# Patient Record
Sex: FEMALE | Race: WHITE | NOT HISPANIC OR LATINO | Employment: OTHER | URBAN - METROPOLITAN AREA
[De-identification: names, ages, dates, MRNs, and addresses within clinical notes are randomized per-mention and may not be internally consistent; named-entity substitution may affect disease eponyms.]

---

## 2017-01-05 ENCOUNTER — DOCUMENTATION ONLY (OUTPATIENT)
Dept: NEUROLOGY | Facility: CLINIC | Age: 52
End: 2017-01-05

## 2017-01-05 NOTE — PROGRESS NOTES
Notified via fax from [employer] that patient has been approved for the Free Drug Program for Avonex as of 1/4/2017.

## 2017-01-09 DIAGNOSIS — M79.2 NEUROPATHIC PAIN OF FOOT, LEFT: ICD-10-CM

## 2017-01-09 DIAGNOSIS — G57.93 NEUROPATHIC PAIN OF BOTH FEET: ICD-10-CM

## 2017-01-09 DIAGNOSIS — M79.2 NEUROPATHIC PAIN OF FOOT, RIGHT: ICD-10-CM

## 2017-01-09 DIAGNOSIS — G35 MS (MULTIPLE SCLEROSIS): ICD-10-CM

## 2017-01-10 RX ORDER — GABAPENTIN 400 MG/1
CAPSULE ORAL
Qty: 450 CAPSULE | Refills: 1 | Status: SHIPPED | OUTPATIENT
Start: 2017-01-10 | End: 2017-05-31 | Stop reason: SDUPTHER

## 2017-01-10 RX ORDER — CARBAMAZEPINE 200 MG/1
TABLET, EXTENDED RELEASE ORAL
Qty: 180 TABLET | Refills: 1 | Status: SHIPPED | OUTPATIENT
Start: 2017-01-10 | End: 2017-01-19

## 2017-01-10 RX ORDER — GABAPENTIN 600 MG/1
TABLET ORAL
Qty: 270 TABLET | Refills: 1 | Status: SHIPPED | OUTPATIENT
Start: 2017-01-10 | End: 2017-05-31 | Stop reason: SDUPTHER

## 2017-01-17 ENCOUNTER — LAB VISIT (OUTPATIENT)
Dept: LAB | Facility: HOSPITAL | Age: 52
End: 2017-01-17
Attending: INTERNAL MEDICINE
Payer: MEDICARE

## 2017-01-17 DIAGNOSIS — D50.9 IRON DEFICIENCY ANEMIA, UNSPECIFIED: ICD-10-CM

## 2017-01-17 LAB
BASOPHILS # BLD AUTO: 0.02 K/UL
BASOPHILS NFR BLD: 0.2 %
DIFFERENTIAL METHOD: ABNORMAL
EOSINOPHIL # BLD AUTO: 0.2 K/UL
EOSINOPHIL NFR BLD: 2 %
ERYTHROCYTE [DISTWIDTH] IN BLOOD BY AUTOMATED COUNT: 12.7 %
FERRITIN SERPL-MCNC: 36 NG/ML
HCT VFR BLD AUTO: 40 %
HGB BLD-MCNC: 14.3 G/DL
IRON SERPL-MCNC: 91 UG/DL
LYMPHOCYTES # BLD AUTO: 1.7 K/UL
LYMPHOCYTES NFR BLD: 20.4 %
MCH RBC QN AUTO: 29.7 PG
MCHC RBC AUTO-ENTMCNC: 35.8 %
MCV RBC AUTO: 83 FL
MONOCYTES # BLD AUTO: 0.5 K/UL
MONOCYTES NFR BLD: 5.8 %
NEUTROPHILS # BLD AUTO: 6.1 K/UL
NEUTROPHILS NFR BLD: 71.4 %
PLATELET # BLD AUTO: 306 K/UL
PMV BLD AUTO: 8.4 FL
RBC # BLD AUTO: 4.81 M/UL
SATURATED IRON: 24 %
TOTAL IRON BINDING CAPACITY: 385 UG/DL
TRANSFERRIN SERPL-MCNC: 260 MG/DL
WBC # BLD AUTO: 8.55 K/UL

## 2017-01-17 PROCEDURE — 85025 COMPLETE CBC W/AUTO DIFF WBC: CPT

## 2017-01-17 PROCEDURE — 84466 ASSAY OF TRANSFERRIN: CPT

## 2017-01-17 PROCEDURE — 36415 COLL VENOUS BLD VENIPUNCTURE: CPT

## 2017-01-17 PROCEDURE — 82728 ASSAY OF FERRITIN: CPT

## 2017-01-17 PROCEDURE — 83540 ASSAY OF IRON: CPT

## 2017-01-18 ENCOUNTER — DOCUMENTATION ONLY (OUTPATIENT)
Dept: NEUROLOGY | Facility: CLINIC | Age: 52
End: 2017-01-18

## 2017-01-19 ENCOUNTER — OFFICE VISIT (OUTPATIENT)
Dept: HEMATOLOGY/ONCOLOGY | Facility: CLINIC | Age: 52
End: 2017-01-19
Payer: MEDICARE

## 2017-01-19 ENCOUNTER — TELEPHONE (OUTPATIENT)
Dept: NEUROLOGY | Facility: CLINIC | Age: 52
End: 2017-01-19

## 2017-01-19 VITALS
TEMPERATURE: 98 F | DIASTOLIC BLOOD PRESSURE: 60 MMHG | WEIGHT: 137.13 LBS | HEART RATE: 62 BPM | BODY MASS INDEX: 26.78 KG/M2 | SYSTOLIC BLOOD PRESSURE: 102 MMHG | OXYGEN SATURATION: 95 %

## 2017-01-19 DIAGNOSIS — D50.9 IRON DEFICIENCY ANEMIA, UNSPECIFIED IRON DEFICIENCY ANEMIA TYPE: Primary | ICD-10-CM

## 2017-01-19 PROCEDURE — 99213 OFFICE O/P EST LOW 20 MIN: CPT | Mod: S$GLB,,, | Performed by: INTERNAL MEDICINE

## 2017-01-19 PROCEDURE — 1159F MED LIST DOCD IN RCRD: CPT | Mod: S$GLB,,, | Performed by: INTERNAL MEDICINE

## 2017-01-19 PROCEDURE — 99999 PR PBB SHADOW E&M-EST. PATIENT-LVL IV: CPT | Mod: PBBFAC,,, | Performed by: INTERNAL MEDICINE

## 2017-01-19 PROCEDURE — 3078F DIAST BP <80 MM HG: CPT | Mod: S$GLB,,, | Performed by: INTERNAL MEDICINE

## 2017-01-19 PROCEDURE — 3074F SYST BP LT 130 MM HG: CPT | Mod: S$GLB,,, | Performed by: INTERNAL MEDICINE

## 2017-01-19 RX ORDER — CARBAMAZEPINE 200 MG/1
200 TABLET, EXTENDED RELEASE ORAL 2 TIMES DAILY
COMMUNITY
End: 2017-02-20 | Stop reason: SDUPTHER

## 2017-01-19 NOTE — PROGRESS NOTES
"Subjective:       Patient ID: Zoey Cali is a 51 y.o. female.    Chief Complaint: Follow-up  Diagnosis: TRACIE  HPI 51 y/o female with history of MS seen  today for f/u for TRACIE  She undergoes intermittent IV iron therapy.  She has been intolerant of oral Fe supp  s/p GI eval with EGD and colonoscopy which was unremarkable    She is followed by Dr. Mendez for long standing history of MS and remains on therapy with Avonex   She has chronic pain  and neuralgia and on opioid pain medication managed by PCP and Neurology    Today , she reports mild fatuge  She ran out of Tegretol 4 days ago  She reports "trembling  in legs" and HAs since d/c medication  No SOB/CP/N/V  No melena, hematochezia or change in bowel habits    MRI brain planned end of February     CBC reveals wbc 8550/mm3  Hb 14.3g/d  40 g/dl   plt ct 306k    Review of Systems   Constitutional: Positive for fatigue. Negative for appetite change, chills and fever.   Eyes: Negative for visual disturbance.   Respiratory: Negative for cough and shortness of breath.    Cardiovascular: Negative for chest pain, palpitations and leg swelling.   Gastrointestinal: Negative for abdominal pain, blood in stool, constipation, diarrhea, nausea and vomiting.   Genitourinary: Negative for dysuria, frequency and hematuria.   Musculoskeletal: Negative for arthralgias, back pain and joint swelling.   Skin: Negative for rash.        No petechiae, ecchymoses   Neurological: Positive for headaches. Negative for light-headedness.        Paresthesias   Hematological: Negative for adenopathy. Does not bruise/bleed easily.       Objective:       Vitals:    01/19/17 1104   BP: 102/60   BP Location: Left arm   Patient Position: Sitting   BP Method: Manual   Pulse: 62   Temp: 98.4 °F (36.9 °C)   TempSrc: Oral   SpO2: 95%   Weight: 62.2 kg (137 lb 2 oz)       Physical Exam   Constitutional: She is oriented to person, place, and time. She appears well-developed and well-nourished.   HENT: "   Head: Normocephalic.   Mouth/Throat: Oropharynx is clear and moist. No oropharyngeal exudate.   Eyes: Conjunctivae and lids are normal. Pupils are equal, round, and reactive to light. No scleral icterus.   Neck: Normal range of motion. Neck supple. No thyromegaly present.   Cardiovascular: Normal rate, regular rhythm and normal heart sounds.    No murmur heard.  Pulmonary/Chest: Breath sounds normal. She has no wheezes. She has no rales.   Abdominal: Soft. Bowel sounds are normal. She exhibits no distension and no mass. There is no hepatosplenomegaly. There is no tenderness. There is no rebound and no guarding.   Musculoskeletal: Normal range of motion. She exhibits no edema or tenderness.   Lymphadenopathy:     She has no cervical adenopathy.     She has no axillary adenopathy.        Right: No supraclavicular adenopathy present.        Left: No supraclavicular adenopathy present.   Neurological: She is alert and oriented to person, place, and time. No cranial nerve deficit. Coordination normal.   Skin: Skin is warm and dry. No ecchymosis, no petechiae and no rash noted. No erythema.   Psychiatric: She has a normal mood and affect.        Lab Results   Component Value Date    WBC 8.55 01/17/2017    HGB 14.3 01/17/2017    HCT 40.0 01/17/2017    MCV 83 01/17/2017     01/17/2017     Lab Results   Component Value Date    IRON 91 01/17/2017    TIBC 385 01/17/2017    FERRITIN 36 01/17/2017       Assessment:       1. Iron deficiency anemia, unspecified iron deficiency anemia type        Plan:   Zoey FRASER was seen today for follow-up.    Diagnoses and all orders for this visit:    Iron deficiency anemia, unspecified iron deficiency       S/p intermittent IV Fe therapy       S/p GI eval-unremarkable       CBC reveals stable Hb 14+ g/dl        Fe parameters wnl        No indication for IV iron        Cont to monitor                  Nursing staff will contact Dr. Mendez's office today regarding refill of Tegretol       F/u 3 mos with cbc, Fe studies prior to f/u( or sooner if problems should arise in the interim)      Cc: MD Heidy Lancaster MD

## 2017-01-19 NOTE — MR AVS SNAPSHOT
[facility]  [address] [place] [address]  Phone: [phone]                  Zoey Cali   2017 10:30 AM   Office Visit    Description:  Female : 1965   Provider:  Charito Mejia MD   [facility]           Reason for Visit     Follow-up           Diagnoses this Visit        Comments    Iron deficiency anemia, unspecified iron deficiency anemia type    -  Primary            To Do List           Future Appointments        Provider Department Dept Phone    2017 10:30 AM [facility] MRI2 [facility] [phone]    3/1/2017 10:00 AM Charito Mejia MD [facility] [phone]    3/29/2017 12:45 PM [facility] Multiple S[facility] [phone]      Goals (5 Years of Data)     None      Follow-Up and Disposition     Return in about 2 months (around 3/19/2017).      [facility]     [facility] -  Assistance  Registered nurses in the [facility] provide clinical advisement, health education, appointment booking, and other advisory services.  Call for this free service at [phone].             Medications           Message regarding Medications     Verify the changes and/or additions to your medication regime listed below are the same as discussed with your clinician today.  If any of these changes or additions are incorrect, please notify your healthcare provider.        STOP taking these medications     albuterol (PROAIR HFA) 90 mcg/actuation inhaler Inhale 2 puffs into the lungs every 4 (four) hours as needed for Wheezing.           Verify that the below list of medications is an accurate representation of the medications you are currently taking.  If none reported, the list may be blank. If incorrect, please contact your healthcare provider. Carry this list with you in case of emergency.           Current Medications      cholecalciferol, vitamin D3, (VITAMIN D3) 5,000 unit Tab Take 5,000 Units by mouth once daily.    diazePAM (VALIUM) 5 MG tablet Take 1 to 2 tabs po 2 hours prior to MRI    escitalopram oxalate (LEXAPRO) 10 MG tablet Take 2 tablets (20 mg total) by mouth once daily.    gabapentin (NEURONTIN) 400 MG capsule TAKES 1 CAPSULE @ 9AM, 1 CAPSULE @ 1PM, 1 CAPSULE @ 5PM, 2 CAPSULES @ BEDTIME    gabapentin (NEURONTIN) 600 MG tablet TAKE 1 TABLET THREE TIMES DAILY    hydrocodone-acetaminophen 10-325mg (NORCO)  mg Tab Starting on Feb 01, 2017. Take 1 tablet by mouth every 6 (six) hours as needed.    ibuprofen (ADVIL,MOTRIN) 200 MG tablet Take 800 mg by mouth. Takes 4 tablets every 6 hours post Avonex injection    interferon beta-1a (AVONEX) 30 mcg/0.5 mL PnKt Inject 30 mcg into the muscle once a week.    lidocaine-prilocaine (EMLA) cream Apply topically 3 (three) times daily.     lorazepam (ATIVAN) 1 MG tablet Take 1 tablet (1 mg total) by mouth nightly as needed.    meclizine (ANTIVERT) 25 mg tablet Take 1 tablet (25 mg total) by mouth as needed. 1 Tablet Oral Every day    metoprolol succinate (TOPROL-XL) 100 MG 24 hr tablet Take 1 tablet (100 mg total) by mouth once daily.    omeprazole (PRILOSEC) 40 MG capsule Take 40 mg by mouth every evening.     promethazine (PHENERGAN) 25 MG tablet Take 1 tablet (25 mg total) by mouth every 4 (four) hours.    simvastatin (ZOCOR) 20 MG tablet Take 2 tablets (40 mg total) by mouth every evening.    tizanidine (ZANAFLEX) 4 MG tablet TAKE 2 TABLETS IN THE MORNING, AT NOON, AND IN THE EVENING AND 3 TABLETS AT NIGHT (9 TABLETS PER DAY)    triamterene-hydrochlorothiazide 37.5-25 mg (MAXZIDE-25) 37.5-25 mg per tablet Take 1 tablet by mouth once daily.    carbamazepine (TEGRETOL XR) 200 MG 12 hr tablet Take 200 mg by mouth 2 (two) times daily.    lidocaine (LIDODERM) 5 %(700 mg/patch) Place 1 patch onto the skin once daily. Remove & Discard patch within 12 hours or as directed by MD            Clinical Reference Information           Vital Signs - Last Recorded  Most recent update: 1/19/2017 11:05 AM by Brittany Whitmore RN    BP Pulse Temp Wt SpO2 BMI    102/60 (BP Location: Left arm, Patient Position: Sitting, BP Method: Manual) 62 98.4 °F (36.9 °C) (Oral) 62.2 kg (137 lb 2 oz) 95% 26.78 kg/m2      Blood Pressure          Most Recent Value    BP  102/60      Allergies as of 1/19/2017     Lomotil [Diphenoxylate-atropine]    Dilaudid [Hydromorphone (Bulk)]      Immunizations Administered on Date of Encounter - 1/19/2017     None      Smoking Cessation     If you would like to quit smoking:   You may be eligible for free services if you are a [place] resident and started smoking cigarettes before September 1, 1988.  Call the Smoking Cessation Trust (SCT) toll free at [phone] or [phone].   Call 6-097-QUIT-NOW if you do not meet the above criteria.

## 2017-01-19 NOTE — TELEPHONE ENCOUNTER
Call received from Kia. She states that pt's Tegretol is $294 for a 90 day supply. Pt is out of medication and doesn't know what to do.    Call placed to [facility]. Spoke with Hakeem. He states that pt has $400 deductible since start of new year. Her current 90 day fill will be $294 and next 90 day fill will be $105.60. Her subsequent fills will be her much lower standard copay.    Call placed to pt and informed her of the above and that we have faxed in a PA request to [facility] for tegretol. She would like a 30 day supply called in to [facility] in order to tide her over while waiting for the mail delivery issue to be sorted out.    Call placed to [facility] and spoke with Jena. Called in a 30 day supply.

## 2017-01-19 NOTE — TELEPHONE ENCOUNTER
----- Message from Sharlene Cano sent at 1/19/2017 11:22 AM CST -----  Contact: [facility]  Would like to speak with someone, regarding her carbamazepine (TEGRETOL XR) 200 MG 12 hr tablet    Please call: [phone]

## 2017-01-20 NOTE — TELEPHONE ENCOUNTER
Call placed to pt. She has not picked up her rx yet. I informed her that we received a denial to lower the tier classification of her carbamazepine er. We discussed prescription discount cards and [email or web address] to help her with medication cost. Verbalizes understanding of all. States she will call this office if there are any other issues with filling her rx.

## 2017-02-01 ENCOUNTER — HOSPITAL ENCOUNTER (OUTPATIENT)
Dept: RADIOLOGY | Facility: HOSPITAL | Age: 52
Discharge: HOME | End: 2017-02-01
Attending: PSYCHIATRY & NEUROLOGY
Payer: MEDICARE

## 2017-02-01 DIAGNOSIS — G35 MS (MULTIPLE SCLEROSIS): ICD-10-CM

## 2017-02-01 PROCEDURE — A9585 GADOBUTROL INJECTION: HCPCS | Performed by: PSYCHIATRY & NEUROLOGY

## 2017-02-01 PROCEDURE — 70553 MRI BRAIN STEM W/O & W/DYE: CPT | Mod: TC

## 2017-02-01 PROCEDURE — 25500020 PHARM REV CODE 255: Performed by: PSYCHIATRY & NEUROLOGY

## 2017-02-01 PROCEDURE — 70553 MRI BRAIN STEM W/O & W/DYE: CPT | Mod: 26,,, | Performed by: RADIOLOGY

## 2017-02-01 RX ORDER — GADOBUTROL 604.72 MG/ML
7 INJECTION INTRAVENOUS
Status: COMPLETED | OUTPATIENT
Start: 2017-02-01 | End: 2017-02-01

## 2017-02-01 RX ADMIN — GADOBUTROL 7 ML: 604.72 INJECTION INTRAVENOUS at 10:02

## 2017-02-17 DIAGNOSIS — G35 MULTIPLE SCLEROSIS: Primary | ICD-10-CM

## 2017-02-20 ENCOUNTER — HOSPITAL ENCOUNTER (OUTPATIENT)
Dept: RADIOLOGY | Facility: HOSPITAL | Age: 52
Discharge: HOME | End: 2017-02-20
Attending: OBSTETRICS & GYNECOLOGY
Payer: MEDICARE

## 2017-02-20 DIAGNOSIS — Z12.31 OTHER SCREENING MAMMOGRAM: ICD-10-CM

## 2017-02-20 PROCEDURE — 77063 BREAST TOMOSYNTHESIS BI: CPT | Mod: 26,,, | Performed by: RADIOLOGY

## 2017-02-20 PROCEDURE — 77067 SCR MAMMO BI INCL CAD: CPT | Mod: TC

## 2017-02-20 PROCEDURE — 77067 SCR MAMMO BI INCL CAD: CPT | Mod: 26,,, | Performed by: RADIOLOGY

## 2017-02-21 ENCOUNTER — PATIENT MESSAGE (OUTPATIENT)
Dept: OBSTETRICS AND GYNECOLOGY | Facility: CLINIC | Age: 52
End: 2017-02-21

## 2017-02-22 RX ORDER — CARBAMAZEPINE 200 MG/1
TABLET, EXTENDED RELEASE ORAL
Qty: 60 TABLET | Refills: 5 | Status: SHIPPED | OUTPATIENT
Start: 2017-02-22 | End: 2017-08-23 | Stop reason: SDUPTHER

## 2017-02-27 ENCOUNTER — LAB VISIT (OUTPATIENT)
Dept: LAB | Facility: HOSPITAL | Age: 52
End: 2017-02-27
Attending: INTERNAL MEDICINE
Payer: MEDICARE

## 2017-02-27 DIAGNOSIS — D50.9 IRON DEFICIENCY ANEMIA, UNSPECIFIED: ICD-10-CM

## 2017-02-27 LAB
BASOPHILS # BLD AUTO: 0.04 K/UL
BASOPHILS NFR BLD: 0.6 %
DIFFERENTIAL METHOD: ABNORMAL
EOSINOPHIL # BLD AUTO: 0.2 K/UL
EOSINOPHIL NFR BLD: 3.1 %
ERYTHROCYTE [DISTWIDTH] IN BLOOD BY AUTOMATED COUNT: 13.2 %
FERRITIN SERPL-MCNC: 38 NG/ML
HCT VFR BLD AUTO: 40.1 %
HGB BLD-MCNC: 14.2 G/DL
IRON SERPL-MCNC: 74 UG/DL
LYMPHOCYTES # BLD AUTO: 1.6 K/UL
LYMPHOCYTES NFR BLD: 24.7 %
MCH RBC QN AUTO: 29.3 PG
MCHC RBC AUTO-ENTMCNC: 35.4 %
MCV RBC AUTO: 83 FL
MONOCYTES # BLD AUTO: 0.4 K/UL
MONOCYTES NFR BLD: 5.7 %
NEUTROPHILS # BLD AUTO: 4.3 K/UL
NEUTROPHILS NFR BLD: 65.7 %
PLATELET # BLD AUTO: 278 K/UL
PMV BLD AUTO: 9.1 FL
RBC # BLD AUTO: 4.85 M/UL
SATURATED IRON: 19 %
TOTAL IRON BINDING CAPACITY: 397 UG/DL
TRANSFERRIN SERPL-MCNC: 268 MG/DL
WBC # BLD AUTO: 6.47 K/UL

## 2017-02-27 PROCEDURE — 83540 ASSAY OF IRON: CPT

## 2017-02-27 PROCEDURE — 82728 ASSAY OF FERRITIN: CPT

## 2017-02-27 PROCEDURE — 36415 COLL VENOUS BLD VENIPUNCTURE: CPT

## 2017-02-27 PROCEDURE — 85025 COMPLETE CBC W/AUTO DIFF WBC: CPT

## 2017-03-01 ENCOUNTER — OFFICE VISIT (OUTPATIENT)
Dept: HEMATOLOGY/ONCOLOGY | Facility: CLINIC | Age: 52
End: 2017-03-01
Payer: MEDICARE

## 2017-03-01 VITALS
DIASTOLIC BLOOD PRESSURE: 64 MMHG | OXYGEN SATURATION: 96 % | HEIGHT: 60 IN | SYSTOLIC BLOOD PRESSURE: 118 MMHG | TEMPERATURE: 98 F | HEART RATE: 62 BPM | WEIGHT: 137.56 LBS | BODY MASS INDEX: 27.01 KG/M2

## 2017-03-01 DIAGNOSIS — D50.9 IRON DEFICIENCY ANEMIA, UNSPECIFIED IRON DEFICIENCY ANEMIA TYPE: Primary | ICD-10-CM

## 2017-03-01 PROCEDURE — 3078F DIAST BP <80 MM HG: CPT | Mod: S$GLB,,, | Performed by: INTERNAL MEDICINE

## 2017-03-01 PROCEDURE — 1160F RVW MEDS BY RX/DR IN RCRD: CPT | Mod: S$GLB,,, | Performed by: INTERNAL MEDICINE

## 2017-03-01 PROCEDURE — 3074F SYST BP LT 130 MM HG: CPT | Mod: S$GLB,,, | Performed by: INTERNAL MEDICINE

## 2017-03-01 PROCEDURE — 99999 PR PBB SHADOW E&M-EST. PATIENT-LVL IV: CPT | Mod: PBBFAC,,, | Performed by: INTERNAL MEDICINE

## 2017-03-01 PROCEDURE — 99213 OFFICE O/P EST LOW 20 MIN: CPT | Mod: S$GLB,,, | Performed by: INTERNAL MEDICINE

## 2017-03-01 RX ORDER — SODIUM CHLORIDE 0.9 % (FLUSH) 0.9 %
10 SYRINGE (ML) INJECTION
Status: CANCELLED | OUTPATIENT
Start: 2017-03-09

## 2017-03-01 RX ORDER — SODIUM CHLORIDE 0.9 % (FLUSH) 0.9 %
10 SYRINGE (ML) INJECTION
Status: CANCELLED | OUTPATIENT
Start: 2017-03-16

## 2017-03-01 RX ORDER — HEPARIN 100 UNIT/ML
500 SYRINGE INTRAVENOUS
Status: CANCELLED | OUTPATIENT
Start: 2017-03-09

## 2017-03-01 RX ORDER — CIPROFLOXACIN HYDROCHLORIDE 3 MG/ML
SOLUTION/ DROPS OPHTHALMIC
Refills: 0 | COMMUNITY
Start: 2017-02-07 | End: 2017-03-29

## 2017-03-01 RX ORDER — HEPARIN 100 UNIT/ML
500 SYRINGE INTRAVENOUS
Status: CANCELLED | OUTPATIENT
Start: 2017-03-16

## 2017-03-01 NOTE — PROGRESS NOTES
Subjective:       Patient ID: Zoey Cali is a 51 y.o. female.    Chief Complaint: Follow-up  Diagnosis: TRACIE  HPI 51 y/o female with history of MS seen  today for f/u for TRACIE  She undergoes intermittent IV iron therapy.  She has been intolerant of oral Fe supp  s/p GI eval with EGD and colonoscopy which was unremarkable    She is followed by Dr. Mendez for long standing history of MS and remains on therapy with Avonex   She has chronic pain  and neuralgia and on opioid pain medication managed by PCP and Neurology    Today , she reports no new issues  She continues with mild fatuge  No SOB/CP/N/V  No melena, hematochezia or change in bowel habits      CBC reveals wbc 6470/mm3  Hb 14.2g/d  40 g/dl   plt ct 270k    Review of Systems   Constitutional: Positive for fatigue. Negative for appetite change, chills and fever.   Eyes: Negative for visual disturbance.   Respiratory: Negative for cough and shortness of breath.    Cardiovascular: Negative for chest pain, palpitations and leg swelling.   Gastrointestinal: Negative for abdominal pain, blood in stool, constipation, diarrhea, nausea and vomiting.   Genitourinary: Negative for dysuria, frequency and hematuria.   Musculoskeletal: Negative for arthralgias, back pain and joint swelling.   Skin: Negative for rash.        No petechiae, ecchymoses   Neurological: Positive for headaches. Negative for light-headedness.        Paresthesias   Hematological: Negative for adenopathy. Does not bruise/bleed easily.       Objective:       Vitals:    03/01/17 1044   BP: 118/64   BP Location: Right arm   Patient Position: Sitting   BP Method: Manual   Pulse: 62   Temp: 97.7 °F (36.5 °C)   TempSrc: Oral   SpO2: 96%   Weight: 62.4 kg (137 lb 9.1 oz)   Height: 5' (1.524 m)       Physical Exam   Constitutional: She is oriented to person, place, and time. She appears well-developed and well-nourished.   HENT:   Head: Normocephalic.   Mouth/Throat: Oropharynx is clear and moist. No  oropharyngeal exudate.   Eyes: Conjunctivae and lids are normal. Pupils are equal, round, and reactive to light. No scleral icterus.   Neck: Normal range of motion. Neck supple. No thyromegaly present.   Cardiovascular: Normal rate, regular rhythm and normal heart sounds.    No murmur heard.  Pulmonary/Chest: Breath sounds normal. She has no wheezes. She has no rales.   Abdominal: Soft. Bowel sounds are normal. She exhibits no distension and no mass. There is no hepatosplenomegaly. There is no tenderness. There is no rebound and no guarding.   Musculoskeletal: Normal range of motion. She exhibits no edema or tenderness.   Lymphadenopathy:     She has no cervical adenopathy.     She has no axillary adenopathy.        Right: No supraclavicular adenopathy present.        Left: No supraclavicular adenopathy present.   Neurological: She is alert and oriented to person, place, and time. No cranial nerve deficit. Coordination normal.   Skin: Skin is warm and dry. No ecchymosis, no petechiae and no rash noted. No erythema.   Psychiatric: She has a normal mood and affect.        Lab Results   Component Value Date    WBC 6.47 02/27/2017    HGB 14.2 02/27/2017    HCT 40.1 02/27/2017    MCV 83 02/27/2017     02/27/2017     Lab Results   Component Value Date    IRON 74 02/27/2017    TIBC 397 02/27/2017    FERRITIN 38 02/27/2017       Assessment:       1. Iron deficiency anemia, unspecified iron deficiency anemia type        Plan:   Zoey FRASER was seen today for follow-up.    Diagnoses and all orders for this visit:    Iron deficiency anemia, unspecified iron deficiency       S/p intermittent IV Fe therapy       S/p GI eval-unremarkable       CBC reveals stable Hb 14+ g/dl        Fe parameters nl except - decreased Fe sat       Plan  IV iron with target Ferrtin > 50                           F/u 3 mos with cbc, Fe studies prior to f/u( or sooner if problems should arise in the interim)      Cc: Devon Collier MD          Heidy Mendez MD

## 2017-03-01 NOTE — MR AVS SNAPSHOT
[facility]  [address] [place] [address]  Phone: [phone]                  Zoey Cali   3/1/2017 10:00 AM   Office Visit    Description:  Female : 1965   Provider:  Charito Mejia MD   Department:  [facility] Oncology           Reason for Visit     Follow-up           Diagnoses this Visit        Comments    Iron deficiency anemia, unspecified iron deficiency anemia type    -  Primary            To Do List           Future Appointments        Provider Department Dept Phone    3/29/2017 12:45 PM [facility]- Multiple Sclerosis [phone]    2017 10:00 AM [facility] [phone]    2017 10:30 AM Charito Mejia [facility] [phone]      Goals (5 Years of Data)     None      Follow-Up and Disposition     Return in about 3 months (around 2017).      [facility]     [facility] Care [facility] -  Assistance  Registered nurses in the [facility] provide clinical advisement, health education, appointment booking, and other advisory services.  Call for this free service at [phone].             Medications           Message regarding Medications     Verify the changes and/or additions to your medication regime listed below are the same as discussed with your clinician today.  If any of these changes or additions are incorrect, please notify your healthcare provider.        STOP taking these medications     diazePAM (VALIUM) 5 MG tablet Take 1 to 2 tabs po 2 hours prior to MRI    lidocaine (LIDODERM) 5 %(700 mg/patch) Place 1 patch onto the skin once daily. Remove & Discard patch within 12 hours or as directed by MD           Verify that the below list of medications is an accurate representation of the medications you are currently taking.  If none reported, the list may be blank. If incorrect, please contact your healthcare  provider. Carry this list with you in case of emergency.           Current Medications     carbamazepine (TEGRETOL XR) 200 MG 12 hr tablet TAKE ONE TABLET TWICE DAILY    cholecalciferol, vitamin D3, (VITAMIN D3) 5,000 unit Tab Take 5,000 Units by mouth once daily.    ciprofloxacin HCl (CILOXAN) 0.3 % ophthalmic solution TWO DROPS IN LEFT EYE EVERY 4 HOURS FOR 10 DAYS    escitalopram oxalate (LEXAPRO) 10 MG tablet Take 2 tablets (20 mg total) by mouth once daily.    gabapentin (NEURONTIN) 400 MG capsule TAKES 1 CAPSULE @ 9AM, 1 CAPSULE @ 1PM, 1 CAPSULE @ 5PM, 2 CAPSULES @ BEDTIME    gabapentin (NEURONTIN) 600 MG tablet TAKE 1 TABLET THREE TIMES DAILY    hydrocodone-acetaminophen 10-325mg (NORCO)  mg Tab Take 1 tablet by mouth every 6 (six) hours as needed.    ibuprofen (ADVIL,MOTRIN) 200 MG tablet Take 800 mg by mouth. Takes 4 tablets every 6 hours post Avonex injection    interferon beta-1a (AVONEX) 30 mcg/0.5 mL PnKt Inject 30 mcg into the muscle once a week.    lidocaine-prilocaine (EMLA) cream Apply topically 3 (three) times daily.     lorazepam (ATIVAN) 1 MG tablet Take 1 tablet (1 mg total) by mouth nightly as needed.    meclizine (ANTIVERT) 25 mg tablet Take 1 tablet (25 mg total) by mouth as needed. 1 Tablet Oral Every day    metoprolol succinate (TOPROL-XL) 100 MG 24 hr tablet Take 1 tablet (100 mg total) by mouth once daily.    omeprazole (PRILOSEC) 40 MG capsule Take 40 mg by mouth every evening.     promethazine (PHENERGAN) 25 MG tablet Take 1 tablet (25 mg total) by mouth every 4 (four) hours.    simvastatin (ZOCOR) 20 MG tablet Take 2 tablets (40 mg total) by mouth every evening.    tizanidine (ZANAFLEX) 4 MG tablet TAKE 2 TABLETS IN THE MORNING, AT NOON, AND IN THE EVENING AND 3 TABLETS AT NIGHT (9 TABLETS PER DAY)    triamterene-hydrochlorothiazide 37.5-25 mg (MAXZIDE-25) 37.5-25 mg per tablet Take 1 tablet by mouth once daily.           Clinical Reference Information           Your Vitals Were      BP                   118/64 (BP Location: Right arm, Patient Position: Sitting, BP Method: Manual)           Blood Pressure          Most Recent Value    BP  118/64      Allergies as of 3/1/2017     Lomotil [Diphenoxylate-atropine]    Dilaudid [Hydromorphone (Bulk)]      Immunizations Administered on Date of Encounter - 3/1/2017     None      Smoking Cessation     If you would like to quit smoking:   You may be eligible for free services if you are a [place] resident and started smoking cigarettes before September 1, 1988.  Call the Smoking Cessation Trust (SCT) toll free at [phone] or [phone].   Call 4-689-QUIT-NOW if you do not meet the above criteria.            Language Assistance Services     ATTENTION: Language assistance services are available, free of charge. Please call [phone].      ATENCIÓN: Si batsheva chapa, tiene a small disposición servicios gratuitos de asistencia lingüística. Llame al [phone].     CHÚ Ý: N?u b?n nói Ti?ng Vi?t, có các d?ch v? h? tr? ngôn ng? mi?n phí dành cho b?n. G?i s? [phone].         [facility] complies with applicable Federal civil rights laws and does not discriminate on the basis of race, color, national origin, age, disability, or sex.

## 2017-03-09 ENCOUNTER — INFUSION (OUTPATIENT)
Dept: INFUSION THERAPY | Facility: HOSPITAL | Age: 52
End: 2017-03-09
Attending: INTERNAL MEDICINE
Payer: MEDICARE

## 2017-03-09 VITALS — HEART RATE: 62 BPM | DIASTOLIC BLOOD PRESSURE: 58 MMHG | SYSTOLIC BLOOD PRESSURE: 101 MMHG | RESPIRATION RATE: 14 BRPM

## 2017-03-09 DIAGNOSIS — D50.9 IRON DEFICIENCY ANEMIA, UNSPECIFIED IRON DEFICIENCY ANEMIA TYPE: Primary | ICD-10-CM

## 2017-03-09 PROCEDURE — 63600175 PHARM REV CODE 636 W HCPCS: Performed by: INTERNAL MEDICINE

## 2017-03-09 PROCEDURE — 25000003 PHARM REV CODE 250: Performed by: INTERNAL MEDICINE

## 2017-03-09 PROCEDURE — 96374 THER/PROPH/DIAG INJ IV PUSH: CPT

## 2017-03-09 RX ADMIN — SODIUM CHLORIDE: 0.9 INJECTION, SOLUTION INTRAVENOUS at 01:03

## 2017-03-09 RX ADMIN — IRON SUCROSE 200 MG: 20 INJECTION, SOLUTION INTRAVENOUS at 01:03

## 2017-03-09 NOTE — PLAN OF CARE
Problem: Patient Care Overview (Adult)  Goal: Plan of Care Review  Outcome: Ongoing (interventions implemented as appropriate)  Pt will inform md of any changes while on Venofer.

## 2017-03-16 ENCOUNTER — INFUSION (OUTPATIENT)
Dept: INFUSION THERAPY | Facility: HOSPITAL | Age: 52
End: 2017-03-16
Attending: INTERNAL MEDICINE
Payer: MEDICARE

## 2017-03-16 VITALS — SYSTOLIC BLOOD PRESSURE: 109 MMHG | DIASTOLIC BLOOD PRESSURE: 71 MMHG | HEART RATE: 76 BPM | RESPIRATION RATE: 16 BRPM

## 2017-03-16 DIAGNOSIS — D50.9 IRON DEFICIENCY ANEMIA, UNSPECIFIED IRON DEFICIENCY ANEMIA TYPE: Primary | ICD-10-CM

## 2017-03-16 PROCEDURE — 96372 THER/PROPH/DIAG INJ SC/IM: CPT

## 2017-03-16 PROCEDURE — 25000003 PHARM REV CODE 250: Performed by: INTERNAL MEDICINE

## 2017-03-16 PROCEDURE — 63600175 PHARM REV CODE 636 W HCPCS: Performed by: INTERNAL MEDICINE

## 2017-03-16 RX ADMIN — SODIUM CHLORIDE: 0.9 INJECTION, SOLUTION INTRAVENOUS at 01:03

## 2017-03-16 RX ADMIN — IRON SUCROSE 200 MG: 20 INJECTION, SOLUTION INTRAVENOUS at 01:03

## 2017-03-20 ENCOUNTER — TELEPHONE (OUTPATIENT)
Dept: NEUROLOGY | Facility: CLINIC | Age: 52
End: 2017-03-20

## 2017-03-23 ENCOUNTER — TELEPHONE (OUTPATIENT)
Dept: NEUROLOGY | Facility: CLINIC | Age: 52
End: 2017-03-23

## 2017-03-29 ENCOUNTER — OFFICE VISIT (OUTPATIENT)
Dept: FAMILY MEDICINE | Facility: CLINIC | Age: 52
End: 2017-03-29
Payer: MEDICARE

## 2017-03-29 VITALS
HEART RATE: 71 BPM | HEIGHT: 60 IN | DIASTOLIC BLOOD PRESSURE: 60 MMHG | WEIGHT: 138.69 LBS | SYSTOLIC BLOOD PRESSURE: 100 MMHG | OXYGEN SATURATION: 95 % | RESPIRATION RATE: 16 BRPM | BODY MASS INDEX: 27.23 KG/M2 | TEMPERATURE: 98 F

## 2017-03-29 DIAGNOSIS — J01.90 ACUTE SINUSITIS WITH SYMPTOMS GREATER THAN 10 DAYS: Primary | ICD-10-CM

## 2017-03-29 PROCEDURE — 3078F DIAST BP <80 MM HG: CPT | Mod: S$GLB,,, | Performed by: PHYSICIAN ASSISTANT

## 2017-03-29 PROCEDURE — 3074F SYST BP LT 130 MM HG: CPT | Mod: S$GLB,,, | Performed by: PHYSICIAN ASSISTANT

## 2017-03-29 PROCEDURE — 1160F RVW MEDS BY RX/DR IN RCRD: CPT | Mod: S$GLB,,, | Performed by: PHYSICIAN ASSISTANT

## 2017-03-29 PROCEDURE — 99214 OFFICE O/P EST MOD 30 MIN: CPT | Mod: S$GLB,,, | Performed by: PHYSICIAN ASSISTANT

## 2017-03-29 PROCEDURE — 99999 PR PBB SHADOW E&M-EST. PATIENT-LVL V: CPT | Mod: PBBFAC,,, | Performed by: PHYSICIAN ASSISTANT

## 2017-03-29 RX ORDER — DOXYCYCLINE 100 MG/1
100 TABLET ORAL 2 TIMES DAILY
Qty: 20 TABLET | Refills: 0 | Status: SHIPPED | OUTPATIENT
Start: 2017-03-29 | End: 2017-04-18 | Stop reason: ALTCHOICE

## 2017-03-29 RX ORDER — PROMETHAZINE HYDROCHLORIDE AND DEXTROMETHORPHAN HYDROBROMIDE 6.25; 15 MG/5ML; MG/5ML
5 SYRUP ORAL 4 TIMES DAILY PRN
Qty: 120 ML | Refills: 0 | Status: SHIPPED | OUTPATIENT
Start: 2017-03-29 | End: 2017-04-05

## 2017-03-29 NOTE — PROGRESS NOTES
Subjective:       Patient ID: Zoey Cali is a 51 y.o. female with multiple medical diagnoses as listed in the medical history and problem list that presents for URI (since yesterday)  .    Chief Complaint: URI (since yesterday)      URI    This is a new problem. The current episode started 1 to 4 weeks ago (worse over the weekend ). The problem has been gradually worsening. There has been no fever. Associated symptoms include congestion, coughing, headaches, nausea, rhinorrhea (AM able to blow it ), a sore throat and wheezing (maybe last night ). Pertinent negatives include no abdominal pain, ear pain, sneezing or vomiting. Treatments tried: tylenol sinu, severe congestion, and bendaryl--last dose was last night  The treatment provided mild relief.     Review of Systems   Constitutional: Positive for chills. Negative for fever.   HENT: Positive for congestion, postnasal drip, rhinorrhea (AM able to blow it ), sinus pressure and sore throat. Negative for ear pain, sneezing and trouble swallowing.    Eyes: Negative for pain, discharge, redness and itching.   Respiratory: Positive for cough and wheezing (maybe last night ). Negative for chest tightness and shortness of breath.    Gastrointestinal: Positive for nausea. Negative for abdominal pain and vomiting.   Neurological: Positive for headaches.         PAST MEDICAL HISTORY:  Past Medical History:   Diagnosis Date    Allergy     Anemia     Anxiety     Chronic kidney disease     Depression     Hypertension     Multiple sclerosis     Multiple sclerosis     Neuromuscular disorder     Osteoporosis     Rib fracture 12/2015       SOCIAL HISTORY:  Social History     Social History    Marital status:      Spouse name: N/A    Number of children: N/A    Years of education: N/A     Occupational History    Not on file.     Social History Main Topics    Smoking status: Current Every Day Smoker     Packs/day: 1.00     Types: Cigarettes    Smokeless  tobacco: Never Used    Alcohol use No    Drug use: No    Sexual activity: Yes     Other Topics Concern    Not on file     Social History Narrative       ALLERGIES AND MEDICATIONS: updated and reviewed.  Review of patient's allergies indicates:   Allergen Reactions    Lomotil [diphenoxylate-atropine]      Causes stomach spasms    Dilaudid [hydromorphone (bulk)] Itching     Current Outpatient Prescriptions   Medication Sig Dispense Refill    carbamazepine (TEGRETOL XR) 200 MG 12 hr tablet TAKE ONE TABLET TWICE DAILY 60 tablet 5    cholecalciferol, vitamin D3, (VITAMIN D3) 5,000 unit Tab Take 5,000 Units by mouth once daily.      escitalopram oxalate (LEXAPRO) 10 MG tablet Take 2 tablets (20 mg total) by mouth once daily. 180 tablet 3    gabapentin (NEURONTIN) 400 MG capsule TAKES 1 CAPSULE @ 9AM, 1 CAPSULE @ 1PM, 1 CAPSULE @ 5PM, 2 CAPSULES @ BEDTIME 450 capsule 1    gabapentin (NEURONTIN) 600 MG tablet TAKE 1 TABLET THREE TIMES DAILY 270 tablet 1    hydrocodone-acetaminophen 10-325mg (NORCO)  mg Tab Take 1 tablet by mouth every 6 (six) hours as needed. 120 tablet 0    ibuprofen (ADVIL,MOTRIN) 200 MG tablet Take 800 mg by mouth. Takes 4 tablets every 6 hours post Avonex injection      interferon beta-1a (AVONEX) 30 mcg/0.5 mL PnKt Inject 30 mcg into the muscle once a week. 3 kit 0    lidocaine-prilocaine (EMLA) cream Apply topically 3 (three) times daily.       lorazepam (ATIVAN) 1 MG tablet Take 1 tablet (1 mg total) by mouth nightly as needed. 30 tablet 2    meclizine (ANTIVERT) 25 mg tablet Take 1 tablet (25 mg total) by mouth as needed. 1 Tablet Oral Every day 90 tablet 3    metoprolol succinate (TOPROL-XL) 100 MG 24 hr tablet Take 1 tablet (100 mg total) by mouth once daily. 90 tablet 3    omeprazole (PRILOSEC) 40 MG capsule Take 40 mg by mouth every evening.       promethazine (PHENERGAN) 25 MG tablet Take 1 tablet (25 mg total) by mouth every 4 (four) hours. 45 tablet 0    simvastatin  (ZOCOR) 20 MG tablet Take 2 tablets (40 mg total) by mouth every evening. 180 tablet 3    tizanidine (ZANAFLEX) 4 MG tablet TAKE 2 TABLETS IN THE MORNING, AT NOON, AND IN THE EVENING AND 3 TABLETS AT NIGHT (9 TABLETS PER DAY) 810 tablet 3    triamterene-hydrochlorothiazide 37.5-25 mg (MAXZIDE-25) 37.5-25 mg per tablet Take 1 tablet by mouth once daily. 90 tablet 3    doxycycline monohydrate 100 mg Tab Take 1 tablet (100 mg total) by mouth 2 (two) times daily. Take with food 20 tablet 0    promethazine-dextromethorphan (PROMETHAZINE-DM) 6.25-15 mg/5 mL Syrp Take 5 mLs by mouth 4 (four) times daily as needed. 120 mL 0     No current facility-administered medications for this visit.          Objective:   /60  Pulse 71  Temp 98.2 °F (36.8 °C) (Oral)   Resp 16  Ht 5' (1.524 m)  Wt 62.9 kg (138 lb 10.7 oz)  SpO2 95%  BMI 27.08 kg/m2     Physical Exam   Constitutional: She is oriented to person, place, and time. No distress.   HENT:   Head: Normocephalic and atraumatic.   Right Ear: External ear and ear canal normal. No middle ear effusion.   Left Ear: External ear and ear canal normal.  No middle ear effusion.   Nose: Mucosal edema present. No rhinorrhea. Right sinus exhibits maxillary sinus tenderness and frontal sinus tenderness. Left sinus exhibits maxillary sinus tenderness and frontal sinus tenderness.   Mouth/Throat: Uvula is midline and mucous membranes are normal. Posterior oropharyngeal erythema (PND) present. No oropharyngeal exudate. No tonsillar exudate.   Air fluid levels    Eyes: Conjunctivae and EOM are normal.   Cardiovascular: Normal rate and regular rhythm.    Pulmonary/Chest: Effort normal and breath sounds normal. She has no wheezes.   Negative egophony   Lymphadenopathy:     She has no cervical adenopathy.   Neurological: She is alert and oriented to person, place, and time.   Skin: Skin is warm. No erythema.           Assessment:       1. Acute sinusitis with symptoms greater than 10  days        Plan:       Acute sinusitis with symptoms greater than 10 days  -     doxycycline monohydrate 100 mg Tab; Take 1 tablet (100 mg total) by mouth 2 (two) times daily. Take with food  Dispense: 20 tablet; Refill: 0  -     promethazine-dextromethorphan (PROMETHAZINE-DM) 6.25-15 mg/5 mL Syrp; Take 5 mLs by mouth 4 (four) times daily as needed.  Dispense: 120 mL; Refill: 0  cetrizine or loratadine     Call if no improvement in 2-3 days           No Follow-up on file.

## 2017-03-29 NOTE — MR AVS SNAPSHOT
[facility]  [address]  [address]  [place] [address]  Phone: [phone]  Fax: [phone]                  Zoey Cali   3/29/2017 3:00 PM   Office Visit    Description:  Female : 1965   Provider:  ANTHONY Loving   Department:  [facility]           Reason for Visit     URI           Diagnoses this Visit        Comments    Acute sinusitis with symptoms greater than 10 days    -  Primary            To Do List           Future Appointments        Provider Department Dept Phone    3/29/2017 3:00 PM ANTHONY Loving [facility] [phone]    2017 10:10 AM [facility] [phone]    2017 12:45 PM ANTHONY Arita-KADE Trejo [facility]- Multiple Sclerosis [phone]    2017 10:00 AM [facility] [phone]    2017 10:30 AM Charito Mejia MD [facility] [phone]      Goals (5 Years of Data)     None       These Medications        Disp Refills Start End    doxycycline monohydrate 100 mg Tab 20 tablet 0 3/29/2017     Take 1 tablet (100 mg total) by mouth 2 (two) times daily. Take with food - Oral    Pharmacy: [facility] - [place], [place] - [address] Ph #: [phone]       promethazine-dextromethorphan (PROMETHAZINE-DM) 6.25-15 mg/5 mL Syrp 120 mL 0 3/29/2017 2017    Take 5 mLs by mouth 4 (four) times daily as needed. - Oral    Pharmacy: [facility] - [facility] [place] - [address] Ph #: [phone]         [facility]     [facility] -  Assistance  Registered nurses in the [facility] provide clinical advisement, health education, appointment booking, and other advisory services.  Call for this free service at [phone].             Medications           Message regarding Medications     Verify the changes and/or additions to  your medication regime listed below are the same as discussed with your clinician today.  If any of these changes or additions are incorrect, please notify your healthcare provider.        START taking these NEW medications        Refills    doxycycline monohydrate 100 mg Tab 0    Sig: Take 1 tablet (100 mg total) by mouth 2 (two) times daily. Take with food    Class: Normal    Route: Oral    promethazine-dextromethorphan (PROMETHAZINE-DM) 6.25-15 mg/5 mL Syrp 0    Sig: Take 5 mLs by mouth 4 (four) times daily as needed.    Class: Normal    Route: Oral      STOP taking these medications     ciprofloxacin HCl (CILOXAN) 0.3 % ophthalmic solution TWO DROPS IN LEFT EYE EVERY 4 HOURS FOR 10 DAYS           Verify that the below list of medications is an accurate representation of the medications you are currently taking.  If none reported, the list may be blank. If incorrect, please contact your healthcare provider. Carry this list with you in case of emergency.           Current Medications     carbamazepine (TEGRETOL XR) 200 MG 12 hr tablet TAKE ONE TABLET TWICE DAILY    cholecalciferol, vitamin D3, (VITAMIN D3) 5,000 unit Tab Take 5,000 Units by mouth once daily.    doxycycline monohydrate 100 mg Tab Take 1 tablet (100 mg total) by mouth 2 (two) times daily. Take with food    escitalopram oxalate (LEXAPRO) 10 MG tablet Take 2 tablets (20 mg total) by mouth once daily.    gabapentin (NEURONTIN) 400 MG capsule TAKES 1 CAPSULE @ 9AM, 1 CAPSULE @ 1PM, 1 CAPSULE @ 5PM, 2 CAPSULES @ BEDTIME    gabapentin (NEURONTIN) 600 MG tablet TAKE 1 TABLET THREE TIMES DAILY    hydrocodone-acetaminophen 10-325mg (NORCO)  mg Tab Take 1 tablet by mouth every 6 (six) hours as needed.    ibuprofen (ADVIL,MOTRIN) 200 MG tablet Take 800 mg by mouth. Takes 4 tablets every 6 hours post Avonex injection    interferon beta-1a (AVONEX) 30 mcg/0.5 mL PnKt Inject 30 mcg into the muscle once a week.    lidocaine-prilocaine (EMLA) cream Apply  topically 3 (three) times daily.     lorazepam (ATIVAN) 1 MG tablet Take 1 tablet (1 mg total) by mouth nightly as needed.    meclizine (ANTIVERT) 25 mg tablet Take 1 tablet (25 mg total) by mouth as needed. 1 Tablet Oral Every day    metoprolol succinate (TOPROL-XL) 100 MG 24 hr tablet Take 1 tablet (100 mg total) by mouth once daily.    omeprazole (PRILOSEC) 40 MG capsule Take 40 mg by mouth every evening.     promethazine (PHENERGAN) 25 MG tablet Take 1 tablet (25 mg total) by mouth every 4 (four) hours.    promethazine-dextromethorphan (PROMETHAZINE-DM) 6.25-15 mg/5 mL Syrp Take 5 mLs by mouth 4 (four) times daily as needed.    simvastatin (ZOCOR) 20 MG tablet Take 2 tablets (40 mg total) by mouth every evening.    tizanidine (ZANAFLEX) 4 MG tablet TAKE 2 TABLETS IN THE MORNING, AT NOON, AND IN THE EVENING AND 3 TABLETS AT NIGHT (9 TABLETS PER DAY)    triamterene-hydrochlorothiazide 37.5-25 mg (MAXZIDE-25) 37.5-25 mg per tablet Take 1 tablet by mouth once daily.           Clinical Reference Information           Your Vitals Were     BP Pulse Temp Resp Height Weight    100/60 71 98.2 °F (36.8 °C) (Oral) 16 5' (1.524 m) 62.9 kg (138 lb 10.7 oz)    SpO2 BMI             95% 27.08 kg/m2         Blood Pressure          Most Recent Value    BP  100/60      Allergies as of 3/29/2017     Lomotil [Diphenoxylate-atropine]    Dilaudid [Hydromorphone (Bulk)]      Immunizations Administered on Date of Encounter - 3/29/2017     None      Instructions    cetrizine or loratadine        Smoking Cessation     If you would like to quit smoking:   You may be eligible for free services if you are a [place] resident and started smoking cigarettes before September 1, 1988.  Call the [employer] ([employer]) toll free at (477) 524-3393 or (758) 646-2531.   Call 1-800-QUIT-NOW if you do not meet the above criteria.            Language Assistance Services     ATTENTION: Language assistance services are available, free of charge.  Please call [phone].      ATENCIÓN: Si habla español, tiene a small disposición servicios gratuitos de asistencia lingüística. Llame al [phone].     CHÚ Ý: N?u b?n nói Ti?ng Vi?t, có các d?ch v? h? tr? ngôn ng? mi?n phí dành cho b?n. G?i s? [phone].         [facility] complies with applicable Federal civil rights laws and does not discriminate on the basis of race, color, national origin, age, disability, or sex.

## 2017-04-05 ENCOUNTER — DOCUMENTATION ONLY (OUTPATIENT)
Dept: NEUROLOGY | Facility: CLINIC | Age: 52
End: 2017-04-05

## 2017-04-05 ENCOUNTER — LAB VISIT (OUTPATIENT)
Dept: LAB | Facility: HOSPITAL | Age: 52
End: 2017-04-05
Attending: PSYCHIATRY & NEUROLOGY
Payer: MEDICARE

## 2017-04-05 DIAGNOSIS — G35 MULTIPLE SCLEROSIS: ICD-10-CM

## 2017-04-05 LAB
ALBUMIN SERPL BCP-MCNC: 3.7 G/DL
ALP SERPL-CCNC: 146 U/L
ALT SERPL W/O P-5'-P-CCNC: 12 U/L
AST SERPL-CCNC: 15 U/L
BILIRUB DIRECT SERPL-MCNC: 0.1 MG/DL
BILIRUB SERPL-MCNC: 0.4 MG/DL
PROT SERPL-MCNC: 7.2 G/DL

## 2017-04-05 PROCEDURE — 80076 HEPATIC FUNCTION PANEL: CPT

## 2017-04-05 PROCEDURE — 36415 COLL VENOUS BLD VENIPUNCTURE: CPT

## 2017-04-18 ENCOUNTER — OFFICE VISIT (OUTPATIENT)
Dept: FAMILY MEDICINE | Facility: CLINIC | Age: 52
End: 2017-04-18
Payer: MEDICARE

## 2017-04-18 VITALS
HEART RATE: 70 BPM | WEIGHT: 136.69 LBS | TEMPERATURE: 98 F | BODY MASS INDEX: 26.84 KG/M2 | DIASTOLIC BLOOD PRESSURE: 60 MMHG | OXYGEN SATURATION: 97 % | HEIGHT: 60 IN | SYSTOLIC BLOOD PRESSURE: 104 MMHG

## 2017-04-18 DIAGNOSIS — F11.90 CHRONIC, CONTINUOUS USE OF OPIOIDS: ICD-10-CM

## 2017-04-18 DIAGNOSIS — I10 ESSENTIAL HYPERTENSION: ICD-10-CM

## 2017-04-18 DIAGNOSIS — Z00.00 ANNUAL PHYSICAL EXAM: Primary | ICD-10-CM

## 2017-04-18 DIAGNOSIS — Z72.0 TOBACCO ABUSE: ICD-10-CM

## 2017-04-18 DIAGNOSIS — G35 MULTIPLE SCLEROSIS: ICD-10-CM

## 2017-04-18 DIAGNOSIS — R25.2 SPASTICITY: ICD-10-CM

## 2017-04-18 DIAGNOSIS — E78.5 HYPERLIPIDEMIA, UNSPECIFIED HYPERLIPIDEMIA TYPE: ICD-10-CM

## 2017-04-18 DIAGNOSIS — Z79.1 NSAID LONG-TERM USE: ICD-10-CM

## 2017-04-18 DIAGNOSIS — F32.A DEPRESSION, UNSPECIFIED DEPRESSION TYPE: ICD-10-CM

## 2017-04-18 PROCEDURE — 3078F DIAST BP <80 MM HG: CPT | Mod: S$GLB,,, | Performed by: FAMILY MEDICINE

## 2017-04-18 PROCEDURE — 99406 BEHAV CHNG SMOKING 3-10 MIN: CPT | Mod: S$GLB,,, | Performed by: FAMILY MEDICINE

## 2017-04-18 PROCEDURE — 99214 OFFICE O/P EST MOD 30 MIN: CPT | Mod: 25,S$GLB,, | Performed by: FAMILY MEDICINE

## 2017-04-18 PROCEDURE — 3074F SYST BP LT 130 MM HG: CPT | Mod: S$GLB,,, | Performed by: FAMILY MEDICINE

## 2017-04-18 PROCEDURE — 99999 PR PBB SHADOW E&M-EST. PATIENT-LVL III: CPT | Mod: PBBFAC,,, | Performed by: FAMILY MEDICINE

## 2017-04-18 RX ORDER — HYDROCODONE BITARTRATE AND ACETAMINOPHEN 10; 325 MG/1; MG/1
1 TABLET ORAL EVERY 6 HOURS PRN
Qty: 120 TABLET | Refills: 0 | Status: SHIPPED | OUTPATIENT
Start: 2017-06-12 | End: 2017-08-15 | Stop reason: SDUPTHER

## 2017-04-18 RX ORDER — ESCITALOPRAM OXALATE 20 MG/1
20 TABLET ORAL DAILY
Qty: 90 TABLET | Refills: 3 | Status: SHIPPED | OUTPATIENT
Start: 2017-04-18 | End: 2017-08-15 | Stop reason: SDUPTHER

## 2017-04-18 RX ORDER — SIMVASTATIN 40 MG/1
40 TABLET, FILM COATED ORAL NIGHTLY
Qty: 90 TABLET | Refills: 3 | Status: SHIPPED | OUTPATIENT
Start: 2017-04-18 | End: 2017-08-15 | Stop reason: SDUPTHER

## 2017-04-18 RX ORDER — HYDROCODONE BITARTRATE AND ACETAMINOPHEN 10; 325 MG/1; MG/1
1 TABLET ORAL EVERY 6 HOURS PRN
Qty: 120 TABLET | Refills: 0 | Status: SHIPPED | OUTPATIENT
Start: 2017-05-15 | End: 2017-07-14 | Stop reason: SDUPTHER

## 2017-04-18 RX ORDER — LORAZEPAM 1 MG/1
1 TABLET ORAL NIGHTLY PRN
Qty: 30 TABLET | Refills: 2 | Status: SHIPPED | OUTPATIENT
Start: 2017-04-18 | End: 2017-11-24 | Stop reason: SDUPTHER

## 2017-04-18 RX ORDER — METOPROLOL SUCCINATE 100 MG/1
100 TABLET, EXTENDED RELEASE ORAL DAILY
Qty: 90 TABLET | Refills: 3 | Status: SHIPPED | OUTPATIENT
Start: 2017-04-18 | End: 2017-08-15 | Stop reason: SDUPTHER

## 2017-04-18 RX ORDER — IBUPROFEN 800 MG/1
800 TABLET ORAL EVERY 8 HOURS
Qty: 60 TABLET | Refills: 2 | Status: SHIPPED | OUTPATIENT
Start: 2017-04-18 | End: 2017-05-18

## 2017-04-18 RX ORDER — OMEPRAZOLE 40 MG/1
40 CAPSULE, DELAYED RELEASE ORAL NIGHTLY
Qty: 90 CAPSULE | Refills: 3 | Status: SHIPPED | OUTPATIENT
Start: 2017-04-18 | End: 2019-12-27

## 2017-04-18 RX ORDER — HYDROCODONE BITARTRATE AND ACETAMINOPHEN 10; 325 MG/1; MG/1
1 TABLET ORAL EVERY 6 HOURS PRN
Qty: 120 TABLET | Refills: 0 | Status: SHIPPED | OUTPATIENT
Start: 2017-04-18 | End: 2017-07-14 | Stop reason: SDUPTHER

## 2017-04-18 RX ORDER — IBUPROFEN 200 MG
800 TABLET ORAL
Status: CANCELLED | COMMUNITY
Start: 2017-04-18

## 2017-04-18 NOTE — PROGRESS NOTES
Chief Complaint   Patient presents with    Medication Refill     SUBJECTIVE:   51 y.o. female for annual routine Pap and checkup.  Current Outpatient Prescriptions   Medication Sig Dispense Refill    carbamazepine (TEGRETOL XR) 200 MG 12 hr tablet TAKE ONE TABLET TWICE DAILY 60 tablet 5    cholecalciferol, vitamin D3, (VITAMIN D3) 5,000 unit Tab Take 5,000 Units by mouth once daily.      escitalopram oxalate (LEXAPRO) 20 MG tablet Take 1 tablet (20 mg total) by mouth once daily. 90 tablet 3    gabapentin (NEURONTIN) 400 MG capsule TAKES 1 CAPSULE @ 9AM, 1 CAPSULE @ 1PM, 1 CAPSULE @ 5PM, 2 CAPSULES @ BEDTIME 450 capsule 1    gabapentin (NEURONTIN) 600 MG tablet TAKE 1 TABLET THREE TIMES DAILY 270 tablet 1    hydrocodone-acetaminophen 10-325mg (NORCO)  mg Tab Take 1 tablet by mouth every 6 (six) hours as needed. 120 tablet 0    [START ON 5/15/2017] hydrocodone-acetaminophen 10-325mg (NORCO)  mg Tab Take 1 tablet by mouth every 6 (six) hours as needed. 120 tablet 0    [START ON 6/12/2017] hydrocodone-acetaminophen 10-325mg (NORCO)  mg Tab Take 1 tablet by mouth every 6 (six) hours as needed. 120 tablet 0    interferon beta-1a (AVONEX) 30 mcg/0.5 mL PnKt Inject 30 mcg into the muscle once a week. 3 kit 0    lidocaine-prilocaine (EMLA) cream Apply topically 3 (three) times daily.       lorazepam (ATIVAN) 1 MG tablet Take 1 tablet (1 mg total) by mouth nightly as needed. 30 tablet 2    meclizine (ANTIVERT) 25 mg tablet Take 1 tablet (25 mg total) by mouth as needed. 1 Tablet Oral Every day 90 tablet 3    metoprolol succinate (TOPROL-XL) 100 MG 24 hr tablet Take 1 tablet (100 mg total) by mouth once daily. 90 tablet 3    omeprazole (PRILOSEC) 40 MG capsule Take 1 capsule (40 mg total) by mouth every evening. 90 capsule 3    promethazine (PHENERGAN) 25 MG tablet Take 1 tablet (25 mg total) by mouth every 4 (four) hours. 45 tablet 0    simvastatin (ZOCOR) 40 MG tablet Take 1 tablet (40 mg  total) by mouth every evening. 90 tablet 3    tizanidine (ZANAFLEX) 4 MG tablet TAKE 2 TABLETS IN THE MORNING, AT NOON, AND IN THE EVENING AND 3 TABLETS AT NIGHT (9 TABLETS PER DAY) 810 tablet 3    triamterene-hydrochlorothiazide 37.5-25 mg (MAXZIDE-25) 37.5-25 mg per tablet Take 1 tablet by mouth once daily. 90 tablet 3    ibuprofen (ADVIL,MOTRIN) 800 MG tablet Take 1 tablet (800 mg total) by mouth every 8 (eight) hours. 60 tablet 2     No current facility-administered medications for this visit.      Allergies: Lomotil [diphenoxylate-atropine] and Dilaudid [hydromorphone (bulk)]   No LMP recorded. Patient has had a hysterectomy.    ROS:  Feeling well. No dyspnea or chest pain on exertion.  No abdominal pain, change in bowel habits, black or bloody stools.  No urinary tract symptoms. GYN ROS: no breast pain or new or enlarging lumps on self exam, no vaginal bleeding. No neurological complaints.  Foot burning and pain and the fatigue  OBJECTIVE:   The patient appears well, alert, oriented x 3, in no distress.  /60 (BP Location: Right arm, Patient Position: Sitting, BP Method: Manual)  Pulse 70  Temp 97.8 °F (36.6 °C) (Oral)   Ht 5' (1.524 m)  Wt 62 kg (136 lb 11 oz)  SpO2 97%  BMI 26.69 kg/m2  ENT normal.  Neck supple. No adenopathy or thyromegaly. YAN. Lungs are clear, good air entry, no wheezes, rhonchi or rales. S1 and S2 normal, no murmurs, regular rate and rhythm. Abdomen soft without tenderness, guarding, mass or organomegaly. Extremities show no edema, normal peripheral pulses. Neurological is normal, no focal findings.    BREAST EXAM:     PELVIC EXAM:     ASSESSMENT:   1. Annual physical exam    2. Hyperlipidemia, unspecified hyperlipidemia type    3. Essential hypertension    4. Spasticity    5. Depression, unspecified depression type    6. Multiple sclerosis    7. Chronic, continuous use of opioids    8. Tobacco abuse    9. NSAID long-term use          PLAN:   Zoey FRASER was seen today for  medication refill.    Diagnoses and all orders for this visit:    Annual physical exam  Counseled on age appropriate medical preventative services, including age appropriate cancer screenings, over all nutritional health, need for a consistent exercise regimen and an over all push towards maintaining a vigorous and active lifestyle.  Counseled on age appropriate vaccines and discussed upcoming health care needs based on age/gender.  Spent time with patient counseling on need for a good patient/doctor relationship moving forward.  Discussed use of common OTC medications and supplements.  Discussed common dietary aids and use of caffeine and the need for good sleep hygiene and stress management.    Hyperlipidemia, unspecified hyperlipidemia type  -     simvastatin (ZOCOR) 40 MG tablet; Take 1 tablet (40 mg total) by mouth every evening.    Essential hypertension  -     metoprolol succinate (TOPROL-XL) 100 MG 24 hr tablet; Take 1 tablet (100 mg total) by mouth once daily.    Spasticity  -     lorazepam (ATIVAN) 1 MG tablet; Take 1 tablet (1 mg total) by mouth nightly as needed.    Depression, unspecified depression type  -     lorazepam (ATIVAN) 1 MG tablet; Take 1 tablet (1 mg total) by mouth nightly as needed.  -     escitalopram oxalate (LEXAPRO) 20 MG tablet; Take 1 tablet (20 mg total) by mouth once daily.    Multiple sclerosis  -     hydrocodone-acetaminophen 10-325mg (NORCO)  mg Tab; Take 1 tablet by mouth every 6 (six) hours as needed.  -     hydrocodone-acetaminophen 10-325mg (NORCO)  mg Tab; Take 1 tablet by mouth every 6 (six) hours as needed.  -     hydrocodone-acetaminophen 10-325mg (NORCO)  mg Tab; Take 1 tablet by mouth every 6 (six) hours as needed.    Chronic, continuous use of opioids  -     hydrocodone-acetaminophen 10-325mg (NORCO)  mg Tab; Take 1 tablet by mouth every 6 (six) hours as needed.  -     hydrocodone-acetaminophen 10-325mg (NORCO)  mg Tab; Take 1 tablet by  mouth every 6 (six) hours as needed.  -     hydrocodone-acetaminophen 10-325mg (NORCO)  mg Tab; Take 1 tablet by mouth every 6 (six) hours as needed.    Tobacco abuse  Continue to  and encourage has been very difficult for her to even consider.  Spent 5 minutes counseling.  Will f/u in 3 months to reassess.  NSAID long-term use  -     omeprazole (PRILOSEC) 40 MG capsule; Take 1 capsule (40 mg total) by mouth every evening.  -     ibuprofen (ADVIL,MOTRIN) 800 MG tablet; Take 1 tablet (800 mg total) by mouth every 8 (eight) hours.      She is on 40 MME, we will try to wean down to 30 MME also with ativan and high risk, not to be taken with OPIOID  Went over black box warning.    F/u in 3 months

## 2017-05-01 ENCOUNTER — TELEPHONE (OUTPATIENT)
Dept: NEUROLOGY | Facility: CLINIC | Age: 52
End: 2017-05-01

## 2017-05-01 NOTE — TELEPHONE ENCOUNTER
----- Message from Jessica Caldwell sent at 5/1/2017 12:48 PM CDT -----  Contact: pt [phone]  Pt is calling to reschedule her appt for tomorrow,pt states she can come in on tomorrow.pls call

## 2017-05-02 ENCOUNTER — OFFICE VISIT (OUTPATIENT)
Dept: NEUROLOGY | Facility: CLINIC | Age: 52
End: 2017-05-02
Payer: MEDICARE

## 2017-05-02 VITALS
BODY MASS INDEX: 27.09 KG/M2 | DIASTOLIC BLOOD PRESSURE: 67 MMHG | HEART RATE: 62 BPM | HEIGHT: 60 IN | WEIGHT: 138 LBS | SYSTOLIC BLOOD PRESSURE: 108 MMHG

## 2017-05-02 DIAGNOSIS — G35 MULTIPLE SCLEROSIS: Primary | ICD-10-CM

## 2017-05-02 DIAGNOSIS — M62.838 MUSCLE SPASM: ICD-10-CM

## 2017-05-02 DIAGNOSIS — Z71.89 COUNSELING REGARDING GOALS OF CARE: ICD-10-CM

## 2017-05-02 DIAGNOSIS — Z79.899 ENCOUNTER FOR LONG-TERM (CURRENT) USE OF HIGH-RISK MEDICATION: ICD-10-CM

## 2017-05-02 DIAGNOSIS — R26.9 GAIT DISTURBANCE: ICD-10-CM

## 2017-05-02 DIAGNOSIS — M79.2 NEUROPATHIC PAIN: ICD-10-CM

## 2017-05-02 DIAGNOSIS — E55.9 VITAMIN D INSUFFICIENCY: ICD-10-CM

## 2017-05-02 PROCEDURE — 99215 OFFICE O/P EST HI 40 MIN: CPT | Mod: S$GLB,,, | Performed by: PHYSICIAN ASSISTANT

## 2017-05-02 PROCEDURE — 3074F SYST BP LT 130 MM HG: CPT | Mod: S$GLB,,, | Performed by: PHYSICIAN ASSISTANT

## 2017-05-02 PROCEDURE — 3078F DIAST BP <80 MM HG: CPT | Mod: S$GLB,,, | Performed by: PHYSICIAN ASSISTANT

## 2017-05-02 PROCEDURE — 99999 PR PBB SHADOW E&M-EST. PATIENT-LVL III: CPT | Mod: PBBFAC,,, | Performed by: PHYSICIAN ASSISTANT

## 2017-05-02 RX ORDER — BETAMETHASONE DIPROPIONATE 0.5 MG/G
CREAM TOPICAL
COMMUNITY
Start: 2017-05-01 | End: 2017-08-07

## 2017-05-02 NOTE — PROGRESS NOTES
Subjective:       Patient ID: Zoey Cali is a 51 y.o. female who presents today for a routine clinic visit for MS.    MS HPI:  · DMT: Avonex  · Side effects from DMT? No  · Taking vitamin D3 as recommended? Yes -  Dose: 5,000 IU daily  · Feels that her headaches are worsen lately  · Allergic reaction ---skin reaction behind ears --seen in urgent care yesterday. Treated with steroid cream  · Sees GI for gastro spasms  · Daughter just got engaged-planning wedding for early next year    SOCIAL HISTORY  Social History   Substance Use Topics    Smoking status: Current Every Day Smoker     Packs/day: 1.00     Types: Cigarettes    Smokeless tobacco: Never Used    Alcohol use No     Living arrangements - the patient lives with her family.  Employment N/A    MS ROS:  · Fatigue: Yes - under a lot of stress and feels this is a factor;   · Sleep Disturbance: Yes -sleeping OK  · Bladder Dysfunction: No  · Bowel Dysfunction: No  · Spasticity: Yes - takes tizanidine QID  · Visual Symptoms: Yes - History of ON, see Dr. Hernandez  · Cognitive: No  · Mood Disorder: Yes - Taking lexapro  · Gait Disturbance: No  · Falls: No  · Hand Dysfunction: No  · Pain: Yes - NP in feet, improved; Taking gabapentin 600mg TID and 800mg HS and Tegretol 100mg TWICE DAILY; on Norco as well prescribed by Dr. Costello;   · Sexual Dysfunction: Not Assessed  · Skin Breakdown: No  · Tremors: No  · Dysphagia: No  · Dysarthria: No  · Heat sensitivity: Yes--fatigue  · Any un-met adaptive needs? Yes - cooling device  · Copay Assist? Getting free drug through [employer]    Research:   CHORDS candidate? No      Objective:        1. 25 foot timed walk:last visit in November 4.5s  Timed 25 Foot Walk: 8/16/2016 5/2/2017   Did patient wear an AFO? No No   Was assistive device used? No No   Time for 25 Foot Walk (seconds) 5.2 4.4     Neurologic Exam     Mental Status   Oriented to person, place, and time.   Follows 3 step commands.   Speech: speech is normal    Level of consciousness: alert  Normal comprehension.     Cranial Nerves     CN II   Visual acuity: normal with correction (20/20 OS; 20/25 OD correctedwith Snellen hand held chart at 6 ft)    CN III, IV, VI   Pupils are equal, round, and reactive to light.  Extraocular motions are normal.     CN V   Facial sensation intact.     CN VII   Facial expression full, symmetric.     CN VIII   Hearing: intact (finger rub)    CN IX, X   Palate: symmetric    CN XI   CN XI normal.     CN XII   Tongue deviation: none    Motor Exam   Muscle bulk: normal  Overall muscle tone: normal    Strength   Right deltoid: 5/5  Left deltoid: 5/5  Right triceps: 5/5  Left triceps: 5/5  Right wrist extension: 5/5  Left wrist extension: 5/5  Right interossei: 5/5  Left interossei: 5/5  Right iliopsoas: 5/5  Left iliopsoas: 5/5  Right hamstrin/5  Left hamstrin/5  Right anterior tibial: 5/5  Left anterior tibial: 5/5  Right peroneal: 5/5  Left peroneal: 5/5       Increased muscle tightness noted traps bilaterally moreso upper and middle traps     Sensory Exam   Light touch normal.   Right arm vibration: normal  Left arm vibration: normal  Right leg vibration: normal  Left leg vibration: decreased from toes    Gait, Coordination, and Reflexes     Gait  Gait: normal    Coordination   Finger to nose coordination: normal  Tandem walking coordination: abnormal (loss of balance noted)    Tremor   Resting tremor: absent  Action tremor: absent    Reflexes   Right brachioradialis: 3+  Left brachioradialis: 3+  Right biceps: 3+  Left biceps: 3+  Right triceps: 3+  Left triceps: 3+  Right patellar: 3+  Left patellar: 3+  Right achilles: 3+  Left achilles: 3+  Right plantar: equivocal  Left plantar: equivocal  Right ankle clonus: absent  Left ankle clonus: absent  Right pendular knee jerk: absent  Left pendular knee jerk: absent       Normal Heel/toe walk  Normal RSM         Imaging:     Results for orders placed during the hospital encounter of  02/01/17   MRI Brain W WO Contrast    Narrative Technique: Multiplanar, multisequence MRI brain was obtained before and after the administration of IV contrast per demyelinating protocol. 7 cc gadavist was used.    History: Multiple sclerosis    Comparison: 01/21/2016    Findings:    Patient again demonstrates a few patchy areas of T2/FLAIR hyperintensity within the supratentorial white matter , largely situated in the periventricular zones along the margins of the corpus callosum and lateral ventricles radiating outward.  This pattern is nonspecific, but it is quite typical for the clinically suspected diagnosis of multiple sclerosis. No new discrete lesions identified on today's study. Following contrast administration, there is no evidence of abnormal enhancement to suggest active demyelinating plaques.    No new parenchymal mass, hemorrhage or major vascular distribution infarct. No new extra-axial blood or fluid collections. Ventricles stable in size without evidence of hydrocephalus.    T2 skull base flow voids are preserved. Bone marrow signal intensity is unremarkable.    Impression Scattered white matter lesions consistent with the reported history of multiple sclerosis, not significant change from the prior study of 1/21/2016.  No new or enhancing lesions to indicate ongoing or active demyelination.    ______________________________________     Electronically signed by resident: TANYA PORRAS  Date:     02/01/17  Time:    11:35  As the supervising and teaching physician, I personally reviewed the images and resident's interpretation and I agree with the findings.      Electronically signed by: NADIR RAMIREZ MD  Date:     02/01/17  Time:    17:15          Labs:     Lab Results   Component Value Date    QNKLSSEB84QC 48 11/11/2016    VGATUAHD78IR 93 01/26/2016    QERLSHTR33EA 45 11/04/2014     No results found for: JCVINDEX, JCVANTIBODY  No results found for: FG7CORMM, ABSOLUTECD3, IQ9WIIQI, ABSOLUTECD8,  PA9RIFNC, ABSOLUTECD4, LABCD48  Lab Results   Component Value Date    ALT 12 04/05/2017    AST 15 04/05/2017    ALKPHOS 146 (H) 04/05/2017    BILITOT 0.4 04/05/2017     Lab Results   Component Value Date    WBC 6.47 02/27/2017    RBC 4.85 02/27/2017    HGB 14.2 02/27/2017    HCT 40.1 02/27/2017    MCV 83 02/27/2017    MCH 29.3 02/27/2017    MCHC 35.4 02/27/2017    RDW 13.2 02/27/2017     02/27/2017    MPV 9.1 (L) 02/27/2017    GRAN 4.3 02/27/2017    GRAN 65.7 02/27/2017    LYMPH 1.6 02/27/2017    LYMPH 24.7 02/27/2017    MONO 0.4 02/27/2017    MONO 5.7 02/27/2017    EOS 0.2 02/27/2017    BASO 0.04 02/27/2017    EOSINOPHIL 3.1 02/27/2017    BASOPHIL 0.6 02/27/2017       Diagnosis/Assessment/Plan:    1. Multiple Sclerosis  · Assessment:Patient is stable both clinically and radiographically on Avonex  · Imaging: MRI Brain in February 2017 stable as above with no new or enhancing lesions noted. Reviewed images today in clinic with patient   · Disease Modifying Therapies: Continue Avonex and high dose Vit D3. Safety labs stable, will continue to monitor    2. MS Symptom Assessment / Management  · Spasticity: continue Tizanidine. I have given her written material on neck exercises and provided her with card for MS Society website to further investigate neck /upper back stretching exercises.   · Visual Symptoms: history of ON(Left)  · Mood Disorder: continue Lexapro  · Pain: continue gabapentin/tegretol regime for NP pain    Over 50% of this 40 minute visit was spent in direct face to face counseling of the patient regarding her current symptoms and management of same.  Follow up in 6 months with me  Patient agreed to POC today.    Attending, Dr. Mendez, was available during today's encounter. Any change to plan along with cosign to appear in the EMR.     Makayla Swift, PA-C  [facility]    Multiple sclerosis    Vitamin D insufficiency    Muscle spasm    Counseling regarding goals of care    Encounter for long-term  (current) use of high-risk medication    Gait disturbance    Neuropathic pain

## 2017-05-02 NOTE — MR AVS SNAPSHOT
[facility]  [address]  [facility] [address]  Phone: [phone]                  Zoey Cali   2017 12:45 PM   Office Visit    Description:  Female : 1965   Provider:  Makayla Swift PA-C   Department:  [facility]           Reason for Visit     Neurologic Problem                To Do List           Future Appointments        Provider Department Dept Phone    2017 10:00 AM LAB, [facility] [phone]    2017 10:30 AM Charito Mejia MD [facility]Hematology Oncology [phone]      Goals (5 Years of Data)     None      Follow-Up and Disposition     Return in about 6 months (around 2017).      [facility]     [facility] -  Assistance  Unless otherwise directed by your provider, please contact [facility], our nurse care line that is available for  assistance.     Registered nurses in the [facility] provide: appointment scheduling, clinical advisement, health education, and other advisory services.  Call: [phone] (toll free)               Medications           Message regarding Medications     Verify the changes and/or additions to your medication regime listed below are the same as discussed with your clinician today.  If any of these changes or additions are incorrect, please notify your healthcare provider.             Verify that the below list of medications is an accurate representation of the medications you are currently taking.  If none reported, the list may be blank. If incorrect, please contact your healthcare provider. Carry this list with you in case of emergency.           Current Medications     betamethasone dipropionate (DIPROLENE) 0.05 % cream     carbamazepine (TEGRETOL XR) 200 MG 12 hr tablet TAKE ONE TABLET TWICE DAILY    cholecalciferol, vitamin D3, (VITAMIN D3) 5,000 unit Tab Take 5,000 Units by mouth once daily.     escitalopram oxalate (LEXAPRO) 20 MG tablet Take 1 tablet (20 mg total) by mouth once daily.    gabapentin (NEURONTIN) 400 MG capsule TAKES 1 CAPSULE @ 9AM, 1 CAPSULE @ 1PM, 1 CAPSULE @ 5PM, 2 CAPSULES @ BEDTIME    gabapentin (NEURONTIN) 600 MG tablet TAKE 1 TABLET THREE TIMES DAILY    hydrocodone-acetaminophen 10-325mg (NORCO)  mg Tab Take 1 tablet by mouth every 6 (six) hours as needed.    hydrocodone-acetaminophen 10-325mg (NORCO)  mg Tab Starting on May 15, 2017. Take 1 tablet by mouth every 6 (six) hours as needed.    hydrocodone-acetaminophen 10-325mg (NORCO)  mg Tab Starting on Jun 12, 2017. Take 1 tablet by mouth every 6 (six) hours as needed.    ibuprofen (ADVIL,MOTRIN) 800 MG tablet Take 1 tablet (800 mg total) by mouth every 8 (eight) hours.    interferon beta-1a (AVONEX) 30 mcg/0.5 mL PnKt Inject 30 mcg into the muscle once a week.    lidocaine-prilocaine (EMLA) cream Apply topically 3 (three) times daily.     lorazepam (ATIVAN) 1 MG tablet Take 1 tablet (1 mg total) by mouth nightly as needed.    meclizine (ANTIVERT) 25 mg tablet Take 1 tablet (25 mg total) by mouth as needed. 1 Tablet Oral Every day    metoprolol succinate (TOPROL-XL) 100 MG 24 hr tablet Take 1 tablet (100 mg total) by mouth once daily.    omeprazole (PRILOSEC) 40 MG capsule Take 1 capsule (40 mg total) by mouth every evening.    promethazine (PHENERGAN) 25 MG tablet Take 1 tablet (25 mg total) by mouth every 4 (four) hours.    simvastatin (ZOCOR) 40 MG tablet Take 1 tablet (40 mg total) by mouth every evening.    tizanidine (ZANAFLEX) 4 MG tablet TAKE 2 TABLETS IN THE MORNING, AT NOON, AND IN THE EVENING AND 3 TABLETS AT NIGHT (9 TABLETS PER DAY)    triamterene-hydrochlorothiazide 37.5-25 mg (MAXZIDE-25) 37.5-25 mg per tablet Take 1 tablet by mouth once daily.           Clinical Reference Information           Your Vitals Were     BP Pulse Height Weight BMI    108/67 62 5' (1.524 m) 62.6 kg (138 lb) 26.95 kg/m2       Blood Pressure          Most Recent Value    BP  108/67      Allergies as of 5/2/2017     Lomotil [Diphenoxylate-atropine]    Dilaudid [Hydromorphone (Bulk)]      Immunizations Administered on Date of Encounter - 5/2/2017     None      Instructions      Shoulder and Upper Back Stretch  To start, stand tall with your ears, shoulders, and hips in line. Your feet should be slightly apart, positioned just under your hips. Focus your eyes directly in front of you.  this position for a few seconds before starting your exercise. This helps increase your awareness of proper posture.  Reach overhead and slightly back with both arms. Keep your shoulders and neck aligned and your elbows behind your shoulders:  · With your palms facing the ceiling, turn your fingers inward.  · Take a deep breath. Breathe out, and lower your elbows toward your buttocks. Hold for 5 seconds, then return to starting position.  · Repeat 3 times.       Date Last Reviewed: 8/16/2015  © 2301-9312 [employer]. [address] [place] [address]. All rights reserved. This information is not intended as a substitute for professional medical care. Always follow your healthcare professional's instructions.        Shoulder Girdle Stretch      To start, sit in a chair with your feet flat on the floor. Your weight should be slightly forward so that youre balanced evenly on your buttocks. Relax your shoulders and keep your head level. Using a chair with arms may help you keep your balance:  · Place 1 hand on the outside elbow of the other arm.  · Pull the arm across your body. Hold for 30 to 60 seconds. Repeat once.  · Switch sides.  For your safety, check with your healthcare provider before starting an exercise program.   Date Last Reviewed: 8/16/2015  © 9301-8912 [employer]. [address] [place] [address]. All rights reserved. This information is not intended as a substitute for professional medical  care. Always follow your healthcare professional's instructions.        Neck Exercises: Active Neck Rotation    To start, lie on your back, knees bent and feet flat on the floor. Keep your ears, shoulders, and hips aligned, but dont press your lower back to the floor. Rest your hands on your pelvis. Breathe deeply and relax.    · Use your neck muscles to turn your head to one side until you feel a stretch in the muscles.  · Hold for 5 seconds. Then turn to the other side.  · Repeat 5 times on each side.  Note: Keep your shoulders on the floor. Dont lift or tuck your chin as you turn your head.  Date Last Reviewed: 8/16/2015  © 3311-8989 [employer]. [address] [place] [address]. All rights reserved. This information is not intended as a substitute for professional medical care. Always follow your healthcare professional's instructions.        Neck Exercises: Neck Flex  To start, sit in a chair with your feet flat on the floor. Your weight should be slightly forward so that youre balanced evenly on your buttocks. Relax your shoulders and keep your head level. Avoid arching your back or rounding your shoulders. Using a chair with arms may help you keep your balance:  · Rest the back of your left hand against your lower back. Place your right palm on the top of your head.  · Gently pull your head forward and down until you feel a stretch in the muscles in the back of your neck. Dont force the motion.  · Hold for 20 seconds, then return to starting position. Switch arms.    Date Last Reviewed: 10/2/2015  © 6869-6321 [employer]. [address] [place] [address]. All rights reserved. This information is not intended as a substitute for professional medical care. Always follow your healthcare professional's instructions.             Smoking Cessation     If you would like to quit smoking:   You may be eligible for free services if you are a [place] resident and  started smoking cigarettes before September 1, 1988.  Call the Smoking Cessation Trust (SCT) toll free at (130) 931-3053 or (007) 231-4019.   Call 1-800-QUIT-NOW if you do not meet the above criteria.   Contact us via email: [email or web address]   View our website for more information: [email or web address]        Language Assistance Services     ATTENTION: Language assistance services are available, free of charge. Please call [phone].      ATENCIÓN: Si habla español, tiene a small disposición servicios gratuitos de asistencia lingüística. Llame al [phone].     CHÚ Ý: N?u b?n nói Ti?ng Vi?t, có các d?ch v? h? tr? ngôn ng? mi?n phí dành cho b?n. G?i s? [phone].         [facility] complies with applicable Federal civil rights laws and does not discriminate on the basis of race, color, national origin, age, disability, or sex.

## 2017-05-02 NOTE — PATIENT INSTRUCTIONS
Shoulder and Upper Back Stretch  To start, stand tall with your ears, shoulders, and hips in line. Your feet should be slightly apart, positioned just under your hips. Focus your eyes directly in front of you.  this position for a few seconds before starting your exercise. This helps increase your awareness of proper posture.  Reach overhead and slightly back with both arms. Keep your shoulders and neck aligned and your elbows behind your shoulders:  · With your palms facing the ceiling, turn your fingers inward.  · Take a deep breath. Breathe out, and lower your elbows toward your buttocks. Hold for 5 seconds, then return to starting position.  · Repeat 3 times.       Date Last Reviewed: 8/16/2015  © 0754-0042 [employer]. [address] [place] [address]. All rights reserved. This information is not intended as a substitute for professional medical care. Always follow your healthcare professional's instructions.        Shoulder Girdle Stretch      To start, sit in a chair with your feet flat on the floor. Your weight should be slightly forward so that youre balanced evenly on your buttocks. Relax your shoulders and keep your head level. Using a chair with arms may help you keep your balance:  · Place 1 hand on the outside elbow of the other arm.  · Pull the arm across your body. Hold for 30 to 60 seconds. Repeat once.  · Switch sides.  For your safety, check with your healthcare provider before starting an exercise program.   Date Last Reviewed: 8/16/2015  © 9906-1566 [employer]. [address] [place] [address]. All rights reserved. This information is not intended as a substitute for professional medical care. Always follow your healthcare professional's instructions.        Neck Exercises: Active Neck Rotation    To start, lie on your back, knees bent and feet flat on the floor. Keep your ears, shoulders, and hips aligned, but dont press your lower back  to the floor. Rest your hands on your pelvis. Breathe deeply and relax.    · Use your neck muscles to turn your head to one side until you feel a stretch in the muscles.  · Hold for 5 seconds. Then turn to the other side.  · Repeat 5 times on each side.  Note: Keep your shoulders on the floor. Dont lift or tuck your chin as you turn your head.  Date Last Reviewed: 8/16/2015  © 4399-0782 [employer]. [address] [place] [address]. All rights reserved. This information is not intended as a substitute for professional medical care. Always follow your healthcare professional's instructions.        Neck Exercises: Neck Flex  To start, sit in a chair with your feet flat on the floor. Your weight should be slightly forward so that youre balanced evenly on your buttocks. Relax your shoulders and keep your head level. Avoid arching your back or rounding your shoulders. Using a chair with arms may help you keep your balance:  · Rest the back of your left hand against your lower back. Place your right palm on the top of your head.  · Gently pull your head forward and down until you feel a stretch in the muscles in the back of your neck. Dont force the motion.  · Hold for 20 seconds, then return to starting position. Switch arms.    Date Last Reviewed: 10/2/2015  © 5629-9474 [employer]. [address] [place] [address]. All rights reserved. This information is not intended as a substitute for professional medical care. Always follow your healthcare professional's instructions.

## 2017-05-31 DIAGNOSIS — G35 MS (MULTIPLE SCLEROSIS): ICD-10-CM

## 2017-05-31 DIAGNOSIS — M79.2 NEUROPATHIC PAIN OF FOOT, LEFT: ICD-10-CM

## 2017-05-31 DIAGNOSIS — M79.2 NEUROPATHIC PAIN OF FOOT, RIGHT: ICD-10-CM

## 2017-05-31 RX ORDER — GABAPENTIN 600 MG/1
TABLET ORAL
Qty: 270 TABLET | Refills: 1 | Status: SHIPPED | OUTPATIENT
Start: 2017-05-31 | End: 2018-05-01 | Stop reason: SDUPTHER

## 2017-05-31 RX ORDER — GABAPENTIN 400 MG/1
800 CAPSULE ORAL NIGHTLY
Qty: 180 CAPSULE | Refills: 1 | Status: SHIPPED | OUTPATIENT
Start: 2017-05-31 | End: 2018-05-01 | Stop reason: SDUPTHER

## 2017-06-01 RX ORDER — GABAPENTIN 400 MG/1
CAPSULE ORAL
Qty: 450 CAPSULE | Refills: 1 | OUTPATIENT
Start: 2017-06-01

## 2017-06-14 DIAGNOSIS — R25.2 SPASTICITY: ICD-10-CM

## 2017-06-15 RX ORDER — TIZANIDINE 4 MG/1
TABLET ORAL
Qty: 810 TABLET | Refills: 3 | Status: SHIPPED | OUTPATIENT
Start: 2017-06-15 | End: 2017-08-15 | Stop reason: SDUPTHER

## 2017-06-19 ENCOUNTER — PATIENT MESSAGE (OUTPATIENT)
Dept: FAMILY MEDICINE | Facility: CLINIC | Age: 52
End: 2017-06-19

## 2017-06-19 DIAGNOSIS — B37.9 CANDIDA INFECTION: Primary | ICD-10-CM

## 2017-06-19 RX ORDER — FLUCONAZOLE 150 MG/1
150 TABLET ORAL ONCE
Qty: 1 TABLET | Refills: 0 | Status: SHIPPED | OUTPATIENT
Start: 2017-06-19 | End: 2017-06-19

## 2017-06-27 DIAGNOSIS — G35 MULTIPLE SCLEROSIS: ICD-10-CM

## 2017-06-28 ENCOUNTER — DOCUMENTATION ONLY (OUTPATIENT)
Dept: NEUROLOGY | Facility: CLINIC | Age: 52
End: 2017-06-28

## 2017-07-10 ENCOUNTER — LAB VISIT (OUTPATIENT)
Dept: LAB | Facility: HOSPITAL | Age: 52
End: 2017-07-10
Attending: INTERNAL MEDICINE
Payer: MEDICARE

## 2017-07-10 DIAGNOSIS — D50.9 IRON DEFICIENCY ANEMIA, UNSPECIFIED: ICD-10-CM

## 2017-07-10 LAB
BASOPHILS # BLD AUTO: 0.02 K/UL
BASOPHILS NFR BLD: 0.3 %
DIFFERENTIAL METHOD: ABNORMAL
EOSINOPHIL # BLD AUTO: 0.2 K/UL
EOSINOPHIL NFR BLD: 2.3 %
ERYTHROCYTE [DISTWIDTH] IN BLOOD BY AUTOMATED COUNT: 13.2 %
FERRITIN SERPL-MCNC: 100 NG/ML
HCT VFR BLD AUTO: 39.6 %
HGB BLD-MCNC: 14.2 G/DL
IRON SERPL-MCNC: 66 UG/DL
LYMPHOCYTES # BLD AUTO: 1.7 K/UL
LYMPHOCYTES NFR BLD: 23.9 %
MCH RBC QN AUTO: 31.3 PG
MCHC RBC AUTO-ENTMCNC: 35.9 %
MCV RBC AUTO: 87 FL
MONOCYTES # BLD AUTO: 0.3 K/UL
MONOCYTES NFR BLD: 4.1 %
NEUTROPHILS # BLD AUTO: 4.8 K/UL
NEUTROPHILS NFR BLD: 69.3 %
PLATELET # BLD AUTO: 290 K/UL
PMV BLD AUTO: 8.7 FL
RBC # BLD AUTO: 4.53 M/UL
SATURATED IRON: 18 %
TOTAL IRON BINDING CAPACITY: 366 UG/DL
TRANSFERRIN SERPL-MCNC: 247 MG/DL
WBC # BLD AUTO: 6.9 K/UL

## 2017-07-10 PROCEDURE — 85025 COMPLETE CBC W/AUTO DIFF WBC: CPT

## 2017-07-10 PROCEDURE — 83540 ASSAY OF IRON: CPT

## 2017-07-10 PROCEDURE — 82728 ASSAY OF FERRITIN: CPT

## 2017-07-10 PROCEDURE — 36415 COLL VENOUS BLD VENIPUNCTURE: CPT

## 2017-07-14 ENCOUNTER — OFFICE VISIT (OUTPATIENT)
Dept: HEMATOLOGY/ONCOLOGY | Facility: CLINIC | Age: 52
End: 2017-07-14
Payer: MEDICARE

## 2017-07-14 VITALS
WEIGHT: 135.13 LBS | DIASTOLIC BLOOD PRESSURE: 60 MMHG | SYSTOLIC BLOOD PRESSURE: 90 MMHG | HEART RATE: 67 BPM | TEMPERATURE: 98 F | BODY MASS INDEX: 26.39 KG/M2 | RESPIRATION RATE: 16 BRPM | OXYGEN SATURATION: 93 %

## 2017-07-14 DIAGNOSIS — D50.9 IRON DEFICIENCY ANEMIA, UNSPECIFIED IRON DEFICIENCY ANEMIA TYPE: Primary | ICD-10-CM

## 2017-07-14 PROCEDURE — 99999 PR PBB SHADOW E&M-EST. PATIENT-LVL III: CPT | Mod: PBBFAC,,, | Performed by: INTERNAL MEDICINE

## 2017-07-14 PROCEDURE — 99213 OFFICE O/P EST LOW 20 MIN: CPT | Mod: S$GLB,,, | Performed by: INTERNAL MEDICINE

## 2017-07-14 RX ORDER — MUPIROCIN 20 MG/G
OINTMENT TOPICAL
COMMUNITY
Start: 2017-07-07 | End: 2017-08-04

## 2017-07-14 RX ORDER — CEPHALEXIN 500 MG/1
500 CAPSULE ORAL 2 TIMES DAILY
COMMUNITY
Start: 2017-07-07 | End: 2017-08-07

## 2017-07-14 NOTE — PROGRESS NOTES
Subjective:       Patient ID: Zoey Cali is a 51 y.o. female.    Chief Complaint: No chief complaint on file.  Diagnosis: TRACIE  HPI 50 y/o female with history of MS seen  today for f/u for TRACIE  She undergoes intermittent IV iron therapy.  She has been intolerant of oral Fe supp  s/p GI eval with EGD and colonoscopy which was unremarkable    She is followed by Dr. Mendez for long standing history of MS and remains on therapy with Avonex   She has chronic pain  and neuralgia and on opioid pain medication managed by PCP and Neurology    Today , she is doing well  Mild fatigue-stable  No SOB/CP  No lightheadedness  No melena, hematochezia or change in bowel habits      CBC reveals wbc 91064/mm3  Hb 14.2g/d  40 g/dl   plt ct 290k    Review of Systems   Constitutional: Positive for fatigue. Negative for appetite change and unexpected weight change.   Eyes: Negative for visual disturbance.   Respiratory: Negative for cough and shortness of breath.    Cardiovascular: Negative for chest pain and leg swelling.   Gastrointestinal: Negative for abdominal pain, blood in stool, constipation, diarrhea, nausea and vomiting.   Genitourinary: Negative for frequency.   Musculoskeletal: Negative for arthralgias, back pain and joint swelling.   Skin: Negative for rash.        No petechiae, ecchymoses   Neurological: Negative for light-headedness and headaches.        Paresthesias   Hematological: Negative for adenopathy. Does not bruise/bleed easily.       Objective:       Vitals:    07/14/17 1521   BP: 90/60   BP Location: Left arm   Patient Position: Sitting   BP Method: Manual   Pulse: 67   Resp: 16   Temp: 98.2 °F (36.8 °C)   SpO2: (!) 93%   Weight: 61.3 kg (135 lb 2.3 oz)       Physical Exam   Constitutional: She is oriented to person, place, and time. She appears well-developed and well-nourished.   HENT:   Head: Normocephalic.   Mouth/Throat: Oropharynx is clear and moist. No oropharyngeal exudate.   Eyes: Conjunctivae and  lids are normal. Pupils are equal, round, and reactive to light. No scleral icterus.   Neck: Normal range of motion. Neck supple. No thyromegaly present.   Cardiovascular: Normal rate, regular rhythm and normal heart sounds.    No murmur heard.  Pulmonary/Chest: Breath sounds normal. She has no wheezes. She has no rales.   Abdominal: Soft. Bowel sounds are normal. She exhibits no distension and no mass. There is no hepatosplenomegaly. There is no tenderness. There is no rebound and no guarding.   Musculoskeletal: Normal range of motion. She exhibits no edema or tenderness.   Lymphadenopathy:     She has no cervical adenopathy.     She has no axillary adenopathy.        Right: No supraclavicular adenopathy present.        Left: No supraclavicular adenopathy present.   Neurological: She is alert and oriented to person, place, and time. No cranial nerve deficit. Coordination normal.   Skin: Skin is warm and dry. No ecchymosis, no petechiae and no rash noted. No erythema.   Psychiatric: She has a normal mood and affect.        Lab Results   Component Value Date    WBC 6.90 07/10/2017    HGB 14.2 07/10/2017    HCT 39.6 07/10/2017    MCV 87 07/10/2017     07/10/2017     Lab Results   Component Value Date    IRON 66 07/10/2017    TIBC 366 07/10/2017    FERRITIN 100 07/10/2017       Assessment:       1. Iron deficiency anemia, unspecified iron deficiency anemia type        Plan:   Zoey FRASER was seen today for follow-up.    Diagnoses and all orders for this visit:    Iron deficiency anemia, unspecified iron deficiency       S/p intermittent IV Fe therapy       S/p GI eval-unremarkable       CBC reveals stable Hb 14+ g/dl        Fe parameters nl except borderline decreased Fe sat       Ferritin 100                          F/u 3 mos with cbc, Fe studies prior to f/u( or sooner if problems should arise in the interim)      Cc: MD Heidy Lancaster MD

## 2017-07-31 ENCOUNTER — PATIENT MESSAGE (OUTPATIENT)
Dept: FAMILY MEDICINE | Facility: CLINIC | Age: 52
End: 2017-07-31

## 2017-08-04 ENCOUNTER — HOSPITAL ENCOUNTER (EMERGENCY)
Facility: HOSPITAL | Age: 52
Discharge: HOME | End: 2017-08-04
Attending: EMERGENCY MEDICINE
Payer: MEDICARE

## 2017-08-04 VITALS
TEMPERATURE: 99 F | DIASTOLIC BLOOD PRESSURE: 76 MMHG | HEART RATE: 77 BPM | RESPIRATION RATE: 20 BRPM | SYSTOLIC BLOOD PRESSURE: 141 MMHG | WEIGHT: 136 LBS | HEIGHT: 60 IN | OXYGEN SATURATION: 95 % | BODY MASS INDEX: 26.7 KG/M2

## 2017-08-04 DIAGNOSIS — S80.01XA CONTUSION OF RIGHT KNEE, INITIAL ENCOUNTER: ICD-10-CM

## 2017-08-04 DIAGNOSIS — S60.212A CONTUSION OF LEFT WRIST, INITIAL ENCOUNTER: Primary | ICD-10-CM

## 2017-08-04 DIAGNOSIS — R52 PAIN: ICD-10-CM

## 2017-08-04 DIAGNOSIS — J06.9 VIRAL URI WITH COUGH: ICD-10-CM

## 2017-08-04 PROCEDURE — 25000003 PHARM REV CODE 250: Performed by: PHYSICIAN ASSISTANT

## 2017-08-04 PROCEDURE — 99284 EMERGENCY DEPT VISIT MOD MDM: CPT

## 2017-08-04 PROCEDURE — 25000242 PHARM REV CODE 250 ALT 637 W/ HCPCS: Performed by: PHYSICIAN ASSISTANT

## 2017-08-04 PROCEDURE — 94640 AIRWAY INHALATION TREATMENT: CPT

## 2017-08-04 RX ORDER — OXYMETAZOLINE HCL 0.05 %
1 SPRAY, NON-AEROSOL (ML) NASAL 2 TIMES DAILY
Qty: 15 ML | Refills: 0 | COMMUNITY
Start: 2017-08-04 | End: 2017-08-07

## 2017-08-04 RX ORDER — ACETAMINOPHEN 500 MG
1000 TABLET ORAL
Status: COMPLETED | OUTPATIENT
Start: 2017-08-04 | End: 2017-08-04

## 2017-08-04 RX ORDER — CETIRIZINE HYDROCHLORIDE 10 MG/1
10 TABLET ORAL
Status: COMPLETED | OUTPATIENT
Start: 2017-08-04 | End: 2017-08-04

## 2017-08-04 RX ORDER — CETIRIZINE HYDROCHLORIDE 10 MG/1
10 TABLET ORAL DAILY
Qty: 30 TABLET | Refills: 0 | COMMUNITY
Start: 2017-08-04

## 2017-08-04 RX ORDER — ALBUTEROL SULFATE 2.5 MG/.5ML
2.5 SOLUTION RESPIRATORY (INHALATION)
Status: COMPLETED | OUTPATIENT
Start: 2017-08-04 | End: 2017-08-04

## 2017-08-04 RX ADMIN — ACETAMINOPHEN 1000 MG: 500 TABLET ORAL at 08:08

## 2017-08-04 RX ADMIN — CETIRIZINE HYDROCHLORIDE 10 MG: 10 TABLET, FILM COATED ORAL at 08:08

## 2017-08-04 RX ADMIN — ALBUTEROL SULFATE 2.5 MG: 2.5 SOLUTION RESPIRATORY (INHALATION) at 08:08

## 2017-08-04 NOTE — ED TRIAGE NOTES
Reports falling asleep  And fell from a bar stool landing on lt. Hand  And rt. Knee last night. Denies  Hitting head.c/o lt wrist  And rt. Knee pain.  No pain meds taken.  ALSO,  C/o cough, wheezing , nasal congestion,  red eyes. Reports using child's inhaler, cough med taken yesterday.

## 2017-08-04 NOTE — DISCHARGE INSTRUCTIONS
Take albuterol rescue inhaler as needed for cough and shortness of breath.  Elevate and ice the injured joints for the next 1-2 days.

## 2017-08-04 NOTE — ED PROVIDER NOTES
"Encounter Date: 8/4/2017    SCRIBE #1 NOTE: I, Liborio Negro, janelle scribing for, and in the presence of,  ANTHONY Curz. I have scribed the following portions of the note - Other sections scribed: HPI and ROS.       History     Chief Complaint   Patient presents with    Fall     fall from stool approx 1 am at home.  landing on right knee and left wrist.  Abrasion to right tibia area, bleeding has stopped.  Hx: MS      URI     "stuffy head with runny nose."  productive cough till yesterday now "just tight" states she has been wheezing.     Chief Complaint: Fall; URI    HPI: This 51 y.o. Female with anemia, anxiety, CKD, HTN, osteoporosis and MS presents to the ED status post a fall from a bar stool at 1 am this morning. Patient believes she fell asleep at the bar table while watching TV prior to fall. However, she is unsure if the fall woke her up or if she "jumped up from sleep" and proceeded to fall. She fell onto her left and landed on the floor. She c/o severe left wrist pain. Symptoms are worse with movement. She reports being unable to  her coffee cup this morning secondary to symptoms. There's an associated abrasions to the right knee and a laceration to the lower right leg. Knee is worse severe and worse with bending. She suspects hitting knee on the table during fall. There's no attempted treatment. No LOC.    She also reports nasal congestion with associated cough and wheezing x 1 week. She was evaluated at  and prescribed an unknown nasal spray for symptoms. Patient denies symptoms improvement. No fever or chills at this time.       The history is provided by the patient. No  was used.     Review of patient's allergies indicates:   Allergen Reactions    Lomotil [diphenoxylate-atropine]      Causes stomach spasms    Dilaudid [hydromorphone (bulk)] Itching     Past Medical History:   Diagnosis Date    Allergy     Anemia     Anxiety     Chronic kidney disease     Depression "     Hypertension     Multiple sclerosis     Multiple sclerosis     Neuromuscular disorder     Osteoporosis     Rib fracture 2015     Past Surgical History:   Procedure Laterality Date    APPENDECTOMY       SECTION, CLASSIC      CHOLECYSTECTOMY      EYE SURGERY      HYSTERECTOMY       Family History   Problem Relation Age of Onset    Arthritis Mother     Diabetes Father     Heart disease Father     Hyperlipidemia Father     Hypertension Father     Stroke Father     Alcohol abuse Son     Arthritis Son     Alcohol abuse Maternal Aunt     Arthritis Maternal Aunt     Alcohol abuse Maternal Uncle     Drug abuse Maternal Uncle     Hypertension Maternal Uncle     Alcohol abuse Paternal Aunt     Heart disease Paternal Aunt     Hearing loss Paternal Aunt     Hypertension Paternal Aunt     Alcohol abuse Paternal Uncle     Heart disease Paternal Uncle     Alcohol abuse Maternal Grandmother     Heart disease Maternal Grandmother     Stroke Maternal Grandmother     Alcohol abuse Maternal Grandfather     Heart disease Maternal Grandfather     Alcohol abuse Paternal Grandmother     Cancer Paternal Grandmother     Heart disease Paternal Grandmother     Hypertension Paternal Grandmother     Stroke Paternal Grandmother     Alcohol abuse Paternal Grandfather     Heart disease Paternal Grandfather      Social History   Substance Use Topics    Smoking status: Current Every Day Smoker     Packs/day: 1.00     Types: Cigarettes    Smokeless tobacco: Never Used    Alcohol use No     Review of Systems   Constitutional: Negative for chills and fever.   HENT: Positive for congestion. Negative for ear pain and sore throat.    Eyes: Negative for pain.   Respiratory: Positive for cough and wheezing. Negative for shortness of breath.    Cardiovascular: Negative for chest pain.   Gastrointestinal: Negative for abdominal pain, diarrhea, nausea and vomiting.   Genitourinary: Negative for dysuria  and hematuria.   Musculoskeletal: Negative for myalgias (arm or leg pain).        (+) wrist pain  (+) knee pain   Skin: Positive for wound. Negative for rash.   Neurological: Positive for weakness. Negative for syncope and headaches.       Physical Exam     Initial Vitals [08/04/17 0743]   BP Pulse Resp Temp SpO2   (!) 172/81 73 17 98.8 °F (37.1 °C) 97 %      MAP       111.33         Physical Exam    Vitals reviewed.  Constitutional: She appears well-developed and well-nourished. She is not diaphoretic. No distress.   HENT:   Head: Normocephalic and atraumatic.   Right Ear: External ear normal.   Left Ear: External ear normal.   Nose: Nose normal.   Mouth/Throat: Oropharynx is clear and moist.   Sinus congestion.   Eyes: No scleral icterus.   Conjunctiva slightly injected bilaterally   Neck: Normal range of motion. Neck supple. No JVD present.   Cardiovascular: Normal rate, regular rhythm, normal heart sounds and intact distal pulses.   Pulmonary/Chest: No respiratory distress. She has wheezes (slight expiratory wheeze bilaterally). She has no rhonchi. She has no rales. She exhibits no tenderness.   Abdominal: Soft. She exhibits no distension and no mass. There is no tenderness. There is no rebound and no guarding.   Musculoskeletal: Normal range of motion. She exhibits edema and tenderness.   Left wrist tenderness with restricted range of motion.  No edema, erythema, warmth.  Right knee patellar tenderness with minor abrasion and minimal suprapatellar edema.  No warmth or erythema.  Good range of motion.   Lymphadenopathy:     She has no cervical adenopathy.   Neurological: She is alert and oriented to person, place, and time. She has normal strength. No sensory deficit.   Skin: Skin is warm and dry.   Scab over abrasion to the right anterior leg mid shaft.  No bony tenderness or deformity.         ED Course   Procedures  Labs Reviewed - No data to display          Medical Decision Making:   Initial Assessment:    51-year-old female complains of left wrist pain and right knee pain after falling off of a stool when she fell asleep early this morning.  She denies hitting head.  She denies loss of consciousness after falling.  She is ambulatory.  She denies weakness.  She also complains of sinus congestion and cough for one week.  She denies fever.  Treated with nasal sprays at urgent care without relief.  She presents in no distress, afebrile, slightly hypertensive with otherwise reassuring vital signs.  She has tenderness to the left wrist and right knee without obvious deformity or fracture.  Patient's left wrist is much more painful than the right knee.  She is ambulatory with minimal limp.  Lung sounds with slight expiratory wheeze and normal work of breathing.  Heart sounds are normal.  Will obtain x-rays of left wrist.  ED Management:  X-rays negative for fracture.  Will treat for wrist and knee contusions.  Patient was given albuterol treatment in the ED with improvement of cough.  Wheezes resolved.  I doubt pneumonia.  She was discharged with instructions to take Tylenol for pain, and Zyrtec and Afrin nasal sprays.  Patient given return precautions and encouraged to follow-up with PCP.  She verbalized understanding and agreed with plan.            Scribe Attestation:   Scribe #1: I performed the above scribed service and the documentation accurately describes the services I performed. I attest to the accuracy of the note.    Attending Attestation:           Physician Attestation for Scribe:  Physician Attestation Statement for Scribe #1: I, ANTHONY Cruz, reviewed documentation, as scribed by Liborio Negro in my presence, and it is both accurate and complete.                 ED Course     Clinical Impression:   The primary encounter diagnosis was Contusion of left wrist, initial encounter. Diagnoses of Pain, Contusion of right knee, initial encounter, and Viral URI with cough were also pertinent to this visit.                            Chun Huynh PA-C  08/04/17 9807

## 2017-08-07 ENCOUNTER — OFFICE VISIT (OUTPATIENT)
Dept: FAMILY MEDICINE | Facility: CLINIC | Age: 52
End: 2017-08-07
Payer: MEDICARE

## 2017-08-07 VITALS
HEART RATE: 59 BPM | RESPIRATION RATE: 16 BRPM | HEIGHT: 60 IN | BODY MASS INDEX: 26.66 KG/M2 | DIASTOLIC BLOOD PRESSURE: 60 MMHG | TEMPERATURE: 99 F | WEIGHT: 135.81 LBS | SYSTOLIC BLOOD PRESSURE: 104 MMHG | OXYGEN SATURATION: 96 %

## 2017-08-07 DIAGNOSIS — Z72.0 TOBACCO ABUSE: ICD-10-CM

## 2017-08-07 DIAGNOSIS — B96.89 ACUTE BACTERIAL BRONCHITIS: Primary | ICD-10-CM

## 2017-08-07 DIAGNOSIS — J20.8 ACUTE BACTERIAL BRONCHITIS: Primary | ICD-10-CM

## 2017-08-07 PROCEDURE — 3008F BODY MASS INDEX DOCD: CPT | Mod: S$GLB,,, | Performed by: PHYSICIAN ASSISTANT

## 2017-08-07 PROCEDURE — 99999 PR PBB SHADOW E&M-EST. PATIENT-LVL V: CPT | Mod: PBBFAC,,, | Performed by: PHYSICIAN ASSISTANT

## 2017-08-07 PROCEDURE — 3078F DIAST BP <80 MM HG: CPT | Mod: S$GLB,,, | Performed by: PHYSICIAN ASSISTANT

## 2017-08-07 PROCEDURE — 3074F SYST BP LT 130 MM HG: CPT | Mod: S$GLB,,, | Performed by: PHYSICIAN ASSISTANT

## 2017-08-07 PROCEDURE — 99214 OFFICE O/P EST MOD 30 MIN: CPT | Mod: S$GLB,,, | Performed by: PHYSICIAN ASSISTANT

## 2017-08-07 RX ORDER — AMOXICILLIN AND CLAVULANATE POTASSIUM 500; 125 MG/1; MG/1
1 TABLET, FILM COATED ORAL 2 TIMES DAILY
Qty: 14 TABLET | Refills: 0 | Status: SHIPPED | OUTPATIENT
Start: 2017-08-07 | End: 2017-08-14

## 2017-08-07 RX ORDER — IBUPROFEN 800 MG/1
TABLET ORAL
Refills: 2 | COMMUNITY
Start: 2017-07-29 | End: 2019-02-05 | Stop reason: SDUPTHER

## 2017-08-07 RX ORDER — ALBUTEROL SULFATE 90 UG/1
2 AEROSOL, METERED RESPIRATORY (INHALATION) EVERY 6 HOURS PRN
Qty: 1 EACH | Refills: 0 | Status: SHIPPED | OUTPATIENT
Start: 2017-08-07 | End: 2017-11-17

## 2017-08-07 RX ORDER — BENZONATATE 100 MG/1
CAPSULE ORAL
Refills: 0 | COMMUNITY
Start: 2017-07-29 | End: 2017-09-15

## 2017-08-07 RX ORDER — FLUTICASONE PROPIONATE 50 MCG
SPRAY, SUSPENSION (ML) NASAL
Refills: 0 | COMMUNITY
Start: 2017-07-29 | End: 2018-05-21 | Stop reason: SDUPTHER

## 2017-08-15 ENCOUNTER — OFFICE VISIT (OUTPATIENT)
Dept: FAMILY MEDICINE | Facility: CLINIC | Age: 52
End: 2017-08-15
Payer: MEDICARE

## 2017-08-15 VITALS
TEMPERATURE: 97 F | DIASTOLIC BLOOD PRESSURE: 70 MMHG | HEIGHT: 60 IN | WEIGHT: 134.5 LBS | SYSTOLIC BLOOD PRESSURE: 110 MMHG | OXYGEN SATURATION: 95 % | BODY MASS INDEX: 26.41 KG/M2 | HEART RATE: 76 BPM

## 2017-08-15 DIAGNOSIS — E78.5 HYPERLIPIDEMIA, UNSPECIFIED HYPERLIPIDEMIA TYPE: ICD-10-CM

## 2017-08-15 DIAGNOSIS — Z72.0 TOBACCO ABUSE: ICD-10-CM

## 2017-08-15 DIAGNOSIS — R25.2 SPASTICITY: ICD-10-CM

## 2017-08-15 DIAGNOSIS — G35 MULTIPLE SCLEROSIS: ICD-10-CM

## 2017-08-15 DIAGNOSIS — B96.89 BACTERIAL SINUSITIS: ICD-10-CM

## 2017-08-15 DIAGNOSIS — F32.A DEPRESSION, UNSPECIFIED DEPRESSION TYPE: ICD-10-CM

## 2017-08-15 DIAGNOSIS — I10 ESSENTIAL HYPERTENSION: ICD-10-CM

## 2017-08-15 DIAGNOSIS — J32.9 BACTERIAL SINUSITIS: ICD-10-CM

## 2017-08-15 DIAGNOSIS — F11.90 CHRONIC, CONTINUOUS USE OF OPIOIDS: Primary | ICD-10-CM

## 2017-08-15 PROCEDURE — 99406 BEHAV CHNG SMOKING 3-10 MIN: CPT | Mod: S$GLB,,, | Performed by: FAMILY MEDICINE

## 2017-08-15 PROCEDURE — 3008F BODY MASS INDEX DOCD: CPT | Mod: S$GLB,,, | Performed by: FAMILY MEDICINE

## 2017-08-15 PROCEDURE — 99215 OFFICE O/P EST HI 40 MIN: CPT | Mod: 25,S$GLB,, | Performed by: FAMILY MEDICINE

## 2017-08-15 PROCEDURE — 99999 PR PBB SHADOW E&M-EST. PATIENT-LVL III: CPT | Mod: PBBFAC,,, | Performed by: FAMILY MEDICINE

## 2017-08-15 PROCEDURE — 3078F DIAST BP <80 MM HG: CPT | Mod: S$GLB,,, | Performed by: FAMILY MEDICINE

## 2017-08-15 PROCEDURE — 3074F SYST BP LT 130 MM HG: CPT | Mod: S$GLB,,, | Performed by: FAMILY MEDICINE

## 2017-08-15 RX ORDER — HYDROCODONE BITARTRATE AND ACETAMINOPHEN 7.5; 325 MG/1; MG/1
1 TABLET ORAL EVERY 6 HOURS PRN
Qty: 120 TABLET | Refills: 0 | Status: SHIPPED | OUTPATIENT
Start: 2017-10-14 | End: 2017-09-15 | Stop reason: SDUPTHER

## 2017-08-15 RX ORDER — ESCITALOPRAM OXALATE 20 MG/1
20 TABLET ORAL DAILY
Qty: 90 TABLET | Refills: 3 | Status: SHIPPED | OUTPATIENT
Start: 2017-08-15 | End: 2018-08-30 | Stop reason: SDUPTHER

## 2017-08-15 RX ORDER — HYDROCODONE BITARTRATE AND ACETAMINOPHEN 7.5; 325 MG/1; MG/1
1 TABLET ORAL EVERY 6 HOURS PRN
Qty: 120 TABLET | Refills: 0 | Status: SHIPPED | OUTPATIENT
Start: 2017-09-14 | End: 2017-10-14

## 2017-08-15 RX ORDER — ONDANSETRON 4 MG/1
4 TABLET, ORALLY DISINTEGRATING ORAL EVERY 12 HOURS PRN
Qty: 20 TABLET | Refills: 2 | Status: SHIPPED | OUTPATIENT
Start: 2017-08-15 | End: 2018-10-01 | Stop reason: SDUPTHER

## 2017-08-15 RX ORDER — HYDROCODONE BITARTRATE AND ACETAMINOPHEN 7.5; 325 MG/1; MG/1
1 TABLET ORAL EVERY 6 HOURS PRN
Qty: 120 TABLET | Refills: 0 | Status: SHIPPED | OUTPATIENT
Start: 2017-09-14 | End: 2017-08-15 | Stop reason: SDUPTHER

## 2017-08-15 RX ORDER — METOPROLOL SUCCINATE 100 MG/1
100 TABLET, EXTENDED RELEASE ORAL DAILY
Qty: 90 TABLET | Refills: 3 | Status: SHIPPED | OUTPATIENT
Start: 2017-08-15 | End: 2018-05-21 | Stop reason: SDUPTHER

## 2017-08-15 RX ORDER — TIZANIDINE 4 MG/1
TABLET ORAL
Qty: 810 TABLET | Refills: 3 | Status: SHIPPED | OUTPATIENT
Start: 2017-08-15 | End: 2018-11-05 | Stop reason: SDUPTHER

## 2017-08-15 RX ORDER — HYDROCODONE BITARTRATE AND ACETAMINOPHEN 10; 325 MG/1; MG/1
1 TABLET ORAL EVERY 6 HOURS PRN
Qty: 120 TABLET | Refills: 0 | Status: SHIPPED | OUTPATIENT
Start: 2017-08-15 | End: 2017-09-15

## 2017-08-15 RX ORDER — DOXYCYCLINE 100 MG/1
100 CAPSULE ORAL 2 TIMES DAILY
Qty: 28 CAPSULE | Refills: 0 | Status: SHIPPED | OUTPATIENT
Start: 2017-08-15 | End: 2017-08-29

## 2017-08-15 RX ORDER — SIMVASTATIN 40 MG/1
40 TABLET, FILM COATED ORAL NIGHTLY
Qty: 90 TABLET | Refills: 3 | Status: SHIPPED | OUTPATIENT
Start: 2017-08-15 | End: 2018-05-21 | Stop reason: SDUPTHER

## 2017-08-15 NOTE — PROGRESS NOTES
Chief Complaint   Patient presents with    Medication Refill       SUBJECTIVE:  Zoey Cali is a 51 y.o. female here for follow up of her chronic pain from the neuropathic pain, doing well on her chronic pain medication and the neurontin, we will get her to less risk MME and see if she can tolerate, also she is still having trouble with the smoking, has tried to quit, she has not gone to tobacco cessation.  She also went to the ER for left wrist and right knee pain with a fall and URI symptoms, saw my PA on 7th as well and feeling a little better.  Still with some postnasal drip and slight cough. But still has facial pressure and daily thick purulent mucus from nose and foul taste in mouth. Currently has co-morbidities including per problem list.      Social History   Substance Use Topics    Smoking status: Current Every Day Smoker     Packs/day: 1.00     Types: Cigarettes    Smokeless tobacco: Never Used    Alcohol use No       Patient Active Problem List    Diagnosis Date Noted    Neuropathic pain 05/02/2017    Prophylactic immunotherapy 11/30/2016    Gait disturbance 11/30/2016    Chronic, continuous use of opioids 04/04/2016     She has mainly back and leg pain with neuropathic pain  Using gabapentin and oxycodone    GOALS:  Keep her walking and functioning with ADL's    PROGNOSIS:  Overall is fair      Vitamin D insufficiency 07/08/2015    Muscle spasm 07/08/2015    Counseling regarding goals of care 07/08/2015    Encounter for long-term (current) use of high-risk medication 07/08/2015    Urinary hesitancy 07/08/2015    Neuralgia and neuritis 06/02/2015     Bilateral foot and lower limb pain.      Tobacco abuse 06/02/2015     Trying to quit.  4/17 still trying to quit brief counseling  8/17 counseled again still not ready to do the tobacco cessation program but she is thinking about it, brief counseling       Osteoporosis 12/29/2014    Iron deficiency anemia 12/29/2014    Hyperlipidemia  LDL goal < 130 12/29/2014    HTN (hypertension) 07/17/2012    Multiple sclerosis 07/17/2012     AVONEX and gabapentin with chronic pain         ROS    OBJECTIVE:  /70   Pulse 76   Temp 97 °F (36.1 °C) (Oral)   Ht 5' (1.524 m)   Wt 61 kg (134 lb 7.7 oz)   SpO2 95%   BMI 26.26 kg/m²     Wt Readings from Last 3 Encounters:   08/15/17 61 kg (134 lb 7.7 oz)   08/07/17 61.6 kg (135 lb 12.9 oz)   08/04/17 61.7 kg (136 lb)     BP Readings from Last 3 Encounters:   08/15/17 110/70   08/07/17 104/60   08/04/17 (!) 141/76       She appears chronically ill, alert and oriented  Ears clear  Nose with coryza and right sided purulent discharge  Some right facial pressure on palpation  Transillumination abnormal on right sinus frontal/maxillary  Lungs clear  Heart exam unremarkable  No gross focal neurological deficits noted today    Review of old Records:  reviedwed  missed her July scripts but was on oxycodone from dentist so didn't want to mix.      MS specialist 5/17- stable on avonex, high dose vit D3, zanaflex, HEP, lexapro, gabapentin/tegretol, and opioid with me    Hem/onc: 7/17 s/p IV FE, GI eval negative, FE better, repeat in 3 months.  Review of old labs:    Lab Visit on 07/10/2017   Component Date Value Ref Range Status    WBC 07/10/2017 6.90  3.90 - 12.70 K/uL Final    RBC 07/10/2017 4.53  4.00 - 5.40 M/uL Final    Hemoglobin 07/10/2017 14.2  12.0 - 16.0 g/dL Final    Hematocrit 07/10/2017 39.6  37.0 - 48.5 % Final    MCV 07/10/2017 87  82 - 98 fL Final    MCH 07/10/2017 31.3* 27.0 - 31.0 pg Final    MCHC 07/10/2017 35.9  32.0 - 36.0 % Final    RDW 07/10/2017 13.2  11.5 - 14.5 % Final    Platelets 07/10/2017 290  150 - 350 K/uL Final    MPV 07/10/2017 8.7* 9.2 - 12.9 fL Final    Gran # 07/10/2017 4.8  1.8 - 7.7 K/uL Final    Lymph # 07/10/2017 1.7  1.0 - 4.8 K/uL Final    Mono # 07/10/2017 0.3  0.3 - 1.0 K/uL Final    Eos # 07/10/2017 0.2  0.0 - 0.5 K/uL Final    Baso # 07/10/2017 0.02   0.00 - 0.20 K/uL Final    Gran% 07/10/2017 69.3  38.0 - 73.0 % Final    Lymph% 07/10/2017 23.9  18.0 - 48.0 % Final    Mono% 07/10/2017 4.1  4.0 - 15.0 % Final    Eosinophil% 07/10/2017 2.3  0.0 - 8.0 % Final    Basophil% 07/10/2017 0.3  0.0 - 1.9 % Final    Differential Method 07/10/2017 Automated   Final    Ferritin 07/10/2017 100  20.0 - 300.0 ng/mL Final    Iron 07/10/2017 66  30 - 160 ug/dL Final    Transferrin 07/10/2017 247  200 - 375 mg/dL Final    TIBC 07/10/2017 366  250 - 450 ug/dL Final    Saturated Iron 07/10/2017 18* 20 - 50 % Final         Review of old imaging:  Xray of left wrist with soft tissue swelling no fx      ASSESSMENT:  Problem List Items Addressed This Visit     Tobacco abuse    Multiple sclerosis    Relevant Medications    hydrocodone-acetaminophen 10-325mg (NORCO)  mg Tab    HTN (hypertension)    Relevant Medications    metoprolol succinate (TOPROL-XL) 100 MG 24 hr tablet    Chronic, continuous use of opioids - Primary    Relevant Medications    hydrocodone-acetaminophen 10-325mg (NORCO)  mg Tab    ondansetron (ZOFRAN-ODT) 4 MG TbDL      Other Visit Diagnoses     Depression, unspecified depression type        Relevant Medications    escitalopram oxalate (LEXAPRO) 20 MG tablet    Hyperlipidemia, unspecified hyperlipidemia type        Relevant Medications    simvastatin (ZOCOR) 40 MG tablet    Spasticity        Relevant Medications    tizanidine (ZANAFLEX) 4 MG tablet    Bacterial sinusitis        Relevant Medications    doxycycline (MONODOX) 100 MG capsule          ICD-10-CM ICD-9-CM   1. Chronic, continuous use of opioids F11.90 305.51   2. Multiple sclerosis G35 340   3. Depression, unspecified depression type F32.9 311   4. Essential hypertension I10 401.9   5. Hyperlipidemia, unspecified hyperlipidemia type E78.5 272.4   6. Spasticity R25.2 781.0   7. Bacterial sinusitis J32.9 473.9    B96.89 041.9   8. Tobacco abuse Z72.0 305.1               PLAN:     continue  with MS    Continue with her pain management   We will go down to 30 MME    Still with the tobacco abuse    We will continue out her treatment for sinusitis with doxycycline    She has more dental procedures to get the bottom 4 teeth out and do implants      Explained to patient the risks and adverse events of opioid treatment. Including: nausea which can be managed with antiemetic, constipation which is well treated with increased fiber, water, exercise and senna, may cause vestibular dizziness which may be treated with antihistamine, CNS side effects that can be sedation and decreased cognition, can be treated with dexedrine or ritalin and seroquel or similar drug if needed, pruritus is well treated with antihistamine.  Also cautioned patient that tolerance and dependence can be a factor and certain risk factors in history can predispose to abuse as well as not taking medication as prescribed. Went over the new black box labeling with concurrent benzodiazepine use that can lead to overdose and to watch for signs and symptoms.  Also explained the possible adrenal suppression.  Patient voiced understanding.        Medication List with Changes/Refills   New Medications    DOXYCYCLINE (MONODOX) 100 MG CAPSULE    Take 1 capsule (100 mg total) by mouth 2 (two) times daily.    HYDROCODONE-ACETAMINOPHEN 7.5-325MG (NORCO) 7.5-325 MG PER TABLET    Take 1 tablet by mouth every 6 (six) hours as needed.    HYDROCODONE-ACETAMINOPHEN 7.5-325MG (NORCO) 7.5-325 MG PER TABLET    Take 1 tablet by mouth every 6 (six) hours as needed.    ONDANSETRON (ZOFRAN-ODT) 4 MG TBDL    Take 1 tablet (4 mg total) by mouth every 12 (twelve) hours as needed.   Current Medications    ALBUTEROL 90 MCG/ACTUATION INHALER    Inhale 2 puffs into the lungs every 6 (six) hours as needed.    BENZONATATE (TESSALON) 100 MG CAPSULE    TAKE ONE CAPSULE BY MOUTH THREE TIMES DAILY AS NEEDED COUGH. Do not crush OR chew    CARBAMAZEPINE (TEGRETOL XR) 200 MG 12 HR  TABLET    TAKE ONE TABLET TWICE DAILY    CETIRIZINE (ZYRTEC) 10 MG TABLET    Take 1 tablet (10 mg total) by mouth once daily.    CHOLECALCIFEROL, VITAMIN D3, (VITAMIN D3) 5,000 UNIT TAB    Take 5,000 Units by mouth once daily.    FLUTICASONE (FLONASE) 50 MCG/ACTUATION NASAL SPRAY    ONE sprays(s) each nostril TWICE DAILY per instructed    GABAPENTIN (NEURONTIN) 400 MG CAPSULE    Take 2 capsules (800 mg total) by mouth every evening.    GABAPENTIN (NEURONTIN) 600 MG TABLET    TAKE 1 TABLET THREE TIMES DAILY    IBUPROFEN (ADVIL,MOTRIN) 800 MG TABLET    TAKE ONE TABLET BY MOUTH EVERY 8 HOURS WITH FOOD AND WATER    INTERFERON BETA-1A (AVONEX) 30 MCG/0.5 ML PNKT    Inject 30 mcg into the muscle once a week.    LORAZEPAM (ATIVAN) 1 MG TABLET    Take 1 tablet (1 mg total) by mouth nightly as needed.    MECLIZINE (ANTIVERT) 25 MG TABLET    Take 1 tablet (25 mg total) by mouth as needed. 1 Tablet Oral Every day    OMEPRAZOLE (PRILOSEC) 40 MG CAPSULE    Take 1 capsule (40 mg total) by mouth every evening.    TRIAMTERENE-HYDROCHLOROTHIAZIDE 37.5-25 MG (MAXZIDE-25) 37.5-25 MG PER TABLET    Take 1 tablet by mouth once daily.   Changed and/or Refilled Medications    Modified Medication Previous Medication    ESCITALOPRAM OXALATE (LEXAPRO) 20 MG TABLET escitalopram oxalate (LEXAPRO) 20 MG tablet       Take 1 tablet (20 mg total) by mouth once daily.    Take 1 tablet (20 mg total) by mouth once daily.    HYDROCODONE-ACETAMINOPHEN 10-325MG (NORCO)  MG TAB hydrocodone-acetaminophen 10-325mg (NORCO)  mg Tab       Take 1 tablet by mouth every 6 (six) hours as needed.    Take 1 tablet by mouth every 6 (six) hours as needed.    METOPROLOL SUCCINATE (TOPROL-XL) 100 MG 24 HR TABLET metoprolol succinate (TOPROL-XL) 100 MG 24 hr tablet       Take 1 tablet (100 mg total) by mouth once daily.    Take 1 tablet (100 mg total) by mouth once daily.    SIMVASTATIN (ZOCOR) 40 MG TABLET simvastatin (ZOCOR) 40 MG tablet       Take 1 tablet  (40 mg total) by mouth every evening.    Take 1 tablet (40 mg total) by mouth every evening.    TIZANIDINE (ZANAFLEX) 4 MG TABLET tizanidine (ZANAFLEX) 4 MG tablet       TAKE 2 TABLETS IN THE MORNING, AT NOON AND IN THE EVENING AND TAKE 3 TABLETS AT NIGHT (9 TABLETS PER DAY)    TAKE 2 TABLETS IN THE MORNING, AT NOON AND IN THE EVENING AND TAKE 3 TABLETS AT NIGHT (9 TABLETS PER DAY)       Return in about 3 months (around 11/15/2017) for assess treatment plan, chronic pain managment.

## 2017-08-23 ENCOUNTER — PATIENT MESSAGE (OUTPATIENT)
Dept: NEUROLOGY | Facility: CLINIC | Age: 52
End: 2017-08-23

## 2017-08-23 DIAGNOSIS — M79.2 NEUROPATHIC PAIN: Primary | ICD-10-CM

## 2017-08-23 RX ORDER — CARBAMAZEPINE 200 MG/1
200 TABLET, EXTENDED RELEASE ORAL 2 TIMES DAILY
Qty: 60 TABLET | Refills: 5 | Status: SHIPPED | OUTPATIENT
Start: 2017-08-23 | End: 2018-02-26 | Stop reason: SDUPTHER

## 2017-08-25 ENCOUNTER — DOCUMENTATION ONLY (OUTPATIENT)
Dept: NEUROLOGY | Facility: CLINIC | Age: 52
End: 2017-08-25

## 2017-09-11 ENCOUNTER — LAB VISIT (OUTPATIENT)
Dept: LAB | Facility: HOSPITAL | Age: 52
End: 2017-09-11
Attending: INTERNAL MEDICINE
Payer: MEDICARE

## 2017-09-11 DIAGNOSIS — D50.9 IRON DEFICIENCY ANEMIA, UNSPECIFIED: ICD-10-CM

## 2017-09-11 LAB
BASOPHILS # BLD AUTO: 0.03 K/UL
BASOPHILS NFR BLD: 0.4 %
DIFFERENTIAL METHOD: ABNORMAL
EOSINOPHIL # BLD AUTO: 0.2 K/UL
EOSINOPHIL NFR BLD: 2 %
ERYTHROCYTE [DISTWIDTH] IN BLOOD BY AUTOMATED COUNT: 12.9 %
FERRITIN SERPL-MCNC: 109 NG/ML
HCT VFR BLD AUTO: 37.9 %
HGB BLD-MCNC: 13.8 G/DL
IRON SERPL-MCNC: 78 UG/DL
LYMPHOCYTES # BLD AUTO: 1.7 K/UL
LYMPHOCYTES NFR BLD: 19.9 %
MCH RBC QN AUTO: 31.5 PG
MCHC RBC AUTO-ENTMCNC: 36.4 G/DL
MCV RBC AUTO: 87 FL
MONOCYTES # BLD AUTO: 0.5 K/UL
MONOCYTES NFR BLD: 5.4 %
NEUTROPHILS # BLD AUTO: 6 K/UL
NEUTROPHILS NFR BLD: 72.3 %
PLATELET # BLD AUTO: 256 K/UL
PMV BLD AUTO: 8.9 FL
RBC # BLD AUTO: 4.38 M/UL
SATURATED IRON: 22 %
TOTAL IRON BINDING CAPACITY: 349 UG/DL
TRANSFERRIN SERPL-MCNC: 236 MG/DL
WBC # BLD AUTO: 8.35 K/UL

## 2017-09-11 PROCEDURE — 83540 ASSAY OF IRON: CPT

## 2017-09-11 PROCEDURE — 85025 COMPLETE CBC W/AUTO DIFF WBC: CPT

## 2017-09-11 PROCEDURE — 36415 COLL VENOUS BLD VENIPUNCTURE: CPT

## 2017-09-11 PROCEDURE — 82728 ASSAY OF FERRITIN: CPT

## 2017-09-15 ENCOUNTER — OFFICE VISIT (OUTPATIENT)
Dept: HEMATOLOGY/ONCOLOGY | Facility: CLINIC | Age: 52
End: 2017-09-15
Payer: MEDICARE

## 2017-09-15 VITALS
HEART RATE: 67 BPM | OXYGEN SATURATION: 95 % | SYSTOLIC BLOOD PRESSURE: 103 MMHG | BODY MASS INDEX: 24.02 KG/M2 | TEMPERATURE: 98 F | WEIGHT: 135.56 LBS | DIASTOLIC BLOOD PRESSURE: 57 MMHG | HEIGHT: 63 IN

## 2017-09-15 DIAGNOSIS — G35 MULTIPLE SCLEROSIS: ICD-10-CM

## 2017-09-15 DIAGNOSIS — D50.9 IRON DEFICIENCY ANEMIA, UNSPECIFIED IRON DEFICIENCY ANEMIA TYPE: Primary | ICD-10-CM

## 2017-09-15 PROCEDURE — 3008F BODY MASS INDEX DOCD: CPT | Mod: S$GLB,,, | Performed by: INTERNAL MEDICINE

## 2017-09-15 PROCEDURE — 99999 PR PBB SHADOW E&M-EST. PATIENT-LVL IV: CPT | Mod: PBBFAC,,, | Performed by: INTERNAL MEDICINE

## 2017-09-15 PROCEDURE — 3078F DIAST BP <80 MM HG: CPT | Mod: S$GLB,,, | Performed by: INTERNAL MEDICINE

## 2017-09-15 PROCEDURE — 3074F SYST BP LT 130 MM HG: CPT | Mod: S$GLB,,, | Performed by: INTERNAL MEDICINE

## 2017-09-15 PROCEDURE — 99213 OFFICE O/P EST LOW 20 MIN: CPT | Mod: S$GLB,,, | Performed by: INTERNAL MEDICINE

## 2017-09-15 NOTE — PROGRESS NOTES
"Subjective:       Patient ID: Zoey Cali is a 51 y.o. female.    Chief Complaint: Follow-up  Diagnosis: TRACIE  HPI     50 y/o female with history of MS seen  today for f/u for TRACIE  She undergoes intermittent IV iron therapy.  She has been intolerant of oral Fe supp  s/p GI eval with EGD and colonoscopy which was unremarkable    She is followed by Dr. Mendez for long standing history of MS and remains on therapy with Avonex   She has chronic pain  and neuralgia and on opioid pain medication managed by PCP and Neurology    Today , she is doing well  She continues with mild fatigue-stable  Appetite and weight stable   No SOB/CP  No lightheadedness  No melena, hematochezia or change in bowel habits      CBC reveals wbc 8350/mm3  Hb 13.8 g/d   37.9 g/dl   plt ct 256k    Review of Systems   Constitutional: Positive for fatigue. Negative for appetite change and unexpected weight change.   Eyes: Negative for visual disturbance.   Respiratory: Negative for cough and shortness of breath.    Cardiovascular: Negative for chest pain and leg swelling.   Gastrointestinal: Negative for abdominal pain, blood in stool, constipation, diarrhea, nausea and vomiting.   Genitourinary: Negative for frequency.   Musculoskeletal: Negative for arthralgias, back pain and joint swelling.   Skin: Negative for rash.        No petechiae, ecchymoses   Neurological: Negative for light-headedness and headaches.   Hematological: Negative for adenopathy. Does not bruise/bleed easily.       Objective:       Vitals:    09/15/17 1021   BP: (!) 103/57   BP Location: Left arm   Patient Position: Sitting   BP Method: Medium (Automatic)   Pulse: 67   Temp: 98.4 °F (36.9 °C)   TempSrc: Oral   SpO2: 95%   Weight: 61.5 kg (135 lb 9.3 oz)   Height: 5' 3" (1.6 m)       Physical Exam   Constitutional: She is oriented to person, place, and time. She appears well-developed and well-nourished.   HENT:   Head: Normocephalic.   Mouth/Throat: Oropharynx is clear " and moist. No oropharyngeal exudate.   Eyes: Conjunctivae and lids are normal. Pupils are equal, round, and reactive to light. No scleral icterus.   Neck: Normal range of motion. Neck supple. No thyromegaly present.   Cardiovascular: Normal rate, regular rhythm and normal heart sounds.    No murmur heard.  Pulmonary/Chest: Breath sounds normal. She has no wheezes. She has no rales.   Abdominal: Soft. Bowel sounds are normal. She exhibits no distension and no mass. There is no hepatosplenomegaly. There is no tenderness. There is no rebound and no guarding.   Musculoskeletal: Normal range of motion. She exhibits no edema or tenderness.   Lymphadenopathy:     She has no cervical adenopathy.     She has no axillary adenopathy.        Right: No supraclavicular adenopathy present.        Left: No supraclavicular adenopathy present.   Neurological: She is alert and oriented to person, place, and time. No cranial nerve deficit. Coordination normal.   Skin: Skin is warm and dry. No ecchymosis, no petechiae and no rash noted. No erythema.   Psychiatric: She has a normal mood and affect.        Lab Results   Component Value Date    WBC 8.35 09/11/2017    HGB 13.8 09/11/2017    HCT 37.9 09/11/2017    MCV 87 09/11/2017     09/11/2017     Lab Results   Component Value Date    IRON 78 09/11/2017    TIBC 349 09/11/2017    FERRITIN 109 09/11/2017       Assessment:       1. Iron deficiency anemia, unspecified iron deficiency anemia type        Plan:   Zoey FRASER was seen today for follow-up.    Diagnoses and all orders for this visit:    Iron deficiency anemia, unspecified iron deficiency       S/p intermittent IV Fe therapy       S/p GI eval-unremarkable       CBC reveals stable Hb 13.8 g/dl        Fe parameters nl        Ferritin 100    Multiple Sclerosis    Follow-up with Neurology     Cont to monitor              F/u 2 mos with cbc, Fe studies prior to f/u ( or sooner if problems should arise in the interim)      Cc: Devon  MD Heidy Collier MD

## 2017-09-21 ENCOUNTER — TELEPHONE (OUTPATIENT)
Dept: NEUROLOGY | Facility: CLINIC | Age: 52
End: 2017-09-21

## 2017-09-21 DIAGNOSIS — G35 MULTIPLE SCLEROSIS: ICD-10-CM

## 2017-09-21 DIAGNOSIS — Z79.899 HIGH RISK MEDICATION USE: ICD-10-CM

## 2017-09-21 DIAGNOSIS — G35 MULTIPLE SCLEROSIS: Primary | ICD-10-CM

## 2017-09-21 NOTE — TELEPHONE ENCOUNTER
----- Message from Liu Mcgarry sent at 9/21/2017  3:54 PM CDT -----  Contact: Patient @ [phone]  Patient is returning a missed call, pls return call

## 2017-10-25 ENCOUNTER — HOSPITAL ENCOUNTER (EMERGENCY)
Facility: HOSPITAL | Age: 52
Discharge: HOME | End: 2017-10-25
Attending: EMERGENCY MEDICINE
Payer: MEDICARE

## 2017-10-25 ENCOUNTER — PATIENT MESSAGE (OUTPATIENT)
Dept: FAMILY MEDICINE | Facility: CLINIC | Age: 52
End: 2017-10-25

## 2017-10-25 VITALS
WEIGHT: 134 LBS | TEMPERATURE: 98 F | OXYGEN SATURATION: 99 % | BODY MASS INDEX: 26.31 KG/M2 | SYSTOLIC BLOOD PRESSURE: 159 MMHG | HEIGHT: 60 IN | DIASTOLIC BLOOD PRESSURE: 74 MMHG | RESPIRATION RATE: 16 BRPM | HEART RATE: 59 BPM

## 2017-10-25 DIAGNOSIS — M85.9 DISORDER OF BONE DENSITY AND STRUCTURE, UNSPECIFIED: ICD-10-CM

## 2017-10-25 DIAGNOSIS — M85.80 OSTEOPENIA, UNSPECIFIED LOCATION: Primary | ICD-10-CM

## 2017-10-25 DIAGNOSIS — S02.2XXA CLOSED FRACTURE OF NASAL BONE, INITIAL ENCOUNTER: Primary | ICD-10-CM

## 2017-10-25 DIAGNOSIS — M89.9 DISORDER OF BONE: ICD-10-CM

## 2017-10-25 DIAGNOSIS — S02.40DD: ICD-10-CM

## 2017-10-25 DIAGNOSIS — T14.90XA TRAUMA: ICD-10-CM

## 2017-10-25 LAB — POCT GLUCOSE: 84 MG/DL (ref 70–110)

## 2017-10-25 PROCEDURE — 93010 ELECTROCARDIOGRAM REPORT: CPT | Mod: ,,, | Performed by: INTERNAL MEDICINE

## 2017-10-25 PROCEDURE — 93005 ELECTROCARDIOGRAM TRACING: CPT

## 2017-10-25 PROCEDURE — 25000003 PHARM REV CODE 250: Performed by: EMERGENCY MEDICINE

## 2017-10-25 PROCEDURE — 21310 HC CL TX OF NASAL FX WO MAN: CPT

## 2017-10-25 PROCEDURE — 99284 EMERGENCY DEPT VISIT MOD MDM: CPT | Mod: 25

## 2017-10-25 PROCEDURE — 82962 GLUCOSE BLOOD TEST: CPT

## 2017-10-25 RX ORDER — OXYCODONE AND ACETAMINOPHEN 10; 325 MG/1; MG/1
1 TABLET ORAL EVERY 6 HOURS PRN
Qty: 18 TABLET | Refills: 0 | Status: SHIPPED | OUTPATIENT
Start: 2017-10-25 | End: 2017-11-10 | Stop reason: ALTCHOICE

## 2017-10-25 RX ORDER — CEPHALEXIN 250 MG/1
250 CAPSULE ORAL 4 TIMES DAILY
Qty: 28 CAPSULE | Refills: 0 | Status: SHIPPED | OUTPATIENT
Start: 2017-10-25 | End: 2017-11-01

## 2017-10-25 RX ORDER — CEPHALEXIN 250 MG/1
250 CAPSULE ORAL 4 TIMES DAILY
Qty: 28 CAPSULE | Refills: 0 | Status: SHIPPED | OUTPATIENT
Start: 2017-10-25 | End: 2017-10-25 | Stop reason: HOSPADM

## 2017-10-25 RX ORDER — CEPHALEXIN 250 MG/1
250 CAPSULE ORAL 4 TIMES DAILY
Qty: 28 CAPSULE | Refills: 0 | Status: SHIPPED | OUTPATIENT
Start: 2017-10-25 | End: 2017-10-25

## 2017-10-25 RX ORDER — OXYCODONE AND ACETAMINOPHEN 5; 325 MG/1; MG/1
1 TABLET ORAL
Status: COMPLETED | OUTPATIENT
Start: 2017-10-25 | End: 2017-10-25

## 2017-10-25 RX ADMIN — OXYCODONE AND ACETAMINOPHEN 1 TABLET: 5; 325 TABLET ORAL at 12:10

## 2017-10-25 NOTE — ED TRIAGE NOTES
Pt. Has a history of MS, Pt. States she came in today due to a fall early this morning. Pt. States she is experiencing pain in her shoulders and muscles. Pt. Has abrasions on her forehead and nose. She is AAOx3 calm and in no acute distress. Family at bedside.

## 2017-10-25 NOTE — ED PROVIDER NOTES
"Encounter Date: 10/25/2017    SCRIBE #1 NOTE: I, Bebe Corona, am scribing for, and in the presence of,  Salvador Enriquez MD. I have scribed the following portions of the note -       History     Chief Complaint   Patient presents with    Fall     "I'm and MS patient, I got up at 0300, went to garage, sat on stool and fell on concrete face first; I don't think I blacked out;"  also complains of bilateral shoulder pain and neck pain, abrasions to face; "not sure if I was dizzy or my foot got caught in stool"     CC: Fall     51 year old female with multiple sclerosis, hypertension, anemia, anxiety, chronic kidney disease, depression, neuromuscular disorder, and osteoporosis presents to the ED for evaluation after a fall at 315 am this morning. Pt has abrasions to her forehead/nose and ecchymosis around her left eye. She notes jaw pain, posterior neck pain,left/right sided shoulder pain that is worse to her left shoulder.     Pt reports she was sitting on a stool in her garage when she got up and fell onto her face. She does not recall if her foot got stuck in the rug or if she was dizzy prior to the fall. Pt took Motrin which did not provide relief. No other symptoms reported.       The history is provided by the patient. No  was used.     Review of patient's allergies indicates:   Allergen Reactions    Lomotil [diphenoxylate-atropine]      Causes stomach spasms    Dilaudid [hydromorphone (bulk)] Itching     Past Medical History:   Diagnosis Date    Allergy     Anemia     Anxiety     Chronic kidney disease     Depression     Hypertension     Multiple sclerosis     Multiple sclerosis     Neuromuscular disorder     Osteoporosis     Rib fracture 2015     Past Surgical History:   Procedure Laterality Date    APPENDECTOMY       SECTION, CLASSIC      CHOLECYSTECTOMY      EYE SURGERY      HYSTERECTOMY       Family History   Problem Relation Age of Onset    " Arthritis Mother     Diabetes Father     Heart disease Father     Hyperlipidemia Father     Hypertension Father     Stroke Father     Alcohol abuse Son     Arthritis Son     Alcohol abuse Maternal Aunt     Arthritis Maternal Aunt     Alcohol abuse Maternal Uncle     Drug abuse Maternal Uncle     Hypertension Maternal Uncle     Alcohol abuse Paternal Aunt     Heart disease Paternal Aunt     Hearing loss Paternal Aunt     Hypertension Paternal Aunt     Alcohol abuse Paternal Uncle     Heart disease Paternal Uncle     Alcohol abuse Maternal Grandmother     Heart disease Maternal Grandmother     Stroke Maternal Grandmother     Alcohol abuse Maternal Grandfather     Heart disease Maternal Grandfather     Alcohol abuse Paternal Grandmother     Cancer Paternal Grandmother     Heart disease Paternal Grandmother     Hypertension Paternal Grandmother     Stroke Paternal Grandmother     Alcohol abuse Paternal Grandfather     Heart disease Paternal Grandfather      Social History   Substance Use Topics    Smoking status: Current Every Day Smoker     Packs/day: 1.00     Types: Cigarettes    Smokeless tobacco: Never Used    Alcohol use No     Review of Systems   Constitutional: Negative for fever.   HENT: Negative for sore throat.    Respiratory: Negative for shortness of breath.    Cardiovascular: Negative for chest pain.   Gastrointestinal: Negative for nausea.   Genitourinary: Negative for dysuria.   Musculoskeletal: Positive for neck pain. Negative for back pain.        (+) right/left shoulder pain  (+) jaw pain   Skin: Negative for rash.        (+) abrasions to forehead and nose  (+) ecchymosis to left eye   Neurological: Negative for weakness.   Hematological: Does not bruise/bleed easily.       Physical Exam     Initial Vitals [10/25/17 1009]   BP Pulse Resp Temp SpO2   (!) 189/75 63 20 98 °F (36.7 °C) 100 %      MAP       113         Physical Exam    Nursing note and vitals  reviewed.  Constitutional: She appears well-developed and well-nourished.   HENT:   Head: Normocephalic. Head is with abrasion (to forehead and nose).   Nose: Nose normal.   Ecchymosis to left eye   Eyes: EOM and lids are normal.   Extraocular movements intact   Neck: Neck supple.   Pulmonary/Chest: No respiratory distress.   Abdominal: Normal appearance.   Musculoskeletal: Normal range of motion.   Neurological: She is alert.   Skin: Skin is warm and dry.   Psychiatric: She has a normal mood and affect. Her behavior is normal. Thought content normal.         ED Course   Procedures  Labs Reviewed - No data to display  EKG Readings: (Independently Interpreted)   Initial Reading: No STEMI. Rhythm: Normal Sinus Rhythm. Heart Rate: 60. Axis: Normal.   No acute ischemic changes           Medical Decision Making:   Differential Diagnosis:   51-year-old white female was sitting on a stool earlier today and actually fell out landing on her face no LOC in the ER complaining of facial pain but signs are normal CT scan of the head face and cervical spine show left maxilla anterior process fracture as well as left nasal bone fracture EKG did not show ischemic changes or arrhythmias Accu-Chek is 84 patient was given 5 mg of Percocet by mouth in emergency department she'll be discharged home with ENT follow-up given a prescription for Keflex and Percocet return ER for any altered mental status change of vision speech strength.  Sensation fever            Scribe Attestation:   Scribe #1: I performed the above scribed service and the documentation accurately describes the services I performed. I attest to the accuracy of the note.    Attending Attestation:           Physician Attestation for Scribe:  Physician Attestation Statement for Scribe #1: I, Salvador Enriquez MD, reviewed documentation, as scribed by Bebe Corona in my presence, and it is both accurate and complete.                 ED Course      Clinical  Impression:   There were no encounter diagnoses.                           Salvador Enriquez MD  10/25/17 [id number]

## 2017-10-27 ENCOUNTER — PATIENT MESSAGE (OUTPATIENT)
Dept: FAMILY MEDICINE | Facility: CLINIC | Age: 52
End: 2017-10-27

## 2017-10-30 ENCOUNTER — PATIENT MESSAGE (OUTPATIENT)
Dept: FAMILY MEDICINE | Facility: CLINIC | Age: 52
End: 2017-10-30

## 2017-11-02 ENCOUNTER — PATIENT MESSAGE (OUTPATIENT)
Dept: FAMILY MEDICINE | Facility: CLINIC | Age: 52
End: 2017-11-02

## 2017-11-03 ENCOUNTER — TELEPHONE (OUTPATIENT)
Dept: NEUROLOGY | Facility: CLINIC | Age: 52
End: 2017-11-03

## 2017-11-03 ENCOUNTER — PATIENT MESSAGE (OUTPATIENT)
Dept: NEUROLOGY | Facility: CLINIC | Age: 52
End: 2017-11-03

## 2017-11-03 ENCOUNTER — TELEPHONE (OUTPATIENT)
Dept: FAMILY MEDICINE | Facility: CLINIC | Age: 52
End: 2017-11-03

## 2017-11-03 DIAGNOSIS — Z79.899 HIGH RISK MEDICATION USE: ICD-10-CM

## 2017-11-03 DIAGNOSIS — G35 MULTIPLE SCLEROSIS: Primary | ICD-10-CM

## 2017-11-03 NOTE — LETTER
November 3, 2017    Zoey Cali  [address] [place] [address]             [facility]  [address] [facility]  [place] [address]  Phone: [phone]  Fax: [phone] Dear MsPatricia Karely:    Sorry we were unable to contact you to schedule your ENT appointment. Please give the referral department a call at [phone].        If you have any questions or concerns, please don't hesitate to call.    Sincerely,        Estrella Ford MA

## 2017-11-03 NOTE — TELEPHONE ENCOUNTER
----- Message from Loulou Manzanares sent at 11/3/2017 11:50 AM CDT -----  Contact: Pharmacy   Pharmacy called regarding interferon beta-1a (AVONEX) 30 mcg/0.5 mL PnKt for the above pt. [facility] - [address] [place] [address]    Pharmacy can be reached at [phone].

## 2017-11-03 NOTE — TELEPHONE ENCOUNTER
Call returned to [facility] and spoke with Fernanda. Informed her that pt is due for safety labs and f/u appt. Attempted to reach her in Sept but no response from pt. Unable to refill until labs done and f/u scheduled.

## 2017-11-06 ENCOUNTER — LAB VISIT (OUTPATIENT)
Dept: LAB | Facility: HOSPITAL | Age: 52
End: 2017-11-06
Attending: PHYSICIAN ASSISTANT
Payer: MEDICARE

## 2017-11-06 DIAGNOSIS — Z79.899 HIGH RISK MEDICATION USE: ICD-10-CM

## 2017-11-06 DIAGNOSIS — G35 MULTIPLE SCLEROSIS: ICD-10-CM

## 2017-11-06 LAB
ALBUMIN SERPL BCP-MCNC: 3.8 G/DL
ALP SERPL-CCNC: 170 U/L
ALT SERPL W/O P-5'-P-CCNC: 31 U/L
AST SERPL-CCNC: 22 U/L
BASOPHILS # BLD AUTO: 0.02 K/UL
BASOPHILS NFR BLD: 0.3 %
BILIRUB DIRECT SERPL-MCNC: 0.2 MG/DL
BILIRUB SERPL-MCNC: 0.5 MG/DL
DIFFERENTIAL METHOD: ABNORMAL
EOSINOPHIL # BLD AUTO: 0.2 K/UL
EOSINOPHIL NFR BLD: 2.1 %
ERYTHROCYTE [DISTWIDTH] IN BLOOD BY AUTOMATED COUNT: 13.2 %
HCT VFR BLD AUTO: 41.2 %
HGB BLD-MCNC: 14.6 G/DL
LYMPHOCYTES # BLD AUTO: 1.9 K/UL
LYMPHOCYTES NFR BLD: 26.2 %
MCH RBC QN AUTO: 30.8 PG
MCHC RBC AUTO-ENTMCNC: 35.4 G/DL
MCV RBC AUTO: 87 FL
MONOCYTES # BLD AUTO: 0.4 K/UL
MONOCYTES NFR BLD: 5.3 %
NEUTROPHILS # BLD AUTO: 4.7 K/UL
NEUTROPHILS NFR BLD: 66.1 %
PLATELET # BLD AUTO: 237 K/UL
PMV BLD AUTO: 9 FL
PROT SERPL-MCNC: 7.2 G/DL
RBC # BLD AUTO: 4.74 M/UL
WBC # BLD AUTO: 7.11 K/UL

## 2017-11-06 PROCEDURE — 36415 COLL VENOUS BLD VENIPUNCTURE: CPT

## 2017-11-06 PROCEDURE — 85025 COMPLETE CBC W/AUTO DIFF WBC: CPT

## 2017-11-06 PROCEDURE — 80076 HEPATIC FUNCTION PANEL: CPT

## 2017-11-07 DIAGNOSIS — G35 MULTIPLE SCLEROSIS: ICD-10-CM

## 2017-11-10 ENCOUNTER — OFFICE VISIT (OUTPATIENT)
Dept: FAMILY MEDICINE | Facility: CLINIC | Age: 52
End: 2017-11-10
Payer: MEDICARE

## 2017-11-10 VITALS
TEMPERATURE: 98 F | HEIGHT: 60 IN | OXYGEN SATURATION: 95 % | WEIGHT: 137.13 LBS | DIASTOLIC BLOOD PRESSURE: 70 MMHG | HEART RATE: 57 BPM | SYSTOLIC BLOOD PRESSURE: 110 MMHG | BODY MASS INDEX: 26.92 KG/M2

## 2017-11-10 DIAGNOSIS — F11.90 CHRONIC, CONTINUOUS USE OF OPIOIDS: ICD-10-CM

## 2017-11-10 DIAGNOSIS — Z72.0 TOBACCO ABUSE: ICD-10-CM

## 2017-11-10 DIAGNOSIS — Z23 NEED FOR PROPHYLACTIC VACCINATION AND INOCULATION AGAINST INFLUENZA: ICD-10-CM

## 2017-11-10 DIAGNOSIS — S02.40DD: ICD-10-CM
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
## 2017-11-10 PROCEDURE — 99214 OFFICE O/P EST MOD 30 MIN: CPT | Mod: 25,S$GLB,, | Performed by: FAMILY MEDICINE

## 2017-11-10 PROCEDURE — G0008 ADMIN INFLUENZA VIRUS VAC: HCPCS | Mod: S$GLB,,, | Performed by: FAMILY MEDICINE

## 2017-11-10 PROCEDURE — 90686 IIV4 VACC NO PRSV 0.5 ML IM: CPT | Mod: S$GLB,,, | Performed by: FAMILY MEDICINE

## 2017-11-10 PROCEDURE — 99999 PR PBB SHADOW E&M-EST. PATIENT-LVL III: CPT | Mod: PBBFAC,,, | Performed by: FAMILY MEDICINE

## 2017-11-10 RX ORDER — OXYCODONE AND ACETAMINOPHEN 10; 325 MG/1; MG/1
1 TABLET ORAL EVERY 6 HOURS PRN
Qty: 90 TABLET | Refills: 0 | Status: SHIPPED | OUTPATIENT
Start: 2017-11-10 | End: 2018-01-17

## 2017-11-10 NOTE — PROGRESS NOTES
Chief Complaint   Patient presents with    Fall       SUBJECTIVE:  Zoey Cali is a 51 y.o. female here for new problem of fall with left facial nasal and maxillary fractures and she is doing better, can breath, no bloody nose, eye/vision normal, she does have consistent frontal headaches over the prior soft tissue swelling and trauma of the forehead, cognition is improved.  Currently has co-morbidities including per problem list.      Past Medical History:   Diagnosis Date    Allergy     Anemia     Anxiety     Chronic kidney disease     Depression     Hypertension     Multiple sclerosis     Multiple sclerosis     Neuromuscular disorder     Osteoporosis     Rib fracture 2015     Past Surgical History:   Procedure Laterality Date    APPENDECTOMY       SECTION, CLASSIC      CHOLECYSTECTOMY      EYE SURGERY      HYSTERECTOMY       Social History     Social History    Marital status:      Spouse name: N/A    Number of children: N/A    Years of education: N/A     Occupational History    Not on file.     Social History Main Topics    Smoking status: Current Every Day Smoker     Packs/day: 1.00     Types: Cigarettes    Smokeless tobacco: Never Used    Alcohol use No    Drug use: No    Sexual activity: Yes     Other Topics Concern    Not on file     Social History Narrative    No narrative on file     Family History   Problem Relation Age of Onset    Arthritis Mother     Diabetes Father     Heart disease Father     Hyperlipidemia Father     Hypertension Father     Stroke Father     Alcohol abuse Son     Arthritis Son     Alcohol abuse Maternal Aunt     Arthritis Maternal Aunt     Alcohol abuse Maternal Uncle     Drug abuse Maternal Uncle     Hypertension Maternal Uncle     Alcohol abuse Paternal Aunt     Heart disease Paternal Aunt     Hearing loss Paternal Aunt     Hypertension Paternal Aunt     Alcohol abuse Paternal Uncle     Heart disease Paternal  Uncle     Alcohol abuse Maternal Grandmother     Heart disease Maternal Grandmother     Stroke Maternal Grandmother     Alcohol abuse Maternal Grandfather     Heart disease Maternal Grandfather     Alcohol abuse Paternal Grandmother     Cancer Paternal Grandmother     Heart disease Paternal Grandmother     Hypertension Paternal Grandmother     Stroke Paternal Grandmother     Alcohol abuse Paternal Grandfather     Heart disease Paternal Grandfather      Current Outpatient Prescriptions on File Prior to Visit   Medication Sig Dispense Refill    albuterol 90 mcg/actuation inhaler Inhale 2 puffs into the lungs every 6 (six) hours as needed. 1 each 0    carbamazepine (TEGRETOL XR) 200 MG 12 hr tablet Take 1 tablet (200 mg total) by mouth 2 (two) times daily. 60 tablet 5    cetirizine (ZYRTEC) 10 MG tablet Take 1 tablet (10 mg total) by mouth once daily. 30 tablet 0    cholecalciferol, vitamin D3, (VITAMIN D3) 5,000 unit Tab Take 5,000 Units by mouth once daily.      escitalopram oxalate (LEXAPRO) 20 MG tablet Take 1 tablet (20 mg total) by mouth once daily. 90 tablet 3    fluticasone (FLONASE) 50 mcg/actuation nasal spray ONE sprays(s) each nostril TWICE DAILY per instructed  0    gabapentin (NEURONTIN) 400 MG capsule Take 2 capsules (800 mg total) by mouth every evening. 180 capsule 1    gabapentin (NEURONTIN) 600 MG tablet TAKE 1 TABLET THREE TIMES DAILY 270 tablet 1    ibuprofen (ADVIL,MOTRIN) 800 MG tablet TAKE ONE TABLET BY MOUTH EVERY 8 HOURS WITH FOOD AND WATER  2    interferon beta-1a (AVONEX) 30 mcg/0.5 mL PnKt Inject 30 mcg into the muscle once a week. 3 kit 1    lorazepam (ATIVAN) 1 MG tablet Take 1 tablet (1 mg total) by mouth nightly as needed. 30 tablet 2    meclizine (ANTIVERT) 25 mg tablet Take 1 tablet (25 mg total) by mouth as needed. 1 Tablet Oral Every day 90 tablet 3    metoprolol succinate (TOPROL-XL) 100 MG 24 hr tablet Take 1 tablet (100 mg total) by mouth once daily. 90  tablet 3    omeprazole (PRILOSEC) 40 MG capsule Take 1 capsule (40 mg total) by mouth every evening. 90 capsule 3    ondansetron (ZOFRAN-ODT) 4 MG TbDL Take 1 tablet (4 mg total) by mouth every 12 (twelve) hours as needed. 20 tablet 2    simvastatin (ZOCOR) 40 MG tablet Take 1 tablet (40 mg total) by mouth every evening. 90 tablet 3    tizanidine (ZANAFLEX) 4 MG tablet TAKE 2 TABLETS IN THE MORNING, AT NOON AND IN THE EVENING AND TAKE 3 TABLETS AT NIGHT (9 TABLETS PER DAY) 810 tablet 3    triamterene-hydrochlorothiazide 37.5-25 mg (MAXZIDE-25) 37.5-25 mg per tablet Take 1 tablet by mouth once daily. 90 tablet 3     No current facility-administered medications on file prior to visit.      Review of patient's allergies indicates:   Allergen Reactions    Lomotil [diphenoxylate-atropine]      Causes stomach spasms    Dilaudid [hydromorphone (bulk)] Itching         ROS  Per HPI    OBJECTIVE:  /70   Pulse (!) 57   Temp 98.4 °F (36.9 °C) (Oral)   Ht 5' (1.524 m)   Wt 62.2 kg (137 lb 2 oz)   SpO2 95%   BMI 26.78 kg/m²     Wt Readings from Last 3 Encounters:   11/10/17 62.2 kg (137 lb 2 oz)   10/25/17 60.8 kg (134 lb)   09/15/17 61.5 kg (135 lb 9.3 oz)     BP Readings from Last 3 Encounters:   11/10/17 110/70   10/25/17 (!) 159/74   09/15/17 (!) 103/57       In some distress, alert and oriented  No new focal neurological deficits noted.  Left facial pain, left forehead still with some swelling  Eyes normal  Dentition normal  Nose without trauma signs inside with speculum exam    Review of old Records:  Reviewed     Review of old labs:  Reviewed labs    Review of old imaging:  Reviewed imaging of CT's    ASSESSMENT:  Problem List Items Addressed This Visit     Tobacco abuse    Closed fracture of left side of maxilla with routine healing    Relevant Medications    oxyCODONE-acetaminophen (PERCOCET)  mg per tablet    Other Relevant Orders    Ambulatory referral to ENT    Chronic, continuous use of  opioids      Other Visit Diagnoses     Fall, sequela    -  Primary    Need for prophylactic vaccination and inoculation against influenza        Relevant Orders    Flu Vaccine - Quadrivalent (PF) (3 years & older) (Completed)          ICD-10-CM ICD-9-CM   1. Fall, sequela W19.XXXS 909.4     E929.3   2. Closed fracture of left side of maxilla with routine healing S02.40DD V54.19   3. Tobacco abuse Z72.0 305.1   4. Chronic, continuous use of opioids F11.90 305.51   5. Need for prophylactic vaccination and inoculation against influenza Z23 V04.81         PLAN:  1. Fall, sequela  Noted  She was high on stool  Make sure she has her feet on the ground or close to them while sitting to avoid blood pooling    2. Closed fracture of left side of maxilla with routine healing  Send to ENT for opinion    3. Tobacco abuse  Brief counseling, she wants to quit, states she can't really do it just yet but we will revisit    4. Chronic, continuous use of opioids  Dependent at this point.  Has gone without for a number of days due to circumstances without severe withdrawal.  May need to consider weaning just for dependence and retitrating back up      Medication List with Changes/Refills   Current Medications    ALBUTEROL 90 MCG/ACTUATION INHALER    Inhale 2 puffs into the lungs every 6 (six) hours as needed.    CARBAMAZEPINE (TEGRETOL XR) 200 MG 12 HR TABLET    Take 1 tablet (200 mg total) by mouth 2 (two) times daily.    CETIRIZINE (ZYRTEC) 10 MG TABLET    Take 1 tablet (10 mg total) by mouth once daily.    CHOLECALCIFEROL, VITAMIN D3, (VITAMIN D3) 5,000 UNIT TAB    Take 5,000 Units by mouth once daily.    ESCITALOPRAM OXALATE (LEXAPRO) 20 MG TABLET    Take 1 tablet (20 mg total) by mouth once daily.    FLUTICASONE (FLONASE) 50 MCG/ACTUATION NASAL SPRAY    ONE sprays(s) each nostril TWICE DAILY per instructed    GABAPENTIN (NEURONTIN) 400 MG CAPSULE    Take 2 capsules (800 mg total) by mouth every evening.    GABAPENTIN (NEURONTIN)  600 MG TABLET    TAKE 1 TABLET THREE TIMES DAILY    IBUPROFEN (ADVIL,MOTRIN) 800 MG TABLET    TAKE ONE TABLET BY MOUTH EVERY 8 HOURS WITH FOOD AND WATER    INTERFERON BETA-1A (AVONEX) 30 MCG/0.5 ML PNKT    Inject 30 mcg into the muscle once a week.    LORAZEPAM (ATIVAN) 1 MG TABLET    Take 1 tablet (1 mg total) by mouth nightly as needed.    MECLIZINE (ANTIVERT) 25 MG TABLET    Take 1 tablet (25 mg total) by mouth as needed. 1 Tablet Oral Every day    METOPROLOL SUCCINATE (TOPROL-XL) 100 MG 24 HR TABLET    Take 1 tablet (100 mg total) by mouth once daily.    OMEPRAZOLE (PRILOSEC) 40 MG CAPSULE    Take 1 capsule (40 mg total) by mouth every evening.    ONDANSETRON (ZOFRAN-ODT) 4 MG TBDL    Take 1 tablet (4 mg total) by mouth every 12 (twelve) hours as needed.    SIMVASTATIN (ZOCOR) 40 MG TABLET    Take 1 tablet (40 mg total) by mouth every evening.    TIZANIDINE (ZANAFLEX) 4 MG TABLET    TAKE 2 TABLETS IN THE MORNING, AT NOON AND IN THE EVENING AND TAKE 3 TABLETS AT NIGHT (9 TABLETS PER DAY)    TRIAMTERENE-HYDROCHLOROTHIAZIDE 37.5-25 MG (MAXZIDE-25) 37.5-25 MG PER TABLET    Take 1 tablet by mouth once daily.   Changed and/or Refilled Medications    Modified Medication Previous Medication    OXYCODONE-ACETAMINOPHEN (PERCOCET)  MG PER TABLET oxyCODONE-acetaminophen (PERCOCET)  mg per tablet       Take 1 tablet by mouth every 6 (six) hours as needed for Pain.    Take 1 tablet by mouth every 6 (six) hours as needed for Pain.       Return in about 4 weeks (around 12/8/2017) for assess treatment plan.

## 2017-11-14 ENCOUNTER — LAB VISIT (OUTPATIENT)
Dept: LAB | Facility: HOSPITAL | Age: 52
End: 2017-11-14
Attending: INTERNAL MEDICINE
Payer: MEDICARE

## 2017-11-14 DIAGNOSIS — D50.9 IRON DEFICIENCY ANEMIA: ICD-10-CM

## 2017-11-14 LAB
BASOPHILS # BLD AUTO: 0.02 K/UL
BASOPHILS NFR BLD: 0.3 %
DIFFERENTIAL METHOD: ABNORMAL
EOSINOPHIL # BLD AUTO: 0.1 K/UL
EOSINOPHIL NFR BLD: 1.6 %
ERYTHROCYTE [DISTWIDTH] IN BLOOD BY AUTOMATED COUNT: 13.1 %
FERRITIN SERPL-MCNC: 92 NG/ML
HCT VFR BLD AUTO: 38.8 %
HGB BLD-MCNC: 14 G/DL
IRON SERPL-MCNC: 82 UG/DL
LYMPHOCYTES # BLD AUTO: 1.6 K/UL
LYMPHOCYTES NFR BLD: 22.4 %
MCH RBC QN AUTO: 31.2 PG
MCHC RBC AUTO-ENTMCNC: 36.1 G/DL
MCV RBC AUTO: 86 FL
MONOCYTES # BLD AUTO: 0.4 K/UL
MONOCYTES NFR BLD: 5.2 %
NEUTROPHILS # BLD AUTO: 5 K/UL
NEUTROPHILS NFR BLD: 70.6 %
PLATELET # BLD AUTO: 217 K/UL
PMV BLD AUTO: 8.9 FL
RBC # BLD AUTO: 4.49 M/UL
SATURATED IRON: 22 %
TOTAL IRON BINDING CAPACITY: 374 UG/DL
TRANSFERRIN SERPL-MCNC: 253 MG/DL
WBC # BLD AUTO: 7.05 K/UL

## 2017-11-14 PROCEDURE — 83540 ASSAY OF IRON: CPT

## 2017-11-14 PROCEDURE — 85025 COMPLETE CBC W/AUTO DIFF WBC: CPT

## 2017-11-14 PROCEDURE — 36415 COLL VENOUS BLD VENIPUNCTURE: CPT

## 2017-11-14 PROCEDURE — 82728 ASSAY OF FERRITIN: CPT

## 2017-11-15 ENCOUNTER — OFFICE VISIT (OUTPATIENT)
Dept: HEMATOLOGY/ONCOLOGY | Facility: CLINIC | Age: 52
End: 2017-11-15
Payer: MEDICARE

## 2017-11-15 VITALS
HEART RATE: 70 BPM | BODY MASS INDEX: 27.09 KG/M2 | OXYGEN SATURATION: 97 % | TEMPERATURE: 99 F | WEIGHT: 138 LBS | SYSTOLIC BLOOD PRESSURE: 116 MMHG | HEIGHT: 60 IN | DIASTOLIC BLOOD PRESSURE: 66 MMHG

## 2017-11-15 DIAGNOSIS — S02.401D CLOSED FRACTURE OF MAXILLA WITH ROUTINE HEALING, UNSPECIFIED LATERALITY, SUBSEQUENT ENCOUNTER: ICD-10-CM

## 2017-11-15 DIAGNOSIS — D50.9 IRON DEFICIENCY ANEMIA, UNSPECIFIED IRON DEFICIENCY ANEMIA TYPE: Primary | ICD-10-CM

## 2017-11-15 DIAGNOSIS — G35 MULTIPLE SCLEROSIS: ICD-10-CM

## 2017-11-15 PROCEDURE — 99213 OFFICE O/P EST LOW 20 MIN: CPT | Mod: S$GLB,,, | Performed by: INTERNAL MEDICINE

## 2017-11-15 PROCEDURE — 99999 PR PBB SHADOW E&M-EST. PATIENT-LVL III: CPT | Mod: PBBFAC,,, | Performed by: INTERNAL MEDICINE

## 2017-11-15 NOTE — PROGRESS NOTES
Subjective:       Patient ID: Zoey Cali is a 51 y.o. female.    Chief Complaint: Follow-up  Diagnosis: TRACIE  HPI     52 y/o female with history of MS seen  today for f/u for TRACIE  She undergoes intermittent IV iron therapy.  She has been intolerant of oral Fe supp  s/p GI eval with EGD and colonoscopy which was unremarkable    She is followed by Dr. Mendez for long standing history of MS and remains on therapy with [facility]   She has chronic pain  and neuralgia and on opioid pain medication managed by PCP and Neurology      She recently visited ED after she suffered a fall   She reports she was sitting on a stool in her garage when she got up and fell onto her face.  She was diagnosed with  left facial nasal and maxillary fractures   She reports HA following the fall  DA SILVA improved  She reports mild neck pain   Denies LOC/vision changes/N/V  No CP/SOB  She is doing better now  Follow-up with ENT, Dr. Reyna planned in near future    CBC reveals wbc 7050/mm3  Hb 14 g/d   38 g/dl   plt ct 217k    Review of Systems   Constitutional: Positive for fatigue. Negative for appetite change and unexpected weight change.   Eyes: Negative for visual disturbance.   Respiratory: Negative for cough and shortness of breath.    Cardiovascular: Negative for chest pain and leg swelling.   Gastrointestinal: Negative for abdominal pain, blood in stool, constipation, diarrhea, nausea and vomiting.   Genitourinary: Negative for frequency.   Musculoskeletal: Positive for neck pain. Negative for arthralgias, back pain and joint swelling.   Skin: Negative for rash.        No petechiae, ecchymoses   Neurological: Negative for light-headedness and headaches.   Hematological: Negative for adenopathy. Does not bruise/bleed easily.       Objective:       Vitals:    11/15/17 0937   BP: 116/66   BP Location: Left arm   Patient Position: Sitting   BP Method: Medium (Automatic)   Pulse: 70   Temp: 98.5 °F (36.9 °C)   TempSrc: Oral   SpO2: 97%    Weight: 62.6 kg (138 lb 0.1 oz)   Height: 5' (1.524 m)       Physical Exam   Constitutional: She is oriented to person, place, and time. She appears well-developed and well-nourished.   HENT:   Head: Normocephalic.   Mouth/Throat: Oropharynx is clear and moist. No oropharyngeal exudate.   Eyes: Conjunctivae and lids are normal. Pupils are equal, round, and reactive to light. No scleral icterus.   Neck: Normal range of motion. Neck supple. No thyromegaly present.   Cardiovascular: Normal rate, regular rhythm and normal heart sounds.    No murmur heard.  Pulmonary/Chest: Breath sounds normal. She has no wheezes. She has no rales.   Abdominal: Soft. Bowel sounds are normal. She exhibits no distension and no mass. There is no hepatosplenomegaly. There is no tenderness. There is no rebound and no guarding.   Musculoskeletal: Normal range of motion. She exhibits no edema or tenderness.   Lymphadenopathy:     She has no cervical adenopathy.     She has no axillary adenopathy.        Right: No supraclavicular adenopathy present.        Left: No supraclavicular adenopathy present.   Neurological: She is alert and oriented to person, place, and time. No cranial nerve deficit. Coordination normal.   Skin: Skin is warm and dry. No ecchymosis, no petechiae and no rash noted. No erythema.   Psychiatric: She has a normal mood and affect.        Lab Results   Component Value Date    WBC 7.05 11/14/2017    HGB 14.0 11/14/2017    HCT 38.8 11/14/2017    MCV 86 11/14/2017     11/14/2017     Lab Results   Component Value Date    IRON 82 11/14/2017    TIBC 374 11/14/2017    FERRITIN 92 11/14/2017       Assessment:       1. Iron deficiency anemia, unspecified iron deficiency anemia type    2. Multiple sclerosis    3. Closed fracture of maxilla with routine healing, unspecified laterality, subsequent encounter        Plan:   Zoey FRASER was seen today for follow-up.    Diagnoses and all orders for this visit:    Iron deficiency  anemia, unspecified iron deficiency       S/p intermittent IV Fe therapy       S/p GI eval-unremarkable       CBC reveals stable Hb 14 g/dl        Fe parameters nl        Ferritin 92       Cont to monitor    Multiple Sclerosis    Follow-up with Neurology    Closed fracture of maxilla    Follow-up with ENT                   F/u 2 mos with cbc, Fe studies prior to f/u ( or sooner if problems should arise in the interim)      Cc: MD Heidy Lancaster MD

## 2017-11-17 ENCOUNTER — OFFICE VISIT (OUTPATIENT)
Dept: NEUROLOGY | Facility: CLINIC | Age: 52
End: 2017-11-17
Payer: MEDICARE

## 2017-11-17 ENCOUNTER — LAB VISIT (OUTPATIENT)
Dept: LAB | Facility: HOSPITAL | Age: 52
End: 2017-11-17
Payer: MEDICARE

## 2017-11-17 VITALS
BODY MASS INDEX: 27.09 KG/M2 | HEART RATE: 51 BPM | DIASTOLIC BLOOD PRESSURE: 63 MMHG | WEIGHT: 138 LBS | HEIGHT: 60 IN | SYSTOLIC BLOOD PRESSURE: 117 MMHG

## 2017-11-17 DIAGNOSIS — Z71.89 COUNSELING REGARDING GOALS OF CARE: ICD-10-CM

## 2017-11-17 DIAGNOSIS — E55.9 VITAMIN D INSUFFICIENCY: ICD-10-CM

## 2017-11-17 DIAGNOSIS — Z79.899 ENCOUNTER FOR LONG-TERM (CURRENT) USE OF HIGH-RISK MEDICATION: ICD-10-CM

## 2017-11-17 DIAGNOSIS — M62.838 MUSCLE SPASM: ICD-10-CM

## 2017-11-17 DIAGNOSIS — G35 MULTIPLE SCLEROSIS: ICD-10-CM

## 2017-11-17 DIAGNOSIS — M79.2 NEUROPATHIC PAIN: ICD-10-CM

## 2017-11-17 DIAGNOSIS — G35 MULTIPLE SCLEROSIS: Primary | ICD-10-CM

## 2017-11-17 LAB — 25(OH)D3+25(OH)D2 SERPL-MCNC: 54 NG/ML

## 2017-11-17 PROCEDURE — 36415 COLL VENOUS BLD VENIPUNCTURE: CPT

## 2017-11-17 PROCEDURE — 99215 OFFICE O/P EST HI 40 MIN: CPT | Mod: S$GLB,,, | Performed by: PHYSICIAN ASSISTANT

## 2017-11-17 PROCEDURE — 82306 VITAMIN D 25 HYDROXY: CPT

## 2017-11-17 PROCEDURE — 99999 PR PBB SHADOW E&M-EST. PATIENT-LVL III: CPT | Mod: PBBFAC,,, | Performed by: PHYSICIAN ASSISTANT

## 2017-11-17 NOTE — PROGRESS NOTES
Subjective:       Patient ID: Zoey Cali is a 51 y.o. female who presents today for a routine clinic visit for MS.      MS HPI:  · DMT: Avonex  · Side effects from DMT? No  · Taking vitamin D3 as recommended? Yes -  Dose: 5,000 IU daily  Patient had a fall on October 25th. Facial fractures. Following up with ENT in the next couple of weeks  Recent oral surgery prior to fall--seeing today and will be completing implants soon  Saw Dr. Mejia---treating iron deficiency anemia  Going to [place] after Thanksgiving    SOCIAL HISTORY  Social History   Substance Use Topics    Smoking status: Current Every Day Smoker     Packs/day: 1.00     Types: Cigarettes    Smokeless tobacco: Never Used    Alcohol use No     Living arrangements - the patient lives with family.  Employment N/A    MS ROS:  · Fatigue: Yes -feels improved with iron infusions   · Sleep Disturbance: Yes -sleeping OK  · Bladder Dysfunction: No  · Bowel Dysfunction: No  · Spasticity: Yes - takes tizanidine QID  · Visual Symptoms: Yes - History of ON, see Dr. Hernandez--time for a visit  · Cognitive: No  · Mood Disorder: Yes - Taking Lexapro  · Gait Disturbance: No  · Falls: as above  · Hand Dysfunction: No  · Pain: Yes - NP in feet, improved; Taking gabapentin 600mg TID and 800mg HS and Tegretol 100mg TWICE DAILY; on Norco PRN prescribed by Dr. Costello--related now to recent fall   · Sexual Dysfunction: Not Assessed  · Skin Breakdown: No  · Tremors: No  · Dysphagia: No  · Dysarthria: No  · Heat sensitivity: Yes--fatigue  · Any un-met adaptive needs? Yes - cooling device  · Copay Assist? Getting free drug through [employer]        Objective:        1. 25 foot timed walk: 4.7s today without assist  Timed 25 Foot Walk: 8/16/2016 5/2/2017   Did patient wear an AFO? No No   Was assistive device used? No No   Time for 25 Foot Walk (seconds) 5.2 4.4       Neurologic Exam     Gait, Coordination, and Reflexes     Reflexes   Right brachioradialis: 2+  Left  brachioradialis: 2+  Right biceps: 2+  Left biceps: 2+  Right triceps: 2+  Left triceps: 2+  Right patellar: 2+  Left patellar: 2+  Right achilles: 2+  Left achilles: 2+        Mental Status   Oriented to person, place, and time.   Follows 3 step commands.   Speech: speech is normal   Level of consciousness: alert  Normal comprehension.      Cranial Nerves      CN II   Visual acuity: normal with correction (20/20 OS; 20/20 OD correctedwith Snellen hand held chart at 6 ft)     CN III, IV, VI   Pupils are equal, round, and reactive to light.  Extraocular motions are normal.      CN V   Facial sensation intact.      CN VII   Facial expression full, symmetric.      CN VIII   Hearing: intact (finger rub)     CN IX, X   Palate: symmetric     CN XI   CN XI normal.      CN XII   Tongue deviation: none     Motor Exam   Muscle bulk: normal  Overall muscle tone: normal     Strength   Right deltoid: 5/5  Left deltoid: 5/5  Right triceps: 5/5  Left triceps: 5/5  Right wrist extension: 5/5  Left wrist extension: 5/5  Right interossei: 5/5  Left interossei: 5/5  Right iliopsoas: 5/5  Left iliopsoas: 5/5  Right hamstrin/5  Left hamstrin/5  Right anterior tibial: 5/5  Left anterior tibial: 5/5  Right peroneal: 5/5  Left peroneal: 5/5            Sensory Exam   Light touch normal.   Right arm vibration: normal  Left arm vibration: normal  Right leg vibration: normal  Left leg vibration: decreased from toes     Gait, Coordination, and Reflexes      Gait  Gait: normal     Coordination   Finger to nose coordination: normal     Tremor   Resting tremor: absent  Action tremor: absent     Right plantar: equivocal  Left plantar: equivocal  Right ankle clonus: absent  Left ankle clonus: absent  Right pendular knee jerk: absent  Left pendular knee jerk: absent    Normal RSM        Imaging:     Results for orders placed during the hospital encounter of 17   MRI Brain W WO Contrast    Impression Scattered white matter lesions  consistent with the reported history of multiple sclerosis, not significant change from the prior study of 1/21/2016.  No new or enhancing lesions to indicate ongoing or active demyelination.    ______________________________________     Electronically signed by resident: TANYA PORRAS  Date:     02/01/17  Time:    11:35    As the supervising and teaching physician, I personally reviewed the images and resident's interpretation and I agree with the findings.    Electronically signed by: NADIR RAMIREZ MD  Date:     02/01/17  Time:    17:15      Results for orders placed during the hospital encounter of 11/19/14   MRI Cervical Spine W WO Cont    Impression  Unremarkable motion limited MRI of the cervical spine specifically without evidence for cord signal abnormality to suggest edema or abnormal intrathecal enhancement.    No significant central canal or neural foraminal stenosis.          Electronically signed by: QUIRINO BURRIS DO  Date:     11/19/14  Time:    13:55      Results for orders placed during the hospital encounter of 11/29/14   MRI Thoracic Spine W WO Cont    Impression  Limited study by motion artifact.  Within limits of the study there is a single nonenhancing focus of T2/stir signal hyperintensity and slight cord truncation right aspect thoracic cord centered at the T11/T12 disk configuration most suggestive for   sequela of prior demyelination in light of history and intracranial findings.  level.    No definite abnormal intrathecal enhancement.    No significant disk bulge, central canal or neural foraminal stenosis allowing for patient motion limitation.      Electronically signed by: QUIRINO BURRIS DO  Date:     12/01/14  Time:    08:54      Labs:     Lab Results   Component Value Date    [id number] 48 11/11/2016    [id number] 93 01/26/2016    [id number] 45 11/04/2014     No results found for: JCVINDEX, JCVANTIBODY  No results found for: VZ5WNVLD, ABSOLUTECD3, YY3EUCLV, ABSOLUTECD8, RJ5LWVDK,  ABSOLUTECD4, LABCD48  Lab Results   Component Value Date    WBC 7.05 11/14/2017    RBC 4.49 11/14/2017    HGB 14.0 11/14/2017    HCT 38.8 11/14/2017    MCV 86 11/14/2017    MCH 31.2 (H) 11/14/2017    MCHC 36.1 (H) 11/14/2017    RDW 13.1 11/14/2017     11/14/2017    MPV 8.9 (L) 11/14/2017    GRAN 5.0 11/14/2017    GRAN 70.6 11/14/2017    LYMPH 1.6 11/14/2017    LYMPH 22.4 11/14/2017    MONO 0.4 11/14/2017    MONO 5.2 11/14/2017    EOS 0.1 11/14/2017    BASO 0.02 11/14/2017    EOSINOPHIL 1.6 11/14/2017    BASOPHIL 0.3 11/14/2017     Sodium   Date Value Ref Range Status   10/12/2015 139 136 - 145 mmol/L Final     Comment:     Specimen is lipemic.     Potassium   Date Value Ref Range Status   10/12/2015 3.2 (L) 3.5 - 5.1 mmol/L Final     Chloride   Date Value Ref Range Status   10/12/2015 104 95 - 110 mmol/L Final     CO2   Date Value Ref Range Status   10/12/2015 22 (L) 23 - 29 mmol/L Final     Glucose   Date Value Ref Range Status   10/12/2015 86 70 - 110 mg/dL Final     BUN, Bld   Date Value Ref Range Status   10/12/2015 16 6 - 20 mg/dL Final     Creatinine   Date Value Ref Range Status   10/12/2015 0.8 0.5 - 1.4 mg/dL Final     Calcium   Date Value Ref Range Status   10/12/2015 9.9 8.7 - 10.5 mg/dL Final     Total Protein   Date Value Ref Range Status   11/06/2017 7.2 6.0 - 8.4 g/dL Final     Albumin   Date Value Ref Range Status   11/06/2017 3.8 3.5 - 5.2 g/dL Final     Total Bilirubin   Date Value Ref Range Status   11/06/2017 0.5 0.1 - 1.0 mg/dL Final     Comment:     For infants and newborns, interpretation of results should be based  on gestational age, weight and in agreement with clinical  observations.  Premature Infant recommended reference ranges:  Up to 24 hours.............<8.0 mg/dL  Up to 48 hours............<12.0 mg/dL  3-5 days..................<15.0 mg/dL  6-29 days.................<15.0 mg/dL       Alkaline Phosphatase   Date Value Ref Range Status   11/06/2017 170 (H) 55 - 135 U/L Final      AST   Date Value Ref Range Status   11/06/2017 22 10 - 40 U/L Final     ALT   Date Value Ref Range Status   11/06/2017 31 10 - 44 U/L Final     Anion Gap   Date Value Ref Range Status   10/12/2015 13 8 - 16 mmol/L Final     eGFR if    Date Value Ref Range Status   10/12/2015 >60 >60 mL/min/1.73 m^2 Final     eGFR if non    Date Value Ref Range Status   10/12/2015 >60 >60 mL/min/1.73 m^2 Final     Comment:     Calculation used to obtain the estimated glomerular filtration  rate (eGFR) is the CKD-EPI equation. Since race is unknown   in our information system, the eGFR values for   -American and Non--American patients are given   for each creatinine result.         Diagnosis/Assessment/Plan:    1. Multiple Sclerosis  · Assessment: Patient is clinically stable on Avonex  · Imaging: Annual MRI planned for February-will need Valium. MRI ordered today  · Disease Modifying Therapies: Continue Avonex and high dose Vit D. Safety labs performed earlier this month are stable. Will check Vit D3 lab today.     2. MS Symptom Assessment / Management   No changes made to management of care today        Over 50% of this 40 minute visit was spent in direct face to face counseling of the patient about MS, DMT considerations, and MS symptom management.     Return in about 6 months (around 5/17/2018) for follow up with Dr. Mendez.  Patient agreed to POC today.    Attending, Dr. Mendez, was available during today's encounter. Any change to plan along with cosign to appear in the EMR.     Makayla Swift PA-C  [facility]        Multiple sclerosis  -     MRI Brain W WO Contrast; Future; Expected date: 02/05/2018  -     Vitamin D; Future    Counseling regarding goals of care    Vitamin D insufficiency  -     Vitamin D; Future

## 2017-11-24 ENCOUNTER — OFFICE VISIT (OUTPATIENT)
Dept: FAMILY MEDICINE | Facility: CLINIC | Age: 52
End: 2017-11-24
Payer: MEDICARE

## 2017-11-24 VITALS
HEART RATE: 68 BPM | TEMPERATURE: 97 F | BODY MASS INDEX: 27.01 KG/M2 | SYSTOLIC BLOOD PRESSURE: 80 MMHG | HEIGHT: 60 IN | WEIGHT: 137.56 LBS | DIASTOLIC BLOOD PRESSURE: 60 MMHG | OXYGEN SATURATION: 96 %

## 2017-11-24 DIAGNOSIS — I10 ESSENTIAL HYPERTENSION: Primary | ICD-10-CM

## 2017-11-24 DIAGNOSIS — S02.40DD: ICD-10-CM

## 2017-11-24 DIAGNOSIS — R25.2 SPASTICITY: ICD-10-CM

## 2017-11-24 DIAGNOSIS — F32.A DEPRESSION, UNSPECIFIED DEPRESSION TYPE: ICD-10-CM

## 2017-11-24 PROCEDURE — 99213 OFFICE O/P EST LOW 20 MIN: CPT | Mod: S$GLB,,, | Performed by: FAMILY MEDICINE

## 2017-11-24 PROCEDURE — 99999 PR PBB SHADOW E&M-EST. PATIENT-LVL III: CPT | Mod: PBBFAC,,, | Performed by: FAMILY MEDICINE

## 2017-11-24 RX ORDER — LORAZEPAM 1 MG/1
1 TABLET ORAL NIGHTLY PRN
Qty: 30 TABLET | Refills: 2 | Status: SHIPPED | OUTPATIENT
Start: 2017-11-24 | End: 2017-12-12 | Stop reason: SDUPTHER

## 2017-11-24 NOTE — PROGRESS NOTES
Chief Complaint   Patient presents with    Medication Refill       SUBJECTIVE:  Zoey Cali is a 51 y.o. female here for follow up of her facial fractures and with her chronic pain and she is doing better and slowly improving and she is to see the specialist.  Currently has co-morbidities including per problem list.      Social History   Substance Use Topics    Smoking status: Current Every Day Smoker     Packs/day: 1.00     Types: Cigarettes    Smokeless tobacco: Never Used    Alcohol use No       Patient Active Problem List    Diagnosis Date Noted    Closed fracture of left side of maxilla with routine healing 10/25/2017    Neuropathic pain 05/02/2017    Prophylactic immunotherapy 11/30/2016    Gait disturbance 11/30/2016    Chronic, continuous use of opioids 04/04/2016     She has mainly back and leg pain with neuropathic pain  Using gabapentin and oxycodone    GOALS:  Keep her walking and functioning with ADL's    PROGNOSIS:  Overall is fair      Vitamin D insufficiency 07/08/2015    Muscle spasm 07/08/2015    Counseling regarding goals of care 07/08/2015    Encounter for long-term (current) use of high-risk medication 07/08/2015    Urinary hesitancy 07/08/2015    Neuralgia and neuritis 06/02/2015     Bilateral foot and lower limb pain.      Tobacco abuse 06/02/2015     Trying to quit.  4/17 still trying to quit brief counseling  8/17 counseled again still not ready to do the tobacco cessation program but she is thinking about it, brief counseling       Osteoporosis 12/29/2014    Iron deficiency anemia 12/29/2014    Hyperlipidemia LDL goal < 130 12/29/2014    HTN (hypertension) 07/17/2012    Multiple sclerosis 07/17/2012     AVONEX and gabapentin with chronic pain         ROS    OBJECTIVE:  BP (!) 80/60   Pulse 68   Temp 97.2 °F (36.2 °C) (Oral)   Ht 5' (1.524 m)   Wt 62.4 kg (137 lb 9.1 oz)   SpO2 96%   BMI 26.87 kg/m²     Wt Readings from Last 3 Encounters:   11/24/17 62.4 kg  (137 lb 9.1 oz)   11/17/17 62.6 kg (138 lb)   11/15/17 62.6 kg (138 lb 0.1 oz)     BP Readings from Last 3 Encounters:   11/24/17 (!) 80/60   11/17/17 117/63   11/15/17 116/66     BP Readings from Last 15 Encounters:   11/24/17 (!) 80/60   11/17/17 117/63   11/15/17 116/66   11/10/17 110/70   10/25/17 (!) 159/74   09/15/17 (!) 103/57   08/15/17 110/70   08/07/17 104/60   08/04/17 (!) 141/76   07/14/17 90/60   05/02/17 108/67   04/18/17 104/60   03/29/17 100/60   03/16/17 109/71   03/09/17 (!) 101/58       She appears well, in no apparent distress.  Alert and oriented times three, pleasant and cooperative. Vital signs are as documented in vital signs section.  Facial trauma improving.    Review of old Records:      Review of old labs:      Review of old imaging:        ASSESSMENT:  Problem List Items Addressed This Visit     HTN (hypertension) - Primary    Closed fracture of left side of maxilla with routine healing      Other Visit Diagnoses     Spasticity        Relevant Medications    LORazepam (ATIVAN) 1 MG tablet    Depression, unspecified depression type        Relevant Medications    LORazepam (ATIVAN) 1 MG tablet          ICD-10-CM ICD-9-CM   1. Essential hypertension I10 401.9   2. Closed fracture of left side of maxilla with routine healing S02.40DD V54.19   3. Spasticity R25.2 781.0   4. Depression, unspecified depression type F32.9 311               PLAN:       Will get post dated scrips after she is back  Hold maxzide and go to every other day    Facial fractures healing will see ENT  Medication List with Changes/Refills   Current Medications    CARBAMAZEPINE (TEGRETOL XR) 200 MG 12 HR TABLET    Take 1 tablet (200 mg total) by mouth 2 (two) times daily.    CETIRIZINE (ZYRTEC) 10 MG TABLET    Take 1 tablet (10 mg total) by mouth once daily.    CHOLECALCIFEROL, VITAMIN D3, (VITAMIN D3) 5,000 UNIT TAB    Take 5,000 Units by mouth once daily.    ESCITALOPRAM OXALATE (LEXAPRO) 20 MG TABLET    Take 1 tablet  (20 mg total) by mouth once daily.    FLUTICASONE (FLONASE) 50 MCG/ACTUATION NASAL SPRAY    ONE sprays(s) each nostril TWICE DAILY per instructed    GABAPENTIN (NEURONTIN) 400 MG CAPSULE    Take 2 capsules (800 mg total) by mouth every evening.    GABAPENTIN (NEURONTIN) 600 MG TABLET    TAKE 1 TABLET THREE TIMES DAILY    IBUPROFEN (ADVIL,MOTRIN) 800 MG TABLET    TAKE ONE TABLET BY MOUTH EVERY 8 HOURS WITH FOOD AND WATER    INTERFERON BETA-1A (AVONEX) 30 MCG/0.5 ML PNKT    Inject 30 mcg into the muscle once a week.    MECLIZINE (ANTIVERT) 25 MG TABLET    Take 1 tablet (25 mg total) by mouth as needed. 1 Tablet Oral Every day    METOPROLOL SUCCINATE (TOPROL-XL) 100 MG 24 HR TABLET    Take 1 tablet (100 mg total) by mouth once daily.    OMEPRAZOLE (PRILOSEC) 40 MG CAPSULE    Take 1 capsule (40 mg total) by mouth every evening.    ONDANSETRON (ZOFRAN-ODT) 4 MG TBDL    Take 1 tablet (4 mg total) by mouth every 12 (twelve) hours as needed.    OXYCODONE-ACETAMINOPHEN (PERCOCET)  MG PER TABLET    Take 1 tablet by mouth every 6 (six) hours as needed for Pain.    SIMVASTATIN (ZOCOR) 40 MG TABLET    Take 1 tablet (40 mg total) by mouth every evening.    TIZANIDINE (ZANAFLEX) 4 MG TABLET    TAKE 2 TABLETS IN THE MORNING, AT NOON AND IN THE EVENING AND TAKE 3 TABLETS AT NIGHT (9 TABLETS PER DAY)    TRIAMTERENE-HYDROCHLOROTHIAZIDE 37.5-25 MG (MAXZIDE-25) 37.5-25 MG PER TABLET    Take 1 tablet by mouth once daily.   Changed and/or Refilled Medications    Modified Medication Previous Medication    LORAZEPAM (ATIVAN) 1 MG TABLET lorazepam (ATIVAN) 1 MG tablet       Take 1 tablet (1 mg total) by mouth nightly as needed.    Take 1 tablet (1 mg total) by mouth nightly as needed.       No Follow-up on file.

## 2017-12-12 ENCOUNTER — PATIENT MESSAGE (OUTPATIENT)
Dept: FAMILY MEDICINE | Facility: CLINIC | Age: 52
End: 2017-12-12

## 2017-12-12 DIAGNOSIS — F32.A DEPRESSION, UNSPECIFIED DEPRESSION TYPE: ICD-10-CM

## 2017-12-12 DIAGNOSIS — R25.2 SPASTICITY: ICD-10-CM

## 2017-12-12 RX ORDER — LORAZEPAM 1 MG/1
1 TABLET ORAL NIGHTLY PRN
Qty: 30 TABLET | Refills: 2 | Status: SHIPPED | OUTPATIENT
Start: 2017-12-12 | End: 2018-03-28 | Stop reason: SDUPTHER

## 2017-12-22 ENCOUNTER — TELEPHONE (OUTPATIENT)
Dept: ADMINISTRATIVE | Facility: HOSPITAL | Age: 52
End: 2017-12-22

## 2018-01-08 ENCOUNTER — PATIENT MESSAGE (OUTPATIENT)
Dept: FAMILY MEDICINE | Facility: CLINIC | Age: 53
End: 2018-01-08

## 2018-01-08 DIAGNOSIS — G35 MULTIPLE SCLEROSIS: Primary | ICD-10-CM

## 2018-01-09 RX ORDER — CARISOPRODOL 350 MG/1
350 TABLET ORAL 3 TIMES DAILY PRN
Qty: 20 TABLET | Refills: 0 | Status: SHIPPED | OUTPATIENT
Start: 2018-01-09 | End: 2018-01-19

## 2018-01-11 ENCOUNTER — LAB VISIT (OUTPATIENT)
Dept: LAB | Facility: HOSPITAL | Age: 53
End: 2018-01-11
Attending: INTERNAL MEDICINE
Payer: MEDICARE

## 2018-01-11 DIAGNOSIS — D50.9 IRON DEFICIENCY ANEMIA, UNSPECIFIED IRON DEFICIENCY ANEMIA TYPE: ICD-10-CM

## 2018-01-11 LAB
BASOPHILS # BLD AUTO: 0.04 K/UL
BASOPHILS NFR BLD: 0.5 %
DIFFERENTIAL METHOD: ABNORMAL
EOSINOPHIL # BLD AUTO: 0.1 K/UL
EOSINOPHIL NFR BLD: 1.5 %
ERYTHROCYTE [DISTWIDTH] IN BLOOD BY AUTOMATED COUNT: 13.2 %
FERRITIN SERPL-MCNC: 59 NG/ML
HCT VFR BLD AUTO: 44 %
HGB BLD-MCNC: 15.3 G/DL
IRON SERPL-MCNC: 74 UG/DL
LYMPHOCYTES # BLD AUTO: 1.5 K/UL
LYMPHOCYTES NFR BLD: 17.7 %
MCH RBC QN AUTO: 30.5 PG
MCHC RBC AUTO-ENTMCNC: 34.8 G/DL
MCV RBC AUTO: 88 FL
MONOCYTES # BLD AUTO: 0.5 K/UL
MONOCYTES NFR BLD: 5.8 %
NEUTROPHILS # BLD AUTO: 6.4 K/UL
NEUTROPHILS NFR BLD: 74.5 %
PLATELET # BLD AUTO: 247 K/UL
PMV BLD AUTO: 9 FL
RBC # BLD AUTO: 5.02 M/UL
SATURATED IRON: 18 %
TOTAL IRON BINDING CAPACITY: 419 UG/DL
TRANSFERRIN SERPL-MCNC: 283 MG/DL
WBC # BLD AUTO: 8.55 K/UL

## 2018-01-11 PROCEDURE — 36415 COLL VENOUS BLD VENIPUNCTURE: CPT

## 2018-01-11 PROCEDURE — 82728 ASSAY OF FERRITIN: CPT

## 2018-01-11 PROCEDURE — 83540 ASSAY OF IRON: CPT

## 2018-01-11 PROCEDURE — 85025 COMPLETE CBC W/AUTO DIFF WBC: CPT

## 2018-01-17 ENCOUNTER — OFFICE VISIT (OUTPATIENT)
Dept: FAMILY MEDICINE | Facility: CLINIC | Age: 53
End: 2018-01-17
Payer: MEDICARE

## 2018-01-17 VITALS
RESPIRATION RATE: 16 BRPM | OXYGEN SATURATION: 97 % | HEIGHT: 60 IN | DIASTOLIC BLOOD PRESSURE: 70 MMHG | TEMPERATURE: 98 F | SYSTOLIC BLOOD PRESSURE: 110 MMHG | WEIGHT: 136.69 LBS | BODY MASS INDEX: 26.84 KG/M2 | HEART RATE: 67 BPM

## 2018-01-17 DIAGNOSIS — G35 MULTIPLE SCLEROSIS: Primary | ICD-10-CM

## 2018-01-17 PROCEDURE — 99999 PR PBB SHADOW E&M-EST. PATIENT-LVL III: CPT | Mod: PBBFAC,,, | Performed by: FAMILY MEDICINE

## 2018-01-17 PROCEDURE — G0009 ADMIN PNEUMOCOCCAL VACCINE: HCPCS | Mod: S$GLB,,, | Performed by: FAMILY MEDICINE

## 2018-01-17 PROCEDURE — 99214 OFFICE O/P EST MOD 30 MIN: CPT | Mod: S$GLB,,, | Performed by: FAMILY MEDICINE

## 2018-01-17 PROCEDURE — 90732 PPSV23 VACC 2 YRS+ SUBQ/IM: CPT | Mod: S$GLB,,, | Performed by: FAMILY MEDICINE

## 2018-01-17 RX ORDER — HYDROCODONE BITARTRATE AND ACETAMINOPHEN 10; 325 MG/1; MG/1
1 TABLET ORAL EVERY 6 HOURS PRN
Qty: 120 TABLET | Refills: 0 | Status: SHIPPED | OUTPATIENT
Start: 2018-01-17 | End: 2018-02-21 | Stop reason: SDUPTHER

## 2018-01-17 NOTE — PROGRESS NOTES
Chief Complaint   Patient presents with    Spasms     shoulders to neck and back of head       SUBJECTIVE:  Zoey Cali is a 52 y.o. female here for new problem of chronic pain, see overview, more spams in the neck and back of head and more spasm, she is battling severe stress and anxiety with child who is on serious drugs and potentially risking his life, she is very distraught today.  Currently has co-morbidities including per problem list.      Past Medical History:   Diagnosis Date    Allergy     Anemia     Anxiety     Chronic kidney disease     Depression     Hypertension     Multiple sclerosis     Multiple sclerosis     Neuromuscular disorder     Osteoporosis     Rib fracture 2015     Past Surgical History:   Procedure Laterality Date    APPENDECTOMY       SECTION, CLASSIC      CHOLECYSTECTOMY      EYE SURGERY      HYSTERECTOMY       Social History     Social History    Marital status:      Spouse name: N/A    Number of children: N/A    Years of education: N/A     Occupational History    Not on file.     Social History Main Topics    Smoking status: Current Every Day Smoker     Packs/day: 1.00     Types: Cigarettes    Smokeless tobacco: Never Used    Alcohol use No    Drug use: No    Sexual activity: Yes     Other Topics Concern    Not on file     Social History Narrative    No narrative on file     Family History   Problem Relation Age of Onset    Arthritis Mother     Diabetes Father     Heart disease Father     Hyperlipidemia Father     Hypertension Father     Stroke Father     Alcohol abuse Son     Arthritis Son     Alcohol abuse Maternal Aunt     Arthritis Maternal Aunt     Alcohol abuse Maternal Uncle     Drug abuse Maternal Uncle     Hypertension Maternal Uncle     Alcohol abuse Paternal Aunt     Heart disease Paternal Aunt     Hearing loss Paternal Aunt     Hypertension Paternal Aunt     Alcohol abuse Paternal Uncle     Heart  disease Paternal Uncle     Alcohol abuse Maternal Grandmother     Heart disease Maternal Grandmother     Stroke Maternal Grandmother     Alcohol abuse Maternal Grandfather     Heart disease Maternal Grandfather     Alcohol abuse Paternal Grandmother     Cancer Paternal Grandmother     Heart disease Paternal Grandmother     Hypertension Paternal Grandmother     Stroke Paternal Grandmother     Alcohol abuse Paternal Grandfather     Heart disease Paternal Grandfather      Current Outpatient Prescriptions on File Prior to Visit   Medication Sig Dispense Refill    carbamazepine (TEGRETOL XR) 200 MG 12 hr tablet Take 1 tablet (200 mg total) by mouth 2 (two) times daily. 60 tablet 5    cetirizine (ZYRTEC) 10 MG tablet Take 1 tablet (10 mg total) by mouth once daily. 30 tablet 0    cholecalciferol, vitamin D3, (VITAMIN D3) 5,000 unit Tab Take 5,000 Units by mouth once daily.      escitalopram oxalate (LEXAPRO) 20 MG tablet Take 1 tablet (20 mg total) by mouth once daily. 90 tablet 3    fluticasone (FLONASE) 50 mcg/actuation nasal spray ONE sprays(s) each nostril TWICE DAILY per instructed  0    gabapentin (NEURONTIN) 400 MG capsule Take 2 capsules (800 mg total) by mouth every evening. 180 capsule 1    gabapentin (NEURONTIN) 600 MG tablet TAKE 1 TABLET THREE TIMES DAILY 270 tablet 1    ibuprofen (ADVIL,MOTRIN) 800 MG tablet TAKE ONE TABLET BY MOUTH EVERY 8 HOURS WITH FOOD AND WATER  2    interferon beta-1a (AVONEX) 30 mcg/0.5 mL PnKt Inject 30 mcg into the muscle once a week. 3 kit 1    LORazepam (ATIVAN) 1 MG tablet Take 1 tablet (1 mg total) by mouth nightly as needed. 30 tablet 2    meclizine (ANTIVERT) 25 mg tablet Take 1 tablet (25 mg total) by mouth as needed. 1 Tablet Oral Every day 90 tablet 3    metoprolol succinate (TOPROL-XL) 100 MG 24 hr tablet Take 1 tablet (100 mg total) by mouth once daily. 90 tablet 3    omeprazole (PRILOSEC) 40 MG capsule Take 1 capsule (40 mg total) by mouth every  evening. 90 capsule 3    ondansetron (ZOFRAN-ODT) 4 MG TbDL Take 1 tablet (4 mg total) by mouth every 12 (twelve) hours as needed. 20 tablet 2    simvastatin (ZOCOR) 40 MG tablet Take 1 tablet (40 mg total) by mouth every evening. 90 tablet 3    tizanidine (ZANAFLEX) 4 MG tablet TAKE 2 TABLETS IN THE MORNING, AT NOON AND IN THE EVENING AND TAKE 3 TABLETS AT NIGHT (9 TABLETS PER DAY) 810 tablet 3    triamterene-hydrochlorothiazide 37.5-25 mg (MAXZIDE-25) 37.5-25 mg per tablet Take 1 tablet by mouth once daily. 90 tablet 3     No current facility-administered medications on file prior to visit.      Review of patient's allergies indicates:   Allergen Reactions    Lomotil [diphenoxylate-atropine]      Causes stomach spasms    Dilaudid [hydromorphone (bulk)] Itching         ROS    OBJECTIVE:  /70   Pulse 67   Temp 97.9 °F (36.6 °C) (Oral)   Resp 16   Ht 5' (1.524 m)   Wt 62 kg (136 lb 11 oz)   SpO2 97%   BMI 26.69 kg/m²     Wt Readings from Last 3 Encounters:   01/17/18 62 kg (136 lb 11 oz)   11/24/17 62.4 kg (137 lb 9.1 oz)   11/17/17 62.6 kg (138 lb)     BP Readings from Last 3 Encounters:   01/17/18 110/70   11/24/17 (!) 80/60   11/17/17 117/63       She appears well, in no apparent distress.  Alert and oriented times three, pleasant and cooperative. Vital signs are as documented in vital signs section.  Neck with increased spasm      Review of old Records:  Reviewed per     Review of old labs:      Review of old imaging:      ASSESSMENT:  Problem List Items Addressed This Visit     Multiple sclerosis - Primary          ICD-10-CM ICD-9-CM   1. Multiple sclerosis G35 340         PLAN:  1. Multiple sclerosis  The current medical regimen is effective;  continue present plan and medications.    Continue soma PRN  Continue opioids    She needs to tend to her son.      Medication List with Changes/Refills   New Medications    HYDROCODONE-ACETAMINOPHEN 10-325MG (NORCO)  MG TAB    Take 1 tablet by  mouth every 6 (six) hours as needed for Pain.   Current Medications    CARBAMAZEPINE (TEGRETOL XR) 200 MG 12 HR TABLET    Take 1 tablet (200 mg total) by mouth 2 (two) times daily.    CARISOPRODOL (SOMA) 350 MG TABLET    Take 350 mg by mouth 3 (three) times daily as needed.    CETIRIZINE (ZYRTEC) 10 MG TABLET    Take 1 tablet (10 mg total) by mouth once daily.    CHOLECALCIFEROL, VITAMIN D3, (VITAMIN D3) 5,000 UNIT TAB    Take 5,000 Units by mouth once daily.    ESCITALOPRAM OXALATE (LEXAPRO) 20 MG TABLET    Take 1 tablet (20 mg total) by mouth once daily.    FLUTICASONE (FLONASE) 50 MCG/ACTUATION NASAL SPRAY    ONE sprays(s) each nostril TWICE DAILY per instructed    GABAPENTIN (NEURONTIN) 400 MG CAPSULE    Take 2 capsules (800 mg total) by mouth every evening.    GABAPENTIN (NEURONTIN) 600 MG TABLET    TAKE 1 TABLET THREE TIMES DAILY    IBUPROFEN (ADVIL,MOTRIN) 800 MG TABLET    TAKE ONE TABLET BY MOUTH EVERY 8 HOURS WITH FOOD AND WATER    INTERFERON BETA-1A (AVONEX) 30 MCG/0.5 ML PNKT    Inject 30 mcg into the muscle once a week.    LORAZEPAM (ATIVAN) 1 MG TABLET    Take 1 tablet (1 mg total) by mouth nightly as needed.    MECLIZINE (ANTIVERT) 25 MG TABLET    Take 1 tablet (25 mg total) by mouth as needed. 1 Tablet Oral Every day    METOPROLOL SUCCINATE (TOPROL-XL) 100 MG 24 HR TABLET    Take 1 tablet (100 mg total) by mouth once daily.    OMEPRAZOLE (PRILOSEC) 40 MG CAPSULE    Take 1 capsule (40 mg total) by mouth every evening.    ONDANSETRON (ZOFRAN-ODT) 4 MG TBDL    Take 1 tablet (4 mg total) by mouth every 12 (twelve) hours as needed.    SIMVASTATIN (ZOCOR) 40 MG TABLET    Take 1 tablet (40 mg total) by mouth every evening.    TIZANIDINE (ZANAFLEX) 4 MG TABLET    TAKE 2 TABLETS IN THE MORNING, AT NOON AND IN THE EVENING AND TAKE 3 TABLETS AT NIGHT (9 TABLETS PER DAY)    TRIAMTERENE-HYDROCHLOROTHIAZIDE 37.5-25 MG (MAXZIDE-25) 37.5-25 MG PER TABLET    Take 1 tablet by mouth once daily.   Discontinued  Medications    OXYCODONE-ACETAMINOPHEN (PERCOCET)  MG PER TABLET    Take 1 tablet by mouth every 6 (six) hours as needed for Pain.       No Follow-up on file.

## 2018-01-21 RX ORDER — CARISOPRODOL 350 MG/1
350 TABLET ORAL 3 TIMES DAILY PRN
Refills: 0 | COMMUNITY
Start: 2018-01-09 | End: 2018-04-25

## 2018-02-21 ENCOUNTER — OFFICE VISIT (OUTPATIENT)
Dept: FAMILY MEDICINE | Facility: CLINIC | Age: 53
End: 2018-02-21
Payer: MEDICARE

## 2018-02-21 VITALS
HEIGHT: 60 IN | OXYGEN SATURATION: 96 % | SYSTOLIC BLOOD PRESSURE: 100 MMHG | BODY MASS INDEX: 27.26 KG/M2 | DIASTOLIC BLOOD PRESSURE: 60 MMHG | TEMPERATURE: 97 F | HEART RATE: 68 BPM | WEIGHT: 138.88 LBS

## 2018-02-21 DIAGNOSIS — F11.90 CHRONIC, CONTINUOUS USE OF OPIOIDS: ICD-10-CM

## 2018-02-21 DIAGNOSIS — R30.0 DYSURIA: ICD-10-CM

## 2018-02-21 DIAGNOSIS — B00.1 COLD SORE: ICD-10-CM

## 2018-02-21 DIAGNOSIS — I10 ESSENTIAL HYPERTENSION: Primary | ICD-10-CM

## 2018-02-21 PROCEDURE — 99999 PR PBB SHADOW E&M-EST. PATIENT-LVL III: CPT | Mod: PBBFAC,,, | Performed by: FAMILY MEDICINE

## 2018-02-21 PROCEDURE — 99214 OFFICE O/P EST MOD 30 MIN: CPT | Mod: S$GLB,,, | Performed by: FAMILY MEDICINE

## 2018-02-21 PROCEDURE — 3008F BODY MASS INDEX DOCD: CPT | Mod: S$GLB,,, | Performed by: FAMILY MEDICINE

## 2018-02-21 RX ORDER — HYDROCODONE BITARTRATE AND ACETAMINOPHEN 10; 325 MG/1; MG/1
1 TABLET ORAL EVERY 6 HOURS PRN
Qty: 120 TABLET | Refills: 0 | Status: SHIPPED | OUTPATIENT
Start: 2018-02-21 | End: 2018-05-21 | Stop reason: SDUPTHER

## 2018-02-21 RX ORDER — VALACYCLOVIR HYDROCHLORIDE 1 G/1
TABLET, FILM COATED ORAL
Qty: 60 TABLET | Refills: 0 | Status: SHIPPED | OUTPATIENT
Start: 2018-02-21 | End: 2018-04-25

## 2018-02-21 NOTE — PROGRESS NOTES
Chief Complaint   Patient presents with    Medication Refill       SUBJECTIVE:  Zoey Cali is a 52 y.o. female here for new problem of losing her son and a lot of stress and she has painful cold sore.  She is otherwise doing well with the current regimen .  Currently has co-morbidities including per problem list.      Past Medical History:   Diagnosis Date    Allergy     Anemia     Anxiety     Chronic kidney disease     Depression     Hypertension     Multiple sclerosis     Multiple sclerosis     Neuromuscular disorder     Osteoporosis     Rib fracture 2015     Past Surgical History:   Procedure Laterality Date    APPENDECTOMY       SECTION, CLASSIC      CHOLECYSTECTOMY      EYE SURGERY      HYSTERECTOMY       Social History     Social History    Marital status:      Spouse name: N/A    Number of children: N/A    Years of education: N/A     Occupational History    Not on file.     Social History Main Topics    Smoking status: Current Every Day Smoker     Packs/day: 1.00     Types: Cigarettes    Smokeless tobacco: Never Used    Alcohol use No    Drug use: No    Sexual activity: Yes     Other Topics Concern    Not on file     Social History Narrative    No narrative on file     Family History   Problem Relation Age of Onset    Arthritis Mother     Diabetes Father     Heart disease Father     Hyperlipidemia Father     Hypertension Father     Stroke Father     Alcohol abuse Son     Arthritis Son     Drug abuse Son     Alcohol abuse Maternal Aunt     Arthritis Maternal Aunt     Alcohol abuse Maternal Uncle     Drug abuse Maternal Uncle     Hypertension Maternal Uncle     Alcohol abuse Paternal Aunt     Heart disease Paternal Aunt     Hearing loss Paternal Aunt     Hypertension Paternal Aunt     Alcohol abuse Paternal Uncle     Heart disease Paternal Uncle     Alcohol abuse Maternal Grandmother     Heart disease Maternal Grandmother     Stroke  Maternal Grandmother     Alcohol abuse Maternal Grandfather     Heart disease Maternal Grandfather     Alcohol abuse Paternal Grandmother     Cancer Paternal Grandmother     Heart disease Paternal Grandmother     Hypertension Paternal Grandmother     Stroke Paternal Grandmother     Alcohol abuse Paternal Grandfather     Heart disease Paternal Grandfather      Current Outpatient Prescriptions on File Prior to Visit   Medication Sig Dispense Refill    carbamazepine (TEGRETOL XR) 200 MG 12 hr tablet Take 1 tablet (200 mg total) by mouth 2 (two) times daily. 60 tablet 5    carisoprodol (SOMA) 350 MG tablet Take 350 mg by mouth 3 (three) times daily as needed.  0    cetirizine (ZYRTEC) 10 MG tablet Take 1 tablet (10 mg total) by mouth once daily. 30 tablet 0    cholecalciferol, vitamin D3, (VITAMIN D3) 5,000 unit Tab Take 5,000 Units by mouth once daily.      escitalopram oxalate (LEXAPRO) 20 MG tablet Take 1 tablet (20 mg total) by mouth once daily. 90 tablet 3    fluticasone (FLONASE) 50 mcg/actuation nasal spray ONE sprays(s) each nostril TWICE DAILY per instructed  0    gabapentin (NEURONTIN) 400 MG capsule Take 2 capsules (800 mg total) by mouth every evening. 180 capsule 1    gabapentin (NEURONTIN) 600 MG tablet TAKE 1 TABLET THREE TIMES DAILY 270 tablet 1    ibuprofen (ADVIL,MOTRIN) 800 MG tablet TAKE ONE TABLET BY MOUTH EVERY 8 HOURS WITH FOOD AND WATER  2    interferon beta-1a (AVONEX) 30 mcg/0.5 mL PnKt Inject 30 mcg into the muscle once a week. 3 kit 1    LORazepam (ATIVAN) 1 MG tablet Take 1 tablet (1 mg total) by mouth nightly as needed. 30 tablet 2    meclizine (ANTIVERT) 25 mg tablet Take 1 tablet (25 mg total) by mouth as needed. 1 Tablet Oral Every day 90 tablet 3    metoprolol succinate (TOPROL-XL) 100 MG 24 hr tablet Take 1 tablet (100 mg total) by mouth once daily. 90 tablet 3    omeprazole (PRILOSEC) 40 MG capsule Take 1 capsule (40 mg total) by mouth every evening. 90 capsule  3    ondansetron (ZOFRAN-ODT) 4 MG TbDL Take 1 tablet (4 mg total) by mouth every 12 (twelve) hours as needed. 20 tablet 2    simvastatin (ZOCOR) 40 MG tablet Take 1 tablet (40 mg total) by mouth every evening. 90 tablet 3    tizanidine (ZANAFLEX) 4 MG tablet TAKE 2 TABLETS IN THE MORNING, AT NOON AND IN THE EVENING AND TAKE 3 TABLETS AT NIGHT (9 TABLETS PER DAY) 810 tablet 3    triamterene-hydrochlorothiazide 37.5-25 mg (MAXZIDE-25) 37.5-25 mg per tablet Take 1 tablet by mouth once daily. 90 tablet 3    [DISCONTINUED] hydrocodone-acetaminophen 10-325mg (NORCO)  mg Tab Take 1 tablet by mouth every 6 (six) hours as needed for Pain. 120 tablet 0     No current facility-administered medications on file prior to visit.      Review of patient's allergies indicates:   Allergen Reactions    Lomotil [diphenoxylate-atropine]      Causes stomach spasms    Dilaudid [hydromorphone (bulk)] Itching         ROS  Per HPI    OBJECTIVE:  /60   Pulse 68   Temp 96.8 °F (36 °C) (Oral)   Ht 5' (1.524 m)   Wt 63 kg (138 lb 14.2 oz)   SpO2 96%   BMI 27.13 kg/m²     Wt Readings from Last 3 Encounters:   02/21/18 63 kg (138 lb 14.2 oz)   01/17/18 62 kg (136 lb 11 oz)   11/24/17 62.4 kg (137 lb 9.1 oz)     BP Readings from Last 3 Encounters:   02/21/18 100/60   01/17/18 110/70   11/24/17 (!) 80/60       She appears well, in no apparent distress.  Alert and oriented times three, pleasant and cooperative. Vital signs are as documented in vital signs section.  S1 and S2 normal, no murmurs, clicks, gallops or rubs. Regular rate and rhythm. Chest is clear; no wheezes or rales. No edema or JVD.  Cold sore noted  She is grieving      Review of old Records:  Reviewed     Review of old labs:      Review of old imaging:      ASSESSMENT:  Problem List Items Addressed This Visit     HTN (hypertension) - Primary    Chronic, continuous use of opioids    Relevant Medications    hydrocodone-acetaminophen 10-325mg (NORCO)  mg  Tab      Other Visit Diagnoses     Cold sore        Relevant Medications    valACYclovir (VALTREX) 1000 MG tablet          ICD-10-CM ICD-9-CM   1. Essential hypertension I10 401.9   2. Chronic, continuous use of opioids F11.90 305.51   3. Cold sore B00.1 054.9         PLAN:  1. Essential hypertension  The current medical regimen is effective;  continue present plan and medications.      2. Chronic, continuous use of opioids  The current medical regimen is effective;  continue present plan and medications.    - hydrocodone-acetaminophen 10-325mg (NORCO)  mg Tab; Take 1 tablet by mouth every 6 (six) hours as needed for Pain.  Dispense: 120 tablet; Refill: 0    3. Cold sore  Potential medication side effects were discussed with the patient; let me know if any occur.    - valACYclovir (VALTREX) 1000 MG tablet; Take 2 pills PO every 12 hours x 1 day, 15 treatments  Dispense: 60 tablet; Refill: 0    Grief counseling and went over some books and groups.  Is connected with everyone  Medication List with Changes/Refills   New Medications    VALACYCLOVIR (VALTREX) 1000 MG TABLET    Take 2 pills PO every 12 hours x 1 day, 15 treatments   Current Medications    CARBAMAZEPINE (TEGRETOL XR) 200 MG 12 HR TABLET    Take 1 tablet (200 mg total) by mouth 2 (two) times daily.    CARISOPRODOL (SOMA) 350 MG TABLET    Take 350 mg by mouth 3 (three) times daily as needed.    CETIRIZINE (ZYRTEC) 10 MG TABLET    Take 1 tablet (10 mg total) by mouth once daily.    CHOLECALCIFEROL, VITAMIN D3, (VITAMIN D3) 5,000 UNIT TAB    Take 5,000 Units by mouth once daily.    ESCITALOPRAM OXALATE (LEXAPRO) 20 MG TABLET    Take 1 tablet (20 mg total) by mouth once daily.    FLUTICASONE (FLONASE) 50 MCG/ACTUATION NASAL SPRAY    ONE sprays(s) each nostril TWICE DAILY per instructed    GABAPENTIN (NEURONTIN) 400 MG CAPSULE    Take 2 capsules (800 mg total) by mouth every evening.    GABAPENTIN (NEURONTIN) 600 MG TABLET    TAKE 1 TABLET THREE TIMES  DAILY    IBUPROFEN (ADVIL,MOTRIN) 800 MG TABLET    TAKE ONE TABLET BY MOUTH EVERY 8 HOURS WITH FOOD AND WATER    INTERFERON BETA-1A (AVONEX) 30 MCG/0.5 ML PNKT    Inject 30 mcg into the muscle once a week.    LORAZEPAM (ATIVAN) 1 MG TABLET    Take 1 tablet (1 mg total) by mouth nightly as needed.    MECLIZINE (ANTIVERT) 25 MG TABLET    Take 1 tablet (25 mg total) by mouth as needed. 1 Tablet Oral Every day    METOPROLOL SUCCINATE (TOPROL-XL) 100 MG 24 HR TABLET    Take 1 tablet (100 mg total) by mouth once daily.    OMEPRAZOLE (PRILOSEC) 40 MG CAPSULE    Take 1 capsule (40 mg total) by mouth every evening.    ONDANSETRON (ZOFRAN-ODT) 4 MG TBDL    Take 1 tablet (4 mg total) by mouth every 12 (twelve) hours as needed.    SIMVASTATIN (ZOCOR) 40 MG TABLET    Take 1 tablet (40 mg total) by mouth every evening.    TIZANIDINE (ZANAFLEX) 4 MG TABLET    TAKE 2 TABLETS IN THE MORNING, AT NOON AND IN THE EVENING AND TAKE 3 TABLETS AT NIGHT (9 TABLETS PER DAY)    TRIAMTERENE-HYDROCHLOROTHIAZIDE 37.5-25 MG (MAXZIDE-25) 37.5-25 MG PER TABLET    Take 1 tablet by mouth once daily.   Changed and/or Refilled Medications    Modified Medication Previous Medication    HYDROCODONE-ACETAMINOPHEN 10-325MG (NORCO)  MG TAB hydrocodone-acetaminophen 10-325mg (NORCO)  mg Tab       Take 1 tablet by mouth every 6 (six) hours as needed for Pain.    Take 1 tablet by mouth every 6 (six) hours as needed for Pain.       Follow-up in about 4 weeks (around 3/21/2018) for assess treatment plan.

## 2018-02-26 ENCOUNTER — PATIENT MESSAGE (OUTPATIENT)
Dept: NEUROLOGY | Facility: CLINIC | Age: 53
End: 2018-02-26

## 2018-02-26 DIAGNOSIS — M79.2 NEUROPATHIC PAIN: ICD-10-CM

## 2018-02-26 RX ORDER — CARBAMAZEPINE 200 MG/1
200 TABLET, EXTENDED RELEASE ORAL 2 TIMES DAILY
Qty: 60 TABLET | Refills: 5 | Status: CANCELLED | OUTPATIENT
Start: 2018-02-26

## 2018-02-26 RX ORDER — CARBAMAZEPINE 200 MG/1
200 TABLET, EXTENDED RELEASE ORAL 2 TIMES DAILY
Qty: 60 TABLET | Refills: 5 | Status: SHIPPED | OUTPATIENT
Start: 2018-02-26 | End: 2018-08-27 | Stop reason: SDUPTHER

## 2018-02-26 RX ORDER — CARBAMAZEPINE 200 MG/1
TABLET, EXTENDED RELEASE ORAL
Qty: 60 TABLET | Status: CANCELLED | OUTPATIENT
Start: 2018-02-26

## 2018-02-27 DIAGNOSIS — G35 MULTIPLE SCLEROSIS: Primary | ICD-10-CM

## 2018-03-28 DIAGNOSIS — F32.A DEPRESSION, UNSPECIFIED DEPRESSION TYPE: ICD-10-CM

## 2018-03-28 DIAGNOSIS — R25.2 SPASTICITY: ICD-10-CM

## 2018-03-29 RX ORDER — LORAZEPAM 1 MG/1
TABLET ORAL
Qty: 30 TABLET | Refills: 0 | Status: SHIPPED | OUTPATIENT
Start: 2018-03-29 | End: 2018-05-21 | Stop reason: SDUPTHER

## 2018-04-03 ENCOUNTER — PATIENT MESSAGE (OUTPATIENT)
Dept: NEUROLOGY | Facility: CLINIC | Age: 53
End: 2018-04-03

## 2018-04-03 DIAGNOSIS — G35 MULTIPLE SCLEROSIS: ICD-10-CM

## 2018-04-16 ENCOUNTER — PATIENT MESSAGE (OUTPATIENT)
Dept: NEUROLOGY | Facility: CLINIC | Age: 53
End: 2018-04-16

## 2018-04-24 ENCOUNTER — TELEPHONE (OUTPATIENT)
Dept: HEMATOLOGY/ONCOLOGY | Facility: CLINIC | Age: 53
End: 2018-04-24

## 2018-04-24 ENCOUNTER — LAB VISIT (OUTPATIENT)
Dept: LAB | Facility: HOSPITAL | Age: 53
End: 2018-04-24
Attending: INTERNAL MEDICINE
Payer: MEDICARE

## 2018-04-24 DIAGNOSIS — D50.9 IRON DEFICIENCY ANEMIA: ICD-10-CM

## 2018-04-24 DIAGNOSIS — D50.9 IRON DEFICIENCY ANEMIA: Primary | ICD-10-CM

## 2018-04-24 LAB
BASOPHILS # BLD AUTO: 0.03 K/UL
BASOPHILS NFR BLD: 0.5 %
DIFFERENTIAL METHOD: ABNORMAL
EOSINOPHIL # BLD AUTO: 0.2 K/UL
EOSINOPHIL NFR BLD: 2.6 %
ERYTHROCYTE [DISTWIDTH] IN BLOOD BY AUTOMATED COUNT: 13.6 %
FERRITIN SERPL-MCNC: 38 NG/ML
HCT VFR BLD AUTO: 41.3 %
HGB BLD-MCNC: 14.7 G/DL
IRON SERPL-MCNC: 53 UG/DL
LYMPHOCYTES # BLD AUTO: 1.4 K/UL
LYMPHOCYTES NFR BLD: 21.6 %
MCH RBC QN AUTO: 30.9 PG
MCHC RBC AUTO-ENTMCNC: 35.6 G/DL
MCV RBC AUTO: 87 FL
MONOCYTES # BLD AUTO: 0.3 K/UL
MONOCYTES NFR BLD: 4.8 %
NEUTROPHILS # BLD AUTO: 4.7 K/UL
NEUTROPHILS NFR BLD: 70.3 %
PLATELET # BLD AUTO: 277 K/UL
PMV BLD AUTO: 8.8 FL
RBC # BLD AUTO: 4.76 M/UL
SATURATED IRON: 13 %
TOTAL IRON BINDING CAPACITY: 416 UG/DL
TRANSFERRIN SERPL-MCNC: 281 MG/DL
WBC # BLD AUTO: 6.63 K/UL

## 2018-04-24 PROCEDURE — 36415 COLL VENOUS BLD VENIPUNCTURE: CPT

## 2018-04-24 PROCEDURE — 83540 ASSAY OF IRON: CPT

## 2018-04-24 PROCEDURE — 82728 ASSAY OF FERRITIN: CPT

## 2018-04-24 PROCEDURE — 85025 COMPLETE CBC W/AUTO DIFF WBC: CPT

## 2018-04-25 ENCOUNTER — OFFICE VISIT (OUTPATIENT)
Dept: HEMATOLOGY/ONCOLOGY | Facility: CLINIC | Age: 53
End: 2018-04-25
Payer: MEDICARE

## 2018-04-25 VITALS
OXYGEN SATURATION: 96 % | TEMPERATURE: 98 F | HEART RATE: 82 BPM | DIASTOLIC BLOOD PRESSURE: 62 MMHG | BODY MASS INDEX: 27.32 KG/M2 | HEIGHT: 60 IN | WEIGHT: 139.13 LBS | SYSTOLIC BLOOD PRESSURE: 104 MMHG

## 2018-04-25 DIAGNOSIS — D50.9 IRON DEFICIENCY ANEMIA, UNSPECIFIED IRON DEFICIENCY ANEMIA TYPE: Primary | ICD-10-CM

## 2018-04-25 PROCEDURE — 99213 OFFICE O/P EST LOW 20 MIN: CPT | Mod: S$GLB,,, | Performed by: INTERNAL MEDICINE

## 2018-04-25 PROCEDURE — 3074F SYST BP LT 130 MM HG: CPT | Mod: CPTII,S$GLB,, | Performed by: INTERNAL MEDICINE

## 2018-04-25 PROCEDURE — 99999 PR PBB SHADOW E&M-EST. PATIENT-LVL IV: CPT | Mod: PBBFAC,,, | Performed by: INTERNAL MEDICINE

## 2018-04-25 PROCEDURE — 3078F DIAST BP <80 MM HG: CPT | Mod: CPTII,S$GLB,, | Performed by: INTERNAL MEDICINE

## 2018-04-25 RX ORDER — SODIUM CHLORIDE 0.9 % (FLUSH) 0.9 %
10 SYRINGE (ML) INJECTION
Status: CANCELLED | OUTPATIENT
Start: 2018-05-15

## 2018-04-25 RX ORDER — SODIUM CHLORIDE 0.9 % (FLUSH) 0.9 %
10 SYRINGE (ML) INJECTION
Status: CANCELLED | OUTPATIENT
Start: 2018-05-08

## 2018-04-25 RX ORDER — SODIUM CHLORIDE 0.9 % (FLUSH) 0.9 %
10 SYRINGE (ML) INJECTION
Status: CANCELLED | OUTPATIENT
Start: 2018-05-01

## 2018-04-25 RX ORDER — HEPARIN 100 UNIT/ML
500 SYRINGE INTRAVENOUS
Status: CANCELLED | OUTPATIENT
Start: 2018-05-08

## 2018-04-25 RX ORDER — HEPARIN 100 UNIT/ML
500 SYRINGE INTRAVENOUS
Status: CANCELLED | OUTPATIENT
Start: 2018-05-15

## 2018-04-25 RX ORDER — HEPARIN 100 UNIT/ML
500 SYRINGE INTRAVENOUS
Status: CANCELLED | OUTPATIENT
Start: 2018-05-01

## 2018-04-25 NOTE — PROGRESS NOTES
Subjective:       Patient ID: Zoey Cali is a 52 y.o. female.    Chief Complaint: Follow-up  Diagnosis: TRACIE  HPI     52 y/o female with history of MS seen  today for f/u for TRACIE  She undergoes intermittent IV iron therapy.  She has been intolerant of oral Fe supp  s/p GI eval with EGD and colonoscopy which was unremarkable    She is followed by Dr. Mendez for long standing history of MS and remains on therapy with [facility]one[facility]   She has chronic pain  and neuralgia and on opioid pain medication managed by PCP and Neurology    She is grieving today   Her son recently passed from heroin overdose   He suffered from depression  She is having sleep disturbances  She has fatigue   She is going to counseling   Mild fatigue  No SOB/CP  No N/V  Chronic neck pain-stable    CBC reveals wbc 6630 /mm3  Hb 14.y g/d 41.3  g/dl   plt ct 277k    Review of Systems   Constitutional: Positive for fatigue. Negative for appetite change and unexpected weight change.   Eyes: Negative for visual disturbance.   Respiratory: Negative for cough and shortness of breath.    Cardiovascular: Negative for chest pain and leg swelling.   Gastrointestinal: Negative for abdominal pain, blood in stool, constipation, diarrhea, nausea and vomiting.   Genitourinary: Negative for frequency.   Musculoskeletal: Positive for neck pain. Negative for arthralgias, back pain and joint swelling.   Skin: Negative for rash.        No petechiae, ecchymoses   Neurological: Negative for light-headedness and headaches.   Hematological: Negative for adenopathy. Does not bruise/bleed easily.   Psychiatric/Behavioral: Positive for dysphoric mood and sleep disturbance.       Objective:       Vitals:    04/25/18 1532 04/25/18 1536   BP: (!) 101/57 104/62   BP Location: Right arm Left arm   Patient Position: Sitting Sitting   BP Method: Medium (Automatic) Medium (Automatic)   Pulse: 82    Temp: 98.2 °F (36.8 °C)    TempSrc: Oral    SpO2: 96%    Weight: 63.1 kg (139 lb 1.8 oz)     Height: 5' (1.524 m)        Physical Exam   Constitutional: She is oriented to person, place, and time. She appears well-developed and well-nourished.   HENT:   Head: Normocephalic.   Mouth/Throat: Oropharynx is clear and moist. No oropharyngeal exudate.   Eyes: Conjunctivae and lids are normal. Pupils are equal, round, and reactive to light. No scleral icterus.   Neck: Normal range of motion. Neck supple. No thyromegaly present.   Cardiovascular: Normal rate, regular rhythm and normal heart sounds.    No murmur heard.  Pulmonary/Chest: Breath sounds normal. She has no wheezes. She has no rales.   Abdominal: Soft. Bowel sounds are normal. She exhibits no distension and no mass. There is no hepatosplenomegaly. There is no tenderness. There is no rebound and no guarding.   Musculoskeletal: Normal range of motion. She exhibits no edema or tenderness.   Lymphadenopathy:     She has no cervical adenopathy.     She has no axillary adenopathy.        Right: No supraclavicular adenopathy present.        Left: No supraclavicular adenopathy present.   Neurological: She is alert and oriented to person, place, and time. No cranial nerve deficit. Coordination normal.   Skin: Skin is warm and dry. No ecchymosis, no petechiae and no rash noted. No erythema.   Psychiatric:   Dysphoric mood        Lab Results   Component Value Date    WBC 6.63 04/24/2018    HGB 14.7 04/24/2018    HCT 41.3 04/24/2018    MCV 87 04/24/2018     04/24/2018     Lab Results   Component Value Date    IRON 53 04/24/2018    TIBC 416 04/24/2018    FERRITIN 38 04/24/2018       Assessment:       1. Iron deficiency anemia, unspecified iron deficiency anemia type        Plan:   Zoey FRASER was seen today for follow-up.    Diagnoses and all orders for this visit:    Iron deficiency anemia, unspecified iron deficiency       S/p GI eval-unremarkable       CBC reveals stable Hb 14 g/dl        Fe parameters reveal decreased Fe sat and Ferritin <30       Plan  Venofer weekly x 3     Multiple Sclerosis    Follow-up with Neurology     Pt advised to continue with grief counseling          F/u 2 mos with cbc, Fe studies prior to f/u ( or sooner if problems should arise in the interim)      Cc: MD Heidy Lancaster MD

## 2018-04-26 DIAGNOSIS — D50.9 IRON (FE) DEFICIENCY ANEMIA: Primary | ICD-10-CM

## 2018-04-27 ENCOUNTER — OFFICE VISIT (OUTPATIENT)
Dept: OBSTETRICS AND GYNECOLOGY | Facility: CLINIC | Age: 53
End: 2018-04-27
Attending: OBSTETRICS & GYNECOLOGY
Payer: MEDICARE

## 2018-04-27 VITALS
WEIGHT: 139.13 LBS | HEIGHT: 60 IN | BODY MASS INDEX: 27.32 KG/M2 | DIASTOLIC BLOOD PRESSURE: 72 MMHG | SYSTOLIC BLOOD PRESSURE: 100 MMHG

## 2018-04-27 DIAGNOSIS — Z90.722 HISTORY OF HYSTERECTOMY WITH BILATERAL OOPHORECTOMY: ICD-10-CM

## 2018-04-27 DIAGNOSIS — Z12.31 VISIT FOR SCREENING MAMMOGRAM: ICD-10-CM

## 2018-04-27 DIAGNOSIS — Z01.419 WELL WOMAN EXAM WITH ROUTINE GYNECOLOGICAL EXAM: Primary | ICD-10-CM

## 2018-04-27 DIAGNOSIS — Z90.710 HISTORY OF HYSTERECTOMY WITH BILATERAL OOPHORECTOMY: ICD-10-CM

## 2018-04-27 DIAGNOSIS — Z78.0 POSTMENOPAUSAL STATUS: ICD-10-CM

## 2018-04-27 PROCEDURE — 99999 PR PBB SHADOW E&M-EST. PATIENT-LVL III: CPT | Mod: PBBFAC,,, | Performed by: OBSTETRICS & GYNECOLOGY

## 2018-04-27 PROCEDURE — G0101 CA SCREEN;PELVIC/BREAST EXAM: HCPCS | Mod: S$GLB,,, | Performed by: OBSTETRICS & GYNECOLOGY

## 2018-04-27 RX ORDER — NYSTATIN AND TRIAMCINOLONE ACETONIDE 100000; 1 [USP'U]/G; MG/G
CREAM TOPICAL
Qty: 30 G | Refills: 1 | Status: SHIPPED | OUTPATIENT
Start: 2018-04-27 | End: 2019-09-26

## 2018-04-27 NOTE — PROGRESS NOTES
Zoey Cali is a 52 y.o. year old  who presents for annual exam.  She is S/P JARON / BSO, not on HRT.  Reports noting some vulva itching.  No abnormal discharge.  No vaginal itching or irritation.  Also, she describes occasional irritation in skin fold of pannus.  She has a history of MS and feels that her symptoms are essentially stable over the past year.  She has been receiving iron infusions.  Reports that her son recently .    Pap 6/9/15: Negative    Past Medical History:   Diagnosis Date    Allergy     Anemia     Anxiety     Chronic kidney disease     Depression     Hypertension     Multiple sclerosis     Multiple sclerosis     Neuromuscular disorder     Osteoporosis     Rib fracture 2015       Past Surgical History:   Procedure Laterality Date    APPENDECTOMY       SECTION, CLASSIC      CHOLECYSTECTOMY      EYE SURGERY      HYSTERECTOMY         OB History      Para Term  AB Living    2 2       2    SAB TAB Ectopic Multiple Live Births                       ROS:  GENERAL: Feeling well overall.   SKIN: Reports episodes vulva itching.   HEAD: Denies head injury or headache.   NODES: Denies enlarged lymph nodes.   CHEST: Denies chest pain or shortness of breath.   CARDIOVASCULAR: Denies palpitations or left sided chest pain.   ABDOMEN: No abdominal pain, nausea, vomiting or rectal bleeding.   URINARY: No dysuria or hematuria.  REPRODUCTIVE: See HPI.   BREASTS: Denies pain, lumps, or nipple discharge.   HEMATOLOGIC: No easy bruisability or excessive bleeding.   MUSCULOSKELETAL: Reports some discomfort in joints.   NEUROLOGIC: Reports walking with cane because of weakness in legs.   PSYCHIATRIC: Reports recent death of her son.    PE:  (chaperone present during entire exam)  APPEARANCE: Well nourished, well developed, in no acute distress.  NODES: No inguinal lymph node enlargement.  ABDOMEN: Soft. No tenderness or masses. No hernias.  BREASTS: Symmetrical, no  skin changes or visible lesions. No palpable masses, nipple discharge or adenopathy bilaterally.  PELVIC: Atrophic external female genitalia without lesions.  Mild loss of labial architecture.  Normal hair distribution. Adequate perineal body, normal urethral meatus. Vagina atrophic without lesions or discharge. No significant cystocele or rectocele. Uterus and cervix surgically absent. Bimanual exam revealed no masses, tenderness or abnormality.  ANUS: Normal.    Diagnosis:  1. Well woman exam with routine gynecological exam    2. Postmenopausal status    3. History of hysterectomy with bilateral oophorectomy    4. Visit for screening mammogram          PLAN:    Orders Placed This Encounter    Mammo Digital Screening Bilat with Tomosynthesis CAD    nystatin-triamcinolone (MYCOLOG II) cream       Patient was counseled today on postmenopausal issues.  She will use Mycolog cream externally to help with vulvitis.  She will also try the cream for skin irritation under her pannus.  If not improved, she will let us know.    Follow-up in 1 year.

## 2018-05-01 ENCOUNTER — OFFICE VISIT (OUTPATIENT)
Dept: NEUROLOGY | Facility: CLINIC | Age: 53
End: 2018-05-01
Payer: MEDICARE

## 2018-05-01 ENCOUNTER — LAB VISIT (OUTPATIENT)
Dept: LAB | Facility: HOSPITAL | Age: 53
End: 2018-05-01
Attending: PHYSICIAN ASSISTANT
Payer: MEDICARE

## 2018-05-01 VITALS
SYSTOLIC BLOOD PRESSURE: 113 MMHG | DIASTOLIC BLOOD PRESSURE: 73 MMHG | HEART RATE: 51 BPM | WEIGHT: 135 LBS | HEIGHT: 60 IN | BODY MASS INDEX: 26.5 KG/M2

## 2018-05-01 DIAGNOSIS — M79.2 NEUROPATHIC PAIN OF FOOT, RIGHT: ICD-10-CM

## 2018-05-01 DIAGNOSIS — G35 MULTIPLE SCLEROSIS: Primary | ICD-10-CM

## 2018-05-01 DIAGNOSIS — Z71.89 COUNSELING REGARDING GOALS OF CARE: ICD-10-CM

## 2018-05-01 DIAGNOSIS — E55.9 VITAMIN D INSUFFICIENCY: ICD-10-CM

## 2018-05-01 DIAGNOSIS — F43.21 GRIEF AT LOSS OF CHILD: ICD-10-CM

## 2018-05-01 DIAGNOSIS — Z79.899 ENCOUNTER FOR LONG-TERM (CURRENT) USE OF HIGH-RISK MEDICATION: ICD-10-CM

## 2018-05-01 DIAGNOSIS — F40.240 CLAUSTROPHOBIA: ICD-10-CM

## 2018-05-01 DIAGNOSIS — Z63.4 GRIEF AT LOSS OF CHILD: ICD-10-CM

## 2018-05-01 DIAGNOSIS — G35 MULTIPLE SCLEROSIS: ICD-10-CM

## 2018-05-01 DIAGNOSIS — M79.2 NEUROPATHIC PAIN OF FOOT, LEFT: ICD-10-CM

## 2018-05-01 DIAGNOSIS — G35 MS (MULTIPLE SCLEROSIS): ICD-10-CM

## 2018-05-01 LAB
ALBUMIN SERPL BCP-MCNC: 4 G/DL
ALP SERPL-CCNC: 136 U/L
ALT SERPL W/O P-5'-P-CCNC: 16 U/L
ANION GAP SERPL CALC-SCNC: 8 MMOL/L
AST SERPL-CCNC: 17 U/L
BASOPHILS # BLD AUTO: 0.02 K/UL
BASOPHILS NFR BLD: 0.3 %
BILIRUB SERPL-MCNC: 0.5 MG/DL
BUN SERPL-MCNC: 10 MG/DL
CALCIUM SERPL-MCNC: 10 MG/DL
CHLORIDE SERPL-SCNC: 102 MMOL/L
CO2 SERPL-SCNC: 31 MMOL/L
CREAT SERPL-MCNC: 0.8 MG/DL
DIFFERENTIAL METHOD: ABNORMAL
EOSINOPHIL # BLD AUTO: 0.2 K/UL
EOSINOPHIL NFR BLD: 3 %
ERYTHROCYTE [DISTWIDTH] IN BLOOD BY AUTOMATED COUNT: 13.8 %
EST. GFR  (AFRICAN AMERICAN): >60 ML/MIN/1.73 M^2
EST. GFR  (NON AFRICAN AMERICAN): >60 ML/MIN/1.73 M^2
GLUCOSE SERPL-MCNC: 89 MG/DL
HCT VFR BLD AUTO: 42 %
HGB BLD-MCNC: 14.9 G/DL
LYMPHOCYTES # BLD AUTO: 2 K/UL
LYMPHOCYTES NFR BLD: 32.5 %
MCH RBC QN AUTO: 30.5 PG
MCHC RBC AUTO-ENTMCNC: 35.5 G/DL
MCV RBC AUTO: 86 FL
MONOCYTES # BLD AUTO: 0.4 K/UL
MONOCYTES NFR BLD: 6.9 %
NEUTROPHILS # BLD AUTO: 3.5 K/UL
NEUTROPHILS NFR BLD: 57.1 %
PLATELET # BLD AUTO: 273 K/UL
PMV BLD AUTO: 8.8 FL
POTASSIUM SERPL-SCNC: 4.4 MMOL/L
PROT SERPL-MCNC: 7.3 G/DL
RBC # BLD AUTO: 4.88 M/UL
SODIUM SERPL-SCNC: 141 MMOL/L
WBC # BLD AUTO: 6.07 K/UL

## 2018-05-01 PROCEDURE — 99999 PR PBB SHADOW E&M-EST. PATIENT-LVL III: CPT | Mod: PBBFAC,,, | Performed by: PHYSICIAN ASSISTANT

## 2018-05-01 PROCEDURE — 3078F DIAST BP <80 MM HG: CPT | Mod: CPTII,S$GLB,, | Performed by: PHYSICIAN ASSISTANT

## 2018-05-01 PROCEDURE — 85025 COMPLETE CBC W/AUTO DIFF WBC: CPT

## 2018-05-01 PROCEDURE — 80053 COMPREHEN METABOLIC PANEL: CPT

## 2018-05-01 PROCEDURE — 99215 OFFICE O/P EST HI 40 MIN: CPT | Mod: S$GLB,,, | Performed by: PHYSICIAN ASSISTANT

## 2018-05-01 PROCEDURE — 3074F SYST BP LT 130 MM HG: CPT | Mod: CPTII,S$GLB,, | Performed by: PHYSICIAN ASSISTANT

## 2018-05-01 PROCEDURE — 3008F BODY MASS INDEX DOCD: CPT | Mod: CPTII,S$GLB,, | Performed by: PHYSICIAN ASSISTANT

## 2018-05-01 PROCEDURE — 36415 COLL VENOUS BLD VENIPUNCTURE: CPT

## 2018-05-01 PROCEDURE — 82306 VITAMIN D 25 HYDROXY: CPT

## 2018-05-01 RX ORDER — GABAPENTIN 400 MG/1
800 CAPSULE ORAL NIGHTLY
Qty: 180 CAPSULE | Refills: 1 | Status: SHIPPED | OUTPATIENT
Start: 2018-05-01 | End: 2019-07-08 | Stop reason: SDUPTHER

## 2018-05-01 RX ORDER — GABAPENTIN 600 MG/1
600 TABLET ORAL 3 TIMES DAILY
Qty: 270 TABLET | Refills: 1 | Status: SHIPPED | OUTPATIENT
Start: 2018-05-01 | End: 2019-02-21 | Stop reason: SDUPTHER

## 2018-05-01 RX ORDER — DIAZEPAM 5 MG/1
TABLET ORAL
Qty: 2 TABLET | Refills: 0 | Status: SHIPPED | OUTPATIENT
Start: 2018-05-01 | End: 2018-07-26

## 2018-05-01 SDOH — SOCIAL DETERMINANTS OF HEALTH (SDOH): DISSAPEARANCE AND DEATH OF FAMILY MEMBER: Z63.4

## 2018-05-01 NOTE — Clinical Note
Hi Dr. Collier, Patient is requesting an increase in her Lexapro if possible. She has recently lost her son and is currently in weekly counseling, but feels that she might benefit from an increase. She sees you on the 21st of this month, but I thought I would pass along the message. I do feel she could benefit from this. She is open to seeing you sooner as well. Thanks, Makayla Swift PA-C [facility]

## 2018-05-01 NOTE — PROGRESS NOTES
"Subjective:       Patient ID: Zoey Cali is a 52 y.o. female who presents today for a routine clinic visit for MS.      MS HPI:  · DMT: Avonex(missed 3 injections since last visit)  · Side effects from DMT? No  · Taking vitamin D3 as recommended? Yes - Dose:   · Requests refills today on Avonex, gabapentin  Dr. Mejia---treating iron deficiency anemia-infusion tomorrow  Patients son passed away in January  She feels her MS has been "great"      SOCIAL HISTORY  Social History   Substance Use Topics    Smoking status: Current Every Day Smoker     Packs/day: 1.00     Types: Cigarettes    Smokeless tobacco: Never Used    Alcohol use No     Living arrangements - the patient lives with their family.  Employment N/A    MS ROS:  · Fatigue: Yes- feels that iron infusion will help with fatigue   · Sleep Disturbance: Yes   · Bladder Dysfunction: No  · Bowel Dysfunction: No  · Spasticity: Yes - takes tizanidine QID, and Baclofen BID  · Visual Symptoms: Yes - History of ON, see Dr. Hernandez  · Cognitive: No  · Mood Disorder: Yes - Taking Lexapro--20mg-feels she might do better to increase again--she is going to weekly counseling now  · Gait Disturbance: No  · Falls: as above  · Hand Dysfunction: No  · Pain: Yes - NP in feet, improved; Taking gabapentin 600mg TID and 800mg HS and Tegretol 100mg TWICE DAILY(has missed last two doses-needs to  from pharmacy); on [place] PRN prescribed by Dr. Costello--occasional  · Sexual Dysfunction: Not Assessed  · Skin Breakdown: No  · Tremors: No  · Dysphagia: No  · Dysarthria: No  · Heat sensitivity: Yes--fatigue  · Any un-met adaptive needs? Yes - cooling device  · Copay Assist? Getting free drug through [employer]        Objective:        1. 25 foot timed walk:5.4s today  Timed 25 Foot Walk: 8/16/2016 5/2/2017   Did patient wear an AFO? No No   Was assistive device used? No No   Time for 25 Foot Walk (seconds) 5.2 4.4     Neurologic Exam     Mental Status   Oriented to person, " place, and time.   Follows 3 step commands.   Speech: speech is normal   Level of consciousness: alert  Normal comprehension.     Cranial Nerves     CN II   Visual acuity: (20/25 OS; 20/25 OD corrected with Snellen hand held chart at 6 ft)    CN III, IV, VI   Pupils are equal, round, and reactive to light.  Extraocular motions are normal.     CN V   Facial sensation intact.     CN VII   Facial expression full, symmetric.     CN VIII   Hearing: intact (finger rub)    CN IX, X   Palate: symmetric    CN XI   CN XI normal.     CN XII   Tongue deviation: none    Motor Exam   Muscle bulk: normal  Overall muscle tone: normal    Strength   Right deltoid: 5/5  Left deltoid: 5/5  Right triceps: 5/5  Left triceps: 5/5  Right wrist extension: 5/5  Left wrist extension: 5/5  Right interossei: 5/5  Left interossei: 5/5  Right iliopsoas: 5/5  Left iliopsoas: 5/5  Right hamstrin/5  Left hamstrin/5  Right anterior tibial: 5/5  Left anterior tibial: 5/5  Right peroneal: 5/5  Left peroneal: 5/5Increased muscle tightness noted traps bilaterally moreso upper and middle traps     Sensory Exam   Light touch normal.   Right arm vibration: normal  Left arm vibration: normal  Right leg vibration: normal  Left leg vibration: normal    Gait, Coordination, and Reflexes     Gait  Gait: normal    Coordination   Finger to nose coordination: normal  Tandem walking coordination: abnormal (loss of balance noted)    Tremor   Resting tremor: absent  Action tremor: absent    Reflexes   Right brachioradialis: 2+  Left brachioradialis: 2+  Right biceps: 2+  Left biceps: 2+  Right triceps: 2+  Left triceps: 2+  Right patellar: 2+  Left patellar: 2+  Right achilles: 2+  Left achilles: 2+  Right plantar: equivocal  Left plantar: equivocal  Right ankle clonus: absent  Left ankle clonus: absent  Right pendular knee jerk: absent  Left pendular knee jerk: absentNormal Heel/toe walk  Normal RSM             Imaging:     Results for orders placed during the  hospital encounter of 02/01/17   MRI Brain W WO Contrast    Impression Scattered white matter lesions consistent with the reported history of multiple sclerosis, not significant change from the prior study of 1/21/2016.  No new or enhancing lesions to indicate ongoing or active demyelination.    ______________________________________     Electronically signed by resident: TANYA PORRAS  Date:     02/01/17  Time:    11:35            As the supervising and teaching physician, I personally reviewed the images and resident's interpretation and I agree with the findings.          Electronically signed by: NADIR RAMIREZ MD  Date:     02/01/17  Time:    17:15      Results for orders placed during the hospital encounter of 11/19/14   MRI Cervical Spine W WO Cont    Impression  Unremarkable motion limited MRI of the cervical spine specifically without evidence for cord signal abnormality to suggest edema or abnormal intrathecal enhancement.    No significant central canal or neural foraminal stenosis.          Electronically signed by: QUIRINO BURRIS DO  Date:     11/19/14  Time:    13:55      Results for orders placed during the hospital encounter of 11/29/14   MRI Thoracic Spine W WO Cont    Impression  Limited study by motion artifact.  Within limits of the study there is a single nonenhancing focus of T2/stir signal hyperintensity and slight cord truncation right aspect thoracic cord centered at the T11/T12 disk configuration most suggestive for   sequela of prior demyelination in light of history and intracranial findings.  level.    No definite abnormal intrathecal enhancement.    No significant disk bulge, central canal or neural foraminal stenosis allowing for patient motion limitation.      Electronically signed by: QUIRINO BURRIS DO  Date:     12/01/14  Time:    08:54          Labs:     Lab Results   Component Value Date    QJJASCEX25OH 54 11/17/2017    CFZMDLZQ15VW 48 11/11/2016    UEBKTRRO17ZF 93 01/26/2016     No  results found for: JCVINDEX, JCVANTIBODY  No results found for: UU1JUXZY, ABSOLUTECD3, LR1NCPNX, ABSOLUTECD8, OB3HKIMC, ABSOLUTECD4, LABCD48  Lab Results   Component Value Date    WBC 6.07 05/01/2018    RBC 4.88 05/01/2018    HGB 14.9 05/01/2018    HCT 42.0 05/01/2018    MCV 86 05/01/2018    MCH 30.5 05/01/2018    MCHC 35.5 05/01/2018    RDW 13.8 05/01/2018     05/01/2018    MPV 8.8 (L) 05/01/2018    GRAN 3.5 05/01/2018    GRAN 57.1 05/01/2018    LYMPH 2.0 05/01/2018    LYMPH 32.5 05/01/2018    MONO 0.4 05/01/2018    MONO 6.9 05/01/2018    EOS 0.2 05/01/2018    BASO 0.02 05/01/2018    EOSINOPHIL 3.0 05/01/2018    BASOPHIL 0.3 05/01/2018     Sodium   Date Value Ref Range Status   05/01/2018 141 136 - 145 mmol/L Final     Potassium   Date Value Ref Range Status   05/01/2018 4.4 3.5 - 5.1 mmol/L Final     Chloride   Date Value Ref Range Status   05/01/2018 102 95 - 110 mmol/L Final     CO2   Date Value Ref Range Status   05/01/2018 31 (H) 23 - 29 mmol/L Final     Glucose   Date Value Ref Range Status   05/01/2018 89 70 - 110 mg/dL Final     BUN, Bld   Date Value Ref Range Status   05/01/2018 10 6 - 20 mg/dL Final     Creatinine   Date Value Ref Range Status   05/01/2018 0.8 0.5 - 1.4 mg/dL Final     Calcium   Date Value Ref Range Status   05/01/2018 10.0 8.7 - 10.5 mg/dL Final     Total Protein   Date Value Ref Range Status   05/01/2018 7.3 6.0 - 8.4 g/dL Final     Albumin   Date Value Ref Range Status   05/01/2018 4.0 3.5 - 5.2 g/dL Final     Total Bilirubin   Date Value Ref Range Status   05/01/2018 0.5 0.1 - 1.0 mg/dL Final     Comment:     For infants and newborns, interpretation of results should be based  on gestational age, weight and in agreement with clinical  observations.  Premature Infant recommended reference ranges:  Up to 24 hours.............<8.0 mg/dL  Up to 48 hours............<12.0 mg/dL  3-5 days..................<15.0 mg/dL  6-29 days.................<15.0 mg/dL       Alkaline Phosphatase    Date Value Ref Range Status   05/01/2018 136 (H) 55 - 135 U/L Final     AST   Date Value Ref Range Status   05/01/2018 17 10 - 40 U/L Final     ALT   Date Value Ref Range Status   05/01/2018 16 10 - 44 U/L Final     Anion Gap   Date Value Ref Range Status   05/01/2018 8 8 - 16 mmol/L Final     eGFR if    Date Value Ref Range Status   05/01/2018 >60 >60 mL/min/1.73 m^2 Final     eGFR if non    Date Value Ref Range Status   05/01/2018 >60 >60 mL/min/1.73 m^2 Final     Comment:     Calculation used to obtain the estimated glomerular filtration  rate (eGFR) is the CKD-EPI equation.          Diagnosis/Assessment/Plan:    1. Multiple Sclerosis  · Assessment: She appears clinically stable today.   · Imaging: She missed her annual MRI in February after her son's death-will reschedule-sent Valium to pharmacy  · Disease Modifying Therapies: Continue Avonex. Will check safety labs today along with Vit D3.     2. MS Symptom Assessment / Management  · Mood Disorder: encouraged continued counseling and suggested specific grief counseling. Message sent to PCP regarding patient request to increase Lexapro at this time  · Pain: refilled Gabapentin(both doses) today.    Over 50% of this 40 minute visit was spent in direct face to face counseling of the patient about MS, DMT considerations, and MS symptom management.   Follow-up in about 3 months (around 8/1/2018) for follow up with Dr. Mendez.  Patient agreed to POC today.    Attending, Dr. Mendez, was available during today's encounter. Any change to plan along with cosign to appear in the EMR.     Makayla Swift PA-C  [facility]        Multiple sclerosis  -     CBC auto differential; Future; Expected date: 05/01/2018  -     Comprehensive metabolic panel; Future  -     Vitamin D; Future  -     gabapentin (NEURONTIN) 600 MG tablet; Take 1 tablet (600 mg total) by mouth 3 (three) times daily.  Dispense: 270 tablet; Refill: 1  -     gabapentin  (NEURONTIN) 400 MG capsule; Take 2 capsules (800 mg total) by mouth every evening.  Dispense: 180 capsule; Refill: 1  -     diazePAM (VALIUM) 5 MG tablet; Take 1 to 2 tabs po 2 hours prior to MRI  Dispense: 2 tablet; Refill: 0    Counseling regarding goals of care    Encounter for long-term (current) use of high-risk medication  -     CBC auto differential; Future; Expected date: 05/01/2018  -     Comprehensive metabolic panel; Future    Vitamin D insufficiency  -     Vitamin D; Future    Neuropathic pain of foot, left  -     gabapentin (NEURONTIN) 600 MG tablet; Take 1 tablet (600 mg total) by mouth 3 (three) times daily.  Dispense: 270 tablet; Refill: 1  -     gabapentin (NEURONTIN) 400 MG capsule; Take 2 capsules (800 mg total) by mouth every evening.  Dispense: 180 capsule; Refill: 1    Neuropathic pain of foot, right  -     gabapentin (NEURONTIN) 600 MG tablet; Take 1 tablet (600 mg total) by mouth 3 (three) times daily.  Dispense: 270 tablet; Refill: 1  -     gabapentin (NEURONTIN) 400 MG capsule; Take 2 capsules (800 mg total) by mouth every evening.  Dispense: 180 capsule; Refill: 1    MS (multiple sclerosis)  -     gabapentin (NEURONTIN) 400 MG capsule; Take 2 capsules (800 mg total) by mouth every evening.  Dispense: 180 capsule; Refill: 1    Claustrophobia  -     diazePAM (VALIUM) 5 MG tablet; Take 1 to 2 tabs po 2 hours prior to MRI  Dispense: 2 tablet; Refill: 0

## 2018-05-02 ENCOUNTER — INFUSION (OUTPATIENT)
Dept: INFUSION THERAPY | Facility: HOSPITAL | Age: 53
End: 2018-05-02
Attending: INTERNAL MEDICINE
Payer: MEDICARE

## 2018-05-02 VITALS
DIASTOLIC BLOOD PRESSURE: 59 MMHG | RESPIRATION RATE: 17 BRPM | TEMPERATURE: 97 F | HEART RATE: 55 BPM | OXYGEN SATURATION: 99 % | SYSTOLIC BLOOD PRESSURE: 117 MMHG

## 2018-05-02 DIAGNOSIS — D50.9 IRON DEFICIENCY ANEMIA, UNSPECIFIED IRON DEFICIENCY ANEMIA TYPE: Primary | ICD-10-CM

## 2018-05-02 DIAGNOSIS — F32.A DEPRESSION, UNSPECIFIED DEPRESSION TYPE: ICD-10-CM

## 2018-05-02 LAB — 25(OH)D3+25(OH)D2 SERPL-MCNC: 59 NG/ML

## 2018-05-02 PROCEDURE — 63600175 PHARM REV CODE 636 W HCPCS: Performed by: INTERNAL MEDICINE

## 2018-05-02 PROCEDURE — 96374 THER/PROPH/DIAG INJ IV PUSH: CPT

## 2018-05-02 RX ORDER — ARIPIPRAZOLE 2 MG/1
2 TABLET ORAL DAILY
Qty: 30 TABLET | Refills: 11 | Status: SHIPPED | OUTPATIENT
Start: 2018-05-02 | End: 2018-10-17

## 2018-05-02 RX ADMIN — IRON SUCROSE 200 MG: 20 INJECTION, SOLUTION INTRAVENOUS at 12:05

## 2018-05-02 NOTE — PLAN OF CARE
Problem: Patient Care Overview  Goal: Plan of Care Review  Outcome: Ongoing (interventions implemented as appropriate)  Pt came in today for venofer infusion. Pt tolerated well. Discharge reviewed and pt verbalized understanding. Pt walked in and out of unit alone with a cane.

## 2018-05-09 ENCOUNTER — INFUSION (OUTPATIENT)
Dept: INFUSION THERAPY | Facility: HOSPITAL | Age: 53
End: 2018-05-09
Attending: INTERNAL MEDICINE
Payer: MEDICARE

## 2018-05-09 DIAGNOSIS — D50.9 IRON DEFICIENCY ANEMIA, UNSPECIFIED IRON DEFICIENCY ANEMIA TYPE: Primary | ICD-10-CM

## 2018-05-09 PROCEDURE — 96374 THER/PROPH/DIAG INJ IV PUSH: CPT

## 2018-05-09 PROCEDURE — 63600175 PHARM REV CODE 636 W HCPCS: Performed by: INTERNAL MEDICINE

## 2018-05-09 RX ADMIN — IRON SUCROSE 200 MG: 20 INJECTION, SOLUTION INTRAVENOUS at 12:05

## 2018-05-09 NOTE — PLAN OF CARE
Problem: Patient Care Overview  Goal: Plan of Care Review  Outcome: Ongoing (interventions implemented as appropriate)  Patient received Venofer IVP. Tolerated well. VSS. Received discharge instructions and verbalized understanding.

## 2018-05-11 ENCOUNTER — HOSPITAL ENCOUNTER (OUTPATIENT)
Dept: RADIOLOGY | Facility: HOSPITAL | Age: 53
Discharge: HOME | End: 2018-05-11
Attending: PHYSICIAN ASSISTANT
Payer: MEDICARE

## 2018-05-11 DIAGNOSIS — G35 MULTIPLE SCLEROSIS: ICD-10-CM

## 2018-05-11 PROCEDURE — 70553 MRI BRAIN STEM W/O & W/DYE: CPT | Mod: TC

## 2018-05-11 PROCEDURE — 25500020 PHARM REV CODE 255: Performed by: PHYSICIAN ASSISTANT

## 2018-05-11 PROCEDURE — A9585 GADOBUTROL INJECTION: HCPCS | Performed by: PHYSICIAN ASSISTANT

## 2018-05-11 PROCEDURE — 70553 MRI BRAIN STEM W/O & W/DYE: CPT | Mod: 26,,, | Performed by: RADIOLOGY

## 2018-05-11 RX ORDER — GADOBUTROL 604.72 MG/ML
6 INJECTION INTRAVENOUS
Status: DISCONTINUED | OUTPATIENT
Start: 2018-05-11 | End: 2018-05-12 | Stop reason: HOSPADM

## 2018-05-11 RX ORDER — GADOBUTROL 604.72 MG/ML
6 INJECTION INTRAVENOUS
Status: COMPLETED | OUTPATIENT
Start: 2018-05-11 | End: 2018-05-11

## 2018-05-11 RX ADMIN — GADOBUTROL 6 ML: 604.72 INJECTION INTRAVENOUS at 11:05

## 2018-05-16 ENCOUNTER — INFUSION (OUTPATIENT)
Dept: INFUSION THERAPY | Facility: HOSPITAL | Age: 53
End: 2018-05-16
Attending: INTERNAL MEDICINE
Payer: MEDICARE

## 2018-05-16 VITALS — HEART RATE: 62 BPM | SYSTOLIC BLOOD PRESSURE: 111 MMHG | DIASTOLIC BLOOD PRESSURE: 57 MMHG | RESPIRATION RATE: 17 BRPM

## 2018-05-16 DIAGNOSIS — D50.9 IRON DEFICIENCY ANEMIA, UNSPECIFIED IRON DEFICIENCY ANEMIA TYPE: Primary | ICD-10-CM

## 2018-05-16 PROCEDURE — 96374 THER/PROPH/DIAG INJ IV PUSH: CPT

## 2018-05-16 PROCEDURE — 63600175 PHARM REV CODE 636 W HCPCS: Performed by: INTERNAL MEDICINE

## 2018-05-16 RX ADMIN — IRON SUCROSE 200 MG: 20 INJECTION, SOLUTION INTRAVENOUS at 01:05

## 2018-05-21 ENCOUNTER — OFFICE VISIT (OUTPATIENT)
Dept: FAMILY MEDICINE | Facility: CLINIC | Age: 53
End: 2018-05-21
Payer: MEDICARE

## 2018-05-21 VITALS
DIASTOLIC BLOOD PRESSURE: 50 MMHG | OXYGEN SATURATION: 96 % | WEIGHT: 138.25 LBS | BODY MASS INDEX: 27.14 KG/M2 | HEART RATE: 61 BPM | HEIGHT: 60 IN | SYSTOLIC BLOOD PRESSURE: 80 MMHG | TEMPERATURE: 99 F

## 2018-05-21 DIAGNOSIS — R25.2 SPASTICITY: ICD-10-CM

## 2018-05-21 DIAGNOSIS — F32.A DEPRESSION, UNSPECIFIED DEPRESSION TYPE: ICD-10-CM

## 2018-05-21 DIAGNOSIS — F11.90 CHRONIC, CONTINUOUS USE OF OPIOIDS: Primary | ICD-10-CM

## 2018-05-21 DIAGNOSIS — E78.5 HYPERLIPIDEMIA, UNSPECIFIED HYPERLIPIDEMIA TYPE: ICD-10-CM

## 2018-05-21 DIAGNOSIS — I10 ESSENTIAL HYPERTENSION: ICD-10-CM

## 2018-05-21 PROCEDURE — 3074F SYST BP LT 130 MM HG: CPT | Mod: CPTII,S$GLB,, | Performed by: FAMILY MEDICINE

## 2018-05-21 PROCEDURE — 3008F BODY MASS INDEX DOCD: CPT | Mod: CPTII,S$GLB,, | Performed by: FAMILY MEDICINE

## 2018-05-21 PROCEDURE — 99215 OFFICE O/P EST HI 40 MIN: CPT | Mod: S$GLB,,, | Performed by: FAMILY MEDICINE

## 2018-05-21 PROCEDURE — 99999 PR PBB SHADOW E&M-EST. PATIENT-LVL III: CPT | Mod: PBBFAC,,, | Performed by: FAMILY MEDICINE

## 2018-05-21 PROCEDURE — 3078F DIAST BP <80 MM HG: CPT | Mod: CPTII,S$GLB,, | Performed by: FAMILY MEDICINE

## 2018-05-21 RX ORDER — TRIAMTERENE/HYDROCHLOROTHIAZID 37.5-25 MG
1 TABLET ORAL DAILY
Qty: 90 TABLET | Refills: 3 | Status: SHIPPED | OUTPATIENT
Start: 2018-05-21 | End: 2019-07-08 | Stop reason: SDUPTHER

## 2018-05-21 RX ORDER — HYDROCODONE BITARTRATE AND ACETAMINOPHEN 10; 325 MG/1; MG/1
1 TABLET ORAL EVERY 6 HOURS PRN
Qty: 120 TABLET | Refills: 0 | Status: SHIPPED | OUTPATIENT
Start: 2018-05-21 | End: 2018-07-30 | Stop reason: SDUPTHER

## 2018-05-21 RX ORDER — LORAZEPAM 2 MG/1
TABLET ORAL
Qty: 30 TABLET | Refills: 0 | Status: SHIPPED | OUTPATIENT
Start: 2018-05-21 | End: 2018-06-20 | Stop reason: SDUPTHER

## 2018-05-21 RX ORDER — LORAZEPAM 1 MG/1
TABLET ORAL
Qty: 30 TABLET | Refills: 0 | Status: SHIPPED | OUTPATIENT
Start: 2018-05-21 | End: 2018-05-21 | Stop reason: SDUPTHER

## 2018-05-21 RX ORDER — SIMVASTATIN 40 MG/1
40 TABLET, FILM COATED ORAL NIGHTLY
Qty: 90 TABLET | Refills: 3 | Status: SHIPPED | OUTPATIENT
Start: 2018-05-21 | End: 2019-02-05 | Stop reason: SDUPTHER

## 2018-05-21 RX ORDER — METOPROLOL SUCCINATE 100 MG/1
100 TABLET, EXTENDED RELEASE ORAL DAILY
Qty: 90 TABLET | Refills: 3 | Status: SHIPPED | OUTPATIENT
Start: 2018-05-21 | End: 2019-02-05 | Stop reason: SDUPTHER

## 2018-05-21 RX ORDER — FLUTICASONE PROPIONATE 50 MCG
SPRAY, SUSPENSION (ML) NASAL
Qty: 16 G | Refills: 3 | Status: SHIPPED | OUTPATIENT
Start: 2018-05-21 | End: 2019-09-26

## 2018-05-21 NOTE — PROGRESS NOTES
Chief Complaint   Patient presents with    Medication Refill       SUBJECTIVE:  Zoey Cali is a 52 y.o. female here for new problem of hardship with the chronic pain and she is dealing with her sons death and she is struggling, didn't start the abilify.  Currently has co-morbidities including per problem list.      Past Medical History:   Diagnosis Date    Allergy     Anemia     Anxiety     Chronic kidney disease     Depression     Hypertension     Multiple sclerosis     Multiple sclerosis     Neuromuscular disorder     Osteoporosis     Rib fracture 2015     Past Surgical History:   Procedure Laterality Date    APPENDECTOMY       SECTION, CLASSIC      CHOLECYSTECTOMY      EYE SURGERY      HYSTERECTOMY       Social History     Social History    Marital status:      Spouse name: N/A    Number of children: N/A    Years of education: N/A     Occupational History    Not on file.     Social History Main Topics    Smoking status: Current Every Day Smoker     Packs/day: 1.00     Types: Cigarettes    Smokeless tobacco: Never Used    Alcohol use No    Drug use: No    Sexual activity: Yes     Other Topics Concern    Not on file     Social History Narrative    No narrative on file     Family History   Problem Relation Age of Onset    Arthritis Mother     Diabetes Father     Heart disease Father     Hyperlipidemia Father     Hypertension Father     Stroke Father     Alcohol abuse Son     Arthritis Son     Drug abuse Son     Alcohol abuse Maternal Aunt     Arthritis Maternal Aunt     Alcohol abuse Maternal Uncle     Drug abuse Maternal Uncle     Hypertension Maternal Uncle     Alcohol abuse Paternal Aunt     Heart disease Paternal Aunt     Hearing loss Paternal Aunt     Hypertension Paternal Aunt     Alcohol abuse Paternal Uncle     Heart disease Paternal Uncle     Alcohol abuse Maternal Grandmother     Heart disease Maternal Grandmother     Stroke  Maternal Grandmother     Alcohol abuse Maternal Grandfather     Heart disease Maternal Grandfather     Alcohol abuse Paternal Grandmother     Cancer Paternal Grandmother     Heart disease Paternal Grandmother     Hypertension Paternal Grandmother     Stroke Paternal Grandmother     Alcohol abuse Paternal Grandfather     Heart disease Paternal Grandfather      Current Outpatient Prescriptions on File Prior to Visit   Medication Sig Dispense Refill    carBAMazepine (TEGRETOL XR) 200 MG 12 hr tablet Take 1 tablet (200 mg total) by mouth 2 (two) times daily. 60 tablet 5    cetirizine (ZYRTEC) 10 MG tablet Take 1 tablet (10 mg total) by mouth once daily. 30 tablet 0    cholecalciferol, vitamin D3, (VITAMIN D3) 5,000 unit Tab Take 5,000 Units by mouth once daily.      escitalopram oxalate (LEXAPRO) 20 MG tablet Take 1 tablet (20 mg total) by mouth once daily. 90 tablet 3    gabapentin (NEURONTIN) 400 MG capsule Take 2 capsules (800 mg total) by mouth every evening. 180 capsule 1    gabapentin (NEURONTIN) 600 MG tablet Take 1 tablet (600 mg total) by mouth 3 (three) times daily. 270 tablet 1    ibuprofen (ADVIL,MOTRIN) 800 MG tablet TAKE ONE TABLET BY MOUTH EVERY 8 HOURS WITH FOOD AND WATER  2    interferon beta-1a (AVONEX) 30 mcg/0.5 mL PnKt Inject 30 mcg into the muscle once a week. 1 kit 0    nystatin-triamcinolone (MYCOLOG II) cream Apply to affected area 2 times daily 30 g 1    omeprazole (PRILOSEC) 40 MG capsule Take 1 capsule (40 mg total) by mouth every evening. 90 capsule 3    ondansetron (ZOFRAN-ODT) 4 MG TbDL Take 1 tablet (4 mg total) by mouth every 12 (twelve) hours as needed. 20 tablet 2    tizanidine (ZANAFLEX) 4 MG tablet TAKE 2 TABLETS IN THE MORNING, AT NOON AND IN THE EVENING AND TAKE 3 TABLETS AT NIGHT (9 TABLETS PER DAY) 810 tablet 3    [DISCONTINUED] fluticasone (FLONASE) 50 mcg/actuation nasal spray ONE sprays(s) each nostril TWICE DAILY per instructed  0    [DISCONTINUED]  hydrocodone-acetaminophen 10-325mg (NORCO)  mg Tab Take 1 tablet by mouth every 6 (six) hours as needed for Pain. 120 tablet 0    [DISCONTINUED] LORazepam (ATIVAN) 1 MG tablet TAKE ONE TABLET BY MOUTH NIGHTLY AS NEEDED ** NO ALCOHOL ** *MAY CAUSE DROWSINESS* 30 tablet 0    [DISCONTINUED] metoprolol succinate (TOPROL-XL) 100 MG 24 hr tablet Take 1 tablet (100 mg total) by mouth once daily. 90 tablet 3    [DISCONTINUED] simvastatin (ZOCOR) 40 MG tablet Take 1 tablet (40 mg total) by mouth every evening. 90 tablet 3    [DISCONTINUED] triamterene-hydrochlorothiazide 37.5-25 mg (MAXZIDE-25) 37.5-25 mg per tablet Take 1 tablet by mouth once daily. 90 tablet 3    ARIPiprazole (ABILIFY) 2 MG Tab Take 1 tablet (2 mg total) by mouth once daily. 30 tablet 11    diazePAM (VALIUM) 5 MG tablet Take 1 to 2 tabs po 2 hours prior to MRI 2 tablet 0     No current facility-administered medications on file prior to visit.      Review of patient's allergies indicates:   Allergen Reactions    Lomotil [diphenoxylate-atropine]      Causes stomach spasms    Dilaudid [hydromorphone (bulk)] Itching         Review of Systems   Constitutional: Positive for malaise/fatigue. Negative for chills, diaphoresis, fever and weight loss.   HENT: Negative.    Eyes: Negative.    Respiratory: Negative.    Cardiovascular: Negative.    Gastrointestinal: Negative.    Genitourinary: Negative.    Musculoskeletal: Positive for back pain and myalgias.   Skin: Negative.    Neurological: Positive for sensory change, focal weakness and headaches. Negative for weakness.   Endo/Heme/Allergies: Negative.    Psychiatric/Behavioral: Negative.        OBJECTIVE:  BP (!) 80/50   Pulse 61   Temp 98.5 °F (36.9 °C) (Oral)   Ht 5' (1.524 m)   Wt 62.7 kg (138 lb 3.7 oz)   SpO2 96%   BMI 27.00 kg/m²     Wt Readings from Last 3 Encounters:   05/21/18 62.7 kg (138 lb 3.7 oz)   05/01/18 61.2 kg (135 lb)   04/27/18 63.1 kg (139 lb 1.8 oz)     BP Readings from Last  3 Encounters:   05/21/18 (!) 80/50   05/16/18 (!) 111/57   05/02/18 (!) 117/59       She appears well, in no apparent distress.  Alert and oriented times three, pleasant and cooperative. Vital signs are as documented in vital signs section.      Review of old Records:  Reviewed per [facility]    Review of old labs:  No results found for: TSH  Lab Results   Component Value Date    WBC 6.07 05/01/2018    HGB 14.9 05/01/2018    HCT 42.0 05/01/2018    MCV 86 05/01/2018     05/01/2018       Chemistry        Component Value Date/Time     05/01/2018 1511    K 4.4 05/01/2018 1511     05/01/2018 1511    CO2 31 (H) 05/01/2018 1511    BUN 10 05/01/2018 1511    CREATININE 0.8 05/01/2018 1511    GLU 89 05/01/2018 1511        Component Value Date/Time    CALCIUM 10.0 05/01/2018 1511    ALKPHOS 136 (H) 05/01/2018 1511    AST 17 05/01/2018 1511    ALT 16 05/01/2018 1511    BILITOT 0.5 05/01/2018 1511    ESTGFRAFRICA >60 05/01/2018 1511    EGFRNONAA >60 05/01/2018 1511        Lab Results   Component Value Date    CHOL 110 (L) 06/22/2015    CHOL 144 05/18/2015    CHOL 174 12/22/2014     Lab Results   Component Value Date    HDL 22 (L) 06/22/2015    HDL 24 (L) 05/18/2015    HDL 19 (L) 12/22/2014     Lab Results   Component Value Date    LDLCALC 47.0 (L) 06/22/2015    LDLCALC Invalid, Trig>400.0 05/18/2015    LDLCALC Invalid, Trig>400.0 12/22/2014     Lab Results   Component Value Date    TRIG 205 (H) 06/22/2015    TRIG 598 (H) 05/18/2015    TRIG 564 (H) 12/22/2014     Lab Results   Component Value Date    CHOLHDL 20.0 06/22/2015    CHOLHDL 16.7 (L) 05/18/2015    CHOLHDL 10.9 (L) 12/22/2014         Review of old imaging:  n/a    ASSESSMENT:  Problem List Items Addressed This Visit     HTN (hypertension)    Relevant Medications    metoprolol succinate (TOPROL-XL) 100 MG 24 hr tablet    triamterene-hydrochlorothiazide 37.5-25 mg (MAXZIDE-25) 37.5-25 mg per tablet    Chronic, continuous use of opioids - Primary    Relevant  Medications    HYDROcodone-acetaminophen (NORCO)  mg per tablet      Other Visit Diagnoses     Spasticity        Relevant Medications    LORazepam (ATIVAN) 2 MG Tab    Depression, unspecified depression type        Relevant Medications    LORazepam (ATIVAN) 2 MG Tab    Hyperlipidemia, unspecified hyperlipidemia type        Relevant Medications    simvastatin (ZOCOR) 40 MG tablet          ICD-10-CM ICD-9-CM   1. Chronic, continuous use of opioids F11.90 305.51   2. Spasticity R25.2 781.0   3. Depression, unspecified depression type F32.9 311   4. Essential hypertension I10 401.9   5. Hyperlipidemia, unspecified hyperlipidemia type E78.5 272.4         PLAN:  1. Chronic, continuous use of opioids  Potential medication side effects were discussed with the patient; let me know if any occur.  The current medical regimen is effective;  continue present plan and medications.    - HYDROcodone-acetaminophen (NORCO)  mg per tablet; Take 1 tablet by mouth every 6 (six) hours as needed for Pain.  Dispense: 120 tablet; Refill: 0    2. Spasticity  The current medical regimen is effective;  continue present plan and medications.    - LORazepam (ATIVAN) 2 MG Tab; TAKE ONE TABLET BY MOUTH NIGHTLY AS NEEDED ** NO ALCOHOL ** *MAY CAUSE DROWSINESS*  Dispense: 30 tablet; Refill: 0    3. Depression, unspecified depression type    - LORazepam (ATIVAN) 2 MG Tab; TAKE ONE TABLET BY MOUTH NIGHTLY AS NEEDED ** NO ALCOHOL ** *MAY CAUSE DROWSINESS*  Dispense: 30 tablet; Refill: 0    4. Essential hypertension    - metoprolol succinate (TOPROL-XL) 100 MG 24 hr tablet; Take 1 tablet (100 mg total) by mouth once daily.  Dispense: 90 tablet; Refill: 3  - triamterene-hydrochlorothiazide 37.5-25 mg (MAXZIDE-25) 37.5-25 mg per tablet; Take 1 tablet by mouth once daily.  Dispense: 90 tablet; Refill: 3    5. Hyperlipidemia, unspecified hyperlipidemia type    - simvastatin (ZOCOR) 40 MG tablet; Take 1 tablet (40 mg total) by mouth every  evening.  Dispense: 90 tablet; Refill: 3    Start abilify  Medication List with Changes/Refills   Current Medications    ARIPIPRAZOLE (ABILIFY) 2 MG TAB    Take 1 tablet (2 mg total) by mouth once daily.    CARBAMAZEPINE (TEGRETOL XR) 200 MG 12 HR TABLET    Take 1 tablet (200 mg total) by mouth 2 (two) times daily.    CETIRIZINE (ZYRTEC) 10 MG TABLET    Take 1 tablet (10 mg total) by mouth once daily.    CHOLECALCIFEROL, VITAMIN D3, (VITAMIN D3) 5,000 UNIT TAB    Take 5,000 Units by mouth once daily.    DIAZEPAM (VALIUM) 5 MG TABLET    Take 1 to 2 tabs po 2 hours prior to MRI    ESCITALOPRAM OXALATE (LEXAPRO) 20 MG TABLET    Take 1 tablet (20 mg total) by mouth once daily.    GABAPENTIN (NEURONTIN) 400 MG CAPSULE    Take 2 capsules (800 mg total) by mouth every evening.    GABAPENTIN (NEURONTIN) 600 MG TABLET    Take 1 tablet (600 mg total) by mouth 3 (three) times daily.    IBUPROFEN (ADVIL,MOTRIN) 800 MG TABLET    TAKE ONE TABLET BY MOUTH EVERY 8 HOURS WITH FOOD AND WATER    INTERFERON BETA-1A (AVONEX) 30 MCG/0.5 ML PNKT    Inject 30 mcg into the muscle once a week.    NYSTATIN-TRIAMCINOLONE (MYCOLOG II) CREAM    Apply to affected area 2 times daily    OMEPRAZOLE (PRILOSEC) 40 MG CAPSULE    Take 1 capsule (40 mg total) by mouth every evening.    ONDANSETRON (ZOFRAN-ODT) 4 MG TBDL    Take 1 tablet (4 mg total) by mouth every 12 (twelve) hours as needed.    TIZANIDINE (ZANAFLEX) 4 MG TABLET    TAKE 2 TABLETS IN THE MORNING, AT NOON AND IN THE EVENING AND TAKE 3 TABLETS AT NIGHT (9 TABLETS PER DAY)   Changed and/or Refilled Medications    Modified Medication Previous Medication    FLUTICASONE (FLONASE) 50 MCG/ACTUATION NASAL SPRAY fluticasone (FLONASE) 50 mcg/actuation nasal spray       ONE sprays(s) each nostril TWICE DAILY per instructed    ONE sprays(s) each nostril TWICE DAILY per instructed    HYDROCODONE-ACETAMINOPHEN (NORCO)  MG PER TABLET hydrocodone-acetaminophen 10-325mg (NORCO)  mg Tab        Take 1 tablet by mouth every 6 (six) hours as needed for Pain.    Take 1 tablet by mouth every 6 (six) hours as needed for Pain.    LORAZEPAM (ATIVAN) 2 MG TAB LORazepam (ATIVAN) 1 MG tablet       TAKE ONE TABLET BY MOUTH NIGHTLY AS NEEDED ** NO ALCOHOL ** *MAY CAUSE DROWSINESS*    TAKE ONE TABLET BY MOUTH NIGHTLY AS NEEDED ** NO ALCOHOL ** *MAY CAUSE DROWSINESS*    METOPROLOL SUCCINATE (TOPROL-XL) 100 MG 24 HR TABLET metoprolol succinate (TOPROL-XL) 100 MG 24 hr tablet       Take 1 tablet (100 mg total) by mouth once daily.    Take 1 tablet (100 mg total) by mouth once daily.    SIMVASTATIN (ZOCOR) 40 MG TABLET simvastatin (ZOCOR) 40 MG tablet       Take 1 tablet (40 mg total) by mouth every evening.    Take 1 tablet (40 mg total) by mouth every evening.    TRIAMTERENE-HYDROCHLOROTHIAZIDE 37.5-25 MG (MAXZIDE-25) 37.5-25 MG PER TABLET triamterene-hydrochlorothiazide 37.5-25 mg (MAXZIDE-25) 37.5-25 mg per tablet       Take 1 tablet by mouth once daily.    Take 1 tablet by mouth once daily.       Follow-up in about 4 weeks (around 6/18/2018) for assess treatment plan.

## 2018-06-01 ENCOUNTER — PATIENT MESSAGE (OUTPATIENT)
Dept: FAMILY MEDICINE | Facility: CLINIC | Age: 53
End: 2018-06-01

## 2018-06-01 ENCOUNTER — TELEPHONE (OUTPATIENT)
Dept: NEUROLOGY | Facility: CLINIC | Age: 53
End: 2018-06-01

## 2018-06-01 NOTE — TELEPHONE ENCOUNTER
Left VM for patient to give our office a call to reschedule her appointment with Dr. Mendez that was set for 8/20/18.  Provider will be out of the office due to meeting

## 2018-06-13 ENCOUNTER — CLINICAL SUPPORT (OUTPATIENT)
Dept: FAMILY MEDICINE | Facility: CLINIC | Age: 53
End: 2018-06-13
Payer: MEDICARE

## 2018-06-13 DIAGNOSIS — R07.81 RIB PAIN ON RIGHT SIDE: Primary | ICD-10-CM

## 2018-06-13 PROCEDURE — 99214 OFFICE O/P EST MOD 30 MIN: CPT | Mod: 25,S$GLB,, | Performed by: FAMILY MEDICINE

## 2018-06-13 PROCEDURE — 96372 THER/PROPH/DIAG INJ SC/IM: CPT | Mod: S$GLB,,, | Performed by: FAMILY MEDICINE

## 2018-06-13 RX ORDER — OXYCODONE AND ACETAMINOPHEN 5; 325 MG/1; MG/1
1 TABLET ORAL EVERY 4 HOURS PRN
Qty: 15 TABLET | Refills: 0 | Status: SHIPPED | OUTPATIENT
Start: 2018-06-13 | End: 2018-07-26

## 2018-06-13 RX ORDER — KETOROLAC TROMETHAMINE 30 MG/ML
30 INJECTION, SOLUTION INTRAMUSCULAR; INTRAVENOUS
Status: COMPLETED | OUTPATIENT
Start: 2018-06-13 | End: 2018-06-13

## 2018-06-13 RX ADMIN — KETOROLAC TROMETHAMINE 30 MG: 30 INJECTION, SOLUTION INTRAMUSCULAR; INTRAVENOUS at 09:06

## 2018-06-13 NOTE — PROGRESS NOTES
Right rib pain and fall    Fell 3 weeks ago right lower rib pain and severe slowly improving  No fever no SoB.    Right ribs lower part and upper abdomen with guarding, no trauma signs  No crepitus  Good BS on lung exam    Right rib pain with fall and h/o osteoporosis and rib fx/    Get xray and toradol  Send in additional pain medication

## 2018-06-14 ENCOUNTER — HOSPITAL ENCOUNTER (OUTPATIENT)
Dept: RADIOLOGY | Facility: HOSPITAL | Age: 53
Discharge: HOME | End: 2018-06-14
Attending: FAMILY MEDICINE
Payer: MEDICARE

## 2018-06-14 DIAGNOSIS — R07.81 RIB PAIN ON RIGHT SIDE: ICD-10-CM

## 2018-06-14 PROCEDURE — 71100 X-RAY EXAM RIBS UNI 2 VIEWS: CPT | Mod: 26,,, | Performed by: RADIOLOGY

## 2018-06-14 PROCEDURE — 71100 X-RAY EXAM RIBS UNI 2 VIEWS: CPT | Mod: TC,FY

## 2018-06-20 DIAGNOSIS — R25.2 SPASTICITY: ICD-10-CM

## 2018-06-20 DIAGNOSIS — F32.A DEPRESSION, UNSPECIFIED DEPRESSION TYPE: ICD-10-CM

## 2018-06-20 RX ORDER — LORAZEPAM 2 MG/1
TABLET ORAL
Qty: 30 TABLET | Refills: 1 | Status: SHIPPED | OUTPATIENT
Start: 2018-06-20 | End: 2018-08-22 | Stop reason: SDUPTHER

## 2018-06-21 ENCOUNTER — OFFICE VISIT (OUTPATIENT)
Dept: FAMILY MEDICINE | Facility: CLINIC | Age: 53
End: 2018-06-21
Payer: MEDICARE

## 2018-06-21 ENCOUNTER — HOSPITAL ENCOUNTER (OUTPATIENT)
Dept: RADIOLOGY | Facility: HOSPITAL | Age: 53
Discharge: HOME | End: 2018-06-21
Attending: PHYSICIAN ASSISTANT
Payer: MEDICARE

## 2018-06-21 VITALS
BODY MASS INDEX: 27.57 KG/M2 | DIASTOLIC BLOOD PRESSURE: 80 MMHG | OXYGEN SATURATION: 96 % | SYSTOLIC BLOOD PRESSURE: 118 MMHG | RESPIRATION RATE: 17 BRPM | TEMPERATURE: 99 F | HEART RATE: 67 BPM | WEIGHT: 140.44 LBS | HEIGHT: 60 IN

## 2018-06-21 DIAGNOSIS — S22.31XA CLOSED FRACTURE OF ONE RIB OF RIGHT SIDE, INITIAL ENCOUNTER: ICD-10-CM

## 2018-06-21 DIAGNOSIS — F11.90 CHRONIC, CONTINUOUS USE OF OPIOIDS: ICD-10-CM

## 2018-06-21 DIAGNOSIS — Z23 NEED FOR DIPHTHERIA, TETANUS, PERTUSSIS, AND HIB VACCINATION: ICD-10-CM

## 2018-06-21 DIAGNOSIS — S22.31XA CLOSED FRACTURE OF ONE RIB OF RIGHT SIDE, INITIAL ENCOUNTER: Primary | ICD-10-CM

## 2018-06-21 PROCEDURE — 3079F DIAST BP 80-89 MM HG: CPT | Mod: CPTII,S$GLB,, | Performed by: FAMILY MEDICINE

## 2018-06-21 PROCEDURE — 3074F SYST BP LT 130 MM HG: CPT | Mod: CPTII,S$GLB,, | Performed by: FAMILY MEDICINE

## 2018-06-21 PROCEDURE — 99999 PR PBB SHADOW E&M-EST. PATIENT-LVL V: CPT | Mod: PBBFAC,,, | Performed by: FAMILY MEDICINE

## 2018-06-21 PROCEDURE — 71250 CT THORAX DX C-: CPT | Mod: 26,,, | Performed by: RADIOLOGY

## 2018-06-21 PROCEDURE — 99214 OFFICE O/P EST MOD 30 MIN: CPT | Mod: S$GLB,,, | Performed by: FAMILY MEDICINE

## 2018-06-21 PROCEDURE — 71250 CT THORAX DX C-: CPT | Mod: TC

## 2018-06-21 PROCEDURE — 3008F BODY MASS INDEX DOCD: CPT | Mod: CPTII,S$GLB,, | Performed by: FAMILY MEDICINE

## 2018-06-22 ENCOUNTER — TELEPHONE (OUTPATIENT)
Dept: FAMILY MEDICINE | Facility: CLINIC | Age: 53
End: 2018-06-22

## 2018-06-22 DIAGNOSIS — Z12.31 ENCOUNTER FOR SCREENING MAMMOGRAM FOR MALIGNANT NEOPLASM OF BREAST: Primary | ICD-10-CM

## 2018-06-25 ENCOUNTER — TELEPHONE (OUTPATIENT)
Dept: FAMILY MEDICINE | Facility: CLINIC | Age: 53
End: 2018-06-25

## 2018-06-25 NOTE — TELEPHONE ENCOUNTER
----- Message from Devon Collier MD sent at 6/25/2018  9:18 AM CDT -----  Yes to the anoro  ----- Message -----  From: ANTHONY Loving  Sent: 6/22/2018   7:29 AM  To: Devon Collier MD    Will order mammogram. Do you want to put her on anoro?

## 2018-06-25 NOTE — TELEPHONE ENCOUNTER
Patient informed, encouraged to quit smokingShe started mucinex last night she is feeling better.

## 2018-06-25 NOTE — TELEPHONE ENCOUNTER
Let her know her CT scan shows Pulmonary emphysema from smoking. Sending in daily Inhaler for this

## 2018-07-02 NOTE — PROGRESS NOTES
Chief Complaint   Patient presents with    fracture ribs     right side        SUBJECTIVE:  Zoey Cali is a 52 y.o. female here for new problem of fall into a chair and she has fractured her rib with increased pain about 2 weeks but not improving.  Currently has co-morbidities including per problem list.      Past Medical History:   Diagnosis Date    Allergy     Anemia     Anxiety     Chronic kidney disease     Depression     Hypertension     Multiple sclerosis     Multiple sclerosis     Neuromuscular disorder     Osteoporosis     Rib fracture 2015     Past Surgical History:   Procedure Laterality Date    APPENDECTOMY       SECTION, CLASSIC      CHOLECYSTECTOMY      EYE SURGERY      HYSTERECTOMY       Social History     Social History    Marital status:      Spouse name: N/A    Number of children: N/A    Years of education: N/A     Occupational History    Not on file.     Social History Main Topics    Smoking status: Current Every Day Smoker     Packs/day: 1.00     Types: Cigarettes    Smokeless tobacco: Never Used    Alcohol use No    Drug use: No    Sexual activity: Yes     Other Topics Concern    Not on file     Social History Narrative    No narrative on file     Family History   Problem Relation Age of Onset    Arthritis Mother     Diabetes Father     Heart disease Father     Hyperlipidemia Father     Hypertension Father     Stroke Father     Alcohol abuse Son     Arthritis Son     Drug abuse Son     Alcohol abuse Maternal Aunt     Arthritis Maternal Aunt     Alcohol abuse Maternal Uncle     Drug abuse Maternal Uncle     Hypertension Maternal Uncle     Alcohol abuse Paternal Aunt     Heart disease Paternal Aunt     Hearing loss Paternal Aunt     Hypertension Paternal Aunt     Alcohol abuse Paternal Uncle     Heart disease Paternal Uncle     Alcohol abuse Maternal Grandmother     Heart disease Maternal Grandmother     Stroke Maternal  Grandmother     Alcohol abuse Maternal Grandfather     Heart disease Maternal Grandfather     Alcohol abuse Paternal Grandmother     Cancer Paternal Grandmother     Heart disease Paternal Grandmother     Hypertension Paternal Grandmother     Stroke Paternal Grandmother     Alcohol abuse Paternal Grandfather     Heart disease Paternal Grandfather      Current Outpatient Prescriptions on File Prior to Visit   Medication Sig Dispense Refill    ARIPiprazole (ABILIFY) 2 MG Tab Take 1 tablet (2 mg total) by mouth once daily. 30 tablet 11    carBAMazepine (TEGRETOL XR) 200 MG 12 hr tablet Take 1 tablet (200 mg total) by mouth 2 (two) times daily. 60 tablet 5    cetirizine (ZYRTEC) 10 MG tablet Take 1 tablet (10 mg total) by mouth once daily. 30 tablet 0    cholecalciferol, vitamin D3, (VITAMIN D3) 5,000 unit Tab Take 5,000 Units by mouth once daily.      escitalopram oxalate (LEXAPRO) 20 MG tablet Take 1 tablet (20 mg total) by mouth once daily. 90 tablet 3    fluticasone (FLONASE) 50 mcg/actuation nasal spray ONE sprays(s) each nostril TWICE DAILY per instructed 16 g 3    gabapentin (NEURONTIN) 400 MG capsule Take 2 capsules (800 mg total) by mouth every evening. 180 capsule 1    gabapentin (NEURONTIN) 600 MG tablet Take 1 tablet (600 mg total) by mouth 3 (three) times daily. 270 tablet 1    HYDROcodone-acetaminophen (NORCO)  mg per tablet Take 1 tablet by mouth every 6 (six) hours as needed for Pain. 120 tablet 0    ibuprofen (ADVIL,MOTRIN) 800 MG tablet TAKE ONE TABLET BY MOUTH EVERY 8 HOURS WITH FOOD AND WATER  2    interferon beta-1a (AVONEX) 30 mcg/0.5 mL PnKt Inject 30 mcg into the muscle once a week. 1 kit 0    LORazepam (ATIVAN) 2 MG Tab TAKE ONE TABLET BY MOUTH EVERY EVENING AS NEEDED 30 tablet 1    metoprolol succinate (TOPROL-XL) 100 MG 24 hr tablet Take 1 tablet (100 mg total) by mouth once daily. 90 tablet 3    nystatin-triamcinolone (MYCOLOG II) cream Apply to affected area 2  times daily 30 g 1    omeprazole (PRILOSEC) 40 MG capsule Take 1 capsule (40 mg total) by mouth every evening. 90 capsule 3    ondansetron (ZOFRAN-ODT) 4 MG TbDL Take 1 tablet (4 mg total) by mouth every 12 (twelve) hours as needed. 20 tablet 2    simvastatin (ZOCOR) 40 MG tablet Take 1 tablet (40 mg total) by mouth every evening. 90 tablet 3    tizanidine (ZANAFLEX) 4 MG tablet TAKE 2 TABLETS IN THE MORNING, AT NOON AND IN THE EVENING AND TAKE 3 TABLETS AT NIGHT (9 TABLETS PER DAY) 810 tablet 3    triamterene-hydrochlorothiazide 37.5-25 mg (MAXZIDE-25) 37.5-25 mg per tablet Take 1 tablet by mouth once daily. 90 tablet 3    diazePAM (VALIUM) 5 MG tablet Take 1 to 2 tabs po 2 hours prior to MRI 2 tablet 0    oxyCODONE-acetaminophen (PERCOCET) 5-325 mg per tablet Take 1 tablet by mouth every 4 (four) hours as needed for Pain. 15 tablet 0     No current facility-administered medications on file prior to visit.      Review of patient's allergies indicates:   Allergen Reactions    Lomotil [diphenoxylate-atropine]      Causes stomach spasms    Dilaudid [hydromorphone (bulk)] Itching         ROS    OBJECTIVE:  /80 (BP Location: Left arm, Patient Position: Sitting, BP Method: Medium (Manual))   Pulse 67   Temp 98.6 °F (37 °C) (Oral)   Resp 17   Ht 5' (1.524 m)   Wt 63.7 kg (140 lb 6.9 oz)   SpO2 96%   BMI 27.43 kg/m²     Wt Readings from Last 3 Encounters:   06/21/18 63.7 kg (140 lb 6.9 oz)   05/21/18 62.7 kg (138 lb 3.7 oz)   05/01/18 61.2 kg (135 lb)     BP Readings from Last 3 Encounters:   06/21/18 118/80   05/21/18 (!) 80/50   05/16/18 (!) 111/57       She appears well, in no apparent distress.  Alert and oriented times three, pleasant and cooperative. Vital signs are as documented in vital signs section.  In pain when she moves.  S1 and S2 normal, no murmurs, clicks, gallops or rubs. Regular rate and rhythm. Chest is clear; no wheezes or rales. No edema or JVD.  Chest wall pain on the  right    Review of old Records:  Reviewed per     Review of old labs:      Review of old imaging:      ASSESSMENT:  Problem List Items Addressed This Visit     Chronic, continuous use of opioids      Other Visit Diagnoses     Closed fracture of one rib of right side, initial encounter    -  Primary    Relevant Orders    CT Chest Without Contrast (Completed)    Need for diphtheria, tetanus, pertussis, and Hib vaccination              ICD-10-CM ICD-9-CM   1. Closed fracture of one rib of right side, initial encounter S22.31XA 807.01   2. Need for diphtheria, tetanus, pertussis, and Hib vaccination Z23 V06.8   3. Chronic, continuous use of opioids F11.90 305.51         PLAN:  1. Need for diphtheria, tetanus, pertussis, and Hib vaccination      2. Chronic, continuous use of opioids  The current medical regimen is effective;  continue present plan and medications.  Doing well    3. Closed fracture of one rib of right side, initial encounter  Get CT with prior history to see if new or old and any lung abnormality  - CT Chest Without Contrast; Future      Medication List with Changes/Refills   New Medications    UMECLIDINIUM-VILANTEROL 62.5-25 MCG/ACTUATION DSDV    Inhale 1 puff into the lungs once daily. Controller   Current Medications    ARIPIPRAZOLE (ABILIFY) 2 MG TAB    Take 1 tablet (2 mg total) by mouth once daily.    CARBAMAZEPINE (TEGRETOL XR) 200 MG 12 HR TABLET    Take 1 tablet (200 mg total) by mouth 2 (two) times daily.    CETIRIZINE (ZYRTEC) 10 MG TABLET    Take 1 tablet (10 mg total) by mouth once daily.    CHOLECALCIFEROL, VITAMIN D3, (VITAMIN D3) 5,000 UNIT TAB    Take 5,000 Units by mouth once daily.    DIAZEPAM (VALIUM) 5 MG TABLET    Take 1 to 2 tabs po 2 hours prior to MRI    ESCITALOPRAM OXALATE (LEXAPRO) 20 MG TABLET    Take 1 tablet (20 mg total) by mouth once daily.    FLUTICASONE (FLONASE) 50 MCG/ACTUATION NASAL SPRAY    ONE sprays(s) each nostril TWICE DAILY per instructed    GABAPENTIN  (NEURONTIN) 400 MG CAPSULE    Take 2 capsules (800 mg total) by mouth every evening.    GABAPENTIN (NEURONTIN) 600 MG TABLET    Take 1 tablet (600 mg total) by mouth 3 (three) times daily.    HYDROCODONE-ACETAMINOPHEN (NORCO)  MG PER TABLET    Take 1 tablet by mouth every 6 (six) hours as needed for Pain.    IBUPROFEN (ADVIL,MOTRIN) 800 MG TABLET    TAKE ONE TABLET BY MOUTH EVERY 8 HOURS WITH FOOD AND WATER    INTERFERON BETA-1A (AVONEX) 30 MCG/0.5 ML PNKT    Inject 30 mcg into the muscle once a week.    LORAZEPAM (ATIVAN) 2 MG TAB    TAKE ONE TABLET BY MOUTH EVERY EVENING AS NEEDED    METOPROLOL SUCCINATE (TOPROL-XL) 100 MG 24 HR TABLET    Take 1 tablet (100 mg total) by mouth once daily.    NYSTATIN-TRIAMCINOLONE (MYCOLOG II) CREAM    Apply to affected area 2 times daily    OMEPRAZOLE (PRILOSEC) 40 MG CAPSULE    Take 1 capsule (40 mg total) by mouth every evening.    ONDANSETRON (ZOFRAN-ODT) 4 MG TBDL    Take 1 tablet (4 mg total) by mouth every 12 (twelve) hours as needed.    OXYCODONE-ACETAMINOPHEN (PERCOCET) 5-325 MG PER TABLET    Take 1 tablet by mouth every 4 (four) hours as needed for Pain.    SIMVASTATIN (ZOCOR) 40 MG TABLET    Take 1 tablet (40 mg total) by mouth every evening.    TIZANIDINE (ZANAFLEX) 4 MG TABLET    TAKE 2 TABLETS IN THE MORNING, AT NOON AND IN THE EVENING AND TAKE 3 TABLETS AT NIGHT (9 TABLETS PER DAY)    TRIAMTERENE-HYDROCHLOROTHIAZIDE 37.5-25 MG (MAXZIDE-25) 37.5-25 MG PER TABLET    Take 1 tablet by mouth once daily.       No Follow-up on file.

## 2018-07-05 DIAGNOSIS — G35 MULTIPLE SCLEROSIS: ICD-10-CM

## 2018-07-10 ENCOUNTER — DOCUMENTATION ONLY (OUTPATIENT)
Dept: NEUROLOGY | Facility: CLINIC | Age: 53
End: 2018-07-10

## 2018-07-16 ENCOUNTER — HOSPITAL ENCOUNTER (OUTPATIENT)
Dept: RADIOLOGY | Facility: HOSPITAL | Age: 53
Discharge: HOME | End: 2018-07-16
Attending: PHYSICIAN ASSISTANT
Payer: MEDICARE

## 2018-07-16 ENCOUNTER — PATIENT MESSAGE (OUTPATIENT)
Dept: OBSTETRICS AND GYNECOLOGY | Facility: CLINIC | Age: 53
End: 2018-07-16

## 2018-07-16 VITALS — HEIGHT: 60 IN | WEIGHT: 140 LBS | BODY MASS INDEX: 27.48 KG/M2

## 2018-07-16 DIAGNOSIS — Z12.31 ENCOUNTER FOR SCREENING MAMMOGRAM FOR MALIGNANT NEOPLASM OF BREAST: ICD-10-CM

## 2018-07-16 DIAGNOSIS — Z12.31 VISIT FOR SCREENING MAMMOGRAM: ICD-10-CM

## 2018-07-16 PROCEDURE — 77067 SCR MAMMO BI INCL CAD: CPT | Mod: 26,,, | Performed by: RADIOLOGY

## 2018-07-16 PROCEDURE — 77063 BREAST TOMOSYNTHESIS BI: CPT | Mod: 26,,, | Performed by: RADIOLOGY

## 2018-07-16 PROCEDURE — 77063 BREAST TOMOSYNTHESIS BI: CPT | Mod: TC,PO

## 2018-07-23 ENCOUNTER — LAB VISIT (OUTPATIENT)
Dept: LAB | Facility: HOSPITAL | Age: 53
End: 2018-07-23
Attending: INTERNAL MEDICINE
Payer: MEDICARE

## 2018-07-23 DIAGNOSIS — D50.9 IRON DEFICIENCY ANEMIA, UNSPECIFIED IRON DEFICIENCY ANEMIA TYPE: ICD-10-CM

## 2018-07-23 LAB
BASOPHILS # BLD AUTO: 0.01 K/UL
BASOPHILS NFR BLD: 0.2 %
DIFFERENTIAL METHOD: ABNORMAL
EOSINOPHIL # BLD AUTO: 0.2 K/UL
EOSINOPHIL NFR BLD: 2.4 %
ERYTHROCYTE [DISTWIDTH] IN BLOOD BY AUTOMATED COUNT: 13.8 %
FERRITIN SERPL-MCNC: 183 NG/ML
HCT VFR BLD AUTO: 39.6 %
HGB BLD-MCNC: 14.4 G/DL
IRON SERPL-MCNC: 69 UG/DL
LYMPHOCYTES # BLD AUTO: 1.3 K/UL
LYMPHOCYTES NFR BLD: 19.4 %
MCH RBC QN AUTO: 31 PG
MCHC RBC AUTO-ENTMCNC: 36.4 G/DL
MCV RBC AUTO: 85 FL
MONOCYTES # BLD AUTO: 0.3 K/UL
MONOCYTES NFR BLD: 5.1 %
NEUTROPHILS # BLD AUTO: 4.8 K/UL
NEUTROPHILS NFR BLD: 72.7 %
PLATELET # BLD AUTO: 236 K/UL
PMV BLD AUTO: 8.6 FL
RBC # BLD AUTO: 4.64 M/UL
SATURATED IRON: 19 %
TOTAL IRON BINDING CAPACITY: 363 UG/DL
TRANSFERRIN SERPL-MCNC: 245 MG/DL
WBC # BLD AUTO: 6.64 K/UL

## 2018-07-23 PROCEDURE — 36415 COLL VENOUS BLD VENIPUNCTURE: CPT

## 2018-07-23 PROCEDURE — 83540 ASSAY OF IRON: CPT

## 2018-07-23 PROCEDURE — 85025 COMPLETE CBC W/AUTO DIFF WBC: CPT

## 2018-07-23 PROCEDURE — 82728 ASSAY OF FERRITIN: CPT

## 2018-07-26 ENCOUNTER — OFFICE VISIT (OUTPATIENT)
Dept: HEMATOLOGY/ONCOLOGY | Facility: CLINIC | Age: 53
End: 2018-07-26
Payer: MEDICARE

## 2018-07-26 VITALS
HEART RATE: 64 BPM | DIASTOLIC BLOOD PRESSURE: 52 MMHG | OXYGEN SATURATION: 96 % | TEMPERATURE: 98 F | SYSTOLIC BLOOD PRESSURE: 82 MMHG | WEIGHT: 141.88 LBS | HEIGHT: 60 IN | BODY MASS INDEX: 27.86 KG/M2

## 2018-07-26 DIAGNOSIS — D50.9 IRON DEFICIENCY ANEMIA, UNSPECIFIED IRON DEFICIENCY ANEMIA TYPE: Primary | ICD-10-CM

## 2018-07-26 PROCEDURE — 99999 PR PBB SHADOW E&M-EST. PATIENT-LVL V: CPT | Mod: PBBFAC,,, | Performed by: INTERNAL MEDICINE

## 2018-07-26 PROCEDURE — 3074F SYST BP LT 130 MM HG: CPT | Mod: CPTII,S$GLB,, | Performed by: INTERNAL MEDICINE

## 2018-07-26 PROCEDURE — 3078F DIAST BP <80 MM HG: CPT | Mod: CPTII,S$GLB,, | Performed by: INTERNAL MEDICINE

## 2018-07-26 PROCEDURE — 99213 OFFICE O/P EST LOW 20 MIN: CPT | Mod: S$GLB,,, | Performed by: INTERNAL MEDICINE

## 2018-07-26 PROCEDURE — 3008F BODY MASS INDEX DOCD: CPT | Mod: CPTII,S$GLB,, | Performed by: INTERNAL MEDICINE

## 2018-07-26 NOTE — PROGRESS NOTES
Subjective:       Patient ID: Zoey Cali is a 52 y.o. female.    Chief Complaint: Follow-up  Diagnosis: TRACIE  HPI     51 y/o female with history of MS seen  today for f/u for TRACIE  She undergoes intermittent IV iron therapy.  She has been intolerant of oral Fe supp  s/p GI eval with EGD and colonoscopy which was unremarkable    She is followed by Dr. Mendez for long standing history of MS and remains on therapy with [facility]   She has chronic pain  and neuralgia and on opioid pain medication managed by PCP and Neurology      Her son passed earlier this year from heroin overdose   He suffered from depression  She continues with grief counseling  She is having sleep disturbances  She continues with mild  fatigue   No SOB/CP  No lightheadedness  Chronic neck pain-stable    CBC reveals wbc 6640 /mm3  Hb 14.4 g/dl   39.6 g/dl   plt ct 236k    Review of Systems   Constitutional: Positive for fatigue. Negative for appetite change and unexpected weight change.   Eyes: Negative for visual disturbance.   Respiratory: Negative for cough and shortness of breath.    Cardiovascular: Negative for chest pain and leg swelling.   Gastrointestinal: Negative for abdominal pain, blood in stool, constipation, diarrhea, nausea and vomiting.   Genitourinary: Negative for frequency.   Musculoskeletal: Negative for arthralgias, back pain, joint swelling and neck pain.   Skin: Negative for rash.        No petechiae, ecchymoses   Neurological: Negative for light-headedness and headaches.   Hematological: Negative for adenopathy. Does not bruise/bleed easily.   Psychiatric/Behavioral: Positive for dysphoric mood and sleep disturbance.       Objective:       Vitals:    07/26/18 1048 07/26/18 1052   BP: (!) 89/55 (!) 82/52   BP Location: Left arm Left arm   Patient Position: Sitting Sitting   BP Method: Medium (Automatic) Medium (Manual)   Pulse: 61 64   Temp: 97.9 °F (36.6 °C)    TempSrc: Oral    SpO2: (!) 94% 96%   Weight: 64.4 kg (141 lb  13.9 oz)    Height: 5' (1.524 m)        Physical Exam   Constitutional: She is oriented to person, place, and time. She appears well-developed and well-nourished.   HENT:   Head: Normocephalic.   Mouth/Throat: Oropharynx is clear and moist. No oropharyngeal exudate.   Eyes: Conjunctivae and lids are normal. Pupils are equal, round, and reactive to light. No scleral icterus.   Neck: Normal range of motion. Neck supple. No thyromegaly present.   Cardiovascular: Normal rate, regular rhythm and normal heart sounds.    No murmur heard.  Pulmonary/Chest: Breath sounds normal. She has no wheezes. She has no rales.   Abdominal: Soft. Bowel sounds are normal. She exhibits no distension and no mass. There is no hepatosplenomegaly. There is no tenderness. There is no rebound and no guarding.   Musculoskeletal: Normal range of motion. She exhibits no edema or tenderness.   Lymphadenopathy:     She has no cervical adenopathy.     She has no axillary adenopathy.        Right: No supraclavicular adenopathy present.        Left: No supraclavicular adenopathy present.   Neurological: She is alert and oriented to person, place, and time. No cranial nerve deficit. Coordination normal.   Skin: Skin is warm and dry. No ecchymosis, no petechiae and no rash noted. No erythema.   Psychiatric:   Dysphoric mood        Lab Results   Component Value Date    WBC 6.64 07/23/2018    HGB 14.4 07/23/2018    HCT 39.6 07/23/2018    MCV 85 07/23/2018     07/23/2018     Lab Results   Component Value Date    IRON 69 07/23/2018    TIBC 363 07/23/2018    FERRITIN 183 07/23/2018       Assessment:       1. Iron deficiency anemia, unspecified iron deficiency anemia type        Plan:   Zoey FRASER was seen today for follow-up.    Diagnoses and all orders for this visit:    Iron deficiency anemia, unspecified iron deficiency       S/p GI eval-unremarkable       S/p Venofer x 3 completed 5/2018        CBC reveals stable Hb 14 g/dl        Fe parameters nl  except for borderline Fe sat       Cont to monitor    Multiple Sclerosis    Follow-up with Neurology     She will continue with grief counseling     Addendum: Decreased BP noted . Pt asymptomatic. Attempts to contact pt following visit unsuccesful.          F/u 3  mos with cbc, Fe studies prior to f/u ( or sooner if problems should arise in the interim)      Cc: MD Heidy Lancaster MD

## 2018-07-27 ENCOUNTER — TELEPHONE (OUTPATIENT)
Dept: HEMATOLOGY/ONCOLOGY | Facility: CLINIC | Age: 53
End: 2018-07-27

## 2018-07-30 ENCOUNTER — OFFICE VISIT (OUTPATIENT)
Dept: FAMILY MEDICINE | Facility: CLINIC | Age: 53
End: 2018-07-30
Payer: MEDICARE

## 2018-07-30 VITALS
BODY MASS INDEX: 24.96 KG/M2 | HEIGHT: 63 IN | SYSTOLIC BLOOD PRESSURE: 120 MMHG | WEIGHT: 140.88 LBS | OXYGEN SATURATION: 95 % | DIASTOLIC BLOOD PRESSURE: 60 MMHG | HEART RATE: 70 BPM | TEMPERATURE: 97 F

## 2018-07-30 DIAGNOSIS — M62.838 MUSCLE SPASM: ICD-10-CM

## 2018-07-30 DIAGNOSIS — M79.2 NEUROPATHIC PAIN: ICD-10-CM

## 2018-07-30 DIAGNOSIS — Z79.899 ENCOUNTER FOR LONG-TERM (CURRENT) USE OF HIGH-RISK MEDICATION: ICD-10-CM

## 2018-07-30 DIAGNOSIS — F11.90 CHRONIC, CONTINUOUS USE OF OPIOIDS: ICD-10-CM

## 2018-07-30 DIAGNOSIS — Z71.89 COUNSELING REGARDING GOALS OF CARE: ICD-10-CM

## 2018-07-30 DIAGNOSIS — I10 ESSENTIAL HYPERTENSION: ICD-10-CM

## 2018-07-30 DIAGNOSIS — Z72.0 TOBACCO ABUSE: ICD-10-CM

## 2018-07-30 DIAGNOSIS — M79.2 NEURALGIA AND NEURITIS: Primary | ICD-10-CM

## 2018-07-30 DIAGNOSIS — G35 MULTIPLE SCLEROSIS: ICD-10-CM

## 2018-07-30 PROCEDURE — 3074F SYST BP LT 130 MM HG: CPT | Mod: CPTII,S$GLB,, | Performed by: FAMILY MEDICINE

## 2018-07-30 PROCEDURE — 3008F BODY MASS INDEX DOCD: CPT | Mod: CPTII,S$GLB,, | Performed by: FAMILY MEDICINE

## 2018-07-30 PROCEDURE — 3078F DIAST BP <80 MM HG: CPT | Mod: CPTII,S$GLB,, | Performed by: FAMILY MEDICINE

## 2018-07-30 PROCEDURE — 99999 PR PBB SHADOW E&M-EST. PATIENT-LVL III: CPT | Mod: PBBFAC,,, | Performed by: FAMILY MEDICINE

## 2018-07-30 PROCEDURE — 99215 OFFICE O/P EST HI 40 MIN: CPT | Mod: S$GLB,,, | Performed by: FAMILY MEDICINE

## 2018-07-30 RX ORDER — HYDROCODONE BITARTRATE AND ACETAMINOPHEN 10; 325 MG/1; MG/1
1 TABLET ORAL EVERY 6 HOURS PRN
Qty: 120 TABLET | Refills: 0 | Status: SHIPPED | OUTPATIENT
Start: 2018-07-30 | End: 2018-10-17

## 2018-07-30 RX ORDER — HYDROCODONE BITARTRATE AND ACETAMINOPHEN 10; 325 MG/1; MG/1
1 TABLET ORAL EVERY 6 HOURS PRN
Qty: 120 TABLET | Refills: 0 | Status: SHIPPED | OUTPATIENT
Start: 2018-08-27 | End: 2018-08-21 | Stop reason: SDUPTHER

## 2018-07-30 RX ORDER — HYDROCODONE BITARTRATE AND ACETAMINOPHEN 10; 325 MG/1; MG/1
1 TABLET ORAL EVERY 6 HOURS PRN
Qty: 120 TABLET | Refills: 0 | Status: SHIPPED | OUTPATIENT
Start: 2018-09-27 | End: 2018-08-21 | Stop reason: SDUPTHER

## 2018-07-30 NOTE — PROGRESS NOTES
Chief Complaint   Patient presents with    Medication Refill       SUBJECTIVE:  Zoey Cali is a 52 y.o. female here for new problem of right rib pain that has subsided and now resolved.  She is haivng more neck pain and neuropathy.  NO new falls, no dizziness, no visual disturbances.  Currently has co-morbidities including per problem list.      Past Medical History:   Diagnosis Date    Allergy     Anemia     Anxiety     Breast cyst     Chronic kidney disease     Depression     Fibrocystic breast     Hypertension     Multiple sclerosis     Multiple sclerosis     Neuromuscular disorder     Osteoporosis     Rib fracture 2015     Past Surgical History:   Procedure Laterality Date    APPENDECTOMY      BREAST BIOPSY Left 2012    BREAST CYST ASPIRATION      BREAST SURGERY  2004    excisional biopsy      SECTION, CLASSIC      CHOLECYSTECTOMY      EYE SURGERY      HYSTERECTOMY      OOPHORECTOMY       Social History     Social History    Marital status:      Spouse name: N/A    Number of children: N/A    Years of education: N/A     Occupational History    Not on file.     Social History Main Topics    Smoking status: Current Every Day Smoker     Packs/day: 1.00     Types: Cigarettes    Smokeless tobacco: Never Used    Alcohol use No    Drug use: No    Sexual activity: Yes     Other Topics Concern    Not on file     Social History Narrative    No narrative on file     Family History   Problem Relation Age of Onset    Arthritis Mother     Diabetes Father     Heart disease Father     Hyperlipidemia Father     Hypertension Father     Stroke Father     Alcohol abuse Son     Arthritis Son     Drug abuse Son     Alcohol abuse Maternal Aunt     Arthritis Maternal Aunt     Alcohol abuse Maternal Uncle     Drug abuse Maternal Uncle     Hypertension Maternal Uncle     Alcohol abuse Paternal Aunt     Heart disease Paternal Aunt     Hearing loss Paternal  Aunt     Hypertension Paternal Aunt     Alcohol abuse Paternal Uncle     Heart disease Paternal Uncle     Alcohol abuse Maternal Grandmother     Heart disease Maternal Grandmother     Stroke Maternal Grandmother     Alcohol abuse Maternal Grandfather     Heart disease Maternal Grandfather     Alcohol abuse Paternal Grandmother     Cancer Paternal Grandmother     Heart disease Paternal Grandmother     Hypertension Paternal Grandmother     Stroke Paternal Grandmother     Alcohol abuse Paternal Grandfather     Heart disease Paternal Grandfather      Current Outpatient Prescriptions on File Prior to Visit   Medication Sig Dispense Refill    ARIPiprazole (ABILIFY) 2 MG Tab Take 1 tablet (2 mg total) by mouth once daily. 30 tablet 11    carBAMazepine (TEGRETOL XR) 200 MG 12 hr tablet Take 1 tablet (200 mg total) by mouth 2 (two) times daily. 60 tablet 5    cetirizine (ZYRTEC) 10 MG tablet Take 1 tablet (10 mg total) by mouth once daily. 30 tablet 0    cholecalciferol, vitamin D3, (VITAMIN D3) 5,000 unit Tab Take 5,000 Units by mouth once daily.      escitalopram oxalate (LEXAPRO) 20 MG tablet Take 1 tablet (20 mg total) by mouth once daily. 90 tablet 3    fluticasone (FLONASE) 50 mcg/actuation nasal spray ONE sprays(s) each nostril TWICE DAILY per instructed 16 g 3    gabapentin (NEURONTIN) 400 MG capsule Take 2 capsules (800 mg total) by mouth every evening. 180 capsule 1    gabapentin (NEURONTIN) 600 MG tablet Take 1 tablet (600 mg total) by mouth 3 (three) times daily. 270 tablet 1    ibuprofen (ADVIL,MOTRIN) 800 MG tablet TAKE ONE TABLET BY MOUTH EVERY 8 HOURS WITH FOOD AND WATER  2    interferon beta-1a (AVONEX) 30 mcg/0.5 mL PnKt Inject 30 mcg into the muscle once a week. 3 kit 1    LORazepam (ATIVAN) 2 MG Tab TAKE ONE TABLET BY MOUTH EVERY EVENING AS NEEDED 30 tablet 1    metoprolol succinate (TOPROL-XL) 100 MG 24 hr tablet Take 1 tablet (100 mg total) by mouth once daily. 90 tablet 3     "nystatin-triamcinolone (MYCOLOG II) cream Apply to affected area 2 times daily 30 g 1    omeprazole (PRILOSEC) 40 MG capsule Take 1 capsule (40 mg total) by mouth every evening. 90 capsule 3    ondansetron (ZOFRAN-ODT) 4 MG TbDL Take 1 tablet (4 mg total) by mouth every 12 (twelve) hours as needed. 20 tablet 2    simvastatin (ZOCOR) 40 MG tablet Take 1 tablet (40 mg total) by mouth every evening. 90 tablet 3    tizanidine (ZANAFLEX) 4 MG tablet TAKE 2 TABLETS IN THE MORNING, AT NOON AND IN THE EVENING AND TAKE 3 TABLETS AT NIGHT (9 TABLETS PER DAY) 810 tablet 3    triamterene-hydrochlorothiazide 37.5-25 mg (MAXZIDE-25) 37.5-25 mg per tablet Take 1 tablet by mouth once daily. 90 tablet 3    umeclidinium-vilanterol 62.5-25 mcg/actuation DsDv Inhale 1 puff into the lungs once daily. Controller 60 each 11    [DISCONTINUED] HYDROcodone-acetaminophen (NORCO)  mg per tablet Take 1 tablet by mouth every 6 (six) hours as needed for Pain. 120 tablet 0     No current facility-administered medications on file prior to visit.      Review of patient's allergies indicates:   Allergen Reactions    Lomotil [diphenoxylate-atropine]      Causes stomach spasms    Dilaudid [hydromorphone (bulk)] Itching         Review of Systems   Constitutional: Negative.    HENT: Negative.    Eyes: Negative.    Respiratory: Negative.    Cardiovascular: Negative.    Gastrointestinal: Negative.    Genitourinary: Negative.    Musculoskeletal: Positive for back pain, joint pain, myalgias and neck pain.   Skin: Negative.    Neurological: Negative.    Endo/Heme/Allergies: Negative.    Psychiatric/Behavioral: Positive for depression. Negative for hallucinations, memory loss, substance abuse and suicidal ideas. The patient is nervous/anxious. The patient does not have insomnia.        OBJECTIVE:  /60   Pulse 70   Temp 97.2 °F (36.2 °C) (Oral)   Ht 5' 3" (1.6 m)   Wt 63.9 kg (140 lb 14 oz)   LMP 01/01/2001 (Approximate) Comment: 2001 "  SpO2 95%   BMI 24.95 kg/m²     Wt Readings from Last 3 Encounters:   07/30/18 63.9 kg (140 lb 14 oz)   07/26/18 64.4 kg (141 lb 13.9 oz)   07/16/18 63.5 kg (140 lb)     BP Readings from Last 3 Encounters:   07/30/18 120/60   07/26/18 (!) 82/52   06/21/18 118/80       She appears well, in no apparent distress.  Alert and oriented times three, pleasant and cooperative. Vital signs are as documented in vital signs section.  She is very upset about her son and started crying.  S1 and S2 normal, no murmurs, clicks, gallops or rubs. Regular rate and rhythm. Chest is clear; no wheezes or rales. No edema or JVD.  Right rib is not painful      Review of old Records:  Reviewed per epic and [facility]    Review of old labs:  No results found for: TSH  Lab Results   Component Value Date    WBC 6.64 07/23/2018    HGB 14.4 07/23/2018    HCT 39.6 07/23/2018    MCV 85 07/23/2018     07/23/2018       Chemistry        Component Value Date/Time     05/01/2018 1511    K 4.4 05/01/2018 1511     05/01/2018 1511    CO2 31 (H) 05/01/2018 1511    BUN 10 05/01/2018 1511    CREATININE 0.8 05/01/2018 1511    GLU 89 05/01/2018 1511        Component Value Date/Time    CALCIUM 10.0 05/01/2018 1511    ALKPHOS 136 (H) 05/01/2018 1511    AST 17 05/01/2018 1511    ALT 16 05/01/2018 1511    BILITOT 0.5 05/01/2018 1511    ESTGFRAFRICA >60 05/01/2018 1511    EGFRNONAA >60 05/01/2018 1511        Lab Results   Component Value Date    CHOL 110 (L) 06/22/2015    CHOL 144 05/18/2015    CHOL 174 12/22/2014     Lab Results   Component Value Date    HDL 22 (L) 06/22/2015    HDL 24 (L) 05/18/2015    HDL 19 (L) 12/22/2014     Lab Results   Component Value Date    LDLCALC 47.0 (L) 06/22/2015    LDLCALC Invalid, Trig>400.0 05/18/2015    LDLCALC Invalid, Trig>400.0 12/22/2014     Lab Results   Component Value Date    TRIG 205 (H) 06/22/2015    TRIG 598 (H) 05/18/2015    TRIG 564 (H) 12/22/2014     Lab Results   Component Value Date    CHOLHDL 20.0  06/22/2015    CHOLHDL 16.7 (L) 05/18/2015    CHOLHDL 10.9 (L) 12/22/2014         Review of old imaging:  n/a    ASSESSMENT:  Problem List Items Addressed This Visit     Tobacco abuse    Neuropathic pain    Neuralgia and neuritis - Primary    Muscle spasm    Multiple sclerosis    HTN (hypertension)    Encounter for long-term (current) use of high-risk medication    Counseling regarding goals of care    Chronic, continuous use of opioids    Relevant Medications    HYDROcodone-acetaminophen (NORCO)  mg per tablet    HYDROcodone-acetaminophen (NORCO)  mg per tablet (Start on 8/27/2018)    HYDROcodone-acetaminophen (NORCO)  mg per tablet (Start on 9/27/2018)          ICD-10-CM ICD-9-CM   1. Neuralgia and neuritis M79.2 729.2   2. Chronic, continuous use of opioids F11.90 305.51   3. Essential hypertension I10 401.9   4. Multiple sclerosis G35 340   5. Muscle spasm M62.838 728.85   6. Counseling regarding goals of care Z71.89 V65.49   7. Encounter for long-term (current) use of high-risk medication Z79.899 V58.69   8. Tobacco abuse Z72.0 305.1   9. Neuropathic pain M79.2 729.2         PLAN:  1. Chronic, continuous use of opioids  Potential medication side effects were discussed with the patient; let me know if any occur.  Explained to patient the risks and adverse events of opioid treatment. Including: nausea which can be managed with antiemetic, constipation which is well treated with increased fiber, water, exercise and senna, may cause vestibular dizziness which may be treated with antihistamine, CNS side effects that can be sedation and decreased cognition, can be treated with dexedrine or ritalin and seroquel or similar drug if needed, pruritus is well treated with antihistamine.  Also cautioned patient that tolerance and dependence can be a factor and certain risk factors in history can predispose to abuse as well as not taking medication as prescribed. Went over the new black box labeling with  concurrent benzodiazepine use that can lead to overdose and to watch for signs and symptoms.  Also explained the possible adrenal suppression.  Patient voiced understanding.    - HYDROcodone-acetaminophen (NORCO)  mg per tablet; Take 1 tablet by mouth every 6 (six) hours as needed for Pain.  Dispense: 120 tablet; Refill: 0  - HYDROcodone-acetaminophen (NORCO)  mg per tablet; Take 1 tablet by mouth every 6 (six) hours as needed for Pain.  Dispense: 120 tablet; Refill: 0  - HYDROcodone-acetaminophen (NORCO)  mg per tablet; Take 1 tablet by mouth every 6 (six) hours as needed for Pain.  Dispense: 120 tablet; Refill: 0    2. Neuralgia and neuritis  The current medical regimen is effective;  continue present plan and medications.      3. Essential hypertension  The current medical regimen is effective;  continue present plan and medications.      4. Multiple sclerosis  The current medical regimen is effective;  continue present plan and medications.      5. Muscle spasm  The current medical regimen is effective;  continue present plan and medications.      6. Counseling regarding goals of care  The current medical regimen is effective;  continue present plan and medications.      7. Encounter for long-term (current) use of high-risk medication  The current medical regimen is effective;  continue present plan and medications.      8. Tobacco abuse  The current medical regimen is effective;  continue present plan and medications.  Tobacco abuse    9. Neuropathic pain  The current medical regimen is effective;  continue present plan and medications.      10/27/2018 due  Medication List with Changes/Refills   New Medications    HYDROCODONE-ACETAMINOPHEN (NORCO)  MG PER TABLET    Take 1 tablet by mouth every 6 (six) hours as needed for Pain.    HYDROCODONE-ACETAMINOPHEN (NORCO)  MG PER TABLET    Take 1 tablet by mouth every 6 (six) hours as needed for Pain.   Current Medications    ARIPIPRAZOLE  (ABILIFY) 2 MG TAB    Take 1 tablet (2 mg total) by mouth once daily.    CARBAMAZEPINE (TEGRETOL XR) 200 MG 12 HR TABLET    Take 1 tablet (200 mg total) by mouth 2 (two) times daily.    CETIRIZINE (ZYRTEC) 10 MG TABLET    Take 1 tablet (10 mg total) by mouth once daily.    CHOLECALCIFEROL, VITAMIN D3, (VITAMIN D3) 5,000 UNIT TAB    Take 5,000 Units by mouth once daily.    ESCITALOPRAM OXALATE (LEXAPRO) 20 MG TABLET    Take 1 tablet (20 mg total) by mouth once daily.    FLUTICASONE (FLONASE) 50 MCG/ACTUATION NASAL SPRAY    ONE sprays(s) each nostril TWICE DAILY per instructed    GABAPENTIN (NEURONTIN) 400 MG CAPSULE    Take 2 capsules (800 mg total) by mouth every evening.    GABAPENTIN (NEURONTIN) 600 MG TABLET    Take 1 tablet (600 mg total) by mouth 3 (three) times daily.    IBUPROFEN (ADVIL,MOTRIN) 800 MG TABLET    TAKE ONE TABLET BY MOUTH EVERY 8 HOURS WITH FOOD AND WATER    INTERFERON BETA-1A (AVONEX) 30 MCG/0.5 ML PNKT    Inject 30 mcg into the muscle once a week.    LORAZEPAM (ATIVAN) 2 MG TAB    TAKE ONE TABLET BY MOUTH EVERY EVENING AS NEEDED    METOPROLOL SUCCINATE (TOPROL-XL) 100 MG 24 HR TABLET    Take 1 tablet (100 mg total) by mouth once daily.    NYSTATIN-TRIAMCINOLONE (MYCOLOG II) CREAM    Apply to affected area 2 times daily    OMEPRAZOLE (PRILOSEC) 40 MG CAPSULE    Take 1 capsule (40 mg total) by mouth every evening.    ONDANSETRON (ZOFRAN-ODT) 4 MG TBDL    Take 1 tablet (4 mg total) by mouth every 12 (twelve) hours as needed.    SIMVASTATIN (ZOCOR) 40 MG TABLET    Take 1 tablet (40 mg total) by mouth every evening.    TIZANIDINE (ZANAFLEX) 4 MG TABLET    TAKE 2 TABLETS IN THE MORNING, AT NOON AND IN THE EVENING AND TAKE 3 TABLETS AT NIGHT (9 TABLETS PER DAY)    TRIAMTERENE-HYDROCHLOROTHIAZIDE 37.5-25 MG (MAXZIDE-25) 37.5-25 MG PER TABLET    Take 1 tablet by mouth once daily.    UMECLIDINIUM-VILANTEROL 62.5-25 MCG/ACTUATION DSDV    Inhale 1 puff into the lungs once daily. Controller   Changed  and/or Refilled Medications    Modified Medication Previous Medication    HYDROCODONE-ACETAMINOPHEN (NORCO)  MG PER TABLET HYDROcodone-acetaminophen (NORCO)  mg per tablet       Take 1 tablet by mouth every 6 (six) hours as needed for Pain.    Take 1 tablet by mouth every 6 (six) hours as needed for Pain.       No Follow-up on file.

## 2018-07-31 ENCOUNTER — PATIENT MESSAGE (OUTPATIENT)
Dept: FAMILY MEDICINE | Facility: CLINIC | Age: 53
End: 2018-07-31

## 2018-08-20 ENCOUNTER — PATIENT MESSAGE (OUTPATIENT)
Dept: FAMILY MEDICINE | Facility: CLINIC | Age: 53
End: 2018-08-20

## 2018-08-21 ENCOUNTER — OFFICE VISIT (OUTPATIENT)
Dept: NEUROLOGY | Facility: CLINIC | Age: 53
End: 2018-08-21
Payer: MEDICARE

## 2018-08-21 VITALS
BODY MASS INDEX: 24.8 KG/M2 | SYSTOLIC BLOOD PRESSURE: 107 MMHG | HEIGHT: 63 IN | DIASTOLIC BLOOD PRESSURE: 66 MMHG | HEART RATE: 55 BPM | WEIGHT: 140 LBS

## 2018-08-21 DIAGNOSIS — G35 MS (MULTIPLE SCLEROSIS): Primary | ICD-10-CM

## 2018-08-21 DIAGNOSIS — R26.9 GAIT DISTURBANCE: ICD-10-CM

## 2018-08-21 DIAGNOSIS — Z71.89 COUNSELING REGARDING GOALS OF CARE: ICD-10-CM

## 2018-08-21 DIAGNOSIS — M79.2 NEUROPATHIC PAIN: ICD-10-CM

## 2018-08-21 DIAGNOSIS — Z29.89 PROPHYLACTIC IMMUNOTHERAPY: ICD-10-CM

## 2018-08-21 PROCEDURE — 3078F DIAST BP <80 MM HG: CPT | Mod: CPTII,S$GLB,, | Performed by: PSYCHIATRY & NEUROLOGY

## 2018-08-21 PROCEDURE — 99215 OFFICE O/P EST HI 40 MIN: CPT | Mod: S$GLB,,, | Performed by: PSYCHIATRY & NEUROLOGY

## 2018-08-21 PROCEDURE — 3074F SYST BP LT 130 MM HG: CPT | Mod: CPTII,S$GLB,, | Performed by: PSYCHIATRY & NEUROLOGY

## 2018-08-21 PROCEDURE — 99999 PR PBB SHADOW E&M-EST. PATIENT-LVL III: CPT | Mod: PBBFAC,,, | Performed by: PSYCHIATRY & NEUROLOGY

## 2018-08-21 PROCEDURE — 3008F BODY MASS INDEX DOCD: CPT | Mod: CPTII,S$GLB,, | Performed by: PSYCHIATRY & NEUROLOGY

## 2018-08-21 NOTE — PROGRESS NOTES
Subjective:       Patient ID: Zoey Cali is a 52 y.o. female who presents today for a routine clinic visit for MS.  Pt is accompanied by her mother    MS HPI:  · DMT: Avonex  · Side effects from DMT? No  · Taking vitamin D3 as recommended? Yes -  5,000 IU/day   · Takes Ativan, prescribed by Dr. Collier;   · Dr. Costello also prescribed Abilify   · Overall pt is feeling stable  · In weekly counseling since the death of her son    SOCIAL HISTORY  Social History     Tobacco Use    Smoking status: Current Every Day Smoker     Packs/day: 1.00     Types: Cigarettes    Smokeless tobacco: Never Used   Substance Use Topics    Alcohol use: No     Alcohol/week: 0.0 oz    Drug use: No     Living arrangements - the patient lives with their spouse.  Employment : no    MS ROS:  · Fatigue: Yes - ongoing; a bit issue for her;   · Sleep Disturbance: Yes - managed by PCP  · Bladder Dysfunction: No  · Bowel Dysfunction: No  · Spasticity: Yes -  On tizanidine 8/8/16  · Visual Symptoms: Yes - feels she needs new glasses;    · Cognitive: Yes - some issues with short-term memory;   · Mood Disorder: Yes - ongoing issue; in therapy weekly; her son passed away earlier this year;   · Gait Disturbance: No  · Falls: No  · Hand Dysfunction: No  · Pain: Yes - on Tegretol 200mg BID, and gabapentin 600/600/600/800  · Sexual Dysfunction: Not Assessed  · Skin Breakdown: No  · Tremors: No  · Dysphagia:  No  · Dysarthria:  No  · Heat sensitivity:  Yes -   · Any un-met adaptive needs? No  · Copay Assist?  Yes - $0      Objective:          25 foot timed walk: 4.8s without assist  Timed 25 Foot Walk: 8/16/2016 5/2/2017   Did patient wear an AFO? No No   Was assistive device used? No No   Time for 25 Foot Walk (seconds) 5.2 4.4       Neurologic Exam      MENTAL STATUS: grossly intact     CRANIAL NERVE EXAM: She has a mild bilateral internuclear ophthalmoplegia.   Extraocular muscles are intact. Pupils are equal, round, and reactive to light.  No facial  asymmetry. Facial sensation is intact bilaterally. There is no   dysarthria.      MOTOR EXAM: She has slightly slow rapid sequential movements in the hands   bilaterally; otherwise, her strength is grossly full, graded at 5/5 on manual   motor testing.     REFLEXES: She has 3+ reflexes in all groups throughout upper and lower   extremities bilaterally and her toes are bilaterally silent.     SENSORY EXAM: She has decreased vibration sense bilaterally in the lower   extremities that is mild; otherwise, sensation is grossly intact.     COORDINATION: Normal finger-to-nose exam.     GAIT: Slightly ataxic and wide based.      Imaging:     Results for orders placed during the hospital encounter of 02/01/17   MRI Brain W WO Contrast    Impression Scattered white matter lesions consistent with the reported history of multiple sclerosis, not significant change from the prior study of 1/21/2016.  No new or enhancing lesions to indicate ongoing or active demyelination.    ______________________________________     Electronically signed by resident: TANYA PORRAS  Date:     02/01/17  Time:    11:35            As the supervising and teaching physician, I personally reviewed the images and resident's interpretation and I agree with the findings.          Electronically signed by: NADIR RAMIREZ MD  Date:     02/01/17  Time:    17:15      Results for orders placed during the hospital encounter of 11/19/14   MRI Cervical Spine W WO Cont    Impression  Unremarkable motion limited MRI of the cervical spine specifically without evidence for cord signal abnormality to suggest edema or abnormal intrathecal enhancement.    No significant central canal or neural foraminal stenosis.          Electronically signed by: QUIRINO BURRIS DO  Date:     11/19/14  Time:    13:55      Results for orders placed during the hospital encounter of 11/29/14   MRI Thoracic Spine W WO Cont    Impression  Limited study by motion artifact.  Within limits of the  study there is a single nonenhancing focus of T2/stir signal hyperintensity and slight cord truncation right aspect thoracic cord centered at the T11/T12 disk configuration most suggestive for   sequela of prior demyelination in light of history and intracranial findings.  level.    No definite abnormal intrathecal enhancement.    No significant disk bulge, central canal or neural foraminal stenosis allowing for patient motion limitation.      Electronically signed by: QUIRINO BURRIS DO  Date:     12/01/14  Time:    08:54      Results for orders placed during the hospital encounter of 05/11/18   MRI Brain Demyelinating W W/O Contrast    Impression Overall mild to moderately degraded examination related to motion artifact.  Scattered supratentorial white matter lesions are somewhat nonspecific but in keeping with the reported history of multiple sclerosis.    No definite new lesions and no enhancing lesions to indicate ongoing or active demyelination.      Electronically signed by: Dre Abrams MD  Date:    05/11/2018  Time:    17:15       Labs:     Lab Results   Component Value Date    [id number] 59 05/01/2018    [id number] 54 11/17/2017    [id number] 48 11/11/2016     Lab Results   Component Value Date    WBC 6.64 07/23/2018    RBC 4.64 07/23/2018    HGB 14.4 07/23/2018    HCT 39.6 07/23/2018    MCV 85 07/23/2018    MCH 31.0 07/23/2018    MCHC 36.4 (H) 07/23/2018    RDW 13.8 07/23/2018     07/23/2018    MPV 8.6 (L) 07/23/2018    GRAN 4.8 07/23/2018    GRAN 72.7 07/23/2018    LYMPH 1.3 07/23/2018    LYMPH 19.4 07/23/2018    MONO 0.3 07/23/2018    MONO 5.1 07/23/2018    EOS 0.2 07/23/2018    BASO 0.01 07/23/2018    EOSINOPHIL 2.4 07/23/2018    BASOPHIL 0.2 07/23/2018     Sodium   Date Value Ref Range Status   05/01/2018 141 136 - 145 mmol/L Final     Potassium   Date Value Ref Range Status   05/01/2018 4.4 3.5 - 5.1 mmol/L Final     Chloride   Date Value Ref Range Status   05/01/2018 102 95 - 110 mmol/L  Final     CO2   Date Value Ref Range Status   05/01/2018 31 (H) 23 - 29 mmol/L Final     Glucose   Date Value Ref Range Status   05/01/2018 89 70 - 110 mg/dL Final     BUN, Bld   Date Value Ref Range Status   05/01/2018 10 6 - 20 mg/dL Final     Creatinine   Date Value Ref Range Status   05/01/2018 0.8 0.5 - 1.4 mg/dL Final     Calcium   Date Value Ref Range Status   05/01/2018 10.0 8.7 - 10.5 mg/dL Final     Total Protein   Date Value Ref Range Status   05/01/2018 7.3 6.0 - 8.4 g/dL Final     Albumin   Date Value Ref Range Status   05/01/2018 4.0 3.5 - 5.2 g/dL Final     Total Bilirubin   Date Value Ref Range Status   05/01/2018 0.5 0.1 - 1.0 mg/dL Final     Comment:     For infants and newborns, interpretation of results should be based  on gestational age, weight and in agreement with clinical  observations.  Premature Infant recommended reference ranges:  Up to 24 hours.............<8.0 mg/dL  Up to 48 hours............<12.0 mg/dL  3-5 days..................<15.0 mg/dL  6-29 days.................<15.0 mg/dL       Alkaline Phosphatase   Date Value Ref Range Status   05/01/2018 136 (H) 55 - 135 U/L Final     AST   Date Value Ref Range Status   05/01/2018 17 10 - 40 U/L Final     ALT   Date Value Ref Range Status   05/01/2018 16 10 - 44 U/L Final     Anion Gap   Date Value Ref Range Status   05/01/2018 8 8 - 16 mmol/L Final     eGFR if    Date Value Ref Range Status   05/01/2018 >60 >60 mL/min/1.73 m^2 Final     eGFR if non    Date Value Ref Range Status   05/01/2018 >60 >60 mL/min/1.73 m^2 Final     Comment:     Calculation used to obtain the estimated glomerular filtration  rate (eGFR) is the CKD-EPI equation.          Diagnosis/Assessment/Plan:    1. Multiple Sclerosis  · Assessment: Pt is clinically stable on Avonex;   · Imaging: MRIs deferred 2019 based on clinical stability; will do next MRI 5/2020 without contrast;   · Disease Modifying Therapies: continue Avonex; check LFTs  today in NOv; CBC being monitored regularly by Dr. Vaughan    2. MS Symptom Assessment / Management  · No other changes to regimen described in ROS above    Over 50% of this 40 minute visit was spent in direct face to face counseling of the patient about MS, DMT considerations, and MS symptom management.     F/u 6 mo with Makayla Swift PA-C  MS (multiple sclerosis)  -     Hepatic function panel; Future

## 2018-08-22 DIAGNOSIS — R25.2 SPASTICITY: ICD-10-CM

## 2018-08-22 DIAGNOSIS — F32.A DEPRESSION, UNSPECIFIED DEPRESSION TYPE: ICD-10-CM

## 2018-08-23 RX ORDER — LORAZEPAM 2 MG/1
TABLET ORAL
Qty: 30 TABLET | Refills: 2 | Status: SHIPPED | OUTPATIENT
Start: 2018-08-23 | End: 2018-11-05 | Stop reason: SDUPTHER

## 2018-08-27 DIAGNOSIS — M79.2 NEUROPATHIC PAIN: ICD-10-CM

## 2018-08-27 RX ORDER — CARBAMAZEPINE 200 MG/1
TABLET, EXTENDED RELEASE ORAL
Qty: 60 TABLET | Refills: 5 | Status: SHIPPED | OUTPATIENT
Start: 2018-08-27 | End: 2018-08-30 | Stop reason: SDUPTHER

## 2018-08-30 ENCOUNTER — PATIENT MESSAGE (OUTPATIENT)
Dept: FAMILY MEDICINE | Facility: CLINIC | Age: 53
End: 2018-08-30

## 2018-08-30 DIAGNOSIS — F32.A DEPRESSION, UNSPECIFIED DEPRESSION TYPE: ICD-10-CM

## 2018-08-30 DIAGNOSIS — M79.2 NEUROPATHIC PAIN: ICD-10-CM

## 2018-08-30 RX ORDER — ESCITALOPRAM OXALATE 20 MG/1
20 TABLET ORAL DAILY
Qty: 90 TABLET | Refills: 3 | Status: SHIPPED | OUTPATIENT
Start: 2018-08-30 | End: 2019-08-29 | Stop reason: SDUPTHER

## 2018-08-30 RX ORDER — CARBAMAZEPINE 200 MG/1
200 TABLET, EXTENDED RELEASE ORAL 2 TIMES DAILY
Qty: 60 TABLET | Refills: 5 | Status: SHIPPED | OUTPATIENT
Start: 2018-08-30 | End: 2019-03-27 | Stop reason: SDUPTHER

## 2018-10-01 DIAGNOSIS — F11.90 CHRONIC, CONTINUOUS USE OF OPIOIDS: ICD-10-CM

## 2018-10-01 RX ORDER — ONDANSETRON 4 MG/1
TABLET, ORALLY DISINTEGRATING ORAL
Qty: 20 TABLET | Refills: 5 | Status: SHIPPED | OUTPATIENT
Start: 2018-10-01 | End: 2020-10-06 | Stop reason: DRUGHIGH

## 2018-10-17 ENCOUNTER — OFFICE VISIT (OUTPATIENT)
Dept: NEUROLOGY | Facility: CLINIC | Age: 53
End: 2018-10-17
Payer: MEDICARE

## 2018-10-17 VITALS
BODY MASS INDEX: 26.4 KG/M2 | DIASTOLIC BLOOD PRESSURE: 76 MMHG | SYSTOLIC BLOOD PRESSURE: 153 MMHG | HEIGHT: 63 IN | WEIGHT: 149 LBS | HEART RATE: 63 BPM

## 2018-10-17 DIAGNOSIS — G35 MULTIPLE SCLEROSIS: Primary | ICD-10-CM

## 2018-10-17 DIAGNOSIS — G57.11 MERALGIA PARAESTHETICA, RIGHT: ICD-10-CM

## 2018-10-17 DIAGNOSIS — Z71.89 COUNSELING REGARDING GOALS OF CARE: ICD-10-CM

## 2018-10-17 PROCEDURE — 99213 OFFICE O/P EST LOW 20 MIN: CPT | Mod: PBBFAC | Performed by: PHYSICIAN ASSISTANT

## 2018-10-17 PROCEDURE — 99213 OFFICE O/P EST LOW 20 MIN: CPT | Mod: S$PBB,,, | Performed by: PHYSICIAN ASSISTANT

## 2018-10-17 PROCEDURE — 99999 PR PBB SHADOW E&M-EST. PATIENT-LVL III: CPT | Mod: PBBFAC,,, | Performed by: PHYSICIAN ASSISTANT

## 2018-10-17 PROCEDURE — 3008F BODY MASS INDEX DOCD: CPT | Mod: CPTII,,, | Performed by: PHYSICIAN ASSISTANT

## 2018-10-17 PROCEDURE — 3077F SYST BP >= 140 MM HG: CPT | Mod: CPTII,,, | Performed by: PHYSICIAN ASSISTANT

## 2018-10-17 PROCEDURE — 3078F DIAST BP <80 MM HG: CPT | Mod: CPTII,,, | Performed by: PHYSICIAN ASSISTANT

## 2018-10-17 NOTE — PROGRESS NOTES
Subjective:       Patient ID: Zoey Cali is a 52 y.o. female who presents today for a fit-in clinic visit for MS.      MS HPI:  · DMT: Avonex  · Side effects from DMT? No  · Taking vitamin D3 as recommended? Yes -  Dose:   · R side pain leg that started several weeks ago following Avonex injection. She is experiencing R lateral thigh numbness/hypersensitivty to touch that began shortly after her Avonex injection. It began to subside some until 2 weeks later she injected that thigh again and her symptoms worsened.   · She is wearing a loose fitting dress today so that she may lift her dress off of her thigh as any clothing that touches her outer thigh is very uncomfortable.  · She denies a rash, but wonders if she could have shingles  · She tried one of her mother's pain patches over the weekend, and found this very helpful    SOCIAL HISTORY  Social History     Tobacco Use    Smoking status: Current Every Day Smoker     Packs/day: 1.00     Types: Cigarettes    Smokeless tobacco: Never Used   Substance Use Topics    Alcohol use: No     Alcohol/week: 0.0 oz    Drug use: No     Living arrangements - the patient lives with their family.  Employment     MS ROS:  As above        Objective:        1. 25 foot timed walk:  Timed 25 Foot Walk: 8/16/2016 5/2/2017   Did patient wear an AFO? No No   Was assistive device used? No No   Time for 25 Foot Walk (seconds) 5.2 4.4     MENTAL STATUS: grossly intact     CRANIAL NERVE EXAM: She has a mild bilateral internuclear ophthalmoplegia.   Extraocular muscles are intact There is no dysarthria.      MOTOR EXAM:Her strength is grossly full, graded at 5/5 LE flexors/extensors     REFLEXES: She has 3+ reflexes in all groups lower   extremities bilaterally     SENSORY EXAM:  She is hypersensitive to touch R outer thigh. Otherwise LT intact     No visible rash present          Imaging:     Results for orders placed during the hospital encounter of 02/01/17   MRI Brain W WO  Contrast    Impression Scattered white matter lesions consistent with the reported history of multiple sclerosis, not significant change from the prior study of 1/21/2016.  No new or enhancing lesions to indicate ongoing or active demyelination.    ______________________________________     Electronically signed by resident: TANYA PORRAS  Date:     02/01/17  Time:    11:35            As the supervising and teaching physician, I personally reviewed the images and resident's interpretation and I agree with the findings.          Electronically signed by: NADIR RAMIREZ MD  Date:     02/01/17  Time:    17:15      Results for orders placed during the hospital encounter of 11/19/14   MRI Cervical Spine W WO Cont    Impression  Unremarkable motion limited MRI of the cervical spine specifically without evidence for cord signal abnormality to suggest edema or abnormal intrathecal enhancement.    No significant central canal or neural foraminal stenosis.          Electronically signed by: QUIRINO BURRIS DO  Date:     11/19/14  Time:    13:55      Results for orders placed during the hospital encounter of 11/29/14   MRI Thoracic Spine W WO Cont    Impression  Limited study by motion artifact.  Within limits of the study there is a single nonenhancing focus of T2/stir signal hyperintensity and slight cord truncation right aspect thoracic cord centered at the T11/T12 disk configuration most suggestive for   sequela of prior demyelination in light of history and intracranial findings.  level.    No definite abnormal intrathecal enhancement.    No significant disk bulge, central canal or neural foraminal stenosis allowing for patient motion limitation.      Electronically signed by: QUIRINO BURRIS DO  Date:     12/01/14  Time:    08:54      Results for orders placed during the hospital encounter of 05/11/18   MRI Brain Demyelinating W W/O Contrast    Impression Overall mild to moderately degraded examination related to motion  artifact.  Scattered supratentorial white matter lesions are somewhat nonspecific but in keeping with the reported history of multiple sclerosis.    No definite new lesions and no enhancing lesions to indicate ongoing or active demyelination.      Electronically signed by: Dre Abrams MD  Date:    05/11/2018  Time:    17:15     No results found for this or any previous visit.  No results found for this or any previous visit.      Labs:     Lab Results   Component Value Date    [id number] 59 05/01/2018    [id number] 54 11/17/2017    [id number] 48 11/11/2016     No results found for: JCVINDEX, JCVANTIBODY  No results found for: UT6VGBET, ABSOLUTECD3, TO2PZSQT, ABSOLUTECD8, IX1BROOG, ABSOLUTECD4, LABCD48  Lab Results   Component Value Date    WBC 6.64 07/23/2018    RBC 4.64 07/23/2018    HGB 14.4 07/23/2018    HCT 39.6 07/23/2018    MCV 85 07/23/2018    MCH 31.0 07/23/2018    MCHC 36.4 (H) 07/23/2018    RDW 13.8 07/23/2018     07/23/2018    MPV 8.6 (L) 07/23/2018    GRAN 4.8 07/23/2018    GRAN 72.7 07/23/2018    LYMPH 1.3 07/23/2018    LYMPH 19.4 07/23/2018    MONO 0.3 07/23/2018    MONO 5.1 07/23/2018    EOS 0.2 07/23/2018    BASO 0.01 07/23/2018    EOSINOPHIL 2.4 07/23/2018    BASOPHIL 0.2 07/23/2018     Sodium   Date Value Ref Range Status   05/01/2018 141 136 - 145 mmol/L Final     Potassium   Date Value Ref Range Status   05/01/2018 4.4 3.5 - 5.1 mmol/L Final     Chloride   Date Value Ref Range Status   05/01/2018 102 95 - 110 mmol/L Final     CO2   Date Value Ref Range Status   05/01/2018 31 (H) 23 - 29 mmol/L Final     Glucose   Date Value Ref Range Status   05/01/2018 89 70 - 110 mg/dL Final     BUN, Bld   Date Value Ref Range Status   05/01/2018 10 6 - 20 mg/dL Final     Creatinine   Date Value Ref Range Status   05/01/2018 0.8 0.5 - 1.4 mg/dL Final     Calcium   Date Value Ref Range Status   05/01/2018 10.0 8.7 - 10.5 mg/dL Final     Total Protein   Date Value Ref Range Status   05/01/2018 7.3 6.0  - 8.4 g/dL Final     Albumin   Date Value Ref Range Status   05/01/2018 4.0 3.5 - 5.2 g/dL Final     Total Bilirubin   Date Value Ref Range Status   05/01/2018 0.5 0.1 - 1.0 mg/dL Final     Comment:     For infants and newborns, interpretation of results should be based  on gestational age, weight and in agreement with clinical  observations.  Premature Infant recommended reference ranges:  Up to 24 hours.............<8.0 mg/dL  Up to 48 hours............<12.0 mg/dL  3-5 days..................<15.0 mg/dL  6-29 days.................<15.0 mg/dL       Alkaline Phosphatase   Date Value Ref Range Status   05/01/2018 136 (H) 55 - 135 U/L Final     AST   Date Value Ref Range Status   05/01/2018 17 10 - 40 U/L Final     ALT   Date Value Ref Range Status   05/01/2018 16 10 - 44 U/L Final     Anion Gap   Date Value Ref Range Status   05/01/2018 8 8 - 16 mmol/L Final     eGFR if    Date Value Ref Range Status   05/01/2018 >60 >60 mL/min/1.73 m^2 Final     eGFR if non    Date Value Ref Range Status   05/01/2018 >60 >60 mL/min/1.73 m^2 Final     Comment:     Calculation used to obtain the estimated glomerular filtration  rate (eGFR) is the CKD-EPI equation.        Diagnosis/Assessment/Plan:    1. Multiple Sclerosis  · Assessment: Patient presents for fit in appt today with a new sensory change to R outer thigh(patient is able to trace outline of sensory disturbance). At this time, it appears she has meralgia paraesthetica--I have discussed this with her today and provided handouts on same. I advised that she avoid her R thigh for upcoming Avonex injection. I suspect this is due to trauma of injection as opposed to weight/clothing/etc.   · Plan: conservative management: avoid Avonex injection in R thigh for upcoming dose. She may try OTC pain patch/pain cream, and increase her gabapentin if need afterward(take extra 1/2 tablet-300mg) with daytime dosing. She will continue with loose clothing.      Over 50% of this 20 minute visit was spent in direct face to face counseling of the patient about MS, DMT considerations, and MS symptom management.   She will keep upcoming appt in February.  Patient agreed to POC today.    Attending, Dr. Mendez, was available during today's encounter. Any change to plan along with cosign to appear in the EMR.     Makayla Swift PA-C  [facility]      Problem List Items Addressed This Visit        Neuro    Multiple sclerosis - Primary    Overview     AVONEX and gabapentin with chronic pain            Other    Counseling regarding goals of care      Other Visit Diagnoses     Meralgia paraesthetica, right

## 2018-10-23 ENCOUNTER — LAB VISIT (OUTPATIENT)
Dept: LAB | Facility: HOSPITAL | Age: 53
End: 2018-10-23
Attending: INTERNAL MEDICINE
Payer: MEDICARE

## 2018-10-23 DIAGNOSIS — D50.9 IRON DEFICIENCY ANEMIA, UNSPECIFIED IRON DEFICIENCY ANEMIA TYPE: ICD-10-CM

## 2018-10-23 LAB
BASOPHILS # BLD AUTO: 0.01 K/UL
BASOPHILS NFR BLD: 0.1 %
DIFFERENTIAL METHOD: ABNORMAL
EOSINOPHIL # BLD AUTO: 0.1 K/UL
EOSINOPHIL NFR BLD: 1.7 %
ERYTHROCYTE [DISTWIDTH] IN BLOOD BY AUTOMATED COUNT: 13.5 %
FERRITIN SERPL-MCNC: 124 NG/ML
HCT VFR BLD AUTO: 44.3 %
HGB BLD-MCNC: 15.4 G/DL
IRON SERPL-MCNC: 60 UG/DL
LYMPHOCYTES # BLD AUTO: 1.5 K/UL
LYMPHOCYTES NFR BLD: 21.2 %
MCH RBC QN AUTO: 31 PG
MCHC RBC AUTO-ENTMCNC: 34.8 G/DL
MCV RBC AUTO: 89 FL
MONOCYTES # BLD AUTO: 0.2 K/UL
MONOCYTES NFR BLD: 3.2 %
NEUTROPHILS # BLD AUTO: 5.3 K/UL
NEUTROPHILS NFR BLD: 73.8 %
PLATELET # BLD AUTO: 196 K/UL
PMV BLD AUTO: 8.7 FL
RBC # BLD AUTO: 4.97 M/UL
SATURATED IRON: 16 %
TOTAL IRON BINDING CAPACITY: 385 UG/DL
TRANSFERRIN SERPL-MCNC: 260 MG/DL
WBC # BLD AUTO: 7.21 K/UL

## 2018-10-23 PROCEDURE — 36415 COLL VENOUS BLD VENIPUNCTURE: CPT

## 2018-10-23 PROCEDURE — 83540 ASSAY OF IRON: CPT

## 2018-10-23 PROCEDURE — 82728 ASSAY OF FERRITIN: CPT

## 2018-10-23 PROCEDURE — 85025 COMPLETE CBC W/AUTO DIFF WBC: CPT

## 2018-10-26 ENCOUNTER — OFFICE VISIT (OUTPATIENT)
Dept: HEMATOLOGY/ONCOLOGY | Facility: CLINIC | Age: 53
End: 2018-10-26
Payer: MEDICARE

## 2018-10-26 VITALS
WEIGHT: 144.5 LBS | HEART RATE: 63 BPM | OXYGEN SATURATION: 95 % | TEMPERATURE: 98 F | BODY MASS INDEX: 28.37 KG/M2 | HEIGHT: 60 IN | DIASTOLIC BLOOD PRESSURE: 69 MMHG | SYSTOLIC BLOOD PRESSURE: 136 MMHG

## 2018-10-26 DIAGNOSIS — G35 MULTIPLE SCLEROSIS: ICD-10-CM

## 2018-10-26 DIAGNOSIS — D50.9 IRON DEFICIENCY ANEMIA, UNSPECIFIED IRON DEFICIENCY ANEMIA TYPE: Primary | ICD-10-CM

## 2018-10-26 PROCEDURE — 3075F SYST BP GE 130 - 139MM HG: CPT | Mod: CPTII,,, | Performed by: INTERNAL MEDICINE

## 2018-10-26 PROCEDURE — 99214 OFFICE O/P EST MOD 30 MIN: CPT | Mod: PBBFAC | Performed by: INTERNAL MEDICINE

## 2018-10-26 PROCEDURE — 3008F BODY MASS INDEX DOCD: CPT | Mod: CPTII,,, | Performed by: INTERNAL MEDICINE

## 2018-10-26 PROCEDURE — 3078F DIAST BP <80 MM HG: CPT | Mod: CPTII,,, | Performed by: INTERNAL MEDICINE

## 2018-10-26 PROCEDURE — 99999 PR PBB SHADOW E&M-EST. PATIENT-LVL IV: CPT | Mod: PBBFAC,,, | Performed by: INTERNAL MEDICINE

## 2018-10-26 PROCEDURE — 99213 OFFICE O/P EST LOW 20 MIN: CPT | Mod: S$PBB,,, | Performed by: INTERNAL MEDICINE

## 2018-10-26 RX ORDER — OXYCODONE AND ACETAMINOPHEN 5; 325 MG/1; MG/1
TABLET ORAL
Refills: 0 | COMMUNITY
Start: 2018-08-17 | End: 2018-11-05 | Stop reason: SDUPTHER

## 2018-10-26 RX ORDER — SODIUM CHLORIDE 0.9 % (FLUSH) 0.9 %
10 SYRINGE (ML) INJECTION
Status: CANCELLED | OUTPATIENT
Start: 2018-10-30

## 2018-10-26 RX ORDER — HEPARIN 100 UNIT/ML
500 SYRINGE INTRAVENOUS
Status: CANCELLED | OUTPATIENT
Start: 2018-10-30

## 2018-10-26 RX ORDER — HEPARIN 100 UNIT/ML
500 SYRINGE INTRAVENOUS
Status: CANCELLED | OUTPATIENT
Start: 2018-11-06

## 2018-10-26 RX ORDER — SODIUM CHLORIDE 0.9 % (FLUSH) 0.9 %
10 SYRINGE (ML) INJECTION
Status: CANCELLED | OUTPATIENT
Start: 2018-11-06

## 2018-10-26 NOTE — PROGRESS NOTES
"Subjective:       Patient ID: Zoey Cali is a 52 y.o. female.    Chief Complaint: Follow-up  Diagnosis: TRACIE  HPI     51 y/o female with history of MS seen  today for f/u for TRACIE  She undergoes intermittent IV iron therapy.  She has been intolerant of oral Fe supp  s/p GI eval with EGD and colonoscopy which was unremarkable    She is followed by Dr. Mendez for long standing history of MS and remains on therapy with Avonex   She has chronic pain  and neuralgia and on opioid pain medication managed by PCP and Neurology      Her son passed earlier this year from heroin overdose   He suffered from depression  She continues with grief counseling  She reports she is sleeping better  Still fatigued   No SOB/CP  No lightheadedness  Chronic neck pain-stable    CBC reveals wbc 7210 /mm3  Hb 15.4 g/dl   44.3  g/dl   plt ct 196k    Review of Systems   Constitutional: Positive for fatigue. Negative for appetite change and unexpected weight change.   Eyes: Negative for visual disturbance.   Respiratory: Negative for cough and shortness of breath.    Cardiovascular: Negative for chest pain and leg swelling.   Gastrointestinal: Negative for abdominal pain, blood in stool, constipation, diarrhea, nausea and vomiting.   Genitourinary: Negative for frequency.   Musculoskeletal: Negative for arthralgias, back pain, joint swelling and neck pain.   Skin: Negative for rash.        No petechiae, ecchymoses   Neurological: Negative for light-headedness and headaches.   Hematological: Negative for adenopathy. Does not bruise/bleed easily.   Psychiatric/Behavioral: Positive for dysphoric mood and sleep disturbance.       Objective:       Vitals:    10/26/18 1019   BP: 136/69   BP Location: Right arm   Patient Position: Sitting   BP Method: Medium (Automatic)   Pulse: 63   Temp: 97.8 °F (36.6 °C)   TempSrc: Oral   SpO2: 95%   Weight: 65.5 kg (144 lb 8.2 oz)   Height: 4' 11.5" (1.511 m)       Physical Exam   Constitutional: She is " oriented to person, place, and time. She appears well-developed and well-nourished.   HENT:   Head: Normocephalic.   Mouth/Throat: Oropharynx is clear and moist. No oropharyngeal exudate.   Eyes: Conjunctivae and lids are normal. Pupils are equal, round, and reactive to light. No scleral icterus.   Neck: Normal range of motion. Neck supple. No thyromegaly present.   Cardiovascular: Normal rate, regular rhythm and normal heart sounds.   No murmur heard.  Pulmonary/Chest: Breath sounds normal. She has no wheezes. She has no rales.   Abdominal: Soft. Bowel sounds are normal. She exhibits no distension and no mass. There is no hepatosplenomegaly. There is no tenderness. There is no rebound and no guarding.   Musculoskeletal: Normal range of motion. She exhibits no edema or tenderness.   Lymphadenopathy:     She has no cervical adenopathy.     She has no axillary adenopathy.        Right: No supraclavicular adenopathy present.        Left: No supraclavicular adenopathy present.   Neurological: She is alert and oriented to person, place, and time. No cranial nerve deficit. Coordination normal.   Skin: Skin is warm and dry. No ecchymosis, no petechiae and no rash noted. No erythema.   Psychiatric:   Dysphoric mood        Lab Results   Component Value Date    WBC 7.21 10/23/2018    HGB 15.4 10/23/2018    HCT 44.3 10/23/2018    MCV 89 10/23/2018     10/23/2018     Lab Results   Component Value Date    IRON 60 10/23/2018    TIBC 385 10/23/2018    FERRITIN 124 10/23/2018       Assessment:       1. Iron deficiency anemia, unspecified iron deficiency anemia type    2. Multiple sclerosis        Plan:   Zoey FRASER was seen today for follow-up.    Diagnoses and all orders for this visit:    Iron deficiency anemia, unspecified iron deficiency       S/p GI eval-unremarkable       S/p Venofer x 3 completed 5/2018        CBC reveals stable Hb 15.4 g/dl        Fe parameters reveal decreased Fe sat  In setting of worsening  fatigue       Plan Venofer x 2        Cont to monitor    Multiple Sclerosis    Follow-up with Neurology    Pt on Avonex      She will continue with grief counseling            F/u 3  mos with cbc, Fe studies prior to f/u ( or sooner if problems should arise in the interim)      Cc: MD Heidy Lancaster MD

## 2018-10-31 ENCOUNTER — INFUSION (OUTPATIENT)
Dept: INFUSION THERAPY | Facility: HOSPITAL | Age: 53
End: 2018-10-31
Attending: INTERNAL MEDICINE
Payer: MEDICARE

## 2018-10-31 VITALS
TEMPERATURE: 98 F | SYSTOLIC BLOOD PRESSURE: 107 MMHG | HEART RATE: 60 BPM | DIASTOLIC BLOOD PRESSURE: 56 MMHG | RESPIRATION RATE: 16 BRPM | OXYGEN SATURATION: 95 %

## 2018-10-31 DIAGNOSIS — D50.9 IRON DEFICIENCY ANEMIA, UNSPECIFIED IRON DEFICIENCY ANEMIA TYPE: Primary | ICD-10-CM

## 2018-10-31 PROCEDURE — 25000003 PHARM REV CODE 250: Performed by: INTERNAL MEDICINE

## 2018-10-31 PROCEDURE — 63600175 PHARM REV CODE 636 W HCPCS: Performed by: INTERNAL MEDICINE

## 2018-10-31 PROCEDURE — 96365 THER/PROPH/DIAG IV INF INIT: CPT

## 2018-10-31 RX ADMIN — IRON SUCROSE 200 MG: 20 INJECTION, SOLUTION INTRAVENOUS at 09:10

## 2018-10-31 NOTE — PLAN OF CARE
Problem: Patient Care Overview  Goal: Plan of Care Review  Outcome: Ongoing (interventions implemented as appropriate)  Patient received Venofer IVPB. Tolerated well. VSS. Received discharge instructions and verbalized understanding.

## 2018-11-05 ENCOUNTER — OFFICE VISIT (OUTPATIENT)
Dept: FAMILY MEDICINE | Facility: CLINIC | Age: 53
End: 2018-11-05
Payer: MEDICARE

## 2018-11-05 VITALS
SYSTOLIC BLOOD PRESSURE: 120 MMHG | HEIGHT: 59 IN | DIASTOLIC BLOOD PRESSURE: 76 MMHG | OXYGEN SATURATION: 96 % | BODY MASS INDEX: 28.76 KG/M2 | HEART RATE: 87 BPM | WEIGHT: 142.63 LBS | TEMPERATURE: 98 F

## 2018-11-05 DIAGNOSIS — F11.90 CHRONIC, CONTINUOUS USE OF OPIOIDS: Primary | ICD-10-CM

## 2018-11-05 DIAGNOSIS — F32.A DEPRESSION, UNSPECIFIED DEPRESSION TYPE: ICD-10-CM

## 2018-11-05 DIAGNOSIS — Z23 FLU VACCINE NEED: ICD-10-CM

## 2018-11-05 DIAGNOSIS — R25.2 SPASTICITY: ICD-10-CM

## 2018-11-05 PROCEDURE — 3074F SYST BP LT 130 MM HG: CPT | Mod: CPTII,S$GLB,, | Performed by: FAMILY MEDICINE

## 2018-11-05 PROCEDURE — G0008 ADMIN INFLUENZA VIRUS VAC: HCPCS | Mod: 59,S$GLB,, | Performed by: FAMILY MEDICINE

## 2018-11-05 PROCEDURE — 3078F DIAST BP <80 MM HG: CPT | Mod: CPTII,S$GLB,, | Performed by: FAMILY MEDICINE

## 2018-11-05 PROCEDURE — 96372 THER/PROPH/DIAG INJ SC/IM: CPT | Mod: S$GLB,,, | Performed by: FAMILY MEDICINE

## 2018-11-05 PROCEDURE — 90686 IIV4 VACC NO PRSV 0.5 ML IM: CPT | Mod: S$GLB,,, | Performed by: FAMILY MEDICINE

## 2018-11-05 PROCEDURE — 99215 OFFICE O/P EST HI 40 MIN: CPT | Mod: 25,S$GLB,, | Performed by: FAMILY MEDICINE

## 2018-11-05 PROCEDURE — 3008F BODY MASS INDEX DOCD: CPT | Mod: CPTII,S$GLB,, | Performed by: FAMILY MEDICINE

## 2018-11-05 PROCEDURE — 99999 PR PBB SHADOW E&M-EST. PATIENT-LVL III: CPT | Mod: PBBFAC,,, | Performed by: FAMILY MEDICINE

## 2018-11-05 RX ORDER — KETOROLAC TROMETHAMINE 30 MG/ML
60 INJECTION, SOLUTION INTRAMUSCULAR; INTRAVENOUS
Status: COMPLETED | OUTPATIENT
Start: 2018-11-05 | End: 2018-11-05

## 2018-11-05 RX ORDER — TIZANIDINE 4 MG/1
TABLET ORAL
Qty: 810 TABLET | Refills: 3 | Status: SHIPPED | OUTPATIENT
Start: 2018-11-05 | End: 2019-02-05 | Stop reason: SDUPTHER

## 2018-11-05 RX ORDER — CARISOPRODOL 350 MG/1
350 TABLET ORAL 4 TIMES DAILY PRN
Qty: 20 TABLET | Refills: 0 | Status: SHIPPED | OUTPATIENT
Start: 2018-11-05 | End: 2018-11-15

## 2018-11-05 RX ORDER — LORAZEPAM 2 MG/1
2 TABLET ORAL NIGHTLY
Qty: 30 TABLET | Refills: 5 | Status: SHIPPED | OUTPATIENT
Start: 2018-11-05 | End: 2018-12-21 | Stop reason: SDUPTHER

## 2018-11-05 RX ADMIN — KETOROLAC TROMETHAMINE 60 MG: 30 INJECTION, SOLUTION INTRAMUSCULAR; INTRAVENOUS at 04:11

## 2018-11-05 NOTE — PROGRESS NOTES
Administered Ketorolac 60 mg IM to right upper outer quad gluteus.  Patient tolerated injection well, no adverse reactions noted.   Administered Flu vaccine IM to right deltoid.  Patient tolerated injection well.  Patient advised to wait in lobby for 15 minutes for observation and to report any adverse reactions immediately.  Patient verbalized understanding.

## 2018-11-05 NOTE — PROGRESS NOTES
Chief Complaint   Patient presents with    Medication Refill       SUBJECTIVE:  Zoey Cali is a 52 y.o. female here for follow up of chronic pain.   Patient has chronic pain involving the lumbar spine, foot and leg neuropathy.  Conservative treatment with tylenol, NSAIDs, alternative treatments, complementary non opioid nerve/epileptic medication has failed with primary use.  Now opioid dependent for relief.  Has tried PT/OT, injections with mixed success.       Currently has co-morbidities including below problem list.      Social History     Tobacco Use    Smoking status: Current Every Day Smoker     Packs/day: 1.00     Types: Cigarettes    Smokeless tobacco: Never Used   Substance Use Topics    Alcohol use: No     Alcohol/week: 0.0 oz    Drug use: No       Patient Active Problem List    Diagnosis Date Noted    Closed fracture of left side of maxilla with routine healing 10/25/2017    Neuropathic pain 05/02/2017    Prophylactic immunotherapy 11/30/2016    Gait disturbance 11/30/2016    Chronic, continuous use of opioids 04/04/2016     She has mainly back and leg pain with neuropathic pain  Using gabapentin and oxycodone    GOALS:  Keep her walking and functioning with ADL's    PROGNOSIS:  Overall is fair      Vitamin D insufficiency 07/08/2015    Muscle spasm 07/08/2015    Counseling regarding goals of care 07/08/2015    Encounter for long-term (current) use of high-risk medication 07/08/2015    Urinary hesitancy 07/08/2015    Neuralgia and neuritis 06/02/2015     Bilateral foot and lower limb pain.      Tobacco abuse 06/02/2015     Trying to quit.  4/17 still trying to quit brief counseling  8/17 counseled again still not ready to do the tobacco cessation program but she is thinking about it, brief counseling       Osteoporosis 12/29/2014    Iron deficiency anemia 12/29/2014    Hyperlipidemia LDL goal < 130 12/29/2014    HTN (hypertension) 07/17/2012    Multiple sclerosis 07/17/2012  "    AVONEX and gabapentin with chronic pain         Review of Systems   Constitutional: Negative.    HENT: Negative.    Eyes: Negative.    Respiratory: Negative.    Cardiovascular: Negative.    Gastrointestinal: Negative.    Genitourinary: Negative.    Musculoskeletal: Positive for back pain, joint pain and myalgias.   Skin: Negative.    Neurological: Positive for tingling.   Endo/Heme/Allergies: Negative.    Psychiatric/Behavioral: The patient is nervous/anxious.        OBJECTIVE:  /76   Pulse 87   Temp 98 °F (36.7 °C) (Oral)   Ht 4' 11" (1.499 m)   Wt 64.7 kg (142 lb 10.2 oz)   LMP 01/01/2001 (Approximate) Comment: 2001  SpO2 96%   BMI 28.81 kg/m²     Wt Readings from Last 3 Encounters:   11/05/18 64.7 kg (142 lb 10.2 oz)   10/26/18 65.5 kg (144 lb 8.2 oz)   10/17/18 67.6 kg (149 lb)     BP Readings from Last 3 Encounters:   11/05/18 120/76   10/31/18 (!) 107/56   10/26/18 136/69       Patient is in usual state of health, alert and oriented without signs of intoxication.  No evidence on exam of illegal substance use or concerning symptoms involving abuse or intoxication (specifically evidence of IVDU, unusual bruising, slurred speech, unusual explanations).  Dentition is normal for patient without acute change.  Heart and lung exam are unremarkable.  Chronic pain sites are unchanged from prior exam.  No new focal neurological deficits noted.    Functional assessment:    PEG-3 : 21    Review of old Records:    Reviewed per epic and [facility]    Review of old labs:    No results found for: TSH  Lab Results   Component Value Date    WBC 7.21 10/23/2018    HGB 15.4 10/23/2018    HCT 44.3 10/23/2018    MCV 89 10/23/2018     10/23/2018       Chemistry        Component Value Date/Time     05/01/2018 1511    K 4.4 05/01/2018 1511     05/01/2018 1511    CO2 31 (H) 05/01/2018 1511    BUN 10 05/01/2018 1511    CREATININE 0.8 05/01/2018 1511    GLU 89 05/01/2018 1511        Component Value Date/Time "    CALCIUM 10.0 05/01/2018 1511    ALKPHOS 136 (H) 05/01/2018 1511    AST 17 05/01/2018 1511    ALT 16 05/01/2018 1511    BILITOT 0.5 05/01/2018 1511    ESTGFRAFRICA >60 05/01/2018 1511    EGFRNONAA >60 05/01/2018 1511        Lab Results   Component Value Date    CHOL 110 (L) 06/22/2015    CHOL 144 05/18/2015    CHOL 174 12/22/2014     Lab Results   Component Value Date    HDL 22 (L) 06/22/2015    HDL 24 (L) 05/18/2015    HDL 19 (L) 12/22/2014     Lab Results   Component Value Date    LDLCALC 47.0 (L) 06/22/2015    LDLCALC Invalid, Trig>400.0 05/18/2015    LDLCALC Invalid, Trig>400.0 12/22/2014     Lab Results   Component Value Date    TRIG 205 (H) 06/22/2015    TRIG 598 (H) 05/18/2015    TRIG 564 (H) 12/22/2014     Lab Results   Component Value Date    CHOLHDL 20.0 06/22/2015    CHOLHDL 16.7 (L) 05/18/2015    CHOLHDL 10.9 (L) 12/22/2014         Review of old imaging:  Reviewed imaging in epic over last 3 months.      ASSESSMENT:    ICD-10-CM ICD-9-CM   1. Chronic, continuous use of opioids F11.90 305.51   2. Spasticity R25.2 781.0   3. Depression, unspecified depression type F32.9 311       No evidence of abuse/diversion/addiction noted through history and physical, or checking the .  Risks, benefits and alternate treatments are considered and plan of care reflects that shared decision with the patient.      Continue with current care.  High risk medication review completed per guidelines to insure safest use of medication.          Referred patient to CDC.GOV to review the new opioid guidelines.  Explained there is a section for patient information that is very informative about the potential risks of chronic opioid therapy and some strategies to minimize risk.  Counseled to f/u with me with any questions or concerns as our goal is always to restore function and  Reducing pain while minimizing side effects and avoiding rare but life threatening risks of overdose/addiction/respiratory suppression.    We reviewed  our goals of care:    Keep her functioning and taking care of the family  She is able to do some education    And discontinuation    REASONS to DISCONTINUE:  >40 MME daily needed to get benefits.  Intolerable side effects.  abhorrent or distasteful behavior towards myself or staff in regards to medication.  Failure to provide drug screens when prompted.  Any criminal investigations or allegations in regards to narcotics.  Illegal selling or distribution of his medication    Future Appointments   Date Time Provider Department Center   11/7/2018  9:30 AM CHAIR 04 [facility] CHEMO [facility]   11/21/2018  9:00 AM LAB, [facility] LAB [facility]   1/22/2019 10:30 AM LAB, [facility] LAB [facility]   1/29/2019  1:40 PM Charito Mejia MD [facility] HEM ONC [facility]   2/21/2019 10:45 AM Makayla Swift PA-C [facility] MSC [facility]          Medication List           Accurate as of 11/5/18  4:30 PM. If you have any questions, ask your nurse or doctor.               CONTINUE taking these medications    carBAMazepine 200 MG 12 hr tablet  Commonly known as:  TEGRETOL XR  Take 1 tablet (200 mg total) by mouth 2 (two) times daily.     cetirizine 10 MG tablet  Commonly known as:  ZYRTEC  Take 1 tablet (10 mg total) by mouth once daily.     escitalopram oxalate 20 MG tablet  Commonly known as:  LEXAPRO  Take 1 tablet (20 mg total) by mouth once daily.     fluticasone 50 mcg/actuation nasal spray  Commonly known as:  FLONASE  ONE sprays(s) each nostril TWICE DAILY per instructed     * gabapentin 600 MG tablet  Commonly known as:  NEURONTIN  Take 1 tablet (600 mg total) by mouth 3 (three) times daily.     * gabapentin 400 MG capsule  Commonly known as:  NEURONTIN  Take 2 capsules (800 mg total) by mouth every evening.     ibuprofen 800 MG tablet  Commonly known as:  ADVIL,MOTRIN     interferon beta-1a 30 mcg/0.5 mL Pnkt  Commonly known as:  AVONEX  Inject 30 mcg into the muscle once a week.     LORazepam 2 MG  Tab  Commonly known as:  ATIVAN  Take 1 tablet (2 mg total) by mouth every evening.     metoprolol succinate 100 MG 24 hr tablet  Commonly known as:  TOPROL-XL  Take 1 tablet (100 mg total) by mouth once daily.     nystatin-triamcinolone cream  Commonly known as:  MYCOLOG II  Apply to affected area 2 times daily     omeprazole 40 MG capsule  Commonly known as:  PRILOSEC  Take 1 capsule (40 mg total) by mouth every evening.     ondansetron 4 MG Tbdl  Commonly known as:  ZOFRAN-ODT  TAKE ONE TABLET BY MOUTH EVERY 12 HOURS AS NEEDED     simvastatin 40 MG tablet  Commonly known as:  ZOCOR  Take 1 tablet (40 mg total) by mouth every evening.     tiZANidine 4 MG tablet  Commonly known as:  ZANAFLEX  TAKE 2 TABLETS IN THE MORNING, AT NOON AND IN THE EVENING AND TAKE 3 TABLETS AT NIGHT (9 TABLETS PER DAY)     triamterene-hydrochlorothiazide 37.5-25 mg 37.5-25 mg per tablet  Commonly known as:  MAXZIDE-25  Take 1 tablet by mouth once daily.     umeclidinium-vilanterol 62.5-25 mcg/actuation Dsdv  Commonly known as:  ANORO ELLIPTA  Inhale 1 puff into the lungs once daily. Controller     VITAMIN D3 5,000 unit Tab  Generic drug:  cholecalciferol (vitamin D3)         * This list has 2 medication(s) that are the same as other medications prescribed for you. Read the directions carefully, and ask your doctor or other care provider to review them with you.               Where to Get Your Medications      These medications were sent to [facility] [place] - [place] - [address]  [address], [place] [address]    Phone:  [phone]   · tiZANidine 4 MG tablet     You can get these medications from any pharmacy    Bring a paper prescription for each of these medications  · LORazepam 2 MG Tab          or sooner as needed

## 2018-11-07 ENCOUNTER — INFUSION (OUTPATIENT)
Dept: INFUSION THERAPY | Facility: HOSPITAL | Age: 53
End: 2018-11-07
Attending: INTERNAL MEDICINE
Payer: MEDICARE

## 2018-11-07 VITALS
SYSTOLIC BLOOD PRESSURE: 118 MMHG | TEMPERATURE: 98 F | DIASTOLIC BLOOD PRESSURE: 58 MMHG | OXYGEN SATURATION: 96 % | HEART RATE: 67 BPM | RESPIRATION RATE: 16 BRPM

## 2018-11-07 DIAGNOSIS — D50.9 IRON DEFICIENCY ANEMIA, UNSPECIFIED IRON DEFICIENCY ANEMIA TYPE: Primary | ICD-10-CM

## 2018-11-07 PROCEDURE — 96365 THER/PROPH/DIAG IV INF INIT: CPT

## 2018-11-07 PROCEDURE — 25000003 PHARM REV CODE 250: Performed by: INTERNAL MEDICINE

## 2018-11-07 PROCEDURE — 63600175 PHARM REV CODE 636 W HCPCS: Performed by: INTERNAL MEDICINE

## 2018-11-07 RX ADMIN — IRON SUCROSE 200 MG: 20 INJECTION, SOLUTION INTRAVENOUS at 09:11

## 2018-11-07 NOTE — PLAN OF CARE
Problem: Patient Care Overview  Goal: Plan of Care Review  Outcome: Ongoing (interventions implemented as appropriate)  Tolerated Venofer 2/2. No reported side effects. NAD. Pt discharged.

## 2018-11-27 ENCOUNTER — LAB VISIT (OUTPATIENT)
Dept: LAB | Facility: HOSPITAL | Age: 53
End: 2018-11-27
Attending: PSYCHIATRY & NEUROLOGY
Payer: MEDICARE

## 2018-11-27 DIAGNOSIS — G35 MULTIPLE SCLEROSIS: Primary | ICD-10-CM

## 2018-11-27 LAB
ALBUMIN SERPL BCP-MCNC: 3.9 G/DL
ALP SERPL-CCNC: 139 U/L
ALT SERPL W/O P-5'-P-CCNC: 17 U/L
AST SERPL-CCNC: 16 U/L
BILIRUB DIRECT SERPL-MCNC: 0.1 MG/DL
BILIRUB SERPL-MCNC: 0.3 MG/DL
PROT SERPL-MCNC: 7.2 G/DL

## 2018-11-27 PROCEDURE — 80076 HEPATIC FUNCTION PANEL: CPT

## 2018-11-27 PROCEDURE — 36415 COLL VENOUS BLD VENIPUNCTURE: CPT

## 2018-11-30 DIAGNOSIS — G35 MULTIPLE SCLEROSIS: ICD-10-CM

## 2018-11-30 RX ORDER — INTERFERON BETA-1A 30MCG/.5ML
KIT INTRAMUSCULAR
Qty: 12 PEN | Refills: 1 | Status: SHIPPED | OUTPATIENT
Start: 2018-11-30 | End: 2019-02-05 | Stop reason: SDUPTHER

## 2018-12-21 ENCOUNTER — PATIENT MESSAGE (OUTPATIENT)
Dept: FAMILY MEDICINE | Facility: CLINIC | Age: 53
End: 2018-12-21

## 2018-12-21 DIAGNOSIS — R25.2 SPASTICITY: ICD-10-CM

## 2018-12-21 DIAGNOSIS — F32.A DEPRESSION, UNSPECIFIED DEPRESSION TYPE: ICD-10-CM

## 2018-12-21 RX ORDER — LORAZEPAM 2 MG/1
2 TABLET ORAL EVERY 6 HOURS PRN
Qty: 30 TABLET | Refills: 0 | Status: SHIPPED | OUTPATIENT
Start: 2018-12-21 | End: 2019-01-29 | Stop reason: SDUPTHER

## 2018-12-31 ENCOUNTER — PATIENT MESSAGE (OUTPATIENT)
Dept: FAMILY MEDICINE | Facility: CLINIC | Age: 53
End: 2018-12-31

## 2018-12-31 DIAGNOSIS — H10.9 CONJUNCTIVITIS, UNSPECIFIED CONJUNCTIVITIS TYPE, UNSPECIFIED LATERALITY: Primary | ICD-10-CM

## 2018-12-31 RX ORDER — ERYTHROMYCIN 5 MG/G
OINTMENT OPHTHALMIC NIGHTLY
Qty: 1 TUBE | Refills: 0 | Status: SHIPPED | OUTPATIENT
Start: 2018-12-31 | End: 2018-12-31 | Stop reason: SDUPTHER

## 2018-12-31 RX ORDER — ERYTHROMYCIN 5 MG/G
OINTMENT OPHTHALMIC NIGHTLY
Qty: 1 TUBE | Refills: 0 | Status: SHIPPED | OUTPATIENT
Start: 2018-12-31 | End: 2019-01-10

## 2019-01-14 ENCOUNTER — OFFICE VISIT (OUTPATIENT)
Dept: FAMILY MEDICINE | Facility: CLINIC | Age: 54
End: 2019-01-14
Payer: MEDICARE

## 2019-01-14 VITALS
HEART RATE: 72 BPM | TEMPERATURE: 99 F | DIASTOLIC BLOOD PRESSURE: 80 MMHG | HEIGHT: 60 IN | SYSTOLIC BLOOD PRESSURE: 120 MMHG | BODY MASS INDEX: 29.73 KG/M2 | OXYGEN SATURATION: 99 % | WEIGHT: 151.44 LBS

## 2019-01-14 DIAGNOSIS — G35 MULTIPLE SCLEROSIS: ICD-10-CM

## 2019-01-14 DIAGNOSIS — N39.0 URINARY TRACT INFECTION WITHOUT HEMATURIA, SITE UNSPECIFIED: ICD-10-CM

## 2019-01-14 DIAGNOSIS — R39.9 URINARY SYMPTOM OR SIGN: Primary | ICD-10-CM

## 2019-01-14 LAB
BILIRUB SERPL-MCNC: NEGATIVE MG/DL
BLOOD URINE, POC: 50
COLOR, POC UA: NORMAL
GLUCOSE UR QL STRIP: NORMAL
KETONES UR QL STRIP: NEGATIVE
LEUKOCYTE ESTERASE URINE, POC: NORMAL
NITRITE, POC UA: POSITIVE
PH, POC UA: 6
PROTEIN, POC: + 30
SPECIFIC GRAVITY, POC UA: 1.01
UROBILINOGEN, POC UA: NORMAL

## 2019-01-14 PROCEDURE — 99214 PR OFFICE/OUTPT VISIT, EST, LEVL IV, 30-39 MIN: ICD-10-PCS | Mod: 25,S$GLB,, | Performed by: FAMILY MEDICINE

## 2019-01-14 PROCEDURE — 3079F PR MOST RECENT DIASTOLIC BLOOD PRESSURE 80-89 MM HG: ICD-10-PCS | Mod: CPTII,S$GLB,, | Performed by: FAMILY MEDICINE

## 2019-01-14 PROCEDURE — 99999 PR PBB SHADOW E&M-EST. PATIENT-LVL IV: ICD-10-PCS | Mod: PBBFAC,,, | Performed by: FAMILY MEDICINE

## 2019-01-14 PROCEDURE — 3008F PR BODY MASS INDEX (BMI) DOCUMENTED: ICD-10-PCS | Mod: CPTII,S$GLB,, | Performed by: FAMILY MEDICINE

## 2019-01-14 PROCEDURE — 99999 PR PBB SHADOW E&M-EST. PATIENT-LVL IV: CPT | Mod: PBBFAC,,, | Performed by: FAMILY MEDICINE

## 2019-01-14 PROCEDURE — 87184 SC STD DISK METHOD PER PLATE: CPT

## 2019-01-14 PROCEDURE — 3008F BODY MASS INDEX DOCD: CPT | Mod: CPTII,S$GLB,, | Performed by: FAMILY MEDICINE

## 2019-01-14 PROCEDURE — 87077 CULTURE AEROBIC IDENTIFY: CPT

## 2019-01-14 PROCEDURE — 87086 URINE CULTURE/COLONY COUNT: CPT

## 2019-01-14 PROCEDURE — 99214 OFFICE O/P EST MOD 30 MIN: CPT | Mod: 25,S$GLB,, | Performed by: FAMILY MEDICINE

## 2019-01-14 PROCEDURE — 87088 URINE BACTERIA CULTURE: CPT

## 2019-01-14 PROCEDURE — 3074F SYST BP LT 130 MM HG: CPT | Mod: CPTII,S$GLB,, | Performed by: FAMILY MEDICINE

## 2019-01-14 PROCEDURE — 87186 SC STD MICRODIL/AGAR DIL: CPT | Mod: 59

## 2019-01-14 PROCEDURE — 3074F PR MOST RECENT SYSTOLIC BLOOD PRESSURE < 130 MM HG: ICD-10-PCS | Mod: CPTII,S$GLB,, | Performed by: FAMILY MEDICINE

## 2019-01-14 PROCEDURE — 81002 URINALYSIS NONAUTO W/O SCOPE: CPT | Mod: S$GLB,,, | Performed by: FAMILY MEDICINE

## 2019-01-14 PROCEDURE — 81002 POCT URINE DIPSTICK WITHOUT MICROSCOPE: ICD-10-PCS | Mod: S$GLB,,, | Performed by: FAMILY MEDICINE

## 2019-01-14 PROCEDURE — 3079F DIAST BP 80-89 MM HG: CPT | Mod: CPTII,S$GLB,, | Performed by: FAMILY MEDICINE

## 2019-01-14 RX ORDER — SULFAMETHOXAZOLE AND TRIMETHOPRIM 800; 160 MG/1; MG/1
1 TABLET ORAL 2 TIMES DAILY
Qty: 10 TABLET | Refills: 0 | Status: SHIPPED | OUTPATIENT
Start: 2019-01-14 | End: 2019-01-19

## 2019-01-14 NOTE — PROGRESS NOTES
Subjective:       Patient ID: Zoey Cali is a 53 y.o. female.    Chief Complaint: Urinary Tract Infection    Urinary Tract Infection    This is a new problem. The current episode started 1 to 4 weeks ago. The problem has been unchanged. The quality of the pain is described as burning. The pain is mild. The maximum temperature recorded prior to her arrival was more than 104 F. Associated symptoms include frequency, nausea and vomiting. Associated symptoms comments: +malodorous urine. She has tried nothing for the symptoms. The treatment provided no relief. Her past medical history is significant for catheterization.     Review of Systems   Gastrointestinal: Positive for nausea and vomiting.   Genitourinary: Positive for frequency.       Objective:       Vitals:    01/14/19 1141   BP: 120/80   Pulse: 72   Temp: 99 °F (37.2 °C)   TempSrc: Oral   SpO2: 99%   Weight: 68.7 kg (151 lb 7.3 oz)   Height: 5' (1.524 m)       Physical Exam   Constitutional: She appears well-developed and well-nourished. No distress.   HENT:   Head: Normocephalic and atraumatic.   Neck: Normal range of motion. Neck supple.   Cardiovascular: Normal rate, regular rhythm and normal heart sounds. Exam reveals no gallop and no friction rub.   No murmur heard.  Pulmonary/Chest: Effort normal and breath sounds normal. No respiratory distress. She has no wheezes. She has no rales. She exhibits no tenderness.   Abdominal: There is no CVA tenderness.   Skin: She is not diaphoretic.   Psychiatric: She has a normal mood and affect.       Assessment:       1. Urinary symptom or sign    2. Multiple sclerosis    3. Urinary tract infection without hematuria, site unspecified        Plan:       Zoey was seen today for urinary tract infection.    Diagnoses and all orders for this visit:    Urinary symptom or sign  -     POCT URINE DIPSTICK WITHOUT MICROSCOPE  -     Urine culture; Future  -     Urine culture    Multiple sclerosis  Per  neurology    Urinary tract infection without hematuria, site unspecified  -     sulfamethoxazole-trimethoprim 800-160mg (BACTRIM DS) 800-160 mg Tab; Take 1 tablet by mouth 2 (two) times daily. for 5 days

## 2019-01-17 LAB — BACTERIA UR CULT: NORMAL

## 2019-01-23 ENCOUNTER — PATIENT MESSAGE (OUTPATIENT)
Dept: FAMILY MEDICINE | Facility: CLINIC | Age: 54
End: 2019-01-23

## 2019-01-23 DIAGNOSIS — N30.00 ACUTE CYSTITIS WITHOUT HEMATURIA: Primary | ICD-10-CM

## 2019-01-24 ENCOUNTER — LAB VISIT (OUTPATIENT)
Dept: LAB | Facility: HOSPITAL | Age: 54
End: 2019-01-24
Attending: FAMILY MEDICINE
Payer: MEDICARE

## 2019-01-24 ENCOUNTER — PATIENT MESSAGE (OUTPATIENT)
Dept: FAMILY MEDICINE | Facility: CLINIC | Age: 54
End: 2019-01-24

## 2019-01-24 DIAGNOSIS — N30.00 ACUTE CYSTITIS WITHOUT HEMATURIA: ICD-10-CM

## 2019-01-24 PROCEDURE — 87086 URINE CULTURE/COLONY COUNT: CPT

## 2019-01-24 PROCEDURE — 87077 CULTURE AEROBIC IDENTIFY: CPT

## 2019-01-24 PROCEDURE — 87186 SC STD MICRODIL/AGAR DIL: CPT

## 2019-01-24 PROCEDURE — 87088 URINE BACTERIA CULTURE: CPT

## 2019-01-25 ENCOUNTER — LAB VISIT (OUTPATIENT)
Dept: LAB | Facility: HOSPITAL | Age: 54
End: 2019-01-25
Attending: INTERNAL MEDICINE
Payer: MEDICARE

## 2019-01-25 DIAGNOSIS — D50.9 IRON DEFICIENCY ANEMIA, UNSPECIFIED IRON DEFICIENCY ANEMIA TYPE: ICD-10-CM

## 2019-01-25 LAB
BASOPHILS # BLD AUTO: 0.02 K/UL
BASOPHILS NFR BLD: 0.2 %
DIFFERENTIAL METHOD: ABNORMAL
EOSINOPHIL # BLD AUTO: 0.2 K/UL
EOSINOPHIL NFR BLD: 1.7 %
ERYTHROCYTE [DISTWIDTH] IN BLOOD BY AUTOMATED COUNT: 13.9 %
FERRITIN SERPL-MCNC: 186 NG/ML
HCT VFR BLD AUTO: 46.4 %
HGB BLD-MCNC: 16.3 G/DL
IRON SERPL-MCNC: 87 UG/DL
LYMPHOCYTES # BLD AUTO: 1.9 K/UL
LYMPHOCYTES NFR BLD: 21.5 %
MCH RBC QN AUTO: 32.2 PG
MCHC RBC AUTO-ENTMCNC: 35.1 G/DL
MCV RBC AUTO: 92 FL
MONOCYTES # BLD AUTO: 0.4 K/UL
MONOCYTES NFR BLD: 5 %
NEUTROPHILS # BLD AUTO: 6.2 K/UL
NEUTROPHILS NFR BLD: 71.6 %
PLATELET # BLD AUTO: 244 K/UL
PMV BLD AUTO: 8.8 FL
RBC # BLD AUTO: 5.06 M/UL
SATURATED IRON: 22 %
TOTAL IRON BINDING CAPACITY: 400 UG/DL
TRANSFERRIN SERPL-MCNC: 270 MG/DL
WBC # BLD AUTO: 8.61 K/UL

## 2019-01-25 PROCEDURE — 85025 COMPLETE CBC W/AUTO DIFF WBC: CPT

## 2019-01-25 PROCEDURE — 83540 ASSAY OF IRON: CPT

## 2019-01-25 PROCEDURE — 36415 COLL VENOUS BLD VENIPUNCTURE: CPT

## 2019-01-25 PROCEDURE — 82728 ASSAY OF FERRITIN: CPT

## 2019-01-26 LAB — BACTERIA UR CULT: NORMAL

## 2019-01-28 ENCOUNTER — TELEPHONE (OUTPATIENT)
Dept: FAMILY MEDICINE | Facility: CLINIC | Age: 54
End: 2019-01-28

## 2019-01-28 NOTE — TELEPHONE ENCOUNTER
----- Message from Devon Collier MD sent at 1/25/2019  5:37 PM CST -----  Let her know that there is not an active bladder infectjion

## 2019-01-29 ENCOUNTER — OFFICE VISIT (OUTPATIENT)
Dept: HEMATOLOGY/ONCOLOGY | Facility: CLINIC | Age: 54
End: 2019-01-29
Payer: MEDICARE

## 2019-01-29 VITALS
BODY MASS INDEX: 28.35 KG/M2 | WEIGHT: 144.38 LBS | OXYGEN SATURATION: 96 % | HEIGHT: 60 IN | HEART RATE: 78 BPM | SYSTOLIC BLOOD PRESSURE: 120 MMHG | TEMPERATURE: 98 F | DIASTOLIC BLOOD PRESSURE: 57 MMHG

## 2019-01-29 DIAGNOSIS — F32.A DEPRESSION, UNSPECIFIED DEPRESSION TYPE: ICD-10-CM

## 2019-01-29 DIAGNOSIS — R25.2 SPASTICITY: ICD-10-CM

## 2019-01-29 DIAGNOSIS — D50.9 IRON DEFICIENCY ANEMIA, UNSPECIFIED IRON DEFICIENCY ANEMIA TYPE: Primary | ICD-10-CM

## 2019-01-29 DIAGNOSIS — G35 MULTIPLE SCLEROSIS: ICD-10-CM

## 2019-01-29 PROCEDURE — 3074F PR MOST RECENT SYSTOLIC BLOOD PRESSURE < 130 MM HG: ICD-10-PCS | Mod: CPTII,S$GLB,, | Performed by: INTERNAL MEDICINE

## 2019-01-29 PROCEDURE — 99999 PR PBB SHADOW E&M-EST. PATIENT-LVL IV: CPT | Mod: PBBFAC,,, | Performed by: INTERNAL MEDICINE

## 2019-01-29 PROCEDURE — 99213 OFFICE O/P EST LOW 20 MIN: CPT | Mod: S$GLB,,, | Performed by: INTERNAL MEDICINE

## 2019-01-29 PROCEDURE — 99213 PR OFFICE/OUTPT VISIT, EST, LEVL III, 20-29 MIN: ICD-10-PCS | Mod: S$GLB,,, | Performed by: INTERNAL MEDICINE

## 2019-01-29 PROCEDURE — 3008F PR BODY MASS INDEX (BMI) DOCUMENTED: ICD-10-PCS | Mod: CPTII,S$GLB,, | Performed by: INTERNAL MEDICINE

## 2019-01-29 PROCEDURE — 3078F DIAST BP <80 MM HG: CPT | Mod: CPTII,S$GLB,, | Performed by: INTERNAL MEDICINE

## 2019-01-29 PROCEDURE — 3008F BODY MASS INDEX DOCD: CPT | Mod: CPTII,S$GLB,, | Performed by: INTERNAL MEDICINE

## 2019-01-29 PROCEDURE — 99999 PR PBB SHADOW E&M-EST. PATIENT-LVL IV: ICD-10-PCS | Mod: PBBFAC,,, | Performed by: INTERNAL MEDICINE

## 2019-01-29 PROCEDURE — 3074F SYST BP LT 130 MM HG: CPT | Mod: CPTII,S$GLB,, | Performed by: INTERNAL MEDICINE

## 2019-01-29 PROCEDURE — 3078F PR MOST RECENT DIASTOLIC BLOOD PRESSURE < 80 MM HG: ICD-10-PCS | Mod: CPTII,S$GLB,, | Performed by: INTERNAL MEDICINE

## 2019-01-29 NOTE — PROGRESS NOTES
Subjective:       Patient ID: Zoey Cali is a 53 y.o. female.    Chief Complaint: Follow-up  Diagnosis: TRACIE  HPI     52 y/o female with history of MS seen  today for f/u for TRACIE  She undergoes intermittent IV iron therapy.  She has been intolerant of oral Fe supp  s/p GI eval with EGD and colonoscopy which was unremarkable    She is followed by Dr. Mendez for long standing history of MS and remains on therapy with Avonex   She has chronic pain  and neuralgia and on opioid pain medication managed by PCP and Neurology      She reports life related stressors  Her son passed last year from heroin overdose   He suffered from depression  She continues with grief counseling  She continues with sleep disturbances    No fatigue  No SOB/CP  No lightheadedness  Chronic neck pain-stable    CBC reveals wbc 8610 /mm3  Hb 16.3 g/dl   46.4 g/dl   plt ct 244k    Review of Systems   Constitutional: Negative for appetite change, fatigue and unexpected weight change.   Eyes: Negative for visual disturbance.   Respiratory: Negative for cough and shortness of breath.    Cardiovascular: Negative for chest pain and leg swelling.   Gastrointestinal: Negative for abdominal pain, blood in stool, constipation, diarrhea, nausea and vomiting.   Genitourinary: Negative for frequency.   Musculoskeletal: Positive for neck pain (chronic). Negative for arthralgias, back pain and joint swelling.   Skin: Negative for rash.        No petechiae, ecchymoses   Neurological: Negative for light-headedness and headaches.   Hematological: Negative for adenopathy. Does not bruise/bleed easily.   Psychiatric/Behavioral: Positive for sleep disturbance.       Objective:       Vitals:    01/29/19 1344   BP: (!) 120/57   BP Location: Left arm   Patient Position: Sitting   BP Method: Medium (Automatic)   Pulse: 78   Temp: 97.8 °F (36.6 °C)   TempSrc: Oral   SpO2: 96%   Weight: 65.5 kg (144 lb 6.4 oz)   Height: 5' (1.524 m)       Physical Exam    Constitutional: She is oriented to person, place, and time. She appears well-developed and well-nourished.   HENT:   Head: Normocephalic.   Mouth/Throat: Oropharynx is clear and moist. No oropharyngeal exudate.   Eyes: Conjunctivae and lids are normal. Pupils are equal, round, and reactive to light. No scleral icterus.   Neck: Normal range of motion. Neck supple. No thyromegaly present.   Cardiovascular: Normal rate, regular rhythm and normal heart sounds.   No murmur heard.  Pulmonary/Chest: Breath sounds normal. She has no wheezes. She has no rales.   Abdominal: Soft. Bowel sounds are normal. She exhibits no distension and no mass. There is no hepatosplenomegaly. There is no tenderness. There is no rebound and no guarding.   Musculoskeletal: Normal range of motion. She exhibits no edema or tenderness.   Neurological: She is alert and oriented to person, place, and time. No cranial nerve deficit. Coordination normal.   Skin: Skin is warm and dry. No ecchymosis, no petechiae and no rash noted. No erythema.   Psychiatric:   Dysphoric mood        Lab Results   Component Value Date    WBC 8.61 01/25/2019    HGB 16.3 (H) 01/25/2019    HCT 46.4 01/25/2019    MCV 92 01/25/2019     01/25/2019     Lab Results   Component Value Date    IRON 87 01/25/2019    TIBC 400 01/25/2019    FERRITIN 186 01/25/2019       Assessment:       1. Iron deficiency anemia, unspecified iron deficiency anemia type    2. Multiple sclerosis        Plan:   Zoey FRASER was seen today for follow-up.    Diagnoses and all orders for this visit:    Iron deficiency anemia, unspecified iron deficiency       S/p GI eval-unremarkable       S/p Venofer x 3 completed 11/2018        CBC reveals stable Hb 16.3g/dl        Fe  OK       Multiple Sclerosis    Follow-up with Neurology    Pt on Avonex      She will continue with grief counseling            F/u 3  mos with cbc, Fe studies prior to f/u ( or sooner if problems should arise in the interim)       Cc: MD Heidy Lancaster MD

## 2019-01-30 RX ORDER — LORAZEPAM 2 MG/1
TABLET ORAL
Qty: 30 TABLET | Refills: 2 | Status: SHIPPED | OUTPATIENT
Start: 2019-01-30 | End: 2019-02-05 | Stop reason: SDUPTHER

## 2019-02-05 ENCOUNTER — OFFICE VISIT (OUTPATIENT)
Dept: FAMILY MEDICINE | Facility: CLINIC | Age: 54
End: 2019-02-05
Payer: MEDICARE

## 2019-02-05 VITALS
HEART RATE: 68 BPM | DIASTOLIC BLOOD PRESSURE: 70 MMHG | HEIGHT: 60 IN | TEMPERATURE: 97 F | OXYGEN SATURATION: 96 % | WEIGHT: 148.56 LBS | BODY MASS INDEX: 29.17 KG/M2 | SYSTOLIC BLOOD PRESSURE: 134 MMHG

## 2019-02-05 DIAGNOSIS — F32.A DEPRESSION, UNSPECIFIED DEPRESSION TYPE: ICD-10-CM

## 2019-02-05 DIAGNOSIS — M79.2 NEUROPATHIC PAIN: ICD-10-CM

## 2019-02-05 DIAGNOSIS — G35 MULTIPLE SCLEROSIS: ICD-10-CM

## 2019-02-05 DIAGNOSIS — F11.90 CHRONIC, CONTINUOUS USE OF OPIOIDS: ICD-10-CM

## 2019-02-05 DIAGNOSIS — I10 ESSENTIAL HYPERTENSION: ICD-10-CM

## 2019-02-05 DIAGNOSIS — E78.5 HYPERLIPIDEMIA, UNSPECIFIED HYPERLIPIDEMIA TYPE: ICD-10-CM

## 2019-02-05 DIAGNOSIS — R25.2 SPASTICITY: ICD-10-CM

## 2019-02-05 DIAGNOSIS — M79.2 NEURALGIA AND NEURITIS: Primary | ICD-10-CM

## 2019-02-05 PROCEDURE — 3075F PR MOST RECENT SYSTOLIC BLOOD PRESS GE 130-139MM HG: ICD-10-PCS | Mod: CPTII,S$GLB,, | Performed by: FAMILY MEDICINE

## 2019-02-05 PROCEDURE — 99999 PR PBB SHADOW E&M-EST. PATIENT-LVL III: ICD-10-PCS | Mod: PBBFAC,,, | Performed by: FAMILY MEDICINE

## 2019-02-05 PROCEDURE — 3008F PR BODY MASS INDEX (BMI) DOCUMENTED: ICD-10-PCS | Mod: CPTII,S$GLB,, | Performed by: FAMILY MEDICINE

## 2019-02-05 PROCEDURE — 99999 PR PBB SHADOW E&M-EST. PATIENT-LVL III: CPT | Mod: PBBFAC,,, | Performed by: FAMILY MEDICINE

## 2019-02-05 PROCEDURE — 3008F BODY MASS INDEX DOCD: CPT | Mod: CPTII,S$GLB,, | Performed by: FAMILY MEDICINE

## 2019-02-05 PROCEDURE — 3078F PR MOST RECENT DIASTOLIC BLOOD PRESSURE < 80 MM HG: ICD-10-PCS | Mod: CPTII,S$GLB,, | Performed by: FAMILY MEDICINE

## 2019-02-05 PROCEDURE — 99215 OFFICE O/P EST HI 40 MIN: CPT | Mod: S$GLB,,, | Performed by: FAMILY MEDICINE

## 2019-02-05 PROCEDURE — 3075F SYST BP GE 130 - 139MM HG: CPT | Mod: CPTII,S$GLB,, | Performed by: FAMILY MEDICINE

## 2019-02-05 PROCEDURE — 99215 PR OFFICE/OUTPT VISIT, EST, LEVL V, 40-54 MIN: ICD-10-PCS | Mod: S$GLB,,, | Performed by: FAMILY MEDICINE

## 2019-02-05 PROCEDURE — 3078F DIAST BP <80 MM HG: CPT | Mod: CPTII,S$GLB,, | Performed by: FAMILY MEDICINE

## 2019-02-05 RX ORDER — IBUPROFEN 800 MG/1
TABLET ORAL
Qty: 180 TABLET | Refills: 1 | Status: SHIPPED | OUTPATIENT
Start: 2019-02-05 | End: 2019-06-05 | Stop reason: SDUPTHER

## 2019-02-05 RX ORDER — TIZANIDINE 4 MG/1
TABLET ORAL
Qty: 810 TABLET | Refills: 3 | Status: SHIPPED | OUTPATIENT
Start: 2019-02-05 | End: 2020-01-21 | Stop reason: SDUPTHER

## 2019-02-05 RX ORDER — HYDROCODONE BITARTRATE AND ACETAMINOPHEN 10; 325 MG/1; MG/1
1 TABLET ORAL 3 TIMES DAILY PRN
Qty: 90 TABLET | Refills: 0 | Status: SHIPPED | OUTPATIENT
Start: 2019-02-05 | End: 2019-05-06 | Stop reason: SDUPTHER

## 2019-02-05 RX ORDER — TRAZODONE HYDROCHLORIDE 150 MG/1
150-300 TABLET ORAL NIGHTLY
Qty: 180 TABLET | Refills: 3 | Status: SHIPPED | OUTPATIENT
Start: 2019-02-05 | End: 2019-09-26

## 2019-02-05 RX ORDER — METOPROLOL SUCCINATE 100 MG/1
100 TABLET, EXTENDED RELEASE ORAL DAILY
Qty: 90 TABLET | Refills: 3 | Status: SHIPPED | OUTPATIENT
Start: 2019-02-05 | End: 2020-02-20

## 2019-02-05 RX ORDER — LORAZEPAM 2 MG/1
2 TABLET ORAL EVERY 6 HOURS PRN
Qty: 120 TABLET | Refills: 0 | Status: SHIPPED | OUTPATIENT
Start: 2019-02-05 | End: 2019-05-06 | Stop reason: SDUPTHER

## 2019-02-05 RX ORDER — SIMVASTATIN 40 MG/1
40 TABLET, FILM COATED ORAL NIGHTLY
Qty: 90 TABLET | Refills: 3 | Status: SHIPPED | OUTPATIENT
Start: 2019-02-05 | End: 2020-02-20

## 2019-02-05 NOTE — PROGRESS NOTES
Chief Complaint   Patient presents with    Medication Refill       SUBJECTIVE:  Zoey Cali is a 53 y.o. female here for follow up of chronic pain.   Patient has chronic pain involving the ankle, foot, lumbar spine.  Conservative treatment with tylenol, NSAIDs, alternative treatments, complementary non opioid nerve/epileptic medication has failed with primary use.  Now opioid dependent for relief.  Has tried PT/OT, injections with mixed success.    Allodynia not present  Central pain sensitization not present       Currently has co-morbidities including below problem list.      Social History     Tobacco Use    Smoking status: Current Every Day Smoker     Packs/day: 1.00     Types: Cigarettes    Smokeless tobacco: Never Used   Substance Use Topics    Alcohol use: No     Alcohol/week: 0.0 oz    Drug use: No       Patient Active Problem List    Diagnosis Date Noted    Closed fracture of left side of maxilla with routine healing 10/25/2017    Neuropathic pain 05/02/2017    Prophylactic immunotherapy 11/30/2016    Gait disturbance 11/30/2016    Chronic, continuous use of opioids 04/04/2016     She has mainly back and leg pain with neuropathic pain  Using gabapentin and oxycodone    GOALS:  Keep her walking and functioning with ADL's    PROGNOSIS:  Overall is fair      Vitamin D insufficiency 07/08/2015    Muscle spasm 07/08/2015    Counseling regarding goals of care 07/08/2015    Encounter for long-term (current) use of high-risk medication 07/08/2015    Urinary hesitancy 07/08/2015    Neuralgia and neuritis 06/02/2015     Bilateral foot and lower limb pain.      Tobacco abuse 06/02/2015     Trying to quit.  4/17 still trying to quit brief counseling  8/17 counseled again still not ready to do the tobacco cessation program but she is thinking about it, brief counseling       Osteoporosis 12/29/2014    Iron deficiency anemia 12/29/2014    Hyperlipidemia LDL goal < 130 12/29/2014    HTN  (hypertension) 07/17/2012    Multiple sclerosis 07/17/2012     AVONEX and gabapentin with chronic pain         Review of Systems   Constitutional: Positive for malaise/fatigue. Negative for chills, diaphoresis, fever and weight loss.   HENT: Negative.    Eyes: Negative.    Respiratory: Negative.    Cardiovascular: Negative.    Gastrointestinal: Negative.    Genitourinary: Negative.    Musculoskeletal: Positive for myalgias. Negative for back pain, falls, joint pain and neck pain.   Skin: Negative.    Neurological: Negative.  Negative for weakness.   Endo/Heme/Allergies: Negative.    Psychiatric/Behavioral: Negative.        OBJECTIVE:  /70   Pulse 68   Temp 97.3 °F (36.3 °C) (Oral)   Ht 5' (1.524 m)   Wt 67.4 kg (148 lb 9.4 oz)   LMP 01/01/2001 (Approximate) Comment: 2001  SpO2 96%   BMI 29.02 kg/m²     Wt Readings from Last 3 Encounters:   02/05/19 67.4 kg (148 lb 9.4 oz)   01/29/19 65.5 kg (144 lb 6.4 oz)   01/14/19 68.7 kg (151 lb 7.3 oz)     BP Readings from Last 3 Encounters:   02/05/19 134/70   01/29/19 (!) 120/57   01/14/19 120/80       Patient is in usual state of health, alert and oriented without signs of intoxication.  No evidence on exam of illegal substance use or concerning symptoms involving abuse or intoxication (specifically evidence of IVDU, unusual bruising, slurred speech, unusual explanations).  Dentition is normal for patient without acute change.  Heart and lung exam are unremarkable.  Chronic pain sites are unchanged from prior exam.  No new focal neurological deficits noted.    Functional assessment:    PEG-3 12    ABerrant behavior:    None noted today      Review of old Records:    Reviewed per epic and [facility]    Review of old labs:    No results found for: TSH  Lab Results   Component Value Date    WBC 8.61 01/25/2019    HGB 16.3 (H) 01/25/2019    HCT 46.4 01/25/2019    MCV 92 01/25/2019     01/25/2019       Chemistry        Component Value Date/Time     05/01/2018  1511    K 4.4 05/01/2018 1511     05/01/2018 1511    CO2 31 (H) 05/01/2018 1511    BUN 10 05/01/2018 1511    CREATININE 0.8 05/01/2018 1511    GLU 89 05/01/2018 1511        Component Value Date/Time    CALCIUM 10.0 05/01/2018 1511    ALKPHOS 139 (H) 11/27/2018 1214    AST 16 11/27/2018 1214    ALT 17 11/27/2018 1214    BILITOT 0.3 11/27/2018 1214    ESTGFRAFRICA >60 05/01/2018 1511    EGFRNONAA >60 05/01/2018 1511        Lab Results   Component Value Date    CHOL 110 (L) 06/22/2015    CHOL 144 05/18/2015    CHOL 174 12/22/2014     Lab Results   Component Value Date    HDL 22 (L) 06/22/2015    HDL 24 (L) 05/18/2015    HDL 19 (L) 12/22/2014     Lab Results   Component Value Date    LDLCALC 47.0 (L) 06/22/2015    LDLCALC Invalid, Trig>400.0 05/18/2015    LDLCALC Invalid, Trig>400.0 12/22/2014     Lab Results   Component Value Date    TRIG 205 (H) 06/22/2015    TRIG 598 (H) 05/18/2015    TRIG 564 (H) 12/22/2014     Lab Results   Component Value Date    CHOLHDL 20.0 06/22/2015    CHOLHDL 16.7 (L) 05/18/2015    CHOLHDL 10.9 (L) 12/22/2014         Review of old imaging:  Reviewed imaging in epic over last 3 months.      ASSESSMENT/plan    ICD-10-CM ICD-9-CM   1. Neuralgia and neuritis M79.2 729.2   2. Spasticity R25.2 781.0   3. Multiple sclerosis G35 340   4. Essential hypertension I10 401.9   5. Hyperlipidemia, unspecified hyperlipidemia type E78.5 272.4   6. Chronic, continuous use of opioids F11.90 305.51   7. Neuropathic pain M79.2 729.2   8. Depression, unspecified depression type F32.9 311       No evidence of abuse/diversion/addiction noted through history and physical, or checking the .  Risks, benefits and alternate treatments are considered and plan of care reflects that shared decision with the patient.    Referred patient to CDC.GOV to review the new opioid guidelines.  Explained there is a section for patient information that is very informative about the potential risks of chronic opioid therapy and  some strategies to minimize risk.  Counseled to f/u with me with any questions or concerns as our goal is always to restore function and  Reducing pain while minimizing side effects and avoiding rare but life threatening risks of overdose/addiction/respiratory suppression.    Continue with current care     Use very PRN hydrocoone/ativan for 120 days at a time.    High risk medication review completed per guidelines to insure safest use of medication.    We reviewed our goals of care:    Functional restoration  Keep her functioning.    And discontinuation    REASONS to DISCONTINUE:  PRN use.  Intolerable side effects.  abhorrent or distasteful behavior towards myself or staff in regards to medication.  Failure to provide drug screens when prompted.  Any criminal investigations or allegations in regards to narcotics.  Illegal selling or distribution of his medication  Failure to progress to functional restoration    Future Appointments   Date Time Provider Department Center   2/21/2019 10:45 AM [facility]   4/26/2019 10:00 AM LAB, [facility] DRAW STATION [facility]   4/29/2019  1:20 PM Charito Mejia MD [facility] HEM ONC [facility]       Medication List with Changes/Refills   New Medications    HYDROCODONE-ACETAMINOPHEN (NORCO)  MG PER TABLET    Take 1 tablet by mouth 3 (three) times daily as needed (pain).   Current Medications    CARBAMAZEPINE (TEGRETOL XR) 200 MG 12 HR TABLET    Take 1 tablet (200 mg total) by mouth 2 (two) times daily.    CETIRIZINE (ZYRTEC) 10 MG TABLET    Take 1 tablet (10 mg total) by mouth once daily.    CHOLECALCIFEROL, VITAMIN D3, (VITAMIN D3) 5,000 UNIT TAB    Take 5,000 Units by mouth once daily.    ESCITALOPRAM OXALATE (LEXAPRO) 20 MG TABLET    Take 1 tablet (20 mg total) by mouth once daily.    FLUTICASONE (FLONASE) 50 MCG/ACTUATION NASAL SPRAY    ONE sprays(s) each nostril TWICE DAILY per instructed    GABAPENTIN (NEURONTIN) 400 MG CAPSULE     Take 2 capsules (800 mg total) by mouth every evening.    GABAPENTIN (NEURONTIN) 600 MG TABLET    Take 1 tablet (600 mg total) by mouth 3 (three) times daily.    NYSTATIN-TRIAMCINOLONE (MYCOLOG II) CREAM    Apply to affected area 2 times daily    OMEPRAZOLE (PRILOSEC) 40 MG CAPSULE    Take 1 capsule (40 mg total) by mouth every evening.    ONDANSETRON (ZOFRAN-ODT) 4 MG TBDL    TAKE ONE TABLET BY MOUTH EVERY 12 HOURS AS NEEDED    TRIAMTERENE-HYDROCHLOROTHIAZIDE 37.5-25 MG (MAXZIDE-25) 37.5-25 MG PER TABLET    Take 1 tablet by mouth once daily.    UMECLIDINIUM-VILANTEROL 62.5-25 MCG/ACTUATION DSDV    Inhale 1 puff into the lungs once daily. Controller   Changed and/or Refilled Medications    Modified Medication Previous Medication    IBUPROFEN (ADVIL,MOTRIN) 800 MG TABLET ibuprofen (ADVIL,MOTRIN) 800 MG tablet       TAKE ONE TABLET BY MOUTH EVERY 8 HOURS WITH FOOD AND WATER    TAKE ONE TABLET BY MOUTH EVERY 8 HOURS WITH FOOD AND WATER    INTERFERON BETA-1A (AVONEX) 30 MCG/0.5 ML PNKT AVONEX 30 mcg/0.5 mL PnKt       INJECT 30 MCG INTRAMUSCULARLY ONCE WEEKLY    INJECT 30 MCG INTRAMUSCULARLY ONCE WEEKLY    LORAZEPAM (ATIVAN) 2 MG TAB LORazepam (ATIVAN) 2 MG Tab       Take 1 tablet (2 mg total) by mouth every 6 (six) hours as needed.    TAKE ONE TABLET BY MOUTH EVERY 6 HOURS AS NEEDED    METOPROLOL SUCCINATE (TOPROL-XL) 100 MG 24 HR TABLET metoprolol succinate (TOPROL-XL) 100 MG 24 hr tablet       Take 1 tablet (100 mg total) by mouth once daily.    Take 1 tablet (100 mg total) by mouth once daily.    SIMVASTATIN (ZOCOR) 40 MG TABLET simvastatin (ZOCOR) 40 MG tablet       Take 1 tablet (40 mg total) by mouth every evening.    Take 1 tablet (40 mg total) by mouth every evening.    TIZANIDINE (ZANAFLEX) 4 MG TABLET tiZANidine (ZANAFLEX) 4 MG tablet       TAKE 2 TABLETS IN THE MORNING, AT NOON AND IN THE EVENING AND TAKE 3 TABLETS AT NIGHT (9 TABLETS PER DAY)    TAKE 2 TABLETS IN THE MORNING, AT NOON AND IN THE EVENING AND  TAKE 3 TABLETS AT NIGHT (9 TABLETS PER DAY)       4 months or sooner as needed

## 2019-02-08 ENCOUNTER — PATIENT MESSAGE (OUTPATIENT)
Dept: FAMILY MEDICINE | Facility: CLINIC | Age: 54
End: 2019-02-08

## 2019-02-08 DIAGNOSIS — R68.89 FLU-LIKE SYMPTOMS: Primary | ICD-10-CM

## 2019-02-08 RX ORDER — OSELTAMIVIR PHOSPHATE 75 MG/1
75 CAPSULE ORAL 2 TIMES DAILY
Qty: 10 CAPSULE | Refills: 0 | Status: SHIPPED | OUTPATIENT
Start: 2019-02-08 | End: 2019-02-13

## 2019-02-12 ENCOUNTER — TELEPHONE (OUTPATIENT)
Dept: FAMILY MEDICINE | Facility: CLINIC | Age: 54
End: 2019-02-12

## 2019-02-12 NOTE — TELEPHONE ENCOUNTER
----- Message from Garima Coon sent at 2/12/2019  2:24 PM CST -----  Contact: Isaias with [employer]/ [phone] ref# [id number]  PA: [interferon beta-1a (AVONEX) 30 mcg/0.5 mL PnKt]. Thank you.

## 2019-02-14 ENCOUNTER — TELEPHONE (OUTPATIENT)
Dept: FAMILY MEDICINE | Facility: CLINIC | Age: 54
End: 2019-02-14

## 2019-02-14 NOTE — TELEPHONE ENCOUNTER
----- Message from Yamilka Portillo sent at 2/14/2019  9:23 AM CST -----  Contact: [employer] - Brandan Gipson calling to speak to a nurse regarding PA. [phone] reff # [id number]

## 2019-02-21 ENCOUNTER — TELEPHONE (OUTPATIENT)
Dept: NEUROLOGY | Facility: CLINIC | Age: 54
End: 2019-02-21

## 2019-02-21 DIAGNOSIS — G35 MULTIPLE SCLEROSIS: ICD-10-CM

## 2019-02-21 DIAGNOSIS — M79.2 NEUROPATHIC PAIN OF FOOT, LEFT: ICD-10-CM

## 2019-02-21 DIAGNOSIS — M79.2 NEUROPATHIC PAIN OF FOOT, RIGHT: ICD-10-CM

## 2019-02-21 RX ORDER — GABAPENTIN 600 MG/1
TABLET ORAL
Qty: 270 TABLET | Refills: 1 | Status: SHIPPED | OUTPATIENT
Start: 2019-02-21 | End: 2019-09-26 | Stop reason: SDUPTHER

## 2019-02-21 NOTE — TELEPHONE ENCOUNTER
----- Message from Alysha Amaral sent at 2/21/2019  8:47 AM CST -----  Contact: pt   Needs Advice    Reason for call: pt is calling to speak with the nurse to reschedule her appt for this morning pt said she isn't feeling good         Communication Preference: can you please call pt at [phone]    Additional Information: none    STEPHANIE

## 2019-02-25 ENCOUNTER — PATIENT MESSAGE (OUTPATIENT)
Dept: FAMILY MEDICINE | Facility: CLINIC | Age: 54
End: 2019-02-25

## 2019-02-25 DIAGNOSIS — M79.2 NEUROPATHIC PAIN: ICD-10-CM

## 2019-02-26 RX ORDER — CARBAMAZEPINE 200 MG/1
TABLET, EXTENDED RELEASE ORAL
Qty: 60 TABLET | Refills: 5 | Status: SHIPPED | OUTPATIENT
Start: 2019-02-26 | End: 2019-08-29 | Stop reason: SDUPTHER

## 2019-03-27 ENCOUNTER — OFFICE VISIT (OUTPATIENT)
Dept: NEUROLOGY | Facility: CLINIC | Age: 54
End: 2019-03-27
Payer: MEDICARE

## 2019-03-27 VITALS
SYSTOLIC BLOOD PRESSURE: 133 MMHG | HEIGHT: 60 IN | BODY MASS INDEX: 29.73 KG/M2 | HEART RATE: 56 BPM | DIASTOLIC BLOOD PRESSURE: 69 MMHG | WEIGHT: 151.44 LBS

## 2019-03-27 DIAGNOSIS — G35 MULTIPLE SCLEROSIS: Primary | ICD-10-CM

## 2019-03-27 DIAGNOSIS — G57.10 MERALGIA PARAESTHETICA, UNSPECIFIED LATERALITY: ICD-10-CM

## 2019-03-27 DIAGNOSIS — M79.2 NEUROPATHIC PAIN: ICD-10-CM

## 2019-03-27 DIAGNOSIS — E55.9 VITAMIN D INSUFFICIENCY: ICD-10-CM

## 2019-03-27 DIAGNOSIS — R26.9 NEUROLOGIC GAIT DYSFUNCTION: ICD-10-CM

## 2019-03-27 DIAGNOSIS — F43.21 GRIEF AT LOSS OF CHILD: ICD-10-CM

## 2019-03-27 DIAGNOSIS — Z79.899 ENCOUNTER FOR LONG-TERM (CURRENT) USE OF HIGH-RISK MEDICATION: ICD-10-CM

## 2019-03-27 DIAGNOSIS — Z63.4 GRIEF AT LOSS OF CHILD: ICD-10-CM

## 2019-03-27 DIAGNOSIS — Z71.89 COUNSELING REGARDING GOALS OF CARE: ICD-10-CM

## 2019-03-27 PROCEDURE — 3008F BODY MASS INDEX DOCD: CPT | Mod: CPTII,S$GLB,, | Performed by: PHYSICIAN ASSISTANT

## 2019-03-27 PROCEDURE — 3078F DIAST BP <80 MM HG: CPT | Mod: CPTII,S$GLB,, | Performed by: PHYSICIAN ASSISTANT

## 2019-03-27 PROCEDURE — 99999 PR PBB SHADOW E&M-EST. PATIENT-LVL IV: CPT | Mod: PBBFAC,,, | Performed by: PHYSICIAN ASSISTANT

## 2019-03-27 PROCEDURE — 99215 OFFICE O/P EST HI 40 MIN: CPT | Mod: S$GLB,,, | Performed by: PHYSICIAN ASSISTANT

## 2019-03-27 PROCEDURE — 3078F PR MOST RECENT DIASTOLIC BLOOD PRESSURE < 80 MM HG: ICD-10-PCS | Mod: CPTII,S$GLB,, | Performed by: PHYSICIAN ASSISTANT

## 2019-03-27 PROCEDURE — 3075F SYST BP GE 130 - 139MM HG: CPT | Mod: CPTII,S$GLB,, | Performed by: PHYSICIAN ASSISTANT

## 2019-03-27 PROCEDURE — 99215 PR OFFICE/OUTPT VISIT, EST, LEVL V, 40-54 MIN: ICD-10-PCS | Mod: S$GLB,,, | Performed by: PHYSICIAN ASSISTANT

## 2019-03-27 PROCEDURE — 3008F PR BODY MASS INDEX (BMI) DOCUMENTED: ICD-10-PCS | Mod: CPTII,S$GLB,, | Performed by: PHYSICIAN ASSISTANT

## 2019-03-27 PROCEDURE — 99999 PR PBB SHADOW E&M-EST. PATIENT-LVL IV: ICD-10-PCS | Mod: PBBFAC,,, | Performed by: PHYSICIAN ASSISTANT

## 2019-03-27 PROCEDURE — 3075F PR MOST RECENT SYSTOLIC BLOOD PRESS GE 130-139MM HG: ICD-10-PCS | Mod: CPTII,S$GLB,, | Performed by: PHYSICIAN ASSISTANT

## 2019-03-27 SDOH — SOCIAL DETERMINANTS OF HEALTH (SDOH): DISSAPEARANCE AND DEATH OF FAMILY MEMBER: Z63.4

## 2019-03-27 NOTE — PROGRESS NOTES
"Subjective:       Patient ID: Zoey Cali is a 53 y.o. female who presents today for a routine clinic visit for MS.  Last visit in [facility] in October 2018 with this provider    MS HPI:  · DMT: Avonex  · Side effects from DMT? No  · Taking vitamin D3 as recommended? Yes - Dose: 5,000IU  · Requests refills today on Avonex, gabapentin  Dr. Mejia---treating iron deficiency anemia-has upcoming visit next month  Patient states that she developed a couple different blisters on her feet after wearing rubber boots and didn't feel it. Otherwise, she has not had any new symptoms  She had one fall-she fell asleep sitting on barstool at home(does not drink)and fell off --has L sore knee    She has run out of enma funding, and will call [employer] for "free drug" if possible  Feels tingling on L thigh also-previously diagnosed with meralgia paraesthetica . Has gained weight.      SOCIAL HISTORY  Social History     Tobacco Use    Smoking status: Current Every Day Smoker     Packs/day: 1.00     Types: Cigarettes    Smokeless tobacco: Never Used   Substance Use Topics    Alcohol use: No     Alcohol/week: 0.0 oz    Drug use: No     Living arrangements - the patient lives with their family.  Employment N/A    MS ROS:  · Fatigue: Yes- feels that iron infusion will help with fatigue   · Sleep Disturbance: Yes --"I don't sleep well". PCP prescribed trazodone but she hasn't tried it yet. Gets up eating.   · Bladder Dysfunction: No  · Bowel Dysfunction: No  · Spasticity: Yes - takes tizanidine QID, and Baclofen BID  · Visual Symptoms: Yes - History of ON, see Dr. Hernandez  · Cognitive: No  · Mood Disorder: Yes - Taking Lexapro--20mg-. she is going to weekly counseling now. Son passed away a bit over a year ago, continues to have a difficult time.   · Gait Disturbance: No  · Falls: as above  · Hand Dysfunction: No  · Pain: Yes - NP in feet, improved; Taking gabapentin 600mg TID and 800mg HS and Tegretol 100mg TWICE DAILY;on " Norco PRN prescribed by Dr. Costello--occasional  · Sexual Dysfunction: Not Assessed  · Skin Breakdown: No  · Tremors: No  · Dysphagia: No  · Dysarthria: No  · Heat sensitivity: Yes--fatigue  · Any un-met adaptive needs? Yes - cooling device  · Copay Assist? Getting free drug through [employer]        Objective:        1. 25 foot timed walk:5.4slast visit;  6.7s today(antalgic today)  Timed 25 Foot Walk: 2016   Did patient wear an AFO? No No   Was assistive device used? No No   Time for 25 Foot Walk (seconds) 5.2 4.4     Neurologic Exam     Mental Status   Oriented to person, place, and time.   Follows 3 step commands.   Speech: speech is normal   Level of consciousness: alert  Normal comprehension.     Cranial Nerves     CN II   Visual acuity: (20/25 OS; 20/25 OD corrected with Snellen hand held chart at 6 ft)    CN III, IV, VI   Pupils are equal, round, and reactive to light.  Extraocular motions are normal.     CN V   Facial sensation intact.     CN VII   Facial expression full, symmetric.     CN VIII   Hearing: intact (finger rub)    CN IX, X   Palate: symmetric    CN XI   CN XI normal.     CN XII   Tongue deviation: none    Motor Exam   Muscle bulk: normal  Overall muscle tone: normal    Strength   Right deltoid: 5/5  Left deltoid: 5/5  Right triceps: 5/5  Left triceps: 5/5  Right wrist extension: 5/5  Left wrist extension: 5/5  Right interossei: 5/5  Left interossei: 5/5  Right iliopsoas: 5/5  Left iliopsoas: 5/5  Right hamstrin/5  Left hamstrin/5  Right anterior tibial: 5/5  Left anterior tibial: 5/5  Right peroneal: 5/5  Left peroneal: 5/5    Sensory Exam   Light touch normal.   Right arm vibration: normal  Left arm vibration: normal  Right leg vibration: normal  Left leg vibration: decreased from toes    Gait, Coordination, and Reflexes     Gait  Gait: normal (antalgic-L knee pain)    Coordination   Finger to nose coordination: normal  Tandem walking coordination: abnormal (loss of balance  noted)    Tremor   Resting tremor: absent  Action tremor: absent    Reflexes   Right brachioradialis: 2+  Left brachioradialis: 2+  Right biceps: 2+  Left biceps: 2+  Right triceps: 2+  Left triceps: 2+  Right patellar: 2+  Left patellar reflex grade: NT due to knee pain.  Right achilles: 2+  Left achilles: 2+  Right plantar: equivocal  Left plantar: equivocal  Right ankle clonus: absent  Left ankle clonus: absent  Right pendular knee jerk: absent  Left pendular knee jerk: absentImpaired toe walk--due to knee pain  Normal RSM             Imaging:     Results for orders placed during the hospital encounter of 02/01/17   MRI Brain W WO Contrast    Impression Scattered white matter lesions consistent with the reported history of multiple sclerosis, not significant change from the prior study of 1/21/2016.  No new or enhancing lesions to indicate ongoing or active demyelination.    ______________________________________     Electronically signed by resident: TANYA PORRAS  Date:     02/01/17  Time:    11:35      As the supervising and teaching physician, I personally reviewed the images and resident's interpretation and I agree with the findings.  Electronically signed by: NADIR RAMIREZ MD  Date:     02/01/17  Time:    17:15      Results for orders placed during the hospital encounter of 11/19/14   MRI Cervical Spine W WO Cont    Impression  Unremarkable motion limited MRI of the cervical spine specifically without evidence for cord signal abnormality to suggest edema or abnormal intrathecal enhancement.    No significant central canal or neural foraminal stenosis.          Electronically signed by: QUIRINO BURRIS DO  Date:     11/19/14  Time:    13:55      Results for orders placed during the hospital encounter of 11/29/14   MRI Thoracic Spine W WO Cont    Impression  Limited study by motion artifact.  Within limits of the study there is a single nonenhancing focus of T2/stir signal hyperintensity and slight cord  truncation right aspect thoracic cord centered at the T11/T12 disk configuration most suggestive for   sequela of prior demyelination in light of history and intracranial findings.  level.    No definite abnormal intrathecal enhancement.    No significant disk bulge, central canal or neural foraminal stenosis allowing for patient motion limitation.      Electronically signed by: QUIRINO BURRIS DO  Date:     12/01/14  Time:    08:54      Results for orders placed during the hospital encounter of 02/01/17   MRI Brain W WO Contrast    Narrative Technique: Multiplanar, multisequence MRI brain was obtained before and after the administration of IV contrast per demyelinating protocol. 7 cc gadavist was used.    History: Multiple sclerosis    Comparison: 01/21/2016    Findings:    Patient again demonstrates a few patchy areas of T2/FLAIR hyperintensity within the supratentorial white matter , largely situated in the periventricular zones along the margins of the corpus callosum and lateral ventricles radiating outward.  This pattern is nonspecific, but it is quite typical for the clinically suspected diagnosis of multiple sclerosis. No new discrete lesions identified on today's study. Following contrast administration, there is no evidence of abnormal enhancement to suggest active demyelinating plaques.    No new parenchymal mass, hemorrhage or major vascular distribution infarct. No new extra-axial blood or fluid collections. Ventricles stable in size without evidence of hydrocephalus.    T2 skull base flow voids are preserved. Bone marrow signal intensity is unremarkable.    Impression Scattered white matter lesions consistent with the reported history of multiple sclerosis, not significant change from the prior study of 1/21/2016.  No new or enhancing lesions to indicate ongoing or active demyelination.    ______________________________________     Electronically signed by resident: TANYA PORRAS  Date:      02/01/17  Time:    11:35            As the supervising and teaching physician, I personally reviewed the images and resident's interpretation and I agree with the findings.          Electronically signed by: NADIR RAMIREZ MD  Date:     02/01/17  Time:    17:15          Labs:     Lab Results   Component Value Date    BIUMYGTT97AD 59 05/01/2018    LJZKZDKI66MI 54 11/17/2017    HELKQFLO10HV 48 11/11/2016     No results found for: JCVINDEX, JCVANTIBODY  No results found for: ZE7CFJOX, ABSOLUTECD3, YE0GIHBZ, ABSOLUTECD8, HF9GQCZY, ABSOLUTECD4, LABCD48  Lab Results   Component Value Date    WBC 8.61 01/25/2019    RBC 5.06 01/25/2019    HGB 16.3 (H) 01/25/2019    HCT 46.4 01/25/2019    MCV 92 01/25/2019    MCH 32.2 (H) 01/25/2019    MCHC 35.1 01/25/2019    RDW 13.9 01/25/2019     01/25/2019    MPV 8.8 (L) 01/25/2019    GRAN 6.2 01/25/2019    GRAN 71.6 01/25/2019    LYMPH 1.9 01/25/2019    LYMPH 21.5 01/25/2019    MONO 0.4 01/25/2019    MONO 5.0 01/25/2019    EOS 0.2 01/25/2019    BASO 0.02 01/25/2019    EOSINOPHIL 1.7 01/25/2019    BASOPHIL 0.2 01/25/2019     Sodium   Date Value Ref Range Status   05/01/2018 141 136 - 145 mmol/L Final     Potassium   Date Value Ref Range Status   05/01/2018 4.4 3.5 - 5.1 mmol/L Final     Chloride   Date Value Ref Range Status   05/01/2018 102 95 - 110 mmol/L Final     CO2   Date Value Ref Range Status   05/01/2018 31 (H) 23 - 29 mmol/L Final     Glucose   Date Value Ref Range Status   05/01/2018 89 70 - 110 mg/dL Final     BUN, Bld   Date Value Ref Range Status   05/01/2018 10 6 - 20 mg/dL Final     Creatinine   Date Value Ref Range Status   05/01/2018 0.8 0.5 - 1.4 mg/dL Final     Calcium   Date Value Ref Range Status   05/01/2018 10.0 8.7 - 10.5 mg/dL Final     Total Protein   Date Value Ref Range Status   11/27/2018 7.2 6.0 - 8.4 g/dL Final     Albumin   Date Value Ref Range Status   11/27/2018 3.9 3.5 - 5.2 g/dL Final     Total Bilirubin   Date Value Ref Range Status    11/27/2018 0.3 0.1 - 1.0 mg/dL Final     Comment:     For infants and newborns, interpretation of results should be based  on gestational age, weight and in agreement with clinical  observations.  Premature Infant recommended reference ranges:  Up to 24 hours.............<8.0 mg/dL  Up to 48 hours............<12.0 mg/dL  3-5 days..................<15.0 mg/dL  6-29 days.................<15.0 mg/dL       Alkaline Phosphatase   Date Value Ref Range Status   11/27/2018 139 (H) 55 - 135 U/L Final     AST   Date Value Ref Range Status   11/27/2018 16 10 - 40 U/L Final     ALT   Date Value Ref Range Status   11/27/2018 17 10 - 44 U/L Final     Anion Gap   Date Value Ref Range Status   05/01/2018 8 8 - 16 mmol/L Final     eGFR if    Date Value Ref Range Status   05/01/2018 >60 >60 mL/min/1.73 m^2 Final     eGFR if non    Date Value Ref Range Status   05/01/2018 >60 >60 mL/min/1.73 m^2 Final     Comment:     Calculation used to obtain the estimated glomerular filtration  rate (eGFR) is the CKD-EPI equation.          Diagnosis/Assessment/Plan:    1. Multiple Sclerosis  · Assessment: She appears clinically stable today on Avonex.   · Imaging:MRI May 2019(Brain and C-spine)  · Disease Modifying Therapies: Continue Avonex. Will check safety labs today along with Vit D3.     2. MS Symptom Assessment / Management  · Mood Disorder: encouraged continued counseling and suggested specific grief counseling.           3.  I suspect bilateral Meralgia Paraesthetica -advised weight loss--she has gained almost 20 pounds in the last year.    Our visit today lasted 40 minutes, and 100% of this time was spent face to face with the patient. Over 50% of this visit included discussion of the treatment plan/medication changes/symptom management/exam findings/imaging/coordination of care.      Follow up in about 3 months (around 6/27/2019) for follow up with Dr. Mendez.  Patient agreed to POC today.    Attending,  Dr. Mendez, was available during today's encounter.      Makayla Swift PA-C  [facility]        Multiple sclerosis  -     MRI Brain W WO Contrast; Future; Expected date: 05/13/2019  -     MRI Cervical Spine W WO Cont; Future; Expected date: 05/13/2019  -     CBC auto differential; Future; Expected date: 03/27/2019  -     Hepatic function panel; Future  -     Vitamin D; Future    Vitamin D insufficiency  -     Vitamin D; Future    Counseling regarding goals of care    Encounter for long-term (current) use of high-risk medication    Neuropathic pain    Neurologic gait dysfunction    Meralgia paraesthetica, unspecified laterality  Comments:  bilateral    Grief at loss of child

## 2019-04-01 ENCOUNTER — PATIENT MESSAGE (OUTPATIENT)
Dept: NEUROLOGY | Facility: CLINIC | Age: 54
End: 2019-04-01

## 2019-04-02 ENCOUNTER — TELEPHONE (OUTPATIENT)
Dept: NEUROLOGY | Facility: CLINIC | Age: 54
End: 2019-04-02

## 2019-04-02 ENCOUNTER — LAB VISIT (OUTPATIENT)
Dept: LAB | Facility: HOSPITAL | Age: 54
End: 2019-04-02
Attending: PHYSICIAN ASSISTANT
Payer: MEDICARE

## 2019-04-02 DIAGNOSIS — G35 MULTIPLE SCLEROSIS: ICD-10-CM

## 2019-04-02 DIAGNOSIS — E55.9 VITAMIN D INSUFFICIENCY: ICD-10-CM

## 2019-04-02 LAB
25(OH)D3+25(OH)D2 SERPL-MCNC: 49 NG/ML (ref 30–96)
ALBUMIN SERPL BCP-MCNC: 4.1 G/DL (ref 3.5–5.2)
ALP SERPL-CCNC: 150 U/L (ref 55–135)
ALT SERPL W/O P-5'-P-CCNC: 15 U/L (ref 10–44)
AST SERPL-CCNC: 15 U/L (ref 10–40)
BASOPHILS # BLD AUTO: 0.04 K/UL (ref 0–0.2)
BASOPHILS NFR BLD: 0.5 % (ref 0–1.9)
BILIRUB DIRECT SERPL-MCNC: 0.2 MG/DL (ref 0.1–0.3)
BILIRUB SERPL-MCNC: 0.4 MG/DL (ref 0.1–1)
DIFFERENTIAL METHOD: ABNORMAL
EOSINOPHIL # BLD AUTO: 0.2 K/UL (ref 0–0.5)
EOSINOPHIL NFR BLD: 2 % (ref 0–8)
ERYTHROCYTE [DISTWIDTH] IN BLOOD BY AUTOMATED COUNT: 14.1 % (ref 11.5–14.5)
HCT VFR BLD AUTO: 44.2 % (ref 37–48.5)
HGB BLD-MCNC: 14.9 G/DL (ref 12–16)
LYMPHOCYTES # BLD AUTO: 1.4 K/UL (ref 1–4.8)
LYMPHOCYTES NFR BLD: 17.3 % (ref 18–48)
MCH RBC QN AUTO: 31.2 PG (ref 27–31)
MCHC RBC AUTO-ENTMCNC: 33.7 G/DL (ref 32–36)
MCV RBC AUTO: 93 FL (ref 82–98)
MONOCYTES # BLD AUTO: 0.4 K/UL (ref 0.3–1)
MONOCYTES NFR BLD: 4.8 % (ref 4–15)
NEUTROPHILS # BLD AUTO: 6 K/UL (ref 1.8–7.7)
NEUTROPHILS NFR BLD: 75.1 % (ref 38–73)
PLATELET # BLD AUTO: 263 K/UL (ref 150–350)
PMV BLD AUTO: 8.6 FL (ref 9.2–12.9)
PROT SERPL-MCNC: 7.4 G/DL (ref 6–8.4)
RBC # BLD AUTO: 4.78 M/UL (ref 4–5.4)
WBC # BLD AUTO: 7.94 K/UL (ref 3.9–12.7)

## 2019-04-02 PROCEDURE — 82306 VITAMIN D 25 HYDROXY: CPT

## 2019-04-02 PROCEDURE — 85025 COMPLETE CBC W/AUTO DIFF WBC: CPT

## 2019-04-02 PROCEDURE — 36415 COLL VENOUS BLD VENIPUNCTURE: CPT

## 2019-04-02 PROCEDURE — 80076 HEPATIC FUNCTION PANEL: CPT

## 2019-04-02 NOTE — TELEPHONE ENCOUNTER
----- Message from Makayla Swift PA-C sent at 4/2/2019 12:55 PM CDT -----  OK to refill Avonex(free drug program)

## 2019-04-29 ENCOUNTER — TELEPHONE (OUTPATIENT)
Dept: NEUROLOGY | Facility: CLINIC | Age: 54
End: 2019-04-29

## 2019-04-29 DIAGNOSIS — G35 MULTIPLE SCLEROSIS: Primary | ICD-10-CM

## 2019-04-29 NOTE — TELEPHONE ENCOUNTER
----- Message from Niranjan Wick sent at 4/29/2019  2:14 PM CDT -----  Needs Advice    Reason for call: Odell states lab orders need to be placed for MRI on 05/01        Communication Preference: Odell knox/ [facility] MRI @ [phone]    Additional Information:

## 2019-04-30 NOTE — TELEPHONE ENCOUNTER
Called patient and informed her of lab appointment prior to MRI being done.  Patient has rescheduled appointments due to dental procedure being done on today, 4/30/2019.

## 2019-05-06 ENCOUNTER — OFFICE VISIT (OUTPATIENT)
Dept: FAMILY MEDICINE | Facility: CLINIC | Age: 54
End: 2019-05-06
Payer: MEDICARE

## 2019-05-06 VITALS
HEIGHT: 60 IN | SYSTOLIC BLOOD PRESSURE: 124 MMHG | HEART RATE: 65 BPM | TEMPERATURE: 97 F | WEIGHT: 149.94 LBS | BODY MASS INDEX: 29.44 KG/M2 | DIASTOLIC BLOOD PRESSURE: 62 MMHG | OXYGEN SATURATION: 98 %

## 2019-05-06 DIAGNOSIS — F32.A DEPRESSION, UNSPECIFIED DEPRESSION TYPE: ICD-10-CM

## 2019-05-06 DIAGNOSIS — R25.2 SPASTICITY: ICD-10-CM

## 2019-05-06 DIAGNOSIS — F11.90 CHRONIC, CONTINUOUS USE OF OPIOIDS: ICD-10-CM

## 2019-05-06 DIAGNOSIS — G35 MULTIPLE SCLEROSIS: Primary | ICD-10-CM

## 2019-05-06 DIAGNOSIS — M79.2 NEUROPATHIC PAIN: ICD-10-CM

## 2019-05-06 DIAGNOSIS — R30.0 DYSURIA: ICD-10-CM

## 2019-05-06 LAB
BILIRUB SERPL-MCNC: NORMAL MG/DL
BLOOD URINE, POC: 50
COLOR, POC UA: YELLOW
GLUCOSE UR QL STRIP: NORMAL
KETONES UR QL STRIP: NORMAL
LEUKOCYTE ESTERASE URINE, POC: NORMAL
NITRITE, POC UA: NORMAL
PH, POC UA: 5
PROTEIN, POC: NORMAL
SPECIFIC GRAVITY, POC UA: 1.01
UROBILINOGEN, POC UA: NORMAL

## 2019-05-06 PROCEDURE — 99999 PR PBB SHADOW E&M-EST. PATIENT-LVL III: ICD-10-PCS | Mod: PBBFAC,,, | Performed by: FAMILY MEDICINE

## 2019-05-06 PROCEDURE — 99215 OFFICE O/P EST HI 40 MIN: CPT | Mod: 25,S$GLB,, | Performed by: FAMILY MEDICINE

## 2019-05-06 PROCEDURE — 3074F PR MOST RECENT SYSTOLIC BLOOD PRESSURE < 130 MM HG: ICD-10-PCS | Mod: CPTII,S$GLB,, | Performed by: FAMILY MEDICINE

## 2019-05-06 PROCEDURE — 99215 PR OFFICE/OUTPT VISIT, EST, LEVL V, 40-54 MIN: ICD-10-PCS | Mod: 25,S$GLB,, | Performed by: FAMILY MEDICINE

## 2019-05-06 PROCEDURE — 3008F BODY MASS INDEX DOCD: CPT | Mod: CPTII,S$GLB,, | Performed by: FAMILY MEDICINE

## 2019-05-06 PROCEDURE — 81001 URINALYSIS AUTO W/SCOPE: CPT | Mod: S$GLB,,, | Performed by: FAMILY MEDICINE

## 2019-05-06 PROCEDURE — 99999 PR PBB SHADOW E&M-EST. PATIENT-LVL III: CPT | Mod: PBBFAC,,, | Performed by: FAMILY MEDICINE

## 2019-05-06 PROCEDURE — 3008F PR BODY MASS INDEX (BMI) DOCUMENTED: ICD-10-PCS | Mod: CPTII,S$GLB,, | Performed by: FAMILY MEDICINE

## 2019-05-06 PROCEDURE — 3078F PR MOST RECENT DIASTOLIC BLOOD PRESSURE < 80 MM HG: ICD-10-PCS | Mod: CPTII,S$GLB,, | Performed by: FAMILY MEDICINE

## 2019-05-06 PROCEDURE — 3078F DIAST BP <80 MM HG: CPT | Mod: CPTII,S$GLB,, | Performed by: FAMILY MEDICINE

## 2019-05-06 PROCEDURE — 3074F SYST BP LT 130 MM HG: CPT | Mod: CPTII,S$GLB,, | Performed by: FAMILY MEDICINE

## 2019-05-06 PROCEDURE — 81001 POCT URINALYSIS, DIPSTICK OR TABLET REAGENT, AUTOMATED, WITH MICROSCOP: ICD-10-PCS | Mod: S$GLB,,, | Performed by: FAMILY MEDICINE

## 2019-05-06 RX ORDER — HYDROCODONE BITARTRATE AND ACETAMINOPHEN 10; 325 MG/1; MG/1
1 TABLET ORAL 3 TIMES DAILY PRN
Qty: 90 TABLET | Refills: 0 | Status: SHIPPED | OUTPATIENT
Start: 2019-05-06 | End: 2019-06-05 | Stop reason: SDUPTHER

## 2019-05-06 RX ORDER — NITROFURANTOIN 25; 75 MG/1; MG/1
100 CAPSULE ORAL 2 TIMES DAILY
Qty: 10 CAPSULE | Refills: 0 | Status: SHIPPED | OUTPATIENT
Start: 2019-05-06 | End: 2019-05-11

## 2019-05-06 RX ORDER — LORAZEPAM 2 MG/1
2 TABLET ORAL EVERY 6 HOURS PRN
Qty: 120 TABLET | Refills: 0 | Status: SHIPPED | OUTPATIENT
Start: 2019-05-06 | End: 2019-08-29 | Stop reason: SDUPTHER

## 2019-05-06 NOTE — PROGRESS NOTES
Chief Complaint   Patient presents with    Chronic Pain       SUBJECTIVE:  Zoey Cali is a 53 y.o. female here for follow up of chronic pain.   Patient has chronic pain involving the pelvis, lumbar spine, thighs and some neuropathy.  Conservative treatment with tylenol, NSAIDs, alternative treatments, complementary non opioid nerve/epileptic medication has failed with primary use.  Now opioid dependent for relief.  Has tried PT/OT, injections with mixed success.    She is undergoing a lot of dental work with pain.       Currently has co-morbidities including below problem list.      Social History     Tobacco Use    Smoking status: Current Every Day Smoker     Packs/day: 1.00     Types: Cigarettes    Smokeless tobacco: Never Used   Substance Use Topics    Alcohol use: No     Alcohol/week: 0.0 oz    Drug use: No       Patient Active Problem List    Diagnosis Date Noted    Closed fracture of left side of maxilla with routine healing 10/25/2017    Neuropathic pain 05/02/2017    Prophylactic immunotherapy 11/30/2016    Gait disturbance 11/30/2016    Chronic, continuous use of opioids 04/04/2016     She has mainly back and leg pain with neuropathic pain  Using gabapentin and oxycodone    GOALS:  Keep her walking and functioning with ADL's    PROGNOSIS:  Overall is fair      Vitamin D insufficiency 07/08/2015    Muscle spasm 07/08/2015    Counseling regarding goals of care 07/08/2015    Encounter for long-term (current) use of high-risk medication 07/08/2015    Urinary hesitancy 07/08/2015    Neuralgia and neuritis 06/02/2015     Bilateral foot and lower limb pain.      Tobacco abuse 06/02/2015     Trying to quit.  4/17 still trying to quit brief counseling  8/17 counseled again still not ready to do the tobacco cessation program but she is thinking about it, brief counseling       Osteoporosis 12/29/2014    Iron deficiency anemia 12/29/2014    Hyperlipidemia LDL goal < 130 12/29/2014    HTN  (hypertension) 07/17/2012    Multiple sclerosis 07/17/2012     AVONEX and gabapentin with chronic pain         ROS    OBJECTIVE:  /62   Pulse 65   Temp 97.2 °F (36.2 °C) (Oral)   Ht 5' (1.524 m)   Wt 68 kg (149 lb 14.6 oz)   LMP 01/01/2001 (Approximate) Comment: 2001  SpO2 98%   BMI 29.28 kg/m²     Wt Readings from Last 3 Encounters:   05/06/19 68 kg (149 lb 14.6 oz)   03/27/19 68.7 kg (151 lb 7.3 oz)   02/05/19 67.4 kg (148 lb 9.4 oz)     BP Readings from Last 3 Encounters:   05/06/19 124/62   03/27/19 133/69   02/05/19 134/70       Patient is in usual state of health, alert and oriented without signs of intoxication.  No evidence on exam of illegal substance use or concerning symptoms involving abuse or intoxication (specifically evidence of IVDU, unusual bruising, slurred speech, unusual explanations).  Dentition is normal for patient without acute change.  Heart and lung exam are unremarkable.  Chronic pain sites are unchanged from prior exam.  No new focal neurological deficits noted.    Functional assessment:    PEG-3 18    ABerrant behavior:    None noted today      Review of old Records:    Reviewed per epic and [facility]    Review of old labs:    No results found for: TSH  Lab Results   Component Value Date    WBC 7.94 04/02/2019    HGB 14.9 04/02/2019    HCT 44.2 04/02/2019    MCV 93 04/02/2019     04/02/2019       Chemistry        Component Value Date/Time     05/01/2018 1511    K 4.4 05/01/2018 1511     05/01/2018 1511    CO2 31 (H) 05/01/2018 1511    BUN 10 05/01/2018 1511    CREATININE 0.8 05/01/2018 1511    GLU 89 05/01/2018 1511        Component Value Date/Time    CALCIUM 10.0 05/01/2018 1511    ALKPHOS 150 (H) 04/02/2019 1210    AST 15 04/02/2019 1210    ALT 15 04/02/2019 1210    BILITOT 0.4 04/02/2019 1210    ESTGFRAFRICA >60 05/01/2018 1511    EGFRNONAA >60 05/01/2018 1511        Lab Results   Component Value Date    CHOL 110 (L) 06/22/2015    CHOL 144 05/18/2015    CHOL  174 12/22/2014     Lab Results   Component Value Date    HDL 22 (L) 06/22/2015    HDL 24 (L) 05/18/2015    HDL 19 (L) 12/22/2014     Lab Results   Component Value Date    LDLCALC 47.0 (L) 06/22/2015    LDLCALC Invalid, Trig>400.0 05/18/2015    LDLCALC Invalid, Trig>400.0 12/22/2014     Lab Results   Component Value Date    TRIG 205 (H) 06/22/2015    TRIG 598 (H) 05/18/2015    TRIG 564 (H) 12/22/2014     Lab Results   Component Value Date    CHOLHDL 20.0 06/22/2015    CHOLHDL 16.7 (L) 05/18/2015    CHOLHDL 10.9 (L) 12/22/2014         Review of old imaging:  Reviewed imaging in epic over last 3 months.      ASSESSMENT/plan    ICD-10-CM ICD-9-CM   1. Multiple sclerosis G35 340   2. Chronic, continuous use of opioids F11.90 305.51   3. Neuropathic pain M79.2 729.2   4. Spasticity R25.2 781.0   5. Depression, unspecified depression type F32.9 311       No evidence of abuse/diversion/addiction noted through history and physical, or checking the .  Risks, benefits and alternate treatments are considered and plan of care reflects that shared decision with the patient.    Referred patient to CDC.GOV to review the new opioid guidelines.  Explained there is a section for patient information that is very informative about the potential risks of chronic opioid therapy and some strategies to minimize risk.  Counseled to f/u with me with any questions or concerns as our goal is always to restore function and  Reducing pain while minimizing side effects and avoiding rare but life threatening risks of overdose/addiction/respiratory suppression.    Continue with current care     Use PRN  High risk medication review completed per guidelines to insure safest use of medication.    We reviewed our goals of care:    Functional restoration  Keep her functional    And discontinuation    REASONS to DISCONTINUE:  Use PRN  Intolerable side effects.  abhorrent or distasteful behavior towards myself or staff in regards to medication.  Failure  to provide drug screens when prompted.  Any criminal investigations or allegations in regards to narcotics.  Illegal selling or distribution of his medication  Failure to progress to functional restoration    Future Appointments   Date Time Provider Department Center   5/15/2019 10:30 AM LAB, APPOINTMENT [facility]ESS[facility] LAB IM [facility]   5/15/2019 11:00 AM [facility] OI-MRI4 [facility]   5/15/2019 11:30 AM [facility]   6/5/2019  9:00 AM Devon Collier MD [facility]   6/25/2019  1:00 PM Heidy Mendez MD [facility] MSC [facility]       Medication List with Changes/Refills   Current Medications    CARBAMAZEPINE (TEGRETOL XR) 200 MG 12 HR TABLET    TAKE ONE TABLET BY MOUTH TWICE DAILY    CETIRIZINE (ZYRTEC) 10 MG TABLET    Take 1 tablet (10 mg total) by mouth once daily.    CHOLECALCIFEROL, VITAMIN D3, (VITAMIN D3) 5,000 UNIT TAB    Take 5,000 Units by mouth once daily.    ESCITALOPRAM OXALATE (LEXAPRO) 20 MG TABLET    Take 1 tablet (20 mg total) by mouth once daily.    FLUTICASONE (FLONASE) 50 MCG/ACTUATION NASAL SPRAY    ONE sprays(s) each nostril TWICE DAILY per instructed    GABAPENTIN (NEURONTIN) 400 MG CAPSULE    Take 2 capsules (800 mg total) by mouth every evening.    GABAPENTIN (NEURONTIN) 600 MG TABLET    TAKE ONE TABLET BY MOUTH THREE TIMES DAILY *MAY CAUSE DROWSINESS* *MAY CAUSE DIZZINESS*    IBUPROFEN (ADVIL,MOTRIN) 800 MG TABLET    TAKE ONE TABLET BY MOUTH EVERY 8 HOURS WITH FOOD AND WATER    INTERFERON BETA-1A (AVONEX) 30 MCG/0.5 ML PNKT    INJECT 30 MCG INTRAMUSCULARLY ONCE WEEKLY    METOPROLOL SUCCINATE (TOPROL-XL) 100 MG 24 HR TABLET    Take 1 tablet (100 mg total) by mouth once daily.    NYSTATIN-TRIAMCINOLONE (MYCOLOG II) CREAM    Apply to affected area 2 times daily    OMEPRAZOLE (PRILOSEC) 40 MG CAPSULE    Take 1 capsule (40 mg total) by mouth every evening.    ONDANSETRON (ZOFRAN-ODT) 4 MG TBDL    TAKE ONE TABLET BY MOUTH EVERY 12 HOURS AS NEEDED     SIMVASTATIN (ZOCOR) 40 MG TABLET    Take 1 tablet (40 mg total) by mouth every evening.    TIZANIDINE (ZANAFLEX) 4 MG TABLET    TAKE 2 TABLETS IN THE MORNING, AT NOON AND IN THE EVENING AND TAKE 3 TABLETS AT NIGHT (9 TABLETS PER DAY)    TRAZODONE (DESYREL) 150 MG TABLET    Take 1-2 tablets (150-300 mg total) by mouth every evening.    TRIAMTERENE-HYDROCHLOROTHIAZIDE 37.5-25 MG (MAXZIDE-25) 37.5-25 MG PER TABLET    Take 1 tablet by mouth once daily.    UMECLIDINIUM-VILANTEROL 62.5-25 MCG/ACTUATION DSDV    Inhale 1 puff into the lungs once daily. Controller   Changed and/or Refilled Medications    Modified Medication Previous Medication    HYDROCODONE-ACETAMINOPHEN (NORCO)  MG PER TABLET HYDROcodone-acetaminophen (NORCO)  mg per tablet       Take 1 tablet by mouth 3 (three) times daily as needed (pain).    Take 1 tablet by mouth 3 (three) times daily as needed (pain).    LORAZEPAM (ATIVAN) 2 MG TAB LORazepam (ATIVAN) 2 MG Tab       Take 1 tablet (2 mg total) by mouth every 6 (six) hours as needed.    Take 1 tablet (2 mg total) by mouth every 6 (six) hours as needed.       3 months or sooner as needed

## 2019-05-15 ENCOUNTER — HOSPITAL ENCOUNTER (OUTPATIENT)
Dept: RADIOLOGY | Facility: HOSPITAL | Age: 54
Discharge: HOME | End: 2019-05-15
Attending: PHYSICIAN ASSISTANT
Payer: MEDICARE

## 2019-05-15 DIAGNOSIS — G35 MULTIPLE SCLEROSIS: ICD-10-CM

## 2019-05-15 PROCEDURE — 25500020 PHARM REV CODE 255: Performed by: PHYSICIAN ASSISTANT

## 2019-05-15 PROCEDURE — 70553 MRI BRAIN W WO CONTRAST: ICD-10-PCS | Mod: 26,,, | Performed by: RADIOLOGY

## 2019-05-15 PROCEDURE — 70553 MRI BRAIN STEM W/O & W/DYE: CPT | Mod: 26,,, | Performed by: RADIOLOGY

## 2019-05-15 PROCEDURE — 72156 MRI NECK SPINE W/O & W/DYE: CPT | Mod: 26,,, | Performed by: RADIOLOGY

## 2019-05-15 PROCEDURE — 72156 MRI NECK SPINE W/O & W/DYE: CPT | Mod: TC

## 2019-05-15 PROCEDURE — 70553 MRI BRAIN STEM W/O & W/DYE: CPT | Mod: TC

## 2019-05-15 PROCEDURE — 72156 MRI CERVICAL SPINE W WO CONTRAST: ICD-10-PCS | Mod: 26,,, | Performed by: RADIOLOGY

## 2019-05-15 PROCEDURE — A9585 GADOBUTROL INJECTION: HCPCS | Performed by: PHYSICIAN ASSISTANT

## 2019-05-15 RX ORDER — GADOBUTROL 604.72 MG/ML
7 INJECTION INTRAVENOUS
Status: COMPLETED | OUTPATIENT
Start: 2019-05-15 | End: 2019-05-15

## 2019-05-15 RX ADMIN — GADOBUTROL 7 ML: 604.72 INJECTION INTRAVENOUS at 11:05

## 2019-05-22 ENCOUNTER — PATIENT OUTREACH (OUTPATIENT)
Dept: ADMINISTRATIVE | Facility: HOSPITAL | Age: 54
End: 2019-05-22

## 2019-05-22 DIAGNOSIS — Z13.220 SCREENING FOR HYPERLIPIDEMIA: ICD-10-CM

## 2019-05-22 DIAGNOSIS — E78.00 HYPERCHOLESTEROLEMIA: Primary | ICD-10-CM

## 2019-05-28 ENCOUNTER — PATIENT MESSAGE (OUTPATIENT)
Dept: NEUROLOGY | Facility: CLINIC | Age: 54
End: 2019-05-28

## 2019-06-05 ENCOUNTER — OFFICE VISIT (OUTPATIENT)
Dept: FAMILY MEDICINE | Facility: CLINIC | Age: 54
End: 2019-06-05
Payer: MEDICARE

## 2019-06-05 VITALS
OXYGEN SATURATION: 97 % | HEART RATE: 64 BPM | SYSTOLIC BLOOD PRESSURE: 110 MMHG | TEMPERATURE: 98 F | BODY MASS INDEX: 28.57 KG/M2 | WEIGHT: 145.5 LBS | DIASTOLIC BLOOD PRESSURE: 70 MMHG | HEIGHT: 60 IN

## 2019-06-05 DIAGNOSIS — M79.2 NEUROPATHIC PAIN: ICD-10-CM

## 2019-06-05 DIAGNOSIS — F11.90 CHRONIC, CONTINUOUS USE OF OPIOIDS: ICD-10-CM

## 2019-06-05 DIAGNOSIS — M79.2 NEURALGIA AND NEURITIS: ICD-10-CM

## 2019-06-05 DIAGNOSIS — R25.2 SPASTICITY: Primary | ICD-10-CM

## 2019-06-05 DIAGNOSIS — E04.1 THYROID CYST: ICD-10-CM

## 2019-06-05 DIAGNOSIS — G35 MULTIPLE SCLEROSIS: ICD-10-CM

## 2019-06-05 PROCEDURE — 3078F PR MOST RECENT DIASTOLIC BLOOD PRESSURE < 80 MM HG: ICD-10-PCS | Mod: CPTII,S$GLB,, | Performed by: FAMILY MEDICINE

## 2019-06-05 PROCEDURE — 99999 PR PBB SHADOW E&M-EST. PATIENT-LVL III: CPT | Mod: PBBFAC,,, | Performed by: FAMILY MEDICINE

## 2019-06-05 PROCEDURE — 99214 OFFICE O/P EST MOD 30 MIN: CPT | Mod: S$GLB,,, | Performed by: FAMILY MEDICINE

## 2019-06-05 PROCEDURE — 3008F BODY MASS INDEX DOCD: CPT | Mod: CPTII,S$GLB,, | Performed by: FAMILY MEDICINE

## 2019-06-05 PROCEDURE — 3074F PR MOST RECENT SYSTOLIC BLOOD PRESSURE < 130 MM HG: ICD-10-PCS | Mod: CPTII,S$GLB,, | Performed by: FAMILY MEDICINE

## 2019-06-05 PROCEDURE — 99214 PR OFFICE/OUTPT VISIT, EST, LEVL IV, 30-39 MIN: ICD-10-PCS | Mod: S$GLB,,, | Performed by: FAMILY MEDICINE

## 2019-06-05 PROCEDURE — 3078F DIAST BP <80 MM HG: CPT | Mod: CPTII,S$GLB,, | Performed by: FAMILY MEDICINE

## 2019-06-05 PROCEDURE — 99999 PR PBB SHADOW E&M-EST. PATIENT-LVL III: ICD-10-PCS | Mod: PBBFAC,,, | Performed by: FAMILY MEDICINE

## 2019-06-05 PROCEDURE — 3074F SYST BP LT 130 MM HG: CPT | Mod: CPTII,S$GLB,, | Performed by: FAMILY MEDICINE

## 2019-06-05 PROCEDURE — 3008F PR BODY MASS INDEX (BMI) DOCUMENTED: ICD-10-PCS | Mod: CPTII,S$GLB,, | Performed by: FAMILY MEDICINE

## 2019-06-05 RX ORDER — IBUPROFEN 800 MG/1
TABLET ORAL
Qty: 180 TABLET | Refills: 1 | Status: SHIPPED | OUTPATIENT
Start: 2019-06-05 | End: 2020-10-06 | Stop reason: SDUPTHER

## 2019-06-05 RX ORDER — HYDROCODONE BITARTRATE AND ACETAMINOPHEN 10; 325 MG/1; MG/1
1 TABLET ORAL 3 TIMES DAILY PRN
Qty: 90 TABLET | Refills: 0 | Status: SHIPPED | OUTPATIENT
Start: 2019-06-05 | End: 2019-08-29 | Stop reason: SDUPTHER

## 2019-06-05 NOTE — PROGRESS NOTES
Chief Complaint   Patient presents with    Chronic Pain       SUBJECTIVE:  Zoey Cali is a 53 y.o. female here for new problem of f/u on the pain and she is doing well, she wants to go ahead and doing the pain medication and it does help.  She has no progression of her sense.  Currently has co-morbidities including per problem list.      Past Medical History:   Diagnosis Date    Allergy     Anemia     Anxiety     Breast cyst     Chronic kidney disease     Depression     Fibrocystic breast     Hypertension     Multiple sclerosis     Multiple sclerosis     Neuromuscular disorder     Osteoporosis     Rib fracture 2015     Past Surgical History:   Procedure Laterality Date    APPENDECTOMY      BREAST BIOPSY Left 2012    BREAST CYST ASPIRATION      BREAST SURGERY  2004    excisional biopsy      SECTION, CLASSIC      CHOLECYSTECTOMY      EYE SURGERY      HYSTERECTOMY      OOPHORECTOMY       Social History     Socioeconomic History    Marital status:      Spouse name: Not on file    Number of children: Not on file    Years of education: Not on file    Highest education level: Not on file   Occupational History    Not on file   Social Needs    Financial resource strain: Not on file    Food insecurity:     Worry: Not on file     Inability: Not on file    Transportation needs:     Medical: Not on file     Non-medical: Not on file   Tobacco Use    Smoking status: Current Every Day Smoker     Packs/day: 1.00     Types: Cigarettes    Smokeless tobacco: Never Used   Substance and Sexual Activity    Alcohol use: No     Alcohol/week: 0.0 oz    Drug use: No    Sexual activity: Yes   Lifestyle    Physical activity:     Days per week: Not on file     Minutes per session: Not on file    Stress: Not on file   Relationships    Social connections:     Talks on phone: Not on file     Gets together: Not on file     Attends Ten[employer] service: Not on file     Active  member of club or organization: Not on file     Attends meetings of clubs or organizations: Not on file     Relationship status: Not on file   Other Topics Concern    Not on file   Social History Narrative    Not on file     Family History   Problem Relation Age of Onset    Arthritis Mother     Diabetes Father     Heart disease Father     Hyperlipidemia Father     Hypertension Father     Stroke Father     Alcohol abuse Son     Arthritis Son     Drug abuse Son     Alcohol abuse Maternal Aunt     Arthritis Maternal Aunt     Alcohol abuse Maternal Uncle     Drug abuse Maternal Uncle     Hypertension Maternal Uncle     Alcohol abuse Paternal Aunt     Heart disease Paternal Aunt     Hearing loss Paternal Aunt     Hypertension Paternal Aunt     Alcohol abuse Paternal Uncle     Heart disease Paternal Uncle     Alcohol abuse Maternal Grandmother     Heart disease Maternal Grandmother     Stroke Maternal Grandmother     Alcohol abuse Maternal Grandfather     Heart disease Maternal Grandfather     Alcohol abuse Paternal Grandmother     Cancer Paternal Grandmother     Heart disease Paternal Grandmother     Hypertension Paternal Grandmother     Stroke Paternal Grandmother     Alcohol abuse Paternal Grandfather     Heart disease Paternal Grandfather      Current Outpatient Medications on File Prior to Visit   Medication Sig Dispense Refill    carBAMazepine (TEGRETOL XR) 200 MG 12 hr tablet TAKE ONE TABLET BY MOUTH TWICE DAILY 60 tablet 5    cholecalciferol, vitamin D3, (VITAMIN D3) 5,000 unit Tab Take 5,000 Units by mouth once daily.      escitalopram oxalate (LEXAPRO) 20 MG tablet Take 1 tablet (20 mg total) by mouth once daily. 90 tablet 3    fluticasone (FLONASE) 50 mcg/actuation nasal spray ONE sprays(s) each nostril TWICE DAILY per instructed 16 g 3    gabapentin (NEURONTIN) 400 MG capsule Take 2 capsules (800 mg total) by mouth every evening. 180 capsule 1    gabapentin (NEURONTIN)  600 MG tablet TAKE ONE TABLET BY MOUTH THREE TIMES DAILY *MAY CAUSE DROWSINESS* *MAY CAUSE DIZZINESS* 270 tablet 1    interferon beta-1a (AVONEX) 30 mcg/0.5 mL PnKt INJECT 30 MCG INTRAMUSCULARLY ONCE WEEKLY 12 pen 1    LORazepam (ATIVAN) 2 MG Tab Take 1 tablet (2 mg total) by mouth every 6 (six) hours as needed. 120 tablet 0    metoprolol succinate (TOPROL-XL) 100 MG 24 hr tablet Take 1 tablet (100 mg total) by mouth once daily. 90 tablet 3    omeprazole (PRILOSEC) 40 MG capsule Take 1 capsule (40 mg total) by mouth every evening. 90 capsule 3    ondansetron (ZOFRAN-ODT) 4 MG TbDL TAKE ONE TABLET BY MOUTH EVERY 12 HOURS AS NEEDED 20 tablet 5    simvastatin (ZOCOR) 40 MG tablet Take 1 tablet (40 mg total) by mouth every evening. 90 tablet 3    tiZANidine (ZANAFLEX) 4 MG tablet TAKE 2 TABLETS IN THE MORNING, AT NOON AND IN THE EVENING AND TAKE 3 TABLETS AT NIGHT (9 TABLETS PER DAY) 810 tablet 3    traZODone (DESYREL) 150 MG tablet Take 1-2 tablets (150-300 mg total) by mouth every evening. 180 tablet 3    triamterene-hydrochlorothiazide 37.5-25 mg (MAXZIDE-25) 37.5-25 mg per tablet Take 1 tablet by mouth once daily. 90 tablet 3    umeclidinium-vilanterol 62.5-25 mcg/actuation DsDv Inhale 1 puff into the lungs once daily. Controller 60 each 11    [DISCONTINUED] HYDROcodone-acetaminophen (NORCO)  mg per tablet Take 1 tablet by mouth 3 (three) times daily as needed (pain). 90 tablet 0    [DISCONTINUED] ibuprofen (ADVIL,MOTRIN) 800 MG tablet TAKE ONE TABLET BY MOUTH EVERY 8 HOURS WITH FOOD AND WATER 180 tablet 1    cetirizine (ZYRTEC) 10 MG tablet Take 1 tablet (10 mg total) by mouth once daily. 30 tablet 0    nystatin-triamcinolone (MYCOLOG II) cream Apply to affected area 2 times daily 30 g 1     No current facility-administered medications on file prior to visit.      Review of patient's allergies indicates:   Allergen Reactions    Lomotil [diphenoxylate-atropine]      Causes stomach spasms     Dilaudid [hydromorphone (bulk)] Itching         Review of Systems   Constitutional: Negative.    HENT: Negative.    Eyes: Negative.    Respiratory: Negative.    Cardiovascular: Negative.    Gastrointestinal: Negative.    Genitourinary: Negative.    Musculoskeletal: Positive for joint pain and myalgias. Negative for back pain and neck pain.   Skin: Negative.    Neurological: Negative.    Endo/Heme/Allergies: Negative.    Psychiatric/Behavioral: Positive for depression. Negative for memory loss. The patient is nervous/anxious.        OBJECTIVE:  /70   Pulse 64   Temp 97.6 °F (36.4 °C) (Oral)   Ht 5' (1.524 m)   Wt 66 kg (145 lb 8.1 oz)   LMP 01/01/2001 (Approximate) Comment: 2001  SpO2 97%   BMI 28.42 kg/m²     Wt Readings from Last 3 Encounters:   06/05/19 66 kg (145 lb 8.1 oz)   05/06/19 68 kg (149 lb 14.6 oz)   03/27/19 68.7 kg (151 lb 7.3 oz)     BP Readings from Last 3 Encounters:   06/05/19 110/70   05/06/19 124/62   03/27/19 133/69       Chronic changes noted alert and oriented x 3   S1 and S2 normal, no murmurs, clicks, gallops or rubs. Regular rate and rhythm. Chest is clear; no wheezes or rales. No edema or JVD.  She is overall a little more debilitated    Review of old Records:  Reviewed     Review of old labs:      Review of old imaging:  Reviewed her MRI's    ASSESSMENT:  Problem List Items Addressed This Visit     Neuropathic pain    Relevant Medications    HYDROcodone-acetaminophen (NORCO)  mg per tablet    Neuralgia and neuritis    Relevant Medications    ibuprofen (ADVIL,MOTRIN) 800 MG tablet    Multiple sclerosis    Relevant Medications    HYDROcodone-acetaminophen (NORCO)  mg per tablet    Chronic, continuous use of opioids    Relevant Medications    HYDROcodone-acetaminophen (NORCO)  mg per tablet      Other Visit Diagnoses     Spasticity    -  Primary    Relevant Medications    ibuprofen (ADVIL,MOTRIN) 800 MG tablet    Thyroid cyst        Relevant Orders    US  Soft Tissue Head Neck Thyroid          ICD-10-CM ICD-9-CM   1. Spasticity R25.2 781.0   2. Neuralgia and neuritis M79.2 729.2   3. Multiple sclerosis G35 340   4. Chronic, continuous use of opioids F11.90 305.51   5. Neuropathic pain M79.2 729.2   6. Thyroid cyst E04.1 246.2         PLAN:  1. Spasticity  The current medical regimen is effective;  continue present plan and medications.    - ibuprofen (ADVIL,MOTRIN) 800 MG tablet; TAKE ONE TABLET BY MOUTH EVERY 8 HOURS WITH FOOD AND WATER  Dispense: 180 tablet; Refill: 1    2. Neuralgia and neuritis    - ibuprofen (ADVIL,MOTRIN) 800 MG tablet; TAKE ONE TABLET BY MOUTH EVERY 8 HOURS WITH FOOD AND WATER  Dispense: 180 tablet; Refill: 1    3. Multiple sclerosis    - HYDROcodone-acetaminophen (NORCO)  mg per tablet; Take 1 tablet by mouth 3 (three) times daily as needed (pain).  Dispense: 90 tablet; Refill: 0    4. Chronic, continuous use of opioids    - HYDROcodone-acetaminophen (NORCO)  mg per tablet; Take 1 tablet by mouth 3 (three) times daily as needed (pain).  Dispense: 90 tablet; Refill: 0    5. Neuropathic pain    - HYDROcodone-acetaminophen (NORCO)  mg per tablet; Take 1 tablet by mouth 3 (three) times daily as needed (pain).  Dispense: 90 tablet; Refill: 0    6. Thyroid cyst    - US Soft Tissue Head Neck Thyroid; Future    Get the US  Medication List with Changes/Refills   Current Medications    CARBAMAZEPINE (TEGRETOL XR) 200 MG 12 HR TABLET    TAKE ONE TABLET BY MOUTH TWICE DAILY    CETIRIZINE (ZYRTEC) 10 MG TABLET    Take 1 tablet (10 mg total) by mouth once daily.    CHOLECALCIFEROL, VITAMIN D3, (VITAMIN D3) 5,000 UNIT TAB    Take 5,000 Units by mouth once daily.    ESCITALOPRAM OXALATE (LEXAPRO) 20 MG TABLET    Take 1 tablet (20 mg total) by mouth once daily.    FLUTICASONE (FLONASE) 50 MCG/ACTUATION NASAL SPRAY    ONE sprays(s) each nostril TWICE DAILY per instructed    GABAPENTIN (NEURONTIN) 400 MG CAPSULE    Take 2 capsules (800 mg  total) by mouth every evening.    GABAPENTIN (NEURONTIN) 600 MG TABLET    TAKE ONE TABLET BY MOUTH THREE TIMES DAILY *MAY CAUSE DROWSINESS* *MAY CAUSE DIZZINESS*    INTERFERON BETA-1A (AVONEX) 30 MCG/0.5 ML PNKT    INJECT 30 MCG INTRAMUSCULARLY ONCE WEEKLY    LORAZEPAM (ATIVAN) 2 MG TAB    Take 1 tablet (2 mg total) by mouth every 6 (six) hours as needed.    METOPROLOL SUCCINATE (TOPROL-XL) 100 MG 24 HR TABLET    Take 1 tablet (100 mg total) by mouth once daily.    NYSTATIN-TRIAMCINOLONE (MYCOLOG II) CREAM    Apply to affected area 2 times daily    OMEPRAZOLE (PRILOSEC) 40 MG CAPSULE    Take 1 capsule (40 mg total) by mouth every evening.    ONDANSETRON (ZOFRAN-ODT) 4 MG TBDL    TAKE ONE TABLET BY MOUTH EVERY 12 HOURS AS NEEDED    SIMVASTATIN (ZOCOR) 40 MG TABLET    Take 1 tablet (40 mg total) by mouth every evening.    TIZANIDINE (ZANAFLEX) 4 MG TABLET    TAKE 2 TABLETS IN THE MORNING, AT NOON AND IN THE EVENING AND TAKE 3 TABLETS AT NIGHT (9 TABLETS PER DAY)    TRAZODONE (DESYREL) 150 MG TABLET    Take 1-2 tablets (150-300 mg total) by mouth every evening.    TRIAMTERENE-HYDROCHLOROTHIAZIDE 37.5-25 MG (MAXZIDE-25) 37.5-25 MG PER TABLET    Take 1 tablet by mouth once daily.    UMECLIDINIUM-VILANTEROL 62.5-25 MCG/ACTUATION DSDV    Inhale 1 puff into the lungs once daily. Controller   Changed and/or Refilled Medications    Modified Medication Previous Medication    HYDROCODONE-ACETAMINOPHEN (NORCO)  MG PER TABLET HYDROcodone-acetaminophen (NORCO)  mg per tablet       Take 1 tablet by mouth 3 (three) times daily as needed (pain).    Take 1 tablet by mouth 3 (three) times daily as needed (pain).    IBUPROFEN (ADVIL,MOTRIN) 800 MG TABLET ibuprofen (ADVIL,MOTRIN) 800 MG tablet       TAKE ONE TABLET BY MOUTH EVERY 8 HOURS WITH FOOD AND WATER    TAKE ONE TABLET BY MOUTH EVERY 8 HOURS WITH FOOD AND WATER       No follow-ups on file.

## 2019-06-07 ENCOUNTER — PATIENT MESSAGE (OUTPATIENT)
Dept: HEMATOLOGY/ONCOLOGY | Facility: CLINIC | Age: 54
End: 2019-06-07

## 2019-06-13 ENCOUNTER — HOSPITAL ENCOUNTER (OUTPATIENT)
Dept: RADIOLOGY | Facility: HOSPITAL | Age: 54
Discharge: HOME | End: 2019-06-13
Attending: FAMILY MEDICINE
Payer: MEDICARE

## 2019-06-13 DIAGNOSIS — E04.1 THYROID CYST: ICD-10-CM

## 2019-06-13 PROCEDURE — 76536 US EXAM OF HEAD AND NECK: CPT | Mod: 26,,, | Performed by: RADIOLOGY

## 2019-06-13 PROCEDURE — 76536 US SOFT TISSUE HEAD NECK THYROID: ICD-10-PCS | Mod: 26,,, | Performed by: RADIOLOGY

## 2019-06-13 PROCEDURE — 76536 US EXAM OF HEAD AND NECK: CPT | Mod: TC

## 2019-06-18 ENCOUNTER — LAB VISIT (OUTPATIENT)
Dept: LAB | Facility: HOSPITAL | Age: 54
End: 2019-06-18
Attending: INTERNAL MEDICINE
Payer: MEDICARE

## 2019-06-18 DIAGNOSIS — D50.9 IRON DEFICIENCY ANEMIA, UNSPECIFIED IRON DEFICIENCY ANEMIA TYPE: ICD-10-CM

## 2019-06-18 LAB
BASOPHILS # BLD AUTO: 0.02 K/UL (ref 0–0.2)
BASOPHILS NFR BLD: 0.3 % (ref 0–1.9)
DIFFERENTIAL METHOD: ABNORMAL
EOSINOPHIL # BLD AUTO: 0.2 K/UL (ref 0–0.5)
EOSINOPHIL NFR BLD: 2.9 % (ref 0–8)
ERYTHROCYTE [DISTWIDTH] IN BLOOD BY AUTOMATED COUNT: 14.2 % (ref 11.5–14.5)
FERRITIN SERPL-MCNC: 138 NG/ML (ref 20–300)
HCT VFR BLD AUTO: 44.3 % (ref 37–48.5)
HGB BLD-MCNC: 15.2 G/DL (ref 12–16)
IRON SERPL-MCNC: 71 UG/DL (ref 30–160)
LYMPHOCYTES # BLD AUTO: 1.3 K/UL (ref 1–4.8)
LYMPHOCYTES NFR BLD: 18.3 % (ref 18–48)
MCH RBC QN AUTO: 31.6 PG (ref 27–31)
MCHC RBC AUTO-ENTMCNC: 34.3 G/DL (ref 32–36)
MCV RBC AUTO: 92 FL (ref 82–98)
MONOCYTES # BLD AUTO: 0.3 K/UL (ref 0.3–1)
MONOCYTES NFR BLD: 4.7 % (ref 4–15)
NEUTROPHILS # BLD AUTO: 5.1 K/UL (ref 1.8–7.7)
NEUTROPHILS NFR BLD: 73.8 % (ref 38–73)
PLATELET # BLD AUTO: 221 K/UL (ref 150–350)
PMV BLD AUTO: 8.6 FL (ref 9.2–12.9)
RBC # BLD AUTO: 4.81 M/UL (ref 4–5.4)
SATURATED IRON: 20 % (ref 20–50)
TOTAL IRON BINDING CAPACITY: 364 UG/DL (ref 250–450)
TRANSFERRIN SERPL-MCNC: 246 MG/DL (ref 200–375)
WBC # BLD AUTO: 6.95 K/UL (ref 3.9–12.7)

## 2019-06-18 PROCEDURE — 82728 ASSAY OF FERRITIN: CPT

## 2019-06-18 PROCEDURE — 36415 COLL VENOUS BLD VENIPUNCTURE: CPT

## 2019-06-18 PROCEDURE — 83540 ASSAY OF IRON: CPT

## 2019-06-18 PROCEDURE — 85025 COMPLETE CBC W/AUTO DIFF WBC: CPT

## 2019-06-20 ENCOUNTER — OFFICE VISIT (OUTPATIENT)
Dept: HEMATOLOGY/ONCOLOGY | Facility: CLINIC | Age: 54
End: 2019-06-20
Payer: MEDICARE

## 2019-06-20 VITALS
HEART RATE: 54 BPM | WEIGHT: 148.56 LBS | DIASTOLIC BLOOD PRESSURE: 59 MMHG | SYSTOLIC BLOOD PRESSURE: 118 MMHG | OXYGEN SATURATION: 97 % | BODY MASS INDEX: 29.95 KG/M2 | HEIGHT: 59 IN | TEMPERATURE: 98 F

## 2019-06-20 DIAGNOSIS — D50.9 IRON DEFICIENCY ANEMIA, UNSPECIFIED IRON DEFICIENCY ANEMIA TYPE: Primary | ICD-10-CM

## 2019-06-20 DIAGNOSIS — G35 MS (MULTIPLE SCLEROSIS): ICD-10-CM

## 2019-06-20 PROCEDURE — 3078F PR MOST RECENT DIASTOLIC BLOOD PRESSURE < 80 MM HG: ICD-10-PCS | Mod: CPTII,S$GLB,, | Performed by: INTERNAL MEDICINE

## 2019-06-20 PROCEDURE — 99999 PR PBB SHADOW E&M-EST. PATIENT-LVL IV: ICD-10-PCS | Mod: PBBFAC,,, | Performed by: INTERNAL MEDICINE

## 2019-06-20 PROCEDURE — 3078F DIAST BP <80 MM HG: CPT | Mod: CPTII,S$GLB,, | Performed by: INTERNAL MEDICINE

## 2019-06-20 PROCEDURE — 3008F BODY MASS INDEX DOCD: CPT | Mod: CPTII,S$GLB,, | Performed by: INTERNAL MEDICINE

## 2019-06-20 PROCEDURE — 99213 OFFICE O/P EST LOW 20 MIN: CPT | Mod: S$GLB,,, | Performed by: INTERNAL MEDICINE

## 2019-06-20 PROCEDURE — 99999 PR PBB SHADOW E&M-EST. PATIENT-LVL IV: CPT | Mod: PBBFAC,,, | Performed by: INTERNAL MEDICINE

## 2019-06-20 PROCEDURE — 3074F PR MOST RECENT SYSTOLIC BLOOD PRESSURE < 130 MM HG: ICD-10-PCS | Mod: CPTII,S$GLB,, | Performed by: INTERNAL MEDICINE

## 2019-06-20 PROCEDURE — 3008F PR BODY MASS INDEX (BMI) DOCUMENTED: ICD-10-PCS | Mod: CPTII,S$GLB,, | Performed by: INTERNAL MEDICINE

## 2019-06-20 PROCEDURE — 99213 PR OFFICE/OUTPT VISIT, EST, LEVL III, 20-29 MIN: ICD-10-PCS | Mod: S$GLB,,, | Performed by: INTERNAL MEDICINE

## 2019-06-20 PROCEDURE — 3074F SYST BP LT 130 MM HG: CPT | Mod: CPTII,S$GLB,, | Performed by: INTERNAL MEDICINE

## 2019-06-20 NOTE — PROGRESS NOTES
"Subjective:       Patient ID: Zoey Cali is a 53 y.o. female.    Chief Complaint: Follow-up  Diagnosis: TRACIE  HPI     52 y/o female with history of MS seen  today for f/u for TRACIE  She undergoes intermittent IV iron therapy.  She has been intolerant of oral Fe supp  s/p GI eval with EGD and colonoscopy which was unremarkable    She is followed by Dr. Mendez for long standing history of MS and remains on therapy with Avonex   No recent flare-up        She undergoes intermittent IV Fe therapy      Doing well  No new issues   Appetite and weight stable   No fatigue  No SOB/CP    Chronic neck pain-stable    CBC reveals wbc 6950 /mm3  Hb 15.2  g/dl   44.3 g/dl   plt ct 221kk    Review of Systems   Constitutional: Negative for appetite change, fatigue and unexpected weight change.   Eyes: Negative for visual disturbance.   Respiratory: Negative for cough and shortness of breath.    Cardiovascular: Negative for chest pain and leg swelling.   Gastrointestinal: Negative for abdominal pain, blood in stool, constipation, diarrhea, nausea and vomiting.   Genitourinary: Negative for frequency.   Musculoskeletal: Positive for neck pain (chronic). Negative for arthralgias, back pain and joint swelling.   Skin: Negative for rash.        No petechiae, ecchymoses   Neurological: Negative for light-headedness and headaches.   Hematological: Negative for adenopathy. Does not bruise/bleed easily.   Psychiatric/Behavioral: Positive for sleep disturbance.       Objective:       Vitals:    06/20/19 1509   BP: (!) 118/59   BP Location: Left arm   Patient Position: Sitting   BP Method: Medium (Automatic)   Pulse: (!) 54   Temp: 98 °F (36.7 °C)   TempSrc: Oral   SpO2: 97%   Weight: 67.4 kg (148 lb 9.4 oz)   Height: 4' 11" (1.499 m)       Physical Exam   Constitutional: She is oriented to person, place, and time. She appears well-developed and well-nourished.   HENT:   Head: Normocephalic.   Mouth/Throat: Oropharynx is clear and moist. " No oropharyngeal exudate.   Eyes: Pupils are equal, round, and reactive to light. Conjunctivae and lids are normal. No scleral icterus.   Neck: Normal range of motion. Neck supple. No thyromegaly present.   Cardiovascular: Normal rate, regular rhythm and normal heart sounds.   No murmur heard.  Pulmonary/Chest: Breath sounds normal. She has no wheezes. She has no rales.   Abdominal: Soft. Bowel sounds are normal. She exhibits no distension and no mass. There is no hepatosplenomegaly. There is no tenderness. There is no rebound and no guarding.   Musculoskeletal: Normal range of motion. She exhibits no edema or tenderness.   Neurological: She is alert and oriented to person, place, and time. No cranial nerve deficit. Coordination normal.   Skin: Skin is warm and dry. No ecchymosis, no petechiae and no rash noted. No erythema.        Lab Results   Component Value Date    WBC 6.95 06/18/2019    HGB 15.2 06/18/2019    HCT 44.3 06/18/2019    MCV 92 06/18/2019     06/18/2019     Lab Results   Component Value Date    IRON 71 06/18/2019    TIBC 364 06/18/2019    FERRITIN 138 06/18/2019     ·       1. Iron deficiency anemia, unspecified iron deficiency anemia type    2. MS (multiple sclerosis)        Plan:   Zoey FRASER was seen today for follow-up.    Diagnoses and all orders for this visit:    Iron deficiency anemia, unspecified iron deficiency       S/p GI eval-unremarkable       S/p Venofer x 3 completed 11/2018        CBC reveals stable Hb 15.2 g/dl        Ferritin 138       Cont to monitor       Multiple Sclerosis    Follow-up with Neurology    Pt on Avonex              F/u 3  mos with cbc, Fe studies prior to f/u ( or sooner if problems should arise in the interim)      Cc: MD Heidy Lancaster MD

## 2019-06-21 ENCOUNTER — PATIENT OUTREACH (OUTPATIENT)
Dept: ADMINISTRATIVE | Facility: HOSPITAL | Age: 54
End: 2019-06-21

## 2019-06-25 ENCOUNTER — TELEPHONE (OUTPATIENT)
Dept: NEUROLOGY | Facility: CLINIC | Age: 54
End: 2019-06-25

## 2019-06-25 ENCOUNTER — OFFICE VISIT (OUTPATIENT)
Dept: NEUROLOGY | Facility: CLINIC | Age: 54
End: 2019-06-25
Payer: MEDICARE

## 2019-06-25 VITALS
BODY MASS INDEX: 30.12 KG/M2 | WEIGHT: 149.38 LBS | DIASTOLIC BLOOD PRESSURE: 71 MMHG | HEART RATE: 72 BPM | HEIGHT: 59 IN | SYSTOLIC BLOOD PRESSURE: 108 MMHG

## 2019-06-25 DIAGNOSIS — R93.7 ABNORMAL MRI, CERVICAL SPINE: ICD-10-CM

## 2019-06-25 DIAGNOSIS — Z71.89 COUNSELING REGARDING GOALS OF CARE: ICD-10-CM

## 2019-06-25 DIAGNOSIS — Z86.69 HISTORY OF OPTIC NEURITIS: ICD-10-CM

## 2019-06-25 DIAGNOSIS — Z29.89 PROPHYLACTIC IMMUNOTHERAPY: ICD-10-CM

## 2019-06-25 DIAGNOSIS — R90.89 ABNORMAL FINDING ON MRI OF BRAIN: ICD-10-CM

## 2019-06-25 DIAGNOSIS — G35 MS (MULTIPLE SCLEROSIS): Primary | ICD-10-CM

## 2019-06-25 DIAGNOSIS — M54.81 OCCIPITAL NEURALGIA, UNSPECIFIED LATERALITY: ICD-10-CM

## 2019-06-25 DIAGNOSIS — M79.2 NEUROPATHIC PAIN: ICD-10-CM

## 2019-06-25 PROCEDURE — 3008F PR BODY MASS INDEX (BMI) DOCUMENTED: ICD-10-PCS | Mod: CPTII,S$GLB,, | Performed by: PSYCHIATRY & NEUROLOGY

## 2019-06-25 PROCEDURE — 99214 PR OFFICE/OUTPT VISIT, EST, LEVL IV, 30-39 MIN: ICD-10-PCS | Mod: S$GLB,,, | Performed by: PSYCHIATRY & NEUROLOGY

## 2019-06-25 PROCEDURE — 3078F PR MOST RECENT DIASTOLIC BLOOD PRESSURE < 80 MM HG: ICD-10-PCS | Mod: CPTII,S$GLB,, | Performed by: PSYCHIATRY & NEUROLOGY

## 2019-06-25 PROCEDURE — 99999 PR PBB SHADOW E&M-EST. PATIENT-LVL IV: CPT | Mod: PBBFAC,,, | Performed by: PSYCHIATRY & NEUROLOGY

## 2019-06-25 PROCEDURE — 99214 OFFICE O/P EST MOD 30 MIN: CPT | Mod: S$GLB,,, | Performed by: PSYCHIATRY & NEUROLOGY

## 2019-06-25 PROCEDURE — 99999 PR PBB SHADOW E&M-EST. PATIENT-LVL IV: ICD-10-PCS | Mod: PBBFAC,,, | Performed by: PSYCHIATRY & NEUROLOGY

## 2019-06-25 PROCEDURE — 3008F BODY MASS INDEX DOCD: CPT | Mod: CPTII,S$GLB,, | Performed by: PSYCHIATRY & NEUROLOGY

## 2019-06-25 PROCEDURE — 3078F DIAST BP <80 MM HG: CPT | Mod: CPTII,S$GLB,, | Performed by: PSYCHIATRY & NEUROLOGY

## 2019-06-25 PROCEDURE — 3074F SYST BP LT 130 MM HG: CPT | Mod: CPTII,S$GLB,, | Performed by: PSYCHIATRY & NEUROLOGY

## 2019-06-25 PROCEDURE — 3074F PR MOST RECENT SYSTOLIC BLOOD PRESSURE < 130 MM HG: ICD-10-PCS | Mod: CPTII,S$GLB,, | Performed by: PSYCHIATRY & NEUROLOGY

## 2019-06-25 NOTE — PROGRESS NOTES
Subjective:       Patient ID: Zoey Cali is a 53 y.o. female who presents today for a routine clinic visit for MS.      MS HPI:  · DMT: Avonex  · Side effects from DMT? No  · Taking vitamin D3 as recommended? Yes -  Dose: 5,000 IU M-F  · Having more tightness in her neck and shoulders, when it gets tight, leads to HA back of head;  no other new sx;      SOCIAL HISTORY  Social History     Tobacco Use    Smoking status: Current Every Day Smoker     Packs/day: 1.00     Types: Cigarettes    Smokeless tobacco: Never Used   Substance Use Topics    Alcohol use: No     Alcohol/week: 0.0 oz    Drug use: No     Living arrangements - the patient lives with their spouse.  Employment : [employer] , Medicare    MS ROS:  · Fatigue: Yes--gets tired at 3PM, and takes a nap, and then is good until night   · Sleep Disturbance: Yes --trying to get off Ativan, and newly on Trazodone;  Does not snore; does wake-up feeling rested;    · Bladder Dysfunction: No  · Bowel Dysfunction: No  · Spasticity: Yes - takes tizanidine QID  · Visual Symptoms: Yes - History of ON  · Cognitive: No  · Mood Disorder: Yes - Taking Lexapro--20mg-. she is going to counseling once / month;   · Gait Disturbance: No  · Falls: as above  · Hand Dysfunction: No  · Pain: Yes - NP in feet, improved; Taking gabapentin 600mg TID and 800mg HS and Tegretol 100mg TWICE DAILY;on Norco PRN prescribed by Dr. Costello--occasional  · Sexual Dysfunction: Not Assessed  · Skin Breakdown: No  · Tremors: No  · Dysphagia: No  · Dysarthria: No  · Heat sensitivity: Yes--fatigue  · Any un-met adaptive needs? Yes - cooling device  · Copay Assist? Getting free drug through [employer]         Objective:        25 foot timed walk: 5.2s without assist    Neurologic Exam        She has exquisite tenderness to palpation at occipital notch     MENTAL STATUS: grossly intact     CRANIAL NERVE EXAM: She has a mild bilateral internuclear ophthalmoplegia.   Extraocular muscles are intact. Pupils are  equal, round, and reactive to light.  No facial asymmetry. Facial sensation is intact bilaterally. There is no   dysarthria.      MOTOR EXAM: She has slightly slow rapid sequential movements in the hands   bilaterally; otherwise, her strength is grossly full, graded at 5/5 on manual   motor testing.     REFLEXES: She has 3+ reflexes in all groups throughout upper and lower   extremities bilaterally and her toes are bilaterally silent.     SENSORY EXAM: She has decreased vibration sense bilaterally in the lower   extremities that is mild; otherwise, sensation is grossly intact.     COORDINATION: Normal finger-to-nose exam.     GAIT: Slightly ataxic and wide based.      Imaging:   MRI brain and C spine 5/2019 stable;     Labs:     Lab Results   Component Value Date    RPVHYREY96BA 49 04/02/2019    VNXUMPAZ59VB 59 05/01/2018    EIZZPWKD16TQ 54 11/17/2017       Lab Results   Component Value Date    WBC 6.95 06/18/2019    RBC 4.81 06/18/2019    HGB 15.2 06/18/2019    HCT 44.3 06/18/2019    MCV 92 06/18/2019    MCH 31.6 (H) 06/18/2019    MCHC 34.3 06/18/2019    RDW 14.2 06/18/2019     06/18/2019    MPV 8.6 (L) 06/18/2019    GRAN 5.1 06/18/2019    GRAN 73.8 (H) 06/18/2019    LYMPH 1.3 06/18/2019    LYMPH 18.3 06/18/2019    MONO 0.3 06/18/2019    MONO 4.7 06/18/2019    EOS 0.2 06/18/2019    BASO 0.02 06/18/2019    EOSINOPHIL 2.9 06/18/2019    BASOPHIL 0.3 06/18/2019     Sodium   Date Value Ref Range Status   05/01/2018 141 136 - 145 mmol/L Final     Potassium   Date Value Ref Range Status   05/01/2018 4.4 3.5 - 5.1 mmol/L Final     Chloride   Date Value Ref Range Status   05/01/2018 102 95 - 110 mmol/L Final     CO2   Date Value Ref Range Status   05/01/2018 31 (H) 23 - 29 mmol/L Final     Glucose   Date Value Ref Range Status   05/01/2018 89 70 - 110 mg/dL Final     BUN, Bld   Date Value Ref Range Status   05/01/2018 10 6 - 20 mg/dL Final     Creatinine   Date Value Ref Range Status   05/15/2019 0.7 0.5 - 1.4 mg/dL  Final     Calcium   Date Value Ref Range Status   05/01/2018 10.0 8.7 - 10.5 mg/dL Final     Total Protein   Date Value Ref Range Status   04/02/2019 7.4 6.0 - 8.4 g/dL Final     Albumin   Date Value Ref Range Status   04/02/2019 4.1 3.5 - 5.2 g/dL Final     Total Bilirubin   Date Value Ref Range Status   04/02/2019 0.4 0.1 - 1.0 mg/dL Final     Comment:     For infants and newborns, interpretation of results should be based  on gestational age, weight and in agreement with clinical  observations.  Premature Infant recommended reference ranges:  Up to 24 hours.............<8.0 mg/dL  Up to 48 hours............<12.0 mg/dL  3-5 days..................<15.0 mg/dL  6-29 days.................<15.0 mg/dL       Alkaline Phosphatase   Date Value Ref Range Status   04/02/2019 150 (H) 55 - 135 U/L Final     AST   Date Value Ref Range Status   04/02/2019 15 10 - 40 U/L Final     ALT   Date Value Ref Range Status   04/02/2019 15 10 - 44 U/L Final     Anion Gap   Date Value Ref Range Status   05/01/2018 8 8 - 16 mmol/L Final     eGFR if    Date Value Ref Range Status   05/15/2019 >60 >60 mL/min/1.73 m^2 Final     eGFR if non    Date Value Ref Range Status   05/15/2019 >60 >60 mL/min/1.73 m^2 Final     Comment:     Calculation used to obtain the estimated glomerular filtration  rate (eGFR) is the CKD-EPI equation.          Diagnosis/Assessment/Plan:    1. Multiple Sclerosis  · Assessment: Pt is stable on Avonex  · Imaging: annual MRI May 2020; can do without contrast if patient remains clinically stable  · Disease Modifying Therapies: continue Avonex; labs per protocol; continue vit D    2. MS Symptom Assessment / Management  · Visual Symptoms: refer to Dr. Stewart; she has history of ON  · Pain: check Carbamezapine level  · No other changes to regimen described in ROS above                  3. Occipital neuralgia--will refer to Headache Neurology for consideration of ON injection     Our visit today  lasted 25 minutes, and 100% of this time was spent face to face with the patient. Over 50% of this visit included discussion of the treatment plan/medication changes/symptom management/exam findings/imaging results/coordination of care. The patient agrees with the plan of care.         Problem List Items Addressed This Visit        1 - High    MS (multiple sclerosis) - Primary       2     Occipital neuralgia    Relevant Orders    Ambulatory Referral to Neurology       3     Neuropathic pain    Relevant Orders    Carbamazepine level, total       Unprioritized    Counseling regarding goals of care    Prophylactic immunotherapy    History of optic neuritis    Relevant Orders    Ambulatory Referral to Ophthalmology      Other Visit Diagnoses     Abnormal finding on MRI of brain        Abnormal MRI, cervical spine

## 2019-06-25 NOTE — TELEPHONE ENCOUNTER
Please contact patient to schedule to see Dr. Stewart. Being referred by Heidy Mendez MD     Dx: History of optic neuritis [Z86.69]

## 2019-06-25 NOTE — TELEPHONE ENCOUNTER
Please contact patient to schedule to see a headache specialist. Being Referred by Heidy Mendez MD     Dx: Occipital neuralgia, unspecified laterality [M54.81

## 2019-06-27 ENCOUNTER — OFFICE VISIT (OUTPATIENT)
Dept: OBSTETRICS AND GYNECOLOGY | Facility: CLINIC | Age: 54
End: 2019-06-27
Attending: OBSTETRICS & GYNECOLOGY
Payer: MEDICARE

## 2019-06-27 VITALS
WEIGHT: 145.75 LBS | DIASTOLIC BLOOD PRESSURE: 70 MMHG | SYSTOLIC BLOOD PRESSURE: 120 MMHG | HEIGHT: 60 IN | BODY MASS INDEX: 28.61 KG/M2

## 2019-06-27 DIAGNOSIS — Z90.710 HISTORY OF HYSTERECTOMY WITH BILATERAL OOPHORECTOMY: ICD-10-CM

## 2019-06-27 DIAGNOSIS — Z12.31 VISIT FOR SCREENING MAMMOGRAM: ICD-10-CM

## 2019-06-27 DIAGNOSIS — Z78.0 POSTMENOPAUSAL STATUS: ICD-10-CM

## 2019-06-27 DIAGNOSIS — Z01.419 WELL WOMAN EXAM WITH ROUTINE GYNECOLOGICAL EXAM: Primary | ICD-10-CM

## 2019-06-27 DIAGNOSIS — L29.2 VULVAR ITCHING: ICD-10-CM

## 2019-06-27 DIAGNOSIS — Z90.722 HISTORY OF HYSTERECTOMY WITH BILATERAL OOPHORECTOMY: ICD-10-CM

## 2019-06-27 PROCEDURE — 87480 CANDIDA DNA DIR PROBE: CPT

## 2019-06-27 PROCEDURE — G0101 CA SCREEN;PELVIC/BREAST EXAM: HCPCS | Mod: S$GLB,,, | Performed by: OBSTETRICS & GYNECOLOGY

## 2019-06-27 PROCEDURE — G0101 PR CA SCREEN;PELVIC/BREAST EXAM: ICD-10-PCS | Mod: S$GLB,,, | Performed by: OBSTETRICS & GYNECOLOGY

## 2019-06-27 PROCEDURE — 99999 PR PBB SHADOW E&M-EST. PATIENT-LVL III: CPT | Mod: PBBFAC,,, | Performed by: OBSTETRICS & GYNECOLOGY

## 2019-06-27 PROCEDURE — 87510 GARDNER VAG DNA DIR PROBE: CPT

## 2019-06-27 PROCEDURE — 99999 PR PBB SHADOW E&M-EST. PATIENT-LVL III: ICD-10-PCS | Mod: PBBFAC,,, | Performed by: OBSTETRICS & GYNECOLOGY

## 2019-06-27 RX ORDER — UMECLIDINIUM BROMIDE AND VILANTEROL TRIFENATATE 62.5; 25 UG/1; UG/1
POWDER RESPIRATORY (INHALATION)
Qty: 60 EACH | Refills: 2 | Status: SHIPPED | OUTPATIENT
Start: 2019-06-27 | End: 2019-08-29 | Stop reason: SDUPTHER

## 2019-06-27 NOTE — PROGRESS NOTES
Zoey Cali is a 53 y.o. year old  who presents for annual exam.  S/P JARON / BSO, not on HRT.  For the past several years, she describes episodes of vulvar itching.  These symptoms seem to come and go.  Denies bleeding, flashes, and sweats.  She reports that her MS is essentially stable.    Pap 6/9/15: Negative    Past Medical History:   Diagnosis Date    Allergy     Anemia     Anxiety     Breast cyst     Chronic kidney disease     Depression     Fibrocystic breast     Hypertension     Multiple sclerosis     Multiple sclerosis     Neuromuscular disorder     Osteoporosis     Rib fracture 2015       Past Surgical History:   Procedure Laterality Date    APPENDECTOMY      BREAST BIOPSY Left 2012    BREAST CYST ASPIRATION      BREAST SURGERY  2004    excisional biopsy      SECTION, CLASSIC      CHOLECYSTECTOMY      EYE SURGERY      HYSTERECTOMY      OOPHORECTOMY         OB History        2    Para   2    Term   2            AB        Living   2       SAB        TAB        Ectopic        Multiple        Live Births   2                 ROS:  GENERAL: Reports fatigue.   SKIN: Reports vulva itching.   HEAD: Denies head injury or headache.   NODES: Denies enlarged lymph nodes.   CHEST: Denies chest pain or shortness of breath.   CARDIOVASCULAR: Denies palpitations or left sided chest pain.   ABDOMEN: No abdominal pain, nausea, vomiting or rectal bleeding.   URINARY: No dysuria or hematuria.  REPRODUCTIVE: See HPI.   BREASTS: Denies pain, lumps, or nipple discharge.   HEMATOLOGIC: No easy bruisability or excessive bleeding.   MUSCULOSKELETAL: Denies joint pain or swelling.   NEUROLOGIC: Reports discomfort, weakness in legs / feet.   PSYCHIATRIC: Denies depression.    PE:  (chaperone present during entire exam)  APPEARANCE: Well nourished, well developed, in no acute distress.  NODES: No inguinal lymph node enlargement.  ABDOMEN: Soft. No tenderness or masses.  No hernias.  BREASTS: Symmetrical, no skin changes or visible lesions. No palpable masses, nipple discharge or adenopathy bilaterally.  PELVIC: Atrophic external female genitalia.  Mild hypopigmentation bilaterally with loss of labial architecture.   Normal hair distribution. Adequate perineal body, normal urethral meatus. Vagina atrophic without lesions or discharge. No significant cystocele or rectocele. Uterus and cervix surgically absent. Bimanual exam revealed no masses, tenderness or abnormality.  ANUS: Normal.    Diagnosis:  1. Well woman exam with routine gynecological exam    2. Postmenopausal status    3. History of hysterectomy with bilateral oophorectomy    4. Vulvar itching    5. Visit for screening mammogram          PLAN:    Orders Placed This Encounter    Vaginosis Screen by DNA Probe    Mammo Digital Screening Bilat w/ Aron       Patient was counseled today on postmenopausal issues.  We discussed her vulva itching and the appearance consistent with lichen sclerosis.  Affirm was performed to rule out other etiologies for vulvovaginitis.  If the affirm is negative, we discussed using a steroid ointment.    Follow-up in 3-4 months for re-evaluation.

## 2019-06-29 LAB
BACTERIAL VAGINOSIS DNA: POSITIVE
CANDIDA GLABRATA DNA: NEGATIVE
CANDIDA KRUSEI DNA: NEGATIVE
CANDIDA RRNA VAG QL PROBE: POSITIVE
T VAGINALIS RRNA GENITAL QL PROBE: NEGATIVE

## 2019-06-30 ENCOUNTER — PATIENT MESSAGE (OUTPATIENT)
Dept: OBSTETRICS AND GYNECOLOGY | Facility: CLINIC | Age: 54
End: 2019-06-30

## 2019-06-30 RX ORDER — METRONIDAZOLE 500 MG/1
500 TABLET ORAL 2 TIMES DAILY
Qty: 14 TABLET | Refills: 0 | Status: SHIPPED | OUTPATIENT
Start: 2019-06-30 | End: 2019-07-08

## 2019-06-30 RX ORDER — FLUCONAZOLE 150 MG/1
150 TABLET ORAL ONCE
Qty: 1 TABLET | Refills: 0 | Status: SHIPPED | OUTPATIENT
Start: 2019-06-30 | End: 2019-06-30

## 2019-06-30 RX ORDER — CLOBETASOL PROPIONATE 0.5 MG/G
OINTMENT TOPICAL 2 TIMES DAILY
Qty: 30 G | Refills: 1 | Status: SHIPPED | OUTPATIENT
Start: 2019-06-30 | End: 2019-09-26

## 2019-07-01 ENCOUNTER — PATIENT MESSAGE (OUTPATIENT)
Dept: OBSTETRICS AND GYNECOLOGY | Facility: CLINIC | Age: 54
End: 2019-07-01

## 2019-07-02 ENCOUNTER — LAB VISIT (OUTPATIENT)
Dept: LAB | Facility: HOSPITAL | Age: 54
End: 2019-07-02
Attending: PSYCHIATRY & NEUROLOGY
Payer: MEDICARE

## 2019-07-02 DIAGNOSIS — M79.2 NEUROPATHIC PAIN: ICD-10-CM

## 2019-07-02 LAB — CARBAMAZEPINE SERPL-MCNC: 8.4 UG/ML (ref 4–12)

## 2019-07-02 PROCEDURE — 80156 ASSAY CARBAMAZEPINE TOTAL: CPT

## 2019-07-02 PROCEDURE — 36415 COLL VENOUS BLD VENIPUNCTURE: CPT

## 2019-07-08 ENCOUNTER — HOSPITAL ENCOUNTER (OUTPATIENT)
Dept: RADIOLOGY | Facility: HOSPITAL | Age: 54
Discharge: HOME | End: 2019-07-08
Attending: FAMILY MEDICINE
Payer: MEDICARE

## 2019-07-08 ENCOUNTER — OFFICE VISIT (OUTPATIENT)
Dept: FAMILY MEDICINE | Facility: CLINIC | Age: 54
End: 2019-07-08
Payer: MEDICARE

## 2019-07-08 ENCOUNTER — TELEPHONE (OUTPATIENT)
Dept: FAMILY MEDICINE | Facility: CLINIC | Age: 54
End: 2019-07-08

## 2019-07-08 VITALS
HEIGHT: 60 IN | BODY MASS INDEX: 28.57 KG/M2 | HEART RATE: 68 BPM | WEIGHT: 145.5 LBS | DIASTOLIC BLOOD PRESSURE: 60 MMHG | TEMPERATURE: 97 F | OXYGEN SATURATION: 98 % | SYSTOLIC BLOOD PRESSURE: 134 MMHG

## 2019-07-08 DIAGNOSIS — G35 MS (MULTIPLE SCLEROSIS): ICD-10-CM

## 2019-07-08 DIAGNOSIS — G35 MULTIPLE SCLEROSIS: ICD-10-CM

## 2019-07-08 DIAGNOSIS — M79.2 NEUROPATHIC PAIN OF FOOT, RIGHT: ICD-10-CM

## 2019-07-08 DIAGNOSIS — M79.2 NEUROPATHIC PAIN OF FOOT, LEFT: ICD-10-CM

## 2019-07-08 DIAGNOSIS — R07.81 RIB PAIN ON LEFT SIDE: ICD-10-CM

## 2019-07-08 DIAGNOSIS — I10 ESSENTIAL HYPERTENSION: ICD-10-CM

## 2019-07-08 DIAGNOSIS — R07.81 RIB PAIN ON LEFT SIDE: Primary | ICD-10-CM

## 2019-07-08 PROCEDURE — 74019 XR ABDOMEN FLAT AND ERECT: ICD-10-PCS | Mod: 26,,, | Performed by: RADIOLOGY

## 2019-07-08 PROCEDURE — 99215 PR OFFICE/OUTPT VISIT, EST, LEVL V, 40-54 MIN: ICD-10-PCS | Mod: S$GLB,,, | Performed by: FAMILY MEDICINE

## 2019-07-08 PROCEDURE — 99999 PR PBB SHADOW E&M-EST. PATIENT-LVL III: CPT | Mod: PBBFAC,,, | Performed by: FAMILY MEDICINE

## 2019-07-08 PROCEDURE — 99215 OFFICE O/P EST HI 40 MIN: CPT | Mod: S$GLB,,, | Performed by: FAMILY MEDICINE

## 2019-07-08 PROCEDURE — 99999 PR PBB SHADOW E&M-EST. PATIENT-LVL III: ICD-10-PCS | Mod: PBBFAC,,, | Performed by: FAMILY MEDICINE

## 2019-07-08 PROCEDURE — 3008F BODY MASS INDEX DOCD: CPT | Mod: CPTII,S$GLB,, | Performed by: FAMILY MEDICINE

## 2019-07-08 PROCEDURE — 71100 X-RAY EXAM RIBS UNI 2 VIEWS: CPT | Mod: 26,LT,, | Performed by: RADIOLOGY

## 2019-07-08 PROCEDURE — 3078F PR MOST RECENT DIASTOLIC BLOOD PRESSURE < 80 MM HG: ICD-10-PCS | Mod: CPTII,S$GLB,, | Performed by: FAMILY MEDICINE

## 2019-07-08 PROCEDURE — 3078F DIAST BP <80 MM HG: CPT | Mod: CPTII,S$GLB,, | Performed by: FAMILY MEDICINE

## 2019-07-08 PROCEDURE — 71100 X-RAY EXAM RIBS UNI 2 VIEWS: CPT | Mod: TC,FY,LT

## 2019-07-08 PROCEDURE — 3075F SYST BP GE 130 - 139MM HG: CPT | Mod: CPTII,S$GLB,, | Performed by: FAMILY MEDICINE

## 2019-07-08 PROCEDURE — 74019 RADEX ABDOMEN 2 VIEWS: CPT | Mod: TC,FY

## 2019-07-08 PROCEDURE — 3075F PR MOST RECENT SYSTOLIC BLOOD PRESS GE 130-139MM HG: ICD-10-PCS | Mod: CPTII,S$GLB,, | Performed by: FAMILY MEDICINE

## 2019-07-08 PROCEDURE — 71100 XR RIBS 2 VIEW LEFT: ICD-10-PCS | Mod: 26,LT,, | Performed by: RADIOLOGY

## 2019-07-08 PROCEDURE — 3008F PR BODY MASS INDEX (BMI) DOCUMENTED: ICD-10-PCS | Mod: CPTII,S$GLB,, | Performed by: FAMILY MEDICINE

## 2019-07-08 PROCEDURE — 74019 RADEX ABDOMEN 2 VIEWS: CPT | Mod: 26,,, | Performed by: RADIOLOGY

## 2019-07-08 RX ORDER — TRIAMTERENE/HYDROCHLOROTHIAZID 37.5-25 MG
1 TABLET ORAL DAILY
Qty: 90 TABLET | Refills: 3 | Status: SHIPPED | OUTPATIENT
Start: 2019-07-08 | End: 2019-11-27 | Stop reason: SDUPTHER

## 2019-07-08 RX ORDER — OXYCODONE AND ACETAMINOPHEN 10; 325 MG/1; MG/1
1 TABLET ORAL EVERY 4 HOURS PRN
Qty: 28 TABLET | Refills: 0 | Status: SHIPPED | OUTPATIENT
Start: 2019-07-08 | End: 2019-07-15

## 2019-07-08 RX ORDER — GABAPENTIN 400 MG/1
800 CAPSULE ORAL NIGHTLY
Qty: 180 CAPSULE | Refills: 1 | Status: SHIPPED | OUTPATIENT
Start: 2019-07-08 | End: 2019-12-18 | Stop reason: SDUPTHER

## 2019-07-08 NOTE — PROGRESS NOTES
Chief Complaint   Patient presents with    Follow-up       SUBJECTIVE:  Zoey Cali is a 53 y.o. female here for new problem of fall and she has a h/o rib fx and feels like she fx more of the fractures.  She has them on the left side with LUQ pain and she notes the hydrocodone didn't really help with the pain.  She is working on her breathing and no issues there.  She has some change in bowel habits but not very much.  She notes some radiation into the back, no nausea.  Neuropathy is the same.  Currently has co-morbidities including per problem list.      Past Medical History:   Diagnosis Date    Allergy     Anemia     Anxiety     Breast cyst     Chronic kidney disease     Depression     Fibrocystic breast     Hypertension     Multiple sclerosis     Multiple sclerosis     Neuromuscular disorder     Osteoporosis     Rib fracture 2015     Past Surgical History:   Procedure Laterality Date    APPENDECTOMY      BREAST BIOPSY Left 2012    BREAST CYST ASPIRATION      BREAST SURGERY  2004    excisional biopsy      SECTION, CLASSIC      CHOLECYSTECTOMY      EYE SURGERY      HYSTERECTOMY      OOPHORECTOMY       Social History     Socioeconomic History    Marital status:      Spouse name: Not on file    Number of children: Not on file    Years of education: Not on file    Highest education level: Not on file   Occupational History    Not on file   Social Needs    Financial resource strain: Not on file    Food insecurity:     Worry: Not on file     Inability: Not on file    Transportation needs:     Medical: Not on file     Non-medical: Not on file   Tobacco Use    Smoking status: Current Every Day Smoker     Packs/day: 1.00     Years: 30.00     Pack years: 30.00     Types: Cigarettes    Smokeless tobacco: Current User   Substance and Sexual Activity    Alcohol use: No     Alcohol/week: 0.0 oz    Drug use: No    Sexual activity: Not Currently     Birth  control/protection: See Surgical Hx     Comment: Hysterectomy   Lifestyle    Physical activity:     Days per week: Not on file     Minutes per session: Not on file    Stress: Not on file   Relationships    Social connections:     Talks on phone: Not on file     Gets together: Not on file     Attends [employer] service: Not on file     Active member of club or organization: Not on file     Attends meetings of clubs or organizations: Not on file     Relationship status: Not on file   Other Topics Concern    Not on file   Social History Narrative    Not on file     Family History   Problem Relation Age of Onset    Arthritis Mother     Diabetes Father     Heart disease Father     Hyperlipidemia Father     Hypertension Father     Stroke Father     Alcohol abuse Son     Arthritis Son     Drug abuse Son     Alcohol abuse Maternal Aunt     Arthritis Maternal Aunt     Alcohol abuse Maternal Uncle     Drug abuse Maternal Uncle     Hypertension Maternal Uncle     Alcohol abuse Paternal Aunt     Heart disease Paternal Aunt     Hearing loss Paternal Aunt     Hypertension Paternal Aunt     Alcohol abuse Paternal Uncle     Heart disease Paternal Uncle     Alcohol abuse Maternal Grandmother     Heart disease Maternal Grandmother     Stroke Maternal Grandmother     Alcohol abuse Maternal Grandfather     Heart disease Maternal Grandfather     Alcohol abuse Paternal Grandmother     Cancer Paternal Grandmother     Heart disease Paternal Grandmother     Hypertension Paternal Grandmother     Stroke Paternal Grandmother     Alcohol abuse Paternal Grandfather     Heart disease Paternal Grandfather     Breast cancer Neg Hx     Colon cancer Neg Hx     Ovarian cancer Neg Hx      Current Outpatient Medications on File Prior to Visit   Medication Sig Dispense Refill    ANORO ELLIPTA 62.5-25 mcg/actuation DsDv INHALE ONE PUFF INTO LUNGS DAILY FOR CONTROL 60 each 2    carBAMazepine (TEGRETOL XR) 200 MG 12  hr tablet TAKE ONE TABLET BY MOUTH TWICE DAILY 60 tablet 5    cetirizine (ZYRTEC) 10 MG tablet Take 1 tablet (10 mg total) by mouth once daily. 30 tablet 0    cholecalciferol, vitamin D3, (VITAMIN D3) 5,000 unit Tab Take 5,000 Units by mouth once daily.      clobetasol 0.05% (TEMOVATE) 0.05 % Oint Apply topically 2 (two) times daily. 30 g 1    escitalopram oxalate (LEXAPRO) 20 MG tablet Take 1 tablet (20 mg total) by mouth once daily. 90 tablet 3    fluticasone (FLONASE) 50 mcg/actuation nasal spray ONE sprays(s) each nostril TWICE DAILY per instructed 16 g 3    gabapentin (NEURONTIN) 600 MG tablet TAKE ONE TABLET BY MOUTH THREE TIMES DAILY *MAY CAUSE DROWSINESS* *MAY CAUSE DIZZINESS* 270 tablet 1    HYDROcodone-acetaminophen (NORCO)  mg per tablet Take 1 tablet by mouth 3 (three) times daily as needed (pain). 90 tablet 0    ibuprofen (ADVIL,MOTRIN) 800 MG tablet TAKE ONE TABLET BY MOUTH EVERY 8 HOURS WITH FOOD AND WATER 180 tablet 1    interferon beta-1a (AVONEX) 30 mcg/0.5 mL PnKt INJECT 30 MCG INTRAMUSCULARLY ONCE WEEKLY 12 pen 1    LORazepam (ATIVAN) 2 MG Tab Take 1 tablet (2 mg total) by mouth every 6 (six) hours as needed. 120 tablet 0    metoprolol succinate (TOPROL-XL) 100 MG 24 hr tablet Take 1 tablet (100 mg total) by mouth once daily. 90 tablet 3    nystatin-triamcinolone (MYCOLOG II) cream Apply to affected area 2 times daily 30 g 1    omeprazole (PRILOSEC) 40 MG capsule Take 1 capsule (40 mg total) by mouth every evening. 90 capsule 3    ondansetron (ZOFRAN-ODT) 4 MG TbDL TAKE ONE TABLET BY MOUTH EVERY 12 HOURS AS NEEDED 20 tablet 5    simvastatin (ZOCOR) 40 MG tablet Take 1 tablet (40 mg total) by mouth every evening. 90 tablet 3    tiZANidine (ZANAFLEX) 4 MG tablet TAKE 2 TABLETS IN THE MORNING, AT NOON AND IN THE EVENING AND TAKE 3 TABLETS AT NIGHT (9 TABLETS PER DAY) 810 tablet 3    traZODone (DESYREL) 150 MG tablet Take 1-2 tablets (150-300 mg total) by mouth every evening.  180 tablet 3    [DISCONTINUED] gabapentin (NEURONTIN) 400 MG capsule Take 2 capsules (800 mg total) by mouth every evening. 180 capsule 1    [DISCONTINUED] metroNIDAZOLE (FLAGYL) 500 MG tablet Take 1 tablet (500 mg total) by mouth 2 (two) times daily. for 7 days 14 tablet 0    [DISCONTINUED] triamterene-hydrochlorothiazide 37.5-25 mg (MAXZIDE-25) 37.5-25 mg per tablet Take 1 tablet by mouth once daily. 90 tablet 3     No current facility-administered medications on file prior to visit.      Review of patient's allergies indicates:   Allergen Reactions    Lomotil [diphenoxylate-atropine]      Causes stomach spasms    Dilaudid [hydromorphone (bulk)] Itching         ROS  Per HPI and with poor sleep.  Otherwise comp ROS is negative.    OBJECTIVE:  /60   Pulse 68   Temp 97.2 °F (36.2 °C) (Oral)   Ht 5' (1.524 m)   Wt 66 kg (145 lb 8.1 oz)   LMP 01/01/2001 (Approximate) Comment: 2001  SpO2 98%   BMI 28.42 kg/m²     Wt Readings from Last 3 Encounters:   07/08/19 66 kg (145 lb 8.1 oz)   06/27/19 66.1 kg (145 lb 11.6 oz)   06/25/19 67.8 kg (149 lb 5.8 oz)     BP Readings from Last 3 Encounters:   07/08/19 134/60   06/27/19 120/70   06/25/19 108/71       She appears in pain, in minimal distress, she is handling it well but is guarding her left ribs/abdomen.  Oriented x 4  HEENT: no gross changes.  S1 and S2 normal, no murmurs, clicks, gallops or rubs. Regular rate and rhythm. Chest is clear; no wheezes or rales. No edema or JVD.  Left side of chest wall about 7-10th rib with pain  Pain in LUQ and slight distention and gas on percussion  Some decreased BS.    Review of old Records:  Reviewed per epic and     Review of old labs:  No results found for: TSH  Lab Results   Component Value Date    WBC 6.95 06/18/2019    HGB 15.2 06/18/2019    HCT 44.3 06/18/2019    MCV 92 06/18/2019     06/18/2019       Chemistry        Component Value Date/Time     05/01/2018 1511    K 4.4 05/01/2018 1511    CL  102 05/01/2018 1511    CO2 31 (H) 05/01/2018 1511    BUN 10 05/01/2018 1511    CREATININE 0.7 05/15/2019 0952    GLU 89 05/01/2018 1511        Component Value Date/Time    CALCIUM 10.0 05/01/2018 1511    ALKPHOS 150 (H) 04/02/2019 1210    AST 15 04/02/2019 1210    ALT 15 04/02/2019 1210    BILITOT 0.4 04/02/2019 1210    ESTGFRAFRICA >60 05/15/2019 0952    EGFRNONAA >60 05/15/2019 0952        Lab Results   Component Value Date    CHOL 110 (L) 06/22/2015    CHOL 144 05/18/2015    CHOL 174 12/22/2014     Lab Results   Component Value Date    HDL 22 (L) 06/22/2015    HDL 24 (L) 05/18/2015    HDL 19 (L) 12/22/2014     Lab Results   Component Value Date    LDLCALC 47.0 (L) 06/22/2015    LDLCALC Invalid, Trig>400.0 05/18/2015    LDLCALC Invalid, Trig>400.0 12/22/2014     Lab Results   Component Value Date    TRIG 205 (H) 06/22/2015    TRIG 598 (H) 05/18/2015    TRIG 564 (H) 12/22/2014     Lab Results   Component Value Date    CHOLHDL 20.0 06/22/2015    CHOLHDL 16.7 (L) 05/18/2015    CHOLHDL 10.9 (L) 12/22/2014         Review of old imaging:  Reviewed imaging    ASSESSMENT:  Problem List Items Addressed This Visit     Multiple sclerosis    Relevant Medications    gabapentin (NEURONTIN) 400 MG capsule    MS (multiple sclerosis)    Relevant Medications    gabapentin (NEURONTIN) 400 MG capsule    HTN (hypertension)    Relevant Medications    triamterene-hydrochlorothiazide 37.5-25 mg (MAXZIDE-25) 37.5-25 mg per tablet      Other Visit Diagnoses     Rib pain on left side    -  Primary    Relevant Medications    oxyCODONE-acetaminophen (PERCOCET)  mg per tablet    Other Relevant Orders    X-Ray Ribs 2 View Left    X-Ray Abdomen Flat And Erect    Neuropathic pain of foot, left        Relevant Medications    gabapentin (NEURONTIN) 400 MG capsule    Neuropathic pain of foot, right        Relevant Medications    gabapentin (NEURONTIN) 400 MG capsule          ICD-10-CM ICD-9-CM   1. Rib pain on left side R07.81 786.50   2.  Essential hypertension I10 401.9   3. Neuropathic pain of foot, left G57.92 355.8   4. Neuropathic pain of foot, right G57.91 355.8   5. MS (multiple sclerosis) G35 340   6. Multiple sclerosis G35 340         PLAN:  1. Essential hypertension    - triamterene-hydrochlorothiazide 37.5-25 mg (MAXZIDE-25) 37.5-25 mg per tablet; Take 1 tablet by mouth once daily.  Dispense: 90 tablet; Refill: 3    2. Neuropathic pain of foot, left    - gabapentin (NEURONTIN) 400 MG capsule; Take 2 capsules (800 mg total) by mouth every evening.  Dispense: 180 capsule; Refill: 1    3. Neuropathic pain of foot, right    - gabapentin (NEURONTIN) 400 MG capsule; Take 2 capsules (800 mg total) by mouth every evening.  Dispense: 180 capsule; Refill: 1  This with her 1800 mg during the day.    4. MS (multiple sclerosis)    - gabapentin (NEURONTIN) 400 MG capsule; Take 2 capsules (800 mg total) by mouth every evening.  Dispense: 180 capsule; Refill: 1    5. Multiple sclerosis    - gabapentin (NEURONTIN) 400 MG capsule; Take 2 capsules (800 mg total) by mouth every evening.  Dispense: 180 capsule; Refill: 1    6. Rib pain on left side    - X-Ray Ribs 2 View Left; Future  - X-Ray Abdomen Flat And Erect; Future  - oxyCODONE-acetaminophen (PERCOCET)  mg per tablet; Take 1 tablet by mouth every 4 (four) hours as needed for Pain.  Dispense: 28 tablet; Refill: 0    High risk medication use.  Increase to percocet.  Recheck in 1 week.  She understands risk    Medication List with Changes/Refills   New Medications    OXYCODONE-ACETAMINOPHEN (PERCOCET)  MG PER TABLET    Take 1 tablet by mouth every 4 (four) hours as needed for Pain.   Current Medications    ANORO ELLIPTA 62.5-25 MCG/ACTUATION DSDV    INHALE ONE PUFF INTO LUNGS DAILY FOR CONTROL    CARBAMAZEPINE (TEGRETOL XR) 200 MG 12 HR TABLET    TAKE ONE TABLET BY MOUTH TWICE DAILY    CETIRIZINE (ZYRTEC) 10 MG TABLET    Take 1 tablet (10 mg total) by mouth once daily.    CHOLECALCIFEROL,  VITAMIN D3, (VITAMIN D3) 5,000 UNIT TAB    Take 5,000 Units by mouth once daily.    CLOBETASOL 0.05% (TEMOVATE) 0.05 % OINT    Apply topically 2 (two) times daily.    ESCITALOPRAM OXALATE (LEXAPRO) 20 MG TABLET    Take 1 tablet (20 mg total) by mouth once daily.    FLUTICASONE (FLONASE) 50 MCG/ACTUATION NASAL SPRAY    ONE sprays(s) each nostril TWICE DAILY per instructed    GABAPENTIN (NEURONTIN) 600 MG TABLET    TAKE ONE TABLET BY MOUTH THREE TIMES DAILY *MAY CAUSE DROWSINESS* *MAY CAUSE DIZZINESS*    HYDROCODONE-ACETAMINOPHEN (NORCO)  MG PER TABLET    Take 1 tablet by mouth 3 (three) times daily as needed (pain).    IBUPROFEN (ADVIL,MOTRIN) 800 MG TABLET    TAKE ONE TABLET BY MOUTH EVERY 8 HOURS WITH FOOD AND WATER    INTERFERON BETA-1A (AVONEX) 30 MCG/0.5 ML PNKT    INJECT 30 MCG INTRAMUSCULARLY ONCE WEEKLY    LORAZEPAM (ATIVAN) 2 MG TAB    Take 1 tablet (2 mg total) by mouth every 6 (six) hours as needed.    METOPROLOL SUCCINATE (TOPROL-XL) 100 MG 24 HR TABLET    Take 1 tablet (100 mg total) by mouth once daily.    NYSTATIN-TRIAMCINOLONE (MYCOLOG II) CREAM    Apply to affected area 2 times daily    OMEPRAZOLE (PRILOSEC) 40 MG CAPSULE    Take 1 capsule (40 mg total) by mouth every evening.    ONDANSETRON (ZOFRAN-ODT) 4 MG TBDL    TAKE ONE TABLET BY MOUTH EVERY 12 HOURS AS NEEDED    SIMVASTATIN (ZOCOR) 40 MG TABLET    Take 1 tablet (40 mg total) by mouth every evening.    TIZANIDINE (ZANAFLEX) 4 MG TABLET    TAKE 2 TABLETS IN THE MORNING, AT NOON AND IN THE EVENING AND TAKE 3 TABLETS AT NIGHT (9 TABLETS PER DAY)    TRAZODONE (DESYREL) 150 MG TABLET    Take 1-2 tablets (150-300 mg total) by mouth every evening.   Changed and/or Refilled Medications    Modified Medication Previous Medication    GABAPENTIN (NEURONTIN) 400 MG CAPSULE gabapentin (NEURONTIN) 400 MG capsule       Take 2 capsules (800 mg total) by mouth every evening.    Take 2 capsules (800 mg total) by mouth every evening.     TRIAMTERENE-HYDROCHLOROTHIAZIDE 37.5-25 MG (MAXZIDE-25) 37.5-25 MG PER TABLET triamterene-hydrochlorothiazide 37.5-25 mg (MAXZIDE-25) 37.5-25 mg per tablet       Take 1 tablet by mouth once daily.    Take 1 tablet by mouth once daily.       No follow-ups on file.

## 2019-07-08 NOTE — TELEPHONE ENCOUNTER
----- Message from Emmie Torrez sent at 7/8/2019 10:55 AM CDT -----  Contact: [facility]  Name of Who is Calling: [facility]      What is the request in detail: Calos has additional questions regarding pt's gabapentin (NEURONTIN) 400 MG capsule. Contact [facility] to further discuss.      Can the clinic reply by [facility]: n    What Number [facility] Back [facility]: [phone] (Phone)

## 2019-07-08 NOTE — TELEPHONE ENCOUNTER
Spoke with [facility], per  patient should only take 800mg at night and the 600mg three time a day during the day.

## 2019-07-10 ENCOUNTER — TELEPHONE (OUTPATIENT)
Dept: OBSTETRICS AND GYNECOLOGY | Facility: CLINIC | Age: 54
End: 2019-07-10

## 2019-07-16 ENCOUNTER — OFFICE VISIT (OUTPATIENT)
Dept: NEUROLOGY | Facility: CLINIC | Age: 54
End: 2019-07-16
Payer: MEDICARE

## 2019-07-16 VITALS
SYSTOLIC BLOOD PRESSURE: 106 MMHG | HEART RATE: 73 BPM | DIASTOLIC BLOOD PRESSURE: 67 MMHG | HEIGHT: 60 IN | BODY MASS INDEX: 28.42 KG/M2

## 2019-07-16 DIAGNOSIS — M54.2 NECK PAIN: ICD-10-CM

## 2019-07-16 DIAGNOSIS — M54.81 BILATERAL OCCIPITAL NEURALGIA: Primary | ICD-10-CM

## 2019-07-16 DIAGNOSIS — G35 MULTIPLE SCLEROSIS: ICD-10-CM

## 2019-07-16 DIAGNOSIS — M79.18 MYOFASCIAL PAIN: ICD-10-CM

## 2019-07-16 PROCEDURE — 99499 UNLISTED E&M SERVICE: CPT | Mod: S$GLB,,, | Performed by: NURSE PRACTITIONER

## 2019-07-16 PROCEDURE — 99499 NO LOS: ICD-10-PCS | Mod: S$GLB,,, | Performed by: NURSE PRACTITIONER

## 2019-07-16 PROCEDURE — 64405 NJX AA&/STRD GR OCPL NRV: CPT | Mod: 50,S$GLB,, | Performed by: NURSE PRACTITIONER

## 2019-07-16 PROCEDURE — 99999 PR PBB SHADOW E&M-EST. PATIENT-LVL III: ICD-10-PCS | Mod: PBBFAC,,, | Performed by: NURSE PRACTITIONER

## 2019-07-16 PROCEDURE — 64405 PR NERVE BLOCK INJ, ANES/STEROID, OCCIPITAL: ICD-10-PCS | Mod: 50,S$GLB,, | Performed by: NURSE PRACTITIONER

## 2019-07-16 PROCEDURE — 99999 PR PBB SHADOW E&M-EST. PATIENT-LVL III: CPT | Mod: PBBFAC,,, | Performed by: NURSE PRACTITIONER

## 2019-07-16 RX ORDER — METHYLPREDNISOLONE ACETATE 40 MG/ML
40 INJECTION, SUSPENSION INTRA-ARTICULAR; INTRALESIONAL; INTRAMUSCULAR; SOFT TISSUE
Status: COMPLETED | OUTPATIENT
Start: 2019-07-16 | End: 2019-07-16

## 2019-07-16 RX ORDER — LIDOCAINE HYDROCHLORIDE 20 MG/ML
4 INJECTION, SOLUTION INFILTRATION; PERINEURAL
Status: COMPLETED | OUTPATIENT
Start: 2019-07-16 | End: 2019-07-16

## 2019-07-16 RX ADMIN — LIDOCAINE HYDROCHLORIDE 4 ML: 20 INJECTION, SOLUTION INFILTRATION; PERINEURAL at 10:07

## 2019-07-16 RX ADMIN — METHYLPREDNISOLONE ACETATE 40 MG: 40 INJECTION, SUSPENSION INTRA-ARTICULAR; INTRALESIONAL; INTRAMUSCULAR; SOFT TISSUE at 10:07

## 2019-07-16 NOTE — PROGRESS NOTES
SUBJECTIVE:  Patient ID: Zoey Cali   Chief Complaint: Consult    History of Present Illness:   Zoey Cali is a 53 y.o. female with HTN, MS, and occipital neuralgia, who presents to the clinic alone for occipital nerve block.   She has been suffering with pain which begins in her traps and travels to the occipital region of her head.  This occurs frequently and can be rated anywhere from a 1-10 out of 10.  Reports when pain gets to be a 10 it can last for hours if not all day.  Additionally, she reports she fell two weeks ago, tripped over a door mat and broke two ribs.       Current Medications:    ANORO ELLIPTA 62.5-25 mcg/actuation DsDv, INHALE ONE PUFF INTO LUNGS DAILY FOR CONTROL, Disp: 60 each, Rfl: 2    carBAMazepine (TEGRETOL XR) 200 MG 12 hr tablet, TAKE ONE TABLET BY MOUTH TWICE DAILY, Disp: 60 tablet, Rfl: 5    cetirizine (ZYRTEC) 10 MG tablet, Take 1 tablet (10 mg total) by mouth once daily., Disp: 30 tablet, Rfl: 0    cholecalciferol, vitamin D3, (VITAMIN D3) 5,000 unit Tab, Take 5,000 Units by mouth once daily., Disp: , Rfl:     clobetasol 0.05% (TEMOVATE) 0.05 % Oint, Apply topically 2 (two) times daily., Disp: 30 g, Rfl: 1    escitalopram oxalate (LEXAPRO) 20 MG tablet, Take 1 tablet (20 mg total) by mouth once daily., Disp: 90 tablet, Rfl: 3    fluticasone (FLONASE) 50 mcg/actuation nasal spray, ONE sprays(s) each nostril TWICE DAILY per instructed, Disp: 16 g, Rfl: 3    gabapentin (NEURONTIN) 400 MG capsule, Take 2 capsules (800 mg total) by mouth every evening., Disp: 180 capsule, Rfl: 1    gabapentin (NEURONTIN) 600 MG tablet, TAKE ONE TABLET BY MOUTH THREE TIMES DAILY *MAY CAUSE DROWSINESS* *MAY CAUSE DIZZINESS*, Disp: 270 tablet, Rfl: 1    HYDROcodone-acetaminophen (NORCO)  mg per tablet, Take 1 tablet by mouth 3 (three) times daily as needed (pain)., Disp: 90 tablet, Rfl: 0    ibuprofen (ADVIL,MOTRIN) 800 MG tablet, TAKE ONE TABLET BY MOUTH EVERY 8 HOURS WITH  FOOD AND WATER, Disp: 180 tablet, Rfl: 1    interferon beta-1a (AVONEX) 30 mcg/0.5 mL PnKt, INJECT 30 MCG INTRAMUSCULARLY ONCE WEEKLY, Disp: 12 pen, Rfl: 1    LORazepam (ATIVAN) 2 MG Tab, Take 1 tablet (2 mg total) by mouth every 6 (six) hours as needed., Disp: 120 tablet, Rfl: 0    metoprolol succinate (TOPROL-XL) 100 MG 24 hr tablet, Take 1 tablet (100 mg total) by mouth once daily., Disp: 90 tablet, Rfl: 3    nystatin-triamcinolone (MYCOLOG II) cream, Apply to affected area 2 times daily, Disp: 30 g, Rfl: 1    omeprazole (PRILOSEC) 40 MG capsule, Take 1 capsule (40 mg total) by mouth every evening., Disp: 90 capsule, Rfl: 3    ondansetron (ZOFRAN-ODT) 4 MG TbDL, TAKE ONE TABLET BY MOUTH EVERY 12 HOURS AS NEEDED, Disp: 20 tablet, Rfl: 5    simvastatin (ZOCOR) 40 MG tablet, Take 1 tablet (40 mg total) by mouth every evening., Disp: 90 tablet, Rfl: 3    tiZANidine (ZANAFLEX) 4 MG tablet, TAKE 2 TABLETS IN THE MORNING, AT NOON AND IN THE EVENING AND TAKE 3 TABLETS AT NIGHT (9 TABLETS PER DAY), Disp: 810 tablet, Rfl: 3    traZODone (DESYREL) 150 MG tablet, Take 1-2 tablets (150-300 mg total) by mouth every evening., Disp: 180 tablet, Rfl: 3    triamterene-hydrochlorothiazide 37.5-25 mg (MAXZIDE-25) 37.5-25 mg per tablet, Take 1 tablet by mouth once daily., Disp: 90 tablet, Rfl: 3    Current Facility-Administered Medications:     lidocaine HCL 20 mg/ml (2%) injection 4 mL, 4 mL, Other, 1 time in Clinic/HOD, JANNY Qiu    methylPREDNISolone acetate injection 40 mg, 40 mg, Peripheral nerve block, 1 time in Clinic/HOD, JANNY Qiu    Review of Systems - as per HPI, otherwise a balanced 7 systems review is negative.    OBJECTIVE:  Vitals:  /67   Pulse 73   Ht 5' (1.524 m)   LMP 01/01/2001 (Approximate) Comment: 2001  BMI 28.42 kg/m²     Physical Exam:  Constitutional: She appears well-developed and well-nourished. She is well groomed    Review of Data:   Notes from Neurology  reviewed     ASSESSMENT:  1. Bilateral occipital neuralgia    2. Multiple sclerosis    3. Myofascial pain    4. Neck pain      PLAN:  - Bilateral GONB performed in clinic (see below)   - Discussed can repeat injections every 6-8 weeks if needed   - Referral to physical therapy for dry needling of traps and cervical paraspinal  - MS - continue regular f/up with MS Clinic   - RTC as needed     Orders Placed This Encounter    Ambulatory Referral to Physical/Occupational Therapy    methylPREDNISolone acetate injection 40 mg    lidocaine HCL 20 mg/ml (2%) injection 4 mL     Procedure Note:   GONB (Greater Occipital Nerve Block): After informed consent was obtained (a copy was given to office staff to scan into the EMR), the patient was asked to remain in a sitting position her head resting prone on her chest. The area was prepped using alcohol swabs. 2% lidocaine (2 mL x2 sides of neck=4 mL total) and 40 mg (1 bottle per sitex2 for total of 80 mg)depomedrol was drawn up utilizing a 21 gauge needle. The occipital trigger points were palpated utilizing latex gloves, a 27 gauge needle and aspiration occured to ensure no medication was introduced into the blood stream during the technique. The medication was delivered bilateral (all of the above was duplicated for the opposite side) in sites 1) midway between the inion and mastoid along the occipital ridge, 2) 2 finger breaths superior lateral to the first injection and 3) 2 finger breaths superior medial to the first injection. The patient tolerated the procedure well with no active bleeding, erythema, or discharge.  The patient was assessed and allowed to leave after ten minutes.  Findings from repeat exam (10 mins after procedure) showed:  Gen: NAD  Derm: no drainage  Neuro: AOx3, EOMI, tongue in midline, FROM of all extremities, gait WNL   Pre-Procedure Pain- 2 out of 10   Post-Procedure Pain- 0 out of 10     The patient verbalizes understanding and agreement with the  treatment plan. Questions were sought and answered to her stated verbal satisfaction.    CC: MD Sydni Guzman, P-C  [facility]  [phone]    Dr. Bhakta available during today's encounter.

## 2019-07-16 NOTE — LETTER
July 16, 2019      Heidy Mendez MD  [address] [facility] [address]           [facility]  [address]  [facility] [address]  Phone: [phone]  Fax: [phone]          Patient: Zoey Cali   MR Number: [id number]   YOB: 1965   Date of Visit: 7/16/2019       Dear Dr. Heidy Mendez:    Thank you for referring Zoey Cali to me for evaluation. Attached you will find relevant portions of my assessment and plan of care.    If you have questions, please do not hesitate to call me. I look forward to following Zoey Cali along with you.    Sincerely,    Sydni Delgadillo, [facility]    Enclosure  CC:  No Recipients    If you would like to receive this communication electronically, please contact [email or web address] or [phone] to request more information on [facility] access.    For providers and/or their staff who would like to refer a patient to [facility], please contact us through our one-stop-shop provider referral line, [facility], at [phone].    If you feel you have received this communication in error or would no longer like to receive these types of communications, please e-mail [email or web address]

## 2019-07-17 ENCOUNTER — OFFICE VISIT (OUTPATIENT)
Dept: OPHTHALMOLOGY | Facility: CLINIC | Age: 54
End: 2019-07-17
Payer: MEDICARE

## 2019-07-17 DIAGNOSIS — G35 MS (MULTIPLE SCLEROSIS): Primary | ICD-10-CM

## 2019-07-17 PROCEDURE — 92004 PR EYE EXAM, NEW PATIENT,COMPREHESV: ICD-10-PCS | Mod: S$GLB,,, | Performed by: OPHTHALMOLOGY

## 2019-07-17 PROCEDURE — 92004 COMPRE OPH EXAM NEW PT 1/>: CPT | Mod: S$GLB,,, | Performed by: OPHTHALMOLOGY

## 2019-07-17 PROCEDURE — 99999 PR PBB SHADOW E&M-EST. PATIENT-LVL II: ICD-10-PCS | Mod: PBBFAC,,, | Performed by: OPHTHALMOLOGY

## 2019-07-17 PROCEDURE — 99999 PR PBB SHADOW E&M-EST. PATIENT-LVL II: CPT | Mod: PBBFAC,,, | Performed by: OPHTHALMOLOGY

## 2019-07-17 NOTE — LETTER
[facility] - Ophthalmology  [address]  [facility] [address]  Phone: [phone]  Fax: [phone]   July 17, 2019    Heidy Mendez MD  [address]  [facility] [address]    Patient: Zoey Cali   MR Number: [id number]   YOB: 1965   Date of Visit: 7/17/2019       Dear Dr. Mendez:    Thank you for referring Zoey Cali to me for evaluation. Here is my assessment and plan of care:    Assessment:   /Plan     For exam results, see Encounter Report.    MS (multiple sclerosis)      I found no evidence of internuclear ophthalmoplegia, optic nerve damage, or dysfunction of cranial nerves. I will repeat her exam in one year or sooner if requested.          Plan:       For exam results, see Encounter Report.    MS (multiple sclerosis)      I found no evidence of internuclear ophthalmoplegia, optic nerve damage, or dysfunction of cranial nerves. I will repeat her exam in one year or sooner if requested.            Below you will find my full exam findings. If you have questions, please do not hesitate to call me. I look forward to following Ms. Zoey Cali along with you.    Sincerely,          Magno Stewart MD       CC  No Recipients             Base Eye Exam     Visual Acuity (Snellen - Linear)       Right Left    Dist sc 20/100 -1 20/100 -1    Dist ph sc 20/40 -2 20/30          Tonometry (Applanation, 11:44 AM)       Right Left    Pressure 20 19          Pupils       Dark Light Shape React APD    Right 5 3 Round Brisk None    Left 5 3 Round Brisk None          Visual Fields       Right Left     Full Full          Extraocular Movement       Right Left     Full Full   2 diopter exophoria in all directions of gaze.           Neuro/Psych     Oriented x3:  Yes    Mood/Affect:  Normal          Dilation     Both eyes:  1% Mydriacyl, 2.5% Phenylephrine @ 12:04 PM            Additional Tests     Color       Right Left    Ishihara 11.5/12 12/12            Slit Lamp and  Fundus Exam     External Exam       Right Left    External Normal Normal          Slit Lamp Exam       Right Left    Lids/Lashes Normal Normal    Conjunctiva/Sclera White and quiet White and quiet    Cornea Clear Clear    Anterior Chamber Deep and quiet Deep and quiet    Iris Round and reactive Round and reactive    Lens Clear Clear    Vitreous Normal Normal          Fundus Exam       Right Left    Disc Normal Normal    C/D Ratio 0.4 0.4    Macula Normal Normal    Vessels Normal Normal    Periphery Normal Normal

## 2019-07-17 NOTE — PROGRESS NOTES
HPI     Concerns About Ocular Health      Additional comments: Multiple Sclerosis              Comments     Referred by Dr. Heidy Mendez  Patient here for to establish care.  Hx of Multiple sclerosis. Episode of optic neurits about 20 years ago in   left eye.  Patient states notice a film over sometimes. Haven't seen an eye doctor in   years.  No eye pain.  Using OTC readers     I have personally interviewed the patient, reviewed the history and   examined the patient and agree with the technician's exam.          Last edited by Magno Stewart MD on 7/17/2019 11:59 AM. (History)            Assessment /Plan     For exam results, see Encounter Report.    MS (multiple sclerosis)      I found no evidence of internuclear ophthalmoplegia, optic nerve damage, or dysfunction of cranial nerves. I will repeat her exam in one year or sooner if requested.

## 2019-08-05 ENCOUNTER — PATIENT OUTREACH (OUTPATIENT)
Dept: ADMINISTRATIVE | Facility: OTHER | Age: 54
End: 2019-08-05

## 2019-08-14 ENCOUNTER — PATIENT OUTREACH (OUTPATIENT)
Dept: ADMINISTRATIVE | Facility: HOSPITAL | Age: 54
End: 2019-08-14

## 2019-08-25 ENCOUNTER — PATIENT OUTREACH (OUTPATIENT)
Dept: ADMINISTRATIVE | Facility: OTHER | Age: 54
End: 2019-08-25

## 2019-08-28 ENCOUNTER — OFFICE VISIT (OUTPATIENT)
Dept: OPTOMETRY | Facility: CLINIC | Age: 54
End: 2019-08-28
Payer: MEDICARE

## 2019-08-28 ENCOUNTER — TELEPHONE (OUTPATIENT)
Dept: FAMILY MEDICINE | Facility: CLINIC | Age: 54
End: 2019-08-28

## 2019-08-28 DIAGNOSIS — Z01.00 ROUTINE EYE EXAM: Primary | ICD-10-CM

## 2019-08-28 DIAGNOSIS — H52.7 REFRACTIVE ERROR: ICD-10-CM

## 2019-08-28 PROCEDURE — 92015 DETERMINE REFRACTIVE STATE: CPT | Mod: S$GLB,,, | Performed by: OPTOMETRIST

## 2019-08-28 PROCEDURE — 92015 PR REFRACTION: ICD-10-PCS | Mod: S$GLB,,, | Performed by: OPTOMETRIST

## 2019-08-28 PROCEDURE — 99999 PR PBB SHADOW E&M-EST. PATIENT-LVL I: ICD-10-PCS | Mod: PBBFAC,,, | Performed by: OPTOMETRIST

## 2019-08-28 PROCEDURE — 92014 PR EYE EXAM, EST PATIENT,COMPREHESV: ICD-10-PCS | Mod: S$GLB,,, | Performed by: OPTOMETRIST

## 2019-08-28 PROCEDURE — 92014 COMPRE OPH EXAM EST PT 1/>: CPT | Mod: S$GLB,,, | Performed by: OPTOMETRIST

## 2019-08-28 PROCEDURE — 99999 PR PBB SHADOW E&M-EST. PATIENT-LVL I: CPT | Mod: PBBFAC,,, | Performed by: OPTOMETRIST

## 2019-08-28 NOTE — TELEPHONE ENCOUNTER
Call placed to Pt, and informed that she would have to reschedule secondary to the Provider in a meeting and won't be back in the office. Pt's phone with plenty of static. Informed the Pt to call the office back to reschedule.

## 2019-08-28 NOTE — PROGRESS NOTES
Subjective:       Patient ID: Zoey Cali is a 53 y.o. female      Chief Complaint   Patient presents with    Concerns About Ocular Health    Hypertensive Eye Exam     History of Present Illness  Dls: 7/17/19 Dr. Stewart     52 y/o female presents today with c/o blurry vision at near ou.   Pt wears otc readers.   Pt states for yrs has a blurry line in os.     No tearing  No itching  No burning  No pain  + ha's  No floaters  No flashes    Eye meds  None      Assessment/Plan:     1. Routine eye exam  [facility] vision    2. Refractive error  Educated patient on refractive error and discussed lens options. Dispensed updated spectacle Rx. Educated about adaptation period to new specs.    Eyeglass Final Rx     Eyeglass Final Rx       Sphere Cylinder Axis Add    Right +2.50 +0.75 090 +2.00    Left +2.25 +1.00 090 +2.00    Expiration Date:  8/28/2020

## 2019-08-29 ENCOUNTER — OFFICE VISIT (OUTPATIENT)
Dept: FAMILY MEDICINE | Facility: CLINIC | Age: 54
End: 2019-08-29
Payer: MEDICARE

## 2019-08-29 VITALS
DIASTOLIC BLOOD PRESSURE: 70 MMHG | BODY MASS INDEX: 28.13 KG/M2 | TEMPERATURE: 98 F | OXYGEN SATURATION: 95 % | HEART RATE: 80 BPM | WEIGHT: 143.31 LBS | SYSTOLIC BLOOD PRESSURE: 120 MMHG | HEIGHT: 60 IN

## 2019-08-29 DIAGNOSIS — F32.A DEPRESSION, UNSPECIFIED DEPRESSION TYPE: ICD-10-CM

## 2019-08-29 DIAGNOSIS — M79.2 NEUROPATHIC PAIN: ICD-10-CM

## 2019-08-29 DIAGNOSIS — G35 MULTIPLE SCLEROSIS: ICD-10-CM

## 2019-08-29 DIAGNOSIS — Z00.00 ANNUAL PHYSICAL EXAM: Primary | ICD-10-CM

## 2019-08-29 DIAGNOSIS — R25.2 SPASTICITY: ICD-10-CM

## 2019-08-29 DIAGNOSIS — F11.90 CHRONIC, CONTINUOUS USE OF OPIOIDS: ICD-10-CM

## 2019-08-29 PROCEDURE — 3074F PR MOST RECENT SYSTOLIC BLOOD PRESSURE < 130 MM HG: ICD-10-PCS | Mod: CPTII,S$GLB,, | Performed by: FAMILY MEDICINE

## 2019-08-29 PROCEDURE — 3074F SYST BP LT 130 MM HG: CPT | Mod: CPTII,S$GLB,, | Performed by: FAMILY MEDICINE

## 2019-08-29 PROCEDURE — 99396 PREV VISIT EST AGE 40-64: CPT | Mod: S$GLB,,, | Performed by: FAMILY MEDICINE

## 2019-08-29 PROCEDURE — 99396 PR PREVENTIVE VISIT,EST,40-64: ICD-10-PCS | Mod: S$GLB,,, | Performed by: FAMILY MEDICINE

## 2019-08-29 PROCEDURE — 3078F DIAST BP <80 MM HG: CPT | Mod: CPTII,S$GLB,, | Performed by: FAMILY MEDICINE

## 2019-08-29 PROCEDURE — 3078F PR MOST RECENT DIASTOLIC BLOOD PRESSURE < 80 MM HG: ICD-10-PCS | Mod: CPTII,S$GLB,, | Performed by: FAMILY MEDICINE

## 2019-08-29 PROCEDURE — 99999 PR PBB SHADOW E&M-EST. PATIENT-LVL III: CPT | Mod: PBBFAC,,, | Performed by: FAMILY MEDICINE

## 2019-08-29 PROCEDURE — 99999 PR PBB SHADOW E&M-EST. PATIENT-LVL III: ICD-10-PCS | Mod: PBBFAC,,, | Performed by: FAMILY MEDICINE

## 2019-08-29 RX ORDER — LORAZEPAM 2 MG/1
2 TABLET ORAL EVERY 6 HOURS PRN
Qty: 120 TABLET | Refills: 0 | Status: SHIPPED | OUTPATIENT
Start: 2019-08-29 | End: 2020-01-21 | Stop reason: SDUPTHER

## 2019-08-29 RX ORDER — ESCITALOPRAM OXALATE 20 MG/1
20 TABLET ORAL DAILY
Qty: 90 TABLET | Refills: 3 | Status: SHIPPED | OUTPATIENT
Start: 2019-08-29 | End: 2020-08-20

## 2019-08-29 RX ORDER — CARBAMAZEPINE 200 MG/1
200 TABLET, EXTENDED RELEASE ORAL 2 TIMES DAILY
Qty: 60 TABLET | Refills: 5 | Status: SHIPPED | OUTPATIENT
Start: 2019-08-29 | End: 2019-11-07 | Stop reason: SDUPTHER

## 2019-08-29 RX ORDER — HYDROCODONE BITARTRATE AND ACETAMINOPHEN 10; 325 MG/1; MG/1
1 TABLET ORAL 3 TIMES DAILY PRN
Qty: 90 TABLET | Refills: 0 | Status: SHIPPED | OUTPATIENT
Start: 2019-08-29 | End: 2019-11-27 | Stop reason: SDUPTHER

## 2019-08-30 PROBLEM — S02.40DD CLOSED FRACTURE OF LEFT SIDE OF MAXILLA WITH ROUTINE HEALING: Status: RESOLVED | Noted: 2017-10-25 | Resolved: 2019-08-30

## 2019-08-31 NOTE — PROGRESS NOTES
Chief Complaint   Patient presents with    Annual Exam     SUBJECTIVE:   53 y.o. female for annual routine Pap and checkup.  Current Outpatient Medications   Medication Sig Dispense Refill    carBAMazepine (TEGRETOL XR) 200 MG 12 hr tablet Take 1 tablet (200 mg total) by mouth 2 (two) times daily. 60 tablet 5    cholecalciferol, vitamin D3, (VITAMIN D3) 5,000 unit Tab Take 5,000 Units by mouth once daily.      clobetasol 0.05% (TEMOVATE) 0.05 % Oint Apply topically 2 (two) times daily. 30 g 1    escitalopram oxalate (LEXAPRO) 20 MG tablet Take 1 tablet (20 mg total) by mouth once daily. 90 tablet 3    fluticasone (FLONASE) 50 mcg/actuation nasal spray ONE sprays(s) each nostril TWICE DAILY per instructed 16 g 3    gabapentin (NEURONTIN) 400 MG capsule Take 2 capsules (800 mg total) by mouth every evening. 180 capsule 1    gabapentin (NEURONTIN) 600 MG tablet TAKE ONE TABLET BY MOUTH THREE TIMES DAILY *MAY CAUSE DROWSINESS* *MAY CAUSE DIZZINESS* 270 tablet 1    HYDROcodone-acetaminophen (NORCO)  mg per tablet Take 1 tablet by mouth 3 (three) times daily as needed (pain). 90 tablet 0    ibuprofen (ADVIL,MOTRIN) 800 MG tablet TAKE ONE TABLET BY MOUTH EVERY 8 HOURS WITH FOOD AND WATER 180 tablet 1    interferon beta-1a (AVONEX) 30 mcg/0.5 mL PnKt INJECT 30 MCG INTRAMUSCULARLY ONCE WEEKLY 12 pen 1    LORazepam (ATIVAN) 2 MG Tab Take 1 tablet (2 mg total) by mouth every 6 (six) hours as needed. 120 tablet 0    metoprolol succinate (TOPROL-XL) 100 MG 24 hr tablet Take 1 tablet (100 mg total) by mouth once daily. 90 tablet 3    omeprazole (PRILOSEC) 40 MG capsule Take 1 capsule (40 mg total) by mouth every evening. 90 capsule 3    ondansetron (ZOFRAN-ODT) 4 MG TbDL TAKE ONE TABLET BY MOUTH EVERY 12 HOURS AS NEEDED 20 tablet 5    simvastatin (ZOCOR) 40 MG tablet Take 1 tablet (40 mg total) by mouth every evening. 90 tablet 3    tiZANidine (ZANAFLEX) 4 MG tablet TAKE 2 TABLETS IN THE MORNING, AT NOON  AND IN THE EVENING AND TAKE 3 TABLETS AT NIGHT (9 TABLETS PER DAY) 810 tablet 3    traZODone (DESYREL) 150 MG tablet Take 1-2 tablets (150-300 mg total) by mouth every evening. 180 tablet 3    triamterene-hydrochlorothiazide 37.5-25 mg (MAXZIDE-25) 37.5-25 mg per tablet Take 1 tablet by mouth once daily. 90 tablet 3    umeclidinium-vilanterol (ANORO ELLIPTA) 62.5-25 mcg/actuation DsDv INHALE ONE PUFF INTO LUNGS DAILY FOR CONTROL 60 each 2    cetirizine (ZYRTEC) 10 MG tablet Take 1 tablet (10 mg total) by mouth once daily. 30 tablet 0    nystatin-triamcinolone (MYCOLOG II) cream Apply to affected area 2 times daily 30 g 1     No current facility-administered medications for this visit.      Allergies: Lomotil [diphenoxylate-atropine] and Dilaudid [hydromorphone (bulk)]   Patient's last menstrual period was 01/01/2001 (approximate).    ROS:  Feeling well. No dyspnea or chest pain on exertion.  No abdominal pain, change in bowel habits, black or bloody stools.  No urinary tract symptoms. GYN ROS: none noted. No neurological complaints.    OBJECTIVE:   The patient appears well, alert, oriented x 3, in no distress.  /70   Pulse 80   Temp 98.3 °F (36.8 °C) (Oral)   Ht 5' (1.524 m)   Wt 65 kg (143 lb 4.8 oz)   LMP 01/01/2001 (Approximate) Comment: 2001  SpO2 95%   BMI 27.99 kg/m²   ENT normal.  Neck supple. No adenopathy or thyromegaly. YAN. Lungs are clear, good air entry, no wheezes, rhonchi or rales. S1 and S2 normal, no murmurs, regular rate and rhythm. Abdomen soft without tenderness, guarding, mass or organomegaly. Extremities show no edema, normal peripheral pulses. Neurological is normal, no focal findings.    BREAST EXAM:     PELVIC EXAM:     ASSESSMENT:   1. Annual physical exam    2. Spasticity    3. Depression, unspecified depression type    4. Multiple sclerosis    5. Chronic, continuous use of opioids    6. Neuropathic pain      Continue with her treatment.    PLAN:   Counseled on age  appropriate medical preventative services, including age appropriate cancer screenings, over all nutritional health, need for a consistent exercise regimen and an over all push towards maintaining a vigorous and active lifestyle.  Counseled on age appropriate vaccines and discussed upcoming health care needs based on age/gender.  Spent time with patient counseling on need for a good patient/doctor relationship moving forward.  Discussed use of common OTC medications and supplements.  Discussed common dietary aids and use of caffeine and the need for good sleep hygiene and stress management.    Continue with meds.    Chronic dehydration.  Went over chronic rehydration.      F/u in 3 months.

## 2019-09-23 ENCOUNTER — LAB VISIT (OUTPATIENT)
Dept: LAB | Facility: HOSPITAL | Age: 54
End: 2019-09-23
Attending: INTERNAL MEDICINE
Payer: MEDICARE

## 2019-09-23 DIAGNOSIS — D50.9 IRON DEFICIENCY ANEMIA, UNSPECIFIED IRON DEFICIENCY ANEMIA TYPE: ICD-10-CM

## 2019-09-23 LAB
BASOPHILS # BLD AUTO: 0.02 K/UL (ref 0–0.2)
BASOPHILS NFR BLD: 0.3 % (ref 0–1.9)
DIFFERENTIAL METHOD: ABNORMAL
EOSINOPHIL # BLD AUTO: 0.1 K/UL (ref 0–0.5)
EOSINOPHIL NFR BLD: 1.7 % (ref 0–8)
ERYTHROCYTE [DISTWIDTH] IN BLOOD BY AUTOMATED COUNT: 13.2 % (ref 11.5–14.5)
FERRITIN SERPL-MCNC: 120 NG/ML (ref 20–300)
HCT VFR BLD AUTO: 44.5 % (ref 37–48.5)
HGB BLD-MCNC: 15.2 G/DL (ref 12–16)
IRON SERPL-MCNC: 47 UG/DL (ref 30–160)
LYMPHOCYTES # BLD AUTO: 1.5 K/UL (ref 1–4.8)
LYMPHOCYTES NFR BLD: 18.6 % (ref 18–48)
MCH RBC QN AUTO: 30.8 PG (ref 27–31)
MCHC RBC AUTO-ENTMCNC: 34.2 G/DL (ref 32–36)
MCV RBC AUTO: 90 FL (ref 82–98)
MONOCYTES # BLD AUTO: 0.3 K/UL (ref 0.3–1)
MONOCYTES NFR BLD: 4.1 % (ref 4–15)
NEUTROPHILS # BLD AUTO: 5.9 K/UL (ref 1.8–7.7)
NEUTROPHILS NFR BLD: 75.4 % (ref 38–73)
PLATELET # BLD AUTO: 252 K/UL (ref 150–350)
PMV BLD AUTO: 9 FL (ref 9.2–12.9)
RBC # BLD AUTO: 4.93 M/UL (ref 4–5.4)
SATURATED IRON: 12 % (ref 20–50)
TOTAL IRON BINDING CAPACITY: 385 UG/DL (ref 250–450)
TRANSFERRIN SERPL-MCNC: 260 MG/DL (ref 200–375)
WBC # BLD AUTO: 7.86 K/UL (ref 3.9–12.7)

## 2019-09-23 PROCEDURE — 85025 COMPLETE CBC W/AUTO DIFF WBC: CPT

## 2019-09-23 PROCEDURE — 82728 ASSAY OF FERRITIN: CPT

## 2019-09-23 PROCEDURE — 36415 COLL VENOUS BLD VENIPUNCTURE: CPT

## 2019-09-23 PROCEDURE — 83540 ASSAY OF IRON: CPT

## 2019-09-26 ENCOUNTER — OFFICE VISIT (OUTPATIENT)
Dept: HEMATOLOGY/ONCOLOGY | Facility: CLINIC | Age: 54
End: 2019-09-26
Payer: MEDICARE

## 2019-09-26 VITALS
DIASTOLIC BLOOD PRESSURE: 63 MMHG | WEIGHT: 142 LBS | BODY MASS INDEX: 28.63 KG/M2 | HEART RATE: 63 BPM | OXYGEN SATURATION: 95 % | HEIGHT: 59 IN | TEMPERATURE: 98 F | SYSTOLIC BLOOD PRESSURE: 115 MMHG

## 2019-09-26 DIAGNOSIS — M79.2 NEUROPATHIC PAIN OF FOOT, RIGHT: ICD-10-CM

## 2019-09-26 DIAGNOSIS — Z71.89 ADVANCED CARE PLANNING/COUNSELING DISCUSSION: ICD-10-CM

## 2019-09-26 DIAGNOSIS — M79.2 NEUROPATHIC PAIN OF FOOT, LEFT: ICD-10-CM

## 2019-09-26 DIAGNOSIS — G35 MULTIPLE SCLEROSIS: ICD-10-CM

## 2019-09-26 DIAGNOSIS — G35 MS (MULTIPLE SCLEROSIS): ICD-10-CM

## 2019-09-26 DIAGNOSIS — D50.9 IRON DEFICIENCY ANEMIA, UNSPECIFIED IRON DEFICIENCY ANEMIA TYPE: Primary | ICD-10-CM

## 2019-09-26 PROCEDURE — 99999 PR PBB SHADOW E&M-EST. PATIENT-LVL IV: ICD-10-PCS | Mod: PBBFAC,,, | Performed by: INTERNAL MEDICINE

## 2019-09-26 PROCEDURE — 3078F PR MOST RECENT DIASTOLIC BLOOD PRESSURE < 80 MM HG: ICD-10-PCS | Mod: CPTII,S$GLB,, | Performed by: INTERNAL MEDICINE

## 2019-09-26 PROCEDURE — 3074F SYST BP LT 130 MM HG: CPT | Mod: CPTII,S$GLB,, | Performed by: INTERNAL MEDICINE

## 2019-09-26 PROCEDURE — 99497 ADVNCD CARE PLAN 30 MIN: CPT | Mod: S$GLB,,, | Performed by: INTERNAL MEDICINE

## 2019-09-26 PROCEDURE — 99497 PR ADVNCD CARE PLAN 30 MIN: ICD-10-PCS | Mod: S$GLB,,, | Performed by: INTERNAL MEDICINE

## 2019-09-26 PROCEDURE — 99213 PR OFFICE/OUTPT VISIT, EST, LEVL III, 20-29 MIN: ICD-10-PCS | Mod: S$GLB,,, | Performed by: INTERNAL MEDICINE

## 2019-09-26 PROCEDURE — 3008F PR BODY MASS INDEX (BMI) DOCUMENTED: ICD-10-PCS | Mod: CPTII,S$GLB,, | Performed by: INTERNAL MEDICINE

## 2019-09-26 PROCEDURE — 99213 OFFICE O/P EST LOW 20 MIN: CPT | Mod: S$GLB,,, | Performed by: INTERNAL MEDICINE

## 2019-09-26 PROCEDURE — 99999 PR PBB SHADOW E&M-EST. PATIENT-LVL IV: CPT | Mod: PBBFAC,,, | Performed by: INTERNAL MEDICINE

## 2019-09-26 PROCEDURE — 3078F DIAST BP <80 MM HG: CPT | Mod: CPTII,S$GLB,, | Performed by: INTERNAL MEDICINE

## 2019-09-26 PROCEDURE — 3008F BODY MASS INDEX DOCD: CPT | Mod: CPTII,S$GLB,, | Performed by: INTERNAL MEDICINE

## 2019-09-26 PROCEDURE — 3074F PR MOST RECENT SYSTOLIC BLOOD PRESSURE < 130 MM HG: ICD-10-PCS | Mod: CPTII,S$GLB,, | Performed by: INTERNAL MEDICINE

## 2019-09-26 RX ORDER — AMOXICILLIN AND CLAVULANATE POTASSIUM 562.5; 437.5; 62.5 MG/1; MG/1; MG/1
2 TABLET, FILM COATED, EXTENDED RELEASE ORAL
COMMUNITY
End: 2019-12-27

## 2019-09-26 NOTE — PROGRESS NOTES
"Subjective:       Patient ID: Zoey Cali is a 53 y.o. female.    Chief Complaint: Follow-up  Diagnosis: TRACIE  HPI     52 y/o female with history of MS seen  today for f/u for TRACIE  She undergoes intermittent IV iron therapy.  She has been intolerant of oral Fe supp  s/p GI eval with EGD and colonoscopy which was unremarkable    She is followed by Dr. Mendez for long standing history of MS and remains on therapy with Avonex   No recent flare-ups  Last flare-up years ago         She undergoes intermittent IV Fe therapy  Last IV Fe 11/2018      Mild chronic fatigue-stable  No new issues   Appetite and weight stable   No SOB/CP  Chronic neck pain improved with occipital nerve block    CBC reveals wbc 7860 /mm3  Hb 15.2  g/dl   44.5 g/dl   plt ct 252k    Review of Systems   Constitutional: Negative for appetite change, fatigue and unexpected weight change.   Eyes: Negative for visual disturbance.   Respiratory: Negative for cough and shortness of breath.    Cardiovascular: Negative for chest pain and leg swelling.   Gastrointestinal: Negative for abdominal pain, blood in stool, constipation, diarrhea, nausea and vomiting.   Genitourinary: Negative for frequency.   Musculoskeletal: Positive for neck pain (chronic improved). Negative for arthralgias, back pain and joint swelling.   Skin: Negative for rash.        No petechiae, ecchymoses   Neurological: Negative for light-headedness and headaches.   Hematological: Negative for adenopathy. Does not bruise/bleed easily.   Psychiatric/Behavioral: Positive for sleep disturbance.       Objective:       Vitals:    09/26/19 1312   BP: 115/63   BP Location: Right arm   Patient Position: Sitting   BP Method: Medium (Automatic)   Pulse: 63   Temp: 98 °F (36.7 °C)   TempSrc: Oral   SpO2: 95%   Weight: 64.4 kg (141 lb 15.6 oz)   Height: 4' 11" (1.499 m)       Physical Exam   Constitutional: She is oriented to person, place, and time. She appears well-developed and " well-nourished.   HENT:   Head: Normocephalic.   Mouth/Throat: Oropharynx is clear and moist. No oropharyngeal exudate.   Eyes: Pupils are equal, round, and reactive to light. Conjunctivae and lids are normal. No scleral icterus.   Neck: Normal range of motion. Neck supple. No thyromegaly present.   Cardiovascular: Normal rate, regular rhythm and normal heart sounds.   No murmur heard.  Pulmonary/Chest: Breath sounds normal. She has no wheezes. She has no rales.   Abdominal: Soft. Bowel sounds are normal. She exhibits no distension and no mass. There is no hepatosplenomegaly. There is no tenderness. There is no rebound and no guarding.   Musculoskeletal: Normal range of motion. She exhibits no edema or tenderness.   Neurological: She is alert and oriented to person, place, and time. No cranial nerve deficit. Coordination normal.   Skin: Skin is warm and dry. No ecchymosis, no petechiae and no rash noted. No erythema.        Lab Results   Component Value Date    WBC 7.86 09/23/2019    HGB 15.2 09/23/2019    HCT 44.5 09/23/2019    MCV 90 09/23/2019     09/23/2019     Lab Results   Component Value Date    IRON 47 09/23/2019    TIBC 385 09/23/2019    FERRITIN 120 09/23/2019     ·       1. Iron deficiency anemia, unspecified iron deficiency anemia type    2. MS (multiple sclerosis)    3. Advanced care planning/counseling discussion        Plan:   Zoey FRASER was seen today for follow-up.    Diagnoses and all orders for this visit:    Iron deficiency anemia, unspecified iron deficiency       S/p GI eval-unremarkable       S/p Venofer x 3 completed 11/2018        CBC reveals stable Hb 15.2 g/dl        Ferritin 120  Fe sats decreased 12       Discussed with pt potential IV Fe infusion - pt elects to hold off        Cont to monitor       Multiple Sclerosis    Follow-up with Neurology    Pt on Avonex       Advance Care Planning     Power of   I initiated the process of advance care planning today and explained  the importance of this process to the patient.  I introduced the concept of advance directives to the patient, as well. Then the patient received detailed information about the importance of designating a Health Care Power of  (HCPOA). She was also instructed to communicate with this person about their wishes for future healthcare, should she become sick and lose decision-making capacity. The patient has not previously appointed a HCPOA. After our discussion, the patient has decided to complete a HCPOA and has appointed her significant other and NAME:Jared Karely   I spent a total time of 16 minutes discussing this issue with the patient.         F/u 3  mos with cbc, Fe studies prior to f/u ( or sooner if problems should arise in the interim)      Cc: MD Heidy Lancaster MD

## 2019-09-27 RX ORDER — GABAPENTIN 600 MG/1
TABLET ORAL
Qty: 270 TABLET | Refills: 1 | Status: SHIPPED | OUTPATIENT
Start: 2019-09-27 | End: 2019-12-18 | Stop reason: SDUPTHER

## 2019-10-04 ENCOUNTER — OFFICE VISIT (OUTPATIENT)
Dept: FAMILY MEDICINE | Facility: CLINIC | Age: 54
End: 2019-10-04
Payer: MEDICARE

## 2019-10-04 VITALS
WEIGHT: 145.5 LBS | HEIGHT: 60 IN | SYSTOLIC BLOOD PRESSURE: 110 MMHG | HEART RATE: 62 BPM | BODY MASS INDEX: 28.57 KG/M2 | OXYGEN SATURATION: 95 % | TEMPERATURE: 98 F | DIASTOLIC BLOOD PRESSURE: 60 MMHG

## 2019-10-04 DIAGNOSIS — J18.9 ATYPICAL PNEUMONIA: Primary | ICD-10-CM

## 2019-10-04 DIAGNOSIS — Z72.0 TOBACCO ABUSE: ICD-10-CM

## 2019-10-04 PROCEDURE — 99214 PR OFFICE/OUTPT VISIT, EST, LEVL IV, 30-39 MIN: ICD-10-PCS | Mod: S$GLB,,, | Performed by: FAMILY MEDICINE

## 2019-10-04 PROCEDURE — 99214 OFFICE O/P EST MOD 30 MIN: CPT | Mod: S$GLB,,, | Performed by: FAMILY MEDICINE

## 2019-10-04 PROCEDURE — 3078F DIAST BP <80 MM HG: CPT | Mod: CPTII,S$GLB,, | Performed by: FAMILY MEDICINE

## 2019-10-04 PROCEDURE — 3074F PR MOST RECENT SYSTOLIC BLOOD PRESSURE < 130 MM HG: ICD-10-PCS | Mod: CPTII,S$GLB,, | Performed by: FAMILY MEDICINE

## 2019-10-04 PROCEDURE — 3078F PR MOST RECENT DIASTOLIC BLOOD PRESSURE < 80 MM HG: ICD-10-PCS | Mod: CPTII,S$GLB,, | Performed by: FAMILY MEDICINE

## 2019-10-04 PROCEDURE — 3008F PR BODY MASS INDEX (BMI) DOCUMENTED: ICD-10-PCS | Mod: CPTII,S$GLB,, | Performed by: FAMILY MEDICINE

## 2019-10-04 PROCEDURE — 3074F SYST BP LT 130 MM HG: CPT | Mod: CPTII,S$GLB,, | Performed by: FAMILY MEDICINE

## 2019-10-04 PROCEDURE — 99999 PR PBB SHADOW E&M-EST. PATIENT-LVL III: CPT | Mod: PBBFAC,,, | Performed by: FAMILY MEDICINE

## 2019-10-04 PROCEDURE — 3008F BODY MASS INDEX DOCD: CPT | Mod: CPTII,S$GLB,, | Performed by: FAMILY MEDICINE

## 2019-10-04 PROCEDURE — 99999 PR PBB SHADOW E&M-EST. PATIENT-LVL III: ICD-10-PCS | Mod: PBBFAC,,, | Performed by: FAMILY MEDICINE

## 2019-10-04 RX ORDER — LEVOFLOXACIN 750 MG/1
750 TABLET ORAL DAILY
Qty: 10 TABLET | Refills: 0 | Status: SHIPPED | OUTPATIENT
Start: 2019-10-04 | End: 2019-10-14

## 2019-10-04 NOTE — PROGRESS NOTES
Chief Complaint   Patient presents with    Nasal Congestion    Fever    Hoarse       SUBJECTIVE:  Zoey Cali is a 53 y.o. female here for new problem of 2 weeks with worsening URI symptoms that led to fever, hoarseness and now cough with sputum and some SoB.  No chills, still urinating.  No focal neurological issues and she is a smoker.  Currently has co-morbidities including per problem list.      Past Medical History:   Diagnosis Date    Allergy     Anemia     Anxiety     Breast cyst     Chronic kidney disease     Depression     Fibrocystic breast     Hypertension     Multiple sclerosis     Multiple sclerosis     Neuromuscular disorder     Osteoporosis     Rib fracture 2015     Past Surgical History:   Procedure Laterality Date    APPENDECTOMY      BREAST BIOPSY Left 2012    BREAST CYST ASPIRATION      BREAST SURGERY  2004    excisional biopsy      SECTION, CLASSIC      CHALAZION EXCISION  at age 9    CHOLECYSTECTOMY      EYE SURGERY      HYSTERECTOMY      OOPHORECTOMY       Social History     Socioeconomic History    Marital status:      Spouse name: Not on file    Number of children: Not on file    Years of education: Not on file    Highest education level: Not on file   Occupational History    Not on file   Social Needs    Financial resource strain: Not on file    Food insecurity:     Worry: Not on file     Inability: Not on file    Transportation needs:     Medical: Not on file     Non-medical: Not on file   Tobacco Use    Smoking status: Current Every Day Smoker     Packs/day: 1.00     Years: 30.00     Pack years: 30.00     Types: Cigarettes    Smokeless tobacco: Current User   Substance and Sexual Activity    Alcohol use: No     Alcohol/week: 0.0 standard drinks    Drug use: No    Sexual activity: Not Currently     Birth control/protection: See Surgical Hx     Comment: Hysterectomy   Lifestyle    Physical activity:     Days per week:  Not on file     Minutes per session: Not on file    Stress: Not on file   Relationships    Social connections:     Talks on phone: Not on file     Gets together: Not on file     Attends Taoism service: Not on file     Active member of club or organization: Not on file     Attends meetings of clubs or organizations: Not on file     Relationship status: Not on file   Other Topics Concern    Not on file   Social History Narrative    Not on file     Family History   Problem Relation Age of Onset    Arthritis Mother     Diabetes Father     Heart disease Father     Hyperlipidemia Father     Hypertension Father     Stroke Father     Alcohol abuse Son     Arthritis Son     Drug abuse Son     Alcohol abuse Maternal Aunt     Arthritis Maternal Aunt     Alcohol abuse Maternal Uncle     Drug abuse Maternal Uncle     Hypertension Maternal Uncle     Alcohol abuse Paternal Aunt     Heart disease Paternal Aunt     Hearing loss Paternal Aunt     Hypertension Paternal Aunt     Alcohol abuse Paternal Uncle     Heart disease Paternal Uncle     Alcohol abuse Maternal Grandmother     Heart disease Maternal Grandmother     Stroke Maternal Grandmother     Cataracts Maternal Grandmother     Alcohol abuse Maternal Grandfather     Heart disease Maternal Grandfather     Cataracts Maternal Grandfather     Alcohol abuse Paternal Grandmother     Cancer Paternal Grandmother     Heart disease Paternal Grandmother     Hypertension Paternal Grandmother     Stroke Paternal Grandmother     Alcohol abuse Paternal Grandfather     Heart disease Paternal Grandfather     No Known Problems Sister     No Known Problems Brother     Breast cancer Neg Hx     Colon cancer Neg Hx     Ovarian cancer Neg Hx     Amblyopia Neg Hx     Blindness Neg Hx     Glaucoma Neg Hx     Macular degeneration Neg Hx     Retinal detachment Neg Hx     Strabismus Neg Hx     Thyroid disease Neg Hx      Current Outpatient Medications  on File Prior to Visit   Medication Sig Dispense Refill    carBAMazepine (TEGRETOL XR) 200 MG 12 hr tablet Take 1 tablet (200 mg total) by mouth 2 (two) times daily. 60 tablet 5    cholecalciferol, vitamin D3, (VITAMIN D3) 5,000 unit Tab Take 5,000 Units by mouth once daily.      escitalopram oxalate (LEXAPRO) 20 MG tablet Take 1 tablet (20 mg total) by mouth once daily. 90 tablet 3    gabapentin (NEURONTIN) 400 MG capsule Take 2 capsules (800 mg total) by mouth every evening. 180 capsule 1    gabapentin (NEURONTIN) 600 MG tablet TAKE ONE TABLET BY MOUTH THREE TIMES DAILY *MAY CAUSE DROWSINESS* *MAY CAUSE DIZZINESS* 270 tablet 1    HYDROcodone-acetaminophen (NORCO)  mg per tablet Take 1 tablet by mouth 3 (three) times daily as needed (pain). 90 tablet 0    ibuprofen (ADVIL,MOTRIN) 800 MG tablet TAKE ONE TABLET BY MOUTH EVERY 8 HOURS WITH FOOD AND WATER 180 tablet 1    interferon beta-1a (AVONEX) 30 mcg/0.5 mL PnKt INJECT 30 MCG INTRAMUSCULARLY ONCE WEEKLY 12 pen 1    LORazepam (ATIVAN) 2 MG Tab Take 1 tablet (2 mg total) by mouth every 6 (six) hours as needed. 120 tablet 0    metoprolol succinate (TOPROL-XL) 100 MG 24 hr tablet Take 1 tablet (100 mg total) by mouth once daily. 90 tablet 3    omeprazole (PRILOSEC) 40 MG capsule Take 1 capsule (40 mg total) by mouth every evening. 90 capsule 3    ondansetron (ZOFRAN-ODT) 4 MG TbDL TAKE ONE TABLET BY MOUTH EVERY 12 HOURS AS NEEDED 20 tablet 5    simvastatin (ZOCOR) 40 MG tablet Take 1 tablet (40 mg total) by mouth every evening. 90 tablet 3    tiZANidine (ZANAFLEX) 4 MG tablet TAKE 2 TABLETS IN THE MORNING, AT NOON AND IN THE EVENING AND TAKE 3 TABLETS AT NIGHT (9 TABLETS PER DAY) 810 tablet 3    triamterene-hydrochlorothiazide 37.5-25 mg (MAXZIDE-25) 37.5-25 mg per tablet Take 1 tablet by mouth once daily. 90 tablet 3    umeclidinium-vilanterol (ANORO ELLIPTA) 62.5-25 mcg/actuation DsDv INHALE ONE PUFF INTO LUNGS DAILY FOR CONTROL 60 each 2     amoxicillin-clavulanate 1,000-62.5 mg (AUGMENTIN XR) 1,000-62.5 mg per tablet Take 2 tablets by mouth every 12 (twelve) hours.      cetirizine (ZYRTEC) 10 MG tablet Take 1 tablet (10 mg total) by mouth once daily. 30 tablet 0     No current facility-administered medications on file prior to visit.      Review of patient's allergies indicates:   Allergen Reactions    Lomotil [diphenoxylate-atropine]      Causes stomach spasms    Dilaudid [hydromorphone (bulk)] Itching         ROS  Per HPI  No CP  OBJECTIVE:  /60   Pulse 62   Temp 97.7 °F (36.5 °C) (Oral)   Ht 5' (1.524 m)   Wt 66 kg (145 lb 8.1 oz)   LMP 01/01/2001 (Approximate) Comment: 2001  SpO2 95%   BMI 28.42 kg/m²     Wt Readings from Last 3 Encounters:   10/04/19 66 kg (145 lb 8.1 oz)   09/26/19 64.4 kg (141 lb 15.6 oz)   08/29/19 65 kg (143 lb 4.8 oz)     BP Readings from Last 3 Encounters:   10/04/19 110/60   09/26/19 115/63   08/29/19 120/70       Appears somewhat ill, not her usual self, alert and oriented.  Hoarse.  Strains to speak  Eyes normal  Nose with coryza and congestion  Ears clear  Throat with PND  Sinuses transilluminate well, no gross pain noted on palpation and percussion  Has coarse BS and much improved with deep cough.  Oxygenating well  No obvious wheezing.        Review of old Records:  Reviewed per [facility]    Review of old labs:      Review of old imaging:      ASSESSMENT:  Problem List Items Addressed This Visit     None      Visit Diagnoses     Atypical pneumonia    -  Primary    Relevant Medications    levoFLOXacin (LEVAQUIN) 750 MG tablet          ICD-10-CM ICD-9-CM   1. Atypical pneumonia J18.9 486     She was treated prior at  and did improve then had acute worsening with sinus issues and now chest issues. No jayson consolidative findings on exam or by history but concern is for interstitial or atypical pneumonia with her risk factors.  Place her on levofloxacin to cover atypicals and for the sinuses.  Monitor closely  gave ER precautions  Continue with her allergic rhinitis treatment.    PLAN:  1. Atypical pneumonia  Continue to work on tobacco cessation  Declined cessation program.  - levoFLOXacin (LEVAQUIN) 750 MG tablet; Take 1 tablet (750 mg total) by mouth once daily. for 10 days  Dispense: 10 tablet; Refill: 0    3 minutes of counseling given.  She is still not quite ready. For tobacco cessation attempt  Medication List with Changes/Refills   New Medications    LEVOFLOXACIN (LEVAQUIN) 750 MG TABLET    Take 1 tablet (750 mg total) by mouth once daily. for 10 days   Current Medications    AMOXICILLIN-CLAVULANATE 1,000-62.5 MG (AUGMENTIN XR) 1,000-62.5 MG PER TABLET    Take 2 tablets by mouth every 12 (twelve) hours.    CARBAMAZEPINE (TEGRETOL XR) 200 MG 12 HR TABLET    Take 1 tablet (200 mg total) by mouth 2 (two) times daily.    CETIRIZINE (ZYRTEC) 10 MG TABLET    Take 1 tablet (10 mg total) by mouth once daily.    CHOLECALCIFEROL, VITAMIN D3, (VITAMIN D3) 5,000 UNIT TAB    Take 5,000 Units by mouth once daily.    ESCITALOPRAM OXALATE (LEXAPRO) 20 MG TABLET    Take 1 tablet (20 mg total) by mouth once daily.    GABAPENTIN (NEURONTIN) 400 MG CAPSULE    Take 2 capsules (800 mg total) by mouth every evening.    GABAPENTIN (NEURONTIN) 600 MG TABLET    TAKE ONE TABLET BY MOUTH THREE TIMES DAILY *MAY CAUSE DROWSINESS* *MAY CAUSE DIZZINESS*    HYDROCODONE-ACETAMINOPHEN (NORCO)  MG PER TABLET    Take 1 tablet by mouth 3 (three) times daily as needed (pain).    IBUPROFEN (ADVIL,MOTRIN) 800 MG TABLET    TAKE ONE TABLET BY MOUTH EVERY 8 HOURS WITH FOOD AND WATER    INTERFERON BETA-1A (AVONEX) 30 MCG/0.5 ML PNKT    INJECT 30 MCG INTRAMUSCULARLY ONCE WEEKLY    LORAZEPAM (ATIVAN) 2 MG TAB    Take 1 tablet (2 mg total) by mouth every 6 (six) hours as needed.    METOPROLOL SUCCINATE (TOPROL-XL) 100 MG 24 HR TABLET    Take 1 tablet (100 mg total) by mouth once daily.    OMEPRAZOLE (PRILOSEC) 40 MG CAPSULE    Take 1 capsule (40 mg  total) by mouth every evening.    ONDANSETRON (ZOFRAN-ODT) 4 MG TBDL    TAKE ONE TABLET BY MOUTH EVERY 12 HOURS AS NEEDED    SIMVASTATIN (ZOCOR) 40 MG TABLET    Take 1 tablet (40 mg total) by mouth every evening.    TIZANIDINE (ZANAFLEX) 4 MG TABLET    TAKE 2 TABLETS IN THE MORNING, AT NOON AND IN THE EVENING AND TAKE 3 TABLETS AT NIGHT (9 TABLETS PER DAY)    TRIAMTERENE-HYDROCHLOROTHIAZIDE 37.5-25 MG (MAXZIDE-25) 37.5-25 MG PER TABLET    Take 1 tablet by mouth once daily.    UMECLIDINIUM-VILANTEROL (ANORO ELLIPTA) 62.5-25 MCG/ACTUATION DSDV    INHALE ONE PUFF INTO LUNGS DAILY FOR CONTROL       No follow-ups on file.

## 2019-10-17 ENCOUNTER — HOSPITAL ENCOUNTER (OUTPATIENT)
Dept: RADIOLOGY | Facility: HOSPITAL | Age: 54
Discharge: HOME | End: 2019-10-17
Attending: OBSTETRICS & GYNECOLOGY
Payer: MEDICARE

## 2019-10-17 DIAGNOSIS — Z12.31 VISIT FOR SCREENING MAMMOGRAM: ICD-10-CM

## 2019-10-17 PROCEDURE — 77063 BREAST TOMOSYNTHESIS BI: CPT | Mod: 26,,, | Performed by: RADIOLOGY

## 2019-10-17 PROCEDURE — 77063 MAMMO DIGITAL SCREENING BILAT WITH TOMOSYNTHESIS_CAD: ICD-10-PCS | Mod: 26,,, | Performed by: RADIOLOGY

## 2019-10-17 PROCEDURE — 77067 MAMMO DIGITAL SCREENING BILAT WITH TOMOSYNTHESIS_CAD: ICD-10-PCS | Mod: 26,,, | Performed by: RADIOLOGY

## 2019-10-17 PROCEDURE — 77063 BREAST TOMOSYNTHESIS BI: CPT | Mod: TC

## 2019-10-17 PROCEDURE — 77067 SCR MAMMO BI INCL CAD: CPT | Mod: 26,,, | Performed by: RADIOLOGY

## 2019-10-22 ENCOUNTER — PATIENT MESSAGE (OUTPATIENT)
Dept: OBSTETRICS AND GYNECOLOGY | Facility: CLINIC | Age: 54
End: 2019-10-22

## 2019-10-22 ENCOUNTER — PATIENT OUTREACH (OUTPATIENT)
Dept: ADMINISTRATIVE | Facility: OTHER | Age: 54
End: 2019-10-22

## 2019-10-24 ENCOUNTER — OFFICE VISIT (OUTPATIENT)
Dept: NEUROLOGY | Facility: CLINIC | Age: 54
End: 2019-10-24
Payer: MEDICARE

## 2019-10-24 VITALS
HEIGHT: 60 IN | BODY MASS INDEX: 28.98 KG/M2 | SYSTOLIC BLOOD PRESSURE: 109 MMHG | DIASTOLIC BLOOD PRESSURE: 66 MMHG | HEART RATE: 57 BPM | WEIGHT: 147.63 LBS

## 2019-10-24 DIAGNOSIS — G35 MULTIPLE SCLEROSIS: Primary | ICD-10-CM

## 2019-10-24 DIAGNOSIS — M79.18 MYOFASCIAL PAIN: ICD-10-CM

## 2019-10-24 DIAGNOSIS — M54.2 NECK PAIN: Primary | ICD-10-CM

## 2019-10-24 DIAGNOSIS — Z71.89 COUNSELING REGARDING GOALS OF CARE: ICD-10-CM

## 2019-10-24 DIAGNOSIS — R25.2 SPASTICITY: ICD-10-CM

## 2019-10-24 DIAGNOSIS — M54.81 BILATERAL OCCIPITAL NEURALGIA: ICD-10-CM

## 2019-10-24 DIAGNOSIS — Z79.899 ENCOUNTER FOR LONG-TERM (CURRENT) USE OF HIGH-RISK MEDICATION: ICD-10-CM

## 2019-10-24 DIAGNOSIS — M54.81 OCCIPITAL NEURALGIA, UNSPECIFIED LATERALITY: ICD-10-CM

## 2019-10-24 DIAGNOSIS — E55.9 VITAMIN D INSUFFICIENCY: ICD-10-CM

## 2019-10-24 DIAGNOSIS — M79.2 NEUROPATHIC PAIN: ICD-10-CM

## 2019-10-24 DIAGNOSIS — Z86.69 HISTORY OF OPTIC NEURITIS: ICD-10-CM

## 2019-10-24 PROCEDURE — 3074F SYST BP LT 130 MM HG: CPT | Mod: CPTII,S$GLB,, | Performed by: PHYSICIAN ASSISTANT

## 2019-10-24 PROCEDURE — 99214 PR OFFICE/OUTPT VISIT, EST, LEVL IV, 30-39 MIN: ICD-10-PCS | Mod: S$GLB,,, | Performed by: PHYSICIAN ASSISTANT

## 2019-10-24 PROCEDURE — 3078F DIAST BP <80 MM HG: CPT | Mod: CPTII,S$GLB,, | Performed by: PHYSICIAN ASSISTANT

## 2019-10-24 PROCEDURE — 3008F PR BODY MASS INDEX (BMI) DOCUMENTED: ICD-10-PCS | Mod: CPTII,S$GLB,, | Performed by: PHYSICIAN ASSISTANT

## 2019-10-24 PROCEDURE — 3078F PR MOST RECENT DIASTOLIC BLOOD PRESSURE < 80 MM HG: ICD-10-PCS | Mod: CPTII,S$GLB,, | Performed by: PHYSICIAN ASSISTANT

## 2019-10-24 PROCEDURE — 99214 OFFICE O/P EST MOD 30 MIN: CPT | Mod: S$GLB,,, | Performed by: PHYSICIAN ASSISTANT

## 2019-10-24 PROCEDURE — 3074F PR MOST RECENT SYSTOLIC BLOOD PRESSURE < 130 MM HG: ICD-10-PCS | Mod: CPTII,S$GLB,, | Performed by: PHYSICIAN ASSISTANT

## 2019-10-24 PROCEDURE — 3008F BODY MASS INDEX DOCD: CPT | Mod: CPTII,S$GLB,, | Performed by: PHYSICIAN ASSISTANT

## 2019-10-24 PROCEDURE — 99999 PR PBB SHADOW E&M-EST. PATIENT-LVL IV: CPT | Mod: PBBFAC,,, | Performed by: PHYSICIAN ASSISTANT

## 2019-10-24 PROCEDURE — 99999 PR PBB SHADOW E&M-EST. PATIENT-LVL IV: ICD-10-PCS | Mod: PBBFAC,,, | Performed by: PHYSICIAN ASSISTANT

## 2019-10-24 NOTE — PROGRESS NOTES
"Subjective:        Patient ID: Zoey Cali is a 53 y.o. female who presents today for a routine clinic visit for MS.  Last visit to MS center in June 2019 with Dr. Mendez.     MS HPI:  · DMT: interferon beta-1a IM  · Side effects from DMT? Yes- some injection site skin thickening  · Taking vitamin D3 as recommended? Yes -5,000IU/d   · Foot pain is "bad"  · Patient is unfortunately over 30 minutes late for appt today    Medications:  Current Outpatient Medications   Medication Sig    amoxicillin-clavulanate 1,000-62.5 mg (AUGMENTIN XR) 1,000-62.5 mg per tablet Take 2 tablets by mouth every 12 (twelve) hours.    carBAMazepine (TEGRETOL XR) 200 MG 12 hr tablet Take 1 tablet (200 mg total) by mouth 2 (two) times daily.    cetirizine (ZYRTEC) 10 MG tablet Take 1 tablet (10 mg total) by mouth once daily.    cholecalciferol, vitamin D3, (VITAMIN D3) 5,000 unit Tab Take 5,000 Units by mouth once daily.    escitalopram oxalate (LEXAPRO) 20 MG tablet Take 1 tablet (20 mg total) by mouth once daily.    gabapentin (NEURONTIN) 400 MG capsule Take 2 capsules (800 mg total) by mouth every evening.    gabapentin (NEURONTIN) 600 MG tablet TAKE ONE TABLET BY MOUTH THREE TIMES DAILY *MAY CAUSE DROWSINESS* *MAY CAUSE DIZZINESS*    HYDROcodone-acetaminophen (NORCO)  mg per tablet Take 1 tablet by mouth 3 (three) times daily as needed (pain).    ibuprofen (ADVIL,MOTRIN) 800 MG tablet TAKE ONE TABLET BY MOUTH EVERY 8 HOURS WITH FOOD AND WATER    interferon beta-1a (AVONEX) 30 mcg/0.5 mL PnKt INJECT 30 MCG INTRAMUSCULARLY ONCE WEEKLY    LORazepam (ATIVAN) 2 MG Tab Take 1 tablet (2 mg total) by mouth every 6 (six) hours as needed.    metoprolol succinate (TOPROL-XL) 100 MG 24 hr tablet Take 1 tablet (100 mg total) by mouth once daily.    omeprazole (PRILOSEC) 40 MG capsule Take 1 capsule (40 mg total) by mouth every evening.    ondansetron (ZOFRAN-ODT) 4 MG TbDL TAKE ONE TABLET BY MOUTH EVERY 12 HOURS AS NEEDED "    simvastatin (ZOCOR) 40 MG tablet Take 1 tablet (40 mg total) by mouth every evening.    tiZANidine (ZANAFLEX) 4 MG tablet TAKE 2 TABLETS IN THE MORNING, AT NOON AND IN THE EVENING AND TAKE 3 TABLETS AT NIGHT (9 TABLETS PER DAY)    triamterene-hydrochlorothiazide 37.5-25 mg (MAXZIDE-25) 37.5-25 mg per tablet Take 1 tablet by mouth once daily.    umeclidinium-vilanterol (ANORO ELLIPTA) 62.5-25 mcg/actuation DsDv INHALE ONE PUFF INTO LUNGS DAILY FOR CONTROL     No current facility-administered medications for this visit.        SOCIAL HISTORY  Social History     Tobacco Use    Smoking status: Current Every Day Smoker     Packs/day: 1.00     Years: 30.00     Pack years: 30.00     Types: Cigarettes    Smokeless tobacco: Current User   Substance Use Topics    Alcohol use: No     Alcohol/week: 0.0 standard drinks    Drug use: No       Living arrangements - the patient lives with their family.    MS ROS:      ·   Fatigue: Yes--gets tired at 3PM, and takes a nap, and then is good until night   · Sleep Disturbance: Yes  Ativan HS,unable to take Trazodone(headaches);  Does not snore; does wake-up feeling rested;    · Bladder Dysfunction: No  · Bowel Dysfunction: No  · Spasticity: Yes - takes tizanidine HS  · Visual Symptoms: Yes - History of ON  · Cognitive: No  · Mood Disorder: Yes - Taking Lexapro--20mg-. she is going to counseling once / month;   · Gait Disturbance: No  · Falls: as above  · Hand Dysfunction: No  · Pain: Yes - NP in feet, improved; Taking gabapentin 600mg TID and 800mg HS and Tegretol 100mg TWICE DAILY;no longer on Norco  · Sexual Dysfunction: Not Assessed  · Skin Breakdown: No  · Tremors: No  · Dysphagia: No  · Dysarthria: No  · Heat sensitivity: Yes--fatigue  · Any un-met adaptive needs? Yes - cooling device     Copay Assist? Getting free drug through [employer]      Objective:        1. 25 foot timed walk:5.0s today; 5.2s last visit  Timed 25 Foot Walk: 8/16/2016 5/2/2017   Did patient wear an AFO?  No No   Was assistive device used? No No   Time for 25 Foot Walk (seconds) 5.2 4.4       Remainder of exam deferred due to limited time today    Imaging:       Results for orders placed during the hospital encounter of 05/11/18   MRI Brain Demyelinating W W/O Contrast    Impression Overall mild to moderately degraded examination related to motion artifact.  Scattered supratentorial white matter lesions are somewhat nonspecific but in keeping with the reported history of multiple sclerosis.    No definite new lesions and no enhancing lesions to indicate ongoing or active demyelination.      Electronically signed by: Dre Abrams MD  Date:    05/11/2018  Time:    17:15     Results for orders placed during the hospital encounter of 05/15/19   MRI Brain W WO Contrast    Narrative EXAMINATION:  MRI BRAIN W WO CONTRAST    CLINICAL HISTORY:  annual MS;Multiple sclerosis    TECHNIQUE:  Sagittal 3D space FLAIR which was reformatted in the coronal and sagittal planes.  Axial T2, axial gradient, axial T1 and axial diffusion imaging of the whole brain without contrast.  Following contrast administration axial T1 and sagittal T1 spoiled gradient which was reformatted in the coronal and axial planes were performed.  Seven ML Gadavist intravenous contrast.    COMPARISON:  05/11/2018    FINDINGS:  There are continued several small to punctate size foci of T2/FLAIR signal hyperintensity in the supratentorial parenchyma.  There is no corresponding diffusion signal abnormality or enhancement.  Allowing for differences in technique there is no definite new lesion.  There is a punctate lesion in the left aspect of the body of the corpus callosum.  There is no definite infratentorial lesion.    No significant age advanced cerebral volume loss.  Major intracranial T2 flow voids are present.  No restricted diffusion to suggest acute infarction.  No abnormal parenchymal susceptibility to suggest parenchymal hemorrhage allowing for artifact  from prominent dental metal.    There is no parenchymal enhancement..      Impression Continued scattered small to punctate foci of T2 FLAIR signal abnormality throughout the supratentorial while nonspecific most compatible with prior areas of demyelination in light of history.    Allowing for differences in technique there is no definite new lesion or current enhancing lesion to suggest significant interval or active demyelination.  Clinical correlation and continued follow-up advised      Electronically signed by: John Paul Valle DO  Date:    05/15/2019  Time:    12:03         Labs:     Lab Results   Component Value Date    HIHLKIOI80LL 49 04/02/2019    UFJRUXRW40ES 59 05/01/2018    [id number] 54 11/17/2017     No results found for: JCVINDEX, JCVANTIBODY  No results found for: TU0TQWWN, ABSOLUTECD3, TF7HCPNN, ABSOLUTECD8, WJ3YQSKU, ABSOLUTECD4, LABCD48  Lab Results   Component Value Date    WBC 7.86 09/23/2019    RBC 4.93 09/23/2019    HGB 15.2 09/23/2019    HCT 44.5 09/23/2019    MCV 90 09/23/2019    MCH 30.8 09/23/2019    MCHC 34.2 09/23/2019    RDW 13.2 09/23/2019     09/23/2019    MPV 9.0 (L) 09/23/2019    GRAN 5.9 09/23/2019    GRAN 75.4 (H) 09/23/2019    LYMPH 1.5 09/23/2019    LYMPH 18.6 09/23/2019    MONO 0.3 09/23/2019    MONO 4.1 09/23/2019    EOS 0.1 09/23/2019    BASO 0.02 09/23/2019    EOSINOPHIL 1.7 09/23/2019    BASOPHIL 0.3 09/23/2019     Sodium   Date Value Ref Range Status   05/01/2018 141 136 - 145 mmol/L Final     Potassium   Date Value Ref Range Status   05/01/2018 4.4 3.5 - 5.1 mmol/L Final     Chloride   Date Value Ref Range Status   05/01/2018 102 95 - 110 mmol/L Final     CO2   Date Value Ref Range Status   05/01/2018 31 (H) 23 - 29 mmol/L Final     Glucose   Date Value Ref Range Status   05/01/2018 89 70 - 110 mg/dL Final     BUN, Bld   Date Value Ref Range Status   05/01/2018 10 6 - 20 mg/dL Final     Creatinine   Date Value Ref Range Status   05/15/2019 0.7 0.5 - 1.4 mg/dL Final      Calcium   Date Value Ref Range Status   05/01/2018 10.0 8.7 - 10.5 mg/dL Final     Total Protein   Date Value Ref Range Status   04/02/2019 7.4 6.0 - 8.4 g/dL Final     Albumin   Date Value Ref Range Status   04/02/2019 4.1 3.5 - 5.2 g/dL Final     Total Bilirubin   Date Value Ref Range Status   04/02/2019 0.4 0.1 - 1.0 mg/dL Final     Comment:     For infants and newborns, interpretation of results should be based  on gestational age, weight and in agreement with clinical  observations.  Premature Infant recommended reference ranges:  Up to 24 hours.............<8.0 mg/dL  Up to 48 hours............<12.0 mg/dL  3-5 days..................<15.0 mg/dL  6-29 days.................<15.0 mg/dL       Alkaline Phosphatase   Date Value Ref Range Status   04/02/2019 150 (H) 55 - 135 U/L Final     AST   Date Value Ref Range Status   04/02/2019 15 10 - 40 U/L Final     ALT   Date Value Ref Range Status   04/02/2019 15 10 - 44 U/L Final     Anion Gap   Date Value Ref Range Status   05/01/2018 8 8 - 16 mmol/L Final     eGFR if    Date Value Ref Range Status   05/15/2019 >60 >60 mL/min/1.73 m^2 Final     eGFR if non    Date Value Ref Range Status   05/15/2019 >60 >60 mL/min/1.73 m^2 Final     Comment:     Calculation used to obtain the estimated glomerular filtration  rate (eGFR) is the CKD-EPI equation.         No results found for: HEPBSAG, HEPBSAB, HEPBCAB      Impression:       Labs reviewed: Yes       CBC done recently done by another provider  Imaging tests reviewed: N/A       Last done in May-Dr. Mendez reviewed last visit with patient    Diagnosis of MS: Yes            Diagnosed in 1996 after episode of ON follow birth of child        MS Diagnosis based on:  CSF: Not done    Progression:  Number of relapses in the past year:  0  Clinical Progression:  Clinically Stable  MRI Progression:  Stable            AS of May 2019    Plan:   Change in Symptom Management Comments:  Increase tegretol  to 100mg in AM and 200mg HS    Monitoring labs ordered?:  Yes            LFT and Vit D3   Imaging labs ordered?:  No    Additional Impression:      Over 50% of this 40 minute visit was spent in direct face to face counseling of the patient about MS, DMT considerations, and MS symptom management.     Problem List Items Addressed This Visit        Neuro    Multiple sclerosis - Primary    Relevant Orders    Vitamin D    Hepatic function panel       Endocrine    Vitamin D insufficiency    Relevant Orders    Vitamin D       Other    Encounter for long-term (current) use of high-risk medication    Relevant Orders    Hepatic function panel        Follow up in about 4 months (around 2/24/2020) for follow up with me.  Patient agreed to POC today.    Attending, Dr. Mendez, was available during today's encounter.     AISSATOU PerezC  [facility]

## 2019-10-24 NOTE — Clinical Note
Maurice Moran---Zoey is here in clinic today and she was mentioning PT that you ordered at your visit with her. She was unable to go due to illness, but would like to go now. Not sure if she needs new orders???Thanks,Makayla

## 2019-10-28 ENCOUNTER — CLINICAL SUPPORT (OUTPATIENT)
Dept: FAMILY MEDICINE | Facility: CLINIC | Age: 54
End: 2019-10-28
Payer: MEDICARE

## 2019-10-28 ENCOUNTER — LAB VISIT (OUTPATIENT)
Dept: LAB | Facility: HOSPITAL | Age: 54
End: 2019-10-28
Attending: PHYSICIAN ASSISTANT
Payer: MEDICARE

## 2019-10-28 DIAGNOSIS — G35 MULTIPLE SCLEROSIS: Primary | ICD-10-CM

## 2019-10-28 DIAGNOSIS — Z79.899 ENCOUNTER FOR LONG-TERM (CURRENT) USE OF OTHER MEDICATIONS: ICD-10-CM

## 2019-10-28 DIAGNOSIS — E55.9 VITAMIN D INSUFFICIENCY: ICD-10-CM

## 2019-10-28 DIAGNOSIS — Z23 NEED FOR INFLUENZA VACCINATION: Primary | ICD-10-CM

## 2019-10-28 LAB
25(OH)D3+25(OH)D2 SERPL-MCNC: 72 NG/ML (ref 30–96)
ALBUMIN SERPL BCP-MCNC: 3.9 G/DL (ref 3.5–5.2)
ALP SERPL-CCNC: 161 U/L (ref 55–135)
ALT SERPL W/O P-5'-P-CCNC: 32 U/L (ref 10–44)
AST SERPL-CCNC: 22 U/L (ref 10–40)
BILIRUB DIRECT SERPL-MCNC: 0.2 MG/DL (ref 0.1–0.3)
BILIRUB SERPL-MCNC: 0.4 MG/DL (ref 0.1–1)
PROT SERPL-MCNC: 7 G/DL (ref 6–8.4)

## 2019-10-28 PROCEDURE — 36415 COLL VENOUS BLD VENIPUNCTURE: CPT

## 2019-10-28 PROCEDURE — 90686 IIV4 VACC NO PRSV 0.5 ML IM: CPT | Mod: S$GLB,,, | Performed by: FAMILY MEDICINE

## 2019-10-28 PROCEDURE — 80076 HEPATIC FUNCTION PANEL: CPT

## 2019-10-28 PROCEDURE — G0008 FLU VACCINE (QUAD) GREATER THAN OR EQUAL TO 3YO PRESERVATIVE FREE IM: ICD-10-PCS | Mod: S$GLB,,, | Performed by: FAMILY MEDICINE

## 2019-10-28 PROCEDURE — 82306 VITAMIN D 25 HYDROXY: CPT

## 2019-10-28 PROCEDURE — 90686 FLU VACCINE (QUAD) GREATER THAN OR EQUAL TO 3YO PRESERVATIVE FREE IM: ICD-10-PCS | Mod: S$GLB,,, | Performed by: FAMILY MEDICINE

## 2019-10-28 PROCEDURE — G0008 ADMIN INFLUENZA VIRUS VAC: HCPCS | Mod: S$GLB,,, | Performed by: FAMILY MEDICINE

## 2019-10-28 NOTE — PROGRESS NOTES
After obtaining consent, and per orders of Dr. Collier, injection of flu shot given into the right deltoid by Brenda Isaacs. Patient instructed to remain in clinic for 20 minutes afterwards, and to report any adverse reaction to me immediately.

## 2019-11-04 ENCOUNTER — PATIENT MESSAGE (OUTPATIENT)
Dept: NEUROLOGY | Facility: CLINIC | Age: 54
End: 2019-11-04

## 2019-11-04 DIAGNOSIS — M79.2 NEUROPATHIC PAIN: ICD-10-CM

## 2019-11-05 ENCOUNTER — PATIENT MESSAGE (OUTPATIENT)
Dept: NEUROLOGY | Facility: CLINIC | Age: 54
End: 2019-11-05

## 2019-11-05 NOTE — TELEPHONE ENCOUNTER
"Call placed to pt. States that she has been taking 200mg qAM and 400mg qPM. She is out of pills and unable to refill without new rx since "refill too soon" based on current rx. Advised pt that Makayla wrote in her recent office visit note to take 100mg qAM and 200mg qPM. Pt confirmed that she's been taking 200mg BID for 2 years or so--she is unsure why the rec would have been to decrease dose.     Pt was not able to take her medication this morning and is concerned that this may be dangerous to stop suddenly.  "

## 2019-11-06 ENCOUNTER — PATIENT MESSAGE (OUTPATIENT)
Dept: NEUROLOGY | Facility: CLINIC | Age: 54
End: 2019-11-06

## 2019-11-06 DIAGNOSIS — M79.2 NEUROPATHIC PAIN: ICD-10-CM

## 2019-11-07 ENCOUNTER — PATIENT MESSAGE (OUTPATIENT)
Dept: NEUROLOGY | Facility: CLINIC | Age: 54
End: 2019-11-07

## 2019-11-07 RX ORDER — CARBAMAZEPINE 200 MG/1
200 TABLET, EXTENDED RELEASE ORAL 2 TIMES DAILY
Qty: 180 TABLET | Refills: 1 | Status: SHIPPED | OUTPATIENT
Start: 2019-11-07 | End: 2020-09-09

## 2019-11-07 NOTE — TELEPHONE ENCOUNTER
Tegretol XR 200mg to be taken BID sent to pharmacy(90 day supply). May need to titrate-will be in communication with patient.

## 2019-11-27 ENCOUNTER — OFFICE VISIT (OUTPATIENT)
Dept: FAMILY MEDICINE | Facility: CLINIC | Age: 54
End: 2019-11-27
Payer: MEDICARE

## 2019-11-27 VITALS
HEART RATE: 69 BPM | DIASTOLIC BLOOD PRESSURE: 62 MMHG | BODY MASS INDEX: 28.67 KG/M2 | SYSTOLIC BLOOD PRESSURE: 120 MMHG | OXYGEN SATURATION: 95 % | HEIGHT: 59 IN | TEMPERATURE: 97 F | WEIGHT: 142.19 LBS

## 2019-11-27 DIAGNOSIS — F11.90 CHRONIC, CONTINUOUS USE OF OPIOIDS: ICD-10-CM

## 2019-11-27 DIAGNOSIS — G35 MULTIPLE SCLEROSIS: ICD-10-CM

## 2019-11-27 DIAGNOSIS — M79.2 NEUROPATHIC PAIN: ICD-10-CM

## 2019-11-27 PROCEDURE — 99999 PR PBB SHADOW E&M-EST. PATIENT-LVL III: ICD-10-PCS | Mod: PBBFAC,,, | Performed by: FAMILY MEDICINE

## 2019-11-27 PROCEDURE — 99215 PR OFFICE/OUTPT VISIT, EST, LEVL V, 40-54 MIN: ICD-10-PCS | Mod: S$GLB,,, | Performed by: FAMILY MEDICINE

## 2019-11-27 PROCEDURE — 3074F SYST BP LT 130 MM HG: CPT | Mod: CPTII,S$GLB,, | Performed by: FAMILY MEDICINE

## 2019-11-27 PROCEDURE — 3078F PR MOST RECENT DIASTOLIC BLOOD PRESSURE < 80 MM HG: ICD-10-PCS | Mod: CPTII,S$GLB,, | Performed by: FAMILY MEDICINE

## 2019-11-27 PROCEDURE — 3008F BODY MASS INDEX DOCD: CPT | Mod: CPTII,S$GLB,, | Performed by: FAMILY MEDICINE

## 2019-11-27 PROCEDURE — 99999 PR PBB SHADOW E&M-EST. PATIENT-LVL III: CPT | Mod: PBBFAC,,, | Performed by: FAMILY MEDICINE

## 2019-11-27 PROCEDURE — 3074F PR MOST RECENT SYSTOLIC BLOOD PRESSURE < 130 MM HG: ICD-10-PCS | Mod: CPTII,S$GLB,, | Performed by: FAMILY MEDICINE

## 2019-11-27 PROCEDURE — 99215 OFFICE O/P EST HI 40 MIN: CPT | Mod: S$GLB,,, | Performed by: FAMILY MEDICINE

## 2019-11-27 PROCEDURE — 3078F DIAST BP <80 MM HG: CPT | Mod: CPTII,S$GLB,, | Performed by: FAMILY MEDICINE

## 2019-11-27 PROCEDURE — 3008F PR BODY MASS INDEX (BMI) DOCUMENTED: ICD-10-PCS | Mod: CPTII,S$GLB,, | Performed by: FAMILY MEDICINE

## 2019-11-27 RX ORDER — HYDROCODONE BITARTRATE AND ACETAMINOPHEN 10; 325 MG/1; MG/1
1 TABLET ORAL 3 TIMES DAILY PRN
Qty: 90 TABLET | Refills: 0 | Status: SHIPPED | OUTPATIENT
Start: 2019-11-27 | End: 2019-12-18 | Stop reason: SDUPTHER

## 2019-11-27 NOTE — PROGRESS NOTES
Chief Complaint   Patient presents with    Medication Refill       SUBJECTIVE:  Zoey Cali is a 53 y.o. female here for follow up of chronic pain.   Patient has chronic pain involving the lumbar spine.  Conservative treatment with tylenol, NSAIDs, alternative treatments, complementary non opioid nerve/epileptic medication has failed with primary use.  Now opioid dependent for relief.  Has tried PT/OT, injections with mixed success.      Current pain is 6, radiates to the feet and the back, improved with current plan by 60%, quality is burning and aching.    PEG-3:  What number best describes your pain on average in past week  6  What number best describes how, during the past week, pain has interfered with your enjoyment of life  7  What number best describes how, during the past week, pain has interfered with you general activity  7  Total 20     Currently has co-morbidities including below problem list.      Social History     Tobacco Use    Smoking status: Current Every Day Smoker     Packs/day: 1.00     Years: 30.00     Pack years: 30.00     Types: Cigarettes    Smokeless tobacco: Current User   Substance Use Topics    Alcohol use: No     Alcohol/week: 0.0 standard drinks    Drug use: No       Patient Active Problem List    Diagnosis Date Noted    Refractive error 08/28/2019    Occipital neuralgia 06/25/2019    History of optic neuritis 06/25/2019    Neuropathic pain 05/02/2017    Prophylactic immunotherapy 11/30/2016    Gait disturbance 11/30/2016    Chronic, continuous use of opioids 04/04/2016     She has mainly back and leg pain with neuropathic pain  Using gabapentin and oxycodone    GOALS:  Keep her walking and functioning with ADL's    PROGNOSIS:  Overall is fair      MS (multiple sclerosis) 09/14/2015    Vitamin D insufficiency 07/08/2015    Muscle spasm 07/08/2015    Counseling regarding goals of care 07/08/2015    Encounter for long-term (current) use of high-risk medication  07/08/2015    Urinary hesitancy 07/08/2015    Neuralgia and neuritis 06/02/2015     Bilateral foot and lower limb pain.      Tobacco abuse 06/02/2015       10/4/19 still trying.  Brief counseling given.    Trying to quit.  4/17 still trying to quit brief counseling  8/17 counseled again still not ready to do the tobacco cessation program but she is thinking about it, brief counseling       Osteoporosis 12/29/2014    Iron deficiency anemia 12/29/2014    Hyperlipidemia LDL goal < 130 12/29/2014     Dx updated per 2019 IMO Load      HTN (hypertension) 07/17/2012    Multiple sclerosis 07/17/2012     AVONEX and gabapentin with chronic pain         Review of Systems   Constitutional: Positive for malaise/fatigue.   HENT: Negative.    Eyes: Negative.    Respiratory: Negative.    Cardiovascular: Negative.    Gastrointestinal: Negative.    Genitourinary: Negative.    Musculoskeletal: Positive for back pain and myalgias. Negative for falls, joint pain and neck pain.        Foot pain   Skin: Negative.    Neurological: Positive for tingling, weakness and headaches. Negative for focal weakness.   Endo/Heme/Allergies: Negative.    Psychiatric/Behavioral: Positive for depression. The patient is not nervous/anxious.        OBJECTIVE:  LMP 01/01/2001 (Approximate) Comment: 2001    Wt Readings from Last 3 Encounters:   10/24/19 67 kg (147 lb 9.6 oz)   10/04/19 66 kg (145 lb 8.1 oz)   09/26/19 64.4 kg (141 lb 15.6 oz)     BP Readings from Last 3 Encounters:   10/24/19 109/66   10/04/19 110/60   09/26/19 115/63       Patient is in usual state of health, alert and oriented without signs of intoxication.  No evidence on exam of illegal substance use or concerning symptoms involving abuse or intoxication (specifically evidence of IVDU, unusual bruising, slurred speech, unusual explanations).  General appearance: alert, appears stated age and cooperative  Head: Normocephalic, without obvious abnormality, atraumatic  Eyes:  conjunctivae/corneas clear. PERRL, EOM's intact. Fundi benign.  Ears: normal TM's and external ear canals both ears  Nose: Nares normal. Septum midline. Mucosa normal. No drainage or sinus tenderness.  Throat: lips, mucosa, and tongue normal; teeth and gums normal  Neck: no adenopathy, no carotid bruit, no JVD, supple, symmetrical, trachea midline and thyroid not enlarged, symmetric, no tenderness/mass/nodules  Back: symmetric, no curvature. ROM normal. No CVA tenderness.  Lungs: clear to auscultation bilaterally  Chest wall: no tenderness  Heart: regular rate and rhythm, S1, S2 normal, no murmur, click, rub or gallop  Abdomen: soft, non-tender; bowel sounds normal; no masses,  no organomegaly  Extremities: extremities normal, atraumatic, no cyanosis or edema  Pulses: 2+ and symmetric  Skin: Skin color, texture, turgor normal. No rashes or lesions  Lymph nodes: Cervical, supraclavicular, and axillary nodes normal.  Neurologic: Grossly normal           ABerrant behavior:    None noted today    Review of old Records:    Reviewed per epic and   NARx OD score/opioid score 270/441/400    Review of old labs:    No results found for: TSH  Lab Results   Component Value Date    WBC 7.86 09/23/2019    HGB 15.2 09/23/2019    HCT 44.5 09/23/2019    MCV 90 09/23/2019     09/23/2019       Chemistry        Component Value Date/Time     05/01/2018 1511    K 4.4 05/01/2018 1511     05/01/2018 1511    CO2 31 (H) 05/01/2018 1511    BUN 10 05/01/2018 1511    CREATININE 0.7 05/15/2019 0952    GLU 89 05/01/2018 1511        Component Value Date/Time    CALCIUM 10.0 05/01/2018 1511    ALKPHOS 161 (H) 10/28/2019 1215    AST 22 10/28/2019 1215    ALT 32 10/28/2019 1215    BILITOT 0.4 10/28/2019 1215    ESTGFRAFRICA >60 05/15/2019 0952    EGFRNONAA >60 05/15/2019 0952        Lab Results   Component Value Date    CHOL 110 (L) 06/22/2015    CHOL 144 05/18/2015    CHOL 174 12/22/2014     Lab Results   Component Value Date     HDL 22 (L) 06/22/2015    HDL 24 (L) 05/18/2015    HDL 19 (L) 12/22/2014     Lab Results   Component Value Date    LDLCALC 47.0 (L) 06/22/2015    LDLCALC Invalid, Trig>400.0 05/18/2015    LDLCALC Invalid, Trig>400.0 12/22/2014     Lab Results   Component Value Date    TRIG 205 (H) 06/22/2015    TRIG 598 (H) 05/18/2015    TRIG 564 (H) 12/22/2014     Lab Results   Component Value Date    CHOLHDL 20.0 06/22/2015    CHOLHDL 16.7 (L) 05/18/2015    CHOLHDL 10.9 (L) 12/22/2014         Review of old imaging:  Reviewed imaging in epic over last 3 months.    Medication List with Changes/Refills   Current Medications    AMOXICILLIN-CLAVULANATE 1,000-62.5 MG (AUGMENTIN XR) 1,000-62.5 MG PER TABLET    Take 2 tablets by mouth every 12 (twelve) hours.    CARBAMAZEPINE (TEGRETOL XR) 200 MG 12 HR TABLET    Take 1 tablet (200 mg total) by mouth 2 (two) times daily.    CETIRIZINE (ZYRTEC) 10 MG TABLET    Take 1 tablet (10 mg total) by mouth once daily.    CHOLECALCIFEROL, VITAMIN D3, (VITAMIN D3) 5,000 UNIT TAB    Take 5,000 Units by mouth once daily.    ESCITALOPRAM OXALATE (LEXAPRO) 20 MG TABLET    Take 1 tablet (20 mg total) by mouth once daily.    GABAPENTIN (NEURONTIN) 400 MG CAPSULE    Take 2 capsules (800 mg total) by mouth every evening.    GABAPENTIN (NEURONTIN) 600 MG TABLET    TAKE ONE TABLET BY MOUTH THREE TIMES DAILY *MAY CAUSE DROWSINESS* *MAY CAUSE DIZZINESS*    HYDROCODONE-ACETAMINOPHEN (NORCO)  MG PER TABLET    Take 1 tablet by mouth 3 (three) times daily as needed (pain).    IBUPROFEN (ADVIL,MOTRIN) 800 MG TABLET    TAKE ONE TABLET BY MOUTH EVERY 8 HOURS WITH FOOD AND WATER    INTERFERON BETA-1A (AVONEX) 30 MCG/0.5 ML PNKT    INJECT 30 MCG INTRAMUSCULARLY ONCE WEEKLY    LORAZEPAM (ATIVAN) 2 MG TAB    Take 1 tablet (2 mg total) by mouth every 6 (six) hours as needed.    METOPROLOL SUCCINATE (TOPROL-XL) 100 MG 24 HR TABLET    Take 1 tablet (100 mg total) by mouth once daily.    OMEPRAZOLE (PRILOSEC) 40 MG  CAPSULE    Take 1 capsule (40 mg total) by mouth every evening.    ONDANSETRON (ZOFRAN-ODT) 4 MG TBDL    TAKE ONE TABLET BY MOUTH EVERY 12 HOURS AS NEEDED    SIMVASTATIN (ZOCOR) 40 MG TABLET    Take 1 tablet (40 mg total) by mouth every evening.    TIZANIDINE (ZANAFLEX) 4 MG TABLET    TAKE 2 TABLETS IN THE MORNING, AT NOON AND IN THE EVENING AND TAKE 3 TABLETS AT NIGHT (9 TABLETS PER DAY)    TRIAMTERENE-HYDROCHLOROTHIAZIDE 37.5-25 MG (MAXZIDE-25) 37.5-25 MG PER TABLET    Take 1 tablet by mouth once daily.    UMECLIDINIUM-VILANTEROL (ANORO ELLIPTA) 62.5-25 MCG/ACTUATION DSDV    INHALE ONE PUFF INTO LUNGS DAILY FOR CONTROL       ASSESSMENT/plan    There are no diagnoses linked to this encounter.    No evidence of abuse/diversion/addiction noted through history and physical, or checking the .  Risks, benefits and alternate treatments are considered and plan of care reflects that shared decision with the patient.    Referred patient to CDC.GOV to review the new opioid guidelines.  Explained there is a section for patient information that is very informative about the potential risks of chronic opioid therapy and some strategies to minimize risk.  Counseled to f/u with me with any questions or concerns as our goal is always to restore function and  Reducing pain while minimizing side effects and avoiding rare but life threatening risks of overdose/addiction/respiratory suppression.    Continue with current care unless noted below:   go to daily and see if we can get ahead of this.  1 month and recheck  Went over risk    High risk medication review completed per guidelines to insure safest use of medication.    We reviewed our goals of care:    Functional restoration  Keep mood and focus in order to continuing working either at job or home.  Reduce burden of pain.      And discontinuation    REASONS to DISCONTINUE:  >30 MME daily needed to get benefits.  Intolerable side effects.  abhorrent or distasteful behavior  towards myself or staff in regards to medication.  Failure to provide drug screens when prompted.  Any criminal investigations or allegations in regards to narcotics.  Illegal selling or distribution of his medication  Failure to progress to functional restoration    Future Appointments   Date Time Provider Department Center   12/20/2019  8:30 AM LAB, [facility] LAB [facility] [place]   12/27/2019  1:00 PM Charito Mejia MD [facility] HEM ONC [facility]         1 months or sooner as needed

## 2019-12-16 ENCOUNTER — PATIENT MESSAGE (OUTPATIENT)
Dept: NEUROLOGY | Facility: CLINIC | Age: 54
End: 2019-12-16

## 2019-12-16 DIAGNOSIS — G35 MULTIPLE SCLEROSIS: ICD-10-CM

## 2019-12-17 DIAGNOSIS — M79.2 NEUROPATHIC PAIN: ICD-10-CM

## 2019-12-17 DIAGNOSIS — G35 MULTIPLE SCLEROSIS: ICD-10-CM

## 2019-12-17 DIAGNOSIS — F11.90 CHRONIC, CONTINUOUS USE OF OPIOIDS: ICD-10-CM

## 2019-12-17 DIAGNOSIS — M79.2 NEUROPATHIC PAIN OF FOOT, LEFT: ICD-10-CM

## 2019-12-17 DIAGNOSIS — G35 MS (MULTIPLE SCLEROSIS): ICD-10-CM

## 2019-12-17 DIAGNOSIS — M79.2 NEUROPATHIC PAIN OF FOOT, RIGHT: ICD-10-CM

## 2019-12-17 NOTE — TELEPHONE ENCOUNTER
Called pt to r/s appt 12/19.  Pt states she only wants to see , would like for him to give her a call regarding prescriptions.  She states if prescriptions are taken acre she can wait until January to be seen.

## 2019-12-18 RX ORDER — GABAPENTIN 400 MG/1
800 CAPSULE ORAL NIGHTLY
Qty: 180 CAPSULE | Refills: 1 | Status: SHIPPED | OUTPATIENT
Start: 2019-12-18 | End: 2020-03-16 | Stop reason: SDUPTHER

## 2019-12-18 RX ORDER — GABAPENTIN 600 MG/1
TABLET ORAL
Qty: 270 TABLET | Refills: 1 | Status: SHIPPED | OUTPATIENT
Start: 2019-12-18 | End: 2020-09-24

## 2019-12-18 RX ORDER — HYDROCODONE BITARTRATE AND ACETAMINOPHEN 10; 325 MG/1; MG/1
1 TABLET ORAL 3 TIMES DAILY PRN
Qty: 90 TABLET | Refills: 0 | Status: SHIPPED | OUTPATIENT
Start: 2019-12-24 | End: 2020-01-21 | Stop reason: SDUPTHER

## 2019-12-19 ENCOUNTER — LAB VISIT (OUTPATIENT)
Dept: LAB | Facility: HOSPITAL | Age: 54
End: 2019-12-19
Attending: INTERNAL MEDICINE
Payer: MEDICARE

## 2019-12-19 DIAGNOSIS — D50.9 IRON DEFICIENCY ANEMIA, UNSPECIFIED IRON DEFICIENCY ANEMIA TYPE: ICD-10-CM

## 2019-12-19 LAB
BASOPHILS # BLD AUTO: 0.04 K/UL (ref 0–0.2)
BASOPHILS NFR BLD: 0.5 % (ref 0–1.9)
DIFFERENTIAL METHOD: ABNORMAL
EOSINOPHIL # BLD AUTO: 0.2 K/UL (ref 0–0.5)
EOSINOPHIL NFR BLD: 2.6 % (ref 0–8)
ERYTHROCYTE [DISTWIDTH] IN BLOOD BY AUTOMATED COUNT: 13.2 % (ref 11.5–14.5)
FERRITIN SERPL-MCNC: 88 NG/ML (ref 20–300)
HCT VFR BLD AUTO: 44.1 % (ref 37–48.5)
HGB BLD-MCNC: 15.2 G/DL (ref 12–16)
IMM GRANULOCYTES # BLD AUTO: 0.02 K/UL (ref 0–0.04)
IMM GRANULOCYTES NFR BLD AUTO: 0.3 % (ref 0–0.5)
IRON SERPL-MCNC: 70 UG/DL (ref 30–160)
LYMPHOCYTES # BLD AUTO: 1.6 K/UL (ref 1–4.8)
LYMPHOCYTES NFR BLD: 21.6 % (ref 18–48)
MCH RBC QN AUTO: 30.2 PG (ref 27–31)
MCHC RBC AUTO-ENTMCNC: 34.5 G/DL (ref 32–36)
MCV RBC AUTO: 88 FL (ref 82–98)
MONOCYTES # BLD AUTO: 0.5 K/UL (ref 0.3–1)
MONOCYTES NFR BLD: 6.2 % (ref 4–15)
NEUTROPHILS # BLD AUTO: 5.1 K/UL (ref 1.8–7.7)
NEUTROPHILS NFR BLD: 68.8 % (ref 38–73)
NRBC BLD-RTO: 0 /100 WBC
PLATELET # BLD AUTO: 246 K/UL (ref 150–350)
PMV BLD AUTO: 9 FL (ref 9.2–12.9)
RBC # BLD AUTO: 5.03 M/UL (ref 4–5.4)
SATURATED IRON: 17 % (ref 20–50)
TOTAL IRON BINDING CAPACITY: 404 UG/DL (ref 250–450)
TRANSFERRIN SERPL-MCNC: 273 MG/DL (ref 200–375)
WBC # BLD AUTO: 7.45 K/UL (ref 3.9–12.7)

## 2019-12-19 PROCEDURE — 85025 COMPLETE CBC W/AUTO DIFF WBC: CPT

## 2019-12-19 PROCEDURE — 82728 ASSAY OF FERRITIN: CPT

## 2019-12-19 PROCEDURE — 83540 ASSAY OF IRON: CPT

## 2019-12-19 PROCEDURE — 36415 COLL VENOUS BLD VENIPUNCTURE: CPT

## 2019-12-27 ENCOUNTER — OFFICE VISIT (OUTPATIENT)
Dept: HEMATOLOGY/ONCOLOGY | Facility: CLINIC | Age: 54
End: 2019-12-27
Payer: MEDICARE

## 2019-12-27 VITALS
WEIGHT: 146.38 LBS | OXYGEN SATURATION: 96 % | HEIGHT: 59 IN | DIASTOLIC BLOOD PRESSURE: 62 MMHG | TEMPERATURE: 98 F | BODY MASS INDEX: 29.51 KG/M2 | HEART RATE: 58 BPM | SYSTOLIC BLOOD PRESSURE: 135 MMHG

## 2019-12-27 DIAGNOSIS — D50.9 IRON DEFICIENCY ANEMIA, UNSPECIFIED IRON DEFICIENCY ANEMIA TYPE: Primary | ICD-10-CM

## 2019-12-27 DIAGNOSIS — G35 MS (MULTIPLE SCLEROSIS): ICD-10-CM

## 2019-12-27 PROCEDURE — 99999 PR PBB SHADOW E&M-EST. PATIENT-LVL IV: CPT | Mod: PBBFAC,,, | Performed by: INTERNAL MEDICINE

## 2019-12-27 PROCEDURE — 3075F SYST BP GE 130 - 139MM HG: CPT | Mod: CPTII,S$GLB,, | Performed by: INTERNAL MEDICINE

## 2019-12-27 PROCEDURE — 3008F PR BODY MASS INDEX (BMI) DOCUMENTED: ICD-10-PCS | Mod: CPTII,S$GLB,, | Performed by: INTERNAL MEDICINE

## 2019-12-27 PROCEDURE — 3078F DIAST BP <80 MM HG: CPT | Mod: CPTII,S$GLB,, | Performed by: INTERNAL MEDICINE

## 2019-12-27 PROCEDURE — 3008F BODY MASS INDEX DOCD: CPT | Mod: CPTII,S$GLB,, | Performed by: INTERNAL MEDICINE

## 2019-12-27 PROCEDURE — 99999 PR PBB SHADOW E&M-EST. PATIENT-LVL IV: ICD-10-PCS | Mod: PBBFAC,,, | Performed by: INTERNAL MEDICINE

## 2019-12-27 PROCEDURE — 99213 OFFICE O/P EST LOW 20 MIN: CPT | Mod: S$GLB,,, | Performed by: INTERNAL MEDICINE

## 2019-12-27 PROCEDURE — 3075F PR MOST RECENT SYSTOLIC BLOOD PRESS GE 130-139MM HG: ICD-10-PCS | Mod: CPTII,S$GLB,, | Performed by: INTERNAL MEDICINE

## 2019-12-27 PROCEDURE — 99213 PR OFFICE/OUTPT VISIT, EST, LEVL III, 20-29 MIN: ICD-10-PCS | Mod: S$GLB,,, | Performed by: INTERNAL MEDICINE

## 2019-12-27 PROCEDURE — 3078F PR MOST RECENT DIASTOLIC BLOOD PRESSURE < 80 MM HG: ICD-10-PCS | Mod: CPTII,S$GLB,, | Performed by: INTERNAL MEDICINE

## 2019-12-27 NOTE — PROGRESS NOTES
"Subjective:       Patient ID: Zoey Cali is a 54 y.o. female.    Chief Complaint: Follow-up  Diagnosis: TRACIE       54 y/o female with history of MS seen  today for f/u for TRACIE  She undergoes intermittent IV iron therapy.  She has been intolerant of oral Fe supp  s/p GI eval with EGD and colonoscopy which was unremarkable    She is followed by Dr. Mendez for long standing history of MS and remains on therapy with Avonex   No recent flare-ups    She undergoes intermittent IV Fe therapy  Last IV Fe 11/2018      She is concerned about fullness in rt axilla  She does not palpate a lump  Recent Mammo 10/17/2019  BI-RADS Category 1: Negative  No recent infections  No fevers, night sweats     Mild chronic fatigue-  Appetite and weight stable   No SOB/CP      CBC reveals wbc 7450 /mm3  Hb 15.2  g/dl   44.1g/dl   plt ct 246k    Review of Systems   Constitutional: Negative for appetite change, fatigue and unexpected weight change.   Eyes: Negative for visual disturbance.   Respiratory: Negative for cough and shortness of breath.    Cardiovascular: Negative for chest pain and leg swelling.   Gastrointestinal: Negative for abdominal pain, blood in stool, constipation, diarrhea, nausea and vomiting.   Genitourinary: Negative for frequency.   Musculoskeletal: Positive for neck pain (chronic improved). Negative for arthralgias, back pain and joint swelling.   Skin: Negative for rash.        No petechiae, ecchymoses   Neurological: Negative for light-headedness and headaches.   Hematological: Negative for adenopathy. Does not bruise/bleed easily.   Psychiatric/Behavioral: Positive for sleep disturbance.       Objective:       Vitals:    12/27/19 1308   BP: 135/62   BP Location: Left arm   Patient Position: Sitting   BP Method: Medium (Automatic)   Pulse: (!) 58   Temp: 97.9 °F (36.6 °C)   TempSrc: Oral   SpO2: 96%   Weight: 66.4 kg (146 lb 6.2 oz)   Height: 4' 11" (1.499 m)       Physical Exam   Constitutional: She is " oriented to person, place, and time. She appears well-developed and well-nourished.   HENT:   Head: Normocephalic.   Mouth/Throat: Oropharynx is clear and moist. No oropharyngeal exudate.   Eyes: Pupils are equal, round, and reactive to light. Conjunctivae and lids are normal. No scleral icterus.   Neck: Normal range of motion. Neck supple. No thyromegaly present.   Cardiovascular: Normal rate, regular rhythm and normal heart sounds.   No murmur heard.  Pulmonary/Chest: Breath sounds normal. She has no wheezes. She has no rales. Right breast exhibits no inverted nipple, no mass, no nipple discharge, no skin change and no tenderness. Left breast exhibits no inverted nipple, no mass, no nipple discharge and no skin change.   Abdominal: Soft. Bowel sounds are normal. She exhibits no distension and no mass. There is no hepatosplenomegaly. There is no tenderness. There is no rebound and no guarding.   Musculoskeletal: Normal range of motion. She exhibits no edema or tenderness.   Lymphadenopathy:     She has no axillary adenopathy.   Neurological: She is alert and oriented to person, place, and time. No cranial nerve deficit. Coordination normal.   Skin: Skin is warm and dry. No ecchymosis, no petechiae and no rash noted. No erythema.        Lab Results   Component Value Date    WBC 7.45 12/19/2019    HGB 15.2 12/19/2019    HCT 44.1 12/19/2019    MCV 88 12/19/2019     12/19/2019     Lab Results   Component Value Date    IRON 70 12/19/2019    TIBC 404 12/19/2019    FERRITIN 88 12/19/2019     ·       1. Iron deficiency anemia, unspecified iron deficiency anemia type    2. MS (multiple sclerosis)        Plan:   Zoey FRASER was seen today for follow-up.    Diagnoses and all orders for this visit:    Iron deficiency anemia, unspecified iron deficiency       S/p GI eval-unremarkable       CBC reveals stable Hb 15.2 g/dl        Ferritin 88   Fe sats decreased 17       Plan Venofer        Target Ferritin >100      Multiple  Sclerosis    Follow-up with Neurology    Pt on Avonex       Advance Care Planning     Power of   I initiated the process of advance care planning today and explained the importance of this process to the patient.  I introduced the concept of advance directives to the patient, as well. Then the patient received detailed information about the importance of designating a Health Care Power of  (HCPOA). She was also instructed to communicate with this person about their wishes for future healthcare, should she become sick and lose decision-making capacity. The patient has not previously appointed a HCPOA. After our discussion, the patient has decided to complete a HCPOA and has appointed her significant other and NAME:Jared Cali   I spent a total time of 16 minutes discussing this issue with the patient.         F/u 3  mos with cbc, Fe studies prior to f/u ( or sooner if problems should arise in the interim)      Cc: MD Heidy Lancaster MD

## 2019-12-31 ENCOUNTER — INFUSION (OUTPATIENT)
Dept: INFUSION THERAPY | Facility: HOSPITAL | Age: 54
End: 2019-12-31
Attending: INTERNAL MEDICINE
Payer: MEDICARE

## 2019-12-31 VITALS
RESPIRATION RATE: 17 BRPM | TEMPERATURE: 97 F | OXYGEN SATURATION: 98 % | SYSTOLIC BLOOD PRESSURE: 108 MMHG | DIASTOLIC BLOOD PRESSURE: 55 MMHG | HEART RATE: 59 BPM

## 2019-12-31 DIAGNOSIS — D50.9 IRON DEFICIENCY ANEMIA, UNSPECIFIED IRON DEFICIENCY ANEMIA TYPE: Primary | ICD-10-CM

## 2019-12-31 PROCEDURE — 96374 THER/PROPH/DIAG INJ IV PUSH: CPT

## 2019-12-31 PROCEDURE — 63600175 PHARM REV CODE 636 W HCPCS: Performed by: INTERNAL MEDICINE

## 2019-12-31 RX ADMIN — IRON SUCROSE 200 MG: 20 INJECTION, SOLUTION INTRAVENOUS at 01:12

## 2019-12-31 NOTE — PLAN OF CARE
Pt presented for Venofer IVP #1/2. VSS. Pt reported she has received iron in the past, but it has been some time. Only reported symptom of anemia was fatigue. Tolerated Venofer. Monitored for 30 minutes post with no reactions noted. Distress screening tool completed. Future appt information reviewed with pt. Pt ambulated with cane into and out of unit. Pt in NAD at time of discharge.

## 2020-01-07 ENCOUNTER — INFUSION (OUTPATIENT)
Dept: INFUSION THERAPY | Facility: HOSPITAL | Age: 55
End: 2020-01-07
Attending: INTERNAL MEDICINE
Payer: MEDICARE

## 2020-01-07 VITALS
RESPIRATION RATE: 18 BRPM | TEMPERATURE: 98 F | OXYGEN SATURATION: 100 % | DIASTOLIC BLOOD PRESSURE: 53 MMHG | SYSTOLIC BLOOD PRESSURE: 101 MMHG | HEART RATE: 60 BPM

## 2020-01-07 DIAGNOSIS — D50.9 IRON DEFICIENCY ANEMIA, UNSPECIFIED IRON DEFICIENCY ANEMIA TYPE: Primary | ICD-10-CM

## 2020-01-07 PROCEDURE — 63600175 PHARM REV CODE 636 W HCPCS: Performed by: INTERNAL MEDICINE

## 2020-01-07 PROCEDURE — 96374 THER/PROPH/DIAG INJ IV PUSH: CPT

## 2020-01-07 RX ADMIN — IRON SUCROSE 200 MG: 20 INJECTION, SOLUTION INTRAVENOUS at 01:01

## 2020-01-07 NOTE — PLAN OF CARE
Pt presented for Venofer 2/2. VSS. Pt reported fatigue, but that she has started to feel a slight improvement in her energy levels in the last day or so. Tolerated Venofer today. Distress screening tool completed. Declined AVS. Ambulated with cane off of unit. Pt in NAD at time of discharge.

## 2020-01-17 DIAGNOSIS — G35 MULTIPLE SCLEROSIS: ICD-10-CM

## 2020-01-17 NOTE — TELEPHONE ENCOUNTER
----- Message from Neda Reid sent at 1/17/2020  8:35 AM CST -----  Contact: [facility] my [facility]  Type: RX Refill Request    Who Called: [facility] my [facility]    Have you contacted your pharmacy: no     Refill or New Rx: refill     RX Name and Strength: interferon beta-1a (AVONEX) 30 mcg/0.5 mL PnKt      Preferred Pharmacy with phone number: Cover my meds  Local or Mail Order: mail    Ordering Provider: neeru    Would the patient rather a call back or a response via [facility]? Call     Best Call Back Number: [phone]      Spring# [id number]     Prior auth needed

## 2020-01-17 NOTE — TELEPHONE ENCOUNTER
Last Office Visit Info:   The patient's last visit with Devon Collier MD was on 11/27/2019.    The patient's last visit in current department was on 11/27/2019.  Last refill 12/2/19      Last CBC Results:   Lab Results   Component Value Date    WBC 7.45 12/19/2019    HGB 15.2 12/19/2019    HCT 44.1 12/19/2019     12/19/2019       Last CMP Results  Lab Results   Component Value Date     05/01/2018    K 4.4 05/01/2018     05/01/2018    CO2 31 (H) 05/01/2018    BUN 10 05/01/2018    CREATININE 0.7 05/15/2019    CALCIUM 10.0 05/01/2018    ALBUMIN 3.9 10/28/2019    AST 22 10/28/2019    ALT 32 10/28/2019       Last Lipids  Lab Results   Component Value Date    CHOL 110 (L) 06/22/2015    TRIG 205 (H) 06/22/2015    HDL 22 (L) 06/22/2015    LDLCALC 47.0 (L) 06/22/2015       Last A1C  No results found for: HGBA1C    Last TSH  No results found for: TSH      Current Med Refills  Medication List with Changes/Refills   Current Medications    CARBAMAZEPINE (TEGRETOL XR) 200 MG 12 HR TABLET    Take 1 tablet (200 mg total) by mouth 2 (two) times daily.       Start Date: 11/7/2019 End Date: --    CETIRIZINE (ZYRTEC) 10 MG TABLET    Take 1 tablet (10 mg total) by mouth once daily.       Start Date: 8/4/2017  End Date: 12/27/2019    CHOLECALCIFEROL, VITAMIN D3, (VITAMIN D3) 5,000 UNIT TAB    Take 5,000 Units by mouth once daily.       Start Date: --        End Date: --    ESCITALOPRAM OXALATE (LEXAPRO) 20 MG TABLET    Take 1 tablet (20 mg total) by mouth once daily.       Start Date: 8/29/2019 End Date: --    GABAPENTIN (NEURONTIN) 400 MG CAPSULE    Take 2 capsules (800 mg total) by mouth every evening.       Start Date: 12/18/2019End Date: --    GABAPENTIN (NEURONTIN) 600 MG TABLET    TAKE ONE TABLET BY MOUTH THREE TIMES DAILY *MAY CAUSE DROWSINESS* *MAY CAUSE DIZZINESS*       Start Date: 12/18/2019End Date: --    HYDROCODONE-ACETAMINOPHEN (NORCO)  MG PER TABLET    Take 1 tablet by mouth 3 (three) times daily as  needed (pain).       Start Date: 12/24/2019End Date: 1/23/2020    IBUPROFEN (ADVIL,MOTRIN) 800 MG TABLET    TAKE ONE TABLET BY MOUTH EVERY 8 HOURS WITH FOOD AND WATER       Start Date: 6/5/2019  End Date: --    INTERFERON BETA-1A (AVONEX) 30 MCG/0.5 ML PNKT    INJECT 30 MCG INTRAMUSCULARLY ONCE WEEKLY       Start Date: 12/17/2019End Date: --    LORAZEPAM (ATIVAN) 2 MG TAB    Take 1 tablet (2 mg total) by mouth every 6 (six) hours as needed.       Start Date: 8/29/2019 End Date: 12/27/2019    METOPROLOL SUCCINATE (TOPROL-XL) 100 MG 24 HR TABLET    Take 1 tablet (100 mg total) by mouth once daily.       Start Date: 2/5/2019  End Date: --    ONDANSETRON (ZOFRAN-ODT) 4 MG TBDL    TAKE ONE TABLET BY MOUTH EVERY 12 HOURS AS NEEDED       Start Date: 10/1/2018 End Date: --    SIMVASTATIN (ZOCOR) 40 MG TABLET    Take 1 tablet (40 mg total) by mouth every evening.       Start Date: 2/5/2019  End Date: 2/5/2020    TIZANIDINE (ZANAFLEX) 4 MG TABLET    TAKE 2 TABLETS IN THE MORNING, AT NOON AND IN THE EVENING AND TAKE 3 TABLETS AT NIGHT (9 TABLETS PER DAY)       Start Date: 2/5/2019  End Date: --    UMECLIDINIUM-VILANTEROL (ANORO ELLIPTA) 62.5-25 MCG/ACTUATION DSDV    INHALE ONE PUFF INTO LUNGS DAILY FOR CONTROL       Start Date: 8/29/2019 End Date: --

## 2020-01-20 ENCOUNTER — TELEPHONE (OUTPATIENT)
Dept: FAMILY MEDICINE | Facility: CLINIC | Age: 55
End: 2020-01-20

## 2020-01-20 NOTE — TELEPHONE ENCOUNTER
Received PA from [facility] for Avonex Pen 30MCG/0.5ML , good from 1/21/20-12/31/20. Spoke with patient letting her know meds was approved and she may go to their Pharmacy to  their meds. Pt verbally understands.     Patient was prescribed Avonex Pen 30MCG/0.5ML auto-injector kit  Pharmacy is stating that med need's PA, sent to Cover My Meds for PA .

## 2020-01-21 ENCOUNTER — OFFICE VISIT (OUTPATIENT)
Dept: FAMILY MEDICINE | Facility: CLINIC | Age: 55
End: 2020-01-21
Payer: MEDICARE

## 2020-01-21 VITALS
BODY MASS INDEX: 28.57 KG/M2 | TEMPERATURE: 98 F | OXYGEN SATURATION: 95 % | HEART RATE: 70 BPM | SYSTOLIC BLOOD PRESSURE: 112 MMHG | WEIGHT: 145.5 LBS | HEIGHT: 60 IN | DIASTOLIC BLOOD PRESSURE: 68 MMHG

## 2020-01-21 DIAGNOSIS — F11.90 CHRONIC, CONTINUOUS USE OF OPIOIDS: ICD-10-CM

## 2020-01-21 DIAGNOSIS — F32.A DEPRESSION, UNSPECIFIED DEPRESSION TYPE: ICD-10-CM

## 2020-01-21 DIAGNOSIS — G35 MS (MULTIPLE SCLEROSIS): Primary | ICD-10-CM

## 2020-01-21 DIAGNOSIS — M79.2 NEUROPATHIC PAIN: ICD-10-CM

## 2020-01-21 DIAGNOSIS — R25.2 SPASTICITY: ICD-10-CM

## 2020-01-21 PROCEDURE — 3074F PR MOST RECENT SYSTOLIC BLOOD PRESSURE < 130 MM HG: ICD-10-PCS | Mod: CPTII,S$GLB,, | Performed by: FAMILY MEDICINE

## 2020-01-21 PROCEDURE — 99215 PR OFFICE/OUTPT VISIT, EST, LEVL V, 40-54 MIN: ICD-10-PCS | Mod: S$GLB,,, | Performed by: FAMILY MEDICINE

## 2020-01-21 PROCEDURE — 3078F DIAST BP <80 MM HG: CPT | Mod: CPTII,S$GLB,, | Performed by: FAMILY MEDICINE

## 2020-01-21 PROCEDURE — 3008F PR BODY MASS INDEX (BMI) DOCUMENTED: ICD-10-PCS | Mod: CPTII,S$GLB,, | Performed by: FAMILY MEDICINE

## 2020-01-21 PROCEDURE — 3078F PR MOST RECENT DIASTOLIC BLOOD PRESSURE < 80 MM HG: ICD-10-PCS | Mod: CPTII,S$GLB,, | Performed by: FAMILY MEDICINE

## 2020-01-21 PROCEDURE — 3008F BODY MASS INDEX DOCD: CPT | Mod: CPTII,S$GLB,, | Performed by: FAMILY MEDICINE

## 2020-01-21 PROCEDURE — 99999 PR PBB SHADOW E&M-EST. PATIENT-LVL III: ICD-10-PCS | Mod: PBBFAC,,, | Performed by: FAMILY MEDICINE

## 2020-01-21 PROCEDURE — 99999 PR PBB SHADOW E&M-EST. PATIENT-LVL III: CPT | Mod: PBBFAC,,, | Performed by: FAMILY MEDICINE

## 2020-01-21 PROCEDURE — 3074F SYST BP LT 130 MM HG: CPT | Mod: CPTII,S$GLB,, | Performed by: FAMILY MEDICINE

## 2020-01-21 PROCEDURE — 99215 OFFICE O/P EST HI 40 MIN: CPT | Mod: S$GLB,,, | Performed by: FAMILY MEDICINE

## 2020-01-21 RX ORDER — TRIAMTERENE/HYDROCHLOROTHIAZID 37.5-25 MG
TABLET ORAL
COMMUNITY
Start: 2020-01-04 | End: 2020-07-22

## 2020-01-21 RX ORDER — ACETAMINOPHEN 500 MG
5000 TABLET ORAL DAILY
Qty: 90 TABLET | Refills: 3 | Status: SHIPPED | OUTPATIENT
Start: 2020-01-21

## 2020-01-21 RX ORDER — LORAZEPAM 2 MG/1
2 TABLET ORAL EVERY 6 HOURS PRN
Qty: 120 TABLET | Refills: 2 | Status: SHIPPED | OUTPATIENT
Start: 2020-01-21 | End: 2020-03-16 | Stop reason: SDUPTHER

## 2020-01-21 RX ORDER — METHOCARBAMOL 750 MG/1
1500 TABLET, FILM COATED ORAL 4 TIMES DAILY
Qty: 80 TABLET | Refills: 0 | Status: SHIPPED | OUTPATIENT
Start: 2020-01-21 | End: 2020-01-31

## 2020-01-21 RX ORDER — TIZANIDINE 4 MG/1
TABLET ORAL
Qty: 810 TABLET | Refills: 3 | Status: SHIPPED | OUTPATIENT
Start: 2020-01-21 | End: 2020-10-06 | Stop reason: SDUPTHER

## 2020-01-21 RX ORDER — HYDROCODONE BITARTRATE AND ACETAMINOPHEN 10; 325 MG/1; MG/1
1 TABLET ORAL EVERY 4 HOURS PRN
Qty: 180 TABLET | Refills: 0 | Status: SHIPPED | OUTPATIENT
Start: 2020-01-21 | End: 2020-03-16 | Stop reason: SDUPTHER

## 2020-01-21 NOTE — PROGRESS NOTES
Chief Complaint   Patient presents with    Chronic Pain       SUBJECTIVE:  Zoey Cali is a 54 y.o. female here for follow up of chronic pain.   Patient has chronic pain involving the knee, lumbar spine.  Conservative treatment with tylenol, NSAIDs, alternative treatments, complementary non opioid nerve/epileptic medication has failed with primary use.  Now opioid dependent for relief.  Has tried PT/OT, injections with mixed success.    Allodynia none noted  Central pain sensitization none noted    Current pain is 2, radiates to the chest with use when it does, improved with current plan by n/a%, quality is aching    PEG-3:  What number best describes your pain on average in past week  5  What number best describes how, during the past week, pain has interfered with your enjoyment of life  5  What number best describes how, during the past week, pain has interfered with you general activity  5  Total 15     Currently has co-morbidities including below problem list.      Social History     Tobacco Use    Smoking status: Current Every Day Smoker     Packs/day: 1.00     Years: 30.00     Pack years: 30.00     Types: Cigarettes    Smokeless tobacco: Current User   Substance Use Topics    Alcohol use: No     Alcohol/week: 0.0 standard drinks    Drug use: No       Patient Active Problem List    Diagnosis Date Noted    Refractive error 08/28/2019    Occipital neuralgia 06/25/2019    History of optic neuritis 06/25/2019    Neuropathic pain 05/02/2017    Prophylactic immunotherapy 11/30/2016    Gait disturbance 11/30/2016    Chronic, continuous use of opioids 04/04/2016     She has mainly back and leg pain with neuropathic pain  Using gabapentin and oxycodone    GOALS:  Keep her walking and functioning with ADL's    PROGNOSIS:  Overall is fair      MS (multiple sclerosis) 09/14/2015    Vitamin D insufficiency 07/08/2015    Muscle spasm 07/08/2015    Counseling regarding goals of care 07/08/2015     Encounter for long-term (current) use of high-risk medication 07/08/2015    Urinary hesitancy 07/08/2015    Neuralgia and neuritis 06/02/2015     Bilateral foot and lower limb pain.      Tobacco abuse 06/02/2015       10/4/19 still trying.  Brief counseling given.    Trying to quit.  4/17 still trying to quit brief counseling  8/17 counseled again still not ready to do the tobacco cessation program but she is thinking about it, brief counseling       Osteoporosis 12/29/2014    Iron deficiency anemia 12/29/2014    Hyperlipidemia LDL goal < 130 12/29/2014     Dx updated per 2019 IMO Load      HTN (hypertension) 07/17/2012    Multiple sclerosis 07/17/2012     AVONEX and gabapentin with chronic pain         ROS    OBJECTIVE:  /68   Pulse 70   Temp 98.3 °F (36.8 °C) (Oral)   Ht 5' (1.524 m)   Wt 66 kg (145 lb 8.1 oz)   LMP 01/01/2001 (Approximate) Comment: 2001  SpO2 95%   BMI 28.42 kg/m²     Wt Readings from Last 3 Encounters:   01/21/20 66 kg (145 lb 8.1 oz)   12/27/19 66.4 kg (146 lb 6.2 oz)   11/27/19 64.5 kg (142 lb 3.2 oz)     BP Readings from Last 3 Encounters:   01/21/20 112/68   01/07/20 (!) 101/53   12/31/19 (!) 108/55       Patient is in usual state of health, alert and oriented without signs of intoxication.  No evidence on exam of illegal substance use or concerning symptoms involving abuse or intoxication (specifically evidence of IVDU, unusual bruising, slurred speech, unusual explanations).  General appearance: alert, appears stated age and cooperative  Head:   Eyes:   Ears:   Nose:   Throat: Neck: no adenopathy, no carotid bruit, no JVD, supple, symmetrical, trachea midline and thyroid not enlarged, symmetric, no tenderness/mass/nodules  Back: restricted ROM, has kyphosis  Lungs: clear to auscultation bilaterally  Chest wall: no tenderness  Heart: regular rate and rhythm, S1, S2 normal, no murmur, click, rub or gallop  Abdomen: soft, non-tender; bowel sounds normal; no masses,  no  organomegaly  Extremities: extremities normal, atraumatic, no cyanosis or edema  Pulses: 2+ and symmetric  Skin: Skin color, texture, turgor normal. No rashes or lesions  Lymph nodes: Cervical, supraclavicular, and axillary nodes normal.  Neurologic: has some diminished sensation in the lower extremity       ABerrant behavior:    None of the below noted  -Selling medications or obtaining them from non-medical sources   -Falsification of prescription--forgery or alteration   -Injecting medications meant for oral use; oral or IV use of transdermal patches -Resistance to changing medications despite deterioration in function or significant negative effects   -Loss of control over alcohol use   -Use of illegal drugs or controlled substances that are not prescribed for the patient -Recurrent episodes of - - - - Prescription loss or theft   -Obtaining opioids from other providers in violation of treatment agreement   -Increases in dosing without providers instruction Running short with medication supply, and requests for early refills   -Asking for, or even demanding, more medication   -Asking for specific medications   -Stockpiling medications during times when pain is less severe   -Use of the pain medications during times when pain is less severe   -Use of the pain medication to treat other symptoms   -Reluctance to decrease opioid dosing once stable   -Increasing medication dosing without instruction to do so from the provider   -Obtaining prescriptions from sources other than the primary pain provider   -Sharing or borrowing similar medications from friends/family       Review of old Records:    Reviewed per epic and   NARx OD score/opioid score 340/501/501    Review of old labs:    No results found for: TSH  Lab Results   Component Value Date    WBC 7.45 12/19/2019    HGB 15.2 12/19/2019    HCT 44.1 12/19/2019    MCV 88 12/19/2019     12/19/2019       Chemistry        Component Value Date/Time      05/01/2018 1511    K 4.4 05/01/2018 1511     05/01/2018 1511    CO2 31 (H) 05/01/2018 1511    BUN 10 05/01/2018 1511    CREATININE 0.7 05/15/2019 0952    GLU 89 05/01/2018 1511        Component Value Date/Time    CALCIUM 10.0 05/01/2018 1511    ALKPHOS 161 (H) 10/28/2019 1215    AST 22 10/28/2019 1215    ALT 32 10/28/2019 1215    BILITOT 0.4 10/28/2019 1215    ESTGFRAFRICA >60 05/15/2019 0952    EGFRNONAA >60 05/15/2019 0952        Lab Results   Component Value Date    CHOL 110 (L) 06/22/2015    CHOL 144 05/18/2015    CHOL 174 12/22/2014     Lab Results   Component Value Date    HDL 22 (L) 06/22/2015    HDL 24 (L) 05/18/2015    HDL 19 (L) 12/22/2014     Lab Results   Component Value Date    LDLCALC 47.0 (L) 06/22/2015    LDLCALC Invalid, Trig>400.0 05/18/2015    LDLCALC Invalid, Trig>400.0 12/22/2014     Lab Results   Component Value Date    TRIG 205 (H) 06/22/2015    TRIG 598 (H) 05/18/2015    TRIG 564 (H) 12/22/2014     Lab Results   Component Value Date    CHOLHDL 20.0 06/22/2015    CHOLHDL 16.7 (L) 05/18/2015    CHOLHDL 10.9 (L) 12/22/2014         Review of old imaging:  Reviewed imaging in epic over last 3 months.      ASSESSMENT/plan    Problem List Items Addressed This Visit     None      Visit Diagnoses     Spasticity        Relevant Medications    LORazepam (ATIVAN) 2 MG Tab    Depression, unspecified depression type        Relevant Medications    LORazepam (ATIVAN) 2 MG Tab            No evidence of abuse/diversion/addiction noted through history and physical, or checking the .  Risks, benefits and alternate treatments are considered and plan of care reflects that shared decision with the patient.    Referred patient to CDC.GOV to review the new opioid guidelines.  Explained there is a section for patient information that is very informative about the potential risks of chronic opioid therapy and some strategies to minimize risk.  Counseled to f/u with me with any questions or concerns as our goal  is always to restore function and  Reducing pain while minimizing side effects and avoiding rare but life threatening risks of overdose/addiction/respiratory suppression.    Continue with current care unless noted below:  Medication List with Changes/Refills   Current Medications    CARBAMAZEPINE (TEGRETOL XR) 200 MG 12 HR TABLET    Take 1 tablet (200 mg total) by mouth 2 (two) times daily.    CETIRIZINE (ZYRTEC) 10 MG TABLET    Take 1 tablet (10 mg total) by mouth once daily.    ESCITALOPRAM OXALATE (LEXAPRO) 20 MG TABLET    Take 1 tablet (20 mg total) by mouth once daily.    GABAPENTIN (NEURONTIN) 400 MG CAPSULE    Take 2 capsules (800 mg total) by mouth every evening.    GABAPENTIN (NEURONTIN) 600 MG TABLET    TAKE ONE TABLET BY MOUTH THREE TIMES DAILY *MAY CAUSE DROWSINESS* *MAY CAUSE DIZZINESS*    HYDROCODONE-ACETAMINOPHEN (NORCO)  MG PER TABLET    Take 1 tablet by mouth 3 (three) times daily as needed (pain).    IBUPROFEN (ADVIL,MOTRIN) 800 MG TABLET    TAKE ONE TABLET BY MOUTH EVERY 8 HOURS WITH FOOD AND WATER    INTERFERON BETA-1A (AVONEX) 30 MCG/0.5 ML PNKT    INJECT 30 MCG INTRAMUSCULARLY ONCE WEEKLY    METOPROLOL SUCCINATE (TOPROL-XL) 100 MG 24 HR TABLET    Take 1 tablet (100 mg total) by mouth once daily.    ONDANSETRON (ZOFRAN-ODT) 4 MG TBDL    TAKE ONE TABLET BY MOUTH EVERY 12 HOURS AS NEEDED    SIMVASTATIN (ZOCOR) 40 MG TABLET    Take 1 tablet (40 mg total) by mouth every evening.    TIZANIDINE (ZANAFLEX) 4 MG TABLET    TAKE 2 TABLETS IN THE MORNING, AT NOON AND IN THE EVENING AND TAKE 3 TABLETS AT NIGHT (9 TABLETS PER DAY)    TRIAMTERENE-HYDROCHLOROTHIAZIDE 37.5-25 MG (MAXZIDE-25) 37.5-25 MG PER TABLET        UMECLIDINIUM-VILANTEROL (ANORO ELLIPTA) 62.5-25 MCG/ACTUATION DSDV    INHALE ONE PUFF INTO LUNGS DAILY FOR CONTROL   Changed and/or Refilled Medications    Modified Medication Previous Medication    CHOLECALCIFEROL, VITAMIN D3, (VITAMIN D3) 125 MCG (5,000 UNIT) TAB cholecalciferol, vitamin  D3, (VITAMIN D3) 5,000 unit Tab       Take 1 tablet (5,000 Units total) by mouth once daily.    Take 5,000 Units by mouth once daily.    LORAZEPAM (ATIVAN) 2 MG TAB LORazepam (ATIVAN) 2 MG Tab       Take 1 tablet (2 mg total) by mouth every 6 (six) hours as needed.    Take 1 tablet (2 mg total) by mouth every 6 (six) hours as needed.            High risk medication review completed per guidelines to insure safest use of medication.    We reviewed our goals of care:    Functional restoration  Keep mood and focus in order to continuing working either at job or home.  Reduce burden of pain.      And discontinuation    REASONS to DISCONTINUE:  > MME daily needed to get benefits.  Intolerable side effects.  abhorrent or distasteful behavior towards myself or staff in regards to medication.  Failure to provide drug screens when prompted.  Any criminal investigations or allegations in regards to narcotics.  Illegal selling or distribution of his medication  Failure to progress to functional restoration    Future Appointments   Date Time Provider Department Center   3/24/2020  9:30 AM LAB, [facility] LAB [facility]   3/26/2020  1:40 PM Charito Mejia MD [facility] HEM ONC [facility]         3 months or sooner as needed

## 2020-01-28 RX ORDER — UMECLIDINIUM BROMIDE AND VILANTEROL TRIFENATATE 62.5; 25 UG/1; UG/1
POWDER RESPIRATORY (INHALATION)
Qty: 60 EACH | Refills: 11 | Status: SHIPPED | OUTPATIENT
Start: 2020-01-28 | End: 2021-01-24

## 2020-02-14 DIAGNOSIS — I10 HTN (HYPERTENSION): ICD-10-CM

## 2020-02-20 DIAGNOSIS — E78.5 HYPERLIPIDEMIA, UNSPECIFIED HYPERLIPIDEMIA TYPE: ICD-10-CM

## 2020-02-20 DIAGNOSIS — I10 ESSENTIAL HYPERTENSION: ICD-10-CM

## 2020-02-20 RX ORDER — SIMVASTATIN 40 MG/1
TABLET, FILM COATED ORAL
Qty: 90 TABLET | Refills: 3 | Status: SHIPPED | OUTPATIENT
Start: 2020-02-20 | End: 2020-10-06 | Stop reason: SDUPTHER

## 2020-02-20 RX ORDER — METOPROLOL SUCCINATE 100 MG/1
TABLET, EXTENDED RELEASE ORAL
Qty: 90 TABLET | Refills: 3 | Status: SHIPPED | OUTPATIENT
Start: 2020-02-20 | End: 2020-10-06 | Stop reason: SDUPTHER

## 2020-03-16 ENCOUNTER — PATIENT MESSAGE (OUTPATIENT)
Dept: FAMILY MEDICINE | Facility: CLINIC | Age: 55
End: 2020-03-16

## 2020-03-16 DIAGNOSIS — M79.2 NEUROPATHIC PAIN OF FOOT, LEFT: ICD-10-CM

## 2020-03-16 DIAGNOSIS — R25.2 SPASTICITY: ICD-10-CM

## 2020-03-16 DIAGNOSIS — F32.A DEPRESSION, UNSPECIFIED DEPRESSION TYPE: ICD-10-CM

## 2020-03-16 DIAGNOSIS — G35 MS (MULTIPLE SCLEROSIS): ICD-10-CM

## 2020-03-16 DIAGNOSIS — M79.2 NEUROPATHIC PAIN OF FOOT, RIGHT: ICD-10-CM

## 2020-03-16 DIAGNOSIS — F11.90 CHRONIC, CONTINUOUS USE OF OPIOIDS: ICD-10-CM

## 2020-03-16 DIAGNOSIS — G35 MULTIPLE SCLEROSIS: ICD-10-CM

## 2020-03-16 DIAGNOSIS — M79.2 NEUROPATHIC PAIN: ICD-10-CM

## 2020-03-16 RX ORDER — GABAPENTIN 400 MG/1
800 CAPSULE ORAL NIGHTLY
Qty: 180 CAPSULE | Refills: 1 | Status: SHIPPED | OUTPATIENT
Start: 2020-03-16 | End: 2020-07-25

## 2020-03-16 RX ORDER — HYDROCODONE BITARTRATE AND ACETAMINOPHEN 10; 325 MG/1; MG/1
1 TABLET ORAL EVERY 4 HOURS PRN
Qty: 180 TABLET | Refills: 0 | Status: SHIPPED | OUTPATIENT
Start: 2020-03-16 | End: 2020-04-15

## 2020-03-16 RX ORDER — LORAZEPAM 2 MG/1
2 TABLET ORAL EVERY 6 HOURS PRN
Qty: 120 TABLET | Refills: 2 | Status: SHIPPED | OUTPATIENT
Start: 2020-03-16 | End: 2020-07-22

## 2020-03-18 ENCOUNTER — TELEPHONE (OUTPATIENT)
Dept: ADMINISTRATIVE | Facility: HOSPITAL | Age: 55
End: 2020-03-18

## 2020-03-30 ENCOUNTER — PATIENT MESSAGE (OUTPATIENT)
Dept: NEUROLOGY | Facility: CLINIC | Age: 55
End: 2020-03-30

## 2020-03-30 DIAGNOSIS — G35 MULTIPLE SCLEROSIS: ICD-10-CM

## 2020-04-06 ENCOUNTER — TELEPHONE (OUTPATIENT)
Dept: NEUROLOGY | Facility: CLINIC | Age: 55
End: 2020-04-06

## 2020-04-06 DIAGNOSIS — G35 MULTIPLE SCLEROSIS: Primary | ICD-10-CM

## 2020-04-08 ENCOUNTER — DOCUMENTATION ONLY (OUTPATIENT)
Dept: NEUROLOGY | Facility: CLINIC | Age: 55
End: 2020-04-08

## 2020-04-08 NOTE — PROGRESS NOTES
Faxed signed Patient Rx request back to [facility] at [phone].

## 2020-04-09 DIAGNOSIS — G35 MULTIPLE SCLEROSIS: ICD-10-CM

## 2020-05-11 ENCOUNTER — PATIENT MESSAGE (OUTPATIENT)
Dept: FAMILY MEDICINE | Facility: CLINIC | Age: 55
End: 2020-05-11

## 2020-05-12 ENCOUNTER — OFFICE VISIT (OUTPATIENT)
Dept: FAMILY MEDICINE | Facility: CLINIC | Age: 55
End: 2020-05-12
Payer: MEDICARE

## 2020-05-12 ENCOUNTER — TELEPHONE (OUTPATIENT)
Dept: FAMILY MEDICINE | Facility: CLINIC | Age: 55
End: 2020-05-12

## 2020-05-12 ENCOUNTER — PATIENT MESSAGE (OUTPATIENT)
Dept: FAMILY MEDICINE | Facility: CLINIC | Age: 55
End: 2020-05-12

## 2020-05-12 DIAGNOSIS — T36.95XA ANTIBIOTIC-INDUCED YEAST INFECTION: ICD-10-CM

## 2020-05-12 DIAGNOSIS — B37.9 ANTIBIOTIC-INDUCED YEAST INFECTION: ICD-10-CM

## 2020-05-12 DIAGNOSIS — N30.01 ACUTE CYSTITIS WITH HEMATURIA: Primary | ICD-10-CM

## 2020-05-12 PROCEDURE — 99214 PR OFFICE/OUTPT VISIT, EST, LEVL IV, 30-39 MIN: ICD-10-PCS | Mod: 95,,, | Performed by: PHYSICIAN ASSISTANT

## 2020-05-12 PROCEDURE — 99214 OFFICE O/P EST MOD 30 MIN: CPT | Mod: 95,,, | Performed by: PHYSICIAN ASSISTANT

## 2020-05-12 RX ORDER — NYSTATIN 100000 U/G
CREAM TOPICAL 2 TIMES DAILY
Qty: 30 G | Refills: 0 | Status: SHIPPED | OUTPATIENT
Start: 2020-05-12 | End: 2020-10-06 | Stop reason: SDUPTHER

## 2020-05-12 RX ORDER — SULFAMETHOXAZOLE AND TRIMETHOPRIM 800; 160 MG/1; MG/1
1 TABLET ORAL 2 TIMES DAILY
Qty: 6 TABLET | Refills: 0 | Status: SHIPPED | OUTPATIENT
Start: 2020-05-12 | End: 2020-05-15

## 2020-05-12 NOTE — PROGRESS NOTES
Subjective:       Patient ID: Zoey Cali is a 54 y.o. female with multiple medical diagnoses as listed in the medical history and problem list that presents for Urinary Tract Infection  .    Chief Complaint: Urinary Tract Infection    The patient location is: at home  The chief complaint leading to consultation is: UTI  Visit type: Virtual visit with synchronous audio and video  Quality of audio: good  Quality of sound: good   Each patient to whom he or she provides medical services by telemedicine is:  (1) informed of the relationship between the physician and patient and the respective role of any other health care provider with respect to management of the patient; and (2) notified that he or she may decline to receive medical services by telemedicine and may withdraw from such care at any time.    Urinary Tract Infection    This is a recurrent (about a year ago ) problem. The current episode started in the past 7 days (saturday ). The quality of the pain is described as burning. There has been no fever. She is not sexually active. There is no history of pyelonephritis. Associated symptoms include chills, frequency, hematuria (saturday resolved ), nausea and urgency. Pertinent negatives include no behavior changes, discharge, flank pain, hesitancy, possible pregnancy, sweats, vomiting, weight loss, bubble bath use, constipation, rash or withholding. Associated symptoms comments: Abdominal cramping   Vaginal itching no discharge  Low back pain        No bathes   No soap changes . She has tried increased fluids for the symptoms. Her past medical history is significant for recurrent UTIs. There is no history of diabetes insipidus, diabetes mellitus, genitourinary reflux, kidney stones, a single kidney, STD or a urological procedure.   Dysuria    This is a recurrent problem. The current episode started in the past 7 days. The problem occurs every urination. The problem has been waxing and waning. The quality  of the pain is described as burning. The pain is at a severity of 4/10. The pain is moderate. There has been no fever. She is not sexually active. There is a history of pyelonephritis. Associated symptoms include chills, frequency, hematuria (saturday resolved ), nausea and urgency. Pertinent negatives include no behavior changes, discharge, flank pain, hesitancy, possible pregnancy, sweats, vomiting, weight loss, bubble bath use, constipation, rash or withholding. She has tried increased fluids for the symptoms. The treatment provided mild relief. Her past medical history is significant for recurrent UTIs. There is no history of diabetes insipidus, diabetes mellitus, genitourinary reflux, kidney stones, a single kidney, STD or a urological procedure.     Review of Systems   Constitutional: Positive for chills and fatigue. Negative for fever and weight loss.   Gastrointestinal: Positive for nausea. Negative for constipation and vomiting.   Genitourinary: Positive for dysuria, frequency, hematuria (saturday resolved ) and urgency. Negative for flank pain and hesitancy.   Skin: Negative for rash.         PAST MEDICAL HISTORY:  Past Medical History:   Diagnosis Date    Allergy     Anemia     Anxiety     Breast cyst     Chronic kidney disease     Depression     Fibrocystic breast     Hypertension     Multiple sclerosis     Multiple sclerosis     Neuromuscular disorder     Osteoporosis     Rib fracture 12/2015       SOCIAL HISTORY:  Social History     Socioeconomic History    Marital status:      Spouse name: Not on file    Number of children: Not on file    Years of education: Not on file    Highest education level: Not on file   Occupational History    Not on file   Social Needs    Financial resource strain: Not on file    Food insecurity:     Worry: Not on file     Inability: Not on file    Transportation needs:     Medical: Not on file     Non-medical: Not on file   Tobacco Use    Smoking  status: Current Every Day Smoker     Packs/day: 1.00     Years: 30.00     Pack years: 30.00     Types: Cigarettes    Smokeless tobacco: Current User   Substance and Sexual Activity    Alcohol use: No     Alcohol/week: 0.0 standard drinks    Drug use: No    Sexual activity: Not Currently     Birth control/protection: See Surgical Hx     Comment: Hysterectomy   Lifestyle    Physical activity:     Days per week: Not on file     Minutes per session: Not on file    Stress: To some extent   Relationships    Social connections:     Talks on phone: Not on file     Gets together: Not on file     Attends Sabianism service: Not on file     Active member of club or organization: Not on file     Attends meetings of clubs or organizations: Not on file     Relationship status: Not on file   Other Topics Concern    Not on file   Social History Narrative    Not on file       ALLERGIES AND MEDICATIONS: updated and reviewed.  Review of patient's allergies indicates:   Allergen Reactions    Lomotil [diphenoxylate-atropine]      Causes stomach spasms    Dilaudid [hydromorphone (bulk)] Itching     Current Outpatient Medications   Medication Sig Dispense Refill    ANORO ELLIPTA 62.5-25 mcg/actuation DsDv inhale ONE PUFF INTO THE LUNGS DAILY FOR CONTROL 60 each 11    carBAMazepine (TEGRETOL XR) 200 MG 12 hr tablet Take 1 tablet (200 mg total) by mouth 2 (two) times daily. 180 tablet 1    cholecalciferol, vitamin D3, (VITAMIN D3) 125 mcg (5,000 unit) Tab Take 1 tablet (5,000 Units total) by mouth once daily. 90 tablet 3    escitalopram oxalate (LEXAPRO) 20 MG tablet Take 1 tablet (20 mg total) by mouth once daily. 90 tablet 3    gabapentin (NEURONTIN) 400 MG capsule Take 2 capsules (800 mg total) by mouth every evening. 180 capsule 1    gabapentin (NEURONTIN) 600 MG tablet TAKE ONE TABLET BY MOUTH THREE TIMES DAILY *MAY CAUSE DROWSINESS* *MAY CAUSE DIZZINESS* 270 tablet 1    ibuprofen (ADVIL,MOTRIN) 800 MG tablet TAKE ONE  TABLET BY MOUTH EVERY 8 HOURS WITH FOOD AND WATER 180 tablet 1    interferon beta-1a (AVONEX) 30 mcg/0.5 mL PnKt INJECT 30 MCG INTRAMUSCULARLY ONCE WEEKLY 6 mL 0    LORazepam (ATIVAN) 2 MG Tab Take 1 tablet (2 mg total) by mouth every 6 (six) hours as needed. 120 tablet 2    metoprolol succinate (TOPROL-XL) 100 MG 24 hr tablet TAKE ONE TABLET BY MOUTH DAILY 90 tablet 3    ondansetron (ZOFRAN-ODT) 4 MG TbDL TAKE ONE TABLET BY MOUTH EVERY 12 HOURS AS NEEDED 20 tablet 5    simvastatin (ZOCOR) 40 MG tablet TAKE ONE TABLET BY MOUTH EVERY EVENING 90 tablet 3    tiZANidine (ZANAFLEX) 4 MG tablet TAKE 2 TABLETS IN THE MORNING, AT NOON AND IN THE EVENING AND TAKE 3 TABLETS AT NIGHT (9 TABLETS PER DAY) 810 tablet 3    triamterene-hydrochlorothiazide 37.5-25 mg (MAXZIDE-25) 37.5-25 mg per tablet       cetirizine (ZYRTEC) 10 MG tablet Take 1 tablet (10 mg total) by mouth once daily. 30 tablet 0    nystatin (MYCOSTATIN) cream Apply topically 2 (two) times daily. 30 g 0    sulfamethoxazole-trimethoprim 800-160mg (BACTRIM DS) 800-160 mg Tab Take 1 tablet by mouth 2 (two) times daily. for 3 days 6 tablet 0     No current facility-administered medications for this visit.          Objective:   [facility] 01/01/2001 (Approximate) Comment: 2001     Physical Exam   Constitutional: She is oriented to person, place, and time. No distress.   HENT:   Head: Normocephalic and atraumatic.   Eyes: Conjunctivae and EOM are normal.   Neurological: She is alert and oriented to person, place, and time.   Psychiatric: She has a normal mood and affect. Her behavior is normal.           Assessment:       1. Acute cystitis with hematuria    2. Antibiotic-induced yeast infection        Plan:       Acute cystitis with hematuria  -     sulfamethoxazole-trimethoprim 800-160mg (BACTRIM DS) 800-160 mg Tab; Take 1 tablet by mouth 2 (two) times daily. for 3 days  Dispense: 6 tablet; Refill: 0    Antibiotic-induced yeast infection  -     nystatin  (MYCOSTATIN) cream; Apply topically 2 (two) times daily.  Dispense: 30 g; Refill: 0    if persist, will need to come in for sample  abx picked based on last few cultures. She does have some resistance  She is needed labs and scheduled at the hospital Monday. Suggested lapc this week before clinic opens back up. She will call to reschedule if she changes her mind.       Consult Start Time: 05/12/2020 07:40  Consult End Time: 05/12/2020 07:49            No follow-ups on file.

## 2020-05-12 NOTE — PROGRESS NOTES
Answers for HPI/ROS submitted by the patient on 5/11/2020   Dysuria  Chronicity: recurrent  Onset: in the past 7 days  Frequency: every urination  Progression since onset: waxing and waning  Pain quality: burning  Pain - numeric: 4/10  Fever: no fever  Sexually active?: No  History of pyelonephritis?: Yes  chills: No  discharge: No  flank pain: No  frequency: Yes  hematuria: Yes  hesitancy: No  nausea: Yes  possible pregnancy: No  sweats: No  urgency: Yes  vomiting: No  constipation: No  rash: No  weight loss: No  withholding: No  behavior changes: No  Treatments tried: increased fluids  Improvement on treatment: mild  Pain severity: moderate  diabetes insipidus: No  diabetes mellitus: No  genitourinary reflux: No  recurrent UTIs: Yes  single kidney: No  STD: No  urological procedure: No  kidney stones: No

## 2020-05-12 NOTE — TELEPHONE ENCOUNTER
----- Message from Yamilka Portillo sent at 5/12/2020  7:26 AM CDT -----  Contact: self   Type: Patient Call Back       What is the request in detail:  Pt calling to speak to a nurse regarding health.      Can the clinic reply by [facility]? No       Would the patient rather a call back or a response via [facility]? Call back       Best call back number : [phone]

## 2020-05-18 ENCOUNTER — LAB VISIT (OUTPATIENT)
Dept: LAB | Facility: HOSPITAL | Age: 55
End: 2020-05-18
Attending: PHYSICIAN ASSISTANT
Payer: MEDICARE

## 2020-05-18 DIAGNOSIS — G35 MULTIPLE SCLEROSIS: ICD-10-CM

## 2020-05-18 LAB
ALBUMIN SERPL BCP-MCNC: 3.7 G/DL (ref 3.5–5.2)
ALP SERPL-CCNC: 166 U/L (ref 55–135)
ALT SERPL W/O P-5'-P-CCNC: 29 U/L (ref 10–44)
AST SERPL-CCNC: 20 U/L (ref 10–40)
BASOPHILS # BLD AUTO: 0.03 K/UL (ref 0–0.2)
BASOPHILS NFR BLD: 0.4 % (ref 0–1.9)
BILIRUB DIRECT SERPL-MCNC: 0.1 MG/DL (ref 0.1–0.3)
BILIRUB SERPL-MCNC: 0.3 MG/DL (ref 0.1–1)
DIFFERENTIAL METHOD: ABNORMAL
EOSINOPHIL # BLD AUTO: 0.1 K/UL (ref 0–0.5)
EOSINOPHIL NFR BLD: 1.9 % (ref 0–8)
ERYTHROCYTE [DISTWIDTH] IN BLOOD BY AUTOMATED COUNT: 13.2 % (ref 11.5–14.5)
HCT VFR BLD AUTO: 44.8 % (ref 37–48.5)
HGB BLD-MCNC: 15.3 G/DL (ref 12–16)
IMM GRANULOCYTES # BLD AUTO: 0.03 K/UL (ref 0–0.04)
IMM GRANULOCYTES NFR BLD AUTO: 0.4 % (ref 0–0.5)
LYMPHOCYTES # BLD AUTO: 1.2 K/UL (ref 1–4.8)
LYMPHOCYTES NFR BLD: 16.4 % (ref 18–48)
MCH RBC QN AUTO: 30.8 PG (ref 27–31)
MCHC RBC AUTO-ENTMCNC: 34.2 G/DL (ref 32–36)
MCV RBC AUTO: 90 FL (ref 82–98)
MONOCYTES # BLD AUTO: 0.5 K/UL (ref 0.3–1)
MONOCYTES NFR BLD: 6.4 % (ref 4–15)
NEUTROPHILS # BLD AUTO: 5.5 K/UL (ref 1.8–7.7)
NEUTROPHILS NFR BLD: 74.5 % (ref 38–73)
NRBC BLD-RTO: 0 /100 WBC
PLATELET # BLD AUTO: 222 K/UL (ref 150–350)
PMV BLD AUTO: 9.1 FL (ref 9.2–12.9)
PROT SERPL-MCNC: 7.3 G/DL (ref 6–8.4)
RBC # BLD AUTO: 4.96 M/UL (ref 4–5.4)
WBC # BLD AUTO: 7.33 K/UL (ref 3.9–12.7)

## 2020-05-18 PROCEDURE — 80076 HEPATIC FUNCTION PANEL: CPT

## 2020-05-18 PROCEDURE — 85025 COMPLETE CBC W/AUTO DIFF WBC: CPT

## 2020-05-18 PROCEDURE — 36415 COLL VENOUS BLD VENIPUNCTURE: CPT

## 2020-06-17 DIAGNOSIS — G35 MULTIPLE SCLEROSIS: ICD-10-CM

## 2020-06-17 RX ORDER — INTERFERON BETA-1A 30MCG/.5ML
KIT INTRAMUSCULAR
Qty: 6 ML | Refills: 0 | Status: SHIPPED | OUTPATIENT
Start: 2020-06-17 | End: 2020-09-04 | Stop reason: ALTCHOICE

## 2020-06-17 NOTE — TELEPHONE ENCOUNTER
----- Message from Darlene Simmons sent at 6/17/2020 12:37 PM CDT -----  Type: RX Refill Request    Who Called:  [facility]    Have you contacted your pharmacy: yes     Refill or New Rx: Refill     RX Name and Strength:interferon beta-1a (AVONEX) 30 mcg/0.5 mL PnKt      [facility]  [address] [phone] (Phone)  [phone] (Fax)        Local or Mail Order: Mail     Ordering Provider: Dr. Collier     Would the patient rather a call back or a response via [facility]? Call

## 2020-06-17 NOTE — TELEPHONE ENCOUNTER
Last Office Visit Info:   The patient's last visit with Devon Collier MD was on 1/21/2020.    The patient's last visit in current department was on 5/12/2020.  Last refill 4/9/2020      Last CBC Results:   Lab Results   Component Value Date    WBC 7.33 05/18/2020    HGB 15.3 05/18/2020    HCT 44.8 05/18/2020     05/18/2020       Last CMP Results  Lab Results   Component Value Date     05/01/2018    K 4.4 05/01/2018     05/01/2018    CO2 31 (H) 05/01/2018    BUN 10 05/01/2018    CREATININE 0.7 05/15/2019    CALCIUM 10.0 05/01/2018    ALBUMIN 3.7 05/18/2020    AST 20 05/18/2020    ALT 29 05/18/2020       Last Lipids  Lab Results   Component Value Date    CHOL 110 (L) 06/22/2015    TRIG 205 (H) 06/22/2015    HDL 22 (L) 06/22/2015    LDLCALC 47.0 (L) 06/22/2015       Last A1C  No results found for: HGBA1C    Last TSH  No results found for: TSH

## 2020-06-22 ENCOUNTER — TELEPHONE (OUTPATIENT)
Dept: OBSTETRICS AND GYNECOLOGY | Facility: CLINIC | Age: 55
End: 2020-06-22

## 2020-06-22 NOTE — TELEPHONE ENCOUNTER
----- Message from Lauryn Trent sent at 6/22/2020  9:22 AM CDT -----  Pt is requesting an order for a mammogram be placed as she found a lump on her breast.    Pt is also asking for a call when the orders get put in- [phone]

## 2020-06-22 NOTE — TELEPHONE ENCOUNTER
Patient was scheduled as requested but patient states she will call her PCP to see if she cold be seen there since it is closer. Patient will let me know

## 2020-06-22 NOTE — TELEPHONE ENCOUNTER
Right breast lump outer side. Reports new finding hard no pain associated with touch. Last screening mmg 10/2019, informed patient will check with Dr. Sheikh for recommendations and will call back.

## 2020-06-23 ENCOUNTER — HOSPITAL ENCOUNTER (OUTPATIENT)
Dept: RADIOLOGY | Facility: HOSPITAL | Age: 55
Discharge: HOME | End: 2020-06-23
Attending: NURSE PRACTITIONER
Payer: MEDICARE

## 2020-06-23 ENCOUNTER — OFFICE VISIT (OUTPATIENT)
Dept: OBSTETRICS AND GYNECOLOGY | Facility: CLINIC | Age: 55
End: 2020-06-23
Payer: MEDICARE

## 2020-06-23 VITALS
SYSTOLIC BLOOD PRESSURE: 116 MMHG | BODY MASS INDEX: 28.96 KG/M2 | DIASTOLIC BLOOD PRESSURE: 72 MMHG | WEIGHT: 143.63 LBS | HEIGHT: 59 IN

## 2020-06-23 DIAGNOSIS — N63.13 BREAST LUMP ON RIGHT SIDE AT 8 O'CLOCK POSITION: Primary | ICD-10-CM

## 2020-06-23 DIAGNOSIS — N63.13 BREAST LUMP ON RIGHT SIDE AT 8 O'CLOCK POSITION: ICD-10-CM

## 2020-06-23 PROCEDURE — 3074F PR MOST RECENT SYSTOLIC BLOOD PRESSURE < 130 MM HG: ICD-10-PCS | Mod: CPTII,S$GLB,, | Performed by: NURSE PRACTITIONER

## 2020-06-23 PROCEDURE — 99999 PR PBB SHADOW E&M-EST. PATIENT-LVL IV: CPT | Mod: PBBFAC,,, | Performed by: NURSE PRACTITIONER

## 2020-06-23 PROCEDURE — 99999 PR PBB SHADOW E&M-EST. PATIENT-LVL IV: ICD-10-PCS | Mod: PBBFAC,,, | Performed by: NURSE PRACTITIONER

## 2020-06-23 PROCEDURE — 99213 PR OFFICE/OUTPT VISIT, EST, LEVL III, 20-29 MIN: ICD-10-PCS | Mod: S$GLB,,, | Performed by: NURSE PRACTITIONER

## 2020-06-23 PROCEDURE — 77061 MAMMO DIGITAL DIAGNOSTIC RIGHT WITH TOMOSYNTHESIS_CAD: ICD-10-PCS | Mod: 26,,, | Performed by: RADIOLOGY

## 2020-06-23 PROCEDURE — 99213 OFFICE O/P EST LOW 20 MIN: CPT | Mod: S$GLB,,, | Performed by: NURSE PRACTITIONER

## 2020-06-23 PROCEDURE — 77065 MAMMO DIGITAL DIAGNOSTIC RIGHT WITH TOMOSYNTHESIS_CAD: ICD-10-PCS | Mod: 26,,, | Performed by: RADIOLOGY

## 2020-06-23 PROCEDURE — 77061 BREAST TOMOSYNTHESIS UNI: CPT | Mod: 26,,, | Performed by: RADIOLOGY

## 2020-06-23 PROCEDURE — 3074F SYST BP LT 130 MM HG: CPT | Mod: CPTII,S$GLB,, | Performed by: NURSE PRACTITIONER

## 2020-06-23 PROCEDURE — 3078F DIAST BP <80 MM HG: CPT | Mod: CPTII,S$GLB,, | Performed by: NURSE PRACTITIONER

## 2020-06-23 PROCEDURE — 3078F PR MOST RECENT DIASTOLIC BLOOD PRESSURE < 80 MM HG: ICD-10-PCS | Mod: CPTII,S$GLB,, | Performed by: NURSE PRACTITIONER

## 2020-06-23 PROCEDURE — 77065 DX MAMMO INCL CAD UNI: CPT | Mod: 26,,, | Performed by: RADIOLOGY

## 2020-06-23 PROCEDURE — 77065 DX MAMMO INCL CAD UNI: CPT | Mod: TC,PO

## 2020-06-23 PROCEDURE — 77061 BREAST TOMOSYNTHESIS UNI: CPT | Mod: TC,PO

## 2020-06-23 PROCEDURE — 3008F PR BODY MASS INDEX (BMI) DOCUMENTED: ICD-10-PCS | Mod: CPTII,S$GLB,, | Performed by: NURSE PRACTITIONER

## 2020-06-23 PROCEDURE — 3008F BODY MASS INDEX DOCD: CPT | Mod: CPTII,S$GLB,, | Performed by: NURSE PRACTITIONER

## 2020-06-23 NOTE — PROGRESS NOTES
Chief Complaint   Patient presents with    Breast Mass     Right       History of Present Illness: Zoey Cali is a 54 y.o. female that presents today 2020   Pt [facility] c/o lump in right breast that she discovered 3 nights ago. She denies any pain or tenderness around the lump. She denies any nipple discharge, skin changes to breast or dimpling of breast tissue. She reports that a maternal great aunt had breast cancer. No other complaints or concerns noted.       Past Medical History:   Diagnosis Date    Allergy     Anemia     Anxiety     Breast cyst     Chronic kidney disease     Depression     Fibrocystic breast     Hypertension     Multiple sclerosis     Multiple sclerosis     Neuromuscular disorder     Osteoporosis     Rib fracture 2015       Past Surgical History:   Procedure Laterality Date    APPENDECTOMY      BREAST BIOPSY Left 2012    BREAST CYST ASPIRATION      BREAST SURGERY  2004    excisional biopsy      SECTION, CLASSIC      CHALAZION EXCISION  at age 9    CHOLECYSTECTOMY      EYE SURGERY      HYSTERECTOMY      OOPHORECTOMY         Current Outpatient Medications   Medication Sig Dispense Refill    ANORO ELLIPTA 62.5-25 mcg/actuation DsDv inhale ONE PUFF INTO THE LUNGS DAILY FOR CONTROL 60 each 11    carBAMazepine (TEGRETOL XR) 200 MG 12 hr tablet Take 1 tablet (200 mg total) by mouth 2 (two) times daily. 180 tablet 1    cetirizine (ZYRTEC) 10 MG tablet Take 1 tablet (10 mg total) by mouth once daily. 30 tablet 0    cholecalciferol, vitamin D3, (VITAMIN D3) 125 mcg (5,000 unit) Tab Take 1 tablet (5,000 Units total) by mouth once daily. 90 tablet 3    escitalopram oxalate (LEXAPRO) 20 MG tablet Take 1 tablet (20 mg total) by mouth once daily. 90 tablet 3    gabapentin (NEURONTIN) 400 MG capsule Take 2 capsules (800 mg total) by mouth every evening. 180 capsule 1    gabapentin (NEURONTIN) 600 MG tablet TAKE  ONE TABLET BY MOUTH THREE TIMES DAILY *MAY CAUSE DROWSINESS* *MAY CAUSE DIZZINESS* 270 tablet 1    ibuprofen (ADVIL,MOTRIN) 800 MG tablet TAKE ONE TABLET BY MOUTH EVERY 8 HOURS WITH FOOD AND WATER 180 tablet 1    interferon beta-1a (AVONEX) 30 mcg/0.5 mL PnKt INJECT 30 MCG INTRAMUSCULARLY ONCE WEEKLY 6 mL 0    LORazepam (ATIVAN) 2 MG Tab Take 1 tablet (2 mg total) by mouth every 6 (six) hours as needed. 120 tablet 2    metoprolol succinate (TOPROL-XL) 100 MG 24 hr tablet TAKE ONE TABLET BY MOUTH DAILY 90 tablet 3    nystatin (MYCOSTATIN) cream Apply topically 2 (two) times daily. 30 g 0    ondansetron (ZOFRAN-ODT) 4 MG TbDL TAKE ONE TABLET BY MOUTH EVERY 12 HOURS AS NEEDED 20 tablet 5    simvastatin (ZOCOR) 40 MG tablet TAKE ONE TABLET BY MOUTH EVERY EVENING 90 tablet 3    tiZANidine (ZANAFLEX) 4 MG tablet TAKE 2 TABLETS IN THE MORNING, AT NOON AND IN THE EVENING AND TAKE 3 TABLETS AT NIGHT (9 TABLETS PER DAY) 810 tablet 3    triamterene-hydrochlorothiazide 37.5-25 mg (MAXZIDE-25) 37.5-25 mg per tablet        No current facility-administered medications for this visit.        Review of patient's allergies indicates:   Allergen Reactions    Lomotil [diphenoxylate-atropine]      Causes stomach spasms    Dilaudid [hydromorphone (bulk)] Itching       Family History   Problem Relation Age of Onset    Arthritis Mother     Diabetes Father     Heart disease Father     Hyperlipidemia Father     Hypertension Father     Stroke Father     Arthritis Son     Drug abuse Son     Hypertension Maternal Grandmother     Stroke Maternal Grandmother     Arthritis Maternal Grandmother     Arthritis Paternal Grandmother     Heart disease Paternal Grandfather     Heart attack Paternal Grandfather     Colon cancer Neg Hx     Ovarian cancer Neg Hx     Amblyopia Neg Hx     Blindness Neg Hx     Glaucoma Neg Hx     Macular degeneration Neg Hx     Retinal detachment Neg Hx     Strabismus Neg Hx     Thyroid  "disease Neg Hx        Social History     Tobacco Use    Smoking status: Current Every Day Smoker     Packs/day: 1.00     Years: 30.00     Pack years: 30.00     Types: Cigarettes    Smokeless tobacco: Current User   Substance Use Topics    Alcohol use: No     Alcohol/week: 0.0 standard drinks    Drug use: No       OB History    Para Term  AB Living   2 2 2     2   SAB TAB Ectopic Multiple Live Births           2      # Outcome Date GA Lbr Leeroy/2nd Weight Sex Delivery Anes PTL Lv   2 Term            1 Term                Review of Symptoms:  GENERAL: Denies weight gain or weight loss. Feeling well overall.   SKIN: Denies rash or lesions.   HEAD: Denies head injury or headache.   NODES: Denies enlarged lymph nodes.   CHEST: Denies chest pain or shortness of breath.   CARDIOVASCULAR: Denies palpitations or left sided chest pain.   ABDOMEN: No abdominal pain, constipation, diarrhea, nausea, vomiting or rectal bleeding.   URINARY: No frequency, dysuria, hematuria, or burning on urination.    /72   Ht 4' 11" (1.499 m)   Wt 65.2 kg (143 lb 10.1 oz)   LMP 2001 (Approximate)   Physical Exam:  APPEARANCE: Well nourished, well developed, in no acute distress.  SKIN: No acne or hirsutism.  CHEST:  Normal respiratory effort.  BREASTS:  Symmetrical, no skin changes or visible lesions. Palpable mass noted in R breast at ~ 8 o'clock position. No nipple discharge bilaterally.    ASSESSMENT/PLAN:  Breast lump on right side at 8 o'clock position  -     Mammo Digital Diagnostic Right with [facility]; Future; Expected date: 2020  -     US Breast Right Complete; Future; Expected date: 2020        Follow-up:  Will f/u with results  RTC TBA after mammo/US  RTC as needed    "

## 2020-07-10 ENCOUNTER — OFFICE VISIT (OUTPATIENT)
Dept: FAMILY MEDICINE | Facility: CLINIC | Age: 55
End: 2020-07-10
Payer: MEDICARE

## 2020-07-10 ENCOUNTER — LAB VISIT (OUTPATIENT)
Dept: LAB | Facility: HOSPITAL | Age: 55
End: 2020-07-10
Attending: INTERNAL MEDICINE
Payer: MEDICARE

## 2020-07-10 VITALS
BODY MASS INDEX: 28.45 KG/M2 | DIASTOLIC BLOOD PRESSURE: 60 MMHG | SYSTOLIC BLOOD PRESSURE: 120 MMHG | HEIGHT: 59 IN | WEIGHT: 141.13 LBS | HEART RATE: 75 BPM | TEMPERATURE: 98 F | OXYGEN SATURATION: 95 %

## 2020-07-10 DIAGNOSIS — M79.2 NEURALGIA AND NEURITIS: Primary | ICD-10-CM

## 2020-07-10 DIAGNOSIS — D50.9 IRON DEFICIENCY ANEMIA, UNSPECIFIED IRON DEFICIENCY ANEMIA TYPE: ICD-10-CM

## 2020-07-10 DIAGNOSIS — F43.9 STRESS: ICD-10-CM

## 2020-07-10 LAB
BASOPHILS # BLD AUTO: 0.04 K/UL (ref 0–0.2)
BASOPHILS NFR BLD: 0.5 % (ref 0–1.9)
DIFFERENTIAL METHOD: ABNORMAL
EOSINOPHIL # BLD AUTO: 0.1 K/UL (ref 0–0.5)
EOSINOPHIL NFR BLD: 1.7 % (ref 0–8)
ERYTHROCYTE [DISTWIDTH] IN BLOOD BY AUTOMATED COUNT: 13.5 % (ref 11.5–14.5)
FERRITIN SERPL-MCNC: 164 NG/ML (ref 20–300)
HCT VFR BLD AUTO: 45.6 % (ref 37–48.5)
HGB BLD-MCNC: 15.4 G/DL (ref 12–16)
IMM GRANULOCYTES # BLD AUTO: 0.02 K/UL (ref 0–0.04)
IMM GRANULOCYTES NFR BLD AUTO: 0.3 % (ref 0–0.5)
IRON SERPL-MCNC: 49 UG/DL (ref 30–160)
LYMPHOCYTES # BLD AUTO: 1.5 K/UL (ref 1–4.8)
LYMPHOCYTES NFR BLD: 19.3 % (ref 18–48)
MCH RBC QN AUTO: 31.5 PG (ref 27–31)
MCHC RBC AUTO-ENTMCNC: 33.8 G/DL (ref 32–36)
MCV RBC AUTO: 93 FL (ref 82–98)
MONOCYTES # BLD AUTO: 0.5 K/UL (ref 0.3–1)
MONOCYTES NFR BLD: 6.6 % (ref 4–15)
NEUTROPHILS # BLD AUTO: 5.5 K/UL (ref 1.8–7.7)
NEUTROPHILS NFR BLD: 71.6 % (ref 38–73)
NRBC BLD-RTO: 0 /100 WBC
PLATELET # BLD AUTO: 223 K/UL (ref 150–350)
PMV BLD AUTO: 9.5 FL (ref 9.2–12.9)
RBC # BLD AUTO: 4.89 M/UL (ref 4–5.4)
SATURATED IRON: 14 % (ref 20–50)
TOTAL IRON BINDING CAPACITY: 352 UG/DL (ref 250–450)
TRANSFERRIN SERPL-MCNC: 238 MG/DL (ref 200–375)
WBC # BLD AUTO: 7.62 K/UL (ref 3.9–12.7)

## 2020-07-10 PROCEDURE — 85025 COMPLETE CBC W/AUTO DIFF WBC: CPT

## 2020-07-10 PROCEDURE — 99999 PR PBB SHADOW E&M-EST. PATIENT-LVL III: CPT | Mod: PBBFAC,,, | Performed by: FAMILY MEDICINE

## 2020-07-10 PROCEDURE — 83540 ASSAY OF IRON: CPT

## 2020-07-10 PROCEDURE — 3074F SYST BP LT 130 MM HG: CPT | Mod: CPTII,S$GLB,, | Performed by: FAMILY MEDICINE

## 2020-07-10 PROCEDURE — 3074F PR MOST RECENT SYSTOLIC BLOOD PRESSURE < 130 MM HG: ICD-10-PCS | Mod: CPTII,S$GLB,, | Performed by: FAMILY MEDICINE

## 2020-07-10 PROCEDURE — 82728 ASSAY OF FERRITIN: CPT

## 2020-07-10 PROCEDURE — 99214 OFFICE O/P EST MOD 30 MIN: CPT | Mod: S$GLB,,, | Performed by: FAMILY MEDICINE

## 2020-07-10 PROCEDURE — 3078F PR MOST RECENT DIASTOLIC BLOOD PRESSURE < 80 MM HG: ICD-10-PCS | Mod: CPTII,S$GLB,, | Performed by: FAMILY MEDICINE

## 2020-07-10 PROCEDURE — 3008F PR BODY MASS INDEX (BMI) DOCUMENTED: ICD-10-PCS | Mod: CPTII,S$GLB,, | Performed by: FAMILY MEDICINE

## 2020-07-10 PROCEDURE — 99999 PR PBB SHADOW E&M-EST. PATIENT-LVL III: ICD-10-PCS | Mod: PBBFAC,,, | Performed by: FAMILY MEDICINE

## 2020-07-10 PROCEDURE — 3008F BODY MASS INDEX DOCD: CPT | Mod: CPTII,S$GLB,, | Performed by: FAMILY MEDICINE

## 2020-07-10 PROCEDURE — 3078F DIAST BP <80 MM HG: CPT | Mod: CPTII,S$GLB,, | Performed by: FAMILY MEDICINE

## 2020-07-10 PROCEDURE — 99214 PR OFFICE/OUTPT VISIT, EST, LEVL IV, 30-39 MIN: ICD-10-PCS | Mod: S$GLB,,, | Performed by: FAMILY MEDICINE

## 2020-07-10 PROCEDURE — 36415 COLL VENOUS BLD VENIPUNCTURE: CPT | Mod: PO

## 2020-07-10 RX ORDER — HYDROXYZINE HYDROCHLORIDE 25 MG/1
25 TABLET, FILM COATED ORAL 3 TIMES DAILY
Qty: 90 TABLET | Refills: 0 | Status: SHIPPED | OUTPATIENT
Start: 2020-07-10 | End: 2020-10-06 | Stop reason: SDUPTHER

## 2020-07-10 RX ORDER — HYDROCODONE BITARTRATE AND ACETAMINOPHEN 10; 325 MG/1; MG/1
1 TABLET ORAL
Qty: 60 TABLET | Refills: 0 | Status: SHIPPED | OUTPATIENT
Start: 2020-08-08 | End: 2020-09-07

## 2020-07-10 RX ORDER — HYDROCODONE BITARTRATE AND ACETAMINOPHEN 10; 325 MG/1; MG/1
1 TABLET ORAL
Qty: 60 TABLET | Refills: 0 | Status: SHIPPED | OUTPATIENT
Start: 2020-07-10 | End: 2020-08-09

## 2020-07-10 NOTE — PROGRESS NOTES
Chief Complaint   Patient presents with    Chronic Pain       SUBJECTIVE:  Zoey Cali is a 54 y.o. female here for follow up of chronic pain.   Patient has chronic pain involving the with multiple areas of pain and nueropathy..  Conservative treatment with tylenol, NSAIDs, alternative treatments, complementary non opioid nerve/epileptic medication has failed with primary use.  Now opioid dependent for relief.  Has tried PT/OT, injections with mixed success.    Allodynia not present by history  Central pain sensitization not present    Current pain is 3, radiates foot and hands, improved with current plan by 40%, quality is aching    PEG-3:  What number best describes your pain on average in past week    What number best describes how, during the past week, pain has interfered with your enjoyment of life    What number best describes how, during the past week, pain has interfered with you general activity    Total      Currently has co-morbidities including below problem list.      Social History     Tobacco Use    Smoking status: Current Every Day Smoker     Packs/day: 1.00     Years: 30.00     Pack years: 30.00     Types: Cigarettes    Smokeless tobacco: Current User   Substance Use Topics    Alcohol use: No     Alcohol/week: 0.0 standard drinks    Drug use: No       Patient Active Problem List    Diagnosis Date Noted    Refractive error 08/28/2019    Occipital neuralgia 06/25/2019    History of optic neuritis 06/25/2019    Neuropathic pain 05/02/2017    Prophylactic immunotherapy 11/30/2016    Gait disturbance 11/30/2016    Chronic, continuous use of opioids 04/04/2016     She has mainly back and leg pain with neuropathic pain  Using gabapentin and oxycodone    GOALS:  Keep her walking and functioning with ADL's    PROGNOSIS:  Overall is fair      MS (multiple sclerosis) 09/14/2015    Vitamin D insufficiency 07/08/2015    Muscle spasm 07/08/2015    Counseling regarding goals of care  "07/08/2015    Encounter for long-term (current) use of high-risk medication 07/08/2015    Urinary hesitancy 07/08/2015    Neuralgia and neuritis 06/02/2015     Bilateral foot and lower limb pain.      Tobacco abuse 06/02/2015       10/4/19 still trying.  Brief counseling given.    Trying to quit.  4/17 still trying to quit brief counseling  8/17 counseled again still not ready to do the tobacco cessation program but she is thinking about it, brief counseling       Osteoporosis 12/29/2014    Iron deficiency anemia 12/29/2014    Hyperlipidemia LDL goal < 130 12/29/2014     Dx updated per 2019 IMO Load      HTN (hypertension) 07/17/2012    Multiple sclerosis 07/17/2012     AVONEX and gabapentin with chronic pain         Review of Systems   Constitutional: Negative.    HENT: Negative.    Eyes: Negative.    Respiratory: Negative.    Cardiovascular: Negative.    Gastrointestinal: Negative.    Genitourinary: Negative.    Musculoskeletal: Positive for myalgias.   Skin: Negative.    Neurological: Positive for tingling.   Endo/Heme/Allergies: Negative.    Psychiatric/Behavioral: Negative.        OBJECTIVE:  /60   Pulse 75   Temp 97.5 °F (36.4 °C) (Oral)   Ht 4' 11" (1.499 m)   Wt 64 kg (141 lb 1.5 oz)   LMP 01/01/2001 (Approximate) Comment: 2001  SpO2 95%   BMI 28.50 kg/m²     Wt Readings from Last 3 Encounters:   07/10/20 64 kg (141 lb 1.5 oz)   06/23/20 65.2 kg (143 lb 10.1 oz)   01/21/20 66 kg (145 lb 8.1 oz)     BP Readings from Last 3 Encounters:   07/10/20 120/60   06/23/20 116/72   01/21/20 112/68       Patient is in usual state of health, alert and oriented without signs of intoxication.  No evidence on exam of illegal substance use or concerning symptoms involving abuse or intoxication (specifically evidence of IVDU, unusual bruising, slurred speech, unusual explanations).  General appearance: alert, appears stated age and cooperative  Head:   Eyes:   Ears:   Nose: Throat:   Neck:   Back: "   Lungs:   Chest wall:   Heart:   Abdomen:   Extremities: Pulses:   Skin:   Lymph nodes:   Neurologic:          ABerrant behavior:    None of the below noted  -Selling medications or obtaining them from non-medical sources   -Falsification of prescription--forgery or alteration   -Injecting medications meant for oral use; oral or IV use of transdermal patches -Resistance to changing medications despite deterioration in function or significant negative effects   -Loss of control over alcohol use   -Use of illegal drugs or controlled substances that are not prescribed for the patient -Recurrent episodes of - - - - Prescription loss or theft   -Obtaining opioids from other providers in violation of treatment agreement   -Increases in dosing without providers instruction Running short with medication supply, and requests for early refills   -Asking for, or even demanding, more medication   -Asking for specific medications   -Stockpiling medications during times when pain is less severe   -Use of the pain medications during times when pain is less severe   -Use of the pain medication to treat other symptoms   -Reluctance to decrease opioid dosing once stable   -Increasing medication dosing without instruction to do so from the provider   -Obtaining prescriptions from sources other than the primary pain provider   -Sharing or borrowing similar medications from friends/family       Review of old Records:    Reviewed per epic and   NARx OD score/opioid score 270    Review of old labs:    No results found for: TSH  Lab Results   Component Value Date    WBC 7.62 07/10/2020    HGB 15.4 07/10/2020    HCT 45.6 07/10/2020    MCV 93 07/10/2020     07/10/2020       Chemistry        Component Value Date/Time     05/01/2018 1511    K 4.4 05/01/2018 1511     05/01/2018 1511    CO2 31 (H) 05/01/2018 1511    BUN 10 05/01/2018 1511    CREATININE 0.7 05/15/2019 0952    GLU 89 05/01/2018 1511        Component Value  Date/Time    CALCIUM 10.0 05/01/2018 1511    ALKPHOS 166 (H) 05/18/2020 0732    AST 20 05/18/2020 0732    ALT 29 05/18/2020 0732    BILITOT 0.3 05/18/2020 0732    ESTGFRAFRICA >60 05/15/2019 0952    EGFRNONAA >60 05/15/2019 0952        Lab Results   Component Value Date    CHOL 110 (L) 06/22/2015    CHOL 144 05/18/2015    CHOL 174 12/22/2014     Lab Results   Component Value Date    HDL 22 (L) 06/22/2015    HDL 24 (L) 05/18/2015    HDL 19 (L) 12/22/2014     Lab Results   Component Value Date    LDLCALC 47.0 (L) 06/22/2015    LDLCALC Invalid, Trig>400.0 05/18/2015    LDLCALC Invalid, Trig>400.0 12/22/2014     Lab Results   Component Value Date    TRIG 205 (H) 06/22/2015    TRIG 598 (H) 05/18/2015    TRIG 564 (H) 12/22/2014     Lab Results   Component Value Date    CHOLHDL 20.0 06/22/2015    CHOLHDL 16.7 (L) 05/18/2015    CHOLHDL 10.9 (L) 12/22/2014         Review of old imaging:  Reviewed imaging in epic over last 3 months.      ASSESSMENT/plan    Problem List Items Addressed This Visit     Neuralgia and neuritis - Primary    Overview     Bilateral foot and lower limb pain.         Relevant Medications    HYDROcodone-acetaminophen (NORCO)  mg per tablet    HYDROcodone-acetaminophen (NORCO)  mg per tablet (Start on 8/8/2020)      Other Visit Diagnoses     Stress        Relevant Medications    hydrOXYzine HCL (ATARAX) 25 MG tablet            No evidence of abuse/diversion/addiction noted through history and physical, or checking the .  Risks, benefits and alternate treatments are considered and plan of care reflects that shared decision with the patient.    Referred patient to CDC.GOV to review the new opioid guidelines.  Explained there is a section for patient information that is very informative about the potential risks of chronic opioid therapy and some strategies to minimize risk.  Counseled to f/u with me with any questions or concerns as our goal is always to restore function and  Reducing pain  while minimizing side effects and avoiding rare but life threatening risks of overdose/addiction/respiratory suppression.    Continue with current care unless noted below:  Medication List with Changes/Refills   New Medications    HYDROCODONE-ACETAMINOPHEN (NORCO)  MG PER TABLET    Take 1 tablet by mouth every 4 to 6 hours as needed for Pain (to last 30 days).    HYDROCODONE-ACETAMINOPHEN (NORCO)  MG PER TABLET    Take 1 tablet by mouth every 4 to 6 hours as needed for Pain (to last 30 days).    HYDROXYZINE HCL (ATARAX) 25 MG TABLET    Take 1 tablet (25 mg total) by mouth 3 (three) times daily.   Current Medications    ANORO ELLIPTA 62.5-25 MCG/ACTUATION DSDV    inhale ONE PUFF INTO THE LUNGS DAILY FOR CONTROL    CARBAMAZEPINE (TEGRETOL XR) 200 MG 12 HR TABLET    Take 1 tablet (200 mg total) by mouth 2 (two) times daily.    CETIRIZINE (ZYRTEC) 10 MG TABLET    Take 1 tablet (10 mg total) by mouth once daily.    CHOLECALCIFEROL, VITAMIN D3, (VITAMIN D3) 125 MCG (5,000 UNIT) TAB    Take 1 tablet (5,000 Units total) by mouth once daily.    ESCITALOPRAM OXALATE (LEXAPRO) 20 MG TABLET    Take 1 tablet (20 mg total) by mouth once daily.    GABAPENTIN (NEURONTIN) 400 MG CAPSULE    Take 2 capsules (800 mg total) by mouth every evening.    GABAPENTIN (NEURONTIN) 600 MG TABLET    TAKE ONE TABLET BY MOUTH THREE TIMES DAILY *MAY CAUSE DROWSINESS* *MAY CAUSE DIZZINESS*    IBUPROFEN (ADVIL,MOTRIN) 800 MG TABLET    TAKE ONE TABLET BY MOUTH EVERY 8 HOURS WITH FOOD AND WATER    INTERFERON BETA-1A (AVONEX) 30 MCG/0.5 ML PNKT    INJECT 30 MCG INTRAMUSCULARLY ONCE WEEKLY    LORAZEPAM (ATIVAN) 2 MG TAB    Take 1 tablet (2 mg total) by mouth every 6 (six) hours as needed.    METOPROLOL SUCCINATE (TOPROL-XL) 100 MG 24 HR TABLET    TAKE ONE TABLET BY MOUTH DAILY    NYSTATIN (MYCOSTATIN) CREAM    Apply topically 2 (two) times daily.    ONDANSETRON (ZOFRAN-ODT) 4 MG TBDL    TAKE ONE TABLET BY MOUTH EVERY 12 HOURS AS NEEDED     SIMVASTATIN (ZOCOR) 40 MG TABLET    TAKE ONE TABLET BY MOUTH EVERY EVENING    TIZANIDINE (ZANAFLEX) 4 MG TABLET    TAKE 2 TABLETS IN THE MORNING, AT NOON AND IN THE EVENING AND TAKE 3 TABLETS AT NIGHT (9 TABLETS PER DAY)    TRIAMTERENE-HYDROCHLOROTHIAZIDE 37.5-25 MG (MAXZIDE-25) 37.5-25 MG PER TABLET            The current medical regimen is effective;  continue present plan and medications.      High risk medication review completed per guidelines to insure safest use of medication.    We reviewed our goals of care:    Functional restoration  Keep mood and focus in order to continuing working either at job or home.  Reduce burden of pain.      And discontinuation    REASONS to DISCONTINUE:  >45 MME daily needed to get benefits.  Intolerable side effects.  abhorrent or distasteful behavior towards myself or staff in regards to medication.  Failure to provide drug screens when prompted.  Any criminal investigations or allegations in regards to narcotics.  Illegal selling or distribution of his medication  Failure to progress to functional restoration    Future Appointments   Date Time Provider Department Center   10/6/2020  1:45 PM Devon Collier MD [facility]         3 months or sooner as needed

## 2020-07-13 ENCOUNTER — OFFICE VISIT (OUTPATIENT)
Dept: HEMATOLOGY/ONCOLOGY | Facility: CLINIC | Age: 55
End: 2020-07-13
Payer: MEDICARE

## 2020-07-13 DIAGNOSIS — D50.9 IRON DEFICIENCY ANEMIA, UNSPECIFIED IRON DEFICIENCY ANEMIA TYPE: Primary | ICD-10-CM

## 2020-07-13 DIAGNOSIS — G35 MS (MULTIPLE SCLEROSIS): ICD-10-CM

## 2020-07-13 PROCEDURE — 99213 OFFICE O/P EST LOW 20 MIN: CPT | Mod: 95,,, | Performed by: INTERNAL MEDICINE

## 2020-07-13 PROCEDURE — 99213 PR OFFICE/OUTPT VISIT, EST, LEVL III, 20-29 MIN: ICD-10-PCS | Mod: 95,,, | Performed by: INTERNAL MEDICINE

## 2020-07-13 NOTE — PROGRESS NOTES
Subjective:      The patient location is: Home  The chief complaint leading to consultation is: Follow-up TRACIE    Visit type: audiovisual    Face to Face time with patient: 15 min  20 minutes of total time spent on the encounter, which includes face to face time and non-face to face time preparing to see the patient (eg, review of tests), Obtaining and/or reviewing separately obtained history, Documenting clinical information in the electronic or other health record, Independently interpreting results (not separately reported) and communicating results to the patient/family/caregiver, or Care coordination (not separately reported).         Each patient to whom he or she provides medical services by telemedicine is:  (1) informed of the relationship between the physician and patient and the respective role of any other health care provider with respect to management of the patient; and (2) notified that he or she may decline to receive medical services by telemedicine and may withdraw from such care at any time.        This service was not originating from a related E/M service provided within the previous 7 days nor will  to an E/M service or procedure within the next 24 hours or my soonest available appointment.  Prevailing standard of care was able to be met in this audio-only visit.         Patient ID: Zoey Cali is a 54 y.o. female.    Chief Complaint: No chief complaint on file.  Diagnosis: TRACIE       52 y/o female with history of MS seen  today for f/u for TRACIE  She undergoes intermittent IV iron therapy.  She has been intolerant of oral Fe supp  s/p GI eval with EGD and colonoscopy which was unremarkable    She is followed by Dr. Mendez for long standing history of MS and remains on therapy with Avonex   No recent flare-ups    She undergoes intermittent IV Fe therapy  Last IV Fe 11/2018      Doing well  Minor fatigue  Appetite and weight stable   No SOB/CP    Pt first noticed breast mass 1=2 mo  ago  Non painful  No nipple discharge  Not increasing   No assoc trauma        Mammo 6/23/20  BI-RADS Category:   Overall: 2 - Benign        Review of Systems   Constitutional: Positive for fatigue (minor). Negative for appetite change and unexpected weight change.   Eyes: Negative for visual disturbance.   Respiratory: Negative for cough and shortness of breath.    Cardiovascular: Negative for chest pain and leg swelling.   Gastrointestinal: Negative for abdominal pain, blood in stool, constipation, diarrhea, nausea and vomiting.   Genitourinary: Negative for frequency.   Musculoskeletal: Positive for neck pain (chronic improved). Negative for arthralgias, back pain and joint swelling.   Skin: Negative for rash.        No petechiae, ecchymoses   Neurological: Negative for light-headedness and headaches.   Hematological: Negative for adenopathy. Does not bruise/bleed easily.   Psychiatric/Behavioral: Positive for sleep disturbance.       Objective:       Televisit        Lab Results   Component Value Date    WBC 7.62 07/10/2020    HGB 15.4 07/10/2020    HCT 45.6 07/10/2020    MCV 93 07/10/2020     07/10/2020     Lab Results   Component Value Date    IRON 49 07/10/2020    TIBC 352 07/10/2020    FERRITIN 164 07/10/2020     ·       1. Iron deficiency anemia, unspecified iron deficiency anemia type    2. MS (multiple sclerosis)        Plan:   Zoey FRASER was seen today for follow-up.    Diagnoses and all orders for this visit:    Iron deficiency anemia, unspecified iron deficiency       S/p GI eval-unremarkable       CBC reveals stable Hb 15.4 g/dl        Ferritin 164   Fe sats decreased 14       Target Ferritin >100  Cont to monitor      Multiple Sclerosis    Follow-up with Neurology    Pt on Avonex       Advance Care Planning     Power of   I previously initiated the process of advance care planning today and explained the importance of this process to the patient.  I introduced the concept of advance  directives to the patient, as well. Then the patient received detailed information about the importance of designating a Health Care Power of  (HCPOA). She was also instructed to communicate with this person about their wishes for future healthcare, should she become sick and lose decision-making capacity. The patient has not previously appointed a HCPOA. After our discussion, the patient has decided to complete a HCPOA and has appointed her significant other and NAME:Jared Cali   I spent a total time of 16 minutes discussing this issue with the patient.         F/u 3  mos with cbc, Fe studies prior to f/u ( or sooner if problems should arise in the interim)      Cc: MD Heidy Lancaster MD

## 2020-07-13 NOTE — Clinical Note
F/u 3  mos with cbc, Fe studies prior to f/u ( or sooner if problems should arise in the interim)

## 2020-08-03 ENCOUNTER — PATIENT MESSAGE (OUTPATIENT)
Dept: NEUROLOGY | Facility: CLINIC | Age: 55
End: 2020-08-03

## 2020-08-13 ENCOUNTER — PATIENT OUTREACH (OUTPATIENT)
Dept: ADMINISTRATIVE | Facility: OTHER | Age: 55
End: 2020-08-13

## 2020-08-13 NOTE — PROGRESS NOTES
Requested updates from Care Everywhere.  Reviewed chart for overdue ARLETH topics.  Updated Health Maintenance.   Reconciled immunizations in LINKS.

## 2020-08-13 NOTE — PROGRESS NOTES
"Subjective:          Patient ID: Zoey Cali is a 54 y.o. female who presents today for a fit-in video visit for MS.  [facility] in October 2019    MS HPI:  · DMT: interferon beta-1a IM  · Side effects from DMT? Yes - painful injections/scarring  · Taking vitamin D3 as recommended? Yes   · Patient has been on Avonex for 20+ years and is now having injection issues. Wants to discuss change in DMT  · Thighs are "hard as a rock" and painful to the touch(hypersensitive)  · Past due for MRI(May 2020) secondary to COVID    The patient location is: home  The chief complaint leading to consultation is: DMT discussion    Visit type: audiovisual    Face to Face time with patient: 25 minutes  30 minutes of total time spent on the encounter, which includes face to face time and non-face to face time preparing to see the patient (eg, review of tests), Obtaining and/or reviewing separately obtained history, Documenting clinical information in the electronic or other health record, Independently interpreting results (not separately reported) and communicating results to the patient/family/caregiver, or Care coordination (not separately reported).         Each patient to whom he or she provides medical services by telemedicine is:  (1) informed of the relationship between the physician and patient and the respective role of any other health care provider with respect to management of the patient; and (2) notified that he or she may decline to receive medical services by telemedicine and may withdraw from such care at any time.    Notes:       Medications:  Current Outpatient Medications   Medication Sig    ANORO ELLIPTA 62.5-25 mcg/actuation DsDv inhale ONE PUFF INTO THE LUNGS DAILY FOR CONTROL    carBAMazepine (TEGRETOL XR) 200 MG 12 hr tablet Take 1 tablet (200 mg total) by mouth 2 (two) times daily.    cetirizine (ZYRTEC) 10 MG tablet Take 1 tablet (10 mg total) by mouth once daily.    cholecalciferol, " vitamin D3, (VITAMIN D3) 125 mcg (5,000 unit) Tab Take 1 tablet (5,000 Units total) by mouth once daily.    escitalopram oxalate (LEXAPRO) 20 MG tablet Take 1 tablet (20 mg total) by mouth once daily.    gabapentin (NEURONTIN) 400 MG capsule TAKE 2 CAPSULES EVERY EVENING    gabapentin (NEURONTIN) 600 MG tablet TAKE ONE TABLET BY MOUTH THREE TIMES DAILY *MAY CAUSE DROWSINESS* *MAY CAUSE DIZZINESS*    HYDROcodone-acetaminophen (NORCO)  mg per tablet Take 1 tablet by mouth every 4 to 6 hours as needed for Pain (to last 30 days).    hydrOXYzine HCL (ATARAX) 25 MG tablet Take 1 tablet (25 mg total) by mouth 3 (three) times daily.    ibuprofen (ADVIL,MOTRIN) 800 MG tablet TAKE ONE TABLET BY MOUTH EVERY 8 HOURS WITH FOOD AND WATER    interferon beta-1a (AVONEX) 30 mcg/0.5 mL PnKt INJECT 30 MCG INTRAMUSCULARLY ONCE WEEKLY    LORazepam (ATIVAN) 2 MG Tab TAKE ONE TABLET BY MOUTH EVERY 6 HOURS AS NEEDED    metoprolol succinate (TOPROL-XL) 100 MG 24 hr tablet TAKE ONE TABLET BY MOUTH DAILY    nystatin (MYCOSTATIN) cream Apply topically 2 (two) times daily.    ondansetron (ZOFRAN-ODT) 4 MG TbDL TAKE ONE TABLET BY MOUTH EVERY 12 HOURS AS NEEDED    simvastatin (ZOCOR) 40 MG tablet TAKE ONE TABLET BY MOUTH EVERY EVENING    tiZANidine (ZANAFLEX) 4 MG tablet TAKE 2 TABLETS IN THE MORNING, AT NOON AND IN THE EVENING AND TAKE 3 TABLETS AT NIGHT (9 TABLETS PER DAY)    triamterene-hydrochlorothiazide 37.5-25 mg (MAXZIDE-25) 37.5-25 mg per tablet TAKE ONE TABLET BY MOUTH EVERY DAY     No current facility-administered medications for this visit.        SOCIAL HISTORY  Social History     Tobacco Use    Smoking status: Current Every Day Smoker     Packs/day: 1.00     Years: 30.00     Pack years: 30.00     Types: Cigarettes    Smokeless tobacco: Current User   Substance Use Topics    Alcohol use: No     Alcohol/week: 0.0 standard drinks    Drug use: No       Living arrangements - the patient lives with their  family.    ROS:    No flowsheet data found.  As above           Objective:        1. 25 foot timed walk:  Timed 25 Foot Walk: 8/16/2016 5/2/2017   Did patient wear an AFO? No No   Was assistive device used? No No   Time for 25 Foot Walk (seconds) 5.2 4.4       2. 9 Hole Peg Test:  No flowsheet data found.    Neurologic Exam    Deferred today  Imaging:     No results found for this or any previous visit.  No results found for this or any previous visit.  No results found for this or any previous visit.  Results for orders placed during the hospital encounter of 05/11/18   MRI Brain Demyelinating W W/O Contrast    Impression Overall mild to moderately degraded examination related to motion artifact.  Scattered supratentorial white matter lesions are somewhat nonspecific but in keeping with the reported history of multiple sclerosis.    No definite new lesions and no enhancing lesions to indicate ongoing or active demyelination.      Electronically signed by: Dre Abrams MD  Date:    05/11/2018  Time:    17:15     No results found for this or any previous visit.  No results found for this or any previous visit.      Labs:     Lab Results   Component Value Date    DLDQQWQI34BZ 72 10/28/2019    DOVQKKXR25QX 49 04/02/2019    AHASWREA42DA 59 05/01/2018     No results found for: JCVINDEX, JCVANTIBODY  No results found for: FP9DVTPO, ABSOLUTECD3, AQ3RRVVP, ABSOLUTECD8, JQ4FPBJZ, ABSOLUTECD4, LABCD48  Lab Results   Component Value Date    WBC 7.62 07/10/2020    RBC 4.89 07/10/2020    HGB 15.4 07/10/2020    HCT 45.6 07/10/2020    MCV 93 07/10/2020    MCH 31.5 (H) 07/10/2020    MCHC 33.8 07/10/2020    RDW 13.5 07/10/2020     07/10/2020    MPV 9.5 07/10/2020    GRAN 5.5 07/10/2020    GRAN 71.6 07/10/2020    LYMPH 1.5 07/10/2020    LYMPH 19.3 07/10/2020    MONO 0.5 07/10/2020    MONO 6.6 07/10/2020    EOS 0.1 07/10/2020    BASO 0.04 07/10/2020    EOSINOPHIL 1.7 07/10/2020    BASOPHIL 0.5 07/10/2020     Sodium   Date Value  Ref Range Status   05/01/2018 141 136 - 145 mmol/L Final     Potassium   Date Value Ref Range Status   05/01/2018 4.4 3.5 - 5.1 mmol/L Final     Chloride   Date Value Ref Range Status   05/01/2018 102 95 - 110 mmol/L Final     CO2   Date Value Ref Range Status   05/01/2018 31 (H) 23 - 29 mmol/L Final     Glucose   Date Value Ref Range Status   05/01/2018 89 70 - 110 mg/dL Final     BUN, Bld   Date Value Ref Range Status   05/01/2018 10 6 - 20 mg/dL Final     Creatinine   Date Value Ref Range Status   05/15/2019 0.7 0.5 - 1.4 mg/dL Final     Calcium   Date Value Ref Range Status   05/01/2018 10.0 8.7 - 10.5 mg/dL Final     Total Protein   Date Value Ref Range Status   05/18/2020 7.3 6.0 - 8.4 g/dL Final     Albumin   Date Value Ref Range Status   05/18/2020 3.7 3.5 - 5.2 g/dL Final     Total Bilirubin   Date Value Ref Range Status   05/18/2020 0.3 0.1 - 1.0 mg/dL Final     Comment:     For infants and newborns, interpretation of results should be based  on gestational age, weight and in agreement with clinical  observations.  Premature Infant recommended reference ranges:  Up to 24 hours.............<8.0 mg/dL  Up to 48 hours............<12.0 mg/dL  3-5 days..................<15.0 mg/dL  6-29 days.................<15.0 mg/dL       Alkaline Phosphatase   Date Value Ref Range Status   05/18/2020 166 (H) 55 - 135 U/L Final     AST   Date Value Ref Range Status   05/18/2020 20 10 - 40 U/L Final     ALT   Date Value Ref Range Status   05/18/2020 29 10 - 44 U/L Final     Anion Gap   Date Value Ref Range Status   05/01/2018 8 8 - 16 mmol/L Final     eGFR if    Date Value Ref Range Status   05/15/2019 >60 >60 mL/min/1.73 m^2 Final     eGFR if non    Date Value Ref Range Status   05/15/2019 >60 >60 mL/min/1.73 m^2 Final     Comment:     Calculation used to obtain the estimated glomerular filtration  rate (eGFR) is the CKD-EPI equation.        No results found for: HEPBSAG, HEPBSAB,  HEPBCAB        MS Impression and Plan:     NEURO MULTIPLE SCLEROSIS IMPRESSION:   MS Status:     Number of relapses in the past year?:  0    Clinical Progression:  Clinically Stable    MRI Progression comment:  Due for MRI delayed secondary to COVID  Plan:     DMT:  Switch Disease Modifying therapy    Switch Disease Modifying Therapy FROM:  Interferon beta-1a IM    TO:  Teriflunomide    Symptom Management:  No change in symptom management     Next Imaging Due: 8/14/2020     Next Labs Due: 8/19/2020       Zoey presented for appointment today to discuss possible change in DMT due to adverse side effects from prolonged injection therapy(Avonex). She has been stable on injectable therapy for 20+years and is hesitant to change; however, she has increase pain with injection and hypersensitivity to bilateral injection site areas. We discussed comparable oral DMT namely Aubagio which is a reasonable switch as she has had long term stability on Aubagio.  We have discussed method of administration/method of action/possible side effect profile/safety lab requirements. She was provided with written information to review and will consider over the next week.     Our today visit today lasted 30 minutes(see above for face to face).  Over 50% of this visit included discussion of the treatment plan/medication changes/symptom management/exam findings/imaging/coordination of care.     Problem List Items Addressed This Visit        Neuro    Multiple sclerosis - Primary       Other    Counseling regarding goals of care    Encounter for long-term (current) use of high-risk medication          Follow up in about 3 months (around 11/14/2020) for follow up with Dr. Mendez.  Patient agreed to POC today.    Attending, Dr. Mendez, was available during today's encounter.     Makayla Swift PA-C  [facility]

## 2020-08-14 ENCOUNTER — OFFICE VISIT (OUTPATIENT)
Dept: NEUROLOGY | Facility: CLINIC | Age: 55
End: 2020-08-14
Payer: MEDICARE

## 2020-08-14 DIAGNOSIS — G35 MULTIPLE SCLEROSIS: Primary | ICD-10-CM

## 2020-08-14 DIAGNOSIS — Z71.89 COUNSELING REGARDING GOALS OF CARE: ICD-10-CM

## 2020-08-14 DIAGNOSIS — Z79.899 ENCOUNTER FOR LONG-TERM (CURRENT) USE OF HIGH-RISK MEDICATION: ICD-10-CM

## 2020-08-14 PROCEDURE — 99214 PR OFFICE/OUTPT VISIT, EST, LEVL IV, 30-39 MIN: ICD-10-PCS | Mod: 95,,, | Performed by: PHYSICIAN ASSISTANT

## 2020-08-14 PROCEDURE — 99214 OFFICE O/P EST MOD 30 MIN: CPT | Mod: 95,,, | Performed by: PHYSICIAN ASSISTANT

## 2020-08-14 NOTE — PATIENT INSTRUCTIONS
Please look to [employer]  Teriflunomide oral tablets  What is this medicine?  TERIFLUNOMIDE (TER i BRENNAO talon mide) treats multiple sclerosis. It can decrease the number of flare-ups. This medicine is not a cure.  How should I use this medicine?  Take this medicine by mouth with a glass of water. Follow the directions on the prescription label. You can take it with or without food. If it upsets your stomach, take it with food. Take your medicine at regular intervals. Do not take it more often that directed. Do not stop taking except on your doctor's advice.  A special MedGuide will be given to you by the pharmacist with each prescription and refill. Be sure to read this information carefully each time.  Talk to your pediatrician regarding the use of this medicine in children. Special care may be needed.  What side effects may I notice from receiving this medicine?  Side effects that you should report to your doctor or health care professional as soon as possible:  · allergic reactions like skin rash, itching or hives, swelling of the face, lips, or tongue  · breathing problems  · dizziness  · low blood counts - this medicine may decrease the number of white blood cells and platelets. You may be at increased risk for infections and bleeding.  · pain, tingling, numbness in the hands or feet that is different from your multiple sclerosis symptoms  · redness, blistering, peeing or loosening of the skin, including inside the mouth  · signs of decreased platelets or bleeding - bruising, pinpoint red spots on the skin, black, tarry stools, blood in urine  · signs of infection - fever or chills, cough, sore throat, pain or trouble passing urine  · signs and symptoms of liver injury like dark yellow or brown urine; general ill feeling or flu-like symptoms; light-colored stools; loss of appetite; nausea; right upper belly pain; unusually weak or tired; yellowing of the eyes or skin  · trouble passing urine or change  in the amount of urine  · vomiting  Side effects that usually do not require medical attention (Report these to your doctor or health care professional if they continue or are bothersome.):  · diarrhea  · fatigue  · hair thinning or loss  · headache  · nausea  What may interact with this medicine?  Do not take this medicine with any of the following medications:  · leflunomide  This medicine may also interact with the following medications:  · alosetron  · birth control pills  · caffeine  · certain medicines for diabetes like repaglinide, rosiglitazone, pioglitazone  · certain medicines that treat or prevent blood clots like warfarin  · charcoal  · cholestyramine  · duloxetine  · live virus vaccines  · medicines that lower your chance of fighting infection  · paclitaxel  · theophylline  · tizanidine  What if I miss a dose?  If you miss a dose, take it as soon as you can. If it is almost time for your next dose, take only that dose. Do not take double or extra doses.  Where should I keep my medicine?  Keep out of the reach of children.  Store between 20 to 25 degrees C (68 to 77 degrees F). Throw away any unused medicine after the expiration date.  What should I tell my health care provider before I take this medicine?  They need to know if you have any of these conditions:  · diabetes  · have a fever or infection  · high blood pressure  · immune system problems  · kidney disease  · liver disease  · low blood cell counts, like low white cell, platelet, or red cell counts  · lung or breathing disease, like asthma  · recently received or scheduled to receive a vaccine  · receiving treatment for cancer  · skin conditions or sensitivity  · tingling of the fingers or toes, or other nerve disorder  · tuberculosis  · an unusual or allergic reaction to teriflunomide, leflunomide, other medicines, food, dyes, or preservatives  · pregnant or trying to get pregnant  · breast-feeding  What should I watch for while using this  medicine?  Visit your healthcare professional for regular checks on your progress. Tell your doctor or healthcare professional if your symptoms do not start to get better or if they get worse. You may need blood work done while you are taking this medicine.  This medicine may stay in your body for up to 2 years after your last dose. Tell your doctor about any unusual side effects or symptoms. A medicine can be given to help lower your blood levels of teriflunomide quickly.  Women must use effective birth control with this medicine. There is a potential for serious side effects to an unborn child. Do not become pregnant while taking this medicine or for 2 years after stopping it. Inform your doctor if you wish to become pregnant. This medicine remains in your blood after you stop taking it. You must continue using effective birth control until the blood levels have been checked and they are low enough. Immediately talk to your doctor if you think you may be pregnant. You may need a pregnancy test. A medicine can be given to help lower your blood levels of this medicine more quickly. Talk to your health care professional or pharmacist for more information.  For men, your partner should not become pregnant while you are taking this medicine. There is a potential for serious side effects to an unborn child. You and your female partner should use effective birth control during your treatment. This medicine is found in the semen of men. This medicine remains in your blood after you stop taking it. Men who wish to father a child should continue using effective birth control until the blood levels of this medicine have been checked and they are low enough.  You should not receive certain vaccines during your treatment and for 6 months after your treatment with this medication ends.  NOTE:This sheet is a summary. It may not cover all possible information. If you have questions about this medicine, talk to your doctor,  pharmacist, or health care provider. Copyright© 2017 [employer]

## 2020-08-20 ENCOUNTER — PATIENT MESSAGE (OUTPATIENT)
Dept: NEUROLOGY | Facility: CLINIC | Age: 55
End: 2020-08-20

## 2020-08-20 DIAGNOSIS — G35 MULTIPLE SCLEROSIS: Primary | ICD-10-CM

## 2020-08-24 ENCOUNTER — TELEPHONE (OUTPATIENT)
Dept: NEUROLOGY | Facility: CLINIC | Age: 55
End: 2020-08-24

## 2020-08-24 NOTE — TELEPHONE ENCOUNTER
----- Message from Brenda FLORINDA Summers sent at 8/24/2020  3:43 PM CDT -----  Regarding: Nurse callback  Contact: Mulu Sebastian - Nurse educator - sara Sebastian - Nurse educator - sara   Would like to speak with nurse regarding PT's injection medication    Callback: [phone]

## 2020-09-01 ENCOUNTER — LAB VISIT (OUTPATIENT)
Dept: LAB | Facility: HOSPITAL | Age: 55
End: 2020-09-01
Attending: PHYSICIAN ASSISTANT
Payer: MEDICARE

## 2020-09-01 DIAGNOSIS — G35 MULTIPLE SCLEROSIS: ICD-10-CM

## 2020-09-01 PROCEDURE — 36415 COLL VENOUS BLD VENIPUNCTURE: CPT

## 2020-09-01 PROCEDURE — 86480 TB TEST CELL IMMUN MEASURE: CPT

## 2020-09-03 DIAGNOSIS — G35 MULTIPLE SCLEROSIS: Primary | ICD-10-CM

## 2020-09-03 LAB
GAMMA INTERFERON BACKGROUND BLD IA-ACNC: 0.02 IU/ML
M TB IFN-G CD4+ BCKGRND COR BLD-ACNC: 0 IU/ML
MITOGEN IGNF BCKGRD COR BLD-ACNC: 7.3 IU/ML
TB GOLD PLUS: NEGATIVE
TB2 - NIL: 0 IU/ML

## 2020-09-04 RX ORDER — TERIFLUNOMIDE 14 MG/1
14 TABLET, FILM COATED ORAL DAILY
Qty: 90 TABLET | Refills: 0 | Status: SHIPPED | OUTPATIENT
Start: 2020-09-04 | End: 2020-09-29 | Stop reason: SDUPTHER

## 2020-09-08 ENCOUNTER — TELEPHONE (OUTPATIENT)
Dept: PHARMACY | Facility: CLINIC | Age: 55
End: 2020-09-08

## 2020-09-08 NOTE — TELEPHONE ENCOUNTER
LVM for callback to inform patient that [facility] received prescription for Aubagio and prior authorization is required.  [facility] will be back in touch once insurance determination is received.

## 2020-09-11 NOTE — TELEPHONE ENCOUNTER
DOCUMENTATION ONLY:  Prior authorization for Aubagio 14 mg approved from 1/1/2020 to 12/31/2020.     Case ID# [id number]    Co-pay: $1247.56    Patient Assistance IS required and being researched    Forward to patient assistance for review.  FLC

## 2020-09-16 NOTE — TELEPHONE ENCOUNTER
We will be assisting the patient with applying to [employer] Start Program for the patient assistance program.    Faxing the Start Form Application to Makayla Swift PA-C for her review and signature

## 2020-09-28 ENCOUNTER — PATIENT MESSAGE (OUTPATIENT)
Dept: NEUROLOGY | Facility: CLINIC | Age: 55
End: 2020-09-28

## 2020-09-28 DIAGNOSIS — G35 MULTIPLE SCLEROSIS: Primary | ICD-10-CM

## 2020-10-01 ENCOUNTER — TELEPHONE (OUTPATIENT)
Dept: NEUROLOGY | Facility: CLINIC | Age: 55
End: 2020-10-01

## 2020-10-01 NOTE — TELEPHONE ENCOUNTER
----- Message from Tosha Perez sent at 10/1/2020  1:23 PM CDT -----  Regarding: Aubagio Patient Assistance Approval  Patient is approved for assistance through the [place] [employer] Patient assistance program through 12-.   Thank you  Henrietta  [id number]

## 2020-10-01 NOTE — TELEPHONE ENCOUNTER
Patient enrolled in the [employer] Patient assistance program through 12-.     Sending a staff message to  Makayla Swift regarding approval

## 2020-10-06 ENCOUNTER — OFFICE VISIT (OUTPATIENT)
Dept: FAMILY MEDICINE | Facility: CLINIC | Age: 55
End: 2020-10-06
Payer: MEDICARE

## 2020-10-06 VITALS
BODY MASS INDEX: 27.71 KG/M2 | SYSTOLIC BLOOD PRESSURE: 122 MMHG | HEART RATE: 68 BPM | DIASTOLIC BLOOD PRESSURE: 64 MMHG | HEIGHT: 60 IN | TEMPERATURE: 98 F | WEIGHT: 141.13 LBS | OXYGEN SATURATION: 96 %

## 2020-10-06 DIAGNOSIS — M79.2 NEURALGIA AND NEURITIS: ICD-10-CM

## 2020-10-06 DIAGNOSIS — B37.9 ANTIBIOTIC-INDUCED YEAST INFECTION: ICD-10-CM

## 2020-10-06 DIAGNOSIS — M79.2 NEUROPATHIC PAIN OF FOOT, LEFT: ICD-10-CM

## 2020-10-06 DIAGNOSIS — F43.9 STRESS: ICD-10-CM

## 2020-10-06 DIAGNOSIS — T36.95XA ANTIBIOTIC-INDUCED YEAST INFECTION: ICD-10-CM

## 2020-10-06 DIAGNOSIS — E78.5 HYPERLIPIDEMIA, UNSPECIFIED HYPERLIPIDEMIA TYPE: ICD-10-CM

## 2020-10-06 DIAGNOSIS — G35 MS (MULTIPLE SCLEROSIS): ICD-10-CM

## 2020-10-06 DIAGNOSIS — G35 MULTIPLE SCLEROSIS: ICD-10-CM

## 2020-10-06 DIAGNOSIS — R25.2 SPASTICITY: ICD-10-CM

## 2020-10-06 DIAGNOSIS — Z00.00 ANNUAL PHYSICAL EXAM: Primary | ICD-10-CM

## 2020-10-06 DIAGNOSIS — F32.A DEPRESSION, UNSPECIFIED DEPRESSION TYPE: ICD-10-CM

## 2020-10-06 DIAGNOSIS — M79.2 NEUROPATHIC PAIN OF FOOT, RIGHT: ICD-10-CM

## 2020-10-06 DIAGNOSIS — I10 ESSENTIAL HYPERTENSION: ICD-10-CM

## 2020-10-06 DIAGNOSIS — Z23 NEEDS FLU SHOT: ICD-10-CM

## 2020-10-06 PROCEDURE — 99396 PR PREVENTIVE VISIT,EST,40-64: ICD-10-PCS | Mod: 25,S$GLB,, | Performed by: FAMILY MEDICINE

## 2020-10-06 PROCEDURE — G0008 FLU VACCINE - QUADRIVALENT - ADJUVANTED: ICD-10-PCS | Mod: S$GLB,,, | Performed by: FAMILY MEDICINE

## 2020-10-06 PROCEDURE — 3078F DIAST BP <80 MM HG: CPT | Mod: CPTII,S$GLB,, | Performed by: FAMILY MEDICINE

## 2020-10-06 PROCEDURE — 3008F PR BODY MASS INDEX (BMI) DOCUMENTED: ICD-10-PCS | Mod: CPTII,S$GLB,, | Performed by: FAMILY MEDICINE

## 2020-10-06 PROCEDURE — 3074F PR MOST RECENT SYSTOLIC BLOOD PRESSURE < 130 MM HG: ICD-10-PCS | Mod: CPTII,S$GLB,, | Performed by: FAMILY MEDICINE

## 2020-10-06 PROCEDURE — G0008 ADMIN INFLUENZA VIRUS VAC: HCPCS | Mod: S$GLB,,, | Performed by: FAMILY MEDICINE

## 2020-10-06 PROCEDURE — 3008F BODY MASS INDEX DOCD: CPT | Mod: CPTII,S$GLB,, | Performed by: FAMILY MEDICINE

## 2020-10-06 PROCEDURE — 99999 PR PBB SHADOW E&M-EST. PATIENT-LVL IV: CPT | Mod: PBBFAC,,, | Performed by: FAMILY MEDICINE

## 2020-10-06 PROCEDURE — 99999 PR PBB SHADOW E&M-EST. PATIENT-LVL IV: ICD-10-PCS | Mod: PBBFAC,,, | Performed by: FAMILY MEDICINE

## 2020-10-06 PROCEDURE — 90694 VACC AIIV4 NO PRSRV 0.5ML IM: CPT | Mod: S$GLB,,, | Performed by: FAMILY MEDICINE

## 2020-10-06 PROCEDURE — 99396 PREV VISIT EST AGE 40-64: CPT | Mod: 25,S$GLB,, | Performed by: FAMILY MEDICINE

## 2020-10-06 PROCEDURE — 90694 FLU VACCINE - QUADRIVALENT - ADJUVANTED: ICD-10-PCS | Mod: S$GLB,,, | Performed by: FAMILY MEDICINE

## 2020-10-06 PROCEDURE — 3074F SYST BP LT 130 MM HG: CPT | Mod: CPTII,S$GLB,, | Performed by: FAMILY MEDICINE

## 2020-10-06 PROCEDURE — 3078F PR MOST RECENT DIASTOLIC BLOOD PRESSURE < 80 MM HG: ICD-10-PCS | Mod: CPTII,S$GLB,, | Performed by: FAMILY MEDICINE

## 2020-10-06 RX ORDER — SIMVASTATIN 40 MG/1
40 TABLET, FILM COATED ORAL NIGHTLY
Qty: 90 TABLET | Refills: 3 | Status: SHIPPED | OUTPATIENT
Start: 2020-10-06 | End: 2021-03-30 | Stop reason: SDUPTHER

## 2020-10-06 RX ORDER — METOPROLOL SUCCINATE 100 MG/1
100 TABLET, EXTENDED RELEASE ORAL DAILY
Qty: 90 TABLET | Refills: 3 | Status: SHIPPED | OUTPATIENT
Start: 2020-10-06 | End: 2021-03-30 | Stop reason: SDUPTHER

## 2020-10-06 RX ORDER — HYDROXYZINE HYDROCHLORIDE 25 MG/1
25 TABLET, FILM COATED ORAL 3 TIMES DAILY
Qty: 90 TABLET | Refills: 0 | Status: SHIPPED | OUTPATIENT
Start: 2020-10-06 | End: 2021-10-06 | Stop reason: SDUPTHER

## 2020-10-06 RX ORDER — IBUPROFEN 800 MG/1
TABLET ORAL
Qty: 180 TABLET | Refills: 1 | Status: SHIPPED | OUTPATIENT
Start: 2020-10-06 | End: 2021-02-23

## 2020-10-06 RX ORDER — NYSTATIN 100000 U/G
CREAM TOPICAL 2 TIMES DAILY
Qty: 30 G | Refills: 2 | Status: SHIPPED | OUTPATIENT
Start: 2020-10-06

## 2020-10-06 RX ORDER — LORAZEPAM 2 MG/1
2 TABLET ORAL EVERY 6 HOURS PRN
Qty: 120 TABLET | Refills: 2 | Status: SHIPPED | OUTPATIENT
Start: 2020-10-06 | End: 2021-10-06 | Stop reason: SDUPTHER

## 2020-10-06 RX ORDER — ONDANSETRON 8 MG/1
TABLET, ORALLY DISINTEGRATING ORAL
COMMUNITY
Start: 2020-09-04 | End: 2023-01-13 | Stop reason: SDUPTHER

## 2020-10-06 RX ORDER — GABAPENTIN 600 MG/1
TABLET ORAL
Qty: 270 TABLET | Refills: 1 | Status: SHIPPED | OUTPATIENT
Start: 2020-10-06 | End: 2021-03-30 | Stop reason: SDUPTHER

## 2020-10-06 RX ORDER — HYDROCODONE BITARTRATE AND ACETAMINOPHEN 10; 325 MG/1; MG/1
1 TABLET ORAL EVERY 6 HOURS PRN
Qty: 60 TABLET | Refills: 0 | Status: SHIPPED | OUTPATIENT
Start: 2020-10-06 | End: 2021-03-30 | Stop reason: SDUPTHER

## 2020-10-06 RX ORDER — TIZANIDINE 4 MG/1
TABLET ORAL
Qty: 810 TABLET | Refills: 3 | Status: SHIPPED | OUTPATIENT
Start: 2020-10-06 | End: 2021-10-06 | Stop reason: SDUPTHER

## 2020-10-06 RX ORDER — GABAPENTIN 400 MG/1
800 CAPSULE ORAL NIGHTLY
Qty: 180 CAPSULE | Refills: 3 | Status: SHIPPED | OUTPATIENT
Start: 2020-10-06 | End: 2021-03-30 | Stop reason: SDUPTHER

## 2020-10-06 RX ORDER — CLOBETASOL PROPIONATE 0.5 MG/G
1 OINTMENT TOPICAL 2 TIMES DAILY
COMMUNITY
Start: 2020-07-24 | End: 2021-01-24 | Stop reason: ALTCHOICE

## 2020-10-06 NOTE — PROGRESS NOTES
Chief Complaint   Patient presents with    Medication Refill     SUBJECTIVE:   54 y.o. female for annual routine checkup.  Current Outpatient Medications   Medication Sig Dispense Refill    ANORO ELLIPTA 62.5-25 mcg/actuation DsDv inhale ONE PUFF INTO THE LUNGS DAILY FOR CONTROL 60 each 11    carBAMazepine (TEGRETOL XR) 200 MG 12 hr tablet TAKE ONE TABLET BY MOUTH TWICE DAILY 180 tablet 1    cholecalciferol, vitamin D3, (VITAMIN D3) 125 mcg (5,000 unit) Tab Take 1 tablet (5,000 Units total) by mouth once daily. 90 tablet 3    clobetasol 0.05% (TEMOVATE) 0.05 % Oint 1 application 2 (two) times daily.      escitalopram oxalate (LEXAPRO) 20 MG tablet TAKE ONE TABLET BY MOUTH DAILY 90 tablet 3    gabapentin (NEURONTIN) 400 MG capsule Take 2 capsules (800 mg total) by mouth every evening. 180 capsule 3    gabapentin (NEURONTIN) 600 MG tablet TAKE ONE TABLET BY MOUTH THREE TIMES DAILY 270 tablet 1    hydrOXYzine HCL (ATARAX) 25 MG tablet Take 1 tablet (25 mg total) by mouth 3 (three) times daily. 90 tablet 0    ibuprofen (ADVIL,MOTRIN) 800 MG tablet TAKE ONE TABLET BY MOUTH EVERY 8 HOURS WITH FOOD AND WATER 180 tablet 1    LORazepam (ATIVAN) 2 MG Tab Take 1 tablet (2 mg total) by mouth every 6 (six) hours as needed. 120 tablet 2    metoprolol succinate (TOPROL-XL) 100 MG 24 hr tablet Take 1 tablet (100 mg total) by mouth once daily. 90 tablet 3    nystatin (MYCOSTATIN) cream Apply topically 2 (two) times daily. 30 g 2    ondansetron (ZOFRAN-ODT) 8 MG TbDL DISSOLVE 1 TABLET ON THE TONGUE THREE TIMES DAILY AS NEEDED      simvastatin (ZOCOR) 40 MG tablet Take 1 tablet (40 mg total) by mouth every evening. 90 tablet 3    teriflunomide (AUBAGIO) 14 mg Tab Take 14 mg by mouth once daily. 90 tablet 0    tiZANidine (ZANAFLEX) 4 MG tablet TAKE 2 TABLETS IN THE MORNING, AT NOON AND IN THE EVENING AND TAKE 3 TABLETS AT NIGHT (9 TABLETS PER DAY) 810 tablet 3    triamterene-hydrochlorothiazide 37.5-25 mg  (MAXZIDE-25) 37.5-25 mg per tablet TAKE ONE TABLET BY MOUTH EVERY DAY 90 tablet 3    cetirizine (ZYRTEC) 10 MG tablet Take 1 tablet (10 mg total) by mouth once daily. 30 tablet 0    HYDROcodone-acetaminophen (NORCO)  mg per tablet Take 1 tablet by mouth every 6 (six) hours as needed for Pain. 60 tablet 0     No current facility-administered medications for this visit.      Allergies: Lomotil [diphenoxylate-atropine] and Dilaudid [hydromorphone (bulk)]   Patient's last menstrual period was 01/01/2001 (approximate).    ROS:  Feeling well. No dyspnea or chest pain on exertion.  No abdominal pain, change in bowel habits, black or bloody stools.  No urinary tract symptoms. GYN ROS: no breast pain or new or enlarging lumps on self exam, she complains of started new medication, doing well, has some side effects of the GI tract and some headaches, her pain is still bad, has neuropathy in the feet, using very cautiously the narcotics.. No neurological complaints.    OBJECTIVE:   The patient appears well, alert, oriented x 3, in no distress.  /64   Pulse 68   Temp 97.8 °F (36.6 °C) (Oral)   Ht 5' (1.524 m)   Wt 64 kg (141 lb 1.5 oz)   LMP 01/01/2001 (Approximate) Comment: 2001  SpO2 96%   BMI 27.56 kg/m²   ENT normal.  Neck supple. No adenopathy or thyromegaly. YAN. Lungs are clear, good air entry, no wheezes, rhonchi or rales. S1 and S2 normal, no murmurs, regular rate and rhythm. Abdomen soft without tenderness, guarding, mass or organomegaly. Extremities show no edema, normal peripheral pulses. Neurological is abnormal, no focal findings.    BREAST EXAM: deferred    PELVIC EXAM: deferred    ASSESSMENT:   1. Annual physical exam    2. Antibiotic-induced yeast infection    3. Essential hypertension    4. Spasticity    5. Depression, unspecified depression type    6. Neuralgia and neuritis    7. Stress    8. Multiple sclerosis    9. Neuropathic pain of foot, left    10. Neuropathic pain of foot, right     11. MS (multiple sclerosis)    12. Hyperlipidemia, unspecified hyperlipidemia type    13. Needs flu shot          PLAN:   Zoey was seen today for medication refill.    Diagnoses and all orders for this visit:    Annual physical exam    Antibiotic-induced yeast infection  -     nystatin (MYCOSTATIN) cream; Apply topically 2 (two) times daily.    Essential hypertension  -     metoprolol succinate (TOPROL-XL) 100 MG 24 hr tablet; Take 1 tablet (100 mg total) by mouth once daily.    Spasticity  -     LORazepam (ATIVAN) 2 MG Tab; Take 1 tablet (2 mg total) by mouth every 6 (six) hours as needed.  -     ibuprofen (ADVIL,MOTRIN) 800 MG tablet; TAKE ONE TABLET BY MOUTH EVERY 8 HOURS WITH FOOD AND WATER  -     tiZANidine (ZANAFLEX) 4 MG tablet; TAKE 2 TABLETS IN THE MORNING, AT NOON AND IN THE EVENING AND TAKE 3 TABLETS AT NIGHT (9 TABLETS PER DAY)    Depression, unspecified depression type  -     LORazepam (ATIVAN) 2 MG Tab; Take 1 tablet (2 mg total) by mouth every 6 (six) hours as needed.    Neuralgia and neuritis  -     ibuprofen (ADVIL,MOTRIN) 800 MG tablet; TAKE ONE TABLET BY MOUTH EVERY 8 HOURS WITH FOOD AND WATER    Stress  -     hydrOXYzine HCL (ATARAX) 25 MG tablet; Take 1 tablet (25 mg total) by mouth 3 (three) times daily.    Multiple sclerosis  -     gabapentin (NEURONTIN) 600 MG tablet; TAKE ONE TABLET BY MOUTH THREE TIMES DAILY  -     gabapentin (NEURONTIN) 400 MG capsule; Take 2 capsules (800 mg total) by mouth every evening.    Neuropathic pain of foot, left  -     gabapentin (NEURONTIN) 600 MG tablet; TAKE ONE TABLET BY MOUTH THREE TIMES DAILY  -     gabapentin (NEURONTIN) 400 MG capsule; Take 2 capsules (800 mg total) by mouth every evening.    Neuropathic pain of foot, right  -     gabapentin (NEURONTIN) 600 MG tablet; TAKE ONE TABLET BY MOUTH THREE TIMES DAILY  -     gabapentin (NEURONTIN) 400 MG capsule; Take 2 capsules (800 mg total) by mouth every evening.  -     HYDROcodone-acetaminophen  (NORCO)  mg per tablet; Take 1 tablet by mouth every 6 (six) hours as needed for Pain.    MS (multiple sclerosis)  -     gabapentin (NEURONTIN) 400 MG capsule; Take 2 capsules (800 mg total) by mouth every evening.    Hyperlipidemia, unspecified hyperlipidemia type  -     simvastatin (ZOCOR) 40 MG tablet; Take 1 tablet (40 mg total) by mouth every evening.    Needs flu shot  -     Influenza (FLUAD) - Quadrivalent (Adjuvanted) *Preferred* (65+) (PF)      Counseled on age appropriate medical preventative services, including age appropriate cancer screenings, over all nutritional health, need for a consistent exercise regimen and an over all push towards maintaining a vigorous and active lifestyle.  Counseled on age appropriate vaccines and discussed upcoming health care needs based on age/gender.  Spent time with patient counseling on need for a good patient/doctor relationship moving forward.  Discussed use of common OTC medications and supplements.  Discussed common dietary aids and use of caffeine and the need for good sleep hygiene and stress management.  The current medical regimen is effective;  continue present plan and medications.  Doing well overall

## 2020-10-15 ENCOUNTER — LAB VISIT (OUTPATIENT)
Dept: LAB | Facility: HOSPITAL | Age: 55
End: 2020-10-15
Attending: INTERNAL MEDICINE
Payer: MEDICARE

## 2020-10-15 DIAGNOSIS — D50.9 IRON DEFICIENCY ANEMIA, UNSPECIFIED IRON DEFICIENCY ANEMIA TYPE: ICD-10-CM

## 2020-10-15 LAB
BASOPHILS # BLD AUTO: 0.06 K/UL (ref 0–0.2)
BASOPHILS NFR BLD: 0.7 % (ref 0–1.9)
DIFFERENTIAL METHOD: ABNORMAL
EOSINOPHIL # BLD AUTO: 0.2 K/UL (ref 0–0.5)
EOSINOPHIL NFR BLD: 2.1 % (ref 0–8)
ERYTHROCYTE [DISTWIDTH] IN BLOOD BY AUTOMATED COUNT: 13.7 % (ref 11.5–14.5)
FERRITIN SERPL-MCNC: 128 NG/ML (ref 20–300)
HCT VFR BLD AUTO: 46.8 % (ref 37–48.5)
HGB BLD-MCNC: 16.3 G/DL (ref 12–16)
IMM GRANULOCYTES # BLD AUTO: 0.03 K/UL (ref 0–0.04)
IMM GRANULOCYTES NFR BLD AUTO: 0.3 % (ref 0–0.5)
IRON SERPL-MCNC: 55 UG/DL (ref 30–160)
LYMPHOCYTES # BLD AUTO: 1.3 K/UL (ref 1–4.8)
LYMPHOCYTES NFR BLD: 15.3 % (ref 18–48)
MCH RBC QN AUTO: 32.2 PG (ref 27–31)
MCHC RBC AUTO-ENTMCNC: 34.8 G/DL (ref 32–36)
MCV RBC AUTO: 93 FL (ref 82–98)
MONOCYTES # BLD AUTO: 0.5 K/UL (ref 0.3–1)
MONOCYTES NFR BLD: 6.1 % (ref 4–15)
NEUTROPHILS # BLD AUTO: 6.5 K/UL (ref 1.8–7.7)
NEUTROPHILS NFR BLD: 75.5 % (ref 38–73)
NRBC BLD-RTO: 0 /100 WBC
PLATELET # BLD AUTO: 222 K/UL (ref 150–350)
PMV BLD AUTO: 8.9 FL (ref 9.2–12.9)
RBC # BLD AUTO: 5.06 M/UL (ref 4–5.4)
SATURATED IRON: 15 % (ref 20–50)
TOTAL IRON BINDING CAPACITY: 364 UG/DL (ref 250–450)
TRANSFERRIN SERPL-MCNC: 246 MG/DL (ref 200–375)
WBC # BLD AUTO: 8.59 K/UL (ref 3.9–12.7)

## 2020-10-15 PROCEDURE — 36415 COLL VENOUS BLD VENIPUNCTURE: CPT | Mod: PO

## 2020-10-15 PROCEDURE — 83540 ASSAY OF IRON: CPT

## 2020-10-15 PROCEDURE — 85025 COMPLETE CBC W/AUTO DIFF WBC: CPT

## 2020-10-15 PROCEDURE — 82728 ASSAY OF FERRITIN: CPT

## 2020-10-19 ENCOUNTER — OFFICE VISIT (OUTPATIENT)
Dept: HEMATOLOGY/ONCOLOGY | Facility: CLINIC | Age: 55
End: 2020-10-19
Payer: MEDICARE

## 2020-10-19 DIAGNOSIS — E61.1 IRON DEFICIENCY: Primary | ICD-10-CM

## 2020-10-19 DIAGNOSIS — G35 MS (MULTIPLE SCLEROSIS): ICD-10-CM

## 2020-10-19 DIAGNOSIS — D50.9 IRON DEFICIENCY ANEMIA, UNSPECIFIED IRON DEFICIENCY ANEMIA TYPE: ICD-10-CM

## 2020-10-19 PROCEDURE — 99214 PR OFFICE/OUTPT VISIT, EST, LEVL IV, 30-39 MIN: ICD-10-PCS | Mod: 95,,, | Performed by: INTERNAL MEDICINE

## 2020-10-19 PROCEDURE — 99214 OFFICE O/P EST MOD 30 MIN: CPT | Mod: 95,,, | Performed by: INTERNAL MEDICINE

## 2020-10-19 RX ORDER — HEPARIN 100 UNIT/ML
500 SYRINGE INTRAVENOUS
Status: CANCELLED | OUTPATIENT
Start: 2020-11-02

## 2020-10-19 RX ORDER — HEPARIN 100 UNIT/ML
500 SYRINGE INTRAVENOUS
Status: CANCELLED | OUTPATIENT
Start: 2020-10-22

## 2020-10-19 RX ORDER — SODIUM CHLORIDE 0.9 % (FLUSH) 0.9 %
10 SYRINGE (ML) INJECTION
Status: CANCELLED | OUTPATIENT
Start: 2020-10-22

## 2020-10-19 RX ORDER — SODIUM CHLORIDE 0.9 % (FLUSH) 0.9 %
10 SYRINGE (ML) INJECTION
Status: CANCELLED | OUTPATIENT
Start: 2020-11-02

## 2020-10-19 NOTE — PROGRESS NOTES
Subjective:      The patient location is: Home  The chief complaint leading to consultation is: Follow-up TRACIE    Visit type: audiovisual    Face to Face time with patient: 14 min  20 minutes of total time spent on the encounter, which includes face to face time and non-face to face time preparing to see the patient (eg, review of tests), Obtaining and/or reviewing separately obtained history, Documenting clinical information in the electronic or other health record, Independently interpreting results (not separately reported) and communicating results to the patient/family/caregiver, or Care coordination (not separately reported).         Each patient to whom he or she provides medical services by telemedicine is:  (1) informed of the relationship between the physician and patient and the respective role of any other health care provider with respect to management of the patient; and (2) notified that he or she may decline to receive medical services by telemedicine and may withdraw from such care at any time.        This service was not originating from a related E/M service provided within the previous 7 days nor will  to an E/M service or procedure within the next 24 hours or my soonest available appointment.  Prevailing standard of care was able to be met in this audio-only visit.         Patient ID: Zoey Cali is a 54 y.o. female.    Chief Complaint: No chief complaint on file.  Diagnosis: TRACIE       53 y/o female with history of MS seen  today for f/u for TRACIE  She undergoes intermittent IV iron therapy.  She has been intolerant of oral Fe supp  s/p GI eval with EGD and colonoscopy 2015  which was unremarkable    She is followed by Dr. Mendez for long standing history of MS and now on therapy with [facility]  She was previously on Avonex for >20 yrs  She was experiencing  increasing pain with injection and hypersensitivity to bilateral injection site areas    She reports mild fatigue  Appetite and  weight stable   No SOB/CP/cough        Review of Systems   Constitutional: Positive for fatigue (mild). Negative for appetite change and unexpected weight change.   Eyes: Negative for visual disturbance.   Respiratory: Negative for cough and shortness of breath.    Cardiovascular: Negative for chest pain and leg swelling.   Gastrointestinal: Negative for abdominal pain, blood in stool, constipation, diarrhea, nausea and vomiting.   Genitourinary: Negative for frequency.   Musculoskeletal: Positive for neck pain (chronic ). Negative for arthralgias, back pain and joint swelling.   Skin: Negative for rash.        No petechiae, ecchymoses   Neurological: Negative for light-headedness and headaches.   Hematological: Negative for adenopathy. Does not bruise/bleed easily.   Psychiatric/Behavioral: Negative for confusion.       Objective:       Televisit        Lab Results   Component Value Date    WBC 8.59 10/15/2020    HGB 16.3 (H) 10/15/2020    HCT 46.8 10/15/2020    MCV 93 10/15/2020     10/15/2020     Lab Results   Component Value Date    IRON 55 10/15/2020    TIBC 364 10/15/2020    FERRITIN 128 10/15/2020       Mammo 6/23/20  BI-RADS Category:   Overall: 2 - Benign      ·       1. Iron deficiency anemia, unspecified iron deficiency anemia type    2. MS (multiple sclerosis)        Plan:   Zoey FRASER was seen today for follow-up.    Diagnoses and all orders for this visit:    Iron deficiency anemia, unspecified iron deficiency       S/p GI eval-unremarkable       CBC reveals stable Hb 16.3 g/dl        Ferritin 128   Fe sats decreased 15       Plan IV Fe x 2        Multiple Sclerosis    Follow-up with Neurology    Pt now on  [facility]        Advance Care Planning     Power of   I previously initiated the process of advance care planning today and explained the importance of this process to the patient.  I introduced the concept of advance directives to the patient, as well. Then the patient received  detailed information about the importance of designating a Health Care Power of  (HCPOA). She was also instructed to communicate with this person about their wishes for future healthcare, should she become sick and lose decision-making capacity. The patient has not previously appointed a HCPOA. After our discussion, the patient has decided to complete a HCPOA and has appointed her significant other and NAME:Jared Cali   I spent a total time of 16 minutes discussing this issue with the patient.         F/u 3  mos with cbc, Fe studies prior to f/u ( or sooner if problems should arise in the interim)      Cc: MD Heidy Lancaster MD

## 2020-10-26 ENCOUNTER — INFUSION (OUTPATIENT)
Dept: INFUSION THERAPY | Facility: HOSPITAL | Age: 55
End: 2020-10-26
Attending: INTERNAL MEDICINE
Payer: MEDICARE

## 2020-10-26 VITALS
SYSTOLIC BLOOD PRESSURE: 104 MMHG | HEART RATE: 70 BPM | DIASTOLIC BLOOD PRESSURE: 59 MMHG | OXYGEN SATURATION: 96 % | TEMPERATURE: 98 F | RESPIRATION RATE: 17 BRPM

## 2020-10-26 DIAGNOSIS — E61.1 IRON DEFICIENCY: Primary | ICD-10-CM

## 2020-10-26 PROCEDURE — 63600175 PHARM REV CODE 636 W HCPCS: Performed by: INTERNAL MEDICINE

## 2020-10-26 PROCEDURE — 96374 THER/PROPH/DIAG INJ IV PUSH: CPT

## 2020-10-26 RX ADMIN — IRON SUCROSE 200 MG: 20 INJECTION, SOLUTION INTRAVENOUS at 01:10

## 2020-10-26 NOTE — PLAN OF CARE
Patient arrived to unit for #1 of 2 Venofer IVP. Pt reports she has had decreased appetite, fatigue, diarrhea, constipation, numbness and tingling to feet. Pt reports MS and having some side effects from new medication she has been on for a month. Pt also reports she has had Iron infusions in the past. Reviewed potential signs and symptoms of allergic reaction as well as potential side effects she may experience. Pt verbalized understanding. Plan of care reviewed, patient agreeable to plan. Patient tolerated IVP well. 30 min post observation completed. VSS. Discharge instructions reviewed, patient instructed to return Nov 2 for #2 of 2 Venofer. Patient ambulated off unit using cane by self. Patient in NAD at time of discharge.

## 2020-10-29 ENCOUNTER — TELEPHONE (OUTPATIENT)
Dept: FAMILY MEDICINE | Facility: CLINIC | Age: 55
End: 2020-10-29

## 2020-11-02 ENCOUNTER — PATIENT MESSAGE (OUTPATIENT)
Dept: PSYCHIATRY | Facility: CLINIC | Age: 55
End: 2020-11-02

## 2020-11-02 ENCOUNTER — INFUSION (OUTPATIENT)
Dept: INFUSION THERAPY | Facility: HOSPITAL | Age: 55
End: 2020-11-02
Attending: INTERNAL MEDICINE
Payer: MEDICARE

## 2020-11-02 VITALS
HEART RATE: 62 BPM | DIASTOLIC BLOOD PRESSURE: 56 MMHG | TEMPERATURE: 98 F | RESPIRATION RATE: 17 BRPM | SYSTOLIC BLOOD PRESSURE: 125 MMHG | OXYGEN SATURATION: 98 %

## 2020-11-02 DIAGNOSIS — E61.1 IRON DEFICIENCY: Primary | ICD-10-CM

## 2020-11-02 PROCEDURE — 63600175 PHARM REV CODE 636 W HCPCS: Performed by: INTERNAL MEDICINE

## 2020-11-02 PROCEDURE — 96374 THER/PROPH/DIAG INJ IV PUSH: CPT

## 2020-11-02 RX ADMIN — IRON SUCROSE 200 MG: 20 INJECTION, SOLUTION INTRAVENOUS at 12:11

## 2020-11-02 NOTE — PLAN OF CARE
Patient received Venofer IVP dose 2 of 2. Tolerated well. VSS. Received discharge instructions and verbalized understanding. Pt ambulated with cane off unit, in NAD at time of dc.

## 2020-11-13 ENCOUNTER — PATIENT OUTREACH (OUTPATIENT)
Dept: ADMINISTRATIVE | Facility: OTHER | Age: 55
End: 2020-11-13

## 2020-11-13 ENCOUNTER — PATIENT MESSAGE (OUTPATIENT)
Dept: NEUROLOGY | Facility: CLINIC | Age: 55
End: 2020-11-13

## 2020-11-13 NOTE — TELEPHONE ENCOUNTER
Called patient and confirmed I converted in-person visit to a virtual visit. Monday with Dr Mendez

## 2020-11-16 ENCOUNTER — OFFICE VISIT (OUTPATIENT)
Dept: NEUROLOGY | Facility: CLINIC | Age: 55
End: 2020-11-16
Payer: MEDICARE

## 2020-11-16 DIAGNOSIS — G35 MS (MULTIPLE SCLEROSIS): Primary | ICD-10-CM

## 2020-11-16 DIAGNOSIS — M79.2 NEUROPATHIC PAIN: ICD-10-CM

## 2020-11-16 DIAGNOSIS — Z71.89 COUNSELING REGARDING GOALS OF CARE: ICD-10-CM

## 2020-11-16 DIAGNOSIS — D84.9 IMMUNOSUPPRESSION: ICD-10-CM

## 2020-11-16 PROCEDURE — 99215 OFFICE O/P EST HI 40 MIN: CPT | Mod: 95,,, | Performed by: PSYCHIATRY & NEUROLOGY

## 2020-11-16 PROCEDURE — 99215 PR OFFICE/OUTPT VISIT, EST, LEVL V, 40-54 MIN: ICD-10-PCS | Mod: 95,,, | Performed by: PSYCHIATRY & NEUROLOGY

## 2020-11-16 NOTE — PROGRESS NOTES
"Subjective:        The patient location is: home  The chief complaint leading to consultation is: MS    Visit type: audiovisual    Face to Face time with patient: 35   40 minutes of total time spent on the encounter, which includes face to face time and non-face to face time preparing to see the patient (eg, review of tests), Obtaining and/or reviewing separately obtained history, Documenting clinical information in the electronic or other health record, Independently interpreting results (not separately reported) and communicating results to the patient/family/caregiver, or Care coordination (not separately reported).     Each patient to whom he or she provides medical services by telemedicine is:  (1) informed of the relationship between the physician and patient and the respective role of any other health care provider with respect to management of the patient; and (2) notified that he or she may decline to receive medical services by telemedicine and may withdraw from such care at any time.    Notes:     Patient ID: Zoey Cali is a 54 y.o. female who presents today for a routine clinic visit for MS.      MS HPI:  · DMT: teriflunomide--has taken this for the past 6 weeks;  Off Avonex;   · Side effects from DMT? Yes - diarrhea;  Had some HA but that's better; has taken Lomotil;  · Taking vitamin D3 as recommended? Yes -  5,000 IU M-F (skips weekend)    · She states walking is stable  · Has recurrence of pain for occipital neuralgia;   · She continues to have pain in her feet--she wears "cold socks" which helps a lot;     Medications:  Current Outpatient Medications   Medication Sig    ANORO ELLIPTA 62.5-25 mcg/actuation DsDv inhale ONE PUFF INTO THE LUNGS DAILY FOR CONTROL    carBAMazepine (TEGRETOL XR) 200 MG 12 hr tablet TAKE ONE TABLET BY MOUTH TWICE DAILY    cetirizine (ZYRTEC) 10 MG tablet Take 1 tablet (10 mg total) by mouth once daily.    cholecalciferol, vitamin D3, (VITAMIN D3) 125 mcg (5,000 " unit) Tab Take 1 tablet (5,000 Units total) by mouth once daily.    clobetasol 0.05% (TEMOVATE) 0.05 % Oint 1 application 2 (two) times daily.    escitalopram oxalate (LEXAPRO) 20 MG tablet TAKE ONE TABLET BY MOUTH DAILY    gabapentin (NEURONTIN) 400 MG capsule Take 2 capsules (800 mg total) by mouth every evening.    gabapentin (NEURONTIN) 600 MG tablet TAKE ONE TABLET BY MOUTH THREE TIMES DAILY    HYDROcodone-acetaminophen (NORCO)  mg per tablet Take 1 tablet by mouth every 6 (six) hours as needed for Pain.    hydrOXYzine HCL (ATARAX) 25 MG tablet Take 1 tablet (25 mg total) by mouth 3 (three) times daily.    ibuprofen (ADVIL,MOTRIN) 800 MG tablet TAKE ONE TABLET BY MOUTH EVERY 8 HOURS WITH FOOD AND WATER    LORazepam (ATIVAN) 2 MG Tab Take 1 tablet (2 mg total) by mouth every 6 (six) hours as needed.    metoprolol succinate (TOPROL-XL) 100 MG 24 hr tablet Take 1 tablet (100 mg total) by mouth once daily.    nystatin (MYCOSTATIN) cream Apply topically 2 (two) times daily.    ondansetron (ZOFRAN-ODT) 8 MG TbDL DISSOLVE 1 TABLET ON THE TONGUE THREE TIMES DAILY AS NEEDED    simvastatin (ZOCOR) 40 MG tablet Take 1 tablet (40 mg total) by mouth every evening.    teriflunomide (AUBAGIO) 14 mg Tab Take 14 mg by mouth once daily.    tiZANidine (ZANAFLEX) 4 MG tablet TAKE 2 TABLETS IN THE MORNING, AT NOON AND IN THE EVENING AND TAKE 3 TABLETS AT NIGHT (9 TABLETS PER DAY)    triamterene-hydrochlorothiazide 37.5-25 mg (MAXZIDE-25) 37.5-25 mg per tablet TAKE ONE TABLET BY MOUTH EVERY DAY     No current facility-administered medications for this visit.        SOCIAL HISTORY  Social History     Tobacco Use    Smoking status: Current Every Day Smoker     Packs/day: 1.00     Years: 30.00     Pack years: 30.00     Types: Cigarettes    Smokeless tobacco: Current User   Substance Use Topics    Alcohol use: No     Alcohol/week: 0.0 standard drinks     Frequency: Never     Binge frequency: Never    Drug use:  No       Living arrangements - the patient lives with their spouse.    ROS:    No flowsheet data found.             Objective:      Neurologic Exam  MS: fluent, normal comprehension and attention  CN: no dysarthria; no facial asymmetry  MOTOR: moves all limbs well  COORD: normal FTN  GAIT: normal causal gait      Imaging:     Results for orders placed during the hospital encounter of 05/11/18   MRI Brain Demyelinating W W/O Contrast    Impression Overall mild to moderately degraded examination related to motion artifact.  Scattered supratentorial white matter lesions are somewhat nonspecific but in keeping with the reported history of multiple sclerosis.    No definite new lesions and no enhancing lesions to indicate ongoing or active demyelination.      Electronically signed by: Dre Abrams MD  Date:    05/11/2018  Time:    17:15         Labs:     Lab Results   Component Value Date    FWFNHLZO73WG 72 10/28/2019    [id number] 49 04/02/2019    [id number] 59 05/01/2018     No results found for: JCVINDEX, JCVANTIBODY  No results found for: OF5MWBHX, ABSOLUTECD3, RY6FABDJ, ABSOLUTECD8, IS0BQNNH, ABSOLUTECD4, LABCD48  Lab Results   Component Value Date    WBC 7.40 11/02/2020    RBC 5.03 11/02/2020    HGB 16.2 (H) 11/02/2020    HCT 45.0 11/02/2020    MCV 90 11/02/2020    MCH 32.2 (H) 11/02/2020    MCHC 36.0 11/02/2020    RDW 13.7 11/02/2020     11/02/2020    MPV 9.1 (L) 11/02/2020    GRAN 5.2 11/02/2020    GRAN 70.6 11/02/2020    LYMPH 1.4 11/02/2020    LYMPH 18.9 11/02/2020    MONO 0.5 11/02/2020    MONO 6.9 11/02/2020    EOS 0.2 11/02/2020    BASO 0.06 11/02/2020    EOSINOPHIL 2.7 11/02/2020    BASOPHIL 0.8 11/02/2020     Sodium   Date Value Ref Range Status   05/01/2018 141 136 - 145 mmol/L Final     Potassium   Date Value Ref Range Status   05/01/2018 4.4 3.5 - 5.1 mmol/L Final     Chloride   Date Value Ref Range Status   05/01/2018 102 95 - 110 mmol/L Final     CO2   Date Value Ref Range Status   05/01/2018  31 (H) 23 - 29 mmol/L Final     Glucose   Date Value Ref Range Status   05/01/2018 89 70 - 110 mg/dL Final     BUN   Date Value Ref Range Status   05/01/2018 10 6 - 20 mg/dL Final     Creatinine   Date Value Ref Range Status   05/15/2019 0.7 0.5 - 1.4 mg/dL Final     Calcium   Date Value Ref Range Status   05/01/2018 10.0 8.7 - 10.5 mg/dL Final     Total Protein   Date Value Ref Range Status   11/02/2020 6.8 6.0 - 8.4 g/dL Final     Albumin   Date Value Ref Range Status   11/02/2020 3.7 3.5 - 5.2 g/dL Final     Total Bilirubin   Date Value Ref Range Status   11/02/2020 0.4 0.1 - 1.0 mg/dL Final     Comment:     For infants and newborns, interpretation of results should be based  on gestational age, weight and in agreement with clinical  observations.  Premature Infant recommended reference ranges:  Up to 24 hours.............<8.0 mg/dL  Up to 48 hours............<12.0 mg/dL  3-5 days..................<15.0 mg/dL  6-29 days.................<15.0 mg/dL       Alkaline Phosphatase   Date Value Ref Range Status   11/02/2020 149 (H) 55 - 135 U/L Final     AST   Date Value Ref Range Status   11/02/2020 14 10 - 40 U/L Final     ALT   Date Value Ref Range Status   11/02/2020 11 10 - 44 U/L Final     Anion Gap   Date Value Ref Range Status   05/01/2018 8 8 - 16 mmol/L Final     eGFR if    Date Value Ref Range Status   05/15/2019 >60 >60 mL/min/1.73 m^2 Final     eGFR if non    Date Value Ref Range Status   05/15/2019 >60 >60 mL/min/1.73 m^2 Final     Comment:     Calculation used to obtain the estimated glomerular filtration  rate (eGFR) is the CKD-EPI equation.        No results found for: HEPBSAG, HEPBSAB, HEPBCAB        MS Impression and Plan:     NEURO MULTIPLE SCLEROSIS IMPRESSION:   MS Status:     Number of relapses in the past year?:  0    Clinical Progression:  Clinically Stable    MRI Progression:  N/A  Plan:     DMT:  No change in management    Symptom Management:  No change in  symptom management     Next Imaging Due: 3/16/2021     Next Labs Due: 11/30/2020     She is scheduled for [facility] labs for next 3 months; will add two additional months (Feb,March).   MRI brain in March [facility];   F/naresh with Sabina Swift PA-C after MRI        Problem List Items Addressed This Visit        1 - High    MS (multiple sclerosis) - Primary    Relevant Orders    MRI Brain Demyelinating Without Contrast    CBC Auto Differential    Hepatic Function Panel       3     Neuropathic pain       Unprioritized    Counseling regarding goals of care      Other Visit Diagnoses     Immunosuppression              Heidy Mendez MD

## 2020-11-16 NOTE — Clinical Note
1. CBC/ LFT [facility] end of Feb, and end of March;   MRI brain mid-February--[facility]   F/naresh [facility] in March 2021

## 2020-11-24 ENCOUNTER — TELEPHONE (OUTPATIENT)
Dept: OBSTETRICS AND GYNECOLOGY | Facility: CLINIC | Age: 55
End: 2020-11-24

## 2020-11-24 ENCOUNTER — PATIENT MESSAGE (OUTPATIENT)
Dept: OBSTETRICS AND GYNECOLOGY | Facility: CLINIC | Age: 55
End: 2020-11-24

## 2020-11-25 ENCOUNTER — TELEPHONE (OUTPATIENT)
Dept: OBSTETRICS AND GYNECOLOGY | Facility: CLINIC | Age: 55
End: 2020-11-25

## 2020-11-25 ENCOUNTER — OFFICE VISIT (OUTPATIENT)
Dept: OBSTETRICS AND GYNECOLOGY | Facility: CLINIC | Age: 55
End: 2020-11-25
Payer: MEDICARE

## 2020-11-25 VITALS
WEIGHT: 140.63 LBS | BODY MASS INDEX: 27.47 KG/M2 | SYSTOLIC BLOOD PRESSURE: 108 MMHG | DIASTOLIC BLOOD PRESSURE: 66 MMHG

## 2020-11-25 DIAGNOSIS — N89.8 VAGINAL ITCHING: Primary | ICD-10-CM

## 2020-11-25 DIAGNOSIS — N95.2 ATROPHIC VAGINITIS: ICD-10-CM

## 2020-11-25 PROCEDURE — 99999 PR PBB SHADOW E&M-EST. PATIENT-LVL IV: ICD-10-PCS | Mod: PBBFAC,,, | Performed by: PHYSICIAN ASSISTANT

## 2020-11-25 PROCEDURE — 3008F BODY MASS INDEX DOCD: CPT | Mod: CPTII,S$GLB,, | Performed by: PHYSICIAN ASSISTANT

## 2020-11-25 PROCEDURE — 99214 PR OFFICE/OUTPT VISIT, EST, LEVL IV, 30-39 MIN: ICD-10-PCS | Mod: S$GLB,,, | Performed by: PHYSICIAN ASSISTANT

## 2020-11-25 PROCEDURE — 3078F PR MOST RECENT DIASTOLIC BLOOD PRESSURE < 80 MM HG: ICD-10-PCS | Mod: CPTII,S$GLB,, | Performed by: PHYSICIAN ASSISTANT

## 2020-11-25 PROCEDURE — 1126F AMNT PAIN NOTED NONE PRSNT: CPT | Mod: S$GLB,,, | Performed by: PHYSICIAN ASSISTANT

## 2020-11-25 PROCEDURE — 3074F PR MOST RECENT SYSTOLIC BLOOD PRESSURE < 130 MM HG: ICD-10-PCS | Mod: CPTII,S$GLB,, | Performed by: PHYSICIAN ASSISTANT

## 2020-11-25 PROCEDURE — 1126F PR PAIN SEVERITY QUANTIFIED, NO PAIN PRESENT: ICD-10-PCS | Mod: S$GLB,,, | Performed by: PHYSICIAN ASSISTANT

## 2020-11-25 PROCEDURE — 99999 PR PBB SHADOW E&M-EST. PATIENT-LVL IV: CPT | Mod: PBBFAC,,, | Performed by: PHYSICIAN ASSISTANT

## 2020-11-25 PROCEDURE — 3078F DIAST BP <80 MM HG: CPT | Mod: CPTII,S$GLB,, | Performed by: PHYSICIAN ASSISTANT

## 2020-11-25 PROCEDURE — 87480 CANDIDA DNA DIR PROBE: CPT

## 2020-11-25 PROCEDURE — 3008F PR BODY MASS INDEX (BMI) DOCUMENTED: ICD-10-PCS | Mod: CPTII,S$GLB,, | Performed by: PHYSICIAN ASSISTANT

## 2020-11-25 PROCEDURE — 87510 GARDNER VAG DNA DIR PROBE: CPT

## 2020-11-25 PROCEDURE — 99214 OFFICE O/P EST MOD 30 MIN: CPT | Mod: S$GLB,,, | Performed by: PHYSICIAN ASSISTANT

## 2020-11-25 PROCEDURE — 3074F SYST BP LT 130 MM HG: CPT | Mod: CPTII,S$GLB,, | Performed by: PHYSICIAN ASSISTANT

## 2020-11-25 RX ORDER — CLOTRIMAZOLE AND BETAMETHASONE DIPROPIONATE 10; .64 MG/G; MG/G
CREAM TOPICAL
Qty: 15 G | Refills: 1 | Status: SHIPPED | OUTPATIENT
Start: 2020-11-25 | End: 2020-12-01 | Stop reason: SDUPTHER

## 2020-11-25 NOTE — PROGRESS NOTES
Zoey Cali is a 54 y.o. female  presents with complaint of vaginal itching for a few days, states that this is a chronic issue but worsened recently. Reports external irritation, worse to the left vulva. States she had BV about a year ago, treated by Dr. Sheikh.  Denies discharge, odor, nausea, vomiting, diarrhea, constipation, dysuria, dyspareunia, abdominal pain. Not on HRT. No other concerns or complaints at this visit.     Past Medical History:   Diagnosis Date    Allergy     Anemia     Anxiety     Breast cyst     Chronic kidney disease     Depression     Fibrocystic breast     Hypertension     Multiple sclerosis     Multiple sclerosis     Neuromuscular disorder     Osteoporosis     Rib fracture 2015     Past Surgical History:   Procedure Laterality Date    APPENDECTOMY      BREAST BIOPSY Left 2012    BREAST CYST ASPIRATION      BREAST SURGERY  2004    excisional biopsy      SECTION, CLASSIC      CHALAZION EXCISION  at age 9    CHOLECYSTECTOMY      EYE SURGERY      HYSTERECTOMY      OOPHORECTOMY       Social History     Tobacco Use    Smoking status: Current Every Day Smoker     Packs/day: 1.00     Years: 30.00     Pack years: 30.00     Types: Cigarettes    Smokeless tobacco: Current User   Substance Use Topics    Alcohol use: No     Alcohol/week: 0.0 standard drinks     Frequency: Never     Binge frequency: Never    Drug use: No     Family History   Problem Relation Age of Onset    Arthritis Mother     Diabetes Father     Heart disease Father     Hyperlipidemia Father     Hypertension Father     Stroke Father     Arthritis Son     Drug abuse Son     Hypertension Maternal Grandmother     Stroke Maternal Grandmother     Arthritis Maternal Grandmother     Arthritis Paternal Grandmother     Heart disease Paternal Grandfather     Heart attack Paternal Grandfather     Colon cancer Neg Hx     Ovarian cancer Neg Hx     Amblyopia Neg Hx      Blindness Neg Hx     Glaucoma Neg Hx     Macular degeneration Neg Hx     Retinal detachment Neg Hx     Strabismus Neg Hx     Thyroid disease Neg Hx      OB History    Para Term  AB Living   2 2 2     2   SAB TAB Ectopic Multiple Live Births           2      # Outcome Date GA Lbr Leeroy/2nd Weight Sex Delivery Anes PTL Lv   2 Term            1 Term                /66   Wt 63.8 kg (140 lb 10.5 oz)   LMP 2001 (Approximate) Comment:   BMI 27.47 kg/m²     ROS:  GENERAL: No fever, chills, fatigability or weight loss.  VULVAR: No pain, no lesions and no itching.  VAGINAL: No relaxation,  no discharge, no abnormal bleeding and no lesions. +itching  ABDOMEN: No abdominal pain. Denies nausea. Denies vomiting. No diarrhea. No constipation  BREAST: Denies pain. No lumps. No discharge.  URINARY: No incontinence, no nocturia, no frequency and no dysuria.  CARDIOVASCULAR: No chest pain. No shortness of breath. No leg cramps.  NEUROLOGICAL: No headaches. No vision changes.    PHYSICAL EXAM:  VULVA: normal appearing vulva with, tenderness or lesions, 1cm round mass to left inguinal fold (pt states this has been here for a while, Dr. Sheikh is aware)   VAGINA: normal appearing vagina with normal color. No discharge, no lesions, loss of ruggae , fusion of labia   CERVIX: surgically absent  UTERUS:surgically absent       ASSESSMENT and PLAN:    ICD-10-CM ICD-9-CM    1. Vaginal itching  N89.8 698.1 Vaginosis Screen by DNA Probe      clotrimazole-betamethasone 1-0.05% (LOTRISONE) cream   2. Atrophic vaginitis  N95.2 627.3      OTC estrogen creams  F/u with vulva clinic     Vaginitis Prevention:  - Avoiding feminine products such as deoderant soaps, body wash, bubble bath, douches, scented toilet paper, deoderant tampons or pads, feminine wipes, chronic pad use, etc. If you wash with soap make sure its hypo allergic (free of added scents or colors)   - Avoiding other vulvovaginal irritants such as long  hot baths, humidity, tight, synthetic clothing, chlorine and sitting around in wet bathing suits  - Wearing cotton underwear, avoiding thong underwear and no underwear to bed-wear loose cotton bottoms to bed   - Taking showers instead of baths and use a hair dryer on cool setting afterwards to dry  - Wearing cotton to exercise and shower immediately after exercise and change clothes  - Using polyurethane condoms without spermicide if sexually active and symptoms are triggered by intercourse  - Use laundry detergent without added perfumes or colors   - Do not have sexual intercourse until symptoms have gone away   - Increase your intake of probiotics--you can get these by eating yogurt/greek yogurt or by buying over the counter probiotic supplements     Probiotic Information:   Any probiotic you choose needs to have ALL of the following components (Each needs to be >10 colony forming units [CFUs]):   - L. Acidophilus  - L. Rhamnosus  - L. Fermentum       FOLLOW UP: PRN lack of improvement.

## 2020-11-25 NOTE — TELEPHONE ENCOUNTER
----- Message from Heidy Armenta MA sent at 11/25/2020 10:19 AM CST -----  Regarding: Vulva Appointment  Maurice Inman,    Not sure if you're still handling vulva clinic appointments, this young lady needs to see Anthony Munoz.    Thanks,    Heidy

## 2020-11-26 LAB
CANDIDA RRNA VAG QL PROBE: POSITIVE
G VAGINALIS RRNA GENITAL QL PROBE: POSITIVE
T VAGINALIS RRNA GENITAL QL PROBE: NEGATIVE

## 2020-11-27 DIAGNOSIS — B96.89 BV (BACTERIAL VAGINOSIS): ICD-10-CM

## 2020-11-27 DIAGNOSIS — N76.0 BV (BACTERIAL VAGINOSIS): ICD-10-CM

## 2020-11-27 DIAGNOSIS — B37.31 CANDIDA VAGINITIS: Primary | ICD-10-CM

## 2020-11-27 RX ORDER — FLUCONAZOLE 150 MG/1
150 TABLET ORAL ONCE
Qty: 2 TABLET | Refills: 1 | Status: SHIPPED | OUTPATIENT
Start: 2020-11-27 | End: 2020-11-27

## 2020-11-27 RX ORDER — TINIDAZOLE 500 MG/1
2 TABLET ORAL DAILY
Qty: 8 TABLET | Refills: 0 | Status: SHIPPED | OUTPATIENT
Start: 2020-11-27 | End: 2020-11-29

## 2020-11-30 ENCOUNTER — LAB VISIT (OUTPATIENT)
Dept: LAB | Facility: HOSPITAL | Age: 55
End: 2020-11-30
Attending: FAMILY MEDICINE
Payer: MEDICARE

## 2020-11-30 ENCOUNTER — PATIENT MESSAGE (OUTPATIENT)
Dept: OBSTETRICS AND GYNECOLOGY | Facility: CLINIC | Age: 55
End: 2020-11-30

## 2020-11-30 DIAGNOSIS — G35 MULTIPLE SCLEROSIS: ICD-10-CM

## 2020-11-30 LAB
ALBUMIN SERPL BCP-MCNC: 4.1 G/DL (ref 3.5–5.2)
ALP SERPL-CCNC: 155 U/L (ref 55–135)
ALT SERPL W/O P-5'-P-CCNC: 11 U/L (ref 10–44)
AST SERPL-CCNC: 13 U/L (ref 10–40)
BASOPHILS # BLD AUTO: 0.06 K/UL (ref 0–0.2)
BASOPHILS NFR BLD: 0.6 % (ref 0–1.9)
BILIRUB DIRECT SERPL-MCNC: 0.1 MG/DL (ref 0.1–0.3)
BILIRUB SERPL-MCNC: 0.3 MG/DL (ref 0.1–1)
DIFFERENTIAL METHOD: ABNORMAL
EOSINOPHIL # BLD AUTO: 0.2 K/UL (ref 0–0.5)
EOSINOPHIL NFR BLD: 1.7 % (ref 0–8)
ERYTHROCYTE [DISTWIDTH] IN BLOOD BY AUTOMATED COUNT: 13.3 % (ref 11.5–14.5)
HCT VFR BLD AUTO: 46.6 % (ref 37–48.5)
HGB BLD-MCNC: 16.4 G/DL (ref 12–16)
IMM GRANULOCYTES # BLD AUTO: 0.03 K/UL (ref 0–0.04)
IMM GRANULOCYTES NFR BLD AUTO: 0.3 % (ref 0–0.5)
LYMPHOCYTES # BLD AUTO: 1.5 K/UL (ref 1–4.8)
LYMPHOCYTES NFR BLD: 15.8 % (ref 18–48)
MCH RBC QN AUTO: 31.5 PG (ref 27–31)
MCHC RBC AUTO-ENTMCNC: 35.2 G/DL (ref 32–36)
MCV RBC AUTO: 89 FL (ref 82–98)
MONOCYTES # BLD AUTO: 0.6 K/UL (ref 0.3–1)
MONOCYTES NFR BLD: 6.3 % (ref 4–15)
NEUTROPHILS # BLD AUTO: 7.1 K/UL (ref 1.8–7.7)
NEUTROPHILS NFR BLD: 75.3 % (ref 38–73)
NRBC BLD-RTO: 0 /100 WBC
PLATELET # BLD AUTO: 239 K/UL (ref 150–350)
PMV BLD AUTO: 9.2 FL (ref 9.2–12.9)
PROT SERPL-MCNC: 7.7 G/DL (ref 6–8.4)
RBC # BLD AUTO: 5.21 M/UL (ref 4–5.4)
WBC # BLD AUTO: 9.38 K/UL (ref 3.9–12.7)

## 2020-11-30 PROCEDURE — 80076 HEPATIC FUNCTION PANEL: CPT

## 2020-11-30 PROCEDURE — 85025 COMPLETE CBC W/AUTO DIFF WBC: CPT

## 2020-12-01 DIAGNOSIS — N89.8 VAGINAL ITCHING: ICD-10-CM

## 2020-12-01 RX ORDER — METRONIDAZOLE 500 MG/1
500 TABLET ORAL 2 TIMES DAILY
Qty: 14 TABLET | Refills: 0 | Status: SHIPPED | OUTPATIENT
Start: 2020-12-01 | End: 2020-12-08

## 2020-12-01 RX ORDER — FLUCONAZOLE 150 MG/1
150 TABLET ORAL ONCE
Qty: 2 TABLET | Refills: 0 | Status: SHIPPED | OUTPATIENT
Start: 2020-12-01 | End: 2020-12-01

## 2020-12-01 RX ORDER — CLOTRIMAZOLE AND BETAMETHASONE DIPROPIONATE 10; .64 MG/G; MG/G
CREAM TOPICAL
Qty: 15 G | Refills: 1 | Status: SHIPPED | OUTPATIENT
Start: 2020-12-01 | End: 2021-01-24 | Stop reason: ALTCHOICE

## 2020-12-30 ENCOUNTER — LAB VISIT (OUTPATIENT)
Dept: LAB | Facility: HOSPITAL | Age: 55
End: 2020-12-30
Attending: FAMILY MEDICINE
Payer: MEDICARE

## 2020-12-30 DIAGNOSIS — G35 MULTIPLE SCLEROSIS: ICD-10-CM

## 2020-12-30 LAB
ALBUMIN SERPL BCP-MCNC: 3.8 G/DL (ref 3.5–5.2)
ALP SERPL-CCNC: 130 U/L (ref 55–135)
ALT SERPL W/O P-5'-P-CCNC: 13 U/L (ref 10–44)
AST SERPL-CCNC: 13 U/L (ref 10–40)
BASOPHILS # BLD AUTO: 0.08 K/UL (ref 0–0.2)
BASOPHILS NFR BLD: 0.8 % (ref 0–1.9)
BILIRUB DIRECT SERPL-MCNC: 0.2 MG/DL (ref 0.1–0.3)
BILIRUB SERPL-MCNC: 0.5 MG/DL (ref 0.1–1)
DIFFERENTIAL METHOD: ABNORMAL
EOSINOPHIL # BLD AUTO: 0.3 K/UL (ref 0–0.5)
EOSINOPHIL NFR BLD: 2.5 % (ref 0–8)
ERYTHROCYTE [DISTWIDTH] IN BLOOD BY AUTOMATED COUNT: 13.3 % (ref 11.5–14.5)
HCT VFR BLD AUTO: 45.2 % (ref 37–48.5)
HGB BLD-MCNC: 16 G/DL (ref 12–16)
IMM GRANULOCYTES # BLD AUTO: 0.04 K/UL (ref 0–0.04)
IMM GRANULOCYTES NFR BLD AUTO: 0.4 % (ref 0–0.5)
LYMPHOCYTES # BLD AUTO: 1.2 K/UL (ref 1–4.8)
LYMPHOCYTES NFR BLD: 11.5 % (ref 18–48)
MCH RBC QN AUTO: 31.2 PG (ref 27–31)
MCHC RBC AUTO-ENTMCNC: 35.4 G/DL (ref 32–36)
MCV RBC AUTO: 88 FL (ref 82–98)
MONOCYTES # BLD AUTO: 0.6 K/UL (ref 0.3–1)
MONOCYTES NFR BLD: 6 % (ref 4–15)
NEUTROPHILS # BLD AUTO: 8 K/UL (ref 1.8–7.7)
NEUTROPHILS NFR BLD: 78.8 % (ref 38–73)
NRBC BLD-RTO: 0 /100 WBC
PLATELET # BLD AUTO: 254 K/UL (ref 150–350)
PMV BLD AUTO: 9.2 FL (ref 9.2–12.9)
PROT SERPL-MCNC: 7.1 G/DL (ref 6–8.4)
RBC # BLD AUTO: 5.13 M/UL (ref 4–5.4)
WBC # BLD AUTO: 10.09 K/UL (ref 3.9–12.7)

## 2020-12-30 PROCEDURE — 85025 COMPLETE CBC W/AUTO DIFF WBC: CPT

## 2020-12-30 PROCEDURE — 80076 HEPATIC FUNCTION PANEL: CPT

## 2020-12-30 PROCEDURE — 36415 COLL VENOUS BLD VENIPUNCTURE: CPT | Mod: PO

## 2021-01-13 ENCOUNTER — TELEPHONE (OUTPATIENT)
Dept: OBSTETRICS AND GYNECOLOGY | Facility: CLINIC | Age: 56
End: 2021-01-13

## 2021-01-13 DIAGNOSIS — Z12.31 ENCOUNTER FOR SCREENING MAMMOGRAM FOR BREAST CANCER: Primary | ICD-10-CM

## 2021-01-15 ENCOUNTER — LAB VISIT (OUTPATIENT)
Dept: LAB | Facility: HOSPITAL | Age: 56
End: 2021-01-15
Attending: INTERNAL MEDICINE
Payer: MEDICARE

## 2021-01-15 DIAGNOSIS — E61.1 IRON DEFICIENCY: ICD-10-CM

## 2021-01-15 LAB
BASOPHILS # BLD AUTO: 0.05 K/UL (ref 0–0.2)
BASOPHILS NFR BLD: 0.7 % (ref 0–1.9)
DIFFERENTIAL METHOD: ABNORMAL
EOSINOPHIL # BLD AUTO: 0.2 K/UL (ref 0–0.5)
EOSINOPHIL NFR BLD: 2.5 % (ref 0–8)
ERYTHROCYTE [DISTWIDTH] IN BLOOD BY AUTOMATED COUNT: 13 % (ref 11.5–14.5)
FERRITIN SERPL-MCNC: 291 NG/ML (ref 20–300)
HCT VFR BLD AUTO: 45 % (ref 37–48.5)
HGB BLD-MCNC: 16.6 G/DL (ref 12–16)
IMM GRANULOCYTES # BLD AUTO: 0.02 K/UL (ref 0–0.04)
IMM GRANULOCYTES NFR BLD AUTO: 0.3 % (ref 0–0.5)
IRON SERPL-MCNC: 103 UG/DL (ref 30–160)
LYMPHOCYTES # BLD AUTO: 1.1 K/UL (ref 1–4.8)
LYMPHOCYTES NFR BLD: 15.5 % (ref 18–48)
MCH RBC QN AUTO: 31.6 PG (ref 27–31)
MCHC RBC AUTO-ENTMCNC: 36.9 G/DL (ref 32–36)
MCV RBC AUTO: 86 FL (ref 82–98)
MONOCYTES # BLD AUTO: 0.5 K/UL (ref 0.3–1)
MONOCYTES NFR BLD: 6.4 % (ref 4–15)
NEUTROPHILS # BLD AUTO: 5.5 K/UL (ref 1.8–7.7)
NEUTROPHILS NFR BLD: 74.6 % (ref 38–73)
NRBC BLD-RTO: 0 /100 WBC
PLATELET # BLD AUTO: 250 K/UL (ref 150–350)
PMV BLD AUTO: 9.5 FL (ref 9.2–12.9)
RBC # BLD AUTO: 5.25 M/UL (ref 4–5.4)
SATURATED IRON: 28 % (ref 20–50)
TOTAL IRON BINDING CAPACITY: 366 UG/DL (ref 250–450)
TRANSFERRIN SERPL-MCNC: 247 MG/DL (ref 200–375)
WBC # BLD AUTO: 7.34 K/UL (ref 3.9–12.7)

## 2021-01-15 PROCEDURE — 82728 ASSAY OF FERRITIN: CPT

## 2021-01-15 PROCEDURE — 83540 ASSAY OF IRON: CPT

## 2021-01-15 PROCEDURE — 85025 COMPLETE CBC W/AUTO DIFF WBC: CPT

## 2021-01-19 ENCOUNTER — OFFICE VISIT (OUTPATIENT)
Dept: HEMATOLOGY/ONCOLOGY | Facility: CLINIC | Age: 56
End: 2021-01-19
Payer: MEDICARE

## 2021-01-19 DIAGNOSIS — D50.9 IRON DEFICIENCY ANEMIA, UNSPECIFIED IRON DEFICIENCY ANEMIA TYPE: Primary | ICD-10-CM

## 2021-01-19 DIAGNOSIS — G35 MS (MULTIPLE SCLEROSIS): ICD-10-CM

## 2021-01-19 PROCEDURE — 99213 OFFICE O/P EST LOW 20 MIN: CPT | Mod: 95,,, | Performed by: INTERNAL MEDICINE

## 2021-01-19 PROCEDURE — 99213 PR OFFICE/OUTPT VISIT, EST, LEVL III, 20-29 MIN: ICD-10-PCS | Mod: 95,,, | Performed by: INTERNAL MEDICINE

## 2021-01-22 ENCOUNTER — PATIENT MESSAGE (OUTPATIENT)
Dept: NEUROLOGY | Facility: CLINIC | Age: 56
End: 2021-01-22

## 2021-01-24 DIAGNOSIS — J44.9 COPD MIXED TYPE: Primary | ICD-10-CM

## 2021-01-24 RX ORDER — UMECLIDINIUM BROMIDE AND VILANTEROL TRIFENATATE 62.5; 25 UG/1; UG/1
POWDER RESPIRATORY (INHALATION)
Qty: 60 EACH | Refills: 11 | Status: SHIPPED | OUTPATIENT
Start: 2021-01-24 | End: 2021-02-11 | Stop reason: CLARIF

## 2021-01-29 ENCOUNTER — LAB VISIT (OUTPATIENT)
Dept: LAB | Facility: HOSPITAL | Age: 56
End: 2021-01-29
Attending: FAMILY MEDICINE
Payer: MEDICARE

## 2021-01-29 DIAGNOSIS — G35 MULTIPLE SCLEROSIS: ICD-10-CM

## 2021-01-29 LAB
ALBUMIN SERPL BCP-MCNC: 3.7 G/DL (ref 3.5–5.2)
ALP SERPL-CCNC: 125 U/L (ref 55–135)
ALT SERPL W/O P-5'-P-CCNC: 24 U/L (ref 10–44)
AST SERPL-CCNC: 18 U/L (ref 10–40)
BASOPHILS # BLD AUTO: 0.08 K/UL (ref 0–0.2)
BASOPHILS NFR BLD: 0.7 % (ref 0–1.9)
BILIRUB DIRECT SERPL-MCNC: 0.2 MG/DL (ref 0.1–0.3)
BILIRUB SERPL-MCNC: 0.4 MG/DL (ref 0.1–1)
DIFFERENTIAL METHOD: ABNORMAL
EOSINOPHIL # BLD AUTO: 0.2 K/UL (ref 0–0.5)
EOSINOPHIL NFR BLD: 1.9 % (ref 0–8)
ERYTHROCYTE [DISTWIDTH] IN BLOOD BY AUTOMATED COUNT: 13.8 % (ref 11.5–14.5)
HCT VFR BLD AUTO: 47.6 % (ref 37–48.5)
HGB BLD-MCNC: 16.2 G/DL (ref 12–16)
IMM GRANULOCYTES # BLD AUTO: 0.04 K/UL (ref 0–0.04)
IMM GRANULOCYTES NFR BLD AUTO: 0.4 % (ref 0–0.5)
LYMPHOCYTES # BLD AUTO: 1.9 K/UL (ref 1–4.8)
LYMPHOCYTES NFR BLD: 17.9 % (ref 18–48)
MCH RBC QN AUTO: 30.7 PG (ref 27–31)
MCHC RBC AUTO-ENTMCNC: 34 G/DL (ref 32–36)
MCV RBC AUTO: 90 FL (ref 82–98)
MONOCYTES # BLD AUTO: 0.7 K/UL (ref 0.3–1)
MONOCYTES NFR BLD: 6 % (ref 4–15)
NEUTROPHILS # BLD AUTO: 7.9 K/UL (ref 1.8–7.7)
NEUTROPHILS NFR BLD: 73.1 % (ref 38–73)
NRBC BLD-RTO: 0 /100 WBC
PLATELET # BLD AUTO: 250 K/UL (ref 150–350)
PMV BLD AUTO: 8.6 FL (ref 9.2–12.9)
PROT SERPL-MCNC: 6.8 G/DL (ref 6–8.4)
RBC # BLD AUTO: 5.28 M/UL (ref 4–5.4)
WBC # BLD AUTO: 10.78 K/UL (ref 3.9–12.7)

## 2021-01-29 PROCEDURE — 85025 COMPLETE CBC W/AUTO DIFF WBC: CPT

## 2021-01-29 PROCEDURE — 80076 HEPATIC FUNCTION PANEL: CPT

## 2021-02-05 ENCOUNTER — PATIENT MESSAGE (OUTPATIENT)
Dept: NEUROLOGY | Facility: CLINIC | Age: 56
End: 2021-02-05

## 2021-02-08 ENCOUNTER — PATIENT MESSAGE (OUTPATIENT)
Dept: FAMILY MEDICINE | Facility: CLINIC | Age: 56
End: 2021-02-08

## 2021-02-08 DIAGNOSIS — J44.9 COPD MIXED TYPE: ICD-10-CM

## 2021-02-11 ENCOUNTER — PATIENT MESSAGE (OUTPATIENT)
Dept: FAMILY MEDICINE | Facility: CLINIC | Age: 56
End: 2021-02-11

## 2021-02-11 DIAGNOSIS — J44.9 COPD MIXED TYPE: ICD-10-CM

## 2021-02-11 RX ORDER — TIOTROPIUM BROMIDE AND OLODATEROL 3.124; 2.736 UG/1; UG/1
1 SPRAY, METERED RESPIRATORY (INHALATION) DAILY
Qty: 4 G | Refills: 11 | Status: SHIPPED | OUTPATIENT
Start: 2021-02-11 | End: 2021-02-11

## 2021-02-11 RX ORDER — GLYCOPYRROLATE AND FORMOTEROL FUMARATE 9; 4.8 UG/1; UG/1
AEROSOL, METERED RESPIRATORY (INHALATION)
Qty: 10.7 G | Refills: 11 | Status: SHIPPED | OUTPATIENT
Start: 2021-02-11 | End: 2021-02-11 | Stop reason: ALTCHOICE

## 2021-02-11 RX ORDER — GLYCOPYRROLATE AND FORMOTEROL FUMARATE 9; 4.8 UG/1; UG/1
1 AEROSOL, METERED RESPIRATORY (INHALATION) 2 TIMES DAILY
Qty: 1 INHALER | Refills: 11 | Status: SHIPPED | OUTPATIENT
Start: 2021-02-11 | End: 2021-02-11

## 2021-02-22 ENCOUNTER — PATIENT OUTREACH (OUTPATIENT)
Dept: ADMINISTRATIVE | Facility: OTHER | Age: 56
End: 2021-02-22

## 2021-02-25 ENCOUNTER — LAB VISIT (OUTPATIENT)
Dept: LAB | Facility: HOSPITAL | Age: 56
End: 2021-02-25
Attending: PSYCHIATRY & NEUROLOGY
Payer: MEDICARE

## 2021-02-25 DIAGNOSIS — G35 MS (MULTIPLE SCLEROSIS): ICD-10-CM

## 2021-02-25 LAB
ALBUMIN SERPL BCP-MCNC: 3.5 G/DL (ref 3.5–5.2)
ALP SERPL-CCNC: 143 U/L (ref 55–135)
ALT SERPL W/O P-5'-P-CCNC: 28 U/L (ref 10–44)
AST SERPL-CCNC: 25 U/L (ref 10–40)
BASOPHILS # BLD AUTO: 0.07 K/UL (ref 0–0.2)
BASOPHILS NFR BLD: 0.9 % (ref 0–1.9)
BILIRUB DIRECT SERPL-MCNC: 0.1 MG/DL (ref 0.1–0.3)
BILIRUB SERPL-MCNC: 0.3 MG/DL (ref 0.1–1)
DIFFERENTIAL METHOD: ABNORMAL
EOSINOPHIL # BLD AUTO: 0.2 K/UL (ref 0–0.5)
EOSINOPHIL NFR BLD: 2 % (ref 0–8)
ERYTHROCYTE [DISTWIDTH] IN BLOOD BY AUTOMATED COUNT: 14.3 % (ref 11.5–14.5)
HCT VFR BLD AUTO: 46.5 % (ref 37–48.5)
HGB BLD-MCNC: 15.9 G/DL (ref 12–16)
IMM GRANULOCYTES # BLD AUTO: 0.02 K/UL (ref 0–0.04)
IMM GRANULOCYTES NFR BLD AUTO: 0.3 % (ref 0–0.5)
LYMPHOCYTES # BLD AUTO: 1.6 K/UL (ref 1–4.8)
LYMPHOCYTES NFR BLD: 20.2 % (ref 18–48)
MCH RBC QN AUTO: 31.4 PG (ref 27–31)
MCHC RBC AUTO-ENTMCNC: 34.2 G/DL (ref 32–36)
MCV RBC AUTO: 92 FL (ref 82–98)
MONOCYTES # BLD AUTO: 0.5 K/UL (ref 0.3–1)
MONOCYTES NFR BLD: 6.8 % (ref 4–15)
NEUTROPHILS # BLD AUTO: 5.4 K/UL (ref 1.8–7.7)
NEUTROPHILS NFR BLD: 69.8 % (ref 38–73)
NRBC BLD-RTO: 0 /100 WBC
PLATELET # BLD AUTO: 253 K/UL (ref 150–350)
PMV BLD AUTO: 9.2 FL (ref 9.2–12.9)
PROT SERPL-MCNC: 7.1 G/DL (ref 6–8.4)
RBC # BLD AUTO: 5.06 M/UL (ref 4–5.4)
WBC # BLD AUTO: 7.69 K/UL (ref 3.9–12.7)

## 2021-02-25 PROCEDURE — 85025 COMPLETE CBC W/AUTO DIFF WBC: CPT

## 2021-02-25 PROCEDURE — 80076 HEPATIC FUNCTION PANEL: CPT

## 2021-03-02 ENCOUNTER — OFFICE VISIT (OUTPATIENT)
Dept: FAMILY MEDICINE | Facility: CLINIC | Age: 56
End: 2021-03-02
Payer: MEDICARE

## 2021-03-02 ENCOUNTER — LAB VISIT (OUTPATIENT)
Dept: FAMILY MEDICINE | Facility: CLINIC | Age: 56
End: 2021-03-02
Payer: MEDICARE

## 2021-03-02 DIAGNOSIS — R05.9 COUGH: Primary | ICD-10-CM

## 2021-03-02 DIAGNOSIS — R05.9 COUGH: ICD-10-CM

## 2021-03-02 PROCEDURE — 99213 OFFICE O/P EST LOW 20 MIN: CPT | Mod: 95,,, | Performed by: FAMILY MEDICINE

## 2021-03-02 PROCEDURE — 99213 PR OFFICE/OUTPT VISIT, EST, LEVL III, 20-29 MIN: ICD-10-PCS | Mod: 95,,, | Performed by: FAMILY MEDICINE

## 2021-03-02 PROCEDURE — U0003 INFECTIOUS AGENT DETECTION BY NUCLEIC ACID (DNA OR RNA); SEVERE ACUTE RESPIRATORY SYNDROME CORONAVIRUS 2 (SARS-COV-2) (CORONAVIRUS DISEASE [COVID-19]), AMPLIFIED PROBE TECHNIQUE, MAKING USE OF HIGH THROUGHPUT TECHNOLOGIES AS DESCRIBED BY CMS-2020-01-R: HCPCS | Performed by: FAMILY MEDICINE

## 2021-03-02 PROCEDURE — U0005 INFEC AGEN DETEC AMPLI PROBE: HCPCS | Performed by: FAMILY MEDICINE

## 2021-03-02 RX ORDER — AZITHROMYCIN 250 MG/1
TABLET, FILM COATED ORAL
Qty: 6 TABLET | Refills: 0 | Status: SHIPPED | OUTPATIENT
Start: 2021-03-02 | End: 2021-03-07

## 2021-03-04 LAB — SARS-COV-2 RNA RESP QL NAA+PROBE: NOT DETECTED

## 2021-03-08 ENCOUNTER — PATIENT MESSAGE (OUTPATIENT)
Dept: NEUROLOGY | Facility: CLINIC | Age: 56
End: 2021-03-08

## 2021-03-09 ENCOUNTER — PATIENT MESSAGE (OUTPATIENT)
Dept: NEUROLOGY | Facility: CLINIC | Age: 56
End: 2021-03-09

## 2021-03-09 DIAGNOSIS — F40.240 CLAUSTROPHOBIA: ICD-10-CM

## 2021-03-09 DIAGNOSIS — G35 MS (MULTIPLE SCLEROSIS): Primary | ICD-10-CM

## 2021-03-11 RX ORDER — DIAZEPAM 5 MG/1
TABLET ORAL
Qty: 2 TABLET | Refills: 0 | Status: SHIPPED | OUTPATIENT
Start: 2021-03-11 | End: 2021-06-29 | Stop reason: ALTCHOICE

## 2021-03-13 ENCOUNTER — HOSPITAL ENCOUNTER (OUTPATIENT)
Dept: RADIOLOGY | Facility: HOSPITAL | Age: 56
Discharge: HOME | End: 2021-03-13
Attending: PSYCHIATRY & NEUROLOGY
Payer: MEDICARE

## 2021-03-13 DIAGNOSIS — G35 MS (MULTIPLE SCLEROSIS): ICD-10-CM

## 2021-03-13 PROCEDURE — 70551 MRI BRAIN STEM W/O DYE: CPT | Mod: 26,,, | Performed by: RADIOLOGY

## 2021-03-13 PROCEDURE — 70551 MRI BRAIN DEMYELINATING WITHOUT CONTRAST: ICD-10-PCS | Mod: 26,,, | Performed by: RADIOLOGY

## 2021-03-13 PROCEDURE — 70551 MRI BRAIN STEM W/O DYE: CPT | Mod: TC

## 2021-03-15 ENCOUNTER — PATIENT MESSAGE (OUTPATIENT)
Dept: NEUROLOGY | Facility: CLINIC | Age: 56
End: 2021-03-15

## 2021-03-22 ENCOUNTER — LAB VISIT (OUTPATIENT)
Dept: LAB | Facility: HOSPITAL | Age: 56
End: 2021-03-22
Attending: INTERNAL MEDICINE
Payer: MEDICARE

## 2021-03-22 DIAGNOSIS — D50.9 IRON DEFICIENCY ANEMIA, UNSPECIFIED IRON DEFICIENCY ANEMIA TYPE: ICD-10-CM

## 2021-03-22 LAB
BASOPHILS # BLD AUTO: 0.05 K/UL (ref 0–0.2)
BASOPHILS NFR BLD: 0.6 % (ref 0–1.9)
DIFFERENTIAL METHOD: ABNORMAL
EOSINOPHIL # BLD AUTO: 0.2 K/UL (ref 0–0.5)
EOSINOPHIL NFR BLD: 2.3 % (ref 0–8)
ERYTHROCYTE [DISTWIDTH] IN BLOOD BY AUTOMATED COUNT: 13.5 % (ref 11.5–14.5)
FERRITIN SERPL-MCNC: 300 NG/ML (ref 20–300)
HCT VFR BLD AUTO: 47.7 % (ref 37–48.5)
HGB BLD-MCNC: 16.1 G/DL (ref 12–16)
IMM GRANULOCYTES # BLD AUTO: 0.03 K/UL (ref 0–0.04)
IMM GRANULOCYTES NFR BLD AUTO: 0.4 % (ref 0–0.5)
IRON SERPL-MCNC: 78 UG/DL (ref 30–160)
LYMPHOCYTES # BLD AUTO: 1.4 K/UL (ref 1–4.8)
LYMPHOCYTES NFR BLD: 17.3 % (ref 18–48)
MCH RBC QN AUTO: 30.7 PG (ref 27–31)
MCHC RBC AUTO-ENTMCNC: 33.8 G/DL (ref 32–36)
MCV RBC AUTO: 91 FL (ref 82–98)
MONOCYTES # BLD AUTO: 0.4 K/UL (ref 0.3–1)
MONOCYTES NFR BLD: 5.2 % (ref 4–15)
NEUTROPHILS # BLD AUTO: 5.9 K/UL (ref 1.8–7.7)
NEUTROPHILS NFR BLD: 74.2 % (ref 38–73)
NRBC BLD-RTO: 0 /100 WBC
PLATELET # BLD AUTO: 267 K/UL (ref 150–350)
PMV BLD AUTO: 9.2 FL (ref 9.2–12.9)
RBC # BLD AUTO: 5.24 M/UL (ref 4–5.4)
SATURATED IRON: 22 % (ref 20–50)
TOTAL IRON BINDING CAPACITY: 360 UG/DL (ref 250–450)
TRANSFERRIN SERPL-MCNC: 243 MG/DL (ref 200–375)
WBC # BLD AUTO: 7.88 K/UL (ref 3.9–12.7)

## 2021-03-22 PROCEDURE — 85025 COMPLETE CBC W/AUTO DIFF WBC: CPT | Performed by: INTERNAL MEDICINE

## 2021-03-22 PROCEDURE — 83540 ASSAY OF IRON: CPT | Performed by: INTERNAL MEDICINE

## 2021-03-22 PROCEDURE — 82728 ASSAY OF FERRITIN: CPT | Performed by: INTERNAL MEDICINE

## 2021-03-24 ENCOUNTER — OFFICE VISIT (OUTPATIENT)
Dept: HEMATOLOGY/ONCOLOGY | Facility: CLINIC | Age: 56
End: 2021-03-24
Payer: MEDICARE

## 2021-03-24 DIAGNOSIS — G35 MS (MULTIPLE SCLEROSIS): ICD-10-CM

## 2021-03-24 DIAGNOSIS — D50.9 IRON DEFICIENCY ANEMIA, UNSPECIFIED IRON DEFICIENCY ANEMIA TYPE: Primary | ICD-10-CM

## 2021-03-24 PROCEDURE — 99213 OFFICE O/P EST LOW 20 MIN: CPT | Mod: 95,,, | Performed by: INTERNAL MEDICINE

## 2021-03-24 PROCEDURE — 99213 PR OFFICE/OUTPT VISIT, EST, LEVL III, 20-29 MIN: ICD-10-PCS | Mod: 95,,, | Performed by: INTERNAL MEDICINE

## 2021-03-26 ENCOUNTER — PATIENT OUTREACH (OUTPATIENT)
Dept: ADMINISTRATIVE | Facility: OTHER | Age: 56
End: 2021-03-26

## 2021-03-29 ENCOUNTER — OFFICE VISIT (OUTPATIENT)
Dept: NEUROLOGY | Facility: CLINIC | Age: 56
End: 2021-03-29
Payer: MEDICARE

## 2021-03-29 ENCOUNTER — TELEPHONE (OUTPATIENT)
Dept: NEUROLOGY | Facility: CLINIC | Age: 56
End: 2021-03-29

## 2021-03-29 DIAGNOSIS — T88.7XXA MEDICATION SIDE EFFECT: ICD-10-CM

## 2021-03-29 DIAGNOSIS — G35 MULTIPLE SCLEROSIS: Primary | ICD-10-CM

## 2021-03-29 DIAGNOSIS — Z71.89 COUNSELING REGARDING GOALS OF CARE: ICD-10-CM

## 2021-03-29 DIAGNOSIS — M79.2 NEUROPATHIC PAIN: ICD-10-CM

## 2021-03-29 DIAGNOSIS — I10 ESSENTIAL HYPERTENSION: ICD-10-CM

## 2021-03-29 DIAGNOSIS — Z79.899 ENCOUNTER FOR LONG-TERM (CURRENT) USE OF HIGH-RISK MEDICATION: ICD-10-CM

## 2021-03-29 DIAGNOSIS — E55.9 VITAMIN D INSUFFICIENCY: ICD-10-CM

## 2021-03-29 PROCEDURE — 99215 PR OFFICE/OUTPT VISIT, EST, LEVL V, 40-54 MIN: ICD-10-PCS | Mod: 95,,, | Performed by: PHYSICIAN ASSISTANT

## 2021-03-29 PROCEDURE — 99215 OFFICE O/P EST HI 40 MIN: CPT | Mod: 95,,, | Performed by: PHYSICIAN ASSISTANT

## 2021-03-29 NOTE — TELEPHONE ENCOUNTER
----- Message from Lu Good sent at 3/29/2021 10:04 AM CDT -----  Pt called doesn't feel good to come in to her appt this morning wants to know if she can she do virtual asking for a call back    Contact info [phone]

## 2021-03-30 ENCOUNTER — OFFICE VISIT (OUTPATIENT)
Dept: FAMILY MEDICINE | Facility: CLINIC | Age: 56
End: 2021-03-30
Payer: MEDICARE

## 2021-03-30 VITALS
OXYGEN SATURATION: 99 % | HEART RATE: 72 BPM | SYSTOLIC BLOOD PRESSURE: 116 MMHG | WEIGHT: 130.06 LBS | TEMPERATURE: 97 F | DIASTOLIC BLOOD PRESSURE: 60 MMHG | BODY MASS INDEX: 25.53 KG/M2 | HEIGHT: 60 IN

## 2021-03-30 DIAGNOSIS — D84.9 IMMUNOSUPPRESSION: ICD-10-CM

## 2021-03-30 DIAGNOSIS — I10 ESSENTIAL HYPERTENSION: ICD-10-CM

## 2021-03-30 DIAGNOSIS — G35 MS (MULTIPLE SCLEROSIS): ICD-10-CM

## 2021-03-30 DIAGNOSIS — E78.5 HYPERLIPIDEMIA, UNSPECIFIED HYPERLIPIDEMIA TYPE: ICD-10-CM

## 2021-03-30 DIAGNOSIS — M79.2 NEUROPATHIC PAIN OF FOOT, LEFT: ICD-10-CM

## 2021-03-30 DIAGNOSIS — F32.A DEPRESSION, UNSPECIFIED DEPRESSION TYPE: ICD-10-CM

## 2021-03-30 DIAGNOSIS — G35 MULTIPLE SCLEROSIS: ICD-10-CM

## 2021-03-30 DIAGNOSIS — M79.2 NEUROPATHIC PAIN OF FOOT, RIGHT: ICD-10-CM

## 2021-03-30 PROCEDURE — 99214 OFFICE O/P EST MOD 30 MIN: CPT | Mod: S$GLB,,, | Performed by: FAMILY MEDICINE

## 2021-03-30 PROCEDURE — 3078F DIAST BP <80 MM HG: CPT | Mod: CPTII,S$GLB,, | Performed by: FAMILY MEDICINE

## 2021-03-30 PROCEDURE — 3008F PR BODY MASS INDEX (BMI) DOCUMENTED: ICD-10-PCS | Mod: CPTII,S$GLB,, | Performed by: FAMILY MEDICINE

## 2021-03-30 PROCEDURE — 3074F SYST BP LT 130 MM HG: CPT | Mod: CPTII,S$GLB,, | Performed by: FAMILY MEDICINE

## 2021-03-30 PROCEDURE — 99999 PR PBB SHADOW E&M-EST. PATIENT-LVL IV: ICD-10-PCS | Mod: PBBFAC,,, | Performed by: FAMILY MEDICINE

## 2021-03-30 PROCEDURE — 3074F PR MOST RECENT SYSTOLIC BLOOD PRESSURE < 130 MM HG: ICD-10-PCS | Mod: CPTII,S$GLB,, | Performed by: FAMILY MEDICINE

## 2021-03-30 PROCEDURE — 1126F PR PAIN SEVERITY QUANTIFIED, NO PAIN PRESENT: ICD-10-PCS | Mod: S$GLB,,, | Performed by: FAMILY MEDICINE

## 2021-03-30 PROCEDURE — 99214 PR OFFICE/OUTPT VISIT, EST, LEVL IV, 30-39 MIN: ICD-10-PCS | Mod: S$GLB,,, | Performed by: FAMILY MEDICINE

## 2021-03-30 PROCEDURE — 3078F PR MOST RECENT DIASTOLIC BLOOD PRESSURE < 80 MM HG: ICD-10-PCS | Mod: CPTII,S$GLB,, | Performed by: FAMILY MEDICINE

## 2021-03-30 PROCEDURE — 99999 PR PBB SHADOW E&M-EST. PATIENT-LVL IV: CPT | Mod: PBBFAC,,, | Performed by: FAMILY MEDICINE

## 2021-03-30 PROCEDURE — 3008F BODY MASS INDEX DOCD: CPT | Mod: CPTII,S$GLB,, | Performed by: FAMILY MEDICINE

## 2021-03-30 PROCEDURE — 1126F AMNT PAIN NOTED NONE PRSNT: CPT | Mod: S$GLB,,, | Performed by: FAMILY MEDICINE

## 2021-03-30 RX ORDER — TRIAMTERENE/HYDROCHLOROTHIAZID 37.5-25 MG
1 TABLET ORAL EVERY OTHER DAY
Qty: 45 TABLET | Refills: 3 | Status: SHIPPED | OUTPATIENT
Start: 2021-03-30 | End: 2022-06-02

## 2021-03-30 RX ORDER — HYDROCODONE BITARTRATE AND ACETAMINOPHEN 10; 325 MG/1; MG/1
1 TABLET ORAL EVERY 6 HOURS PRN
Qty: 60 TABLET | Refills: 0 | Status: SHIPPED | OUTPATIENT
Start: 2021-04-29 | End: 2021-05-29

## 2021-03-30 RX ORDER — GABAPENTIN 400 MG/1
800 CAPSULE ORAL NIGHTLY
Qty: 180 CAPSULE | Refills: 3 | Status: SHIPPED | OUTPATIENT
Start: 2021-03-30 | End: 2022-05-06

## 2021-03-30 RX ORDER — ESCITALOPRAM OXALATE 20 MG/1
20 TABLET ORAL DAILY
Qty: 90 TABLET | Refills: 3 | Status: SHIPPED | OUTPATIENT
Start: 2021-03-30 | End: 2021-10-06 | Stop reason: SDUPTHER

## 2021-03-30 RX ORDER — METOPROLOL SUCCINATE 100 MG/1
100 TABLET, EXTENDED RELEASE ORAL DAILY
Qty: 90 TABLET | Refills: 3 | Status: SHIPPED | OUTPATIENT
Start: 2021-03-30 | End: 2021-10-06 | Stop reason: SDUPTHER

## 2021-03-30 RX ORDER — HYDROCODONE BITARTRATE AND ACETAMINOPHEN 10; 325 MG/1; MG/1
1 TABLET ORAL EVERY 6 HOURS PRN
Qty: 60 TABLET | Refills: 0 | Status: SHIPPED | OUTPATIENT
Start: 2021-03-30 | End: 2021-10-06 | Stop reason: SDUPTHER

## 2021-03-30 RX ORDER — SIMVASTATIN 40 MG/1
40 TABLET, FILM COATED ORAL NIGHTLY
Qty: 90 TABLET | Refills: 3 | Status: SHIPPED | OUTPATIENT
Start: 2021-03-30 | End: 2021-10-06 | Stop reason: SDUPTHER

## 2021-03-30 RX ORDER — HYDROCODONE BITARTRATE AND ACETAMINOPHEN 10; 325 MG/1; MG/1
1 TABLET ORAL EVERY 6 HOURS PRN
Qty: 60 TABLET | Refills: 0 | Status: SHIPPED | OUTPATIENT
Start: 2021-05-29 | End: 2021-06-28

## 2021-03-30 RX ORDER — VALACYCLOVIR HYDROCHLORIDE 1 G/1
1000 TABLET, FILM COATED ORAL 3 TIMES DAILY
COMMUNITY
Start: 2021-01-29 | End: 2021-06-29 | Stop reason: ALTCHOICE

## 2021-03-30 RX ORDER — METHOCARBAMOL 750 MG/1
1500 TABLET, FILM COATED ORAL 4 TIMES DAILY
Qty: 80 TABLET | Refills: 1 | Status: SHIPPED | OUTPATIENT
Start: 2021-03-30 | End: 2021-05-31

## 2021-03-30 RX ORDER — GABAPENTIN 600 MG/1
TABLET ORAL
Qty: 270 TABLET | Refills: 3 | Status: SHIPPED | OUTPATIENT
Start: 2021-03-30 | End: 2021-10-06 | Stop reason: SDUPTHER

## 2021-03-31 PROBLEM — D84.9 IMMUNOSUPPRESSION: Status: ACTIVE | Noted: 2021-03-31

## 2021-04-01 ENCOUNTER — TELEPHONE (OUTPATIENT)
Dept: NEUROLOGY | Facility: CLINIC | Age: 56
End: 2021-04-01

## 2021-04-08 ENCOUNTER — TELEPHONE (OUTPATIENT)
Dept: NEUROLOGY | Facility: CLINIC | Age: 56
End: 2021-04-08

## 2021-04-08 NOTE — TELEPHONE ENCOUNTER
----- Message from Makayla Swift PA-C sent at 3/29/2021  1:27 PM CDT -----  Please initiate Avonex for her(without auto injector), no washout necessary(I spoke with Dr. Mendez); however, when she messages us in a week if she is not improving we can consider washout with cholestyramine

## 2021-04-28 ENCOUNTER — PATIENT MESSAGE (OUTPATIENT)
Dept: NEUROLOGY | Facility: CLINIC | Age: 56
End: 2021-04-28

## 2021-04-28 DIAGNOSIS — G35 MULTIPLE SCLEROSIS: Primary | ICD-10-CM

## 2021-04-30 RX ORDER — CHOLESTYRAMINE 4 G/9G
POWDER, FOR SUSPENSION ORAL
Qty: 132 G | Refills: 0 | Status: SHIPPED | OUTPATIENT
Start: 2021-04-30 | End: 2021-06-29 | Stop reason: ALTCHOICE

## 2021-05-01 ENCOUNTER — PATIENT OUTREACH (OUTPATIENT)
Dept: ADMINISTRATIVE | Facility: OTHER | Age: 56
End: 2021-05-01

## 2021-05-04 ENCOUNTER — HOSPITAL ENCOUNTER (OUTPATIENT)
Dept: RADIOLOGY | Facility: HOSPITAL | Age: 56
Discharge: HOME | End: 2021-05-04
Attending: PODIATRIST
Payer: MEDICARE

## 2021-05-04 ENCOUNTER — OFFICE VISIT (OUTPATIENT)
Dept: PODIATRY | Facility: CLINIC | Age: 56
End: 2021-05-04
Payer: MEDICARE

## 2021-05-04 VITALS — WEIGHT: 130 LBS | HEIGHT: 60 IN | BODY MASS INDEX: 25.52 KG/M2

## 2021-05-04 DIAGNOSIS — M25.572 CHRONIC PAIN OF LEFT ANKLE: ICD-10-CM

## 2021-05-04 DIAGNOSIS — M79.89 SOFT TISSUE MASS: ICD-10-CM

## 2021-05-04 DIAGNOSIS — M79.89 SOFT TISSUE MASS: Primary | ICD-10-CM

## 2021-05-04 DIAGNOSIS — R22.42 LOCALIZED SWELLING, MASS, OR LUMP OF LEFT LOWER EXTREMITY: ICD-10-CM

## 2021-05-04 DIAGNOSIS — G89.29 CHRONIC PAIN OF LEFT ANKLE: ICD-10-CM

## 2021-05-04 PROCEDURE — 3008F BODY MASS INDEX DOCD: CPT | Mod: CPTII,S$GLB,, | Performed by: PODIATRIST

## 2021-05-04 PROCEDURE — 73610 XR ANKLE COMPLETE 3 VIEW LEFT: ICD-10-PCS | Mod: 26,LT,, | Performed by: RADIOLOGY

## 2021-05-04 PROCEDURE — 1126F AMNT PAIN NOTED NONE PRSNT: CPT | Mod: S$GLB,,, | Performed by: PODIATRIST

## 2021-05-04 PROCEDURE — 99204 OFFICE O/P NEW MOD 45 MIN: CPT | Mod: S$GLB,,, | Performed by: PODIATRIST

## 2021-05-04 PROCEDURE — 73610 X-RAY EXAM OF ANKLE: CPT | Mod: 26,LT,, | Performed by: RADIOLOGY

## 2021-05-04 PROCEDURE — 1126F PR PAIN SEVERITY QUANTIFIED, NO PAIN PRESENT: ICD-10-PCS | Mod: S$GLB,,, | Performed by: PODIATRIST

## 2021-05-04 PROCEDURE — 73610 X-RAY EXAM OF ANKLE: CPT | Mod: TC,FY,PO,LT

## 2021-05-04 PROCEDURE — 99999 PR PBB SHADOW E&M-EST. PATIENT-LVL III: ICD-10-PCS | Mod: PBBFAC,,, | Performed by: PODIATRIST

## 2021-05-04 PROCEDURE — 99999 PR PBB SHADOW E&M-EST. PATIENT-LVL III: CPT | Mod: PBBFAC,,, | Performed by: PODIATRIST

## 2021-05-04 PROCEDURE — 99204 PR OFFICE/OUTPT VISIT, NEW, LEVL IV, 45-59 MIN: ICD-10-PCS | Mod: S$GLB,,, | Performed by: PODIATRIST

## 2021-05-04 PROCEDURE — 3008F PR BODY MASS INDEX (BMI) DOCUMENTED: ICD-10-PCS | Mod: CPTII,S$GLB,, | Performed by: PODIATRIST

## 2021-05-11 RX ORDER — MUPIROCIN 20 MG/G
1 OINTMENT TOPICAL DAILY
Qty: 30 G | Refills: 1 | Status: SHIPPED | OUTPATIENT
Start: 2021-05-11 | End: 2021-10-06

## 2021-05-18 ENCOUNTER — HOSPITAL ENCOUNTER (OUTPATIENT)
Dept: RADIOLOGY | Facility: HOSPITAL | Age: 56
Discharge: HOME | End: 2021-05-18
Attending: PODIATRIST
Payer: MEDICARE

## 2021-05-18 DIAGNOSIS — G89.29 CHRONIC PAIN OF LEFT ANKLE: ICD-10-CM

## 2021-05-18 DIAGNOSIS — R22.42 LOCALIZED SWELLING, MASS, OR LUMP OF LEFT LOWER EXTREMITY: ICD-10-CM

## 2021-05-18 DIAGNOSIS — M79.89 SOFT TISSUE MASS: Primary | ICD-10-CM

## 2021-05-18 DIAGNOSIS — M25.572 CHRONIC PAIN OF LEFT ANKLE: ICD-10-CM

## 2021-05-19 ENCOUNTER — HOSPITAL ENCOUNTER (OUTPATIENT)
Dept: RADIOLOGY | Facility: HOSPITAL | Age: 56
Discharge: HOME | End: 2021-05-19
Attending: PODIATRIST
Payer: MEDICARE

## 2021-05-19 DIAGNOSIS — R22.42 LOCALIZED SWELLING, MASS, OR LUMP OF LEFT LOWER EXTREMITY: ICD-10-CM

## 2021-05-19 PROCEDURE — A9585 GADOBUTROL INJECTION: HCPCS | Performed by: PODIATRIST

## 2021-05-19 PROCEDURE — 73720 MRI LWR EXTREMITY W/O&W/DYE: CPT | Mod: 26,LT,, | Performed by: RADIOLOGY

## 2021-05-19 PROCEDURE — 25500020 PHARM REV CODE 255: Performed by: PODIATRIST

## 2021-05-19 PROCEDURE — 73720 MRI LWR EXTREMITY W/O&W/DYE: CPT | Mod: TC,LT

## 2021-05-19 PROCEDURE — 73720 MRI FOOT (HINDFOOT) LEFT W W/O CONTRAST: ICD-10-PCS | Mod: 26,LT,, | Performed by: RADIOLOGY

## 2021-05-19 RX ORDER — GADOBUTROL 604.72 MG/ML
6 INJECTION INTRAVENOUS
Status: COMPLETED | OUTPATIENT
Start: 2021-05-19 | End: 2021-05-19

## 2021-05-19 RX ADMIN — GADOBUTROL 6 ML: 604.72 INJECTION INTRAVENOUS at 07:05

## 2021-05-24 ENCOUNTER — OFFICE VISIT (OUTPATIENT)
Dept: PODIATRY | Facility: CLINIC | Age: 56
End: 2021-05-24
Payer: MEDICARE

## 2021-05-24 VITALS — BODY MASS INDEX: 25.53 KG/M2 | WEIGHT: 130.06 LBS | HEIGHT: 60 IN

## 2021-05-24 DIAGNOSIS — M79.89 SOFT TISSUE MASS: ICD-10-CM

## 2021-05-24 DIAGNOSIS — R22.42 LOCALIZED SWELLING, MASS, OR LUMP OF LEFT LOWER EXTREMITY: ICD-10-CM

## 2021-05-24 DIAGNOSIS — G89.29 CHRONIC PAIN OF LEFT ANKLE: Primary | ICD-10-CM

## 2021-05-24 DIAGNOSIS — M25.572 CHRONIC PAIN OF LEFT ANKLE: Primary | ICD-10-CM

## 2021-05-24 PROCEDURE — 1126F PR PAIN SEVERITY QUANTIFIED, NO PAIN PRESENT: ICD-10-PCS | Mod: S$GLB,,, | Performed by: PODIATRIST

## 2021-05-24 PROCEDURE — 1126F AMNT PAIN NOTED NONE PRSNT: CPT | Mod: S$GLB,,, | Performed by: PODIATRIST

## 2021-05-24 PROCEDURE — 99214 PR OFFICE/OUTPT VISIT, EST, LEVL IV, 30-39 MIN: ICD-10-PCS | Mod: S$GLB,,, | Performed by: PODIATRIST

## 2021-05-24 PROCEDURE — 3008F PR BODY MASS INDEX (BMI) DOCUMENTED: ICD-10-PCS | Mod: CPTII,S$GLB,, | Performed by: PODIATRIST

## 2021-05-24 PROCEDURE — 99214 OFFICE O/P EST MOD 30 MIN: CPT | Mod: S$GLB,,, | Performed by: PODIATRIST

## 2021-05-24 PROCEDURE — 99999 PR PBB SHADOW E&M-EST. PATIENT-LVL V: CPT | Mod: PBBFAC,,, | Performed by: PODIATRIST

## 2021-05-24 PROCEDURE — 3008F BODY MASS INDEX DOCD: CPT | Mod: CPTII,S$GLB,, | Performed by: PODIATRIST

## 2021-05-24 PROCEDURE — 99999 PR PBB SHADOW E&M-EST. PATIENT-LVL V: ICD-10-PCS | Mod: PBBFAC,,, | Performed by: PODIATRIST

## 2021-05-28 ENCOUNTER — LAB VISIT (OUTPATIENT)
Dept: LAB | Facility: HOSPITAL | Age: 56
End: 2021-05-28
Attending: PSYCHIATRY & NEUROLOGY
Payer: MEDICARE

## 2021-05-28 DIAGNOSIS — G35 MULTIPLE SCLEROSIS: ICD-10-CM

## 2021-05-28 DIAGNOSIS — Z79.899 ENCOUNTER FOR LONG-TERM (CURRENT) USE OF HIGH-RISK MEDICATION: ICD-10-CM

## 2021-05-28 LAB
ALBUMIN SERPL BCP-MCNC: 3.3 G/DL (ref 3.5–5.2)
ALP SERPL-CCNC: 119 U/L (ref 55–135)
ALT SERPL W/O P-5'-P-CCNC: 15 U/L (ref 10–44)
AST SERPL-CCNC: 14 U/L (ref 10–40)
BASOPHILS # BLD AUTO: 0.04 K/UL (ref 0–0.2)
BASOPHILS NFR BLD: 0.6 % (ref 0–1.9)
BILIRUB DIRECT SERPL-MCNC: 0.2 MG/DL (ref 0.1–0.3)
BILIRUB SERPL-MCNC: 0.4 MG/DL (ref 0.1–1)
DIFFERENTIAL METHOD: NORMAL
EOSINOPHIL # BLD AUTO: 0.2 K/UL (ref 0–0.5)
EOSINOPHIL NFR BLD: 2.3 % (ref 0–8)
ERYTHROCYTE [DISTWIDTH] IN BLOOD BY AUTOMATED COUNT: 13.2 % (ref 11.5–14.5)
HCT VFR BLD AUTO: 43 % (ref 37–48.5)
HGB BLD-MCNC: 14.5 G/DL (ref 12–16)
IMM GRANULOCYTES # BLD AUTO: 0.02 K/UL (ref 0–0.04)
IMM GRANULOCYTES NFR BLD AUTO: 0.3 % (ref 0–0.5)
LYMPHOCYTES # BLD AUTO: 1.5 K/UL (ref 1–4.8)
LYMPHOCYTES NFR BLD: 21.9 % (ref 18–48)
MCH RBC QN AUTO: 30.3 PG (ref 27–31)
MCHC RBC AUTO-ENTMCNC: 33.7 G/DL (ref 32–36)
MCV RBC AUTO: 90 FL (ref 82–98)
MONOCYTES # BLD AUTO: 0.4 K/UL (ref 0.3–1)
MONOCYTES NFR BLD: 5.7 % (ref 4–15)
NEUTROPHILS # BLD AUTO: 4.6 K/UL (ref 1.8–7.7)
NEUTROPHILS NFR BLD: 69.2 % (ref 38–73)
NRBC BLD-RTO: 0 /100 WBC
PLATELET # BLD AUTO: 224 K/UL (ref 150–450)
PMV BLD AUTO: 9.6 FL (ref 9.2–12.9)
PROT SERPL-MCNC: 6.2 G/DL (ref 6–8.4)
RBC # BLD AUTO: 4.79 M/UL (ref 4–5.4)
WBC # BLD AUTO: 6.61 K/UL (ref 3.9–12.7)

## 2021-05-28 PROCEDURE — 85025 COMPLETE CBC W/AUTO DIFF WBC: CPT | Performed by: PHYSICIAN ASSISTANT

## 2021-05-28 PROCEDURE — 80076 HEPATIC FUNCTION PANEL: CPT | Performed by: PHYSICIAN ASSISTANT

## 2021-05-31 ENCOUNTER — PATIENT MESSAGE (OUTPATIENT)
Dept: PSYCHIATRY | Facility: CLINIC | Age: 56
End: 2021-05-31

## 2021-06-03 ENCOUNTER — PATIENT MESSAGE (OUTPATIENT)
Dept: NEUROLOGY | Facility: CLINIC | Age: 56
End: 2021-06-03

## 2021-06-28 ENCOUNTER — PATIENT MESSAGE (OUTPATIENT)
Dept: ADMINISTRATIVE | Facility: OTHER | Age: 56
End: 2021-06-28

## 2021-06-28 ENCOUNTER — PATIENT OUTREACH (OUTPATIENT)
Dept: ADMINISTRATIVE | Facility: OTHER | Age: 56
End: 2021-06-28

## 2021-06-28 ENCOUNTER — LAB VISIT (OUTPATIENT)
Dept: LAB | Facility: HOSPITAL | Age: 56
End: 2021-06-28
Attending: INTERNAL MEDICINE
Payer: MEDICARE

## 2021-06-28 DIAGNOSIS — D50.9 IRON DEFICIENCY ANEMIA, UNSPECIFIED IRON DEFICIENCY ANEMIA TYPE: ICD-10-CM

## 2021-06-28 LAB
BASOPHILS # BLD AUTO: 0.05 K/UL (ref 0–0.2)
BASOPHILS NFR BLD: 0.6 % (ref 0–1.9)
DIFFERENTIAL METHOD: ABNORMAL
EOSINOPHIL # BLD AUTO: 0.2 K/UL (ref 0–0.5)
EOSINOPHIL NFR BLD: 2.4 % (ref 0–8)
ERYTHROCYTE [DISTWIDTH] IN BLOOD BY AUTOMATED COUNT: 13.5 % (ref 11.5–14.5)
FERRITIN SERPL-MCNC: 184 NG/ML (ref 20–300)
HCT VFR BLD AUTO: 44.3 % (ref 37–48.5)
HGB BLD-MCNC: 15.1 G/DL (ref 12–16)
IMM GRANULOCYTES # BLD AUTO: 0.03 K/UL (ref 0–0.04)
IMM GRANULOCYTES NFR BLD AUTO: 0.4 % (ref 0–0.5)
IRON SERPL-MCNC: 68 UG/DL (ref 30–160)
LYMPHOCYTES # BLD AUTO: 1.5 K/UL (ref 1–4.8)
LYMPHOCYTES NFR BLD: 17.7 % (ref 18–48)
MCH RBC QN AUTO: 30.8 PG (ref 27–31)
MCHC RBC AUTO-ENTMCNC: 34.1 G/DL (ref 32–36)
MCV RBC AUTO: 90 FL (ref 82–98)
MONOCYTES # BLD AUTO: 0.3 K/UL (ref 0.3–1)
MONOCYTES NFR BLD: 4.1 % (ref 4–15)
NEUTROPHILS # BLD AUTO: 6.3 K/UL (ref 1.8–7.7)
NEUTROPHILS NFR BLD: 74.8 % (ref 38–73)
NRBC BLD-RTO: 0 /100 WBC
PLATELET # BLD AUTO: 227 K/UL (ref 150–450)
PMV BLD AUTO: 9.5 FL (ref 9.2–12.9)
RBC # BLD AUTO: 4.9 M/UL (ref 4–5.4)
SATURATED IRON: 21 % (ref 20–50)
TOTAL IRON BINDING CAPACITY: 326 UG/DL (ref 250–450)
TRANSFERRIN SERPL-MCNC: 220 MG/DL (ref 200–375)
WBC # BLD AUTO: 8.36 K/UL (ref 3.9–12.7)

## 2021-06-28 PROCEDURE — 85025 COMPLETE CBC W/AUTO DIFF WBC: CPT | Performed by: INTERNAL MEDICINE

## 2021-06-28 PROCEDURE — 83540 ASSAY OF IRON: CPT | Performed by: INTERNAL MEDICINE

## 2021-06-28 PROCEDURE — 36415 COLL VENOUS BLD VENIPUNCTURE: CPT | Mod: PO | Performed by: INTERNAL MEDICINE

## 2021-06-28 PROCEDURE — 82728 ASSAY OF FERRITIN: CPT | Performed by: INTERNAL MEDICINE

## 2021-06-29 ENCOUNTER — OFFICE VISIT (OUTPATIENT)
Dept: HEMATOLOGY/ONCOLOGY | Facility: CLINIC | Age: 56
End: 2021-06-29
Payer: MEDICARE

## 2021-06-29 ENCOUNTER — SPECIALTY PHARMACY (OUTPATIENT)
Dept: PHARMACY | Facility: CLINIC | Age: 56
End: 2021-06-29

## 2021-06-29 ENCOUNTER — OFFICE VISIT (OUTPATIENT)
Dept: NEUROLOGY | Facility: CLINIC | Age: 56
End: 2021-06-29
Payer: MEDICARE

## 2021-06-29 VITALS
BODY MASS INDEX: 25.78 KG/M2 | DIASTOLIC BLOOD PRESSURE: 69 MMHG | HEART RATE: 56 BPM | HEIGHT: 60 IN | WEIGHT: 131.31 LBS | SYSTOLIC BLOOD PRESSURE: 111 MMHG

## 2021-06-29 DIAGNOSIS — H53.50 UNSPECIFIED COLOR VISION DEFICIENCIES: Primary | ICD-10-CM

## 2021-06-29 DIAGNOSIS — M79.2 NEUROPATHIC PAIN: ICD-10-CM

## 2021-06-29 DIAGNOSIS — Z71.89 COUNSELING REGARDING GOALS OF CARE: ICD-10-CM

## 2021-06-29 DIAGNOSIS — G35 MULTIPLE SCLEROSIS: Primary | ICD-10-CM

## 2021-06-29 DIAGNOSIS — Z29.89 PROPHYLACTIC IMMUNOTHERAPY: ICD-10-CM

## 2021-06-29 DIAGNOSIS — G35 MULTIPLE SCLEROSIS: ICD-10-CM

## 2021-06-29 DIAGNOSIS — D50.9 IRON DEFICIENCY ANEMIA, UNSPECIFIED IRON DEFICIENCY ANEMIA TYPE: Primary | ICD-10-CM

## 2021-06-29 DIAGNOSIS — E55.9 VITAMIN D DEFICIENCY: ICD-10-CM

## 2021-06-29 PROCEDURE — 99215 OFFICE O/P EST HI 40 MIN: CPT | Mod: S$GLB,,, | Performed by: PSYCHIATRY & NEUROLOGY

## 2021-06-29 PROCEDURE — 1126F AMNT PAIN NOTED NONE PRSNT: CPT | Mod: S$GLB,,, | Performed by: PSYCHIATRY & NEUROLOGY

## 2021-06-29 PROCEDURE — 99215 PR OFFICE/OUTPT VISIT, EST, LEVL V, 40-54 MIN: ICD-10-PCS | Mod: S$GLB,,, | Performed by: PSYCHIATRY & NEUROLOGY

## 2021-06-29 PROCEDURE — 99999 PR PBB SHADOW E&M-EST. PATIENT-LVL IV: CPT | Mod: PBBFAC,,, | Performed by: PSYCHIATRY & NEUROLOGY

## 2021-06-29 PROCEDURE — 3008F PR BODY MASS INDEX (BMI) DOCUMENTED: ICD-10-PCS | Mod: CPTII,S$GLB,, | Performed by: PSYCHIATRY & NEUROLOGY

## 2021-06-29 PROCEDURE — 99999 PR PBB SHADOW E&M-EST. PATIENT-LVL IV: ICD-10-PCS | Mod: PBBFAC,,, | Performed by: PSYCHIATRY & NEUROLOGY

## 2021-06-29 PROCEDURE — 1126F PR PAIN SEVERITY QUANTIFIED, NO PAIN PRESENT: ICD-10-PCS | Mod: S$GLB,,, | Performed by: PSYCHIATRY & NEUROLOGY

## 2021-06-29 PROCEDURE — 99213 OFFICE O/P EST LOW 20 MIN: CPT | Mod: 95,,, | Performed by: INTERNAL MEDICINE

## 2021-06-29 PROCEDURE — 99213 PR OFFICE/OUTPT VISIT, EST, LEVL III, 20-29 MIN: ICD-10-PCS | Mod: 95,,, | Performed by: INTERNAL MEDICINE

## 2021-06-29 PROCEDURE — 3008F BODY MASS INDEX DOCD: CPT | Mod: CPTII,S$GLB,, | Performed by: PSYCHIATRY & NEUROLOGY

## 2021-06-29 RX ORDER — INTERFERON BETA-1A 30MCG/.5ML
KIT INTRAMUSCULAR
Qty: 2 ML | Refills: 0 | Status: SHIPPED | OUTPATIENT
Start: 2021-06-29 | End: 2021-09-23

## 2021-06-29 RX ORDER — INTERFERON BETA-1A 30MCG/.5ML
30 KIT INTRAMUSCULAR
Qty: 2 ML | Refills: 1 | Status: SHIPPED | OUTPATIENT
Start: 2021-06-29 | End: 2021-09-23

## 2021-07-06 ENCOUNTER — PATIENT MESSAGE (OUTPATIENT)
Dept: ADMINISTRATIVE | Facility: HOSPITAL | Age: 56
End: 2021-07-06

## 2021-07-07 NOTE — Clinical Note
Patient called has some concerning questions regarding his [phone]   Refer to Luisa Delgadillo;

## 2021-07-26 ENCOUNTER — PATIENT MESSAGE (OUTPATIENT)
Dept: NEUROLOGY | Facility: CLINIC | Age: 56
End: 2021-07-26

## 2021-08-16 ENCOUNTER — HOSPITAL ENCOUNTER (OUTPATIENT)
Dept: RADIOLOGY | Facility: HOSPITAL | Age: 56
Discharge: HOME | End: 2021-08-16
Attending: OBSTETRICS & GYNECOLOGY
Payer: MEDICARE

## 2021-08-16 VITALS — WEIGHT: 135 LBS | BODY MASS INDEX: 26.5 KG/M2 | HEIGHT: 60 IN

## 2021-08-16 DIAGNOSIS — Z12.31 ENCOUNTER FOR SCREENING MAMMOGRAM FOR BREAST CANCER: ICD-10-CM

## 2021-08-16 PROCEDURE — 77063 MAMMO DIGITAL SCREENING BILAT WITH TOMO: ICD-10-PCS | Mod: 26,,, | Performed by: RADIOLOGY

## 2021-08-16 PROCEDURE — 77063 BREAST TOMOSYNTHESIS BI: CPT | Mod: 26,,, | Performed by: RADIOLOGY

## 2021-08-16 PROCEDURE — 77067 MAMMO DIGITAL SCREENING BILAT WITH TOMO: ICD-10-PCS | Mod: 26,,, | Performed by: RADIOLOGY

## 2021-08-16 PROCEDURE — 77067 SCR MAMMO BI INCL CAD: CPT | Mod: TC

## 2021-08-16 PROCEDURE — 77067 SCR MAMMO BI INCL CAD: CPT | Mod: 26,,, | Performed by: RADIOLOGY

## 2021-08-17 ENCOUNTER — PATIENT MESSAGE (OUTPATIENT)
Dept: OBSTETRICS AND GYNECOLOGY | Facility: CLINIC | Age: 56
End: 2021-08-17

## 2021-08-17 ENCOUNTER — DOCUMENTATION ONLY (OUTPATIENT)
Dept: NEUROLOGY | Facility: CLINIC | Age: 56
End: 2021-08-17

## 2021-08-17 NOTE — PROGRESS NOTES
Notified via fax from [employer] that pt injected her first dose of Avonex on 8/2/21.

## 2021-09-01 ENCOUNTER — PATIENT MESSAGE (OUTPATIENT)
Dept: PSYCHIATRY | Facility: CLINIC | Age: 56
End: 2021-09-01

## 2021-09-02 ENCOUNTER — PATIENT MESSAGE (OUTPATIENT)
Dept: PSYCHIATRY | Facility: CLINIC | Age: 56
End: 2021-09-02

## 2021-09-04 ENCOUNTER — PATIENT MESSAGE (OUTPATIENT)
Dept: NEUROLOGY | Facility: CLINIC | Age: 56
End: 2021-09-04

## 2021-09-24 DIAGNOSIS — G35 MULTIPLE SCLEROSIS: ICD-10-CM

## 2021-09-24 NOTE — TELEPHONE ENCOUNTER
----- Message from Ember Delgado sent at 9/24/2021  3:51 PM CDT -----  Regarding: Refill Request  Please refill the medication(s) listed below :[facility] ([facility]) states this is there 3rd time requesting this medication for patient please contact to further discuss.    Medication # 1 :interferon beta-1a (AVONEX) 30 mcg/0.5 mL PnKt    Please call the patient when the prescription is sent to pharmacy :[phone]    Can the clinic reply in [facility] :No     Preferred Pharmacy : [phone] option 2

## 2021-09-25 RX ORDER — INTERFERON BETA-1A 30MCG/.5ML
30 KIT INTRAMUSCULAR
Qty: 2 ML | Refills: 1 | Status: SHIPPED | OUTPATIENT
Start: 2021-09-25 | End: 2021-09-27 | Stop reason: SDUPTHER

## 2021-09-27 DIAGNOSIS — G35 MULTIPLE SCLEROSIS: ICD-10-CM

## 2021-09-27 NOTE — TELEPHONE ENCOUNTER
Per patient, refill request for medication Avonex was sent to Dr. Collier by accident.  It should have been sent to neurologist.  Advised patient that I would forward request to neurologist.  Please be advised.

## 2021-09-27 NOTE — TELEPHONE ENCOUNTER
----- Message from Hali Ruiz sent at 9/27/2021 12:27 PM CDT -----  Regarding: Patient Access  Contact: Pt [phone]  Patient pharmacy called to inform that they do not fill interferon beta-1a (AVONEX) 30 mcg/0.5 mL PnKt, Please contact patient and send to another pharmacy. Thank you.

## 2021-09-28 RX ORDER — INTERFERON BETA-1A 30MCG/.5ML
30 KIT INTRAMUSCULAR
Qty: 2 ML | Refills: 1 | Status: SHIPPED | OUTPATIENT
Start: 2021-09-28 | End: 2022-07-01 | Stop reason: SDUPTHER

## 2021-09-28 RX ORDER — INTERFERON BETA-1A 30MCG/.5ML
30 KIT INTRAMUSCULAR
Qty: 2 ML | Refills: 5 | Status: SHIPPED | OUTPATIENT
Start: 2021-09-28 | End: 2022-10-22 | Stop reason: SDUPTHER

## 2021-10-05 ENCOUNTER — TELEPHONE (OUTPATIENT)
Dept: HEMATOLOGY/ONCOLOGY | Facility: CLINIC | Age: 56
End: 2021-10-05

## 2021-10-06 ENCOUNTER — OFFICE VISIT (OUTPATIENT)
Dept: FAMILY MEDICINE | Facility: CLINIC | Age: 56
End: 2021-10-06
Payer: MEDICARE

## 2021-10-06 ENCOUNTER — LAB VISIT (OUTPATIENT)
Dept: LAB | Facility: HOSPITAL | Age: 56
End: 2021-10-06
Attending: FAMILY MEDICINE
Payer: MEDICARE

## 2021-10-06 VITALS
TEMPERATURE: 98 F | BODY MASS INDEX: 25.97 KG/M2 | OXYGEN SATURATION: 98 % | DIASTOLIC BLOOD PRESSURE: 60 MMHG | SYSTOLIC BLOOD PRESSURE: 108 MMHG | WEIGHT: 132.25 LBS | HEART RATE: 67 BPM | HEIGHT: 60 IN

## 2021-10-06 DIAGNOSIS — R25.2 SPASTICITY: ICD-10-CM

## 2021-10-06 DIAGNOSIS — M79.2 NEUROPATHIC PAIN: ICD-10-CM

## 2021-10-06 DIAGNOSIS — M79.2 NEURALGIA AND NEURITIS: ICD-10-CM

## 2021-10-06 DIAGNOSIS — R30.0 DYSURIA: Primary | ICD-10-CM

## 2021-10-06 DIAGNOSIS — R30.0 DYSURIA: ICD-10-CM

## 2021-10-06 DIAGNOSIS — I10 ESSENTIAL HYPERTENSION: ICD-10-CM

## 2021-10-06 DIAGNOSIS — M79.2 NEUROPATHIC PAIN OF FOOT, LEFT: ICD-10-CM

## 2021-10-06 DIAGNOSIS — G35 MULTIPLE SCLEROSIS: ICD-10-CM

## 2021-10-06 DIAGNOSIS — F43.9 STRESS: ICD-10-CM

## 2021-10-06 DIAGNOSIS — M79.2 NEUROPATHIC PAIN OF FOOT, RIGHT: ICD-10-CM

## 2021-10-06 DIAGNOSIS — J44.9 COPD MIXED TYPE: ICD-10-CM

## 2021-10-06 DIAGNOSIS — E78.5 HYPERLIPIDEMIA, UNSPECIFIED HYPERLIPIDEMIA TYPE: ICD-10-CM

## 2021-10-06 DIAGNOSIS — F32.A DEPRESSION, UNSPECIFIED DEPRESSION TYPE: ICD-10-CM

## 2021-10-06 LAB
BILIRUB UR QL STRIP: NEGATIVE
CLARITY UR: CLEAR
COLOR UR: YELLOW
GLUCOSE UR QL STRIP: NEGATIVE
HGB UR QL STRIP: NEGATIVE
KETONES UR QL STRIP: NEGATIVE
LEUKOCYTE ESTERASE UR QL STRIP: NEGATIVE
NITRITE UR QL STRIP: NEGATIVE
PH UR STRIP: 6 [PH] (ref 5–8)
PROT UR QL STRIP: NEGATIVE
SP GR UR STRIP: 1.02 (ref 1–1.03)
URN SPEC COLLECT METH UR: NORMAL
UROBILINOGEN UR STRIP-ACNC: NEGATIVE EU/DL

## 2021-10-06 PROCEDURE — 3008F PR BODY MASS INDEX (BMI) DOCUMENTED: ICD-10-PCS | Mod: CPTII,S$GLB,, | Performed by: FAMILY MEDICINE

## 2021-10-06 PROCEDURE — 1160F RVW MEDS BY RX/DR IN RCRD: CPT | Mod: CPTII,S$GLB,, | Performed by: FAMILY MEDICINE

## 2021-10-06 PROCEDURE — 1159F MED LIST DOCD IN RCRD: CPT | Mod: CPTII,S$GLB,, | Performed by: FAMILY MEDICINE

## 2021-10-06 PROCEDURE — 99999 PR PBB SHADOW E&M-EST. PATIENT-LVL III: CPT | Mod: PBBFAC,,, | Performed by: FAMILY MEDICINE

## 2021-10-06 PROCEDURE — 3078F PR MOST RECENT DIASTOLIC BLOOD PRESSURE < 80 MM HG: ICD-10-PCS | Mod: CPTII,S$GLB,, | Performed by: FAMILY MEDICINE

## 2021-10-06 PROCEDURE — 1159F PR MEDICATION LIST DOCUMENTED IN MEDICAL RECORD: ICD-10-PCS | Mod: CPTII,S$GLB,, | Performed by: FAMILY MEDICINE

## 2021-10-06 PROCEDURE — 3008F BODY MASS INDEX DOCD: CPT | Mod: CPTII,S$GLB,, | Performed by: FAMILY MEDICINE

## 2021-10-06 PROCEDURE — 3078F DIAST BP <80 MM HG: CPT | Mod: CPTII,S$GLB,, | Performed by: FAMILY MEDICINE

## 2021-10-06 PROCEDURE — 81003 URINALYSIS AUTO W/O SCOPE: CPT | Performed by: FAMILY MEDICINE

## 2021-10-06 PROCEDURE — 3074F PR MOST RECENT SYSTOLIC BLOOD PRESSURE < 130 MM HG: ICD-10-PCS | Mod: CPTII,S$GLB,, | Performed by: FAMILY MEDICINE

## 2021-10-06 PROCEDURE — 3074F SYST BP LT 130 MM HG: CPT | Mod: CPTII,S$GLB,, | Performed by: FAMILY MEDICINE

## 2021-10-06 PROCEDURE — 99214 OFFICE O/P EST MOD 30 MIN: CPT | Mod: S$GLB,,, | Performed by: FAMILY MEDICINE

## 2021-10-06 PROCEDURE — 99214 PR OFFICE/OUTPT VISIT, EST, LEVL IV, 30-39 MIN: ICD-10-PCS | Mod: S$GLB,,, | Performed by: FAMILY MEDICINE

## 2021-10-06 PROCEDURE — 1160F PR REVIEW ALL MEDS BY PRESCRIBER/CLIN PHARMACIST DOCUMENTED: ICD-10-PCS | Mod: CPTII,S$GLB,, | Performed by: FAMILY MEDICINE

## 2021-10-06 PROCEDURE — 99999 PR PBB SHADOW E&M-EST. PATIENT-LVL III: ICD-10-PCS | Mod: PBBFAC,,, | Performed by: FAMILY MEDICINE

## 2021-10-06 RX ORDER — TIOTROPIUM BROMIDE AND OLODATEROL 3.124; 2.736 UG/1; UG/1
2 SPRAY, METERED RESPIRATORY (INHALATION) DAILY
Qty: 4 G | Refills: 11 | Status: SHIPPED | OUTPATIENT
Start: 2021-10-06 | End: 2022-03-11

## 2021-10-06 RX ORDER — IBUPROFEN 800 MG/1
TABLET ORAL
Qty: 180 TABLET | Refills: 1 | Status: SHIPPED | OUTPATIENT
Start: 2021-10-06 | End: 2022-05-06

## 2021-10-06 RX ORDER — HYDROCODONE BITARTRATE AND ACETAMINOPHEN 10; 325 MG/1; MG/1
1 TABLET ORAL EVERY 6 HOURS PRN
Qty: 60 TABLET | Refills: 0 | Status: SHIPPED | OUTPATIENT
Start: 2021-10-06 | End: 2021-11-05 | Stop reason: SDUPTHER

## 2021-10-06 RX ORDER — GABAPENTIN 600 MG/1
TABLET ORAL
Qty: 270 TABLET | Refills: 3 | Status: SHIPPED | OUTPATIENT
Start: 2021-10-06 | End: 2022-10-20

## 2021-10-06 RX ORDER — CARBAMAZEPINE 200 MG/1
200 TABLET, EXTENDED RELEASE ORAL 2 TIMES DAILY
Qty: 180 TABLET | Refills: 1 | Status: SHIPPED | OUTPATIENT
Start: 2021-10-06 | End: 2022-04-05

## 2021-10-06 RX ORDER — LORAZEPAM 2 MG/1
2 TABLET ORAL EVERY 6 HOURS PRN
Qty: 120 TABLET | Refills: 2 | Status: SHIPPED | OUTPATIENT
Start: 2021-10-06 | End: 2022-01-21

## 2021-10-06 RX ORDER — SIMVASTATIN 40 MG/1
40 TABLET, FILM COATED ORAL NIGHTLY
Qty: 90 TABLET | Refills: 3 | Status: SHIPPED | OUTPATIENT
Start: 2021-10-06 | End: 2022-06-02

## 2021-10-06 RX ORDER — ESCITALOPRAM OXALATE 20 MG/1
20 TABLET ORAL DAILY
Qty: 90 TABLET | Refills: 3 | Status: SHIPPED | OUTPATIENT
Start: 2021-10-06 | End: 2022-05-05

## 2021-10-06 RX ORDER — TIZANIDINE 4 MG/1
TABLET ORAL
Qty: 810 TABLET | Refills: 3 | Status: SHIPPED | OUTPATIENT
Start: 2021-10-06 | End: 2022-10-20 | Stop reason: SDUPTHER

## 2021-10-06 RX ORDER — HYDROXYZINE HYDROCHLORIDE 25 MG/1
25 TABLET, FILM COATED ORAL 3 TIMES DAILY
Qty: 90 TABLET | Refills: 0 | Status: SHIPPED | OUTPATIENT
Start: 2021-10-06

## 2021-10-06 RX ORDER — METOPROLOL SUCCINATE 100 MG/1
100 TABLET, EXTENDED RELEASE ORAL DAILY
Qty: 90 TABLET | Refills: 3 | Status: SHIPPED | OUTPATIENT
Start: 2021-10-06 | End: 2022-10-20 | Stop reason: SDUPTHER

## 2021-10-13 ENCOUNTER — LAB VISIT (OUTPATIENT)
Dept: LAB | Facility: HOSPITAL | Age: 56
End: 2021-10-13
Attending: FAMILY MEDICINE
Payer: MEDICARE

## 2021-10-13 DIAGNOSIS — G35 MULTIPLE SCLEROSIS: ICD-10-CM

## 2021-10-13 DIAGNOSIS — E55.9 VITAMIN D DEFICIENCY: ICD-10-CM

## 2021-10-13 DIAGNOSIS — D50.9 IRON DEFICIENCY ANEMIA, UNSPECIFIED IRON DEFICIENCY ANEMIA TYPE: ICD-10-CM

## 2021-10-13 LAB
ALBUMIN SERPL BCP-MCNC: 4 G/DL (ref 3.5–5.2)
ALP SERPL-CCNC: 103 U/L (ref 55–135)
ALT SERPL W/O P-5'-P-CCNC: 16 U/L (ref 10–44)
AST SERPL-CCNC: 16 U/L (ref 10–40)
BASOPHILS # BLD AUTO: 0.04 K/UL (ref 0–0.2)
BASOPHILS # BLD AUTO: 0.04 K/UL (ref 0–0.2)
BASOPHILS NFR BLD: 0.6 % (ref 0–1.9)
BASOPHILS NFR BLD: 0.6 % (ref 0–1.9)
BILIRUB DIRECT SERPL-MCNC: 0.2 MG/DL (ref 0.1–0.3)
BILIRUB SERPL-MCNC: 0.5 MG/DL (ref 0.1–1)
DIFFERENTIAL METHOD: ABNORMAL
DIFFERENTIAL METHOD: ABNORMAL
EOSINOPHIL # BLD AUTO: 0.1 K/UL (ref 0–0.5)
EOSINOPHIL # BLD AUTO: 0.1 K/UL (ref 0–0.5)
EOSINOPHIL NFR BLD: 2 % (ref 0–8)
EOSINOPHIL NFR BLD: 2 % (ref 0–8)
ERYTHROCYTE [DISTWIDTH] IN BLOOD BY AUTOMATED COUNT: 13.3 % (ref 11.5–14.5)
ERYTHROCYTE [DISTWIDTH] IN BLOOD BY AUTOMATED COUNT: 13.3 % (ref 11.5–14.5)
FERRITIN SERPL-MCNC: 252 NG/ML (ref 20–300)
HCT VFR BLD AUTO: 42.7 % (ref 37–48.5)
HCT VFR BLD AUTO: 42.7 % (ref 37–48.5)
HGB BLD-MCNC: 15.1 G/DL (ref 12–16)
HGB BLD-MCNC: 15.1 G/DL (ref 12–16)
IMM GRANULOCYTES # BLD AUTO: 0.01 K/UL (ref 0–0.04)
IMM GRANULOCYTES # BLD AUTO: 0.01 K/UL (ref 0–0.04)
IMM GRANULOCYTES NFR BLD AUTO: 0.2 % (ref 0–0.5)
IMM GRANULOCYTES NFR BLD AUTO: 0.2 % (ref 0–0.5)
IRON SERPL-MCNC: 52 UG/DL (ref 30–160)
LYMPHOCYTES # BLD AUTO: 1.1 K/UL (ref 1–4.8)
LYMPHOCYTES # BLD AUTO: 1.1 K/UL (ref 1–4.8)
LYMPHOCYTES NFR BLD: 16.7 % (ref 18–48)
LYMPHOCYTES NFR BLD: 16.7 % (ref 18–48)
MCH RBC QN AUTO: 32.1 PG (ref 27–31)
MCH RBC QN AUTO: 32.1 PG (ref 27–31)
MCHC RBC AUTO-ENTMCNC: 35.4 G/DL (ref 32–36)
MCHC RBC AUTO-ENTMCNC: 35.4 G/DL (ref 32–36)
MCV RBC AUTO: 91 FL (ref 82–98)
MCV RBC AUTO: 91 FL (ref 82–98)
MONOCYTES # BLD AUTO: 0.4 K/UL (ref 0.3–1)
MONOCYTES # BLD AUTO: 0.4 K/UL (ref 0.3–1)
MONOCYTES NFR BLD: 6.5 % (ref 4–15)
MONOCYTES NFR BLD: 6.5 % (ref 4–15)
NEUTROPHILS # BLD AUTO: 4.8 K/UL (ref 1.8–7.7)
NEUTROPHILS # BLD AUTO: 4.8 K/UL (ref 1.8–7.7)
NEUTROPHILS NFR BLD: 74 % (ref 38–73)
NEUTROPHILS NFR BLD: 74 % (ref 38–73)
NRBC BLD-RTO: 0 /100 WBC
NRBC BLD-RTO: 0 /100 WBC
PLATELET # BLD AUTO: 226 K/UL (ref 150–450)
PLATELET # BLD AUTO: 226 K/UL (ref 150–450)
PMV BLD AUTO: 9.2 FL (ref 9.2–12.9)
PMV BLD AUTO: 9.2 FL (ref 9.2–12.9)
PROT SERPL-MCNC: 7.1 G/DL (ref 6–8.4)
RBC # BLD AUTO: 4.71 M/UL (ref 4–5.4)
RBC # BLD AUTO: 4.71 M/UL (ref 4–5.4)
SATURATED IRON: 15 % (ref 20–50)
TOTAL IRON BINDING CAPACITY: 339 UG/DL (ref 250–450)
TRANSFERRIN SERPL-MCNC: 229 MG/DL (ref 200–375)
WBC # BLD AUTO: 6.46 K/UL (ref 3.9–12.7)
WBC # BLD AUTO: 6.46 K/UL (ref 3.9–12.7)

## 2021-10-13 PROCEDURE — 84466 ASSAY OF TRANSFERRIN: CPT | Performed by: INTERNAL MEDICINE

## 2021-10-13 PROCEDURE — 85025 COMPLETE CBC W/AUTO DIFF WBC: CPT | Performed by: PHYSICIAN ASSISTANT

## 2021-10-13 PROCEDURE — 82306 VITAMIN D 25 HYDROXY: CPT | Performed by: PSYCHIATRY & NEUROLOGY

## 2021-10-13 PROCEDURE — 82728 ASSAY OF FERRITIN: CPT | Performed by: INTERNAL MEDICINE

## 2021-10-13 PROCEDURE — 80076 HEPATIC FUNCTION PANEL: CPT | Performed by: PHYSICIAN ASSISTANT

## 2021-10-14 LAB — 25(OH)D3+25(OH)D2 SERPL-MCNC: 78 NG/ML (ref 30–96)

## 2021-10-18 ENCOUNTER — OFFICE VISIT (OUTPATIENT)
Dept: HEMATOLOGY/ONCOLOGY | Facility: CLINIC | Age: 56
End: 2021-10-18
Payer: MEDICARE

## 2021-10-18 VITALS
DIASTOLIC BLOOD PRESSURE: 57 MMHG | WEIGHT: 131.19 LBS | BODY MASS INDEX: 25.76 KG/M2 | HEART RATE: 54 BPM | SYSTOLIC BLOOD PRESSURE: 127 MMHG | HEIGHT: 60 IN | TEMPERATURE: 98 F | OXYGEN SATURATION: 97 %

## 2021-10-18 DIAGNOSIS — G35 MS (MULTIPLE SCLEROSIS): ICD-10-CM

## 2021-10-18 DIAGNOSIS — D50.9 IRON DEFICIENCY ANEMIA, UNSPECIFIED IRON DEFICIENCY ANEMIA TYPE: Primary | ICD-10-CM

## 2021-10-18 PROCEDURE — 3078F PR MOST RECENT DIASTOLIC BLOOD PRESSURE < 80 MM HG: ICD-10-PCS | Mod: CPTII,S$GLB,, | Performed by: INTERNAL MEDICINE

## 2021-10-18 PROCEDURE — 3074F PR MOST RECENT SYSTOLIC BLOOD PRESSURE < 130 MM HG: ICD-10-PCS | Mod: CPTII,S$GLB,, | Performed by: INTERNAL MEDICINE

## 2021-10-18 PROCEDURE — 99213 OFFICE O/P EST LOW 20 MIN: CPT | Mod: S$GLB,,, | Performed by: INTERNAL MEDICINE

## 2021-10-18 PROCEDURE — 3008F BODY MASS INDEX DOCD: CPT | Mod: CPTII,S$GLB,, | Performed by: INTERNAL MEDICINE

## 2021-10-18 PROCEDURE — 3078F DIAST BP <80 MM HG: CPT | Mod: CPTII,S$GLB,, | Performed by: INTERNAL MEDICINE

## 2021-10-18 PROCEDURE — 99213 PR OFFICE/OUTPT VISIT, EST, LEVL III, 20-29 MIN: ICD-10-PCS | Mod: S$GLB,,, | Performed by: INTERNAL MEDICINE

## 2021-10-18 PROCEDURE — 3008F PR BODY MASS INDEX (BMI) DOCUMENTED: ICD-10-PCS | Mod: CPTII,S$GLB,, | Performed by: INTERNAL MEDICINE

## 2021-10-18 PROCEDURE — 1159F PR MEDICATION LIST DOCUMENTED IN MEDICAL RECORD: ICD-10-PCS | Mod: CPTII,S$GLB,, | Performed by: INTERNAL MEDICINE

## 2021-10-18 PROCEDURE — 99999 PR PBB SHADOW E&M-EST. PATIENT-LVL IV: ICD-10-PCS | Mod: PBBFAC,,, | Performed by: INTERNAL MEDICINE

## 2021-10-18 PROCEDURE — 1159F MED LIST DOCD IN RCRD: CPT | Mod: CPTII,S$GLB,, | Performed by: INTERNAL MEDICINE

## 2021-10-18 PROCEDURE — 3074F SYST BP LT 130 MM HG: CPT | Mod: CPTII,S$GLB,, | Performed by: INTERNAL MEDICINE

## 2021-10-18 PROCEDURE — 99999 PR PBB SHADOW E&M-EST. PATIENT-LVL IV: CPT | Mod: PBBFAC,,, | Performed by: INTERNAL MEDICINE

## 2021-11-05 ENCOUNTER — OFFICE VISIT (OUTPATIENT)
Dept: FAMILY MEDICINE | Facility: CLINIC | Age: 56
End: 2021-11-05
Payer: MEDICARE

## 2021-11-05 VITALS
DIASTOLIC BLOOD PRESSURE: 68 MMHG | TEMPERATURE: 98 F | HEART RATE: 62 BPM | SYSTOLIC BLOOD PRESSURE: 106 MMHG | WEIGHT: 130.94 LBS | HEIGHT: 60 IN | BODY MASS INDEX: 25.71 KG/M2 | OXYGEN SATURATION: 99 %

## 2021-11-05 DIAGNOSIS — M79.2 NEUROPATHIC PAIN OF FOOT, RIGHT: Primary | ICD-10-CM

## 2021-11-05 DIAGNOSIS — Z23 FLU VACCINE NEED: ICD-10-CM

## 2021-11-05 PROCEDURE — G0008 ADMIN INFLUENZA VIRUS VAC: HCPCS | Mod: S$GLB,,, | Performed by: FAMILY MEDICINE

## 2021-11-05 PROCEDURE — 1160F RVW MEDS BY RX/DR IN RCRD: CPT | Mod: CPTII,S$GLB,, | Performed by: FAMILY MEDICINE

## 2021-11-05 PROCEDURE — 1159F MED LIST DOCD IN RCRD: CPT | Mod: CPTII,S$GLB,, | Performed by: FAMILY MEDICINE

## 2021-11-05 PROCEDURE — 1160F PR REVIEW ALL MEDS BY PRESCRIBER/CLIN PHARMACIST DOCUMENTED: ICD-10-PCS | Mod: CPTII,S$GLB,, | Performed by: FAMILY MEDICINE

## 2021-11-05 PROCEDURE — 3008F PR BODY MASS INDEX (BMI) DOCUMENTED: ICD-10-PCS | Mod: CPTII,S$GLB,, | Performed by: FAMILY MEDICINE

## 2021-11-05 PROCEDURE — 99214 PR OFFICE/OUTPT VISIT, EST, LEVL IV, 30-39 MIN: ICD-10-PCS | Mod: S$GLB,,, | Performed by: FAMILY MEDICINE

## 2021-11-05 PROCEDURE — 3078F PR MOST RECENT DIASTOLIC BLOOD PRESSURE < 80 MM HG: ICD-10-PCS | Mod: CPTII,S$GLB,, | Performed by: FAMILY MEDICINE

## 2021-11-05 PROCEDURE — 99214 OFFICE O/P EST MOD 30 MIN: CPT | Mod: S$GLB,,, | Performed by: FAMILY MEDICINE

## 2021-11-05 PROCEDURE — 3074F SYST BP LT 130 MM HG: CPT | Mod: CPTII,S$GLB,, | Performed by: FAMILY MEDICINE

## 2021-11-05 PROCEDURE — 3074F PR MOST RECENT SYSTOLIC BLOOD PRESSURE < 130 MM HG: ICD-10-PCS | Mod: CPTII,S$GLB,, | Performed by: FAMILY MEDICINE

## 2021-11-05 PROCEDURE — 1159F PR MEDICATION LIST DOCUMENTED IN MEDICAL RECORD: ICD-10-PCS | Mod: CPTII,S$GLB,, | Performed by: FAMILY MEDICINE

## 2021-11-05 PROCEDURE — 90686 FLU VACCINE (QUAD) GREATER THAN OR EQUAL TO 3YO PRESERVATIVE FREE IM: ICD-10-PCS | Mod: S$GLB,,, | Performed by: FAMILY MEDICINE

## 2021-11-05 PROCEDURE — 3008F BODY MASS INDEX DOCD: CPT | Mod: CPTII,S$GLB,, | Performed by: FAMILY MEDICINE

## 2021-11-05 PROCEDURE — 99999 PR PBB SHADOW E&M-EST. PATIENT-LVL IV: ICD-10-PCS | Mod: PBBFAC,,, | Performed by: FAMILY MEDICINE

## 2021-11-05 PROCEDURE — 3078F DIAST BP <80 MM HG: CPT | Mod: CPTII,S$GLB,, | Performed by: FAMILY MEDICINE

## 2021-11-05 PROCEDURE — G0008 FLU VACCINE (QUAD) GREATER THAN OR EQUAL TO 3YO PRESERVATIVE FREE IM: ICD-10-PCS | Mod: S$GLB,,, | Performed by: FAMILY MEDICINE

## 2021-11-05 PROCEDURE — 99999 PR PBB SHADOW E&M-EST. PATIENT-LVL IV: CPT | Mod: PBBFAC,,, | Performed by: FAMILY MEDICINE

## 2021-11-05 PROCEDURE — 90686 IIV4 VACC NO PRSV 0.5 ML IM: CPT | Mod: S$GLB,,, | Performed by: FAMILY MEDICINE

## 2021-11-05 RX ORDER — HYDROCODONE BITARTRATE AND ACETAMINOPHEN 10; 325 MG/1; MG/1
1 TABLET ORAL EVERY 6 HOURS PRN
Qty: 60 TABLET | Refills: 0 | Status: SHIPPED | OUTPATIENT
Start: 2021-11-05 | End: 2022-01-26 | Stop reason: ALTCHOICE

## 2021-11-05 RX ORDER — HYDROCODONE BITARTRATE AND ACETAMINOPHEN 10; 325 MG/1; MG/1
1 TABLET ORAL EVERY 6 HOURS PRN
Qty: 60 TABLET | Refills: 0 | Status: SHIPPED | OUTPATIENT
Start: 2021-12-03 | End: 2022-01-26 | Stop reason: ALTCHOICE

## 2021-11-05 RX ORDER — DIAZEPAM 5 MG/1
TABLET ORAL
COMMUNITY
Start: 2021-09-15 | End: 2021-11-05

## 2021-11-05 RX ORDER — HYDROCODONE BITARTRATE AND ACETAMINOPHEN 10; 325 MG/1; MG/1
1 TABLET ORAL EVERY 6 HOURS PRN
Qty: 60 TABLET | Refills: 0 | Status: SHIPPED | OUTPATIENT
Start: 2022-01-02 | End: 2022-01-26 | Stop reason: SDUPTHER

## 2021-12-13 ENCOUNTER — LAB VISIT (OUTPATIENT)
Dept: LAB | Facility: HOSPITAL | Age: 56
End: 2021-12-13
Attending: FAMILY MEDICINE
Payer: MEDICARE

## 2021-12-13 DIAGNOSIS — D50.9 IRON DEFICIENCY ANEMIA, UNSPECIFIED IRON DEFICIENCY ANEMIA TYPE: ICD-10-CM

## 2021-12-13 LAB
BASOPHILS # BLD AUTO: 0.05 K/UL (ref 0–0.2)
BASOPHILS NFR BLD: 0.6 % (ref 0–1.9)
DIFFERENTIAL METHOD: ABNORMAL
EOSINOPHIL # BLD AUTO: 0.3 K/UL (ref 0–0.5)
EOSINOPHIL NFR BLD: 2.8 % (ref 0–8)
ERYTHROCYTE [DISTWIDTH] IN BLOOD BY AUTOMATED COUNT: 12.7 % (ref 11.5–14.5)
FERRITIN SERPL-MCNC: 176 NG/ML (ref 20–300)
HCT VFR BLD AUTO: 45.6 % (ref 37–48.5)
HGB BLD-MCNC: 15.3 G/DL (ref 12–16)
IMM GRANULOCYTES # BLD AUTO: 0.08 K/UL (ref 0–0.04)
IMM GRANULOCYTES NFR BLD AUTO: 0.9 % (ref 0–0.5)
IRON SERPL-MCNC: 89 UG/DL (ref 30–160)
LYMPHOCYTES # BLD AUTO: 1.2 K/UL (ref 1–4.8)
LYMPHOCYTES NFR BLD: 13.7 % (ref 18–48)
MCH RBC QN AUTO: 30.7 PG (ref 27–31)
MCHC RBC AUTO-ENTMCNC: 33.6 G/DL (ref 32–36)
MCV RBC AUTO: 92 FL (ref 82–98)
MONOCYTES # BLD AUTO: 0.6 K/UL (ref 0.3–1)
MONOCYTES NFR BLD: 6.2 % (ref 4–15)
NEUTROPHILS # BLD AUTO: 6.8 K/UL (ref 1.8–7.7)
NEUTROPHILS NFR BLD: 75.8 % (ref 38–73)
NRBC BLD-RTO: 0 /100 WBC
PLATELET # BLD AUTO: 250 K/UL (ref 150–450)
PMV BLD AUTO: 9.2 FL (ref 9.2–12.9)
RBC # BLD AUTO: 4.98 M/UL (ref 4–5.4)
SATURATED IRON: 24 % (ref 20–50)
TOTAL IRON BINDING CAPACITY: 373 UG/DL (ref 250–450)
TRANSFERRIN SERPL-MCNC: 252 MG/DL (ref 200–375)
WBC # BLD AUTO: 9 K/UL (ref 3.9–12.7)

## 2021-12-13 PROCEDURE — 84466 ASSAY OF TRANSFERRIN: CPT | Performed by: INTERNAL MEDICINE

## 2021-12-13 PROCEDURE — 85025 COMPLETE CBC W/AUTO DIFF WBC: CPT | Performed by: INTERNAL MEDICINE

## 2021-12-13 PROCEDURE — 82728 ASSAY OF FERRITIN: CPT | Performed by: INTERNAL MEDICINE

## 2021-12-21 ENCOUNTER — PATIENT MESSAGE (OUTPATIENT)
Dept: NEUROLOGY | Facility: CLINIC | Age: 56
End: 2021-12-21
Payer: MEDICARE

## 2021-12-21 DIAGNOSIS — D50.9 IRON DEFICIENCY ANEMIA, UNSPECIFIED IRON DEFICIENCY ANEMIA TYPE: Primary | ICD-10-CM

## 2021-12-23 RX ORDER — AZITHROMYCIN 250 MG/1
TABLET, FILM COATED ORAL
Qty: 6 TABLET | Refills: 0 | Status: SHIPPED | OUTPATIENT
Start: 2021-12-23 | End: 2022-01-26 | Stop reason: ALTCHOICE

## 2021-12-23 RX ORDER — DEXAMETHASONE 2 MG/1
2 TABLET ORAL 2 TIMES DAILY WITH MEALS
Qty: 20 TABLET | Refills: 0 | Status: SHIPPED | OUTPATIENT
Start: 2021-12-23 | End: 2022-01-02

## 2021-12-29 ENCOUNTER — TELEPHONE (OUTPATIENT)
Dept: HEMATOLOGY/ONCOLOGY | Facility: CLINIC | Age: 56
End: 2021-12-29
Payer: MEDICARE

## 2022-01-04 ENCOUNTER — TELEPHONE (OUTPATIENT)
Dept: FAMILY MEDICINE | Facility: CLINIC | Age: 57
End: 2022-01-04
Payer: MEDICARE

## 2022-01-20 DIAGNOSIS — F32.A DEPRESSION, UNSPECIFIED DEPRESSION TYPE: ICD-10-CM

## 2022-01-20 DIAGNOSIS — R25.2 SPASTICITY: ICD-10-CM

## 2022-01-20 NOTE — TELEPHONE ENCOUNTER
Care Due:                  Date            Visit Type   Department     Provider  --------------------------------------------------------------------------------                                             [facility] FAMILY                                           MEDICINE/INTERN  Last Visit: 11-      None         AL MED         Devon Collier                                           [facility] FAMILY                                           MEDICINE/INTERN  Next Visit: 01-      None         AL MED         Devonsean Collier                                                            Last  Test          Frequency    Reason                     Performed    Due Date  --------------------------------------------------------------------------------    CMP.........  12 months..  triamterene-hydrochloroth  Not Found    Overdue                             iazide...................    Lipid Panel.  12 months..  simvastatin..............  Not Found    Overdue    Powered by [employer]. Reference number: [id number].   1/20/2022 11:25:01 AM CST

## 2022-01-20 NOTE — TELEPHONE ENCOUNTER
Last Office Visit Info:   The patient's last visit with Devon Collier MD was on 11/5/2021.    The patient's last visit in current department was on 11/5/2021.        Last CBC Results:   Lab Results   Component Value Date    WBC 9.00 12/13/2021    HGB 15.3 12/13/2021    HCT 45.6 12/13/2021     12/13/2021       Last CMP Results  Lab Results   Component Value Date     05/01/2018    K 4.4 05/01/2018     05/01/2018    CO2 31 (H) 05/01/2018    BUN 10 05/01/2018    CREATININE 0.7 05/15/2019    CALCIUM 10.0 05/01/2018    ALBUMIN 4.0 10/13/2021    AST 16 10/13/2021    ALT 16 10/13/2021       Last Lipids  Lab Results   Component Value Date    CHOL 110 (L) 06/22/2015    TRIG 205 (H) 06/22/2015    HDL 22 (L) 06/22/2015    LDLCALC 47.0 (L) 06/22/2015       Last A1C  No results found for: HGBA1C    Last TSH  No results found for: TSH          Current Med Refills  Medication List with Changes/Refills   Current Medications    AZITHROMYCIN (Z-NESTOR) 250 MG TABLET    Take 1 po daily, take 2 on day one.       Start Date: 12/23/2021End Date: --    CARBAMAZEPINE (TEGRETOL XR) 200 MG 12 HR TABLET    Take 1 tablet (200 mg total) by mouth 2 (two) times daily.       Start Date: 10/6/2021 End Date: --    CETIRIZINE (ZYRTEC) 10 MG TABLET    Take 1 tablet (10 mg total) by mouth once daily.       Start Date: 8/4/2017  End Date: --    CHOLECALCIFEROL, VITAMIN D3, (VITAMIN D3) 125 MCG (5,000 UNIT) TAB    Take 1 tablet (5,000 Units total) by mouth once daily.       Start Date: 1/21/2020 End Date: --    ESCITALOPRAM OXALATE (LEXAPRO) 20 MG TABLET    Take 1 tablet (20 mg total) by mouth once daily.       Start Date: 10/6/2021 End Date: --    GABAPENTIN (NEURONTIN) 400 MG CAPSULE    Take 2 capsules (800 mg total) by mouth every evening.       Start Date: 3/30/2021 End Date: --    GABAPENTIN (NEURONTIN) 600 MG TABLET    TAKE ONE TABLET BY MOUTH THREE TIMES DAILY       Start Date: 10/6/2021 End Date: --    HYDROCODONE-ACETAMINOPHEN  (NORCO)  MG PER TABLET    Take 1 tablet by mouth every 6 (six) hours as needed for Pain.       Start Date: 11/5/2021 End Date: --    HYDROCODONE-ACETAMINOPHEN (NORCO)  MG PER TABLET    Take 1 tablet by mouth every 6 (six) hours as needed for Pain.       Start Date: 12/3/2021 End Date: --    HYDROCODONE-ACETAMINOPHEN (NORCO)  MG PER TABLET    Take 1 tablet by mouth every 6 (six) hours as needed for Pain.       Start Date: 1/2/2022  End Date: --    HYDROXYZINE HCL (ATARAX) 25 MG TABLET    Take 1 tablet (25 mg total) by mouth 3 (three) times daily.       Start Date: 10/6/2021 End Date: --    IBUPROFEN (ADVIL,MOTRIN) 800 MG TABLET    TAKE ONE TABLET BY MOUTH EVERY 8 HOURS WITH FOOD AND WATER       Start Date: 10/6/2021 End Date: --    INTERFERON BETA-1A (AVONEX) 30 MCG/0.5 ML PNKT    Inject 0.5 mLs (30 mcg total) into the muscle every 7 days.       Start Date: 9/28/2021 End Date: --    INTERFERON BETA-1A (AVONEX) 30 MCG/0.5 ML PNKT    Inject 0.5 mLs (30 mcg total) into the muscle every 7 days.       Start Date: 9/28/2021 End Date: --    LORAZEPAM (ATIVAN) 2 MG TAB    Take 1 tablet (2 mg total) by mouth every 6 (six) hours as needed (anxiety).       Start Date: 10/6/2021 End Date: --    METHOCARBAMOL (ROBAXIN) 750 MG TAB    TAKE TWO TABLETS BY MOUTH FOUR TIMES DAILY FOR 10 DAYS       Start Date: 5/31/2021 End Date: --    METOPROLOL SUCCINATE (TOPROL-XL) 100 MG 24 HR TABLET    Take 1 tablet (100 mg total) by mouth once daily.       Start Date: 10/6/2021 End Date: --    NYSTATIN (MYCOSTATIN) CREAM    Apply topically 2 (two) times daily.       Start Date: 10/6/2020 End Date: --    ONDANSETRON (ZOFRAN-ODT) 8 MG TBDL    DISSOLVE 1 TABLET ON THE TONGUE THREE TIMES DAILY AS NEEDED       Start Date: 9/4/2020  End Date: --    SIMVASTATIN (ZOCOR) 40 MG TABLET    Take 1 tablet (40 mg total) by mouth every evening.       Start Date: 10/6/2021 End Date: --    TIOTROPIUM-OLODATEROL (STIOLTO RESPIMAT) 2.5-2.5  MCG/ACTUATION MIST    Inhale 2 puffs into the lungs once daily.       Start Date: 10/6/2021 End Date: --    TIZANIDINE (ZANAFLEX) 4 MG TABLET    TAKE 2 TABLETS IN THE MORNING, AT NOON AND IN THE EVENING AND TAKE 3 TABLETS AT NIGHT (9 TABLETS PER DAY)       Start Date: 10/6/2021 End Date: --    TRIAMTERENE-HYDROCHLOROTHIAZIDE 37.5-25 MG (MAXZIDE-25) 37.5-25 MG PER TABLET    Take 1 tablet by mouth every other day.       Start Date: 3/30/2021 End Date: --

## 2022-01-21 RX ORDER — LORAZEPAM 2 MG/1
TABLET ORAL
Qty: 120 TABLET | Refills: 2 | Status: SHIPPED | OUTPATIENT
Start: 2022-01-21 | End: 2022-05-09

## 2022-01-26 ENCOUNTER — OFFICE VISIT (OUTPATIENT)
Dept: FAMILY MEDICINE | Facility: CLINIC | Age: 57
End: 2022-01-26
Payer: MEDICARE

## 2022-01-26 VITALS
SYSTOLIC BLOOD PRESSURE: 120 MMHG | HEART RATE: 73 BPM | OXYGEN SATURATION: 100 % | WEIGHT: 134.5 LBS | DIASTOLIC BLOOD PRESSURE: 60 MMHG | BODY MASS INDEX: 26.41 KG/M2 | TEMPERATURE: 98 F | HEIGHT: 60 IN

## 2022-01-26 DIAGNOSIS — M79.2 NEUROPATHIC PAIN OF FOOT, RIGHT: ICD-10-CM

## 2022-01-26 DIAGNOSIS — G35 MS (MULTIPLE SCLEROSIS): ICD-10-CM

## 2022-01-26 DIAGNOSIS — D84.9 IMMUNOSUPPRESSION: ICD-10-CM

## 2022-01-26 DIAGNOSIS — J44.9 COPD MIXED TYPE: Primary | ICD-10-CM

## 2022-01-26 PROCEDURE — 99214 OFFICE O/P EST MOD 30 MIN: CPT | Mod: 25,S$GLB,, | Performed by: FAMILY MEDICINE

## 2022-01-26 PROCEDURE — 99214 PR OFFICE/OUTPT VISIT, EST, LEVL IV, 30-39 MIN: ICD-10-PCS | Mod: 25,S$GLB,, | Performed by: FAMILY MEDICINE

## 2022-01-26 PROCEDURE — 3008F PR BODY MASS INDEX (BMI) DOCUMENTED: ICD-10-PCS | Mod: CPTII,S$GLB,, | Performed by: FAMILY MEDICINE

## 2022-01-26 PROCEDURE — 99999 PR PBB SHADOW E&M-EST. PATIENT-LVL IV: ICD-10-PCS | Mod: PBBFAC,,, | Performed by: FAMILY MEDICINE

## 2022-01-26 PROCEDURE — 1159F PR MEDICATION LIST DOCUMENTED IN MEDICAL RECORD: ICD-10-PCS | Mod: CPTII,S$GLB,, | Performed by: FAMILY MEDICINE

## 2022-01-26 PROCEDURE — 3078F PR MOST RECENT DIASTOLIC BLOOD PRESSURE < 80 MM HG: ICD-10-PCS | Mod: CPTII,S$GLB,, | Performed by: FAMILY MEDICINE

## 2022-01-26 PROCEDURE — 3008F BODY MASS INDEX DOCD: CPT | Mod: CPTII,S$GLB,, | Performed by: FAMILY MEDICINE

## 2022-01-26 PROCEDURE — 99999 PR PBB SHADOW E&M-EST. PATIENT-LVL IV: CPT | Mod: PBBFAC,,, | Performed by: FAMILY MEDICINE

## 2022-01-26 PROCEDURE — 1159F MED LIST DOCD IN RCRD: CPT | Mod: CPTII,S$GLB,, | Performed by: FAMILY MEDICINE

## 2022-01-26 PROCEDURE — 96372 PR INJECTION,THERAP/PROPH/DIAG2ST, IM OR SUBCUT: ICD-10-PCS | Mod: S$GLB,,, | Performed by: FAMILY MEDICINE

## 2022-01-26 PROCEDURE — 3074F PR MOST RECENT SYSTOLIC BLOOD PRESSURE < 130 MM HG: ICD-10-PCS | Mod: CPTII,S$GLB,, | Performed by: FAMILY MEDICINE

## 2022-01-26 PROCEDURE — 96372 THER/PROPH/DIAG INJ SC/IM: CPT | Mod: S$GLB,,, | Performed by: FAMILY MEDICINE

## 2022-01-26 PROCEDURE — 3074F SYST BP LT 130 MM HG: CPT | Mod: CPTII,S$GLB,, | Performed by: FAMILY MEDICINE

## 2022-01-26 PROCEDURE — 3078F DIAST BP <80 MM HG: CPT | Mod: CPTII,S$GLB,, | Performed by: FAMILY MEDICINE

## 2022-01-26 RX ORDER — HYDROCODONE BITARTRATE AND ACETAMINOPHEN 10; 325 MG/1; MG/1
1 TABLET ORAL EVERY 6 HOURS PRN
Qty: 60 TABLET | Refills: 0 | Status: SHIPPED | OUTPATIENT
Start: 2022-01-26 | End: 2023-01-18 | Stop reason: ALTCHOICE

## 2022-01-26 RX ORDER — KETOROLAC TROMETHAMINE 30 MG/ML
30 INJECTION, SOLUTION INTRAMUSCULAR; INTRAVENOUS
Status: COMPLETED | OUTPATIENT
Start: 2022-01-26 | End: 2022-01-26

## 2022-01-26 RX ORDER — HYDROCODONE BITARTRATE AND ACETAMINOPHEN 10; 325 MG/1; MG/1
1 TABLET ORAL EVERY 6 HOURS PRN
Qty: 60 TABLET | Refills: 0 | Status: SHIPPED | OUTPATIENT
Start: 2022-02-25

## 2022-01-26 RX ADMIN — KETOROLAC TROMETHAMINE 30 MG: 30 INJECTION, SOLUTION INTRAMUSCULAR; INTRAVENOUS at 09:01

## 2022-01-26 NOTE — PROGRESS NOTES
Chief Complaint   Patient presents with    Chronic Pain       SUBJECTIVE:  Zoey Cali is a 56 y.o. female presents with   Chief Complaint   Patient presents with    Chronic Pain     Conservative treatment with tylenol, NSAIDs, alternative treatments, complementary non opioid nerve/epileptic medication has failed with primary use.  Now opioid dependent for relief.  Has tried PT/OT, injections with mixed success.  She uses opioids very sparingly.  She did not fill any of the prior scripts.  She is managing well.    Past Medical History:   Diagnosis Date    Allergy     Anemia     Anxiety     Breast cyst     Chronic kidney disease     Depression     Fibrocystic breast     Hypertension     Multiple sclerosis     Multiple sclerosis     Neuromuscular disorder     Osteoporosis     Rib fracture 2015     Past Surgical History:   Procedure Laterality Date    APPENDECTOMY      BREAST BIOPSY Left 2012    BREAST CYST ASPIRATION      BREAST SURGERY  2004    excisional biopsy      SECTION, CLASSIC      CHALAZION EXCISION  at age 9    CHOLECYSTECTOMY      EYE SURGERY      HYSTERECTOMY  2001    OOPHORECTOMY       Social History     Socioeconomic History    Marital status:    Tobacco Use    Smoking status: Current Every Day Smoker     Packs/day: 1.00     Years: 30.00     Pack years: 30.00     Types: Cigarettes    Smokeless tobacco: Current User   Substance and Sexual Activity    Alcohol use: No     Alcohol/week: 0.0 standard drinks    Drug use: No    Sexual activity: Not Currently     Birth control/protection: See Surgical Hx     Comment: Hysterectomy     Social Determinants of Health     Financial Resource Strain: Unknown    Difficulty of Paying Living Expenses: Patient refused   Food Insecurity: No Food Insecurity    Worried About Running Out of Food in the Last Year: Never true    Ran Out of Food in the Last Year: Never true   Transportation Needs: No Transportation  Needs    Lack of Transportation (Medical): No    Lack of Transportation (Non-Medical): No   Physical Activity: Unknown    Days of Exercise per Week: 0 days   Stress: No Stress Concern Present    Feeling of Stress : Not at all   Social Connections: Unknown    Frequency of Communication with Friends and Family: More than three times a week    Frequency of Social Gatherings with Friends and Family: More than three times a week    Active Member of Clubs or Organizations: Yes    Attends Club or Organization Meetings: Patient refused    Marital Status:    Housing Stability: Unknown    Unable to Pay for Housing in the Last Year: No    Unstable Housing in the Last Year: No     Family History   Problem Relation Age of Onset    Arthritis Mother     Diabetes Father     Heart disease Father     Hyperlipidemia Father     Hypertension Father     Stroke Father     Arthritis Son     Drug abuse Son     Hypertension Maternal Grandmother     Stroke Maternal Grandmother     Arthritis Maternal Grandmother     Arthritis Paternal Grandmother     Heart disease Paternal Grandfather     Heart attack Paternal Grandfather     Colon cancer Neg Hx     Ovarian cancer Neg Hx     Amblyopia Neg Hx     Blindness Neg Hx     Glaucoma Neg Hx     Macular degeneration Neg Hx     Retinal detachment Neg Hx     Strabismus Neg Hx     Thyroid disease Neg Hx      Current Outpatient Medications on File Prior to Visit   Medication Sig Dispense Refill    carBAMazepine (TEGRETOL XR) 200 MG 12 hr tablet Take 1 tablet (200 mg total) by mouth 2 (two) times daily. 180 tablet 1    cetirizine (ZYRTEC) 10 MG tablet Take 1 tablet (10 mg total) by mouth once daily. 30 tablet 0    cholecalciferol, vitamin D3, (VITAMIN D3) 125 mcg (5,000 unit) Tab Take 1 tablet (5,000 Units total) by mouth once daily. 90 tablet 3    EScitalopram oxalate (LEXAPRO) 20 MG tablet Take 1 tablet (20 mg total) by mouth once daily. 90 tablet 3     gabapentin (NEURONTIN) 400 MG capsule Take 2 capsules (800 mg total) by mouth every evening. 180 capsule 3    gabapentin (NEURONTIN) 600 MG tablet TAKE ONE TABLET BY MOUTH THREE TIMES DAILY 270 tablet 3    hydrOXYzine HCL (ATARAX) 25 MG tablet Take 1 tablet (25 mg total) by mouth 3 (three) times daily. 90 tablet 0    ibuprofen (ADVIL,MOTRIN) 800 MG tablet TAKE ONE TABLET BY MOUTH EVERY 8 HOURS WITH FOOD AND WATER 180 tablet 1    interferon beta-1a (AVONEX) 30 mcg/0.5 mL PnKt Inject 0.5 mLs (30 mcg total) into the muscle every 7 days. 2 mL 1    interferon beta-1a (AVONEX) 30 mcg/0.5 mL PnKt Inject 0.5 mLs (30 mcg total) into the muscle every 7 days. 2 mL 5    LORazepam (ATIVAN) 2 MG Tab TAKE ONE TABLET BY MOUTH EVERY 6 HOURS AS NEEDED 120 tablet 2    methocarbamoL (ROBAXIN) 750 MG Tab TAKE TWO TABLETS BY MOUTH FOUR TIMES DAILY FOR 10 DAYS 80 tablet 1    metoprolol succinate (TOPROL-XL) 100 MG 24 hr tablet Take 1 tablet (100 mg total) by mouth once daily. 90 tablet 3    nystatin (MYCOSTATIN) cream Apply topically 2 (two) times daily. 30 g 2    ondansetron (ZOFRAN-ODT) 8 MG TbDL DISSOLVE 1 TABLET ON THE TONGUE THREE TIMES DAILY AS NEEDED      simvastatin (ZOCOR) 40 MG tablet Take 1 tablet (40 mg total) by mouth every evening. 90 tablet 3    tiotropium-olodateroL (STIOLTO RESPIMAT) 2.5-2.5 mcg/actuation Mist Inhale 2 puffs into the lungs once daily. 4 g 11    tiZANidine (ZANAFLEX) 4 MG tablet TAKE 2 TABLETS IN THE MORNING, AT NOON AND IN THE EVENING AND TAKE 3 TABLETS AT NIGHT (9 TABLETS PER DAY) 810 tablet 3    triamterene-hydrochlorothiazide 37.5-25 mg (MAXZIDE-25) 37.5-25 mg per tablet Take 1 tablet by mouth every other day. 45 tablet 3    [DISCONTINUED] HYDROcodone-acetaminophen (NORCO)  mg per tablet Take 1 tablet by mouth every 6 (six) hours as needed for Pain. 60 tablet 0    [DISCONTINUED] HYDROcodone-acetaminophen (NORCO)  mg per tablet Take 1 tablet by mouth every 6 (six) hours as  needed for Pain. 60 tablet 0    [DISCONTINUED] HYDROcodone-acetaminophen (NORCO)  mg per tablet Take 1 tablet by mouth every 6 (six) hours as needed for Pain. 60 tablet 0    azithromycin (Z-NESTOR) 250 MG tablet Take 1 po daily, take 2 on day one. 6 tablet 0     No current facility-administered medications on file prior to visit.     Review of patient's allergies indicates:   Allergen Reactions    Lomotil [diphenoxylate-atropine]      Causes stomach spasms    Dilaudid [hydromorphone (bulk)] Itching       Review of Systems   Constitutional: Negative.    HENT: Negative.    Eyes: Negative.    Respiratory: Negative.    Cardiovascular: Negative.    Gastrointestinal: Negative.    Genitourinary: Negative.    Musculoskeletal: Positive for back pain, joint pain and myalgias.   Skin: Negative.    Neurological: Negative.    Endo/Heme/Allergies: Negative.    Psychiatric/Behavioral: Negative.  The patient is not nervous/anxious.        OBJECTIVE:  /60   Pulse 73   Temp 98.1 °F (36.7 °C) (Oral)   Ht 5' (1.524 m)   Wt 61 kg (134 lb 7.7 oz)   LMP 01/01/2001 (Approximate) Comment: 2001  SpO2 100%   BMI 26.26 kg/m²   Wt Readings from Last 5 Encounters:   01/26/22 61 kg (134 lb 7.7 oz)   11/05/21 59.4 kg (130 lb 15.3 oz)   10/18/21 59.5 kg (131 lb 2.8 oz)   10/06/21 60 kg (132 lb 4.4 oz)   08/16/21 61.2 kg (135 lb)       In usual state of health without signs of drug misuse/abuse today.  Specifically no alteration in cognition, signs of injections into the skin.  Patient areas of chronic pain in the head/spine and feet/legs  No new focal neurological findings noted.    Chronic aids to ambulation    Reviewed  and NARx: REviewed    Assessment/Plan:    1. COPD mixed type    2. Immunosuppression    3. MS (multiple sclerosis)    4. Neuropathic pain of foot, right        Discussed chronic pain diagnosis again, reviewed pain contract and patient is still in agreement with it. Continued to stress the importance of  smoking cessation/avoiding tobacco products which can exacerbate the pain.  Continued to discuss the importance of good nutrition and some type of exercise program even if it's very basic and light to help sustain function.  Continued to discuss risks, benefits and alternatives of care with high risk medication and we have decided to continue to use them.  Our goal continues to be stabilizing of function, quality of life and avoidance of medication side effects.  Patient voiced understanding.    Problem List Items Addressed This Visit     MS (multiple sclerosis)    Immunosuppression    COPD mixed type - Primary      Other Visit Diagnoses     Neuropathic pain of foot, right        Relevant Medications    HYDROcodone-acetaminophen (NORCO)  mg per tablet    HYDROcodone-acetaminophen (NORCO)  mg per tablet (Start on 2/25/2022)        Refill today  She will continue to use just as needd  COPD is controlled, no exacerbation  Not ready to tackle smoking cessation yet.  MS is still stable, no acute events.  Follow up in 3 months

## 2022-01-26 NOTE — PROGRESS NOTES
Administered Toradol 30mg/mL IM to right upper outer glut. No s/s of any adverse reaction noted.

## 2022-02-18 ENCOUNTER — LAB VISIT (OUTPATIENT)
Dept: LAB | Facility: HOSPITAL | Age: 57
End: 2022-02-18
Attending: INTERNAL MEDICINE
Payer: MEDICARE

## 2022-02-18 DIAGNOSIS — D50.9 IRON DEFICIENCY ANEMIA, UNSPECIFIED IRON DEFICIENCY ANEMIA TYPE: ICD-10-CM

## 2022-02-18 LAB
BASOPHILS # BLD AUTO: 0.04 K/UL (ref 0–0.2)
BASOPHILS NFR BLD: 0.6 % (ref 0–1.9)
DIFFERENTIAL METHOD: ABNORMAL
EOSINOPHIL # BLD AUTO: 0.2 K/UL (ref 0–0.5)
EOSINOPHIL NFR BLD: 2.3 % (ref 0–8)
ERYTHROCYTE [DISTWIDTH] IN BLOOD BY AUTOMATED COUNT: 13.2 % (ref 11.5–14.5)
FERRITIN SERPL-MCNC: 179 NG/ML (ref 20–300)
HCT VFR BLD AUTO: 43.6 % (ref 37–48.5)
HGB BLD-MCNC: 15.1 G/DL (ref 12–16)
IMM GRANULOCYTES # BLD AUTO: 0.01 K/UL (ref 0–0.04)
IMM GRANULOCYTES NFR BLD AUTO: 0.2 % (ref 0–0.5)
IRON SERPL-MCNC: 87 UG/DL (ref 30–160)
IRON SERPL-MCNC: 87 UG/DL (ref 30–160)
LYMPHOCYTES # BLD AUTO: 1.6 K/UL (ref 1–4.8)
LYMPHOCYTES NFR BLD: 25.3 % (ref 18–48)
MCH RBC QN AUTO: 30.6 PG (ref 27–31)
MCHC RBC AUTO-ENTMCNC: 34.6 G/DL (ref 32–36)
MCV RBC AUTO: 88 FL (ref 82–98)
MONOCYTES # BLD AUTO: 0.3 K/UL (ref 0.3–1)
MONOCYTES NFR BLD: 5.1 % (ref 4–15)
NEUTROPHILS # BLD AUTO: 4.3 K/UL (ref 1.8–7.7)
NEUTROPHILS NFR BLD: 66.5 % (ref 38–73)
NRBC BLD-RTO: 0 /100 WBC
PLATELET # BLD AUTO: 255 K/UL (ref 150–450)
PMV BLD AUTO: 8.7 FL (ref 9.2–12.9)
RBC # BLD AUTO: 4.93 M/UL (ref 4–5.4)
SATURATED IRON: 24 % (ref 20–50)
TOTAL IRON BINDING CAPACITY: 364 UG/DL (ref 250–450)
TRANSFERRIN SERPL-MCNC: 246 MG/DL (ref 200–375)
WBC # BLD AUTO: 6.49 K/UL (ref 3.9–12.7)

## 2022-02-18 PROCEDURE — 85025 COMPLETE CBC W/AUTO DIFF WBC: CPT | Performed by: INTERNAL MEDICINE

## 2022-02-18 PROCEDURE — 82728 ASSAY OF FERRITIN: CPT | Performed by: INTERNAL MEDICINE

## 2022-02-18 PROCEDURE — 84466 ASSAY OF TRANSFERRIN: CPT | Performed by: INTERNAL MEDICINE

## 2022-02-18 PROCEDURE — 36415 COLL VENOUS BLD VENIPUNCTURE: CPT | Performed by: INTERNAL MEDICINE

## 2022-02-21 ENCOUNTER — OFFICE VISIT (OUTPATIENT)
Dept: HEMATOLOGY/ONCOLOGY | Facility: CLINIC | Age: 57
End: 2022-02-21
Payer: MEDICARE

## 2022-02-21 VITALS
SYSTOLIC BLOOD PRESSURE: 139 MMHG | WEIGHT: 133.81 LBS | TEMPERATURE: 98 F | OXYGEN SATURATION: 99 % | HEART RATE: 60 BPM | HEIGHT: 60 IN | BODY MASS INDEX: 26.27 KG/M2 | DIASTOLIC BLOOD PRESSURE: 63 MMHG

## 2022-02-21 DIAGNOSIS — G35 MS (MULTIPLE SCLEROSIS): ICD-10-CM

## 2022-02-21 DIAGNOSIS — D50.9 IRON DEFICIENCY ANEMIA, UNSPECIFIED IRON DEFICIENCY ANEMIA TYPE: Primary | ICD-10-CM

## 2022-02-21 PROCEDURE — 99999 PR PBB SHADOW E&M-EST. PATIENT-LVL IV: ICD-10-PCS | Mod: PBBFAC,,, | Performed by: INTERNAL MEDICINE

## 2022-02-21 PROCEDURE — 99999 PR PBB SHADOW E&M-EST. PATIENT-LVL IV: CPT | Mod: PBBFAC,,, | Performed by: INTERNAL MEDICINE

## 2022-02-21 PROCEDURE — 3008F BODY MASS INDEX DOCD: CPT | Mod: CPTII,S$GLB,, | Performed by: INTERNAL MEDICINE

## 2022-02-21 PROCEDURE — 99213 OFFICE O/P EST LOW 20 MIN: CPT | Mod: S$GLB,,, | Performed by: INTERNAL MEDICINE

## 2022-02-21 PROCEDURE — 3075F SYST BP GE 130 - 139MM HG: CPT | Mod: CPTII,S$GLB,, | Performed by: INTERNAL MEDICINE

## 2022-02-21 PROCEDURE — 1159F MED LIST DOCD IN RCRD: CPT | Mod: CPTII,S$GLB,, | Performed by: INTERNAL MEDICINE

## 2022-02-21 PROCEDURE — 3078F DIAST BP <80 MM HG: CPT | Mod: CPTII,S$GLB,, | Performed by: INTERNAL MEDICINE

## 2022-02-21 PROCEDURE — 99213 PR OFFICE/OUTPT VISIT, EST, LEVL III, 20-29 MIN: ICD-10-PCS | Mod: S$GLB,,, | Performed by: INTERNAL MEDICINE

## 2022-02-21 PROCEDURE — 3008F PR BODY MASS INDEX (BMI) DOCUMENTED: ICD-10-PCS | Mod: CPTII,S$GLB,, | Performed by: INTERNAL MEDICINE

## 2022-02-21 PROCEDURE — 1159F PR MEDICATION LIST DOCUMENTED IN MEDICAL RECORD: ICD-10-PCS | Mod: CPTII,S$GLB,, | Performed by: INTERNAL MEDICINE

## 2022-02-21 PROCEDURE — 3078F PR MOST RECENT DIASTOLIC BLOOD PRESSURE < 80 MM HG: ICD-10-PCS | Mod: CPTII,S$GLB,, | Performed by: INTERNAL MEDICINE

## 2022-02-21 PROCEDURE — 3075F PR MOST RECENT SYSTOLIC BLOOD PRESS GE 130-139MM HG: ICD-10-PCS | Mod: CPTII,S$GLB,, | Performed by: INTERNAL MEDICINE

## 2022-02-21 NOTE — PROGRESS NOTES
Subjective:      Patient ID: Zoey Cali is a 56 y.o. female.    Chief Complaint: Anemia  Diagnosis: TRACIE       Prior Hx: 57 y/o female with history of MS seen  today for f/u for TRACIE, She undergoes intermittent IV iron therapy.She has been intolerant of oral Fe supp. She is s/p GI eval with EGD and colonoscopy 2015  which was unremarkable She is followed by Dr. Mendez for long standing history of MS and was recently  on therapy with Aubagio  She was previously on Avonex for >20 yrsShe was experiencing  increasing pain with injection and hypersensitivity to bilateral injection site areas    Interval Hx:   She reports mild chronic fatigue  No new issus   Last  received Fe infusion 11/2/2020  No longer on Aubagio therapy for MS  due to ASE ( fatigue, diarrhea,recurrent infections, anorexia)   She has restarted Avonex and tolerating well   No SOB/CP/cough  CBC 2/18/22  shows Hb 15.1g/dL Hct 43.6 Ferritin 179ng/mL    Recent MRI brain -stable findings    Review of Systems   Constitutional: Positive for fatigue (mild). Negative for appetite change and unexpected weight change.   Eyes: Negative for visual disturbance.   Respiratory: Negative for cough and shortness of breath.    Cardiovascular: Negative for chest pain and leg swelling.   Gastrointestinal: Negative for abdominal pain, blood in stool, constipation, diarrhea, nausea and vomiting.   Genitourinary: Negative for frequency.   Musculoskeletal: Negative for arthralgias, back pain, joint swelling and neck pain.   Skin: Negative for rash.        No petechiae, ecchymoses   Neurological: Negative for light-headedness and headaches.   Hematological: Does not bruise/bleed easily.   Psychiatric/Behavioral: Negative for confusion and dysphoric mood.       Objective:       Vitals:    02/21/22 1510   BP: 139/63   BP Location: Left arm   Patient Position: Sitting   Pulse: 60   Temp: 97.5 °F (36.4 °C)   TempSrc: Oral   SpO2: 99%   Weight: 60.7 kg (133 lb 13.1 oz)    Height: 5' (1.524 m)       Physical Exam   Constitutional: She is oriented to person, place, and time. She appears well-developed and well-nourished.   HENT:   Head: Normocephalic.   Mouth/Throat: Oropharynx is clear and moist. No oropharyngeal exudate.   Eyes: Conjunctivae and lids are normal.  No scleral icterus.   Neck: Normal range of motion. Neck supple. No thyromegaly present.   Cardiovascular: Normal rate, regular rhythm and normal heart sounds.  No murmur heard.  Pulmonary/Chest: Breath sounds normal. She has no wheezes. She has no rales.   Abdominal: Soft. Bowel sounds are normal. She exhibits no distension and no mass.  There is no tenderness. There is no rebound and no guarding.   Musculoskeletal: Normal range of motion. She exhibits no edema and no tenderness.   Neurological: She is alert and oriented to person, place, and time.  Skin: Skin is warm and dry. No ecchymosis, no petechiae and no rash noted. No erythema.   Psychiatric: She has a normal mood and affect.          Lab Results   Component Value Date    WBC 6.49 02/18/2022    HGB 15.1 02/18/2022    HCT 43.6 02/18/2022    MCV 88 02/18/2022     02/18/2022     Lab Results   Component Value Date    IRON 87 02/18/2022    IRON 87 02/18/2022    TIBC 364 02/18/2022    FERRITIN 179 02/18/2022       · Mammo 8/16/21 BI-RADS Category 1: Negative      1. Iron deficiency anemia, unspecified iron deficiency anemia type    2. MS (multiple sclerosis)        Plan:   Zoey FRASER was seen today for follow-up.    Diagnoses and all orders for this visit:    Iron deficiency anemia, unspecified iron deficiency       S/p GI eval-unremarkable       CBC reveals stable Hb 15.1 g/dl        S/p IV Fe completed 11/2/2020       Fe parameters wnl        Ferritin 179        Cont to monitor     Multiple Sclerosis    F/b Neurology    Pt no longer on   Aubagio    Advance Care Planning     Power of   I previously initiated the process of advance care planning today and  explained the importance of this process to the patient.  I introduced the concept of advance directives to the patient, as well. Then the patient received detailed information about the importance of designating a Health Care Power of  (HCPOA). She was also instructed to communicate with this person about their wishes for future healthcare, should she become sick and lose decision-making capacity. The patient has not previously appointed a HCPOA. After our discussion, the patient has decided to complete a HCPOA and has appointed her significant other and NAME:Jared Karely   I spent a total time of 16 minutes discussing this issue with the patient.     Cbc, Ferritin, TIBC and Fe 2-3 days prior to f/u  3mos televisit       Cc: MD Heidy Lancaster MD

## 2022-02-28 ENCOUNTER — TELEPHONE (OUTPATIENT)
Dept: FAMILY MEDICINE | Facility: CLINIC | Age: 57
End: 2022-02-28
Payer: MEDICARE

## 2022-02-28 ENCOUNTER — PATIENT MESSAGE (OUTPATIENT)
Dept: PSYCHIATRY | Facility: CLINIC | Age: 57
End: 2022-02-28
Payer: MEDICARE

## 2022-03-04 DIAGNOSIS — J44.9 COPD MIXED TYPE: ICD-10-CM

## 2022-03-04 NOTE — TELEPHONE ENCOUNTER
No new care gaps identified.  Powered by [employer]. Reference number: [id number].   3/04/2022 8:02:05 AM CST

## 2022-03-11 RX ORDER — TIOTROPIUM BROMIDE AND OLODATEROL 3.124; 2.736 UG/1; UG/1
SPRAY, METERED RESPIRATORY (INHALATION)
Qty: 12 G | Refills: 3 | Status: SHIPPED | OUTPATIENT
Start: 2022-03-11 | End: 2023-01-13 | Stop reason: SDUPTHER

## 2022-03-11 NOTE — TELEPHONE ENCOUNTER
Refill Authorization Note   Zoey Cali  is requesting a refill authorization.  Brief Assessment and Rationale for Refill:  Approve     Medication Therapy Plan:       Medication Reconciliation Completed: No   Comments:   --->Care Gap information included below if applicable.       Requested Prescriptions   Pending Prescriptions Disp Refills    STIOLTO RESPIMAT 2.5-2.5 mcg/actuation Mist [Pharmacy Med Name: Stiolto Respimat 2.5 mcg-2.5 mcg/actuation solution for inhalation] 12 g 3     Sig: inhale TWO puffs INTO THE LUNGS DAILY. controller       Pulmonology:  [employer] Passed - 3/11/2022  1:26 PM        Passed - Patient is at least 18 years old        Passed - Patient has applicable pulmonary diagnosis on their problem list        Passed - Last Heart Rate in normal range within 360 days     Pulse Readings from Last 1 Encounters:   02/21/22 60              Passed - Valid encounter within last 15 months     Recent Visits  Date Type Provider Dept   01/26/22 Office Visit Devon Collier MD [facility] Family Medicine/Internal Med   11/05/21 Office Visit Devon Collier MD [facility] Family Medicine/Internal Med   10/06/21 Office Visit Devon Collier MD [facility] Family Medicine/Internal Med   03/30/21 Office Visit Devon Collier MD [facility] Family Medicine/Internal Med   10/06/20 Office Visit Devon Collier MD [facility] Family Medicine/Internal Med   07/10/20 Office Visit Devon Collier MD [facility] Family Medicine/Internal Med   Showing recent visits within past 720 days and meeting all other requirements  Future Appointments  No visits were found meeting these conditions.  Showing future appointments within next 150 days and meeting all other requirements      Future Appointments              In 1 month MD [facility] - Family Medicine, [facility]    In 2 months LAB, [facility] STATION [facility] [place] - [facility]    In 2 months Charito Mejia MD [facility] [place] - Hematology Oncology, [facility]                    Appointments   past 12m or future 3m with PCP    Date Provider   Last Visit   1/26/2022 Devon Collier MD   Next Visit   4/20/2022 Devon Collier MD   ED visits in past 90 days: 0     Note composed:1:29 PM 03/11/2022

## 2022-05-03 DIAGNOSIS — R25.2 SPASTICITY: ICD-10-CM

## 2022-05-03 DIAGNOSIS — M79.2 NEURALGIA AND NEURITIS: ICD-10-CM

## 2022-05-05 DIAGNOSIS — G35 MS (MULTIPLE SCLEROSIS): ICD-10-CM

## 2022-05-05 DIAGNOSIS — G35 MULTIPLE SCLEROSIS: ICD-10-CM

## 2022-05-05 DIAGNOSIS — M79.2 NEUROPATHIC PAIN OF FOOT, LEFT: ICD-10-CM

## 2022-05-05 DIAGNOSIS — M79.2 NEUROPATHIC PAIN OF FOOT, RIGHT: ICD-10-CM

## 2022-05-05 DIAGNOSIS — F32.A DEPRESSION, UNSPECIFIED DEPRESSION TYPE: ICD-10-CM

## 2022-05-05 RX ORDER — ESCITALOPRAM OXALATE 20 MG/1
TABLET ORAL
Qty: 90 TABLET | Refills: 3 | Status: SHIPPED | OUTPATIENT
Start: 2022-05-05 | End: 2023-01-13 | Stop reason: SDUPTHER

## 2022-05-05 NOTE — TELEPHONE ENCOUNTER
Care Due:                  Date            Visit Type   Department     Provider  --------------------------------------------------------------------------------                                [facility] FAMILY                              PRIMARY      MEDICINE/INTERN  Last Visit: 01-      CARE ([facility])   JESSICA Collier                              [facility]                              PRIMARY      MEDICINE/INTERN  Next Visit: 05-      CARE ([facility])   AL NIKITA Collier                                                            Last  Test          Frequency    Reason                     Performed    Due Date  --------------------------------------------------------------------------------    CMP.........  12 months..  triamterene-hydrochloroth  Not Found    Overdue                             iazide...................    Lipid Panel.  12 months..  simvastatin..............  Not Found    Overdue    [employer] Embedded Care Gaps. Reference number: [id number]. 5/05/2022   11:32:01 AM CDT

## 2022-05-06 RX ORDER — GABAPENTIN 400 MG/1
CAPSULE ORAL
Qty: 180 CAPSULE | Refills: 3 | Status: SHIPPED | OUTPATIENT
Start: 2022-05-06 | End: 2023-01-13 | Stop reason: SDUPTHER

## 2022-05-06 RX ORDER — IBUPROFEN 800 MG/1
TABLET ORAL
Qty: 180 TABLET | Refills: 1 | Status: SHIPPED | OUTPATIENT
Start: 2022-05-06 | End: 2022-10-20 | Stop reason: SDUPTHER

## 2022-05-06 NOTE — TELEPHONE ENCOUNTER
Refill Routing Note   Medication(s) are not appropriate for processing by [facility] for the following reason(s):      - Outside of protocol    [facility] action(s):  Route  Approve Medication-related problems identified: Requires labs        Medication reconciliation completed: No     Appointments  past 12m or future 3m with PCP    Date Provider   Last Visit   1/26/2022 Devon Collier MD   Next Visit   5/24/2022 Devon Collier MD   ED visits in past 90 days: 0        Note composed:11:01 PM 05/05/2022

## 2022-05-07 DIAGNOSIS — R25.2 SPASTICITY: ICD-10-CM

## 2022-05-07 DIAGNOSIS — F32.A DEPRESSION, UNSPECIFIED DEPRESSION TYPE: ICD-10-CM

## 2022-05-07 NOTE — TELEPHONE ENCOUNTER
No new care gaps identified.  [facility] Embedded Care Gaps. Reference number: [id number]. 5/07/2022   8:01:56 AM RLT

## 2022-05-09 RX ORDER — LORAZEPAM 2 MG/1
TABLET ORAL
Qty: 120 TABLET | Refills: 2 | Status: SHIPPED | OUTPATIENT
Start: 2022-05-09 | End: 2022-10-20 | Stop reason: SDUPTHER

## 2022-05-09 NOTE — TELEPHONE ENCOUNTER
Last Office Visit Info:   The patient's last visit with Devon Collier MD was on 1/26/2022.    The patient's last visit in current department was on 1/26/2022.        Last CBC Results:   Lab Results   Component Value Date    WBC 6.49 02/18/2022    HGB 15.1 02/18/2022    HCT 43.6 02/18/2022     02/18/2022       Last CMP Results  Lab Results   Component Value Date     05/01/2018    K 4.4 05/01/2018     05/01/2018    CO2 31 (H) 05/01/2018    BUN 10 05/01/2018    CREATININE 0.7 05/15/2019    CALCIUM 10.0 05/01/2018    ALBUMIN 4.0 10/13/2021    AST 16 10/13/2021    ALT 16 10/13/2021       Last Lipids  Lab Results   Component Value Date    CHOL 110 (L) 06/22/2015    TRIG 205 (H) 06/22/2015    HDL 22 (L) 06/22/2015    LDLCALC 47.0 (L) 06/22/2015       Last A1C  No results found for: HGBA1C    Last TSH  No results found for: TSH          Current Med Refills  Medication List with Changes/Refills   Current Medications    CARBAMAZEPINE (TEGRETOL XR) 200 MG 12 HR TABLET    TAKE ONE TABLET BY MOUTH TWICE DAILY       Start Date: 4/5/2022  End Date: --    CETIRIZINE (ZYRTEC) 10 MG TABLET    Take 1 tablet (10 mg total) by mouth once daily.       Start Date: 8/4/2017  End Date: --    CHOLECALCIFEROL, VITAMIN D3, (VITAMIN D3) 125 MCG (5,000 UNIT) TAB    Take 1 tablet (5,000 Units total) by mouth once daily.       Start Date: 1/21/2020 End Date: --    ESCITALOPRAM OXALATE (LEXAPRO) 20 MG TABLET    TAKE ONE TABLET BY MOUTH EVERY DAY       Start Date: 5/5/2022  End Date: --    GABAPENTIN (NEURONTIN) 400 MG CAPSULE    TAKE TWO CAPSULES BY MOUTH EVERY EVENING       Start Date: 5/6/2022  End Date: --    GABAPENTIN (NEURONTIN) 600 MG TABLET    TAKE ONE TABLET BY MOUTH THREE TIMES DAILY       Start Date: 10/6/2021 End Date: --    HYDROCODONE-ACETAMINOPHEN (NORCO)  MG PER TABLET    Take 1 tablet by mouth every 6 (six) hours as needed for Pain.       Start Date: 1/26/2022 End Date: --    HYDROCODONE-ACETAMINOPHEN  (NORCO)  MG PER TABLET    Take 1 tablet by mouth every 6 (six) hours as needed for Pain.       Start Date: 2/25/2022 End Date: --    HYDROXYZINE HCL (ATARAX) 25 MG TABLET    Take 1 tablet (25 mg total) by mouth 3 (three) times daily.       Start Date: 10/6/2021 End Date: --    IBUPROFEN (ADVIL,MOTRIN) 800 MG TABLET    TAKE ONE TABLET BY MOUTH EVERY 8 HOURS WITH FOOD AND WATER       Start Date: 5/6/2022  End Date: --    INTERFERON BETA-1A (AVONEX) 30 MCG/0.5 ML PNKT    Inject 0.5 mLs (30 mcg total) into the muscle every 7 days.       Start Date: 9/28/2021 End Date: --    INTERFERON BETA-1A (AVONEX) 30 MCG/0.5 ML PNKT    Inject 0.5 mLs (30 mcg total) into the muscle every 7 days.       Start Date: 9/28/2021 End Date: --    LORAZEPAM (ATIVAN) 2 MG TAB    TAKE ONE TABLET BY MOUTH EVERY 6 HOURS AS NEEDED       Start Date: 1/21/2022 End Date: --    METHOCARBAMOL (ROBAXIN) 750 MG TAB    TAKE TWO TABLETS BY MOUTH FOUR TIMES DAILY FOR 10 DAYS       Start Date: 2/2/2022  End Date: --    METOPROLOL SUCCINATE (TOPROL-XL) 100 MG 24 HR TABLET    Take 1 tablet (100 mg total) by mouth once daily.       Start Date: 10/6/2021 End Date: --    NYSTATIN (MYCOSTATIN) CREAM    Apply topically 2 (two) times daily.       Start Date: 10/6/2020 End Date: --    ONDANSETRON (ZOFRAN-ODT) 8 MG TBDL    DISSOLVE 1 TABLET ON THE TONGUE THREE TIMES DAILY AS NEEDED       Start Date: 9/4/2020  End Date: --    SIMVASTATIN (ZOCOR) 40 MG TABLET    Take 1 tablet (40 mg total) by mouth every evening.       Start Date: 10/6/2021 End Date: --    STIOLTO RESPIMAT 2.5-2.5 MCG/ACTUATION MIST    inhale TWO puffs INTO THE LUNGS DAILY. controller       Start Date: 3/11/2022 End Date: --    TIZANIDINE (ZANAFLEX) 4 MG TABLET    TAKE 2 TABLETS IN THE MORNING, AT NOON AND IN THE EVENING AND TAKE 3 TABLETS AT NIGHT (9 TABLETS PER DAY)       Start Date: 10/6/2021 End Date: --    TRIAMTERENE-HYDROCHLOROTHIAZIDE 37.5-25 MG (MAXZIDE-25) 37.5-25 MG PER TABLET    Take 1  tablet by mouth every other day.       Start Date: 3/30/2021 End Date: --

## 2022-05-16 DIAGNOSIS — G35 MULTIPLE SCLEROSIS: Primary | ICD-10-CM

## 2022-05-16 DIAGNOSIS — M79.2 NEURALGIA AND NEURITIS: ICD-10-CM

## 2022-05-16 RX ORDER — HYDROCODONE BITARTRATE AND ACETAMINOPHEN 10; 325 MG/1; MG/1
TABLET ORAL
Qty: 60 TABLET | Refills: 0 | Status: SHIPPED | OUTPATIENT
Start: 2022-05-16 | End: 2022-10-20 | Stop reason: SDUPTHER

## 2022-05-16 NOTE — TELEPHONE ENCOUNTER
No new care gaps identified.  [facility] Embedded Care Gaps. Reference number: [id number]. 5/16/2022   1:18:28 PM CDT

## 2022-05-16 NOTE — TELEPHONE ENCOUNTER
Last Office Visit Info:   The patient's last visit with Devon Collier MD was on 1/26/2022.    The patient's last visit in current department was on 1/26/2022.        Last CBC Results:   Lab Results   Component Value Date    WBC 6.49 02/18/2022    HGB 15.1 02/18/2022    HCT 43.6 02/18/2022     02/18/2022       Last CMP Results  Lab Results   Component Value Date     05/01/2018    K 4.4 05/01/2018     05/01/2018    CO2 31 (H) 05/01/2018    BUN 10 05/01/2018    CREATININE 0.7 05/15/2019    CALCIUM 10.0 05/01/2018    ALBUMIN 4.0 10/13/2021    AST 16 10/13/2021    ALT 16 10/13/2021       Last Lipids  Lab Results   Component Value Date    CHOL 110 (L) 06/22/2015    TRIG 205 (H) 06/22/2015    HDL 22 (L) 06/22/2015    LDLCALC 47.0 (L) 06/22/2015       Last A1C  No results found for: HGBA1C    Last TSH  No results found for: TSH          Current Med Refills  Medication List with Changes/Refills   Current Medications    CARBAMAZEPINE (TEGRETOL XR) 200 MG 12 HR TABLET    TAKE ONE TABLET BY MOUTH TWICE DAILY       Start Date: 4/5/2022  End Date: --    CETIRIZINE (ZYRTEC) 10 MG TABLET    Take 1 tablet (10 mg total) by mouth once daily.       Start Date: 8/4/2017  End Date: --    CHOLECALCIFEROL, VITAMIN D3, (VITAMIN D3) 125 MCG (5,000 UNIT) TAB    Take 1 tablet (5,000 Units total) by mouth once daily.       Start Date: 1/21/2020 End Date: --    ESCITALOPRAM OXALATE (LEXAPRO) 20 MG TABLET    TAKE ONE TABLET BY MOUTH EVERY DAY       Start Date: 5/5/2022  End Date: --    GABAPENTIN (NEURONTIN) 400 MG CAPSULE    TAKE TWO CAPSULES BY MOUTH EVERY EVENING       Start Date: 5/6/2022  End Date: --    GABAPENTIN (NEURONTIN) 600 MG TABLET    TAKE ONE TABLET BY MOUTH THREE TIMES DAILY       Start Date: 10/6/2021 End Date: --    HYDROCODONE-ACETAMINOPHEN (NORCO)  MG PER TABLET    Take 1 tablet by mouth every 6 (six) hours as needed for Pain.       Start Date: 1/26/2022 End Date: --    HYDROCODONE-ACETAMINOPHEN  (NORCO)  MG PER TABLET    Take 1 tablet by mouth every 6 (six) hours as needed for Pain.       Start Date: 2/25/2022 End Date: --    HYDROXYZINE HCL (ATARAX) 25 MG TABLET    Take 1 tablet (25 mg total) by mouth 3 (three) times daily.       Start Date: 10/6/2021 End Date: --    IBUPROFEN (ADVIL,MOTRIN) 800 MG TABLET    TAKE ONE TABLET BY MOUTH EVERY 8 HOURS WITH FOOD AND WATER       Start Date: 5/6/2022  End Date: --    INTERFERON BETA-1A (AVONEX) 30 MCG/0.5 ML PNKT    Inject 0.5 mLs (30 mcg total) into the muscle every 7 days.       Start Date: 9/28/2021 End Date: --    INTERFERON BETA-1A (AVONEX) 30 MCG/0.5 ML PNKT    Inject 0.5 mLs (30 mcg total) into the muscle every 7 days.       Start Date: 9/28/2021 End Date: --    LORAZEPAM (ATIVAN) 2 MG TAB    TAKE ONE TABLET BY MOUTH EVERY 6 HOURS AS NEEDED       Start Date: 5/9/2022  End Date: --    METHOCARBAMOL (ROBAXIN) 750 MG TAB    TAKE TWO TABLETS BY MOUTH FOUR TIMES DAILY FOR 10 DAYS       Start Date: 2/2/2022  End Date: --    METOPROLOL SUCCINATE (TOPROL-XL) 100 MG 24 HR TABLET    Take 1 tablet (100 mg total) by mouth once daily.       Start Date: 10/6/2021 End Date: --    NYSTATIN (MYCOSTATIN) CREAM    Apply topically 2 (two) times daily.       Start Date: 10/6/2020 End Date: --    ONDANSETRON (ZOFRAN-ODT) 8 MG TBDL    DISSOLVE 1 TABLET ON THE TONGUE THREE TIMES DAILY AS NEEDED       Start Date: 9/4/2020  End Date: --    SIMVASTATIN (ZOCOR) 40 MG TABLET    Take 1 tablet (40 mg total) by mouth every evening.       Start Date: 10/6/2021 End Date: --    STIOLTO RESPIMAT 2.5-2.5 MCG/ACTUATION MIST    inhale TWO puffs INTO THE LUNGS DAILY. controller       Start Date: 3/11/2022 End Date: --    TIZANIDINE (ZANAFLEX) 4 MG TABLET    TAKE 2 TABLETS IN THE MORNING, AT NOON AND IN THE EVENING AND TAKE 3 TABLETS AT NIGHT (9 TABLETS PER DAY)       Start Date: 10/6/2021 End Date: --    TRIAMTERENE-HYDROCHLOROTHIAZIDE 37.5-25 MG (MAXZIDE-25) 37.5-25 MG PER TABLET    Take 1  tablet by mouth every other day.       Start Date: 3/30/2021 End Date: --

## 2022-05-24 ENCOUNTER — OFFICE VISIT (OUTPATIENT)
Dept: FAMILY MEDICINE | Facility: CLINIC | Age: 57
End: 2022-05-24
Payer: MEDICARE

## 2022-05-24 VITALS
DIASTOLIC BLOOD PRESSURE: 60 MMHG | HEART RATE: 64 BPM | TEMPERATURE: 98 F | OXYGEN SATURATION: 95 % | SYSTOLIC BLOOD PRESSURE: 120 MMHG | WEIGHT: 132.25 LBS | BODY MASS INDEX: 25.97 KG/M2 | HEIGHT: 60 IN

## 2022-05-24 DIAGNOSIS — Z00.00 ANNUAL PHYSICAL EXAM: Primary | ICD-10-CM

## 2022-05-24 DIAGNOSIS — M80.00XA OSTEOPOROSIS WITH CURRENT PATHOLOGICAL FRACTURE, UNSPECIFIED OSTEOPOROSIS TYPE, INITIAL ENCOUNTER: ICD-10-CM

## 2022-05-24 DIAGNOSIS — S22.42XS CLOSED FRACTURE OF MULTIPLE RIBS OF LEFT SIDE, SEQUELA: ICD-10-CM

## 2022-05-24 PROCEDURE — 3074F PR MOST RECENT SYSTOLIC BLOOD PRESSURE < 130 MM HG: ICD-10-PCS | Mod: CPTII,S$GLB,, | Performed by: FAMILY MEDICINE

## 2022-05-24 PROCEDURE — 1159F PR MEDICATION LIST DOCUMENTED IN MEDICAL RECORD: ICD-10-PCS | Mod: CPTII,S$GLB,, | Performed by: FAMILY MEDICINE

## 2022-05-24 PROCEDURE — 3008F PR BODY MASS INDEX (BMI) DOCUMENTED: ICD-10-PCS | Mod: CPTII,S$GLB,, | Performed by: FAMILY MEDICINE

## 2022-05-24 PROCEDURE — 3008F BODY MASS INDEX DOCD: CPT | Mod: CPTII,S$GLB,, | Performed by: FAMILY MEDICINE

## 2022-05-24 PROCEDURE — 99396 PREV VISIT EST AGE 40-64: CPT | Mod: S$GLB,,, | Performed by: FAMILY MEDICINE

## 2022-05-24 PROCEDURE — 3074F SYST BP LT 130 MM HG: CPT | Mod: CPTII,S$GLB,, | Performed by: FAMILY MEDICINE

## 2022-05-24 PROCEDURE — 99396 PR PREVENTIVE VISIT,EST,40-64: ICD-10-PCS | Mod: S$GLB,,, | Performed by: FAMILY MEDICINE

## 2022-05-24 PROCEDURE — 99999 PR PBB SHADOW E&M-EST. PATIENT-LVL IV: CPT | Mod: PBBFAC,,, | Performed by: FAMILY MEDICINE

## 2022-05-24 PROCEDURE — 3078F DIAST BP <80 MM HG: CPT | Mod: CPTII,S$GLB,, | Performed by: FAMILY MEDICINE

## 2022-05-24 PROCEDURE — 99999 PR PBB SHADOW E&M-EST. PATIENT-LVL IV: ICD-10-PCS | Mod: PBBFAC,,, | Performed by: FAMILY MEDICINE

## 2022-05-24 PROCEDURE — 1159F MED LIST DOCD IN RCRD: CPT | Mod: CPTII,S$GLB,, | Performed by: FAMILY MEDICINE

## 2022-05-24 PROCEDURE — 3078F PR MOST RECENT DIASTOLIC BLOOD PRESSURE < 80 MM HG: ICD-10-PCS | Mod: CPTII,S$GLB,, | Performed by: FAMILY MEDICINE

## 2022-05-24 RX ORDER — CALCITONIN SALMON 200 [IU]/.09ML
1 SPRAY, METERED NASAL DAILY
Qty: 3.7 ML | Refills: 0 | Status: SHIPPED | OUTPATIENT
Start: 2022-05-24 | End: 2022-10-20

## 2022-05-24 NOTE — PROGRESS NOTES
Chief Complaint   Patient presents with    Chronic Pain       SUBJECTIVE:  Zoey Cali is a 56 y.o. female presents with   Chief Complaint   Patient presents with    Chronic Pain     Conservative treatment with tylenol, NSAIDs, alternative treatments, complementary non opioid nerve/epileptic medication has failed with primary use.  Now opioid dependent for relief.  Has tried PT/OT, injections with mixed success.  Annual and wellness    Past Medical History:   Diagnosis Date    Allergy     Anemia     Anxiety     Breast cyst     Chronic kidney disease     Depression     Fibrocystic breast     Hypertension     Multiple sclerosis     Multiple sclerosis     Neuromuscular disorder     Osteoporosis     Rib fracture 2015     Past Surgical History:   Procedure Laterality Date    APPENDECTOMY      BREAST BIOPSY Left 2012    BREAST CYST ASPIRATION      BREAST SURGERY  2004    excisional biopsy      SECTION, CLASSIC      CHALAZION EXCISION  at age 9    CHOLECYSTECTOMY      EYE SURGERY      HYSTERECTOMY  2001    OOPHORECTOMY       Social History     Socioeconomic History    Marital status:    Tobacco Use    Smoking status: Current Every Day Smoker     Packs/day: 1.00     Years: 30.00     Pack years: 30.00     Types: Cigarettes    Smokeless tobacco: Current User   Substance and Sexual Activity    Alcohol use: No     Alcohol/week: 0.0 standard drinks    Drug use: No    Sexual activity: Not Currently     Birth control/protection: See Surgical Hx     Comment: Hysterectomy     Social Determinants of Health     Financial Resource Strain: Unknown    Difficulty of Paying Living Expenses: Patient refused   Food Insecurity: No Food Insecurity    Worried About Running Out of Food in the Last Year: Never true    Ran Out of Food in the Last Year: Never true   Transportation Needs: No Transportation Needs    Lack of Transportation (Medical): No    Lack of Transportation  (Non-Medical): No   Physical Activity: Unknown    Days of Exercise per Week: 0 days   Stress: No Stress Concern Present    Feeling of Stress : Not at all   Social Connections: Unknown    Frequency of Communication with Friends and Family: More than three times a week    Frequency of Social Gatherings with Friends and Family: More than three times a week    Active Member of Clubs or Organizations: Yes    Attends Club or Organization Meetings: Patient refused    Marital Status:    Housing Stability: Unknown    Unable to Pay for Housing in the Last Year: No    Unstable Housing in the Last Year: No     Family History   Problem Relation Age of Onset    Arthritis Mother     Diabetes Father     Heart disease Father     Hyperlipidemia Father     Hypertension Father     Stroke Father     Arthritis Son     Drug abuse Son     Hypertension Maternal Grandmother     Stroke Maternal Grandmother     Arthritis Maternal Grandmother     Arthritis Paternal Grandmother     Heart disease Paternal Grandfather     Heart attack Paternal Grandfather     Colon cancer Neg Hx     Ovarian cancer Neg Hx     Amblyopia Neg Hx     Blindness Neg Hx     Glaucoma Neg Hx     Macular degeneration Neg Hx     Retinal detachment Neg Hx     Strabismus Neg Hx     Thyroid disease Neg Hx      Current Outpatient Medications on File Prior to Visit   Medication Sig Dispense Refill    carBAMazepine (TEGRETOL XR) 200 MG 12 hr tablet TAKE ONE TABLET BY MOUTH TWICE DAILY 180 tablet 1    cetirizine (ZYRTEC) 10 MG tablet Take 1 tablet (10 mg total) by mouth once daily. 30 tablet 0    cholecalciferol, vitamin D3, (VITAMIN D3) 125 mcg (5,000 unit) Tab Take 1 tablet (5,000 Units total) by mouth once daily. 90 tablet 3    EScitalopram oxalate (LEXAPRO) 20 MG tablet TAKE ONE TABLET BY MOUTH EVERY DAY 90 tablet 3    gabapentin (NEURONTIN) 400 MG capsule TAKE TWO CAPSULES BY MOUTH EVERY EVENING 180 capsule 3    gabapentin  (NEURONTIN) 600 MG tablet TAKE ONE TABLET BY MOUTH THREE TIMES DAILY 270 tablet 3    HYDROcodone-acetaminophen (NORCO)  mg per tablet Take 1 tablet by mouth every 6 (six) hours as needed for Pain. 60 tablet 0    HYDROcodone-acetaminophen (NORCO)  mg per tablet Take 1 tablet by mouth every 6 (six) hours as needed for Pain. 60 tablet 0    HYDROcodone-acetaminophen (NORCO)  mg per tablet TAKE ONE TABLET BY MOUTH EVERY 6 HOURS AS NEEDED FOR PAIN 60 tablet 0    hydrOXYzine HCL (ATARAX) 25 MG tablet Take 1 tablet (25 mg total) by mouth 3 (three) times daily. 90 tablet 0    ibuprofen (ADVIL,MOTRIN) 800 MG tablet TAKE ONE TABLET BY MOUTH EVERY 8 HOURS WITH FOOD AND WATER 180 tablet 1    interferon beta-1a (AVONEX) 30 mcg/0.5 mL PnKt Inject 0.5 mLs (30 mcg total) into the muscle every 7 days. 2 mL 1    interferon beta-1a (AVONEX) 30 mcg/0.5 mL PnKt Inject 0.5 mLs (30 mcg total) into the muscle every 7 days. 2 mL 5    LORazepam (ATIVAN) 2 MG Tab TAKE ONE TABLET BY MOUTH EVERY 6 HOURS AS NEEDED 120 tablet 2    methocarbamoL (ROBAXIN) 750 MG Tab TAKE TWO TABLETS BY MOUTH FOUR TIMES DAILY FOR 10 DAYS 80 tablet 1    metoprolol succinate (TOPROL-XL) 100 MG 24 hr tablet Take 1 tablet (100 mg total) by mouth once daily. 90 tablet 3    nystatin (MYCOSTATIN) cream Apply topically 2 (two) times daily. 30 g 2    ondansetron (ZOFRAN-ODT) 8 MG TbDL DISSOLVE 1 TABLET ON THE TONGUE THREE TIMES DAILY AS NEEDED      STIOLTO RESPIMAT 2.5-2.5 mcg/actuation Mist inhale TWO puffs INTO THE LUNGS DAILY. controller 12 g 3    tiZANidine (ZANAFLEX) 4 MG tablet TAKE 2 TABLETS IN THE MORNING, AT NOON AND IN THE EVENING AND TAKE 3 TABLETS AT NIGHT (9 TABLETS PER DAY) 810 tablet 3     No current facility-administered medications on file prior to visit.     Review of patient's allergies indicates:   Allergen Reactions    Lomotil [diphenoxylate-atropine]      Causes stomach spasms    Dilaudid [hydromorphone (bulk)] Itching        ROS    OBJECTIVE:  /60   Pulse 64   Temp 98.1 °F (36.7 °C) (Oral)   Ht 5' (1.524 m)   Wt 60 kg (132 lb 4.4 oz)   LMP 01/01/2001 (Approximate) Comment: 2001  SpO2 95%   BMI 25.83 kg/m²   Wt Readings from Last 5 Encounters:   05/24/22 60 kg (132 lb 4.4 oz)   02/21/22 60.7 kg (133 lb 13.1 oz)   01/26/22 61 kg (134 lb 7.7 oz)   11/05/21 59.4 kg (130 lb 15.3 oz)   10/18/21 59.5 kg (131 lb 2.8 oz)       In usual state of health without signs of drug misuse/abuse today.  Specifically no alteration in cognition, signs of injections into the skin.  Patient areas of chronic pain in the spine and hands/feet with neuropathy and her left side  No new focal neurological findings noted.    Chronic aids to ambulation    Reviewed  and NARx: reviewed    Assessment/Plan:    1. Annual physical exam    2. Osteoporosis with current pathological fracture, unspecified osteoporosis type, initial encounter    3. Closed fracture of multiple ribs of left side, sequela        Discussed chronic pain diagnosis again, reviewed pain contract and patient is still in agreement with it. Continued to stress the importance of smoking cessation/avoiding tobacco products which can exacerbate the pain.  Continued to discuss the importance of good nutrition and some type of exercise program even if it's very basic and light to help sustain function.  Continued to discuss risks, benefits and alternatives of care with high risk medication and we have decided to continue to use them.  Our goal continues to be stabilizing of function, quality of life and avoidance of medication side effects.  Patient voiced understanding.    Problem List Items Addressed This Visit     Osteoporosis      Other Visit Diagnoses     Annual physical exam    -  Primary    Closed fracture of multiple ribs of left side, sequela          Counseled on age appropriate medical preventative services, including age appropriate cancer screenings, over all nutritional  health, need for a consistent exercise regimen and an over all push towards maintaining a vigorous and active lifestyle.  Counseled on age appropriate vaccines and discussed upcoming health care needs based on age/gender.  Spent time with patient counseling on need for a good patient/doctor relationship moving forward.  Discussed use of common OTC medications and supplements.  Discussed common dietary aids and use of caffeine and the need for good sleep hygiene and stress management.      Follow up in 3months.

## 2022-06-02 DIAGNOSIS — E78.5 HYPERLIPIDEMIA, UNSPECIFIED HYPERLIPIDEMIA TYPE: ICD-10-CM

## 2022-06-02 NOTE — TELEPHONE ENCOUNTER
Refill Routing Note   Medication(s) are not appropriate for processing by [facility] for the following reason(s):      - Required laboratory values are outdated    [facility] action(s):  Defer          Medication reconciliation completed: No     Appointments  past 12m or future 3m with PCP    Date Provider   Last Visit   5/24/2022 Devon Collier MD   Next Visit   Visit date not found Devon Collier MD   ED visits in past 90 days: 0        Note composed:3:43 PM 06/02/2022

## 2022-06-02 NOTE — TELEPHONE ENCOUNTER
No new care gaps identified.  [facility] Embedded Care Gaps. Reference number: [id number]. 6/02/2022   8:05:34 AM RLT

## 2022-06-03 RX ORDER — SIMVASTATIN 40 MG/1
TABLET, FILM COATED ORAL
Qty: 90 TABLET | Refills: 3 | Status: SHIPPED | OUTPATIENT
Start: 2022-06-03 | End: 2022-10-20 | Stop reason: SDUPTHER

## 2022-06-03 RX ORDER — TRIAMTERENE/HYDROCHLOROTHIAZID 37.5-25 MG
TABLET ORAL
Qty: 45 TABLET | Refills: 3 | Status: SHIPPED | OUTPATIENT
Start: 2022-06-03 | End: 2023-01-13 | Stop reason: SDUPTHER

## 2022-06-24 ENCOUNTER — PATIENT MESSAGE (OUTPATIENT)
Dept: PSYCHIATRY | Facility: CLINIC | Age: 57
End: 2022-06-24
Payer: MEDICARE

## 2022-07-01 DIAGNOSIS — G35 MULTIPLE SCLEROSIS: ICD-10-CM

## 2022-07-01 RX ORDER — INTERFERON BETA-1A 30MCG/.5ML
30 KIT INTRAMUSCULAR
Qty: 2 ML | Refills: 1 | Status: SHIPPED | OUTPATIENT
Start: 2022-07-01 | End: 2022-08-25 | Stop reason: SDUPTHER

## 2022-07-01 NOTE — TELEPHONE ENCOUNTER
----- Message from Garima Cho sent at 7/1/2022 11:39 AM CDT -----  Type: RX Refill Request    Who Called[facility] [phone] opt [phone]    Have you contacted your pharmacy:    Refill or New Rx:  interferon beta-1a (AVONEX) 30 mcg/0.5 mL PnKt  RX Name and Strength:    How is the patient currently taking it? (ex. 1XDay):    Is this a 30 day or 90 day RX:    Preferred Pharmacy with phone number:    Local or Mail Order:    Ordering Provider:    Would the patient rather a call back or a response via [facility]?     Best Call Back Number:    Additional Information:

## 2022-07-15 NOTE — TELEPHONE ENCOUNTER
----- Message from Charito Mejia MD sent at 7/27/2018  9:12 AM CDT -----  Notify pt BP reading yesterday visit low  Pt should hold BP med and f/u with PCP for BP check  Schedule BP check with  pcp  If pt is lightheaded or dizzy instruct pt to go to infusion ctr and we can schedule bolus IVF   Tremfya Counseling: I discussed with the patient the risks of guselkumab including but not limited to immunosuppression, serious infections, and drug reactions.  The patient understands that monitoring is required including a PPD at baseline and must alert us or the primary physician if symptoms of infection or other concerning signs are noted.

## 2022-08-25 DIAGNOSIS — G35 MULTIPLE SCLEROSIS: ICD-10-CM

## 2022-08-25 RX ORDER — INTERFERON BETA-1A 30MCG/.5ML
30 KIT INTRAMUSCULAR
Qty: 2 ML | Refills: 1 | Status: SHIPPED | OUTPATIENT
Start: 2022-08-25 | End: 2023-02-20

## 2022-08-25 NOTE — TELEPHONE ENCOUNTER
----- Message from Meghana Coe sent at 8/25/2022  3:56 PM CDT -----  Type:  Pharmacy Calling to Clarify an RX    Name of Caller:     [facility]     Prescription Name: interferon beta-1a (AVONEX) 30 mcg/0.5 mL PnKt    What do they need to clarify? refill    Can you be contacted via [facility]?    Best Call Back Number:       [facility] - [place], [place] - [address] [facility]]  [address] [facility]]  [place] [address]  Phone: [phone] option 2 for pharmacist Fax: [phone]

## 2022-09-20 DIAGNOSIS — I10 HTN (HYPERTENSION): ICD-10-CM

## 2022-10-06 ENCOUNTER — PATIENT MESSAGE (OUTPATIENT)
Dept: NEUROLOGY | Facility: CLINIC | Age: 57
End: 2022-10-06
Payer: MEDICARE

## 2022-10-12 ENCOUNTER — LAB VISIT (OUTPATIENT)
Dept: LAB | Facility: HOSPITAL | Age: 57
End: 2022-10-12
Payer: MEDICARE

## 2022-10-12 ENCOUNTER — OFFICE VISIT (OUTPATIENT)
Dept: NEUROLOGY | Facility: CLINIC | Age: 57
End: 2022-10-12
Payer: MEDICARE

## 2022-10-12 ENCOUNTER — PATIENT MESSAGE (OUTPATIENT)
Dept: NEUROLOGY | Facility: CLINIC | Age: 57
End: 2022-10-12

## 2022-10-12 VITALS
DIASTOLIC BLOOD PRESSURE: 73 MMHG | SYSTOLIC BLOOD PRESSURE: 105 MMHG | HEIGHT: 60 IN | HEART RATE: 58 BPM | BODY MASS INDEX: 26.47 KG/M2 | WEIGHT: 134.81 LBS

## 2022-10-12 DIAGNOSIS — H57.10 PAIN IN EYE, UNSPECIFIED LATERALITY: ICD-10-CM

## 2022-10-12 DIAGNOSIS — Z79.899 OTHER LONG TERM (CURRENT) DRUG THERAPY: ICD-10-CM

## 2022-10-12 DIAGNOSIS — G35 MULTIPLE SCLEROSIS: Primary | ICD-10-CM

## 2022-10-12 DIAGNOSIS — Z86.69 HISTORY OF OPTIC NEURITIS: ICD-10-CM

## 2022-10-12 DIAGNOSIS — G35 MULTIPLE SCLEROSIS: ICD-10-CM

## 2022-10-12 DIAGNOSIS — H46.9 UNSPECIFIED OPTIC NEURITIS: ICD-10-CM

## 2022-10-12 LAB
25(OH)D3+25(OH)D2 SERPL-MCNC: 117 NG/ML (ref 30–96)
ALBUMIN SERPL BCP-MCNC: 4 G/DL (ref 3.5–5.2)
ALP SERPL-CCNC: 126 U/L (ref 55–135)
ALT SERPL W/O P-5'-P-CCNC: 13 U/L (ref 10–44)
ANION GAP SERPL CALC-SCNC: 11 MMOL/L (ref 8–16)
AST SERPL-CCNC: 16 U/L (ref 10–40)
BASOPHILS # BLD AUTO: 0.03 K/UL (ref 0–0.2)
BASOPHILS NFR BLD: 0.5 % (ref 0–1.9)
BILIRUB SERPL-MCNC: 0.5 MG/DL (ref 0.1–1)
BUN SERPL-MCNC: 9 MG/DL (ref 6–20)
CALCIUM SERPL-MCNC: 9.5 MG/DL (ref 8.7–10.5)
CARBAMAZEPINE SERPL-MCNC: 9.6 UG/ML (ref 4–12)
CHLORIDE SERPL-SCNC: 97 MMOL/L (ref 95–110)
CO2 SERPL-SCNC: 25 MMOL/L (ref 23–29)
CREAT SERPL-MCNC: 0.7 MG/DL (ref 0.5–1.4)
DIFFERENTIAL METHOD: ABNORMAL
EOSINOPHIL # BLD AUTO: 0.1 K/UL (ref 0–0.5)
EOSINOPHIL NFR BLD: 1.8 % (ref 0–8)
ERYTHROCYTE [DISTWIDTH] IN BLOOD BY AUTOMATED COUNT: 13.3 % (ref 11.5–14.5)
EST. GFR  (NO RACE VARIABLE): >60 ML/MIN/1.73 M^2
GLUCOSE SERPL-MCNC: 93 MG/DL (ref 70–110)
HCT VFR BLD AUTO: 43.6 % (ref 37–48.5)
HGB BLD-MCNC: 15.6 G/DL (ref 12–16)
IMM GRANULOCYTES # BLD AUTO: 0.01 K/UL (ref 0–0.04)
IMM GRANULOCYTES NFR BLD AUTO: 0.2 % (ref 0–0.5)
LYMPHOCYTES # BLD AUTO: 1.6 K/UL (ref 1–4.8)
LYMPHOCYTES NFR BLD: 23.8 % (ref 18–48)
MCH RBC QN AUTO: 32.4 PG (ref 27–31)
MCHC RBC AUTO-ENTMCNC: 35.8 G/DL (ref 32–36)
MCV RBC AUTO: 91 FL (ref 82–98)
MONOCYTES # BLD AUTO: 0.5 K/UL (ref 0.3–1)
MONOCYTES NFR BLD: 8 % (ref 4–15)
NEUTROPHILS # BLD AUTO: 4.3 K/UL (ref 1.8–7.7)
NEUTROPHILS NFR BLD: 65.7 % (ref 38–73)
NRBC BLD-RTO: 0 /100 WBC
PLATELET # BLD AUTO: 217 K/UL (ref 150–450)
PMV BLD AUTO: 8.8 FL (ref 9.2–12.9)
POTASSIUM SERPL-SCNC: 3.8 MMOL/L (ref 3.5–5.1)
PROT SERPL-MCNC: 7.1 G/DL (ref 6–8.4)
RBC # BLD AUTO: 4.81 M/UL (ref 4–5.4)
SODIUM SERPL-SCNC: 133 MMOL/L (ref 136–145)
WBC # BLD AUTO: 6.52 K/UL (ref 3.9–12.7)

## 2022-10-12 PROCEDURE — 99215 PR OFFICE/OUTPT VISIT, EST, LEVL V, 40-54 MIN: ICD-10-PCS | Mod: S$GLB,,, | Performed by: CLINICAL NURSE SPECIALIST

## 2022-10-12 PROCEDURE — 36415 COLL VENOUS BLD VENIPUNCTURE: CPT | Performed by: CLINICAL NURSE SPECIALIST

## 2022-10-12 PROCEDURE — 3074F SYST BP LT 130 MM HG: CPT | Mod: CPTII,S$GLB,, | Performed by: CLINICAL NURSE SPECIALIST

## 2022-10-12 PROCEDURE — 1159F MED LIST DOCD IN RCRD: CPT | Mod: CPTII,S$GLB,, | Performed by: CLINICAL NURSE SPECIALIST

## 2022-10-12 PROCEDURE — 1159F PR MEDICATION LIST DOCUMENTED IN MEDICAL RECORD: ICD-10-PCS | Mod: CPTII,S$GLB,, | Performed by: CLINICAL NURSE SPECIALIST

## 2022-10-12 PROCEDURE — 3078F PR MOST RECENT DIASTOLIC BLOOD PRESSURE < 80 MM HG: ICD-10-PCS | Mod: CPTII,S$GLB,, | Performed by: CLINICAL NURSE SPECIALIST

## 2022-10-12 PROCEDURE — 99999 PR PBB SHADOW E&M-EST. PATIENT-LVL V: CPT | Mod: PBBFAC,,, | Performed by: CLINICAL NURSE SPECIALIST

## 2022-10-12 PROCEDURE — 3074F PR MOST RECENT SYSTOLIC BLOOD PRESSURE < 130 MM HG: ICD-10-PCS | Mod: CPTII,S$GLB,, | Performed by: CLINICAL NURSE SPECIALIST

## 2022-10-12 PROCEDURE — 3008F PR BODY MASS INDEX (BMI) DOCUMENTED: ICD-10-PCS | Mod: CPTII,S$GLB,, | Performed by: CLINICAL NURSE SPECIALIST

## 2022-10-12 PROCEDURE — 80156 ASSAY CARBAMAZEPINE TOTAL: CPT | Performed by: CLINICAL NURSE SPECIALIST

## 2022-10-12 PROCEDURE — 3008F BODY MASS INDEX DOCD: CPT | Mod: CPTII,S$GLB,, | Performed by: CLINICAL NURSE SPECIALIST

## 2022-10-12 PROCEDURE — 99215 OFFICE O/P EST HI 40 MIN: CPT | Mod: S$GLB,,, | Performed by: CLINICAL NURSE SPECIALIST

## 2022-10-12 PROCEDURE — 80053 COMPREHEN METABOLIC PANEL: CPT | Performed by: CLINICAL NURSE SPECIALIST

## 2022-10-12 PROCEDURE — 82306 VITAMIN D 25 HYDROXY: CPT | Performed by: CLINICAL NURSE SPECIALIST

## 2022-10-12 PROCEDURE — 85025 COMPLETE CBC W/AUTO DIFF WBC: CPT | Performed by: CLINICAL NURSE SPECIALIST

## 2022-10-12 PROCEDURE — 3078F DIAST BP <80 MM HG: CPT | Mod: CPTII,S$GLB,, | Performed by: CLINICAL NURSE SPECIALIST

## 2022-10-12 PROCEDURE — 99999 PR PBB SHADOW E&M-EST. PATIENT-LVL V: ICD-10-PCS | Mod: PBBFAC,,, | Performed by: CLINICAL NURSE SPECIALIST

## 2022-10-12 RX ORDER — DIAZEPAM 5 MG/1
TABLET ORAL
Qty: 2 TABLET | Refills: 0 | Status: SHIPPED | OUTPATIENT
Start: 2022-10-12 | End: 2022-10-20

## 2022-10-12 NOTE — PROGRESS NOTES
"Subjective:          Patient ID: Zoey Cali is a 56 y.o. female who presents today for a fit-in clinic visit for MS.  She was last seen by Dr. Mendez in June 2021. The history has been provided by the patient.     MS HPI:  DMT: interferon beta-1a IM; she does not miss doses  Side effects from DMT? No  Taking vitamin D3 as recommended? Yes -  Dose: 5000 units Monday through Friday   She has had some right eye pain recently. The pain has been severe for more than a week. Her allergies have been bad, so she attributed the pain to this at first. The pain is present when her eyes are at rest or with eye movement. She has been keeping the lights low in the house, but her eyes are not specifically sensitive to light at this time.   She denies any double vision, but she thinks that her vision is not totally normal. She "felt a glare" last night, similar to what she felt when she had optic neuritis in the past. She does not think there has been a change in her color vision. She has reported in the past that her previous optic neuritis was in the left eye, but she is not totally sure. She states "I want to say it was actually the right."   She denies any numbness in her face. She has had more right-sided headaches lately.   She denies any recent infections.   She wears glasses all the time.   Gabapentin dose--600mg three times daily and 1600mg at bedtime.   She also describes a restless leg sensation when she is in bed at night. She also notes that her legs jump at night.     Medications:  Current Outpatient Medications   Medication Sig    calcitonin, salmon, (FORTICAL) 200 unit/actuation nasal spray 1 spray by Nasal route once daily. for 10 days    carBAMazepine (TEGRETOL XR) 200 MG 12 hr tablet TAKE ONE TABLET BY MOUTH TWICE DAILY    cetirizine (ZYRTEC) 10 MG tablet Take 1 tablet (10 mg total) by mouth once daily.    cholecalciferol, vitamin D3, (VITAMIN D3) 125 mcg (5,000 unit) Tab Take 1 tablet (5,000 Units total) " by mouth once daily.    EScitalopram oxalate (LEXAPRO) 20 MG tablet TAKE ONE TABLET BY MOUTH EVERY DAY    gabapentin (NEURONTIN) 400 MG capsule TAKE TWO CAPSULES BY MOUTH EVERY EVENING    gabapentin (NEURONTIN) 600 MG tablet TAKE ONE TABLET BY MOUTH THREE TIMES DAILY    HYDROcodone-acetaminophen (NORCO)  mg per tablet Take 1 tablet by mouth every 6 (six) hours as needed for Pain.    HYDROcodone-acetaminophen (NORCO)  mg per tablet Take 1 tablet by mouth every 6 (six) hours as needed for Pain.    HYDROcodone-acetaminophen (NORCO)  mg per tablet TAKE ONE TABLET BY MOUTH EVERY 6 HOURS AS NEEDED FOR PAIN    hydrOXYzine HCL (ATARAX) 25 MG tablet Take 1 tablet (25 mg total) by mouth 3 (three) times daily.    ibuprofen (ADVIL,MOTRIN) 800 MG tablet TAKE ONE TABLET BY MOUTH EVERY 8 HOURS WITH FOOD AND WATER    interferon beta-1a (AVONEX) 30 mcg/0.5 mL PnKt Inject 0.5 mLs (30 mcg total) into the muscle every 7 days.    interferon beta-1a (AVONEX) 30 mcg/0.5 mL PnKt Inject 0.5 mLs (30 mcg total) into the muscle every 7 days.    LORazepam (ATIVAN) 2 MG Tab TAKE ONE TABLET BY MOUTH EVERY 6 HOURS AS NEEDED    methocarbamoL (ROBAXIN) 750 MG Tab TAKE TWO TABLETS BY MOUTH FOUR TIMES DAILY FOR 10 DAYS    metoprolol succinate (TOPROL-XL) 100 MG 24 hr tablet Take 1 tablet (100 mg total) by mouth once daily.    nystatin (MYCOSTATIN) cream Apply topically 2 (two) times daily.    ondansetron (ZOFRAN-ODT) 8 MG TbDL DISSOLVE 1 TABLET ON THE TONGUE THREE TIMES DAILY AS NEEDED    simvastatin (ZOCOR) 40 MG tablet TAKE ONE TABLET BY MOUTH EVERY EVENING    STIOLTO RESPIMAT 2.5-2.5 mcg/actuation Mist inhale TWO puffs INTO THE LUNGS DAILY. controller    tiZANidine (ZANAFLEX) 4 MG tablet TAKE 2 TABLETS IN THE MORNING, AT NOON AND IN THE EVENING AND TAKE 3 TABLETS AT NIGHT (9 TABLETS PER DAY)    triamterene-hydrochlorothiazide 37.5-25 mg (MAXZIDE-25) 37.5-25 mg per tablet TAKE ONE TABLET BY MOUTH EVERY OTHER DAY     SOCIAL  HISTORY  Social History     Tobacco Use    Smoking status: Every Day     Packs/day: 1.00     Years: 30.00     Pack years: 30.00     Types: Cigarettes    Smokeless tobacco: Current   Substance Use Topics    Alcohol use: No     Alcohol/week: 0.0 standard drinks    Drug use: No       Living arrangements - the patient lives with her  and 3 dogs.          Objective:        1. 25 foot timed walk:  Timed 25 Foot Walk: 5/2/2017 6/29/2021   Did patient wear an AFO? No No   Was assistive device used? No No   Time for 25 Foot Walk (seconds) 4.4 4.8       Neurologic Exam     Mental Status   Oriented to person, place, and time.   Attention: normal. Concentration: normal.   Speech: speech is normal   Level of consciousness: alert  Knowledge: good.   Normal comprehension.     Cranial Nerves     CN II   Visual acuity: (20/30 OD, had to turn head to left to see clearly; 20/25 OS with glasses)    CN III, IV, VI   Extraocular motions are normal.   Nystagmus: none   Diplopia: none   ? R APD ?     Normal color vision     Imaging:     Results for orders placed during the hospital encounter of 03/13/21    MRI Brain Demyelinating Without Contrast    Impression  Brain appears stable from prior exam as above.  No new discrete lesions to indicate ongoing demyelination.      Electronically signed by: Magno Bahena MD  Date:    03/14/2021  Time:    08:34          Labs:     Lab Results   Component Value Date    PMXDBFBI64BB 78 10/13/2021    RDYZKRBP16PJ 72 10/28/2019    JIYYCHAJ74IO 49 04/02/2019     Lab Results   Component Value Date    WBC 6.49 02/18/2022    RBC 4.93 02/18/2022    HGB 15.1 02/18/2022    HCT 43.6 02/18/2022    MCV 88 02/18/2022    MCH 30.6 02/18/2022    MCHC 34.6 02/18/2022    RDW 13.2 02/18/2022     02/18/2022    MPV 8.7 (L) 02/18/2022    GRAN 4.3 02/18/2022    GRAN 66.5 02/18/2022    LYMPH 1.6 02/18/2022    LYMPH 25.3 02/18/2022    MONO 0.3 02/18/2022    MONO 5.1 02/18/2022    EOS 0.2 02/18/2022    BASO 0.04  02/18/2022    EOSINOPHIL 2.3 02/18/2022    BASOPHIL 0.6 02/18/2022     Sodium   Date Value Ref Range Status   05/01/2018 141 136 - 145 mmol/L Final     Potassium   Date Value Ref Range Status   05/01/2018 4.4 3.5 - 5.1 mmol/L Final     Chloride   Date Value Ref Range Status   05/01/2018 102 95 - 110 mmol/L Final     CO2   Date Value Ref Range Status   05/01/2018 31 (H) 23 - 29 mmol/L Final     Glucose   Date Value Ref Range Status   05/01/2018 89 70 - 110 mg/dL Final     BUN   Date Value Ref Range Status   05/01/2018 10 6 - 20 mg/dL Final     Creatinine   Date Value Ref Range Status   05/15/2019 0.7 0.5 - 1.4 mg/dL Final     Calcium   Date Value Ref Range Status   05/01/2018 10.0 8.7 - 10.5 mg/dL Final     Total Protein   Date Value Ref Range Status   10/13/2021 7.1 6.0 - 8.4 g/dL Final     Albumin   Date Value Ref Range Status   10/13/2021 4.0 3.5 - 5.2 g/dL Final     Total Bilirubin   Date Value Ref Range Status   10/13/2021 0.5 0.1 - 1.0 mg/dL Final     Comment:     For infants and newborns, interpretation of results should be based  on gestational age, weight and in agreement with clinical  observations.    Premature Infant recommended reference ranges:  Up to 24 hours.............<8.0 mg/dL  Up to 48 hours............<12.0 mg/dL  3-5 days..................<15.0 mg/dL  6-29 days.................<15.0 mg/dL       Alkaline Phosphatase   Date Value Ref Range Status   10/13/2021 103 55 - 135 U/L Final     AST   Date Value Ref Range Status   10/13/2021 16 10 - 40 U/L Final     ALT   Date Value Ref Range Status   10/13/2021 16 10 - 44 U/L Final     Anion Gap   Date Value Ref Range Status   05/01/2018 8 8 - 16 mmol/L Final     eGFR if    Date Value Ref Range Status   05/15/2019 >60 >60 mL/min/1.73 m^2 Final     eGFR if non    Date Value Ref Range Status   05/15/2019 >60 >60 mL/min/1.73 m^2 Final     Comment:     Calculation used to obtain the estimated glomerular filtration  rate (eGFR)  is the CKD-EPI equation.          MS Impression and Plan:     NEURO MULTIPLE SCLEROSIS IMPRESSION:   MS Status:     Number of relapses in the past year? comment:  Questionable relapse/optic neuritis at this time  We will proceed with infectious work-up and STAT MRI orbits    We will plan for steroids if MRI confirms new or active lesion. In the meantime, she will take ibuprofen for eye pain.   I will also get her in to see Dr. Stewart soon.   Plan:     DMT:  No change in management    DMT comment:  Continue Avonex and Vitamin D. We will check safety labs today.        Valium sent to pharmacy to take prior to MRI     Total time spent with patient: 45 minutes   Total time spent on encounter: 60 minutes       REEMA Ford, CNS    Problem List Items Addressed This Visit          Neurologic Problems    Multiple sclerosis - Primary    Overview     Unable to tolerate Aubagio due to GI side effects--stopped 3/18/2021. Will plan to return to Avonex         Relevant Medications    diazePAM (VALIUM) 5 MG tablet    Other Relevant Orders    MRI Brain Demyelinating W W/O Contrast    CBC auto differential (Completed)    Comprehensive Metabolic Panel (Completed)    Urinalysis, Reflex to Urine Culture Urine, Clean Catch    Carbamazepine level, total (Completed)    Vitamin D (Completed)    Ambulatory referral/consult to Ophthalmology       Other    History of optic neuritis    Relevant Orders    Ambulatory referral/consult to Ophthalmology     Other Visit Diagnoses       Pain in eye, unspecified laterality        Relevant Orders    MRI Brain Demyelinating W W/O Contrast    CBC auto differential (Completed)    Comprehensive Metabolic Panel (Completed)    Urinalysis, Reflex to Urine Culture Urine, Clean Catch    Carbamazepine level, total (Completed)    Vitamin D (Completed)    Ambulatory referral/consult to Ophthalmology    Other long term (current) drug therapy        Relevant Orders    Vitamin D (Completed)    Unspecified  optic neuritis        Relevant Orders    MRI Orbits W W/O Contrast

## 2022-10-13 ENCOUNTER — HOSPITAL ENCOUNTER (OUTPATIENT)
Dept: RADIOLOGY | Facility: HOSPITAL | Age: 57
Discharge: HOME | End: 2022-10-13
Attending: CLINICAL NURSE SPECIALIST
Payer: MEDICARE

## 2022-10-13 ENCOUNTER — PATIENT MESSAGE (OUTPATIENT)
Dept: NEUROLOGY | Facility: CLINIC | Age: 57
End: 2022-10-13
Payer: MEDICARE

## 2022-10-13 DIAGNOSIS — H46.9 UNSPECIFIED OPTIC NEURITIS: ICD-10-CM

## 2022-10-13 PROCEDURE — 70543 MRI ORBT/FAC/NCK W/O &W/DYE: CPT | Mod: 26,,, | Performed by: RADIOLOGY

## 2022-10-13 PROCEDURE — 70543 MRI ORBT/FAC/NCK W/O &W/DYE: CPT | Mod: TC

## 2022-10-13 PROCEDURE — 70543 MRI ORBITS W W/O CONTRAST: ICD-10-PCS | Mod: 26,,, | Performed by: RADIOLOGY

## 2022-10-13 PROCEDURE — A9585 GADOBUTROL INJECTION: HCPCS | Performed by: CLINICAL NURSE SPECIALIST

## 2022-10-13 PROCEDURE — 25500020 PHARM REV CODE 255: Performed by: CLINICAL NURSE SPECIALIST

## 2022-10-13 RX ORDER — GADOBUTROL 604.72 MG/ML
7 INJECTION INTRAVENOUS
Status: COMPLETED | OUTPATIENT
Start: 2022-10-13 | End: 2022-10-13

## 2022-10-13 RX ADMIN — GADOBUTROL 7 ML: 604.72 INJECTION INTRAVENOUS at 08:10

## 2022-10-15 ENCOUNTER — PATIENT MESSAGE (OUTPATIENT)
Dept: NEUROLOGY | Facility: CLINIC | Age: 57
End: 2022-10-15
Payer: MEDICARE

## 2022-10-15 DIAGNOSIS — R90.89 ABNORMAL BRAIN MRI: Primary | ICD-10-CM

## 2022-10-17 ENCOUNTER — TELEPHONE (OUTPATIENT)
Dept: NEUROLOGY | Facility: CLINIC | Age: 57
End: 2022-10-17
Payer: MEDICARE

## 2022-10-17 DIAGNOSIS — H57.11 ACUTE RIGHT EYE PAIN: Primary | ICD-10-CM

## 2022-10-17 NOTE — TELEPHONE ENCOUNTER
----- Message from REEMA Weston, CNS sent at 10/15/2022  9:46 AM CDT -----  Please contact patient to schedule neck MRI.

## 2022-10-19 ENCOUNTER — OFFICE VISIT (OUTPATIENT)
Dept: OPTOMETRY | Facility: CLINIC | Age: 57
End: 2022-10-19
Payer: MEDICARE

## 2022-10-19 DIAGNOSIS — H57.11 ACUTE RIGHT EYE PAIN: Primary | ICD-10-CM

## 2022-10-19 DIAGNOSIS — H52.203 HYPEROPIA WITH ASTIGMATISM AND PRESBYOPIA, BILATERAL: ICD-10-CM

## 2022-10-19 DIAGNOSIS — H52.03 HYPEROPIA WITH ASTIGMATISM AND PRESBYOPIA, BILATERAL: ICD-10-CM

## 2022-10-19 DIAGNOSIS — H52.4 HYPEROPIA WITH ASTIGMATISM AND PRESBYOPIA, BILATERAL: ICD-10-CM

## 2022-10-19 DIAGNOSIS — H57.11 EYE PAIN, RIGHT: Primary | ICD-10-CM

## 2022-10-19 PROCEDURE — 92015 DETERMINE REFRACTIVE STATE: CPT | Mod: S$GLB,,,

## 2022-10-19 PROCEDURE — 1159F MED LIST DOCD IN RCRD: CPT | Mod: CPTII,S$GLB,,

## 2022-10-19 PROCEDURE — 92015 PR REFRACTION: ICD-10-PCS | Mod: S$GLB,,,

## 2022-10-19 PROCEDURE — 1160F PR REVIEW ALL MEDS BY PRESCRIBER/CLIN PHARMACIST DOCUMENTED: ICD-10-PCS | Mod: CPTII,S$GLB,,

## 2022-10-19 PROCEDURE — 1160F RVW MEDS BY RX/DR IN RCRD: CPT | Mod: CPTII,S$GLB,,

## 2022-10-19 PROCEDURE — 99999 PR PBB SHADOW E&M-EST. PATIENT-LVL II: CPT | Mod: PBBFAC,,,

## 2022-10-19 PROCEDURE — 99999 PR PBB SHADOW E&M-EST. PATIENT-LVL II: ICD-10-PCS | Mod: PBBFAC,,,

## 2022-10-19 PROCEDURE — 92004 PR EYE EXAM, NEW PATIENT,COMPREHESV: ICD-10-PCS | Mod: S$GLB,,,

## 2022-10-19 PROCEDURE — 92004 COMPRE OPH EXAM NEW PT 1/>: CPT | Mod: S$GLB,,,

## 2022-10-19 PROCEDURE — 1159F PR MEDICATION LIST DOCUMENTED IN MEDICAL RECORD: ICD-10-PCS | Mod: CPTII,S$GLB,,

## 2022-10-19 RX ORDER — METHYLPREDNISOLONE 4 MG/1
TABLET ORAL
Qty: 21 EACH | Refills: 0 | Status: SHIPPED | OUTPATIENT
Start: 2022-10-19 | End: 2022-11-09

## 2022-10-19 NOTE — PROGRESS NOTES
HPI    The patient reports that she has been noticing pain OD for 3 weeks,   notices constantly. Pain is described as a constant dull ache at top of   OD. Denies increased pain on eye movement. Confirms photophobia OD   yesterday, improved today but still noticing. States blurred vision OD>OS.  MRI done and not conclusive for optic neuritis. Sent here by Neurology to   r/o optic neuritis. Has appointment scheduled with Dr. Stewart next week.  Pt has MS, previous occurrence of optic neuritis 25 years ago.   Last edited by Cynthia Kennedy, OD on 10/19/2022  5:29 PM.            Assessment /Plan     For exam results, see Encounter Report.    Eye pain, right    Hyperopia with astigmatism and presbyopia, bilateral    Pt educated on condition. Reassured pt there are no signs of optic neuritis today, optic nerve is pink and flat with distinct margins, no optic nerve edema and no pallor OU. Normal color vision and  desaturation test today OU. The pt confirms occasional allergies. Denies being sick recently. Denies symptoms of GCA (Denies temporal headache, scalp tenderness, jaw pain when you chew or open your mouth wide, fever, fatigue, unintended weight loss, nor vision loss or double vision. Advised pt use systemic allergy medicine for next week to see if there is inflammation in sinus contributing to symptoms. Keep appointment with Dr. Stewart on 10/27/2022.  Updated pt's spec rx and released final copy. Ed pt on change in rx and adaptation. Pt currently using +2.75 OTC readers, recommend first time PAL. RTC for annual exam.       RTC 1 week with Dr. Stewart

## 2022-10-20 ENCOUNTER — OFFICE VISIT (OUTPATIENT)
Dept: FAMILY MEDICINE | Facility: CLINIC | Age: 57
End: 2022-10-20
Payer: MEDICARE

## 2022-10-20 ENCOUNTER — LAB VISIT (OUTPATIENT)
Dept: LAB | Facility: HOSPITAL | Age: 57
End: 2022-10-20
Payer: MEDICARE

## 2022-10-20 ENCOUNTER — PATIENT MESSAGE (OUTPATIENT)
Dept: NEUROLOGY | Facility: CLINIC | Age: 57
End: 2022-10-20
Payer: MEDICARE

## 2022-10-20 VITALS
BODY MASS INDEX: 26.7 KG/M2 | HEIGHT: 60 IN | HEART RATE: 60 BPM | DIASTOLIC BLOOD PRESSURE: 62 MMHG | SYSTOLIC BLOOD PRESSURE: 120 MMHG | OXYGEN SATURATION: 99 % | WEIGHT: 136 LBS | TEMPERATURE: 98 F

## 2022-10-20 DIAGNOSIS — E78.5 HYPERLIPIDEMIA, UNSPECIFIED HYPERLIPIDEMIA TYPE: ICD-10-CM

## 2022-10-20 DIAGNOSIS — H57.11 ACUTE RIGHT EYE PAIN: ICD-10-CM

## 2022-10-20 DIAGNOSIS — M79.2 NEUROPATHIC PAIN OF FOOT, RIGHT: ICD-10-CM

## 2022-10-20 DIAGNOSIS — M79.2 NEURALGIA AND NEURITIS: ICD-10-CM

## 2022-10-20 DIAGNOSIS — I10 ESSENTIAL HYPERTENSION: ICD-10-CM

## 2022-10-20 DIAGNOSIS — G35 MULTIPLE SCLEROSIS: ICD-10-CM

## 2022-10-20 DIAGNOSIS — F32.A DEPRESSION, UNSPECIFIED DEPRESSION TYPE: ICD-10-CM

## 2022-10-20 DIAGNOSIS — M79.2 NEUROPATHIC PAIN OF FOOT, LEFT: ICD-10-CM

## 2022-10-20 DIAGNOSIS — R25.2 SPASTICITY: ICD-10-CM

## 2022-10-20 LAB
CRP SERPL-MCNC: 2.7 MG/L (ref 0–8.2)
ERYTHROCYTE [SEDIMENTATION RATE] IN BLOOD BY WESTERGREN METHOD: 0 MM/HR (ref 0–20)

## 2022-10-20 PROCEDURE — 36415 COLL VENOUS BLD VENIPUNCTURE: CPT | Mod: PO | Performed by: CLINICAL NURSE SPECIALIST

## 2022-10-20 PROCEDURE — 3074F PR MOST RECENT SYSTOLIC BLOOD PRESSURE < 130 MM HG: ICD-10-PCS | Mod: CPTII,S$GLB,, | Performed by: FAMILY MEDICINE

## 2022-10-20 PROCEDURE — 99215 PR OFFICE/OUTPT VISIT, EST, LEVL V, 40-54 MIN: ICD-10-PCS | Mod: S$GLB,,, | Performed by: FAMILY MEDICINE

## 2022-10-20 PROCEDURE — 3078F PR MOST RECENT DIASTOLIC BLOOD PRESSURE < 80 MM HG: ICD-10-PCS | Mod: CPTII,S$GLB,, | Performed by: FAMILY MEDICINE

## 2022-10-20 PROCEDURE — 1159F MED LIST DOCD IN RCRD: CPT | Mod: CPTII,S$GLB,, | Performed by: FAMILY MEDICINE

## 2022-10-20 PROCEDURE — 99999 PR PBB SHADOW E&M-EST. PATIENT-LVL IV: CPT | Mod: PBBFAC,,, | Performed by: FAMILY MEDICINE

## 2022-10-20 PROCEDURE — 86140 C-REACTIVE PROTEIN: CPT | Performed by: CLINICAL NURSE SPECIALIST

## 2022-10-20 PROCEDURE — 3074F SYST BP LT 130 MM HG: CPT | Mod: CPTII,S$GLB,, | Performed by: FAMILY MEDICINE

## 2022-10-20 PROCEDURE — 99215 OFFICE O/P EST HI 40 MIN: CPT | Mod: S$GLB,,, | Performed by: FAMILY MEDICINE

## 2022-10-20 PROCEDURE — 99999 PR PBB SHADOW E&M-EST. PATIENT-LVL IV: ICD-10-PCS | Mod: PBBFAC,,, | Performed by: FAMILY MEDICINE

## 2022-10-20 PROCEDURE — 1159F PR MEDICATION LIST DOCUMENTED IN MEDICAL RECORD: ICD-10-PCS | Mod: CPTII,S$GLB,, | Performed by: FAMILY MEDICINE

## 2022-10-20 PROCEDURE — 3078F DIAST BP <80 MM HG: CPT | Mod: CPTII,S$GLB,, | Performed by: FAMILY MEDICINE

## 2022-10-20 PROCEDURE — 85652 RBC SED RATE AUTOMATED: CPT | Performed by: CLINICAL NURSE SPECIALIST

## 2022-10-20 RX ORDER — IBUPROFEN 800 MG/1
TABLET ORAL
Qty: 180 TABLET | Refills: 1 | Status: SHIPPED | OUTPATIENT
Start: 2022-10-20 | End: 2023-08-07

## 2022-10-20 RX ORDER — METHOCARBAMOL 750 MG/1
TABLET, FILM COATED ORAL
Qty: 80 TABLET | Refills: 1 | Status: SHIPPED | OUTPATIENT
Start: 2022-10-20 | End: 2023-05-09 | Stop reason: SDUPTHER

## 2022-10-20 RX ORDER — HYDROCODONE BITARTRATE AND ACETAMINOPHEN 10; 325 MG/1; MG/1
1 TABLET ORAL EVERY 6 HOURS PRN
Qty: 60 TABLET | Refills: 0 | Status: CANCELLED | OUTPATIENT
Start: 2022-10-20

## 2022-10-20 RX ORDER — METOPROLOL SUCCINATE 100 MG/1
100 TABLET, EXTENDED RELEASE ORAL DAILY
Qty: 90 TABLET | Refills: 3 | Status: SHIPPED | OUTPATIENT
Start: 2022-10-20 | End: 2023-10-25

## 2022-10-20 RX ORDER — HYDROCODONE BITARTRATE AND ACETAMINOPHEN 10; 325 MG/1; MG/1
TABLET ORAL
Qty: 60 TABLET | Refills: 0 | Status: SHIPPED | OUTPATIENT
Start: 2022-10-20 | End: 2023-04-25 | Stop reason: SDUPTHER

## 2022-10-20 RX ORDER — TIZANIDINE 4 MG/1
TABLET ORAL
Qty: 810 TABLET | Refills: 3 | Status: SHIPPED | OUTPATIENT
Start: 2022-10-20 | End: 2023-11-16

## 2022-10-20 RX ORDER — SIMVASTATIN 40 MG/1
40 TABLET, FILM COATED ORAL NIGHTLY
Qty: 90 TABLET | Refills: 3 | Status: SHIPPED | OUTPATIENT
Start: 2022-10-20 | End: 2023-12-15

## 2022-10-20 RX ORDER — GABAPENTIN 600 MG/1
600 TABLET ORAL 3 TIMES DAILY
Qty: 270 TABLET | Refills: 3 | Status: SHIPPED | OUTPATIENT
Start: 2022-10-20 | End: 2023-10-23

## 2022-10-20 RX ORDER — LORAZEPAM 2 MG/1
TABLET ORAL
Qty: 120 TABLET | Refills: 2 | Status: SHIPPED | OUTPATIENT
Start: 2022-10-20 | End: 2023-01-13

## 2022-10-20 NOTE — PROGRESS NOTES
Chief Complaint   Patient presents with    Medication Refill       SUBJECTIVE:  Zoey Cali is a 56 y.o. female presents with   Chief Complaint   Patient presents with    Medication Refill     Conservative treatment with tylenol, NSAIDs, alternative treatments, complementary non opioid nerve/epileptic medication has failed with primary use.  Now opioid dependent for relief.  Has tried PT/OT, injections with mixed success.  Working up the muscle of neck and eye for pain.  She has seen multiple specialists.    Past Medical History:   Diagnosis Date    Allergy     Anemia     Anxiety     Breast cyst     Chronic kidney disease     Depression     Fibrocystic breast     Hypertension     Multiple sclerosis     Multiple sclerosis     Neuromuscular disorder     Osteoporosis     Rib fracture 2015     Past Surgical History:   Procedure Laterality Date    APPENDECTOMY      BREAST BIOPSY Left 2012    BREAST CYST ASPIRATION      BREAST SURGERY  2004    excisional biopsy      SECTION, CLASSIC      CHALAZION EXCISION  at age 9    CHOLECYSTECTOMY      EYE SURGERY      HYSTERECTOMY  2001    OOPHORECTOMY       Social History     Socioeconomic History    Marital status:    Tobacco Use    Smoking status: Every Day     Packs/day: 1.00     Years: 30.00     Pack years: 30.00     Types: Cigarettes    Smokeless tobacco: Current   Substance and Sexual Activity    Alcohol use: No     Alcohol/week: 0.0 standard drinks    Drug use: No    Sexual activity: Not Currently     Birth control/protection: See Surgical Hx     Comment: Hysterectomy     Social Determinants of Health     Financial Resource Strain: Unknown    Difficulty of Paying Living Expenses: Patient refused   Food Insecurity: No Food Insecurity    Worried About Running Out of Food in the Last Year: Never true    Ran Out of Food in the Last Year: Never true   Transportation Needs: No Transportation Needs    Lack of Transportation (Medical): No    Lack of  Transportation (Non-Medical): No   Physical Activity: Unknown    Days of Exercise per Week: 0 days   Stress: No Stress Concern Present    Feeling of Stress : Not at all   Social Connections: Unknown    Frequency of Communication with Friends and Family: More than three times a week    Frequency of Social Gatherings with Friends and Family: Once a week    Active Member of Clubs or Organizations: No    Attends Club or Organization Meetings: Patient refused    Marital Status:    Housing Stability: Low Risk     Unable to Pay for Housing in the Last Year: No    Number of Places Lived in the Last Year: 1    Unstable Housing in the Last Year: No     Family History   Problem Relation Age of Onset    Arthritis Mother     Diabetes Father     Heart disease Father     Hyperlipidemia Father     Hypertension Father     Stroke Father     Arthritis Son     Drug abuse Son     Hypertension Maternal Grandmother     Stroke Maternal Grandmother     Arthritis Maternal Grandmother     Arthritis Paternal Grandmother     Heart disease Paternal Grandfather     Heart attack Paternal Grandfather     Colon cancer Neg Hx     Ovarian cancer Neg Hx     Amblyopia Neg Hx     Blindness Neg Hx     Glaucoma Neg Hx     Macular degeneration Neg Hx     Retinal detachment Neg Hx     Strabismus Neg Hx     Thyroid disease Neg Hx      Current Outpatient Medications on File Prior to Visit   Medication Sig Dispense Refill    carBAMazepine (TEGRETOL XR) 200 MG 12 hr tablet TAKE ONE TABLET BY MOUTH TWICE DAILY 180 tablet 1    cetirizine (ZYRTEC) 10 MG tablet Take 1 tablet (10 mg total) by mouth once daily. 30 tablet 0    cholecalciferol, vitamin D3, (VITAMIN D3) 125 mcg (5,000 unit) Tab Take 1 tablet (5,000 Units total) by mouth once daily. 90 tablet 3    EScitalopram oxalate (LEXAPRO) 20 MG tablet TAKE ONE TABLET BY MOUTH EVERY DAY 90 tablet 3    gabapentin (NEURONTIN) 400 MG capsule TAKE TWO CAPSULES BY MOUTH EVERY EVENING 180 capsule 3     HYDROcodone-acetaminophen (NORCO)  mg per tablet Take 1 tablet by mouth every 6 (six) hours as needed for Pain. 60 tablet 0    HYDROcodone-acetaminophen (NORCO)  mg per tablet Take 1 tablet by mouth every 6 (six) hours as needed for Pain. 60 tablet 0    hydrOXYzine HCL (ATARAX) 25 MG tablet Take 1 tablet (25 mg total) by mouth 3 (three) times daily. 90 tablet 0    interferon beta-1a (AVONEX) 30 mcg/0.5 mL PnKt Inject 0.5 mLs (30 mcg total) into the muscle every 7 days. 2 mL 5    interferon beta-1a (AVONEX) 30 mcg/0.5 mL PnKt Inject 0.5 mLs (30 mcg total) into the muscle every 7 days. 2 mL 1    nystatin (MYCOSTATIN) cream Apply topically 2 (two) times daily. 30 g 2    ondansetron (ZOFRAN-ODT) 8 MG TbDL DISSOLVE 1 TABLET ON THE TONGUE THREE TIMES DAILY AS NEEDED      STIOLTO RESPIMAT 2.5-2.5 mcg/actuation Mist inhale TWO puffs INTO THE LUNGS DAILY. controller 12 g 3    triamterene-hydrochlorothiazide 37.5-25 mg (MAXZIDE-25) 37.5-25 mg per tablet TAKE ONE TABLET BY MOUTH EVERY OTHER DAY 45 tablet 3    [DISCONTINUED] gabapentin (NEURONTIN) 600 MG tablet TAKE ONE TABLET BY MOUTH THREE TIMES DAILY 270 tablet 3    [DISCONTINUED] HYDROcodone-acetaminophen (NORCO)  mg per tablet TAKE ONE TABLET BY MOUTH EVERY 6 HOURS AS NEEDED FOR PAIN 60 tablet 0    [DISCONTINUED] ibuprofen (ADVIL,MOTRIN) 800 MG tablet TAKE ONE TABLET BY MOUTH EVERY 8 HOURS WITH FOOD AND WATER 180 tablet 1    [DISCONTINUED] LORazepam (ATIVAN) 2 MG Tab TAKE ONE TABLET BY MOUTH EVERY 6 HOURS AS NEEDED 120 tablet 2    [DISCONTINUED] methocarbamoL (ROBAXIN) 750 MG Tab TAKE TWO TABLETS BY MOUTH FOUR TIMES DAILY FOR 10 DAYS 80 tablet 1    [DISCONTINUED] metoprolol succinate (TOPROL-XL) 100 MG 24 hr tablet Take 1 tablet (100 mg total) by mouth once daily. 90 tablet 3    [DISCONTINUED] simvastatin (ZOCOR) 40 MG tablet TAKE ONE TABLET BY MOUTH EVERY EVENING 90 tablet 3    [DISCONTINUED] tiZANidine (ZANAFLEX) 4 MG tablet TAKE 2 TABLETS IN THE  MORNING, AT NOON AND IN THE EVENING AND TAKE 3 TABLETS AT NIGHT (9 TABLETS PER DAY) 810 tablet 3    methylPREDNISolone (MEDROL DOSEPACK) 4 mg tablet use as directed (Patient not taking: Reported on 10/20/2022) 21 each 0    [DISCONTINUED] calcitonin, salmon, (FORTICAL) 200 unit/actuation nasal spray 1 spray by Nasal route once daily. for 10 days 3.7 mL 0    [DISCONTINUED] diazePAM (VALIUM) 5 MG tablet Take 1 tablet by mouth 1 hour prior to MRI and 1 tablet at the time of the MRI if needed 2 tablet 0    [DISCONTINUED] gabapentin (NEURONTIN) 600 MG tablet TAKE ONE TABLET BY MOUTH THREE TIMES DAILY 270 tablet 3     No current facility-administered medications on file prior to visit.     Review of patient's allergies indicates:   Allergen Reactions    Lomotil [diphenoxylate-atropine]      Causes stomach spasms    Dilaudid [hydromorphone (bulk)] Itching       Review of Systems   HENT:  Negative for hearing loss.    Eyes:  Negative for discharge.   Respiratory:  Negative for wheezing.    Cardiovascular:  Negative for chest pain and palpitations.   Gastrointestinal:  Negative for blood in stool, constipation, diarrhea and vomiting.   Genitourinary:  Negative for dysuria and hematuria.   Musculoskeletal:  Negative for neck pain.   Neurological:  Negative for weakness and headaches.   Endo/Heme/Allergies:  Negative for polydipsia.     OBJECTIVE:  /62   Pulse 60   Temp 97.8 °F (36.6 °C)   Ht 5' (1.524 m)   Wt 61.7 kg (136 lb 0.4 oz)   LMP 01/01/2001 (Approximate) Comment: 2001  SpO2 99%   BMI 26.57 kg/m²   Wt Readings from Last 5 Encounters:   10/20/22 61.7 kg (136 lb 0.4 oz)   10/12/22 61.1 kg (134 lb 13 oz)   05/24/22 60 kg (132 lb 4.4 oz)   02/21/22 60.7 kg (133 lb 13.1 oz)   01/26/22 61 kg (134 lb 7.7 oz)       In usual state of health without signs of drug misuse/abuse today.  Specifically no alteration in cognition, signs of injections into the skin.  Patient areas of chronic pain in the spine and joints and  now her right eye  No new focal neurological findings noted.    Chronic aids to ambulation    Reviewed  and NARx: reviewed    Assessment/Plan:    1. Neuropathic pain of foot, left    2. Neuropathic pain of foot, right    3. Multiple sclerosis    4. Neuralgia and neuritis    5. Spasticity    6. Depression, unspecified depression type    7. Essential hypertension    8. Hyperlipidemia, unspecified hyperlipidemia type        Discussed chronic pain diagnosis again, reviewed pain contract and patient is still in agreement with it. Continued to stress the importance of smoking cessation/avoiding tobacco products which can exacerbate the pain.  Continued to discuss the importance of good nutrition and some type of exercise program even if it's very basic and light to help sustain function.  Continued to discuss risks, benefits and alternatives of care with high risk medication and we have decided to continue to use them.  Our goal continues to be stabilizing of function, quality of life and avoidance of medication side effects.  Patient voiced understanding.    Problem List Items Addressed This Visit       Multiple sclerosis    Overview     Unable to tolerate Aubagio due to GI side effects--stopped 3/18/2021. Will plan to return to [facility]         Relevant Medications    gabapentin (NEURONTIN) 600 MG tablet    HYDROcodone-acetaminophen (NORCO)  mg per tablet    Neuralgia and neuritis    Overview     Bilateral foot and lower limb pain.         Relevant Medications    ibuprofen (ADVIL,MOTRIN) 800 MG tablet    HYDROcodone-acetaminophen (NORCO)  mg per tablet     Other Visit Diagnoses       Neuropathic pain of foot, left        Relevant Medications    gabapentin (NEURONTIN) 600 MG tablet    Neuropathic pain of foot, right        Relevant Medications    gabapentin (NEURONTIN) 600 MG tablet    Spasticity        Relevant Medications    ibuprofen (ADVIL,MOTRIN) 800 MG tablet    LORazepam (ATIVAN) 2 MG Tab     tiZANidine (ZANAFLEX) 4 MG tablet    Depression, unspecified depression type        Relevant Medications    LORazepam (ATIVAN) 2 MG Tab    Essential hypertension        Relevant Medications    metoprolol succinate (TOPROL-XL) 100 MG 24 hr tablet    Hyperlipidemia, unspecified hyperlipidemia type        Relevant Medications    simvastatin (ZOCOR) 40 MG tablet          Potential medication side effects were discussed with the patient; let me know if any occur.    Follow up in 3 months.    Answers submitted by the patient for this visit:  Review of Systems Questionnaire (Submitted on 10/20/2022)  activity change: No  unexpected weight change: No  rhinorrhea: No  trouble swallowing: No  visual disturbance: No  chest tightness: No  polyuria: No  difficulty urinating: No  menstrual problem: No  joint swelling: No  arthralgias: No  confusion: No  dysphoric mood: No

## 2022-10-22 DIAGNOSIS — G35 MULTIPLE SCLEROSIS: ICD-10-CM

## 2022-10-22 NOTE — TELEPHONE ENCOUNTER
Last Office Visit Info:   The patient's last visit with Devon Collier MD was on 10/20/2022.    The patient's last visit in current department was on 10/20/2022.        Last CBC Results:   Lab Results   Component Value Date    WBC 6.52 10/12/2022    HGB 15.6 10/12/2022    HCT 43.6 10/12/2022     10/12/2022       Last CMP Results  Lab Results   Component Value Date     (L) 10/12/2022    K 3.8 10/12/2022    CL 97 10/12/2022    CO2 25 10/12/2022    BUN 9 10/12/2022    CREATININE 0.7 10/12/2022    CALCIUM 9.5 10/12/2022    ALBUMIN 4.0 10/12/2022    AST 16 10/12/2022    ALT 13 10/12/2022       Last Lipids  Lab Results   Component Value Date    CHOL 110 (L) 06/22/2015    TRIG 205 (H) 06/22/2015    HDL 22 (L) 06/22/2015    LDLCALC 47.0 (L) 06/22/2015       Last A1C  No results found for: HGBA1C    Last TSH  No results found for: TSH          Current Med Refills  Medication List with Changes/Refills   Current Medications    CARBAMAZEPINE (TEGRETOL XR) 200 MG 12 HR TABLET    TAKE ONE TABLET BY MOUTH TWICE DAILY       Start Date: 10/13/2022End Date: --    CETIRIZINE (ZYRTEC) 10 MG TABLET    Take 1 tablet (10 mg total) by mouth once daily.       Start Date: 8/4/2017  End Date: --    CHOLECALCIFEROL, VITAMIN D3, (VITAMIN D3) 125 MCG (5,000 UNIT) TAB    Take 1 tablet (5,000 Units total) by mouth once daily.       Start Date: 1/21/2020 End Date: --    ESCITALOPRAM OXALATE (LEXAPRO) 20 MG TABLET    TAKE ONE TABLET BY MOUTH EVERY DAY       Start Date: 5/5/2022  End Date: --    GABAPENTIN (NEURONTIN) 400 MG CAPSULE    TAKE TWO CAPSULES BY MOUTH EVERY EVENING       Start Date: 5/6/2022  End Date: --    GABAPENTIN (NEURONTIN) 600 MG TABLET    Take 1 tablet (600 mg total) by mouth 3 (three) times daily.       Start Date: 10/20/2022End Date: --    HYDROCODONE-ACETAMINOPHEN (NORCO)  MG PER TABLET    Take 1 tablet by mouth every 6 (six) hours as needed for Pain.       Start Date: 1/26/2022 End Date: --     HYDROCODONE-ACETAMINOPHEN (NORCO)  MG PER TABLET    Take 1 tablet by mouth every 6 (six) hours as needed for Pain.       Start Date: 2/25/2022 End Date: --    HYDROCODONE-ACETAMINOPHEN (NORCO)  MG PER TABLET    TAKE ONE TABLET BY MOUTH EVERY 6 HOURS AS NEEDED FOR PAIN Strength:  mg       Start Date: 10/20/2022End Date: --    HYDROXYZINE HCL (ATARAX) 25 MG TABLET    Take 1 tablet (25 mg total) by mouth 3 (three) times daily.       Start Date: 10/6/2021 End Date: --    IBUPROFEN (ADVIL,MOTRIN) 800 MG TABLET    TAKE ONE TABLET BY MOUTH EVERY 8 HOURS WITH FOOD AND WATER Strength: 800 mg       Start Date: 10/20/2022End Date: --    INTERFERON BETA-1A (AVONEX) 30 MCG/0.5 ML PNKT    Inject 0.5 mLs (30 mcg total) into the muscle every 7 days.       Start Date: 9/28/2021 End Date: --    INTERFERON BETA-1A (AVONEX) 30 MCG/0.5 ML PNKT    Inject 0.5 mLs (30 mcg total) into the muscle every 7 days.       Start Date: 8/25/2022 End Date: --    LORAZEPAM (ATIVAN) 2 MG TAB    TAKE ONE TABLET BY MOUTH EVERY 6 HOURS AS NEEDED Strength: 2 mg       Start Date: 10/20/2022End Date: --    METHOCARBAMOL (ROBAXIN) 750 MG TAB    TAKE TWO TABLETS BY MOUTH FOUR TIMES DAILY FOR 10 DAYS  Strength: 750 mg       Start Date: 10/20/2022End Date: --    METHYLPREDNISOLONE (MEDROL DOSEPACK) 4 MG TABLET    use as directed       Start Date: 10/19/2022End Date: 11/9/2022    METOPROLOL SUCCINATE (TOPROL-XL) 100 MG 24 HR TABLET    Take 1 tablet (100 mg total) by mouth once daily.       Start Date: 10/20/2022End Date: --    NYSTATIN (MYCOSTATIN) CREAM    Apply topically 2 (two) times daily.       Start Date: 10/6/2020 End Date: --    ONDANSETRON (ZOFRAN-ODT) 8 MG TBDL    DISSOLVE 1 TABLET ON THE TONGUE THREE TIMES DAILY AS NEEDED       Start Date: 9/4/2020  End Date: --    SIMVASTATIN (ZOCOR) 40 MG TABLET    Take 1 tablet (40 mg total) by mouth every evening.       Start Date: 10/20/2022End Date: --    STIOLTO RESPIMAT 2.5-2.5 MCG/ACTUATION  MIST    inhale TWO puffs INTO THE LUNGS DAILY. controller       Start Date: 3/11/2022 End Date: --    TIZANIDINE (ZANAFLEX) 4 MG TABLET    TAKE 2 TABLETS IN THE MORNING, AT NOON AND IN THE EVENING AND TAKE 3 TABLETS AT NIGHT (9 TABLETS PER DAY)       Start Date: 10/20/2022End Date: --    TRIAMTERENE-HYDROCHLOROTHIAZIDE 37.5-25 MG (MAXZIDE-25) 37.5-25 MG PER TABLET    TAKE ONE TABLET BY MOUTH EVERY OTHER DAY       Start Date: 6/3/2022  End Date: --

## 2022-10-22 NOTE — TELEPHONE ENCOUNTER
----- Message from Renee Gomez sent at 10/21/2022  2:17 PM CDT -----  Type: RX Refill Request    Who Called: [facility]    Have you contacted your pharmacy:yes    Refill or New Rx:refill    RX Name and Strength:interferon beta-1a (AVONEX) 30 mcg/0.5 mL PnKt    [facility]  [place] [place] [facility] [address]   Phone:  [phone]  Fax:  [phone]      Local or Mail Order:mail    Ordering Provider:Nando    Would the patient rather a call back or a response via My [facility]? call    Best Call Back Number:[phone] ([place])     Additional information: 90 day supply

## 2022-10-27 ENCOUNTER — PATIENT MESSAGE (OUTPATIENT)
Dept: NEUROLOGY | Facility: CLINIC | Age: 57
End: 2022-10-27
Payer: MEDICARE

## 2022-10-27 RX ORDER — INTERFERON BETA-1A 30MCG/.5ML
30 KIT INTRAMUSCULAR
Qty: 2 ML | Refills: 5 | Status: SHIPPED | OUTPATIENT
Start: 2022-10-27 | End: 2023-04-11

## 2022-10-28 ENCOUNTER — TELEPHONE (OUTPATIENT)
Dept: HEMATOLOGY/ONCOLOGY | Facility: CLINIC | Age: 57
End: 2022-10-28
Payer: MEDICARE

## 2022-10-28 NOTE — TELEPHONE ENCOUNTER
Patient returned a call from [facility]. Informed patient that I will send a message to [facility] to give her a call back. Patient verbalized understanding.

## 2022-11-04 ENCOUNTER — TELEPHONE (OUTPATIENT)
Dept: HEMATOLOGY/ONCOLOGY | Facility: CLINIC | Age: 57
End: 2022-11-04
Payer: MEDICARE

## 2022-11-04 NOTE — TELEPHONE ENCOUNTER
Patient called to reschedule her lab appointment for today because she is too dizzy to drive. Helped patient with rescheduling.

## 2022-11-07 ENCOUNTER — LAB VISIT (OUTPATIENT)
Dept: LAB | Facility: HOSPITAL | Age: 57
End: 2022-11-07
Attending: INTERNAL MEDICINE
Payer: MEDICARE

## 2022-11-07 ENCOUNTER — OFFICE VISIT (OUTPATIENT)
Dept: OPHTHALMOLOGY | Facility: CLINIC | Age: 57
End: 2022-11-07
Payer: MEDICARE

## 2022-11-07 DIAGNOSIS — G35 MS (MULTIPLE SCLEROSIS): Primary | ICD-10-CM

## 2022-11-07 DIAGNOSIS — D50.9 IRON DEFICIENCY ANEMIA, UNSPECIFIED IRON DEFICIENCY ANEMIA TYPE: ICD-10-CM

## 2022-11-07 LAB
BASOPHILS # BLD AUTO: 0.03 K/UL (ref 0–0.2)
BASOPHILS NFR BLD: 0.4 % (ref 0–1.9)
DIFFERENTIAL METHOD: ABNORMAL
EOSINOPHIL # BLD AUTO: 0.1 K/UL (ref 0–0.5)
EOSINOPHIL NFR BLD: 1.2 % (ref 0–8)
ERYTHROCYTE [DISTWIDTH] IN BLOOD BY AUTOMATED COUNT: 13.1 % (ref 11.5–14.5)
FERRITIN SERPL-MCNC: 127 NG/ML (ref 20–300)
HCT VFR BLD AUTO: 42.6 % (ref 37–48.5)
HGB BLD-MCNC: 15.2 G/DL (ref 12–16)
IMM GRANULOCYTES # BLD AUTO: 0.02 K/UL (ref 0–0.04)
IMM GRANULOCYTES NFR BLD AUTO: 0.2 % (ref 0–0.5)
IRON SERPL-MCNC: 81 UG/DL (ref 30–160)
LYMPHOCYTES # BLD AUTO: 1.1 K/UL (ref 1–4.8)
LYMPHOCYTES NFR BLD: 13.6 % (ref 18–48)
MCH RBC QN AUTO: 31.7 PG (ref 27–31)
MCHC RBC AUTO-ENTMCNC: 35.7 G/DL (ref 32–36)
MCV RBC AUTO: 89 FL (ref 82–98)
MONOCYTES # BLD AUTO: 0.4 K/UL (ref 0.3–1)
MONOCYTES NFR BLD: 4.8 % (ref 4–15)
NEUTROPHILS # BLD AUTO: 6.6 K/UL (ref 1.8–7.7)
NEUTROPHILS NFR BLD: 79.8 % (ref 38–73)
NRBC BLD-RTO: 0 /100 WBC
PLATELET # BLD AUTO: 238 K/UL (ref 150–450)
PMV BLD AUTO: 8.7 FL (ref 9.2–12.9)
RBC # BLD AUTO: 4.8 M/UL (ref 4–5.4)
SATURATED IRON: 22 % (ref 20–50)
TOTAL IRON BINDING CAPACITY: 361 UG/DL (ref 250–450)
TRANSFERRIN SERPL-MCNC: 244 MG/DL (ref 200–375)
WBC # BLD AUTO: 8.25 K/UL (ref 3.9–12.7)

## 2022-11-07 PROCEDURE — 85025 COMPLETE CBC W/AUTO DIFF WBC: CPT | Performed by: INTERNAL MEDICINE

## 2022-11-07 PROCEDURE — 1160F PR REVIEW ALL MEDS BY PRESCRIBER/CLIN PHARMACIST DOCUMENTED: ICD-10-PCS | Mod: CPTII,S$GLB,, | Performed by: OPHTHALMOLOGY

## 2022-11-07 PROCEDURE — 1159F PR MEDICATION LIST DOCUMENTED IN MEDICAL RECORD: ICD-10-PCS | Mod: CPTII,S$GLB,, | Performed by: OPHTHALMOLOGY

## 2022-11-07 PROCEDURE — 82728 ASSAY OF FERRITIN: CPT | Performed by: INTERNAL MEDICINE

## 2022-11-07 PROCEDURE — 36415 COLL VENOUS BLD VENIPUNCTURE: CPT | Performed by: INTERNAL MEDICINE

## 2022-11-07 PROCEDURE — 99212 PR OFFICE/OUTPT VISIT, EST, LEVL II, 10-19 MIN: ICD-10-PCS | Mod: S$GLB,,, | Performed by: OPHTHALMOLOGY

## 2022-11-07 PROCEDURE — 99999 PR PBB SHADOW E&M-EST. PATIENT-LVL III: CPT | Mod: PBBFAC,,, | Performed by: OPHTHALMOLOGY

## 2022-11-07 PROCEDURE — 99999 PR PBB SHADOW E&M-EST. PATIENT-LVL III: ICD-10-PCS | Mod: PBBFAC,,, | Performed by: OPHTHALMOLOGY

## 2022-11-07 PROCEDURE — 1159F MED LIST DOCD IN RCRD: CPT | Mod: CPTII,S$GLB,, | Performed by: OPHTHALMOLOGY

## 2022-11-07 PROCEDURE — 1160F RVW MEDS BY RX/DR IN RCRD: CPT | Mod: CPTII,S$GLB,, | Performed by: OPHTHALMOLOGY

## 2022-11-07 PROCEDURE — 99212 OFFICE O/P EST SF 10 MIN: CPT | Mod: S$GLB,,, | Performed by: OPHTHALMOLOGY

## 2022-11-07 PROCEDURE — 84466 ASSAY OF TRANSFERRIN: CPT | Performed by: INTERNAL MEDICINE

## 2022-11-07 NOTE — PROGRESS NOTES
HPI    DLS:07/17/2019 Pat  MS  Patient states OD eye pain past 2 months, but last few days no eye pain.  Did take Medrol patch x week or so ago.  Vision stable.--using OTC readers for dist.    No eye pain.  Last edited by Aleah Wells MA on 11/7/2022 11:20 AM.            Assessment /Plan     For exam results, see Encounter Report.    MS (multiple sclerosis)      I found no active inflammation related to MS. She has had pain recently that responded to a medrol dose pack. It could  have been an early optic neuritis that responded to steroids.  Return to me if pain recurs, Otherwise, return one year.

## 2022-11-08 ENCOUNTER — OFFICE VISIT (OUTPATIENT)
Dept: HEMATOLOGY/ONCOLOGY | Facility: CLINIC | Age: 57
End: 2022-11-08
Payer: MEDICARE

## 2022-11-08 DIAGNOSIS — D50.9 IRON DEFICIENCY ANEMIA, UNSPECIFIED IRON DEFICIENCY ANEMIA TYPE: Primary | ICD-10-CM

## 2022-11-08 DIAGNOSIS — G35 MS (MULTIPLE SCLEROSIS): ICD-10-CM

## 2022-11-08 PROCEDURE — 99213 PR OFFICE/OUTPT VISIT, EST, LEVL III, 20-29 MIN: ICD-10-PCS | Mod: 95,,, | Performed by: INTERNAL MEDICINE

## 2022-11-08 PROCEDURE — 99213 OFFICE O/P EST LOW 20 MIN: CPT | Mod: 95,,, | Performed by: INTERNAL MEDICINE

## 2022-11-08 NOTE — PROGRESS NOTES
Subjective:      The patient location is: home  The chief complaint leading to consultation is: TRACIE     Visit type: audiovisual    Face to Face time with patient: 6 min  10 minutes of total time spent on the encounter, which includes face to face time and non-face to face time preparing to see the patient (eg, review of tests), Obtaining and/or reviewing separately obtained history, Documenting clinical information in the electronic or other health record, Independently interpreting results (not separately reported) and communicating results to the patient/family/caregiver, or Care coordination (not separately reported).         Each patient to whom he or she provides medical services by telemedicine is:  (1) informed of the relationship between the physician and patient and the respective role of any other health care provider with respect to management of the patient; and (2) notified that he or she may decline to receive medical services by telemedicine and may withdraw from such care at any time.    Notes:        Patient ID: Zoey Cali is a 56 y.o. female.    Chief Complaint: No chief complaint on file.  Diagnosis: TRACIE       Prior Hx: 55 y/o female with history of MS seen  today for f/u for TRACIE, She undergoes intermittent IV iron therapy.She has been intolerant of oral Fe supp. She is s/p GI eval with EGD and colonoscopy 2015  which was unremarkable She is followed by Dr. Mendez for long standing history of MS and was recently  on therapy with Aubagio  She was previously on Avonex for >20 yrsShe was experiencing  increasing pain with injection and hypersensitivity to bilateral injection site areas    Interval Hx:   mild chronic fatigue stable  F/b ophthalmology for eye pain  Last  received Fe infusion 11/2/2020  She remains on Avonex and tolerating well   No SOB/CP/cough    CBC 11/7/22  shows Hb 15.2g/dL Hct 42,6  Ferritin 127ng/mL    Recent MRI brain -stable findings    Review of Systems    Constitutional:  Positive for fatigue (mild). Negative for appetite change and unexpected weight change.   Eyes:  Positive for pain. Negative for visual disturbance.   Respiratory:  Negative for cough and shortness of breath.    Cardiovascular:  Negative for chest pain and leg swelling.   Gastrointestinal:  Negative for abdominal pain, blood in stool, constipation, diarrhea, nausea and vomiting.   Genitourinary:  Negative for frequency.   Musculoskeletal:  Negative for arthralgias, back pain, joint swelling and neck pain.   Skin:  Negative for rash.        No petechiae, ecchymoses   Neurological:  Negative for light-headedness and headaches.   Hematological:  Does not bruise/bleed easily.   Psychiatric/Behavioral:  Negative for confusion and dysphoric mood.      Objective:       There were no vitals filed for this visit.    Televisit          Lab Results   Component Value Date    WBC 8.25 11/07/2022    HGB 15.2 11/07/2022    HCT 42.6 11/07/2022    MCV 89 11/07/2022     11/07/2022     Lab Results   Component Value Date    IRON 81 11/07/2022    TIBC 361 11/07/2022    FERRITIN 127 11/07/2022       Mammo 8/16/21 BI-RADS Category 1: Negative      1. Iron deficiency anemia, unspecified iron deficiency anemia type    2. MS (multiple sclerosis)          Plan:   Zoey FRASER was seen today for follow-up.    Diagnoses and all orders for this visit:    Iron deficiency anemia, unspecified iron deficiency       S/p GI eval-unremarkable       CBC reveals stable Hb 15.2 g/dl        S/p IV Fe completed 11/2/2020       Fe parameters wnl        Ferritin 127        Cont to monitor     Multiple Sclerosis    F/b Neurology    Pt on   Avonex    Eye pain  F/b Ophthalmology     Advance Care Planning     Power of   I previously initiated the process of advance care planning today and explained the importance of this process to the patient.  I introduced the concept of advance directives to the patient, as well. Then the  patient received detailed information about the importance of designating a Health Care Power of  (HCPOA). She was also instructed to communicate with this person about their wishes for future healthcare, should she become sick and lose decision-making capacity. The patient has not previously appointed a HCPOA. After our discussion, the patient has decided to complete a HCPOA and has appointed her significant other and NAME:Jared Cali   I spent a total time of 16 minutes discussing this issue with the patient.     Cbc, Ferritin, TIBC and Fe 2-3 days prior to f/u  3mos televisit       Cc: MD Heidy Lancaster MD

## 2022-12-01 ENCOUNTER — PATIENT MESSAGE (OUTPATIENT)
Dept: PSYCHIATRY | Facility: CLINIC | Age: 57
End: 2022-12-01
Payer: MEDICARE

## 2023-01-09 ENCOUNTER — PATIENT MESSAGE (OUTPATIENT)
Dept: NEUROLOGY | Facility: CLINIC | Age: 58
End: 2023-01-09
Payer: MEDICARE

## 2023-01-09 DIAGNOSIS — G35 MULTIPLE SCLEROSIS: ICD-10-CM

## 2023-01-09 RX ORDER — DIAZEPAM 5 MG/1
TABLET ORAL
Qty: 2 TABLET | Refills: 0 | Status: SHIPPED | OUTPATIENT
Start: 2023-01-09 | End: 2023-01-18 | Stop reason: ALTCHOICE

## 2023-01-09 NOTE — TELEPHONE ENCOUNTER
----- Message from Beverley Zhu sent at 1/9/2023  9:00 AM CST -----  Contact: Patient [phone]  Type: Patient Call Back    Who called: Patient     What is the request in detail: Scheduled MRI of Brain for Sat., 01-14-23. Patient states that she will need a valium sent in to pharmacy in order to have the MRI. Please call.   .  [facility] - [place] - [address]  [place] [address]  Phone: [phone] Fax: [phone]    Would the patient rather a call back or a response via My [facility]? Call back    Best call back number: [phone]

## 2023-01-13 ENCOUNTER — OFFICE VISIT (OUTPATIENT)
Dept: FAMILY MEDICINE | Facility: CLINIC | Age: 58
End: 2023-01-13
Payer: MEDICARE

## 2023-01-13 VITALS
WEIGHT: 131.63 LBS | HEART RATE: 75 BPM | TEMPERATURE: 98 F | SYSTOLIC BLOOD PRESSURE: 120 MMHG | DIASTOLIC BLOOD PRESSURE: 72 MMHG | OXYGEN SATURATION: 99 % | HEIGHT: 59 IN | BODY MASS INDEX: 26.54 KG/M2

## 2023-01-13 DIAGNOSIS — D84.9 IMMUNOSUPPRESSION: ICD-10-CM

## 2023-01-13 DIAGNOSIS — G35 MULTIPLE SCLEROSIS: Primary | ICD-10-CM

## 2023-01-13 DIAGNOSIS — F32.A DEPRESSION, UNSPECIFIED DEPRESSION TYPE: ICD-10-CM

## 2023-01-13 DIAGNOSIS — M79.2 NEUROPATHIC PAIN OF FOOT, LEFT: ICD-10-CM

## 2023-01-13 DIAGNOSIS — G35 MS (MULTIPLE SCLEROSIS): ICD-10-CM

## 2023-01-13 DIAGNOSIS — M79.2 NEUROPATHIC PAIN: ICD-10-CM

## 2023-01-13 DIAGNOSIS — M79.2 NEUROPATHIC PAIN OF FOOT, RIGHT: ICD-10-CM

## 2023-01-13 DIAGNOSIS — J44.9 COPD MIXED TYPE: ICD-10-CM

## 2023-01-13 DIAGNOSIS — I10 PRIMARY HYPERTENSION: Primary | ICD-10-CM

## 2023-01-13 DIAGNOSIS — G35 MULTIPLE SCLEROSIS: ICD-10-CM

## 2023-01-13 PROCEDURE — 3078F DIAST BP <80 MM HG: CPT | Mod: CPTII,S$GLB,, | Performed by: FAMILY MEDICINE

## 2023-01-13 PROCEDURE — 3008F PR BODY MASS INDEX (BMI) DOCUMENTED: ICD-10-PCS | Mod: CPTII,S$GLB,, | Performed by: FAMILY MEDICINE

## 2023-01-13 PROCEDURE — 1159F MED LIST DOCD IN RCRD: CPT | Mod: CPTII,S$GLB,, | Performed by: FAMILY MEDICINE

## 2023-01-13 PROCEDURE — 3008F BODY MASS INDEX DOCD: CPT | Mod: CPTII,S$GLB,, | Performed by: FAMILY MEDICINE

## 2023-01-13 PROCEDURE — 99214 OFFICE O/P EST MOD 30 MIN: CPT | Mod: S$GLB,,, | Performed by: FAMILY MEDICINE

## 2023-01-13 PROCEDURE — 99999 PR PBB SHADOW E&M-EST. PATIENT-LVL IV: ICD-10-PCS | Mod: PBBFAC,,, | Performed by: FAMILY MEDICINE

## 2023-01-13 PROCEDURE — 1159F PR MEDICATION LIST DOCUMENTED IN MEDICAL RECORD: ICD-10-PCS | Mod: CPTII,S$GLB,, | Performed by: FAMILY MEDICINE

## 2023-01-13 PROCEDURE — 99999 PR PBB SHADOW E&M-EST. PATIENT-LVL IV: CPT | Mod: PBBFAC,,, | Performed by: FAMILY MEDICINE

## 2023-01-13 PROCEDURE — 3074F PR MOST RECENT SYSTOLIC BLOOD PRESSURE < 130 MM HG: ICD-10-PCS | Mod: CPTII,S$GLB,, | Performed by: FAMILY MEDICINE

## 2023-01-13 PROCEDURE — 99214 PR OFFICE/OUTPT VISIT, EST, LEVL IV, 30-39 MIN: ICD-10-PCS | Mod: S$GLB,,, | Performed by: FAMILY MEDICINE

## 2023-01-13 PROCEDURE — 3074F SYST BP LT 130 MM HG: CPT | Mod: CPTII,S$GLB,, | Performed by: FAMILY MEDICINE

## 2023-01-13 PROCEDURE — 3078F PR MOST RECENT DIASTOLIC BLOOD PRESSURE < 80 MM HG: ICD-10-PCS | Mod: CPTII,S$GLB,, | Performed by: FAMILY MEDICINE

## 2023-01-13 RX ORDER — ONDANSETRON 8 MG/1
TABLET, ORALLY DISINTEGRATING ORAL
Qty: 20 TABLET | Refills: 11 | Status: SHIPPED | OUTPATIENT
Start: 2023-01-13

## 2023-01-13 RX ORDER — GABAPENTIN 400 MG/1
800 CAPSULE ORAL NIGHTLY
Qty: 180 CAPSULE | Refills: 3 | Status: SHIPPED | OUTPATIENT
Start: 2023-01-13 | End: 2024-01-25

## 2023-01-13 RX ORDER — TIOTROPIUM BROMIDE AND OLODATEROL 3.124; 2.736 UG/1; UG/1
SPRAY, METERED RESPIRATORY (INHALATION)
Qty: 12 G | Refills: 3 | Status: SHIPPED | OUTPATIENT
Start: 2023-01-13 | End: 2024-01-18

## 2023-01-13 RX ORDER — CARBAMAZEPINE 200 MG/1
200 TABLET, EXTENDED RELEASE ORAL 2 TIMES DAILY
Qty: 180 TABLET | Refills: 3 | Status: SHIPPED | OUTPATIENT
Start: 2023-01-13 | End: 2024-01-18

## 2023-01-13 RX ORDER — TRIAMTERENE/HYDROCHLOROTHIAZID 37.5-25 MG
1 TABLET ORAL EVERY OTHER DAY
Qty: 45 TABLET | Refills: 3 | Status: SHIPPED | OUTPATIENT
Start: 2023-01-13

## 2023-01-13 RX ORDER — ESCITALOPRAM OXALATE 20 MG/1
20 TABLET ORAL DAILY
Qty: 90 TABLET | Refills: 3 | Status: SHIPPED | OUTPATIENT
Start: 2023-01-13 | End: 2023-05-09

## 2023-01-14 ENCOUNTER — HOSPITAL ENCOUNTER (OUTPATIENT)
Dept: RADIOLOGY | Facility: HOSPITAL | Age: 58
Discharge: HOME | End: 2023-01-14
Attending: CLINICAL NURSE SPECIALIST
Payer: MEDICARE

## 2023-01-14 DIAGNOSIS — R90.89 ABNORMAL BRAIN MRI: ICD-10-CM

## 2023-01-14 PROCEDURE — 70543 MRI ORBT/FAC/NCK W/O &W/DYE: CPT | Mod: 26,,, | Performed by: RADIOLOGY

## 2023-01-14 PROCEDURE — 70543 MRI SOFT TISSUE NECK W W/O CONTRAST: ICD-10-PCS | Mod: 26,,, | Performed by: RADIOLOGY

## 2023-01-14 PROCEDURE — 70543 MRI ORBT/FAC/NCK W/O &W/DYE: CPT | Mod: TC

## 2023-01-14 PROCEDURE — A9585 GADOBUTROL INJECTION: HCPCS | Performed by: CLINICAL NURSE SPECIALIST

## 2023-01-14 PROCEDURE — 25500020 PHARM REV CODE 255: Performed by: CLINICAL NURSE SPECIALIST

## 2023-01-14 RX ORDER — GADOBUTROL 604.72 MG/ML
6 INJECTION INTRAVENOUS
Status: COMPLETED | OUTPATIENT
Start: 2023-01-14 | End: 2023-01-14

## 2023-01-14 RX ADMIN — GADOBUTROL 6 ML: 604.72 INJECTION INTRAVENOUS at 12:01

## 2023-01-18 RX ORDER — DIAZEPAM 5 MG/1
5 TABLET ORAL EVERY 6 HOURS PRN
Qty: 45 TABLET | Refills: 0 | Status: SHIPPED | OUTPATIENT
Start: 2023-01-18 | End: 2023-02-20 | Stop reason: SDUPTHER

## 2023-01-20 ENCOUNTER — PATIENT MESSAGE (OUTPATIENT)
Dept: NEUROLOGY | Facility: CLINIC | Age: 58
End: 2023-01-20
Payer: MEDICARE

## 2023-01-30 ENCOUNTER — PATIENT MESSAGE (OUTPATIENT)
Dept: NEUROLOGY | Facility: CLINIC | Age: 58
End: 2023-01-30
Payer: MEDICARE

## 2023-01-30 NOTE — ASSESSMENT & PLAN NOTE
The current medical regimen is effective;  continue present plan and medications.  Doing well  overall, has medication induced immunosuppression

## 2023-01-30 NOTE — PROGRESS NOTES
Chief Complaint   Patient presents with    Medication Refill       SUBJECTIVE:  Zoey Cali is a 57 y.o. female presents with   Chief Complaint   Patient presents with    Medication Refill     Conservative treatment with tylenol, NSAIDs, alternative treatments, complementary non opioid nerve/epileptic medication has failed with primary use.  Now opioid dependent for relief.  Has tried PT/OT, injections with mixed success.      Past Medical History:   Diagnosis Date    Allergy     Anemia     Anxiety     Breast cyst     Chronic kidney disease     Depression     Fibrocystic breast     Hypertension     Multiple sclerosis     Multiple sclerosis     Neuromuscular disorder     Osteoporosis     Rib fracture 2015     Past Surgical History:   Procedure Laterality Date    APPENDECTOMY      BREAST BIOPSY Left 2012    BREAST CYST ASPIRATION      BREAST SURGERY  2004    excisional biopsy      SECTION, CLASSIC      CHALAZION EXCISION  at age 9    CHOLECYSTECTOMY      EYE SURGERY      HYSTERECTOMY  2001    OOPHORECTOMY       Social History     Socioeconomic History    Marital status:    Tobacco Use    Smoking status: Every Day     Packs/day: 1.00     Years: 30.00     Pack years: 30.00     Types: Cigarettes    Smokeless tobacco: Current   Substance and Sexual Activity    Alcohol use: No     Alcohol/week: 0.0 standard drinks    Drug use: No    Sexual activity: Not Currently     Birth control/protection: See Surgical Hx     Comment: Hysterectomy     Social Determinants of Health     Financial Resource Strain: Low Risk     Difficulty of Paying Living Expenses: Not hard at all   Food Insecurity: No Food Insecurity    Worried About Running Out of Food in the Last Year: Never true    Ran Out of Food in the Last Year: Never true   Transportation Needs: No Transportation Needs    Lack of Transportation (Medical): No    Lack of Transportation (Non-Medical): No   Physical Activity: Inactive    Days of Exercise  per Week: 0 days    Minutes of Exercise per Session: 0 min   Stress: Unknown    Feeling of Stress : Patient refused   Social Connections: Unknown    Frequency of Communication with Friends and Family: More than three times a week    Frequency of Social Gatherings with Friends and Family: Twice a week    Active Member of Clubs or Organizations: Yes    Attends Club or Organization Meetings: Never    Marital Status:    Housing Stability: Low Risk     Unable to Pay for Housing in the Last Year: No    Number of Places Lived in the Last Year: 1    Unstable Housing in the Last Year: No     Family History   Problem Relation Age of Onset    Arthritis Mother     Diabetes Father     Heart disease Father     Hyperlipidemia Father     Hypertension Father     Stroke Father     Arthritis Son     Drug abuse Son     Hypertension Maternal Grandmother     Stroke Maternal Grandmother     Arthritis Maternal Grandmother     Arthritis Paternal Grandmother     Heart disease Paternal Grandfather     Heart attack Paternal Grandfather     Colon cancer Neg Hx     Ovarian cancer Neg Hx     Amblyopia Neg Hx     Blindness Neg Hx     Glaucoma Neg Hx     Macular degeneration Neg Hx     Retinal detachment Neg Hx     Strabismus Neg Hx     Thyroid disease Neg Hx      Current Outpatient Medications on File Prior to Visit   Medication Sig Dispense Refill    cetirizine (ZYRTEC) 10 MG tablet Take 1 tablet (10 mg total) by mouth once daily. 30 tablet 0    cholecalciferol, vitamin D3, (VITAMIN D3) 125 mcg (5,000 unit) Tab Take 1 tablet (5,000 Units total) by mouth once daily. 90 tablet 3    gabapentin (NEURONTIN) 600 MG tablet Take 1 tablet (600 mg total) by mouth 3 (three) times daily. 270 tablet 3    HYDROcodone-acetaminophen (NORCO)  mg per tablet Take 1 tablet by mouth every 6 (six) hours as needed for Pain. 60 tablet 0    HYDROcodone-acetaminophen (NORCO)  mg per tablet TAKE ONE TABLET BY MOUTH EVERY 6 HOURS AS NEEDED FOR PAIN  "Strength:  mg 60 tablet 0    hydrOXYzine HCL (ATARAX) 25 MG tablet Take 1 tablet (25 mg total) by mouth 3 (three) times daily. 90 tablet 0    ibuprofen (ADVIL,MOTRIN) 800 MG tablet TAKE ONE TABLET BY MOUTH EVERY 8 HOURS WITH FOOD AND WATER Strength: 800 mg 180 tablet 1    interferon beta-1a (AVONEX) 30 mcg/0.5 mL PnKt Inject 0.5 mLs (30 mcg total) into the muscle every 7 days. 2 mL 5    methocarbamoL (ROBAXIN) 750 MG Tab TAKE TWO TABLETS BY MOUTH FOUR TIMES DAILY FOR 10 DAYS  Strength: 750 mg 80 tablet 1    metoprolol succinate (TOPROL-XL) 100 MG 24 hr tablet Take 1 tablet (100 mg total) by mouth once daily. 90 tablet 3    nystatin (MYCOSTATIN) cream Apply topically 2 (two) times daily. 30 g 2    simvastatin (ZOCOR) 40 MG tablet Take 1 tablet (40 mg total) by mouth every evening. 90 tablet 3    tiZANidine (ZANAFLEX) 4 MG tablet TAKE 2 TABLETS IN THE MORNING, AT NOON AND IN THE EVENING AND TAKE 3 TABLETS AT NIGHT (9 TABLETS PER DAY) 810 tablet 3    interferon beta-1a (AVONEX) 30 mcg/0.5 mL PnKt Inject 0.5 mLs (30 mcg total) into the muscle every 7 days. (Patient not taking: Reported on 1/13/2023) 2 mL 1     No current facility-administered medications on file prior to visit.     Review of patient's allergies indicates:   Allergen Reactions    Lomotil [diphenoxylate-atropine]      Causes stomach spasms    Dilaudid [hydromorphone (bulk)] Itching       ROS    OBJECTIVE:  /72   Pulse 75   Temp 97.8 °F (36.6 °C) (Oral)   Ht 4' 11" (1.499 m)   Wt 59.7 kg (131 lb 9.8 oz)   LMP 01/01/2001 (Approximate) Comment: 2001  SpO2 99%   BMI 26.58 kg/m²   Wt Readings from Last 5 Encounters:   01/13/23 59.7 kg (131 lb 9.8 oz)   10/20/22 61.7 kg (136 lb 0.4 oz)   10/12/22 61.1 kg (134 lb 13 oz)   05/24/22 60 kg (132 lb 4.4 oz)   02/21/22 60.7 kg (133 lb 13.1 oz)       In usual state of health without signs of drug misuse/abuse today.  Specifically no alteration in cognition, signs of injections into the skin.  Patient " areas of chronic pain in the feet and spien  No new focal neurological findings noted.    Chronic aids to ambulation    Reviewed  and NARx: reviewe    Assessment/Plan:    1. Primary hypertension    2. Neuropathic pain of foot, left    3. Neuropathic pain of foot, right    4. MS (multiple sclerosis)    5. Multiple sclerosis    6. Depression, unspecified depression type    7. Neuropathic pain    8. COPD mixed type    9. Immunosuppression        Discussed chronic pain diagnosis again, reviewed pain contract and patient is still in agreement with it. Continued to stress the importance of smoking cessation/avoiding tobacco products which can exacerbate the pain.  Continued to discuss the importance of good nutrition and some type of exercise program even if it's very basic and light to help sustain function.  Continued to discuss risks, benefits and alternatives of care with high risk medication and we have decided to continue to use them.  Our goal continues to be stabilizing of function, quality of life and avoidance of medication side effects.  Patient voiced understanding.    Problem List Items Addressed This Visit       HTN (hypertension) - Primary    Relevant Medications    triamterene-hydrochlorothiazide 37.5-25 mg (MAXZIDE-25) 37.5-25 mg per tablet    ondansetron (ZOFRAN-ODT) 8 MG TbDL    Multiple sclerosis    Overview     Unable to tolerate Aubagio due to GI side effects--stopped 3/18/2021. Will plan to return to Avonex         Relevant Medications    gabapentin (NEURONTIN) 400 MG capsule    MS (multiple sclerosis)    Relevant Medications    gabapentin (NEURONTIN) 400 MG capsule    Neuropathic pain    Relevant Medications    carBAMazepine (TEGRETOL XR) 200 MG 12 hr tablet    Immunosuppression    Current Assessment & Plan     The current medical regimen is effective;  continue present plan and medications.  Doing well  overall, has medication induced immunosuppression         COPD mixed type    Relevant  Medications    tiotropium-olodateroL (STIOLTO RESPIMAT) 2.5-2.5 mcg/actuation Mist     Other Visit Diagnoses       Neuropathic pain of foot, left        Relevant Medications    gabapentin (NEURONTIN) 400 MG capsule    Neuropathic pain of foot, right        Relevant Medications    gabapentin (NEURONTIN) 400 MG capsule    Depression, unspecified depression type        Relevant Medications    EScitalopram oxalate (LEXAPRO) 20 MG tablet            Follow up in 3 months.

## 2023-02-06 ENCOUNTER — LAB VISIT (OUTPATIENT)
Dept: LAB | Facility: HOSPITAL | Age: 58
End: 2023-02-06
Attending: INTERNAL MEDICINE
Payer: MEDICARE

## 2023-02-06 DIAGNOSIS — D50.9 IRON DEFICIENCY ANEMIA, UNSPECIFIED IRON DEFICIENCY ANEMIA TYPE: ICD-10-CM

## 2023-02-06 LAB
BASOPHILS # BLD AUTO: 0.04 K/UL (ref 0–0.2)
BASOPHILS NFR BLD: 0.6 % (ref 0–1.9)
DIFFERENTIAL METHOD: ABNORMAL
EOSINOPHIL # BLD AUTO: 0.2 K/UL (ref 0–0.5)
EOSINOPHIL NFR BLD: 3.5 % (ref 0–8)
ERYTHROCYTE [DISTWIDTH] IN BLOOD BY AUTOMATED COUNT: 13.2 % (ref 11.5–14.5)
FERRITIN SERPL-MCNC: 72 NG/ML (ref 20–300)
HCT VFR BLD AUTO: 41.5 % (ref 37–48.5)
HGB BLD-MCNC: 14.6 G/DL (ref 12–16)
IMM GRANULOCYTES # BLD AUTO: 0.02 K/UL (ref 0–0.04)
IMM GRANULOCYTES NFR BLD AUTO: 0.3 % (ref 0–0.5)
IRON SERPL-MCNC: 59 UG/DL (ref 30–160)
IRON SERPL-MCNC: 59 UG/DL (ref 30–160)
LYMPHOCYTES # BLD AUTO: 1.6 K/UL (ref 1–4.8)
LYMPHOCYTES NFR BLD: 23.9 % (ref 18–48)
MCH RBC QN AUTO: 31.9 PG (ref 27–31)
MCHC RBC AUTO-ENTMCNC: 35.2 G/DL (ref 32–36)
MCV RBC AUTO: 91 FL (ref 82–98)
MONOCYTES # BLD AUTO: 0.4 K/UL (ref 0.3–1)
MONOCYTES NFR BLD: 5.5 % (ref 4–15)
NEUTROPHILS # BLD AUTO: 4.5 K/UL (ref 1.8–7.7)
NEUTROPHILS NFR BLD: 66.2 % (ref 38–73)
NRBC BLD-RTO: 0 /100 WBC
PLATELET # BLD AUTO: 240 K/UL (ref 150–450)
PMV BLD AUTO: 8.7 FL (ref 9.2–12.9)
RBC # BLD AUTO: 4.58 M/UL (ref 4–5.4)
SATURATED IRON: 16 % (ref 20–50)
SATURATED IRON: 16 % (ref 20–50)
TOTAL IRON BINDING CAPACITY: 360 UG/DL (ref 250–450)
TOTAL IRON BINDING CAPACITY: 360 UG/DL (ref 250–450)
TRANSFERRIN SERPL-MCNC: 243 MG/DL (ref 200–375)
TRANSFERRIN SERPL-MCNC: 243 MG/DL (ref 200–375)
WBC # BLD AUTO: 6.78 K/UL (ref 3.9–12.7)

## 2023-02-06 PROCEDURE — 36415 COLL VENOUS BLD VENIPUNCTURE: CPT | Performed by: INTERNAL MEDICINE

## 2023-02-06 PROCEDURE — 85025 COMPLETE CBC W/AUTO DIFF WBC: CPT | Performed by: INTERNAL MEDICINE

## 2023-02-06 PROCEDURE — 82728 ASSAY OF FERRITIN: CPT | Performed by: INTERNAL MEDICINE

## 2023-02-06 PROCEDURE — 84466 ASSAY OF TRANSFERRIN: CPT | Performed by: INTERNAL MEDICINE

## 2023-02-08 ENCOUNTER — OFFICE VISIT (OUTPATIENT)
Dept: HEMATOLOGY/ONCOLOGY | Facility: CLINIC | Age: 58
End: 2023-02-08
Payer: MEDICARE

## 2023-02-08 DIAGNOSIS — H57.10 PAIN IN EYE, UNSPECIFIED LATERALITY: ICD-10-CM

## 2023-02-08 DIAGNOSIS — G35 MS (MULTIPLE SCLEROSIS): ICD-10-CM

## 2023-02-08 DIAGNOSIS — D50.9 IRON DEFICIENCY ANEMIA, UNSPECIFIED IRON DEFICIENCY ANEMIA TYPE: Primary | ICD-10-CM

## 2023-02-08 PROCEDURE — 99213 PR OFFICE/OUTPT VISIT, EST, LEVL III, 20-29 MIN: ICD-10-PCS | Mod: 95,,, | Performed by: INTERNAL MEDICINE

## 2023-02-08 PROCEDURE — 99213 OFFICE O/P EST LOW 20 MIN: CPT | Mod: 95,,, | Performed by: INTERNAL MEDICINE

## 2023-02-08 NOTE — PROGRESS NOTES
Subjective:      The patient location is: home  The chief complaint leading to consultation is: TRACIE     Visit type: audiovisual    Face to Face time with patient: 6 min  10 minutes of total time spent on the encounter, which includes face to face time and non-face to face time preparing to see the patient (eg, review of tests), Obtaining and/or reviewing separately obtained history, Documenting clinical information in the electronic or other health record, Independently interpreting results (not separately reported) and communicating results to the patient/family/caregiver, or Care coordination (not separately reported).         Each patient to whom he or she provides medical services by telemedicine is:  (1) informed of the relationship between the physician and patient and the respective role of any other health care provider with respect to management of the patient; and (2) notified that he or she may decline to receive medical services by telemedicine and may withdraw from such care at any time.    Notes:        Patient ID: Zoey Cali is a 57 y.o. female.    Chief Complaint: No chief complaint on file.    Diagnosis: TRACIE       Prior Hx: 58 y/o female with history of MS seen  today for f/u for TRACIE, She undergoes intermittent IV iron therapy.She has been intolerant of oral Fe supp. She is s/p GI eval with EGD and colonoscopy 2015  which was unremarkable She is followed by Dr. Mendez for long standing history of MS and was recently  on therapy with Aubagio  She was previously on Avonex for >20 yrsShe was experiencing  increasing pain with injection and hypersensitivity to bilateral injection site areas    Interval Hx:   mild chronic fatigue stable  F/b ophthalmology for eye pain  Last  received Fe infusion 11/2/2020  She remains on Avonex and tolerating well   No SOB/CP/cough    CBC 2/6/23  shows Hb 14.6 g/dL Hct 41.5  Ferritin 72ng/mL    Recent MRI brain -stable findings    Review of Systems    Constitutional:  Positive for fatigue (mild). Negative for appetite change and unexpected weight change.   Eyes:  Positive for pain. Negative for visual disturbance.   Respiratory:  Negative for cough and shortness of breath.    Cardiovascular:  Negative for chest pain and leg swelling.   Gastrointestinal:  Negative for abdominal pain, blood in stool, constipation, diarrhea, nausea and vomiting.   Genitourinary:  Negative for frequency.   Musculoskeletal:  Negative for arthralgias, back pain, joint swelling and neck pain.   Skin:  Negative for rash.        No petechiae, ecchymoses   Neurological:  Negative for light-headedness and headaches.   Hematological:  Does not bruise/bleed easily.   Psychiatric/Behavioral:  Negative for confusion and dysphoric mood.      Objective:       There were no vitals filed for this visit.    Televisit          Lab Results   Component Value Date    WBC 6.78 02/06/2023    HGB 14.6 02/06/2023    HCT 41.5 02/06/2023    MCV 91 02/06/2023     02/06/2023     Lab Results   Component Value Date    IRON 59 02/06/2023    IRON 59 02/06/2023    TIBC 360 02/06/2023    TIBC 360 02/06/2023    FERRITIN 72 02/06/2023       Mammo 8/16/21 BI-RADS Category 1: Negative      1. Iron deficiency anemia, unspecified iron deficiency anemia type    2. MS (multiple sclerosis)            Plan:   Zoey FRASER was seen today for follow-up.    Diagnoses and all orders for this visit:    Iron deficiency anemia, unspecified iron deficiency       S/p GI eval-unremarkable       CBC reveals stable Hb 14.6 g/dl        S/p IV Fe completed 11/2/2020       Fe parameters wnl        Ferritin 72       Plan IV Fe    Multiple Sclerosis    F/b Neurology    Pt on   Avonex    Eye pain  F/b Ophthalmology     Labs prior to fu 4mos    Advance Care Planning     Power of   I previously initiated the process of advance care planning today and explained the importance of this process to the patient.  I introduced the concept  of advance directives to the patient, as well. Then the patient received detailed information about the importance of designating a Health Care Power of  (HCPOA). She was also instructed to communicate with this person about their wishes for future healthcare, should she become sick and lose decision-making capacity. The patient has not previously appointed a HCPOA. After our discussion, the patient has decided to complete a HCPOA and has appointed her significant other and NAME:Jared Karely   I spent a total time of 16 minutes discussing this issue with the patient.      PLAN IV FE    Cbc, Ferritin, TIBC and Fe 2-3 days prior to f/u  3mos televisit       Cc: MD Heidy Lancaster MD

## 2023-02-19 RX ORDER — SODIUM CHLORIDE 0.9 % (FLUSH) 0.9 %
10 SYRINGE (ML) INJECTION
Status: CANCELLED | OUTPATIENT
Start: 2023-04-04

## 2023-02-19 RX ORDER — HEPARIN 100 UNIT/ML
500 SYRINGE INTRAVENOUS
Status: CANCELLED | OUTPATIENT
Start: 2023-03-28

## 2023-02-19 RX ORDER — HEPARIN 100 UNIT/ML
500 SYRINGE INTRAVENOUS
Status: CANCELLED | OUTPATIENT
Start: 2023-02-22

## 2023-02-19 RX ORDER — SODIUM CHLORIDE 0.9 % (FLUSH) 0.9 %
10 SYRINGE (ML) INJECTION
Status: CANCELLED | OUTPATIENT
Start: 2023-03-28

## 2023-02-19 RX ORDER — SODIUM CHLORIDE 0.9 % (FLUSH) 0.9 %
10 SYRINGE (ML) INJECTION
Status: CANCELLED | OUTPATIENT
Start: 2023-02-22

## 2023-02-19 RX ORDER — HEPARIN 100 UNIT/ML
500 SYRINGE INTRAVENOUS
Status: CANCELLED | OUTPATIENT
Start: 2023-04-04

## 2023-02-20 ENCOUNTER — PATIENT MESSAGE (OUTPATIENT)
Dept: PSYCHIATRY | Facility: CLINIC | Age: 58
End: 2023-02-20
Payer: MEDICARE

## 2023-02-20 ENCOUNTER — OFFICE VISIT (OUTPATIENT)
Dept: FAMILY MEDICINE | Facility: CLINIC | Age: 58
End: 2023-02-20
Payer: MEDICARE

## 2023-02-20 VITALS
OXYGEN SATURATION: 96 % | BODY MASS INDEX: 25.11 KG/M2 | SYSTOLIC BLOOD PRESSURE: 114 MMHG | WEIGHT: 127.88 LBS | DIASTOLIC BLOOD PRESSURE: 66 MMHG | TEMPERATURE: 99 F | HEART RATE: 67 BPM | HEIGHT: 60 IN

## 2023-02-20 DIAGNOSIS — G35 MULTIPLE SCLEROSIS: Primary | ICD-10-CM

## 2023-02-20 DIAGNOSIS — Z29.89 PROPHYLACTIC IMMUNOTHERAPY: ICD-10-CM

## 2023-02-20 DIAGNOSIS — G35 MULTIPLE SCLEROSIS: ICD-10-CM

## 2023-02-20 PROCEDURE — 3008F PR BODY MASS INDEX (BMI) DOCUMENTED: ICD-10-PCS | Mod: CPTII,S$GLB,, | Performed by: FAMILY MEDICINE

## 2023-02-20 PROCEDURE — 3078F PR MOST RECENT DIASTOLIC BLOOD PRESSURE < 80 MM HG: ICD-10-PCS | Mod: CPTII,S$GLB,, | Performed by: FAMILY MEDICINE

## 2023-02-20 PROCEDURE — 99214 OFFICE O/P EST MOD 30 MIN: CPT | Mod: S$GLB,,, | Performed by: FAMILY MEDICINE

## 2023-02-20 PROCEDURE — 3008F BODY MASS INDEX DOCD: CPT | Mod: CPTII,S$GLB,, | Performed by: FAMILY MEDICINE

## 2023-02-20 PROCEDURE — 1159F PR MEDICATION LIST DOCUMENTED IN MEDICAL RECORD: ICD-10-PCS | Mod: CPTII,S$GLB,, | Performed by: FAMILY MEDICINE

## 2023-02-20 PROCEDURE — 99999 PR PBB SHADOW E&M-EST. PATIENT-LVL IV: ICD-10-PCS | Mod: PBBFAC,,, | Performed by: FAMILY MEDICINE

## 2023-02-20 PROCEDURE — 3078F DIAST BP <80 MM HG: CPT | Mod: CPTII,S$GLB,, | Performed by: FAMILY MEDICINE

## 2023-02-20 PROCEDURE — 99999 PR PBB SHADOW E&M-EST. PATIENT-LVL IV: CPT | Mod: PBBFAC,,, | Performed by: FAMILY MEDICINE

## 2023-02-20 PROCEDURE — 1159F MED LIST DOCD IN RCRD: CPT | Mod: CPTII,S$GLB,, | Performed by: FAMILY MEDICINE

## 2023-02-20 PROCEDURE — 99214 PR OFFICE/OUTPT VISIT, EST, LEVL IV, 30-39 MIN: ICD-10-PCS | Mod: S$GLB,,, | Performed by: FAMILY MEDICINE

## 2023-02-20 PROCEDURE — 3074F PR MOST RECENT SYSTOLIC BLOOD PRESSURE < 130 MM HG: ICD-10-PCS | Mod: CPTII,S$GLB,, | Performed by: FAMILY MEDICINE

## 2023-02-20 PROCEDURE — 3074F SYST BP LT 130 MM HG: CPT | Mod: CPTII,S$GLB,, | Performed by: FAMILY MEDICINE

## 2023-02-20 RX ORDER — PREDNISONE 50 MG/1
50 TABLET ORAL DAILY
Qty: 3 TABLET | Refills: 0 | Status: SHIPPED | OUTPATIENT
Start: 2023-02-20 | End: 2023-02-23

## 2023-02-20 RX ORDER — DIAZEPAM 5 MG/1
5 TABLET ORAL EVERY 6 HOURS PRN
Qty: 45 TABLET | Refills: 0 | Status: SHIPPED | OUTPATIENT
Start: 2023-02-20 | End: 2023-04-25 | Stop reason: SDUPTHER

## 2023-02-20 NOTE — PROGRESS NOTES
HISTORY OF PRESENT ILLNESS:  Zoey Cali is a 57 y.o. female who presents to the clinic today for Annual Exam  .     She is doing worse since the injection  Fever or chills with 104 and frontal headaches.  Potential medication side effects were discussed with the patient; let me know if any occur.    Answers submitted by the patient for this visit:  Review of Systems Questionnaire (Submitted on 2/10/2023)  activity change: No  unexpected weight change: No  rhinorrhea: No  trouble swallowing: No  visual disturbance: No  chest tightness: No  polyuria: No  difficulty urinating: No  confusion: No  dysphoric mood: No      Patient Active Problem List   Diagnosis    HTN (hypertension)    Multiple sclerosis    Osteoporosis    Iron deficiency anemia    Hyperlipidemia LDL goal < 130    Neuralgia and neuritis    Tobacco abuse    Vitamin D insufficiency    Muscle spasm    Counseling regarding goals of care    Encounter for long-term (current) use of high-risk medication    Urinary hesitancy    MS (multiple sclerosis)    Chronic, continuous use of opioids    Prophylactic immunotherapy    Gait disturbance    Neuropathic pain    Occipital neuralgia    History of optic neuritis    Refractive error    Iron deficiency    Immunosuppression    COPD mixed type           CARE TEAM:  Patient Care Team:  Devon Collier MD as PCP - General (Family Medicine)  Chevy Sheikh MD as Obstetrician (Obstetrics)  Matthew Fountain MD (Inactive) as Consulting Physician (Gastroenterology)  Indigo Olivia MA (Inactive) as Care Coordinator         ROS  Per HPI        PHYSICAL EXAM:   /66   Pulse 67   Temp 98.5 °F (36.9 °C) (Oral)   Ht 5' (1.524 m)   Wt 58 kg (127 lb 13.9 oz)   LMP 01/01/2001 (Approximate) Comment: 2001  SpO2 96%   BMI 24.97 kg/m²   BP Readings from Last 5 Encounters:   02/20/23 114/66   01/13/23 120/72   10/20/22 120/62   10/12/22 105/73   05/24/22 120/60     Wt Readings from Last 5 Encounters:   02/20/23 58 kg  (127 lb 13.9 oz)   01/13/23 59.7 kg (131 lb 9.8 oz)   10/20/22 61.7 kg (136 lb 0.4 oz)   10/12/22 61.1 kg (134 lb 13 oz)   05/24/22 60 kg (132 lb 4.4 oz)             She appears well, in no apparent distress.  Alert and oriented times three, pleasant and cooperative. Vital signs are as documented in vital signs section.  Frontal sinus  S1 and S2 normal, no murmurs, clicks, gallops or rubs. Regular rate and rhythm. Chest is clear; no wheezes or rales. No edema or JVD.       Medication List with Changes/Refills   New Medications    PREDNISONE (DELTASONE) 50 MG TAB    Take 1 tablet (50 mg total) by mouth once daily. for 3 days   Current Medications    CARBAMAZEPINE (TEGRETOL XR) 200 MG 12 HR TABLET    Take 1 tablet (200 mg total) by mouth 2 (two) times daily.    CETIRIZINE (ZYRTEC) 10 MG TABLET    Take 1 tablet (10 mg total) by mouth once daily.    CHOLECALCIFEROL, VITAMIN D3, (VITAMIN D3) 125 MCG (5,000 UNIT) TAB    Take 1 tablet (5,000 Units total) by mouth once daily.    DIAZEPAM (VALIUM) 5 MG TABLET    Take 1 tablet (5 mg total) by mouth every 6 (six) hours as needed for Anxiety.    ESCITALOPRAM OXALATE (LEXAPRO) 20 MG TABLET    Take 1 tablet (20 mg total) by mouth once daily.    GABAPENTIN (NEURONTIN) 400 MG CAPSULE    Take 2 capsules (800 mg total) by mouth every evening.    GABAPENTIN (NEURONTIN) 600 MG TABLET    Take 1 tablet (600 mg total) by mouth 3 (three) times daily.    HYDROCODONE-ACETAMINOPHEN (NORCO)  MG PER TABLET    Take 1 tablet by mouth every 6 (six) hours as needed for Pain.    HYDROCODONE-ACETAMINOPHEN (NORCO)  MG PER TABLET    TAKE ONE TABLET BY MOUTH EVERY 6 HOURS AS NEEDED FOR PAIN Strength:  mg    HYDROXYZINE HCL (ATARAX) 25 MG TABLET    Take 1 tablet (25 mg total) by mouth 3 (three) times daily.    IBUPROFEN (ADVIL,MOTRIN) 800 MG TABLET    TAKE ONE TABLET BY MOUTH EVERY 8 HOURS WITH FOOD AND WATER Strength: 800 mg    INTERFERON BETA-1A (AVONEX) 30 MCG/0.5 ML PNKT    Inject  0.5 mLs (30 mcg total) into the muscle every 7 days.    METHOCARBAMOL (ROBAXIN) 750 MG TAB    TAKE TWO TABLETS BY MOUTH FOUR TIMES DAILY FOR 10 DAYS  Strength: 750 mg    METOPROLOL SUCCINATE (TOPROL-XL) 100 MG 24 HR TABLET    Take 1 tablet (100 mg total) by mouth once daily.    NYSTATIN (MYCOSTATIN) CREAM    Apply topically 2 (two) times daily.    ONDANSETRON (ZOFRAN-ODT) 8 MG TBDL    DISSOLVE 1 TABLET ON THE TONGUE THREE TIMES DAILY AS NEEDED Strength: 8 mg    SIMVASTATIN (ZOCOR) 40 MG TABLET    Take 1 tablet (40 mg total) by mouth every evening.    TIOTROPIUM-OLODATEROL (STIOLTO RESPIMAT) 2.5-2.5 MCG/ACTUATION MIST    inhale TWO puffs INTO THE LUNGS DAILY. controller Strength: 2.5-2.5 mcg/actuation    TIZANIDINE (ZANAFLEX) 4 MG TABLET    TAKE 2 TABLETS IN THE MORNING, AT NOON AND IN THE EVENING AND TAKE 3 TABLETS AT NIGHT (9 TABLETS PER DAY)    TRIAMTERENE-HYDROCHLOROTHIAZIDE 37.5-25 MG (MAXZIDE-25) 37.5-25 MG PER TABLET    Take 1 tablet by mouth every other day.   Discontinued Medications    INTERFERON BETA-1A (AVONEX) 30 MCG/0.5 ML PNKT    Inject 0.5 mLs (30 mcg total) into the muscle every 7 days.         ASSESSMENT AND PLAN:    Problem List Items Addressed This Visit       Multiple sclerosis - Primary    Overview     Unable to tolerate Aubagio due to GI side effects--stopped 3/18/2021. Will plan to return to Avonex         Relevant Medications    predniSONE (DELTASONE) 50 MG Tab    Prophylactic immunotherapy    Relevant Medications    predniSONE (DELTASONE) 50 MG Tab     Potential medication side effects were discussed with the patient; let me know if any occur.      Future Appointments   Date Time Provider Department Center   4/25/2023 10:00 AM [facility]   5/23/2023 11:40 AM Heidy Mendez MD [facility]   6/16/2023  9:00 AM LAB, [facility] LAB [facility]   6/20/2023  1:00 PM Charito Mejia MD [facility] HEM ONC [facility] Cli   Possible flare vs, avonex  reaction    Follow up in about 6 weeks (around 4/3/2023) for assess treatment plan. or sooner as needed.

## 2023-03-07 ENCOUNTER — TELEPHONE (OUTPATIENT)
Dept: INFUSION THERAPY | Facility: HOSPITAL | Age: 58
End: 2023-03-07
Payer: MEDICARE

## 2023-03-07 ENCOUNTER — DOCUMENTATION ONLY (OUTPATIENT)
Dept: INFUSION THERAPY | Facility: HOSPITAL | Age: 58
End: 2023-03-07
Payer: MEDICARE

## 2023-03-27 ENCOUNTER — INFUSION (OUTPATIENT)
Dept: INFUSION THERAPY | Facility: HOSPITAL | Age: 58
End: 2023-03-27
Attending: INTERNAL MEDICINE
Payer: MEDICARE

## 2023-03-27 VITALS
SYSTOLIC BLOOD PRESSURE: 142 MMHG | OXYGEN SATURATION: 97 % | HEART RATE: 64 BPM | RESPIRATION RATE: 17 BRPM | DIASTOLIC BLOOD PRESSURE: 63 MMHG | TEMPERATURE: 98 F

## 2023-03-27 DIAGNOSIS — E61.1 IRON DEFICIENCY: Primary | ICD-10-CM

## 2023-03-27 PROCEDURE — 63600175 PHARM REV CODE 636 W HCPCS: Performed by: INTERNAL MEDICINE

## 2023-03-27 PROCEDURE — 96374 THER/PROPH/DIAG INJ IV PUSH: CPT

## 2023-03-27 RX ADMIN — IRON SUCROSE 200 MG: 20 INJECTION, SOLUTION INTRAVENOUS at 02:03

## 2023-04-03 ENCOUNTER — INFUSION (OUTPATIENT)
Dept: INFUSION THERAPY | Facility: HOSPITAL | Age: 58
End: 2023-04-03
Attending: INTERNAL MEDICINE
Payer: MEDICARE

## 2023-04-03 VITALS
SYSTOLIC BLOOD PRESSURE: 131 MMHG | DIASTOLIC BLOOD PRESSURE: 60 MMHG | TEMPERATURE: 98 F | RESPIRATION RATE: 18 BRPM | HEART RATE: 56 BPM | OXYGEN SATURATION: 97 %

## 2023-04-03 DIAGNOSIS — E61.1 IRON DEFICIENCY: Primary | ICD-10-CM

## 2023-04-03 PROCEDURE — 96374 THER/PROPH/DIAG INJ IV PUSH: CPT

## 2023-04-03 PROCEDURE — 63600175 PHARM REV CODE 636 W HCPCS: Performed by: INTERNAL MEDICINE

## 2023-04-03 RX ADMIN — IRON SUCROSE 200 MG: 20 INJECTION, SOLUTION INTRAVENOUS at 01:04

## 2023-04-03 NOTE — PLAN OF CARE
Pt received second dose venofer IVP. Three weekly IV iron infusions ordered total. Tolerated well. VSS. Discharged from unit in NAD.

## 2023-04-10 ENCOUNTER — INFUSION (OUTPATIENT)
Dept: INFUSION THERAPY | Facility: HOSPITAL | Age: 58
End: 2023-04-10
Attending: INTERNAL MEDICINE
Payer: MEDICARE

## 2023-04-10 VITALS
HEART RATE: 60 BPM | DIASTOLIC BLOOD PRESSURE: 60 MMHG | SYSTOLIC BLOOD PRESSURE: 131 MMHG | RESPIRATION RATE: 18 BRPM | TEMPERATURE: 98 F | OXYGEN SATURATION: 99 %

## 2023-04-10 DIAGNOSIS — E61.1 IRON DEFICIENCY: Primary | ICD-10-CM

## 2023-04-10 PROCEDURE — 63600175 PHARM REV CODE 636 W HCPCS: Performed by: INTERNAL MEDICINE

## 2023-04-10 PROCEDURE — 96374 THER/PROPH/DIAG INJ IV PUSH: CPT

## 2023-04-10 RX ADMIN — IRON SUCROSE 200 MG: 20 INJECTION, SOLUTION INTRAVENOUS at 02:04

## 2023-04-10 NOTE — PLAN OF CARE
Assist with determining underlying cause.  Promote rest; assist patient with energy-conserving measures to manage fatigue (e.g., cluster care, balance activity with periods of rest).  Implement strategies to prevent falls related to fatigue, weakness and dizziness, such as sitting before standing, seeking assistance with activity and decluttering area.  Promote optimal oral intake to support fluid balance and nutrition; monitor intake and output.  Assess for, and correct, nutritional deficiencies including iron, vitamin B12 or folic acid.  Provide oxygen therapy judiciously to avoid hyperoxemia; adjust to achieve oxygenation goal.  Promote optimal hygiene measures; monitor visitors to minimize infection risk; review and update vaccinations.  Maintain bleeding precautions and monitor for evidence of active bleeding, especially when platelet counts are low; provide protective hemostasis.  Consider adjunctive therapy, such as red blood cell stimulating agent or blood transfusion.

## 2023-04-10 NOTE — PLAN OF CARE
Patient arrived to unit for scheduled 3/3 Venofer IVP.VSS.IVP tolerated well. Discharged off unit in no signs of acute distress.

## 2023-04-25 ENCOUNTER — OFFICE VISIT (OUTPATIENT)
Dept: FAMILY MEDICINE | Facility: CLINIC | Age: 58
End: 2023-04-25
Payer: MEDICARE

## 2023-04-25 VITALS
TEMPERATURE: 98 F | OXYGEN SATURATION: 96 % | DIASTOLIC BLOOD PRESSURE: 60 MMHG | HEART RATE: 62 BPM | SYSTOLIC BLOOD PRESSURE: 100 MMHG | HEIGHT: 59 IN | BODY MASS INDEX: 25.78 KG/M2 | WEIGHT: 127.88 LBS

## 2023-04-25 DIAGNOSIS — R79.9 ABNORMAL FINDING OF BLOOD CHEMISTRY: ICD-10-CM

## 2023-04-25 DIAGNOSIS — M79.2 NEURALGIA AND NEURITIS: ICD-10-CM

## 2023-04-25 DIAGNOSIS — R73.03 PREDIABETES: ICD-10-CM

## 2023-04-25 DIAGNOSIS — G35 MULTIPLE SCLEROSIS: Primary | ICD-10-CM

## 2023-04-25 PROCEDURE — 1159F PR MEDICATION LIST DOCUMENTED IN MEDICAL RECORD: ICD-10-PCS | Mod: CPTII,S$GLB,, | Performed by: FAMILY MEDICINE

## 2023-04-25 PROCEDURE — 3074F PR MOST RECENT SYSTOLIC BLOOD PRESSURE < 130 MM HG: ICD-10-PCS | Mod: CPTII,S$GLB,, | Performed by: FAMILY MEDICINE

## 2023-04-25 PROCEDURE — 3078F DIAST BP <80 MM HG: CPT | Mod: CPTII,S$GLB,, | Performed by: FAMILY MEDICINE

## 2023-04-25 PROCEDURE — 3078F PR MOST RECENT DIASTOLIC BLOOD PRESSURE < 80 MM HG: ICD-10-PCS | Mod: CPTII,S$GLB,, | Performed by: FAMILY MEDICINE

## 2023-04-25 PROCEDURE — 99215 PR OFFICE/OUTPT VISIT, EST, LEVL V, 40-54 MIN: ICD-10-PCS | Mod: S$GLB,,, | Performed by: FAMILY MEDICINE

## 2023-04-25 PROCEDURE — 3008F BODY MASS INDEX DOCD: CPT | Mod: CPTII,S$GLB,, | Performed by: FAMILY MEDICINE

## 2023-04-25 PROCEDURE — 99999 PR PBB SHADOW E&M-EST. PATIENT-LVL IV: ICD-10-PCS | Mod: PBBFAC,,, | Performed by: FAMILY MEDICINE

## 2023-04-25 PROCEDURE — 3074F SYST BP LT 130 MM HG: CPT | Mod: CPTII,S$GLB,, | Performed by: FAMILY MEDICINE

## 2023-04-25 PROCEDURE — 99215 OFFICE O/P EST HI 40 MIN: CPT | Mod: S$GLB,,, | Performed by: FAMILY MEDICINE

## 2023-04-25 PROCEDURE — 99999 PR PBB SHADOW E&M-EST. PATIENT-LVL IV: CPT | Mod: PBBFAC,,, | Performed by: FAMILY MEDICINE

## 2023-04-25 PROCEDURE — 3008F PR BODY MASS INDEX (BMI) DOCUMENTED: ICD-10-PCS | Mod: CPTII,S$GLB,, | Performed by: FAMILY MEDICINE

## 2023-04-25 PROCEDURE — 1159F MED LIST DOCD IN RCRD: CPT | Mod: CPTII,S$GLB,, | Performed by: FAMILY MEDICINE

## 2023-04-25 RX ORDER — DIAZEPAM 5 MG/1
5 TABLET ORAL EVERY 6 HOURS PRN
Qty: 45 TABLET | Refills: 5 | Status: SHIPPED | OUTPATIENT
Start: 2023-04-25 | End: 2023-08-29

## 2023-04-25 RX ORDER — MODAFINIL 200 MG/1
200 TABLET ORAL DAILY
Qty: 30 TABLET | Refills: 0 | Status: SHIPPED | OUTPATIENT
Start: 2023-04-25 | End: 2023-05-25

## 2023-04-25 RX ORDER — HYDROCODONE BITARTRATE AND ACETAMINOPHEN 10; 325 MG/1; MG/1
TABLET ORAL
Qty: 60 TABLET | Refills: 0 | Status: SHIPPED | OUTPATIENT
Start: 2023-04-25 | End: 2023-09-07

## 2023-04-25 RX ORDER — HYDROCODONE BITARTRATE AND ACETAMINOPHEN 10; 325 MG/1; MG/1
TABLET ORAL
Qty: 60 TABLET | Refills: 0 | Status: SHIPPED | OUTPATIENT
Start: 2023-05-24 | End: 2023-09-07

## 2023-04-25 NOTE — PROGRESS NOTES
Chief Complaint   Patient presents with    Medication Refill       SUBJECTIVE:  Zoey Cali is a 57 y.o. female presents with   Chief Complaint   Patient presents with    Medication Refill     Conservative treatment with tylenol, NSAIDs, alternative treatments, complementary non opioid nerve/epileptic medication has failed with primary use.  Now opioid dependent for relief.  Has tried PT/OT, injections with mixed success.      Past Medical History:   Diagnosis Date    Allergy     Anemia     Anxiety     Breast cyst     Chronic kidney disease     Depression     Fibrocystic breast     Hypertension     Multiple sclerosis     Multiple sclerosis     Neuromuscular disorder     Osteoporosis     Rib fracture 2015     Past Surgical History:   Procedure Laterality Date    APPENDECTOMY      BREAST BIOPSY Left 2012    BREAST CYST ASPIRATION      BREAST SURGERY  2004    excisional biopsy      SECTION, CLASSIC      CHALAZION EXCISION  at age 9    CHOLECYSTECTOMY      EYE SURGERY      HYSTERECTOMY  2001    OOPHORECTOMY       Social History     Socioeconomic History    Marital status:    Tobacco Use    Smoking status: Every Day     Packs/day: 1.00     Years: 30.00     Pack years: 30.00     Types: Cigarettes    Smokeless tobacco: Current   Substance and Sexual Activity    Alcohol use: No     Alcohol/week: 0.0 standard drinks    Drug use: No    Sexual activity: Not Currently     Birth control/protection: See Surgical Hx     Comment: Hysterectomy     Social Determinants of Health     Financial Resource Strain: Low Risk     Difficulty of Paying Living Expenses: Not hard at all   Food Insecurity: No Food Insecurity    Worried About Running Out of Food in the Last Year: Never true    Ran Out of Food in the Last Year: Never true   Transportation Needs: No Transportation Needs    Lack of Transportation (Medical): No    Lack of Transportation (Non-Medical): No   Physical Activity: Inactive    Days of Exercise  per Week: 0 days    Minutes of Exercise per Session: 0 min   Stress: No Stress Concern Present    Feeling of Stress : Not at all   Social Connections: Unknown    Frequency of Communication with Friends and Family: More than three times a week    Frequency of Social Gatherings with Friends and Family: Twice a week    Active Member of Clubs or Organizations: Yes    Attends Club or Organization Meetings: Never    Marital Status:    Housing Stability: Low Risk     Unable to Pay for Housing in the Last Year: No    Number of Places Lived in the Last Year: 1    Unstable Housing in the Last Year: No     Family History   Problem Relation Age of Onset    Arthritis Mother     Diabetes Father     Heart disease Father     Hyperlipidemia Father     Hypertension Father     Stroke Father     Arthritis Son     Drug abuse Son     Hypertension Maternal Grandmother     Stroke Maternal Grandmother     Arthritis Maternal Grandmother     Arthritis Paternal Grandmother     Heart disease Paternal Grandfather     Heart attack Paternal Grandfather     Colon cancer Neg Hx     Ovarian cancer Neg Hx     Amblyopia Neg Hx     Blindness Neg Hx     Glaucoma Neg Hx     Macular degeneration Neg Hx     Retinal detachment Neg Hx     Strabismus Neg Hx     Thyroid disease Neg Hx      Current Outpatient Medications on File Prior to Visit   Medication Sig Dispense Refill    carBAMazepine (TEGRETOL XR) 200 MG 12 hr tablet Take 1 tablet (200 mg total) by mouth 2 (two) times daily. 180 tablet 3    cetirizine (ZYRTEC) 10 MG tablet Take 1 tablet (10 mg total) by mouth once daily. 30 tablet 0    cholecalciferol, vitamin D3, (VITAMIN D3) 125 mcg (5,000 unit) Tab Take 1 tablet (5,000 Units total) by mouth once daily. 90 tablet 3    EScitalopram oxalate (LEXAPRO) 20 MG tablet Take 1 tablet (20 mg total) by mouth once daily. 90 tablet 3    gabapentin (NEURONTIN) 400 MG capsule Take 2 capsules (800 mg total) by mouth every evening. 180 capsule 3    gabapentin  "(NEURONTIN) 600 MG tablet Take 1 tablet (600 mg total) by mouth 3 (three) times daily. 270 tablet 3    HYDROcodone-acetaminophen (NORCO)  mg per tablet Take 1 tablet by mouth every 6 (six) hours as needed for Pain. 60 tablet 0    hydrOXYzine HCL (ATARAX) 25 MG tablet Take 1 tablet (25 mg total) by mouth 3 (three) times daily. 90 tablet 0    ibuprofen (ADVIL,MOTRIN) 800 MG tablet TAKE ONE TABLET BY MOUTH EVERY 8 HOURS WITH FOOD AND WATER Strength: 800 mg 180 tablet 1    interferon beta-1a (AVONEX) 30 mcg/0.5 mL PnKt INJECT 30 MCG INTRAMUSCULARLY ONCE WEEKLY 3 pen 3    methocarbamoL (ROBAXIN) 750 MG Tab TAKE TWO TABLETS BY MOUTH FOUR TIMES DAILY FOR 10 DAYS  Strength: 750 mg 80 tablet 1    metoprolol succinate (TOPROL-XL) 100 MG 24 hr tablet Take 1 tablet (100 mg total) by mouth once daily. 90 tablet 3    nystatin (MYCOSTATIN) cream Apply topically 2 (two) times daily. 30 g 2    ondansetron (ZOFRAN-ODT) 8 MG TbDL DISSOLVE 1 TABLET ON THE TONGUE THREE TIMES DAILY AS NEEDED Strength: 8 mg 20 tablet 11    simvastatin (ZOCOR) 40 MG tablet Take 1 tablet (40 mg total) by mouth every evening. 90 tablet 3    tiotropium-olodateroL (STIOLTO RESPIMAT) 2.5-2.5 mcg/actuation Mist inhale TWO puffs INTO THE LUNGS DAILY. controller Strength: 2.5-2.5 mcg/actuation 12 g 3    tiZANidine (ZANAFLEX) 4 MG tablet TAKE 2 TABLETS IN THE MORNING, AT NOON AND IN THE EVENING AND TAKE 3 TABLETS AT NIGHT (9 TABLETS PER DAY) 810 tablet 3    triamterene-hydrochlorothiazide 37.5-25 mg (MAXZIDE-25) 37.5-25 mg per tablet Take 1 tablet by mouth every other day. 45 tablet 3     No current facility-administered medications on file prior to visit.     Review of patient's allergies indicates:   Allergen Reactions    Lomotil [diphenoxylate-atropine]      Causes stomach spasms    Dilaudid [hydromorphone (bulk)] Itching       ROS    OBJECTIVE:  /60   Pulse 62   Temp 97.9 °F (36.6 °C) (Oral)   Ht 4' 11" (1.499 m)   Wt 58 kg (127 lb 13.9 oz)   " LMP 01/01/2001 (Approximate) Comment: 2001  SpO2 96%   BMI 25.83 kg/m²   Wt Readings from Last 5 Encounters:   04/25/23 58 kg (127 lb 13.9 oz)   02/20/23 58 kg (127 lb 13.9 oz)   01/13/23 59.7 kg (131 lb 9.8 oz)   10/20/22 61.7 kg (136 lb 0.4 oz)   10/12/22 61.1 kg (134 lb 13 oz)       In usual state of health without signs of drug misuse/abuse today.  Specifically no alteration in cognition, signs of injections into the skin.  Patient areas of chronic pain in the spine and feet/hands and shoulders.  No new focal neurological findings noted.    Chronic aids to ambulation    Reviewed  and NARx: reviewed    Assessment/Plan:    1. Multiple sclerosis    2. Neuralgia and neuritis    3. Abnormal finding of blood chemistry    4. Prediabetes        Discussed chronic pain diagnosis again, reviewed pain contract and patient is still in agreement with it. Continued to stress the importance of smoking cessation/avoiding tobacco products which can exacerbate the pain.  Continued to discuss the importance of good nutrition and some type of exercise program even if it's very basic and light to help sustain function.  Continued to discuss risks, benefits and alternatives of care with high risk medication and we have decided to continue to use them.  Our goal continues to be stabilizing of function, quality of life and avoidance of medication side effects.  Patient voiced understanding.    Problem List Items Addressed This Visit       Multiple sclerosis - Primary    Overview     Unable to tolerate Aubagio due to GI side effects--stopped 3/18/2021. Will plan to return to [facility]           Relevant Medications    HYDROcodone-acetaminophen (NORCO)  mg per tablet    diazePAM (VALIUM) 5 MG tablet    HYDROcodone-acetaminophen (NORCO)  mg per tablet (Start on 5/24/2023)    modafiniL (PROVIGIL) 200 MG Tab    Other Relevant Orders    CBC Auto Differential    Comprehensive Metabolic Panel    Urinalysis, Reflex to Urine Culture  Urine, Clean Catch    Hemoglobin A1C    Lipid Panel    TSH    Iron and TIBC    Neuralgia and neuritis    Overview     Bilateral foot and lower limb pain.           Relevant Medications    HYDROcodone-acetaminophen (NORCO)  mg per tablet    HYDROcodone-acetaminophen (NORCO)  mg per tablet (Start on 5/24/2023)    Other Relevant Orders    CBC Auto Differential    Comprehensive Metabolic Panel    Urinalysis, Reflex to Urine Culture Urine, Clean Catch    Hemoglobin A1C    Lipid Panel    TSH    Iron and TIBC     Other Visit Diagnoses       Abnormal finding of blood chemistry        Relevant Orders    CBC Auto Differential    Comprehensive Metabolic Panel    Urinalysis, Reflex to Urine Culture Urine, Clean Catch    Hemoglobin A1C    Lipid Panel    TSH    Iron and TIBC    Prediabetes        Relevant Orders    TSH            Follow up in 3 months

## 2023-04-27 ENCOUNTER — TELEPHONE (OUTPATIENT)
Dept: FAMILY MEDICINE | Facility: CLINIC | Age: 58
End: 2023-04-27
Payer: MEDICARE

## 2023-04-27 NOTE — TELEPHONE ENCOUNTER
----- Message from Malissa Rios sent at 4/27/2023  4:12 PM CDT -----  Regarding: Patient Call Back  .Type: Patient Call Back    Who called: [facility] [place]    What is the request in detail: Calling to get a pre authorization on the pt's medication modafiniL (PROVIGIL) 200 MG Tab and has some clinical questions that need to be completed. Please advise.     Can the clinic reply by [facility]? No    Would the patient rather a call back or a response via [facility]? Call back    Best call back number: [phone]    Additional Information:

## 2023-05-01 ENCOUNTER — TELEPHONE (OUTPATIENT)
Dept: FAMILY MEDICINE | Facility: CLINIC | Age: 58
End: 2023-05-01
Payer: MEDICARE

## 2023-05-09 DIAGNOSIS — F32.A DEPRESSION, UNSPECIFIED DEPRESSION TYPE: ICD-10-CM

## 2023-05-09 DIAGNOSIS — E78.5 HYPERLIPIDEMIA WITH TARGET LOW DENSITY LIPOPROTEIN (LDL) CHOLESTEROL LESS THAN 130 MG/DL: Primary | ICD-10-CM

## 2023-05-09 RX ORDER — METHOCARBAMOL 750 MG/1
TABLET, FILM COATED ORAL
Qty: 80 TABLET | Refills: 2 | Status: SHIPPED | OUTPATIENT
Start: 2023-05-09

## 2023-05-09 RX ORDER — ESCITALOPRAM OXALATE 20 MG/1
TABLET ORAL
Qty: 90 TABLET | Refills: 3 | Status: SHIPPED | OUTPATIENT
Start: 2023-05-09 | End: 2024-01-18

## 2023-05-09 NOTE — TELEPHONE ENCOUNTER
Care Due:                  Date            Visit Type   Department     Provider  --------------------------------------------------------------------------------                                [facility]                              PRIMARY      MEDICINE/INTERN  Last Visit: 04-      CARE ([facility])   [facility]         Devon Collier  Next Visit: None Scheduled  None         None Found                                                            Last  Test          Frequency    Reason                     Performed    Due Date  --------------------------------------------------------------------------------    Lipid Panel.  12 months..  simvastatin..............  Not Found    Overdue    [employer] Embedded Care Due Messages. Reference number: [id number].   5/09/2023 8:04:23 AM CDT

## 2023-05-09 NOTE — TELEPHONE ENCOUNTER
Refill Decision Note   Zoey Cali  is requesting a refill authorization.  Brief Assessment and Rationale for Refill:  Approve     Medication Therapy Plan:       Medication Reconciliation Completed: No   Comments:     Provider Staff:     Action is required for this patient.   Please see care gap opportunities below in Care Due Message.     Thanks!  [facility]     Appointments      Date Provider   Last Visit   4/25/2023 Devon Collier MD   Next Visit   Visit date not found Devon Collier MD     Note composed:12:38 PM 05/09/2023           Note composed:12:38 PM 05/09/2023

## 2023-05-19 ENCOUNTER — TELEPHONE (OUTPATIENT)
Dept: NEUROLOGY | Facility: CLINIC | Age: 58
End: 2023-05-19
Payer: MEDICARE

## 2023-05-19 NOTE — TELEPHONE ENCOUNTER
----- Message from Abiel Akins sent at 5/19/2023  4:06 PM CDT -----  Regarding: Reschedule appt  Contact: [phone]  Hi, pt is unable to make the appt on 5/23. She is asking to get her appt rescheduled. Pls call the pt at [phone]

## 2023-05-26 NOTE — TELEPHONE ENCOUNTER
Called pt to r/s her 5/23 appt with Dr. Mendez. I offered pt the next available appts for Dr. Mendez. Pt explained she can't do Tuesdays because she does her Avonex injection on Mondays and normally does not feel well on Tuesdays. She also said she cannot do early mornings because she is coming all the way from [place]. I asked if pt sees Denia and she said she did. I told her I could offer her some appts with AP as she probably had more availability. Pt accepted an appt with AP on 8/2 at 11:30 AM.

## 2023-06-16 ENCOUNTER — LAB VISIT (OUTPATIENT)
Dept: LAB | Facility: HOSPITAL | Age: 58
End: 2023-06-16
Attending: INTERNAL MEDICINE
Payer: MEDICARE

## 2023-06-16 DIAGNOSIS — D50.9 IRON DEFICIENCY ANEMIA, UNSPECIFIED IRON DEFICIENCY ANEMIA TYPE: ICD-10-CM

## 2023-06-16 LAB
BASOPHILS # BLD AUTO: 0.04 K/UL (ref 0–0.2)
BASOPHILS NFR BLD: 0.7 % (ref 0–1.9)
DIFFERENTIAL METHOD: ABNORMAL
EOSINOPHIL # BLD AUTO: 0.1 K/UL (ref 0–0.5)
EOSINOPHIL NFR BLD: 2.3 % (ref 0–8)
ERYTHROCYTE [DISTWIDTH] IN BLOOD BY AUTOMATED COUNT: 13.9 % (ref 11.5–14.5)
FERRITIN SERPL-MCNC: 191 NG/ML (ref 20–300)
HCT VFR BLD AUTO: 43.6 % (ref 37–48.5)
HGB BLD-MCNC: 15.2 G/DL (ref 12–16)
IMM GRANULOCYTES # BLD AUTO: 0.02 K/UL (ref 0–0.04)
IMM GRANULOCYTES NFR BLD AUTO: 0.3 % (ref 0–0.5)
IRON SERPL-MCNC: 75 UG/DL (ref 30–160)
LYMPHOCYTES # BLD AUTO: 1.2 K/UL (ref 1–4.8)
LYMPHOCYTES NFR BLD: 19.3 % (ref 18–48)
MCH RBC QN AUTO: 32 PG (ref 27–31)
MCHC RBC AUTO-ENTMCNC: 34.9 G/DL (ref 32–36)
MCV RBC AUTO: 92 FL (ref 82–98)
MONOCYTES # BLD AUTO: 0.4 K/UL (ref 0.3–1)
MONOCYTES NFR BLD: 6.9 % (ref 4–15)
NEUTROPHILS # BLD AUTO: 4.2 K/UL (ref 1.8–7.7)
NEUTROPHILS NFR BLD: 70.5 % (ref 38–73)
NRBC BLD-RTO: 0 /100 WBC
PLATELET # BLD AUTO: 175 K/UL (ref 150–450)
PMV BLD AUTO: 8.8 FL (ref 9.2–12.9)
RBC # BLD AUTO: 4.75 M/UL (ref 4–5.4)
SATURATED IRON: 25 % (ref 20–50)
TOTAL IRON BINDING CAPACITY: 300 UG/DL (ref 250–450)
TRANSFERRIN SERPL-MCNC: 203 MG/DL (ref 200–375)
WBC # BLD AUTO: 5.97 K/UL (ref 3.9–12.7)

## 2023-06-16 PROCEDURE — 36415 COLL VENOUS BLD VENIPUNCTURE: CPT | Performed by: INTERNAL MEDICINE

## 2023-06-16 PROCEDURE — 82728 ASSAY OF FERRITIN: CPT | Performed by: INTERNAL MEDICINE

## 2023-06-16 PROCEDURE — 84466 ASSAY OF TRANSFERRIN: CPT | Performed by: INTERNAL MEDICINE

## 2023-06-16 PROCEDURE — 85025 COMPLETE CBC W/AUTO DIFF WBC: CPT | Performed by: INTERNAL MEDICINE

## 2023-06-21 ENCOUNTER — OFFICE VISIT (OUTPATIENT)
Dept: HEMATOLOGY/ONCOLOGY | Facility: CLINIC | Age: 58
End: 2023-06-21
Payer: MEDICARE

## 2023-06-21 VITALS
HEART RATE: 75 BPM | SYSTOLIC BLOOD PRESSURE: 120 MMHG | WEIGHT: 121.94 LBS | OXYGEN SATURATION: 95 % | BODY MASS INDEX: 23.94 KG/M2 | HEIGHT: 60 IN | DIASTOLIC BLOOD PRESSURE: 75 MMHG

## 2023-06-21 DIAGNOSIS — G35 MS (MULTIPLE SCLEROSIS): ICD-10-CM

## 2023-06-21 DIAGNOSIS — D50.9 IRON DEFICIENCY ANEMIA, UNSPECIFIED IRON DEFICIENCY ANEMIA TYPE: Primary | ICD-10-CM

## 2023-06-21 PROCEDURE — 3074F SYST BP LT 130 MM HG: CPT | Mod: CPTII,S$GLB,, | Performed by: INTERNAL MEDICINE

## 2023-06-21 PROCEDURE — 99213 OFFICE O/P EST LOW 20 MIN: CPT | Mod: S$GLB,,, | Performed by: INTERNAL MEDICINE

## 2023-06-21 PROCEDURE — 99213 PR OFFICE/OUTPT VISIT, EST, LEVL III, 20-29 MIN: ICD-10-PCS | Mod: S$GLB,,, | Performed by: INTERNAL MEDICINE

## 2023-06-21 PROCEDURE — 3078F DIAST BP <80 MM HG: CPT | Mod: CPTII,S$GLB,, | Performed by: INTERNAL MEDICINE

## 2023-06-21 PROCEDURE — 99999 PR PBB SHADOW E&M-EST. PATIENT-LVL III: ICD-10-PCS | Mod: PBBFAC,,, | Performed by: INTERNAL MEDICINE

## 2023-06-21 PROCEDURE — 3074F PR MOST RECENT SYSTOLIC BLOOD PRESSURE < 130 MM HG: ICD-10-PCS | Mod: CPTII,S$GLB,, | Performed by: INTERNAL MEDICINE

## 2023-06-21 PROCEDURE — 3008F PR BODY MASS INDEX (BMI) DOCUMENTED: ICD-10-PCS | Mod: CPTII,S$GLB,, | Performed by: INTERNAL MEDICINE

## 2023-06-21 PROCEDURE — 3078F PR MOST RECENT DIASTOLIC BLOOD PRESSURE < 80 MM HG: ICD-10-PCS | Mod: CPTII,S$GLB,, | Performed by: INTERNAL MEDICINE

## 2023-06-21 PROCEDURE — 99999 PR PBB SHADOW E&M-EST. PATIENT-LVL III: CPT | Mod: PBBFAC,,, | Performed by: INTERNAL MEDICINE

## 2023-06-21 PROCEDURE — 3008F BODY MASS INDEX DOCD: CPT | Mod: CPTII,S$GLB,, | Performed by: INTERNAL MEDICINE

## 2023-06-21 NOTE — PROGRESS NOTES
Subjective:          Patient ID: Zoey Cali is a 57 y.o. female.    Chief Complaint: Anemia    Diagnosis: TRACIE       Prior Hx: 56 y/o female with history of MS seen  today for f/u for TRACIE, She undergoes intermittent IV iron therapy.She has been intolerant of oral Fe supp. She is s/p GI eval with EGD and colonoscopy 2015  which was unremarkable She is followed by Dr. Mendez for long standing history of MS and was recently  on therapy with Aubagio  She was previously on Avonex for >20 yrsShe was experiencing  increasing pain with injection and hypersensitivity to bilateral injection site areas    Interval Hx: Last  received Fe infusion completed 4/2023   Less fatigued  She remains on Avonex and tolerating well   No SOB/CP/cough          Review of Systems   Constitutional:  Positive for fatigue (mild). Negative for appetite change and unexpected weight change.   Eyes:  Positive for pain. Negative for visual disturbance.   Respiratory:  Negative for cough and shortness of breath.    Cardiovascular:  Negative for chest pain and leg swelling.   Gastrointestinal:  Negative for abdominal pain, blood in stool, constipation, diarrhea, nausea and vomiting.   Genitourinary:  Negative for frequency.   Musculoskeletal:  Negative for arthralgias, back pain, joint swelling and neck pain.   Skin:  Negative for rash.        No petechiae, ecchymoses   Neurological:  Negative for light-headedness and headaches.   Hematological:  Does not bruise/bleed easily.   Psychiatric/Behavioral:  Negative for confusion and dysphoric mood.      Objective:       Vitals:    06/21/23 1454   BP: 120/75   Pulse: 75   SpO2: 95%   Weight: 55.3 kg (121 lb 14.6 oz)   Height: 5' (1.524 m)       Physical Exam   Constitutional: She is oriented to person, place, and time. She appears well-developed and well-nourished.   HENT:   Head: Normocephalic.   Mouth/Throat: Oropharynx is clear and moist. No oropharyngeal exudate.   Eyes: Conjunctivae and lids are  normal.   No scleral icterus.   Neck: Normal range of motion. Neck supple. No thyromegaly present.   Cardiovascular: Normal rate, regular rhythm and normal heart sounds.    No murmur heard.  Pulmonary/Chest: Breath sounds normal. She has no wheezes. She has no rales.   Abdominal: Soft. Bowel sounds are normal. There is no tenderness. There is no rebound and no guarding.   Musculoskeletal: Normal range of motion. She exhibits no edema and no tenderness.   Lymphadenopathy:     She has no cervical adenopathy.     She has no axillary adenopathy.        Right: No supraclavicular adenopathy present.        Left: No supraclavicular adenopathy present.   Neurological: She is alert and oriented to person, place, and time. No cranial nerve deficit. Coordination normal.   Skin: Skin is warm and dry. No ecchymosis, no petechiae and no rash noted. No erythema.   Psychiatric: She has a normal mood and affect.           Lab Results   Component Value Date    WBC 5.97 06/16/2023    HGB 15.2 06/16/2023    HCT 43.6 06/16/2023    MCV 92 06/16/2023     06/16/2023     Lab Results   Component Value Date    IRON 75 06/16/2023    TIBC 300 06/16/2023    FERRITIN 191 06/16/2023             1. Iron deficiency anemia, unspecified iron deficiency anemia type    2. MS (multiple sclerosis)              Plan:   Zoey FRASER was seen today for follow-up.    Diagnoses and all orders for this visit:    Iron deficiency anemia, unspecified iron deficiency       S/p GI eval-unremarkable       S/p IV Fe completed 4/2023       CBC reveals stable Hb 15.2  g/dl        Fe parameters wnl        Ferritin 191       Cont to monitor    Multiple Sclerosis    F/b Neurology    Pt on   Avonex      Cbc,Fe studies in 2mos and prior to fu 4mos televist or in person    Advance Care Planning     Power of   I previously initiated the process of advance care planning today and explained the importance of this process to the patient.  I introduced the concept of  advance directives to the patient, as well. Then the patient received detailed information about the importance of designating a Health Care Power of  (HCPOA). She was also instructed to communicate with this person about their wishes for future healthcare, should she become sick and lose decision-making capacity. The patient has not previously appointed a HCPOA. After our discussion, the patient has decided to complete a HCPOA and has appointed her significant other and NAME:Jared Cali   I spent a total time of 16 minutes discussing this issue with the patient.              Cc: MD Heidy Lancaster MD

## 2023-07-21 ENCOUNTER — PATIENT MESSAGE (OUTPATIENT)
Dept: PSYCHIATRY | Facility: CLINIC | Age: 58
End: 2023-07-21
Payer: MEDICARE

## 2023-07-24 ENCOUNTER — PATIENT OUTREACH (OUTPATIENT)
Dept: ADMINISTRATIVE | Facility: HOSPITAL | Age: 58
End: 2023-07-24
Payer: MEDICARE

## 2023-07-24 NOTE — PROGRESS NOTES
Health Maintenance Due   Topic Date Due    Hemoglobin A1c (Prediabetes)  Never done    HIV Screening  Never done    Lipid Panel  06/22/2016    Mammogram  08/16/2022   Chart review done. HM updated. Immunizations reviewed & updated. Care Everywhere updated.  MAMMO,LIPID, A1C TO BE ADDRESSED AT MD VISIT

## 2023-08-06 DIAGNOSIS — M79.2 NEURALGIA AND NEURITIS: ICD-10-CM

## 2023-08-06 DIAGNOSIS — R25.2 SPASTICITY: ICD-10-CM

## 2023-08-07 RX ORDER — IBUPROFEN 800 MG/1
TABLET ORAL
Qty: 180 TABLET | Refills: 1 | Status: SHIPPED | OUTPATIENT
Start: 2023-08-07

## 2023-08-07 NOTE — TELEPHONE ENCOUNTER
Refill Routing Note   Medication(s) are not appropriate for processing by [facility] for the following reason(s):      Medication outside of protocol    [facility] action(s):  Route Care Due:  None identified            Appointments  past 12m or future 3m with PCP    Date Provider   Last Visit   4/25/2023 Devon Collier MD   Next Visit   Visit date not found Devon Collier MD   ED visits in past 90 days: 0        Note composed:8:24 AM 08/07/2023

## 2023-08-15 ENCOUNTER — TELEPHONE (OUTPATIENT)
Dept: NEUROLOGY | Facility: CLINIC | Age: 58
End: 2023-08-15
Payer: MEDICARE

## 2023-08-15 NOTE — TELEPHONE ENCOUNTER
----- Message from Charu Holly RN sent at 8/2/2023 12:08 PM CDT -----  Regarding: FW: Reschedule Appt  Contact: @[phone]    ----- Message -----  From: Glenroy Bond  Sent: 8/2/2023  10:18 AM CDT  To: Evan PATEL Staff  Subject: Reschedule Appt                                  Patient is calling to reschedule a appt on today @ 11:30 because she is having car trouble ....... No available dates.... Please call and advise @[phone]

## 2023-08-21 ENCOUNTER — LAB VISIT (OUTPATIENT)
Dept: LAB | Facility: HOSPITAL | Age: 58
End: 2023-08-21
Attending: INTERNAL MEDICINE
Payer: MEDICARE

## 2023-08-21 DIAGNOSIS — D50.9 IRON DEFICIENCY ANEMIA, UNSPECIFIED IRON DEFICIENCY ANEMIA TYPE: ICD-10-CM

## 2023-08-21 LAB
BASOPHILS # BLD AUTO: 0.04 K/UL (ref 0–0.2)
BASOPHILS NFR BLD: 0.5 % (ref 0–1.9)
DIFFERENTIAL METHOD: ABNORMAL
EOSINOPHIL # BLD AUTO: 0.1 K/UL (ref 0–0.5)
EOSINOPHIL NFR BLD: 1.7 % (ref 0–8)
ERYTHROCYTE [DISTWIDTH] IN BLOOD BY AUTOMATED COUNT: 13.3 % (ref 11.5–14.5)
FERRITIN SERPL-MCNC: 228 NG/ML (ref 20–300)
HCT VFR BLD AUTO: 44.1 % (ref 37–48.5)
HGB BLD-MCNC: 15.2 G/DL (ref 12–16)
IMM GRANULOCYTES # BLD AUTO: 0.03 K/UL (ref 0–0.04)
IMM GRANULOCYTES NFR BLD AUTO: 0.4 % (ref 0–0.5)
IRON SERPL-MCNC: 111 UG/DL (ref 30–160)
LYMPHOCYTES # BLD AUTO: 1.4 K/UL (ref 1–4.8)
LYMPHOCYTES NFR BLD: 18.7 % (ref 18–48)
MCH RBC QN AUTO: 32.1 PG (ref 27–31)
MCHC RBC AUTO-ENTMCNC: 34.5 G/DL (ref 32–36)
MCV RBC AUTO: 93 FL (ref 82–98)
MONOCYTES # BLD AUTO: 0.4 K/UL (ref 0.3–1)
MONOCYTES NFR BLD: 5.7 % (ref 4–15)
NEUTROPHILS # BLD AUTO: 5.6 K/UL (ref 1.8–7.7)
NEUTROPHILS NFR BLD: 73 % (ref 38–73)
NRBC BLD-RTO: 0 /100 WBC
PLATELET # BLD AUTO: 228 K/UL (ref 150–450)
PMV BLD AUTO: 8.9 FL (ref 9.2–12.9)
RBC # BLD AUTO: 4.74 M/UL (ref 4–5.4)
SATURATED IRON: 37 % (ref 20–50)
TOTAL IRON BINDING CAPACITY: 299 UG/DL (ref 250–450)
TRANSFERRIN SERPL-MCNC: 202 MG/DL (ref 200–375)
WBC # BLD AUTO: 7.72 K/UL (ref 3.9–12.7)

## 2023-08-21 PROCEDURE — 84466 ASSAY OF TRANSFERRIN: CPT | Performed by: INTERNAL MEDICINE

## 2023-08-21 PROCEDURE — 85025 COMPLETE CBC W/AUTO DIFF WBC: CPT | Performed by: INTERNAL MEDICINE

## 2023-08-21 PROCEDURE — 36415 COLL VENOUS BLD VENIPUNCTURE: CPT | Mod: PO | Performed by: INTERNAL MEDICINE

## 2023-08-21 PROCEDURE — 83540 ASSAY OF IRON: CPT | Performed by: INTERNAL MEDICINE

## 2023-08-21 PROCEDURE — 82728 ASSAY OF FERRITIN: CPT | Performed by: INTERNAL MEDICINE

## 2023-08-29 DIAGNOSIS — G35 MULTIPLE SCLEROSIS: ICD-10-CM

## 2023-08-29 RX ORDER — DIAZEPAM 5 MG/1
5 TABLET ORAL EVERY 6 HOURS PRN
Qty: 45 TABLET | Refills: 5 | Status: SHIPPED | OUTPATIENT
Start: 2023-08-29 | End: 2024-01-17

## 2023-08-29 NOTE — TELEPHONE ENCOUNTER
Care Due:                  Date            Visit Type   Department     Provider  --------------------------------------------------------------------------------                                [facility] FAM[facility]                              PRIMARY      MEDICINE/INTERN  Last Visit: 04-      CARE ([facility])   [facility]         Devon Collier  Next Visit: None Scheduled  None         None Found                                                            Last  Test          Frequency    Reason                     Performed    Due Date  --------------------------------------------------------------------------------    CMP.........  12 months..  simvastatin,               10-   10-                             triamterene-hydrochloroth                             iazide...................    Lipid Panel.  12 months..  simvastatin..............  Not Found    Overdue    [employer] Embedded Care Due Messages. Reference number: [id number].   8/29/2023 8:05:41 AM CDT

## 2023-09-05 ENCOUNTER — TELEPHONE (OUTPATIENT)
Dept: NEUROLOGY | Facility: CLINIC | Age: 58
End: 2023-09-05
Payer: MEDICARE

## 2023-09-05 NOTE — TELEPHONE ENCOUNTER
----- Message from Kathya Ramírez sent at 9/5/2023  2:57 PM CDT -----  Regarding: Covid Exposure  Contact: [phone]  Calling to inform office of Covid exposure on 9/2/23, no symptom, but have not tested. Please call to advise.

## 2023-09-06 NOTE — TELEPHONE ENCOUNTER
Pt exposed to COVID. Today she has a cough, runny nose and headache. She will test for covid later today.  Changed appt for tomorrow to a virtual visit.

## 2023-09-07 ENCOUNTER — OFFICE VISIT (OUTPATIENT)
Dept: NEUROLOGY | Facility: CLINIC | Age: 58
End: 2023-09-07
Payer: MEDICARE

## 2023-09-07 ENCOUNTER — PATIENT MESSAGE (OUTPATIENT)
Dept: NEUROLOGY | Facility: CLINIC | Age: 58
End: 2023-09-07

## 2023-09-07 ENCOUNTER — LAB VISIT (OUTPATIENT)
Dept: LAB | Facility: HOSPITAL | Age: 58
End: 2023-09-07
Attending: FAMILY MEDICINE
Payer: MEDICARE

## 2023-09-07 DIAGNOSIS — R53.83 FATIGUE, UNSPECIFIED TYPE: ICD-10-CM

## 2023-09-07 DIAGNOSIS — Z29.89 PROPHYLACTIC IMMUNOTHERAPY: ICD-10-CM

## 2023-09-07 DIAGNOSIS — R05.9 COUGH, UNSPECIFIED TYPE: ICD-10-CM

## 2023-09-07 DIAGNOSIS — Z71.89 COUNSELING REGARDING GOALS OF CARE: ICD-10-CM

## 2023-09-07 DIAGNOSIS — G35 MULTIPLE SCLEROSIS: Primary | ICD-10-CM

## 2023-09-07 DIAGNOSIS — R05.9 COUGH, UNSPECIFIED TYPE: Primary | ICD-10-CM

## 2023-09-07 DIAGNOSIS — M79.2 NERVE PAIN: ICD-10-CM

## 2023-09-07 DIAGNOSIS — Z79.899 OTHER LONG TERM (CURRENT) DRUG THERAPY: ICD-10-CM

## 2023-09-07 PROCEDURE — 99215 PR OFFICE/OUTPT VISIT, EST, LEVL V, 40-54 MIN: ICD-10-PCS | Mod: 95,,, | Performed by: CLINICAL NURSE SPECIALIST

## 2023-09-07 PROCEDURE — 99215 OFFICE O/P EST HI 40 MIN: CPT | Mod: 95,,, | Performed by: CLINICAL NURSE SPECIALIST

## 2023-09-07 PROCEDURE — 87635 SARS-COV-2 COVID-19 AMP PRB: CPT | Performed by: FAMILY MEDICINE

## 2023-09-07 NOTE — PROGRESS NOTES
Subjective:          Patient ID: Zoey Cali is a 57 y.o. female who presents today for a routine virtual visit for MS.  She was last seen in October 2022. The history has been provided by the patient.     The patient location is: her home   The chief complaint leading to consultation is: MS     Visit type: audiovisual  Face to Face time with patient: 32 minutes  40 minutes of total time spent on the encounter, which includes face to face time and non-face to face time preparing to see the patient (eg, review of tests), Obtaining and/or reviewing separately obtained history, Documenting clinical information in the electronic or other health record, Independently interpreting results (not separately reported) and communicating results to the patient/family/caregiver, or Care coordination (not separately reported).     Each patient to whom he or she provides medical services by telemedicine is:  (1) informed of the relationship between the physician and patient and the respective role of any other health care provider with respect to management of the patient; and (2) notified that he or she may decline to receive medical services by telemedicine and may withdraw from such care at any time.    MS HPI:  DMT: Avonex   Side effects from DMT? No; flu-like side effects for 24 hours or so after; ibuprofen helps   Taking vitamin D3 as recommended? Yes   She was tested for COVID today, but has not received results yet. She has a cough, runny nose, headache, and chills. She started having symptoms yesterday. She was exposed to COVID on Saturday night. She is taking Aspirin. This is the first time she has had COVID.   She has generally been feeling stable from an MS standpoint.   She has been avoiding the heat. She feels dizzy, has discomfort in feet or legs when overheated.   Fatigue has been a problem. She takes a nap for a few hours during the day and sleeps well at night.   She has good days and bad days. She uses  her cane when needed. She denies any recent relapses.   She does report some itching in her hands randomly, and this can be severe. Hydroxyzine does seem to help. She is also taking gabapentin.     Medications:  Current Outpatient Medications   Medication Sig    carBAMazepine (TEGRETOL XR) 200 MG 12 hr tablet Take 1 tablet (200 mg total) by mouth 2 (two) times daily.    cetirizine (ZYRTEC) 10 MG tablet Take 1 tablet (10 mg total) by mouth once daily.    cholecalciferol, vitamin D3, (VITAMIN D3) 125 mcg (5,000 unit) Tab Take 1 tablet (5,000 Units total) by mouth once daily.    diazePAM (VALIUM) 5 MG tablet TAKE ONE TABLET BY MOUTH EVERY 6 HOURS AS NEEDED FOR ANXIETY    EScitalopram oxalate (LEXAPRO) 20 MG tablet TAKE ONE TABLET BY MOUTH EVERY DAY    gabapentin (NEURONTIN) 400 MG capsule Take 2 capsules (800 mg total) by mouth every evening.    gabapentin (NEURONTIN) 600 MG tablet Take 1 tablet (600 mg total) by mouth 3 (three) times daily.    HYDROcodone-acetaminophen (NORCO)  mg per tablet Take 1 tablet by mouth every 6 (six) hours as needed for Pain.    HYDROcodone-acetaminophen (NORCO)  mg per tablet TAKE ONE TABLET BY MOUTH EVERY 6 HOURS AS NEEDED FOR PAIN Strength:  mg    HYDROcodone-acetaminophen (NORCO)  mg per tablet TAKE ONE TABLET BY MOUTH EVERY 6 HOURS AS NEEDED FOR PAIN Strength:  mg    hydrOXYzine HCL (ATARAX) 25 MG tablet Take 1 tablet (25 mg total) by mouth 3 (three) times daily.    ibuprofen (ADVIL,MOTRIN) 800 MG tablet TAKE ONE TABLET BY MOUTH EVERY 8 HOURS WITH FOOD AND WATER    interferon beta-1a (AVONEX) 30 mcg/0.5 mL PnKt INJECT 30 MCG INTRAMUSCULARLY ONCE WEEKLY    methocarbamoL (ROBAXIN) 750 MG Tab TAKE TWO TABLETS BY MOUTH FOUR TIMES DAILY FOR 10 DAYS Strength: 750 mg    metoprolol succinate (TOPROL-XL) 100 MG 24 hr tablet Take 1 tablet (100 mg total) by mouth once daily.    nystatin (MYCOSTATIN) cream Apply topically 2 (two) times daily.    ondansetron  (ZOFRAN-ODT) 8 MG TbDL DISSOLVE 1 TABLET ON THE TONGUE THREE TIMES DAILY AS NEEDED Strength: 8 mg    simvastatin (ZOCOR) 40 MG tablet Take 1 tablet (40 mg total) by mouth every evening.    tiotropium-olodateroL (STIOLTO RESPIMAT) 2.5-2.5 mcg/actuation Mist inhale TWO puffs INTO THE LUNGS DAILY. controller Strength: 2.5-2.5 mcg/actuation    tiZANidine (ZANAFLEX) 4 MG tablet TAKE 2 TABLETS IN THE MORNING, AT NOON AND IN THE EVENING AND TAKE 3 TABLETS AT NIGHT (9 TABLETS PER DAY)    triamterene-hydrochlorothiazide 37.5-25 mg (MAXZIDE-25) 37.5-25 mg per tablet Take 1 tablet by mouth every other day.     SOCIAL HISTORY  Social History     Tobacco Use    Smoking status: Every Day     Current packs/day: 1.00     Average packs/day: 1 pack/day for 30.0 years (30.0 ttl pk-yrs)     Types: Cigarettes    Smokeless tobacco: Current   Substance Use Topics    Alcohol use: No     Alcohol/week: 0.0 standard drinks of alcohol    Drug use: No       Living arrangements - the patient lives with her spouse    ROS:      9/7/23    REVIEW OF SYMPTOMS   Do you feel abnormally tired on most days? More fatigued lately    Do you feel you generally sleep well? Yes   Do you have difficulty controlling your bladder?  No   Do you have difficulty controlling your bowels?  No   Do you have frequent muscle cramps, tightness or spasms in your limbs?  No--takes tizanidine    Do you have new visual symptoms?  No--wears glasses; due for an eye exam    Do you have worsening difficulty with your memory or thinking? No   Do you have worsening symptoms of anxiety or depression?  No   For patients who walk, Do you have more difficulty walking?  No   Have you fallen since your last visit?  No   For patients who use wheelchairs: Do you have any skin wounds or breakdown? Not Applicable   Do you have difficulty using your hands?  No--stable    Do you have shooting or burning pain? No--She uses gabapentin for nerve pain;  she has thought about weaning down, but  feels like it does help with pins and needles in her feet. She uses ice socks for nerve pain, as well.    Do you have difficulty with sexual function?  Not assessed    If you are sexually active, are you using birth control? Y/N  N/A No   Do you often choke when swallowing liquids or solid food?  No   Do you experience worsening symptoms when overheated? Yes    Do you need any new equipment such as a wheelchair, walker or shower chair? No   Do you receive co-pay financial assistance for your principal MS medicine? Yes   Would you be interested in participating in an MS research trial in the future? No   For patients on Gilenya, Tecfidera, Aubagio, Rituxan, Ocrevus, Tysabri, Lemtrada or Methotrexate, are you aware that you should NOT receive live virus vaccines?  Not Applicable   Do you feel you have adequate family/friend support?  Yes   Do you have health insurance?   Yes   Are you currently employed? No   Do you receive SSDI/SSI?  Yes   Do you use marijuana or cannabis products? No   Have you been diagnosed with a urinary tract infection since your last visit here? No   Have you been diagnosed with a respiratory tract infection since your last visit here? No   Have you been to the emergency room since your last visit here? No   Have you been hospitalized since your last visit here?  No            Objective:        1. 25 foot timed walk:      5/2/2017    12:00 AM 6/29/2021    12:00 AM   Timed 25 Foot Walk:   Did patient wear an AFO? No No   Was assistive device used? No No   Time for 25 Foot Walk (seconds) 4.4 4.8       Neurologic Exam    Deferred   Imaging:     Results for orders placed during the hospital encounter of 03/13/21    MRI Brain Demyelinating Without Contrast    Impression  Brain appears stable from prior exam as above.  No new discrete lesions to indicate ongoing demyelination.      Electronically signed by: Magno Bahena MD  Date:    03/14/2021  Time:    08:34    Results for orders placed during the  hospital encounter of 05/11/18    MRI Brain Demyelinating W W/O Contrast    Impression  Overall mild to moderately degraded examination related to motion artifact.  Scattered supratentorial white matter lesions are somewhat nonspecific but in keeping with the reported history of multiple sclerosis.    No definite new lesions and no enhancing lesions to indicate ongoing or active demyelination.      Electronically signed by: Dre Abrams MD  Date:    05/11/2018  Time:    17:15      Labs:     Lab Results   Component Value Date    WQKTEYGR64LK 117 (H) 10/12/2022    YIDTRZBF03XI 78 10/13/2021    [id number] 72 10/28/2019       Lab Results   Component Value Date    WBC 7.72 08/21/2023    RBC 4.74 08/21/2023    HGB 15.2 08/21/2023    HCT 44.1 08/21/2023    MCV 93 08/21/2023    MCH 32.1 (H) 08/21/2023    MCHC 34.5 08/21/2023    RDW 13.3 08/21/2023     08/21/2023    MPV 8.9 (L) 08/21/2023    GRAN 5.6 08/21/2023    GRAN 73.0 08/21/2023    LYMPH 1.4 08/21/2023    LYMPH 18.7 08/21/2023    MONO 0.4 08/21/2023    MONO 5.7 08/21/2023    EOS 0.1 08/21/2023    BASO 0.04 08/21/2023    EOSINOPHIL 1.7 08/21/2023    BASOPHIL 0.5 08/21/2023     Sodium   Date Value Ref Range Status   10/12/2022 133 (L) 136 - 145 mmol/L Final     Potassium   Date Value Ref Range Status   10/12/2022 3.8 3.5 - 5.1 mmol/L Final     Chloride   Date Value Ref Range Status   10/12/2022 97 95 - 110 mmol/L Final     CO2   Date Value Ref Range Status   10/12/2022 25 23 - 29 mmol/L Final     Glucose   Date Value Ref Range Status   10/12/2022 93 70 - 110 mg/dL Final     BUN   Date Value Ref Range Status   10/12/2022 9 6 - 20 mg/dL Final     Creatinine   Date Value Ref Range Status   01/14/2023 0.7 0.5 - 1.4 mg/dL Final     Calcium   Date Value Ref Range Status   10/12/2022 9.5 8.7 - 10.5 mg/dL Final     Total Protein   Date Value Ref Range Status   10/12/2022 7.1 6.0 - 8.4 g/dL Final     Albumin   Date Value Ref Range Status   10/12/2022 4.0 3.5 - 5.2 g/dL  Final     Total Bilirubin   Date Value Ref Range Status   10/12/2022 0.5 0.1 - 1.0 mg/dL Final     Comment:     For infants and newborns, interpretation of results should be based  on gestational age, weight and in agreement with clinical  observations.    Premature Infant recommended reference ranges:  Up to 24 hours.............<8.0 mg/dL  Up to 48 hours............<12.0 mg/dL  3-5 days..................<15.0 mg/dL  6-29 days.................<15.0 mg/dL       Alkaline Phosphatase   Date Value Ref Range Status   10/12/2022 126 55 - 135 U/L Final     AST   Date Value Ref Range Status   10/12/2022 16 10 - 40 U/L Final     ALT   Date Value Ref Range Status   10/12/2022 13 10 - 44 U/L Final     Anion Gap   Date Value Ref Range Status   10/12/2022 11 8 - 16 mmol/L Final     eGFR if    Date Value Ref Range Status   05/15/2019 >60 >60 mL/min/1.73 m^2 Final     eGFR if non    Date Value Ref Range Status   05/15/2019 >60 >60 mL/min/1.73 m^2 Final     Comment:     Calculation used to obtain the estimated glomerular filtration  rate (eGFR) is the CKD-EPI equation.        MS Impression and Plan:     NEURO MULTIPLE SCLEROSIS IMPRESSION:   MS Status:     Number of relapses in the past year?:  0    Clinical Progression:  Clinically Stable    Clinical Progression comment:  She has been mostly stable, except for fatigue and itching. She likely has COVID right now and is treating her symptoms.   Plan:     DMT:  No change in management    DMT comment:  Continue Avonex and Vitamin D. Her next set of safety labs is due this month.      Symptom Management:  Implement change in symptom management    Implement Change in Symptom Management:  Fatigue (Discussed Co-Q-10 500mg daily for fatigue. We also discussed switching gabapentin to Lyrica for nerve pain and itching. We will hold on this for now. She can try Lidocaine cream on her feet as needed for nerve pain.)    MRI brain is planned for October to  evaluate both MS and pterygoid muscle lesion seen on last MRI. She is very claustrophobic and requires medication to help her relax for MRI. Usual doses of Valium that we have prescribed in the past do not help.   She will follow up with Dr. Mendez in 6 months or sooner with me if needed.         REEMA Ford, CNS    Problem List Items Addressed This Visit          Neurologic Problems    Multiple sclerosis - Primary    Overview     Unable to tolerate Aubagio due to GI side effects--stopped 3/18/2021. Will plan to return to Avonex         Relevant Orders    MRI Brain Demyelinating W W/O Contrast    Hepatic function panel    CBC auto differential    Vitamin D       Other    Counseling regarding goals of care    Prophylactic immunotherapy     Other Visit Diagnoses       Other long term (current) drug therapy        Relevant Orders    Vitamin D    Nerve pain        Fatigue, unspecified type

## 2023-09-08 ENCOUNTER — TELEPHONE (OUTPATIENT)
Dept: NEUROLOGY | Facility: CLINIC | Age: 58
End: 2023-09-08
Payer: MEDICARE

## 2023-09-08 LAB — SARS-COV-2 RNA RESP QL NAA+PROBE: NOT DETECTED

## 2023-09-08 NOTE — TELEPHONE ENCOUNTER
----- Message from Denia Gordon, REEMA, CNS sent at 9/8/2023  9:49 AM CDT -----  Labs this month  MRI in October F/U with BB in 6 months

## 2023-09-19 ENCOUNTER — PATIENT MESSAGE (OUTPATIENT)
Dept: NEUROLOGY | Facility: CLINIC | Age: 58
End: 2023-09-19
Payer: MEDICARE

## 2023-09-19 DIAGNOSIS — F40.240 CLAUSTROPHOBIA: Primary | ICD-10-CM

## 2023-09-19 RX ORDER — ALPRAZOLAM 0.5 MG/1
TABLET ORAL
Qty: 2 TABLET | Refills: 0 | Status: SHIPPED | OUTPATIENT
Start: 2023-09-19 | End: 2024-06-20 | Stop reason: ALTCHOICE

## 2023-10-10 ENCOUNTER — PATIENT MESSAGE (OUTPATIENT)
Dept: ADMINISTRATIVE | Facility: HOSPITAL | Age: 58
End: 2023-10-10
Payer: MEDICARE

## 2023-10-10 DIAGNOSIS — Z12.31 BREAST CANCER SCREENING BY MAMMOGRAM: Primary | ICD-10-CM

## 2023-10-23 DIAGNOSIS — G35 MULTIPLE SCLEROSIS: ICD-10-CM

## 2023-10-23 DIAGNOSIS — M79.2 NEUROPATHIC PAIN OF FOOT, LEFT: ICD-10-CM

## 2023-10-23 DIAGNOSIS — M79.2 NEUROPATHIC PAIN OF FOOT, RIGHT: ICD-10-CM

## 2023-10-23 RX ORDER — GABAPENTIN 600 MG/1
600 TABLET ORAL 3 TIMES DAILY
Qty: 270 TABLET | Refills: 3 | Status: SHIPPED | OUTPATIENT
Start: 2023-10-23

## 2023-10-23 NOTE — TELEPHONE ENCOUNTER
No care due was identified.  [facility] Embedded Care Due Messages. Reference number: [id number].   10/23/2023 11:00:38 AM CDT

## 2023-10-23 NOTE — TELEPHONE ENCOUNTER
Refill Routing Note   Medication(s) are not appropriate for processing by [facility] for the following reason(s):      Medication outside of protocol    [facility] action(s):  Route Care Due:  None identified            Appointments  past 12m or future 3m with PCP    Date Provider   Last Visit   4/25/2023 Devon Collier MD   Next Visit   Visit date not found Devon Collier MD   ED visits in past 90 days: 0        Note composed:3:43 PM 10/23/2023

## 2023-11-10 ENCOUNTER — LAB VISIT (OUTPATIENT)
Dept: LAB | Facility: HOSPITAL | Age: 58
End: 2023-11-10
Attending: INTERNAL MEDICINE
Payer: MEDICARE

## 2023-11-10 DIAGNOSIS — D50.9 IRON DEFICIENCY ANEMIA, UNSPECIFIED IRON DEFICIENCY ANEMIA TYPE: ICD-10-CM

## 2023-11-10 LAB
BASOPHILS # BLD AUTO: 0.02 K/UL (ref 0–0.2)
BASOPHILS NFR BLD: 0.4 % (ref 0–1.9)
DIFFERENTIAL METHOD: ABNORMAL
EOSINOPHIL # BLD AUTO: 0.2 K/UL (ref 0–0.5)
EOSINOPHIL NFR BLD: 3.2 % (ref 0–8)
ERYTHROCYTE [DISTWIDTH] IN BLOOD BY AUTOMATED COUNT: 12.4 % (ref 11.5–14.5)
FERRITIN SERPL-MCNC: 234 NG/ML (ref 20–300)
HCT VFR BLD AUTO: 40 % (ref 37–48.5)
HGB BLD-MCNC: 14 G/DL (ref 12–16)
IMM GRANULOCYTES # BLD AUTO: 0.01 K/UL (ref 0–0.04)
IMM GRANULOCYTES NFR BLD AUTO: 0.2 % (ref 0–0.5)
IRON SERPL-MCNC: 66 UG/DL (ref 30–160)
LYMPHOCYTES # BLD AUTO: 1.1 K/UL (ref 1–4.8)
LYMPHOCYTES NFR BLD: 23.5 % (ref 18–48)
MCH RBC QN AUTO: 32.3 PG (ref 27–31)
MCHC RBC AUTO-ENTMCNC: 35 G/DL (ref 32–36)
MCV RBC AUTO: 92 FL (ref 82–98)
MONOCYTES # BLD AUTO: 0.3 K/UL (ref 0.3–1)
MONOCYTES NFR BLD: 6.8 % (ref 4–15)
NEUTROPHILS # BLD AUTO: 3.1 K/UL (ref 1.8–7.7)
NEUTROPHILS NFR BLD: 65.9 % (ref 38–73)
NRBC BLD-RTO: 0 /100 WBC
PLATELET # BLD AUTO: 166 K/UL (ref 150–450)
PMV BLD AUTO: 8.5 FL (ref 9.2–12.9)
RBC # BLD AUTO: 4.34 M/UL (ref 4–5.4)
SATURATED IRON: 20 % (ref 20–50)
TOTAL IRON BINDING CAPACITY: 327 UG/DL (ref 250–450)
TRANSFERRIN SERPL-MCNC: 221 MG/DL (ref 200–375)
WBC # BLD AUTO: 4.68 K/UL (ref 3.9–12.7)

## 2023-11-10 PROCEDURE — 85025 COMPLETE CBC W/AUTO DIFF WBC: CPT | Performed by: INTERNAL MEDICINE

## 2023-11-10 PROCEDURE — 84466 ASSAY OF TRANSFERRIN: CPT | Performed by: INTERNAL MEDICINE

## 2023-11-10 PROCEDURE — 83540 ASSAY OF IRON: CPT | Performed by: INTERNAL MEDICINE

## 2023-11-10 PROCEDURE — 82728 ASSAY OF FERRITIN: CPT | Performed by: INTERNAL MEDICINE

## 2023-11-10 PROCEDURE — 36415 COLL VENOUS BLD VENIPUNCTURE: CPT | Mod: PO | Performed by: INTERNAL MEDICINE

## 2023-11-15 ENCOUNTER — OFFICE VISIT (OUTPATIENT)
Dept: HEMATOLOGY/ONCOLOGY | Facility: CLINIC | Age: 58
End: 2023-11-15
Payer: MEDICARE

## 2023-11-15 DIAGNOSIS — G35 MS (MULTIPLE SCLEROSIS): ICD-10-CM

## 2023-11-15 DIAGNOSIS — D50.9 IRON DEFICIENCY ANEMIA, UNSPECIFIED IRON DEFICIENCY ANEMIA TYPE: Primary | ICD-10-CM

## 2023-11-15 PROCEDURE — 99213 OFFICE O/P EST LOW 20 MIN: CPT | Mod: 95,,, | Performed by: INTERNAL MEDICINE

## 2023-11-15 PROCEDURE — 99213 PR OFFICE/OUTPT VISIT, EST, LEVL III, 20-29 MIN: ICD-10-PCS | Mod: 95,,, | Performed by: INTERNAL MEDICINE

## 2023-11-15 NOTE — PROGRESS NOTES
Subjective:      The patient location is: home  The chief complaint leading to consultation is: TRACIE    Visit type: audiovisual    Face to Face time with patient:  6 min  7 minutes of total time spent on the encounter, which includes face to face time and non-face to face time preparing to see the patient (eg, review of tests), Obtaining and/or reviewing separately obtained history, Documenting clinical information in the electronic or other health record, Independently interpreting results (not separately reported) and communicating results to the patient/family/caregiver, or Care coordination (not separately reported).         Each patient to whom he or she provides medical services by telemedicine is:  (1) informed of the relationship between the physician and patient and the respective role of any other health care provider with respect to management of the patient; and (2) notified that he or she may decline to receive medical services by telemedicine and may withdraw from such care at any time.    Notes:      Patient ID: Zoey Cali is a 57 y.o. female.    Chief Complaint: No chief complaint on file.    Diagnosis: TRACIE       Prior Hx: 56 y/o female with history of MS seen  today for f/u for TRACIE, She undergoes intermittent IV iron therapy.She has been intolerant of oral Fe supp. She is s/p GI eval with EGD and colonoscopy 2015  which was unremarkable She is followed by Dr. Mendez for long standing history of MS and was recently  on therapy with Aubagio  She was previously on Avonex for >20 yrsShe was experiencing  increasing pain with injection and hypersensitivity to bilateral injection site areas    Interval Hx: Doing well  Last  received Fe infusion completed 4/2023   Less fatigued  She remains on Avonex and tolerating well   No SOB/CP/cough          Review of Systems   Constitutional:  Positive for fatigue (improved). Negative for appetite change and unexpected weight change.   Eyes:  Negative for pain  and visual disturbance.   Respiratory:  Negative for cough and shortness of breath.    Cardiovascular:  Negative for chest pain and leg swelling.   Gastrointestinal:  Negative for abdominal pain, blood in stool, constipation, diarrhea, nausea and vomiting.   Genitourinary:  Negative for frequency.   Musculoskeletal:  Negative for arthralgias, back pain, joint swelling and neck pain.   Skin:  Negative for rash.        No petechiae, ecchymoses   Neurological:  Negative for light-headedness and headaches.   Hematological:  Does not bruise/bleed easily.   Psychiatric/Behavioral:  Negative for confusion and dysphoric mood.        Objective:       There were no vitals filed for this visit.    Televisit         Lab Results   Component Value Date    WBC 4.68 11/10/2023    HGB 14.0 11/10/2023    HCT 40.0 11/10/2023    MCV 92 11/10/2023     11/10/2023     Lab Results   Component Value Date    IRON 66 11/10/2023    TIBC 327 11/10/2023    FERRITIN 234 11/10/2023             1. Iron deficiency anemia, unspecified iron deficiency anemia type    2. MS (multiple sclerosis)                Plan:   Zoey FRASER was seen today for follow-up.    Diagnoses and all orders for this visit:    Iron deficiency anemia, unspecified iron deficiency       S/p GI eval-unremarkable       S/p IV Fe completed 4/2023       CBC reveals stable Hb 14  g/dl        Fe parameters wnl        Ferritin 234 on 11/10/23        Cont to monitor    Multiple Sclerosis\    stable    F/b Neurology    Pt on   Avonex      Cbc,Fe studies in 2mos and prior to fu 4mos televist or in person    Advance Care Planning     Power of   I previously initiated the process of advance care planning today and explained the importance of this process to the patient.  I introduced the concept of advance directives to the patient, as well. Then the patient received detailed information about the importance of designating a Health Care Power of  (HCPOA). She was also  instructed to communicate with this person about their wishes for future healthcare, should she become sick and lose decision-making capacity. The patient has not previously appointed a HCPOA. After our discussion, the patient has decided to complete a HCPOA and has appointed her significant other and NAME:Jared Cali   I spent a total time of 16 minutes discussing this issue with the patient.              Cc: MD Heidy Lancaster MD

## 2023-11-16 DIAGNOSIS — R25.2 SPASTICITY: ICD-10-CM

## 2023-11-16 RX ORDER — TIZANIDINE 4 MG/1
TABLET ORAL
Qty: 810 TABLET | Refills: 3 | Status: SHIPPED | OUTPATIENT
Start: 2023-11-16

## 2023-11-16 NOTE — TELEPHONE ENCOUNTER
Care Due:                  Date            Visit Type   Department     Provider  --------------------------------------------------------------------------------                                [facility] FAMILY                              PRIMARY      MEDICINE/INTERN  Last Visit: 04-      CARE ([facility])   [facility]         Devon Collier  Next Visit: None Scheduled  None         None Found                                                            Last  Test          Frequency    Reason                     Performed    Due Date  --------------------------------------------------------------------------------    CMP.........  12 months..  ibuprofen, simvastatin,    10-   10-                             triamterene-hydrochloroth                             iazide...................    Lipid Panel.  12 months..  simvastatin..............  Not Found    Overdue    [employer] Embedded Care Due Messages. Reference number: [id number].   11/16/2023 12:33:51 PM CST

## 2023-11-27 ENCOUNTER — E-VISIT (OUTPATIENT)
Dept: FAMILY MEDICINE | Facility: CLINIC | Age: 58
End: 2023-11-27
Payer: MEDICARE

## 2023-11-27 DIAGNOSIS — J32.9 SINUSITIS, UNSPECIFIED CHRONICITY, UNSPECIFIED LOCATION: Primary | ICD-10-CM

## 2023-11-27 PROCEDURE — 99423 OL DIG E/M SVC 21+ MIN: CPT | Mod: ,,, | Performed by: FAMILY MEDICINE

## 2023-11-27 PROCEDURE — 99423 PR E&M, ONLINE DIGIT, EST, < 7 DAYS,  21+ MINS: ICD-10-PCS | Mod: ,,, | Performed by: FAMILY MEDICINE

## 2023-11-27 RX ORDER — PROMETHAZINE HYDROCHLORIDE AND DEXTROMETHORPHAN HYDROBROMIDE 6.25; 15 MG/5ML; MG/5ML
5 SYRUP ORAL NIGHTLY PRN
Qty: 120 ML | Refills: 0 | Status: SHIPPED | OUTPATIENT
Start: 2023-11-27 | End: 2023-12-21

## 2023-11-27 RX ORDER — LEVOFLOXACIN 500 MG/1
500 TABLET, FILM COATED ORAL DAILY
Qty: 7 TABLET | Refills: 0 | Status: SHIPPED | OUTPATIENT
Start: 2023-11-27 | End: 2023-12-04

## 2023-11-27 NOTE — PROGRESS NOTES
Patient ID: Zoey Cali is a 57 y.o. female.    Chief Complaint: URI (Entered automatically based on patient selection in Patient Portal.)    The patient initiated a request through [employer] on 11/27/2023 for evaluation and management with a chief complaint of URI (Entered automatically based on patient selection in Patient Portal.)     I evaluated the questionnaire submission on 11/27/2023.    [facility] Peq Evisit Upper Respitatory/Cough Questionnaire    11/27/2023  1:12 PM CST - Filed by Patient   Do you agree to participate in an E-Visit? Yes   If you have any of the following symptoms, please present to your local ER or call 911:  I acknowledge   What is the main issue that you would like for your doctor to address today? Headache, nasal congestion, non productive cough, occasional wheezing   Are you able to take your vital signs? No   What symptoms do you currently have?  Cough;  Headache   Describe your cough: Productive (containing mucus);  Bothersome (interferes with daily activities)   Describe the mucus: Yellow;  Clear   Have you ever smoked? I currently smoke   Have you had a fever? No   When did your symptoms first appear? 11/20/2023   In the last two weeks, have you been in close contact with someone who has COVID-19 or the Flu? No   In the last two weeks, have you worked or volunteered in a healthcare facility or as a ? Healthcare facilities include a hospital, medical or dental clinic, long-term care facility, or nursing home No   Do you live in a long-term care facility, nursing home, group home, or homeless shelter? No   List what you have done or taken to help your symptoms. Mucinex liquid and/or Vicks Sinus   How severe are your symptoms? Moderate   Have your symptoms improved since they first appeared? Worse   Have you taken an at home Covid test? No   Have you taken a Flu test? No   Have you been fully vaccinated for COVID? (2 Pfizer, 2 Moderna or 1 Shaun & Shaun vaccine  injections) Yes   Have you received a booster? No   Have you recieved a Flu shot? No   Do you have transportation to get tested for COVID if it is indicated and ordered for you at an [facility] location? Yes   Provide any information you feel is important to your history not asked above Do not have a sore throat   Please attach any relevant images or files          Recent Labs Obtained:  No visits with results within 7 Day(s) from this visit.   Latest known visit with results is:   Lab Visit on 11/10/2023   Component Date Value Ref Range Status    Iron 11/10/2023 66  30 - 160 ug/dL Final    Transferrin 11/10/2023 221  200 - 375 mg/dL Final    TIBC 11/10/2023 327  250 - 450 ug/dL Final    Saturated Iron 11/10/2023 20  20 - 50 % Final    WBC 11/10/2023 4.68  3.90 - 12.70 K/uL Final    RBC 11/10/2023 4.34  4.00 - 5.40 M/uL Final    Hemoglobin 11/10/2023 14.0  12.0 - 16.0 g/dL Final    Hematocrit 11/10/2023 40.0  37.0 - 48.5 % Final    MCV 11/10/2023 92  82 - 98 fL Final    MCH 11/10/2023 32.3 (H)  27.0 - 31.0 pg Final    MCHC 11/10/2023 35.0  32.0 - 36.0 g/dL Final    RDW 11/10/2023 12.4  11.5 - 14.5 % Final    Platelets 11/10/2023 166  150 - 450 K/uL Final    MPV 11/10/2023 8.5 (L)  9.2 - 12.9 fL Final    Immature Granulocytes 11/10/2023 0.2  0.0 - 0.5 % Final    Gran # (ANC) 11/10/2023 3.1  1.8 - 7.7 K/uL Final    Immature Grans (Abs) 11/10/2023 0.01  0.00 - 0.04 K/uL Final    Comment: Mild elevation in immature granulocytes is non specific and   can be seen in a variety of conditions including stress response,   acute inflammation, trauma and pregnancy. Correlation with other   laboratory and clinical findings is essential.      Lymph # 11/10/2023 1.1  1.0 - 4.8 K/uL Final    Mono # 11/10/2023 0.3  0.3 - 1.0 K/uL Final    Eos # 11/10/2023 0.2  0.0 - 0.5 K/uL Final    Baso # 11/10/2023 0.02  0.00 - 0.20 K/uL Final    nRBC 11/10/2023 0  0 /100 WBC Final    Gran % 11/10/2023 65.9  38.0 - 73.0 % Final    Lymph % 11/10/2023  23.5  18.0 - 48.0 % Final    Mono % 11/10/2023 6.8  4.0 - 15.0 % Final    Eosinophil % 11/10/2023 3.2  0.0 - 8.0 % Final    Basophil % 11/10/2023 0.4  0.0 - 1.9 % Final    Differential Method 11/10/2023 Automated   Final    Ferritin 11/10/2023 234  20.0 - 300.0 ng/mL Final       Encounter Diagnosis   Name Primary?    Sinusitis, unspecified chronicity, unspecified location Yes        No orders of the defined types were placed in this encounter.     Medications Ordered This Encounter   Medications    levoFLOXacin (LEVAQUIN) 500 MG tablet     Sig: Take 1 tablet (500 mg total) by mouth once daily. for 7 days     Dispense:  7 tablet     Refill:  0    promethazine-dextromethorphan (PROMETHAZINE-DM) 6.25-15 mg/5 mL Syrp     Sig: Take 5 mLs by mouth nightly as needed (cough).     Dispense:  120 mL     Refill:  0        No follow-ups on file.      E-Visit Time Tracking:    Day 1 Time (in minutes): 27     Total Time (in minutes): 27

## 2023-12-15 DIAGNOSIS — E78.5 HYPERLIPIDEMIA, UNSPECIFIED HYPERLIPIDEMIA TYPE: ICD-10-CM

## 2023-12-15 RX ORDER — SIMVASTATIN 40 MG/1
40 TABLET, FILM COATED ORAL NIGHTLY
Qty: 90 TABLET | Refills: 3 | Status: SHIPPED | OUTPATIENT
Start: 2023-12-15

## 2023-12-15 NOTE — TELEPHONE ENCOUNTER
No care due was identified.  [facility] Embedded Care Due Messages. Reference number: [id number].   12/15/2023 8:01:53 AM CST

## 2023-12-15 NOTE — TELEPHONE ENCOUNTER
Refill Routing Note   Medication(s) are not appropriate for processing by [facility] for the following reason(s):        Required labs outdated    [facility] action(s):  Defer               Appointments  past 12m or future 3m with PCP    Date Provider   Last Visit   11/27/2023 Devon Collier MD   Next Visit   Visit date not found Devon Collier MD   ED visits in past 90 days: 0        Note composed:1:16 PM 12/15/2023

## 2024-01-17 DIAGNOSIS — G35 MULTIPLE SCLEROSIS: ICD-10-CM

## 2024-01-17 RX ORDER — DIAZEPAM 5 MG/1
5 TABLET ORAL EVERY 6 HOURS PRN
Qty: 45 TABLET | Refills: 5 | Status: SHIPPED | OUTPATIENT
Start: 2024-01-17

## 2024-01-18 DIAGNOSIS — M79.2 NEUROPATHIC PAIN: ICD-10-CM

## 2024-01-18 DIAGNOSIS — F32.A DEPRESSION, UNSPECIFIED DEPRESSION TYPE: ICD-10-CM

## 2024-01-18 DIAGNOSIS — J44.9 COPD MIXED TYPE: ICD-10-CM

## 2024-01-18 RX ORDER — ESCITALOPRAM OXALATE 20 MG/1
TABLET ORAL
Qty: 90 TABLET | Refills: 1 | Status: SHIPPED | OUTPATIENT
Start: 2024-01-18

## 2024-01-18 RX ORDER — CARBAMAZEPINE 200 MG/1
200 TABLET, EXTENDED RELEASE ORAL 2 TIMES DAILY
Qty: 180 TABLET | Refills: 3 | Status: SHIPPED | OUTPATIENT
Start: 2024-01-18

## 2024-01-18 RX ORDER — TIOTROPIUM BROMIDE AND OLODATEROL 3.124; 2.736 UG/1; UG/1
SPRAY, METERED RESPIRATORY (INHALATION)
Qty: 12 G | Refills: 1 | Status: SHIPPED | OUTPATIENT
Start: 2024-01-18

## 2024-01-18 NOTE — TELEPHONE ENCOUNTER
Refill Routing Note   Medication(s) are not appropriate for processing by [facility] for the following reason(s):        Outside of protocol    [facility] action(s):  Route  Approve               Appointments  past 12m or future 3m with PCP    Date Provider   Last Visit   11/27/2023 Devon Collier MD   Next Visit   3/26/2024 Devon Collier MD   ED visits in past 90 days: 0        Note composed:12:42 PM 01/18/2024

## 2024-01-18 NOTE — TELEPHONE ENCOUNTER
No care due was identified.  [facility] Embedded Care Due Messages. Reference number: [id number].   1/18/2024 12:32:47 PM CST

## 2024-01-22 ENCOUNTER — PATIENT MESSAGE (OUTPATIENT)
Dept: PSYCHIATRY | Facility: CLINIC | Age: 59
End: 2024-01-22
Payer: MEDICARE

## 2024-01-24 DIAGNOSIS — M79.2 NEUROPATHIC PAIN OF FOOT, RIGHT: ICD-10-CM

## 2024-01-24 DIAGNOSIS — M79.2 NEUROPATHIC PAIN OF FOOT, LEFT: ICD-10-CM

## 2024-01-24 DIAGNOSIS — G35 MULTIPLE SCLEROSIS: ICD-10-CM

## 2024-01-24 DIAGNOSIS — G35 MS (MULTIPLE SCLEROSIS): ICD-10-CM

## 2024-01-24 NOTE — TELEPHONE ENCOUNTER
No care due was identified.  [facility] Embedded Care Due Messages. Reference number: [id number].   1/24/2024 11:26:30 AM CST

## 2024-01-25 RX ORDER — GABAPENTIN 400 MG/1
800 CAPSULE ORAL NIGHTLY
Qty: 180 CAPSULE | Refills: 3 | Status: SHIPPED | OUTPATIENT
Start: 2024-01-25

## 2024-02-05 NOTE — TELEPHONE ENCOUNTER
LVM telling pt to call us back to schedule labs this month and MRI in October. I will also send a message via portal    Medication: bupropion 300 mg tablet passed protocol.   Last office visit date: 12/11/2023  Next appointment scheduled?: Yes               Last OV note:  Depressive disorder, not elsewhere classified:  Well controlled with Bupropion.  Continue current medications.

## 2024-02-22 ENCOUNTER — PATIENT MESSAGE (OUTPATIENT)
Dept: FAMILY MEDICINE | Facility: CLINIC | Age: 59
End: 2024-02-22
Payer: MEDICARE

## 2024-03-01 ENCOUNTER — LAB VISIT (OUTPATIENT)
Dept: LAB | Facility: HOSPITAL | Age: 59
End: 2024-03-01
Attending: FAMILY MEDICINE
Payer: MEDICARE

## 2024-03-01 DIAGNOSIS — D50.9 IRON DEFICIENCY ANEMIA, UNSPECIFIED IRON DEFICIENCY ANEMIA TYPE: ICD-10-CM

## 2024-03-01 LAB
BASOPHILS # BLD AUTO: 0.04 K/UL (ref 0–0.2)
BASOPHILS NFR BLD: 0.6 % (ref 0–1.9)
DIFFERENTIAL METHOD BLD: ABNORMAL
EOSINOPHIL # BLD AUTO: 0.1 K/UL (ref 0–0.5)
EOSINOPHIL NFR BLD: 1.8 % (ref 0–8)
ERYTHROCYTE [DISTWIDTH] IN BLOOD BY AUTOMATED COUNT: 12.9 % (ref 11.5–14.5)
FERRITIN SERPL-MCNC: 198 NG/ML (ref 20–300)
HCT VFR BLD AUTO: 44.6 % (ref 37–48.5)
HGB BLD-MCNC: 15.3 G/DL (ref 12–16)
IMM GRANULOCYTES # BLD AUTO: 0.01 K/UL (ref 0–0.04)
IMM GRANULOCYTES NFR BLD AUTO: 0.2 % (ref 0–0.5)
IRON SERPL-MCNC: 58 UG/DL (ref 30–160)
LYMPHOCYTES # BLD AUTO: 1.5 K/UL (ref 1–4.8)
LYMPHOCYTES NFR BLD: 23.2 % (ref 18–48)
MCH RBC QN AUTO: 31.7 PG (ref 27–31)
MCHC RBC AUTO-ENTMCNC: 34.3 G/DL (ref 32–36)
MCV RBC AUTO: 92 FL (ref 82–98)
MONOCYTES # BLD AUTO: 0.5 K/UL (ref 0.3–1)
MONOCYTES NFR BLD: 8.3 % (ref 4–15)
NEUTROPHILS # BLD AUTO: 4.1 K/UL (ref 1.8–7.7)
NEUTROPHILS NFR BLD: 65.9 % (ref 38–73)
NRBC BLD-RTO: 0 /100 WBC
PLATELET # BLD AUTO: 191 K/UL (ref 150–450)
PMV BLD AUTO: 9.1 FL (ref 9.2–12.9)
RBC # BLD AUTO: 4.83 M/UL (ref 4–5.4)
SATURATED IRON: 19 % (ref 20–50)
TOTAL IRON BINDING CAPACITY: 312 UG/DL (ref 250–450)
TRANSFERRIN SERPL-MCNC: 211 MG/DL (ref 200–375)
WBC # BLD AUTO: 6.26 K/UL (ref 3.9–12.7)

## 2024-03-01 PROCEDURE — 82728 ASSAY OF FERRITIN: CPT | Performed by: INTERNAL MEDICINE

## 2024-03-01 PROCEDURE — 85025 COMPLETE CBC W/AUTO DIFF WBC: CPT | Performed by: INTERNAL MEDICINE

## 2024-03-01 PROCEDURE — 36415 COLL VENOUS BLD VENIPUNCTURE: CPT | Mod: PO | Performed by: INTERNAL MEDICINE

## 2024-03-01 PROCEDURE — 83540 ASSAY OF IRON: CPT | Performed by: INTERNAL MEDICINE

## 2024-03-04 ENCOUNTER — OFFICE VISIT (OUTPATIENT)
Dept: HEMATOLOGY/ONCOLOGY | Facility: CLINIC | Age: 59
End: 2024-03-04
Payer: MEDICARE

## 2024-03-04 DIAGNOSIS — D50.9 IRON DEFICIENCY ANEMIA, UNSPECIFIED IRON DEFICIENCY ANEMIA TYPE: Primary | ICD-10-CM

## 2024-03-04 DIAGNOSIS — G35 MS (MULTIPLE SCLEROSIS): ICD-10-CM

## 2024-03-04 PROCEDURE — 99213 OFFICE O/P EST LOW 20 MIN: CPT | Mod: 95,,, | Performed by: INTERNAL MEDICINE

## 2024-03-04 NOTE — PROGRESS NOTES
Subjective:      The patient location is: home  The chief complaint leading to consultation is: TRACIE    Visit type: audiovisual    Face to Face time with patient:  4 min  7 minutes of total time spent on the encounter, which includes face to face time and non-face to face time preparing to see the patient (eg, review of tests), Obtaining and/or reviewing separately obtained history, Documenting clinical information in the electronic or other health record, Independently interpreting results (not separately reported) and communicating results to the patient/family/caregiver, or Care coordination (not separately reported).         Each patient to whom he or she provides medical services by telemedicine is:  (1) informed of the relationship between the physician and patient and the respective role of any other health care provider with respect to management of the patient; and (2) notified that he or she may decline to receive medical services by telemedicine and may withdraw from such care at any time.    Notes:      Patient ID: Zoey Cali is a 58 y.o. female.    Chief Complaint: No chief complaint on file.    Diagnosis: TRACIE       Prior Hx: 59 y/o female with history of MS seen  today for f/u for TRACIE, She undergoes intermittent IV iron therapy.She has been intolerant of oral Fe supp. She is s/p GI eval with EGD and colonoscopy 2015  which was unremarkable She is followed by Dr. Mendez for long standing history of MS and was recently  on therapy with [facility]  She was previously on Avonex for >20 yrsShe was experiencing  increasing pain with injection and hypersensitivity to bilateral injection site areas    Interval Hx: Doing well  No new issues  Last  received Fe infusion completed 4/2023   She remains on Avonex and tolerating well   No recent MS flare ups  No SOB/CP/cough          Review of Systems   Constitutional:  Positive for fatigue (improved). Negative for appetite change and unexpected weight change.    Eyes:  Negative for pain and visual disturbance.   Respiratory:  Negative for cough and shortness of breath.    Cardiovascular:  Negative for chest pain and leg swelling.   Gastrointestinal:  Negative for abdominal pain, blood in stool, constipation, diarrhea, nausea and vomiting.   Genitourinary:  Negative for frequency.   Musculoskeletal:  Negative for arthralgias, back pain, joint swelling and neck pain.   Skin:  Negative for rash.        No petechiae, ecchymoses   Neurological:  Negative for light-headedness and headaches.   Hematological:  Does not bruise/bleed easily.   Psychiatric/Behavioral:  Negative for confusion and dysphoric mood.        Objective:       There were no vitals filed for this visit.    Televisit         Lab Results   Component Value Date    WBC 6.26 03/01/2024    HGB 15.3 03/01/2024    HCT 44.6 03/01/2024    MCV 92 03/01/2024     03/01/2024     Lab Results   Component Value Date    IRON 58 03/01/2024    TIBC 312 03/01/2024    FERRITIN 198 03/01/2024             No diagnosis found.              Plan:   Zoey FRASER was seen today for follow-up.    Diagnoses and all orders for this visit:    Iron deficiency anemia, unspecified iron deficiency       S/p GI eval-unremarkable       S/p IV Fe completed 4/2023       CBC reveals stable Hb 15.3  g/dl        Fe parameters wnl with borderline Fe sats       Ferritin 198 on 3/1/24        Cont to monitor    Multiple Sclerosis    stable    F/b Neurology    Pt on   Avonex      Cbc,Fe studies prior to f/u 3 mos    Advance Care Planning     Power of   I previously initiated the process of advance care planning today and explained the importance of this process to the patient.  I introduced the concept of advance directives to the patient, as well. Then the patient received detailed information about the importance of designating a Health Care Power of  (HCPOA). She was also instructed to communicate with this person about their wishes  for future healthcare, should she become sick and lose decision-making capacity. The patient has not previously appointed a HCPOA. After our discussion, the patient has decided to complete a HCPOA and has appointed her significant other and NAME:Jared Cadeaux   I spent a total time of 16 minutes discussing this issue with the patient.              Cc: MD Heidy Lancaster MD

## 2024-04-18 ENCOUNTER — OFFICE VISIT (OUTPATIENT)
Dept: FAMILY MEDICINE | Facility: CLINIC | Age: 59
End: 2024-04-18
Payer: MEDICARE

## 2024-04-18 ENCOUNTER — HOSPITAL ENCOUNTER (OUTPATIENT)
Dept: RADIOLOGY | Facility: HOSPITAL | Age: 59
Discharge: HOME | End: 2024-04-18
Attending: FAMILY MEDICINE
Payer: MEDICARE

## 2024-04-18 VITALS
BODY MASS INDEX: 23.81 KG/M2 | TEMPERATURE: 98 F | DIASTOLIC BLOOD PRESSURE: 68 MMHG | OXYGEN SATURATION: 95 % | HEIGHT: 60 IN | HEART RATE: 77 BPM | SYSTOLIC BLOOD PRESSURE: 112 MMHG

## 2024-04-18 DIAGNOSIS — R07.89 RIGHT-SIDED CHEST WALL PAIN: ICD-10-CM

## 2024-04-18 DIAGNOSIS — R55 SYNCOPE AND COLLAPSE: Primary | ICD-10-CM

## 2024-04-18 DIAGNOSIS — I10 ESSENTIAL HYPERTENSION: ICD-10-CM

## 2024-04-18 DIAGNOSIS — G35 MS (MULTIPLE SCLEROSIS): ICD-10-CM

## 2024-04-18 DIAGNOSIS — R79.9 ABNORMAL FINDING OF BLOOD CHEMISTRY, UNSPECIFIED: ICD-10-CM

## 2024-04-18 DIAGNOSIS — R55 SYNCOPE AND COLLAPSE: ICD-10-CM

## 2024-04-18 DIAGNOSIS — R70.0 ESR RAISED: ICD-10-CM

## 2024-04-18 PROCEDURE — 3078F DIAST BP <80 MM HG: CPT | Mod: CPTII,S$GLB,, | Performed by: FAMILY MEDICINE

## 2024-04-18 PROCEDURE — 70450 CT HEAD/BRAIN W/O DYE: CPT | Mod: TC

## 2024-04-18 PROCEDURE — 99215 OFFICE O/P EST HI 40 MIN: CPT | Mod: S$GLB,,, | Performed by: FAMILY MEDICINE

## 2024-04-18 PROCEDURE — 70450 CT HEAD/BRAIN W/O DYE: CPT | Mod: 26,,, | Performed by: RADIOLOGY

## 2024-04-18 PROCEDURE — 99999 PR PBB SHADOW E&M-EST. PATIENT-LVL III: CPT | Mod: PBBFAC,,, | Performed by: FAMILY MEDICINE

## 2024-04-18 PROCEDURE — 3074F SYST BP LT 130 MM HG: CPT | Mod: CPTII,S$GLB,, | Performed by: FAMILY MEDICINE

## 2024-04-18 PROCEDURE — 3008F BODY MASS INDEX DOCD: CPT | Mod: CPTII,S$GLB,, | Performed by: FAMILY MEDICINE

## 2024-04-18 PROCEDURE — G2211 COMPLEX E/M VISIT ADD ON: HCPCS | Mod: S$GLB,,, | Performed by: FAMILY MEDICINE

## 2024-04-18 RX ORDER — METOPROLOL SUCCINATE 100 MG/1
100 TABLET, EXTENDED RELEASE ORAL
Qty: 90 TABLET | Refills: 0 | Status: SHIPPED | OUTPATIENT
Start: 2024-04-18

## 2024-04-18 NOTE — PROGRESS NOTES
HISTORY OF PRESENT ILLNESS:  Zoey Cali is a 58 y.o. female who presents to the clinic today for Fall  .     She is not herself   here  Spoke with me  Just not feeling well 2 days prior to fall  Then she tripped in garage  Hit her face, knee and chest  Has some pain  States she is fine  Cognition a little off  And breathing a little off.      Patient Active Problem List   Diagnosis    HTN (hypertension)    Multiple sclerosis    Osteoporosis    Iron deficiency anemia    Hyperlipidemia LDL goal < 130    Neuralgia and neuritis    Tobacco abuse    Vitamin D insufficiency    Muscle spasm    Counseling regarding goals of care    Encounter for long-term (current) use of high-risk medication    Urinary hesitancy    MS (multiple sclerosis)    Chronic, continuous use of opioids    Prophylactic immunotherapy    Gait disturbance    Neuropathic pain    Occipital neuralgia    History of optic neuritis    Refractive error    Iron deficiency    Immunosuppression    COPD mixed type           CARE TEAM:  Patient Care Team:  Devon Collier MD as PCP - General (Family Medicine)  Chevy Sheikh MD as Obstetrician (Obstetrics)  Michelle Claudio LPN as Care Coordinator         ROS  Per HPI      PHYSICAL EXAM:   /68   Pulse 77   Temp 97.8 °F (36.6 °C) (Oral)   Ht 5' (1.524 m)   LMP 01/01/2001 (Approximate) Comment: 2001  SpO2 95%   BMI 23.81 kg/m²   BP Readings from Last 5 Encounters:   04/18/24 112/68   06/21/23 120/75   04/25/23 100/60   04/10/23 131/60   04/03/23 131/60     Wt Readings from Last 5 Encounters:   06/21/23 55.3 kg (121 lb 14.6 oz)   04/25/23 58 kg (127 lb 13.9 oz)   02/20/23 58 kg (127 lb 13.9 oz)   01/13/23 59.7 kg (131 lb 9.8 oz)   10/20/22 61.7 kg (136 lb 0.4 oz)             She appears ill, in no apparent distress.  Alert and oriented times three, pleasant and cooperative. Vital signs are as documented in vital signs section.   Frail  Large bruise to left eye with scleral  injection  Blurry vision she states.  She doesn't really want to be examined.  Just not her typical self.  Right ribs very tender.  Left knee swollen and bruised.  Toes swollen and bruised    Medication List with Changes/Refills   Current Medications    ALPRAZOLAM (XANAX) 0.5 MG TABLET    Take 1 tab 30 to 90 minutes before MRI; if needed due to incomplete response, may take 1/2 to 1 tab after 30 to 60 minutes    CARBAMAZEPINE (TEGRETOL XR) 200 MG 12 HR TABLET    TAKE ONE TABLET BY MOUTH TWICE DAILY    CETIRIZINE (ZYRTEC) 10 MG TABLET    Take 1 tablet (10 mg total) by mouth once daily.    CHOLECALCIFEROL, VITAMIN D3, (VITAMIN D3) 125 MCG (5,000 UNIT) TAB    Take 1 tablet (5,000 Units total) by mouth once daily.    DIAZEPAM (VALIUM) 5 MG TABLET    TAKE ONE TABLET BY MOUTH EVERY 6 HOURS AS NEEDED FOR ANXIETY    ESCITALOPRAM OXALATE (LEXAPRO) 20 MG TABLET    TAKE ONE TABLET BY MOUTH EVERY DAY    GABAPENTIN (NEURONTIN) 400 MG CAPSULE    TAKE 2 CAPSULES BY MOUTH EVERY EVENING    GABAPENTIN (NEURONTIN) 600 MG TABLET    TAKE ONE TABLET BY MOUTH THREE TIMES DAILY    HYDROCODONE-ACETAMINOPHEN (NORCO)  MG PER TABLET    Take 1 tablet by mouth every 6 (six) hours as needed for Pain.    HYDROXYZINE HCL (ATARAX) 25 MG TABLET    Take 1 tablet (25 mg total) by mouth 3 (three) times daily.    IBUPROFEN (ADVIL,MOTRIN) 800 MG TABLET    TAKE ONE TABLET BY MOUTH EVERY 8 HOURS WITH FOOD AND WATER    INTERFERON BETA-1A (AVONEX) 30 MCG/0.5 ML PNKT    INJECT 30 MCG INTRAMUSCULARLY ONCE WEEKLY    METOPROLOL SUCCINATE (TOPROL-XL) 100 MG 24 HR TABLET    TAKE ONE TABLET BY MOUTH ONCE DAILY    ONDANSETRON (ZOFRAN-ODT) 8 MG TBDL    DISSOLVE 1 TABLET ON THE TONGUE THREE TIMES DAILY AS NEEDED Strength: 8 mg    SIMVASTATIN (ZOCOR) 40 MG TABLET    TAKE ONE TABLET BY MOUTH EVERY EVENING    TIOTROPIUM-OLODATEROL (STIOLTO RESPIMAT) 2.5-2.5 MCG/ACTUATION MIST    inhale TWO puffs INTO THE LUNGS DAILY    TIZANIDINE (ZANAFLEX) 4 MG TABLET    TAKE 2  TABLETS IN THE MORNING, AT NOON AND IN THE EVENING AND TAKE 3 TABLETS AT NIGHT (9 TABLETS PER DAY); only takes 3 at bedtime and none during the day    TRIAMTERENE-HYDROCHLOROTHIAZIDE 37.5-25 MG (MAXZIDE-25) 37.5-25 MG PER TABLET    Take 1 tablet by mouth every other day.   Discontinued Medications    METHOCARBAMOL (ROBAXIN) 750 MG TAB    TAKE TWO TABLETS BY MOUTH FOUR TIMES DAILY FOR 10 DAYS Strength: 750 mg    NYSTATIN (MYCOSTATIN) CREAM    Apply topically 2 (two) times daily.         ASSESSMENT AND PLAN:    Problem List Items Addressed This Visit       MS (multiple sclerosis)    Relevant Orders    Hemoglobin A1C    Microalbumin/Creatinine Ratio, Urine    PTH, Intact    Lipid Panel    CBC Auto Differential    Comprehensive Metabolic Panel    Urinalysis     Other Visit Diagnoses       Syncope and collapse    -  Primary    Relevant Orders    CT Head Without Contrast    Hemoglobin A1C    Microalbumin/Creatinine Ratio, Urine    PTH, Intact    Lipid Panel    CBC Auto Differential    Comprehensive Metabolic Panel    Urinalysis    Right-sided chest wall pain        Relevant Orders    X-Ray Ribs 2 View Right    Hemoglobin A1C    Microalbumin/Creatinine Ratio, Urine    PTH, Intact    Lipid Panel    CBC Auto Differential    Comprehensive Metabolic Panel    Urinalysis    Sedimentation rate    ESR raised        Relevant Orders    Hemoglobin A1C    Microalbumin/Creatinine Ratio, Urine    PTH, Intact    Lipid Panel    CBC Auto Differential    Comprehensive Metabolic Panel    Urinalysis    Abnormal finding of blood chemistry, unspecified        Relevant Orders    Hemoglobin A1C          Emergent situation  Get her to CT scan and xray  Breathing is not normal  Mentition is not normal for her  She has refused ER care  She is not completely out of it.  She is oriented  But her judgement is poor given how I know her in past  Check blood and imaging  ER precautions.    Future Appointments   Date Time Provider Department Center    4/18/2024 11:00 AM [facility] CT1 LIMIT 400 LBS [facility] CT SCAN [facility]   5/31/2024 10:00 AM LAB, BEL DRAW STATION [facility]   6/4/2024 11:40 AM Charito Mejia MD [facility] HEM ONC [facility]   6/19/2024  1:40 PM Devon Collier MD [facility]       No follow-ups on file. or sooner as needed.

## 2024-04-18 NOTE — TELEPHONE ENCOUNTER
Provider Staff:  Action required for this patient    Requires labs      Please see care gap opportunities below in Care Due Message.    Thanks!  [facility]     Appointments      Date Provider   Last Visit   11/27/2023 Devon Collier MD   Next Visit   4/18/2024 Devon Collier MD     Refill Decision Note   Zoey Cali  is requesting a refill authorization.  Brief Assessment and Rationale for Refill:  Approve     Medication Therapy Plan:        Comments:     Note composed:1:07 PM 04/18/2024

## 2024-04-18 NOTE — TELEPHONE ENCOUNTER
Care Due:                  Date            Visit Type   Department     Provider  --------------------------------------------------------------------------------                                [facility] FAMILY                              PRIMARY      MEDICINE/INTERN  Last Visit: 04-      CARE ([facility])   JESSICA Collier                              [facility]                              PRIMARY      MEDICINE/INTERN  Next Visit: 06-      CARE ([facility])   AL NIKITA Collier                                                            Last  Test          Frequency    Reason                     Performed    Due Date  --------------------------------------------------------------------------------    CMP.........  12 months..  ibuprofen, simvastatin,    10-   10-                             triamterene-hydrochloroth                             iazide...................    Lipid Panel.  12 months..  simvastatin..............  06- 06-    [facility] Embedded Care Due Messages. Reference number: [id number].   4/18/2024 8:02:31 AM CDT

## 2024-04-22 ENCOUNTER — LAB VISIT (OUTPATIENT)
Dept: LAB | Facility: HOSPITAL | Age: 59
End: 2024-04-22
Attending: FAMILY MEDICINE
Payer: MEDICARE

## 2024-04-22 DIAGNOSIS — R55 SYNCOPE AND COLLAPSE: ICD-10-CM

## 2024-04-22 DIAGNOSIS — G35 MS (MULTIPLE SCLEROSIS): ICD-10-CM

## 2024-04-22 DIAGNOSIS — R07.89 RIGHT-SIDED CHEST WALL PAIN: ICD-10-CM

## 2024-04-22 DIAGNOSIS — R70.0 ESR RAISED: ICD-10-CM

## 2024-04-22 DIAGNOSIS — R79.9 ABNORMAL FINDING OF BLOOD CHEMISTRY, UNSPECIFIED: ICD-10-CM

## 2024-04-22 LAB
ALBUMIN SERPL BCP-MCNC: 3.8 G/DL (ref 3.5–5.2)
ALP SERPL-CCNC: 102 U/L (ref 55–135)
ALT SERPL W/O P-5'-P-CCNC: 8 U/L (ref 10–44)
ANION GAP SERPL CALC-SCNC: 10 MMOL/L (ref 8–16)
AST SERPL-CCNC: 12 U/L (ref 10–40)
BASOPHILS # BLD AUTO: 0.04 K/UL (ref 0–0.2)
BASOPHILS NFR BLD: 0.5 % (ref 0–1.9)
BILIRUB SERPL-MCNC: 0.5 MG/DL (ref 0.1–1)
BUN SERPL-MCNC: 13 MG/DL (ref 6–20)
CALCIUM SERPL-MCNC: 9.8 MG/DL (ref 8.7–10.5)
CHLORIDE SERPL-SCNC: 101 MMOL/L (ref 95–110)
CHOLEST SERPL-MCNC: 179 MG/DL (ref 120–199)
CHOLEST/HDLC SERPL: 5.6 {RATIO} (ref 2–5)
CO2 SERPL-SCNC: 26 MMOL/L (ref 23–29)
CREAT SERPL-MCNC: 0.7 MG/DL (ref 0.5–1.4)
DIFFERENTIAL METHOD BLD: ABNORMAL
EOSINOPHIL # BLD AUTO: 0.1 K/UL (ref 0–0.5)
EOSINOPHIL NFR BLD: 1.8 % (ref 0–8)
ERYTHROCYTE [DISTWIDTH] IN BLOOD BY AUTOMATED COUNT: 13.2 % (ref 11.5–14.5)
ERYTHROCYTE [SEDIMENTATION RATE] IN BLOOD BY WESTERGREN METHOD: 5 MM/HR (ref 0–20)
EST. GFR  (NO RACE VARIABLE): >60 ML/MIN/1.73 M^2
GLUCOSE SERPL-MCNC: 91 MG/DL (ref 70–110)
HCT VFR BLD AUTO: 45.2 % (ref 37–48.5)
HDLC SERPL-MCNC: 32 MG/DL (ref 40–75)
HDLC SERPL: 17.9 % (ref 20–50)
HGB BLD-MCNC: 15.5 G/DL (ref 12–16)
IMM GRANULOCYTES # BLD AUTO: 0.03 K/UL (ref 0–0.04)
IMM GRANULOCYTES NFR BLD AUTO: 0.4 % (ref 0–0.5)
LDLC SERPL CALC-MCNC: 113.2 MG/DL (ref 63–159)
LYMPHOCYTES # BLD AUTO: 1.5 K/UL (ref 1–4.8)
LYMPHOCYTES NFR BLD: 18.5 % (ref 18–48)
MCH RBC QN AUTO: 31.6 PG (ref 27–31)
MCHC RBC AUTO-ENTMCNC: 34.3 G/DL (ref 32–36)
MCV RBC AUTO: 92 FL (ref 82–98)
MONOCYTES # BLD AUTO: 0.5 K/UL (ref 0.3–1)
MONOCYTES NFR BLD: 6.8 % (ref 4–15)
NEUTROPHILS # BLD AUTO: 5.7 K/UL (ref 1.8–7.7)
NEUTROPHILS NFR BLD: 72 % (ref 38–73)
NONHDLC SERPL-MCNC: 147 MG/DL
NRBC BLD-RTO: 0 /100 WBC
PLATELET # BLD AUTO: 278 K/UL (ref 150–450)
PMV BLD AUTO: 9.2 FL (ref 9.2–12.9)
POTASSIUM SERPL-SCNC: 3.6 MMOL/L (ref 3.5–5.1)
PROT SERPL-MCNC: 7.1 G/DL (ref 6–8.4)
PTH-INTACT SERPL-MCNC: 57 PG/ML (ref 9–77)
RBC # BLD AUTO: 4.91 M/UL (ref 4–5.4)
SODIUM SERPL-SCNC: 137 MMOL/L (ref 136–145)
TRIGL SERPL-MCNC: 169 MG/DL (ref 30–150)
WBC # BLD AUTO: 7.93 K/UL (ref 3.9–12.7)

## 2024-04-22 PROCEDURE — 85025 COMPLETE CBC W/AUTO DIFF WBC: CPT | Performed by: FAMILY MEDICINE

## 2024-04-22 PROCEDURE — 83970 ASSAY OF PARATHORMONE: CPT | Performed by: FAMILY MEDICINE

## 2024-04-22 PROCEDURE — 80061 LIPID PANEL: CPT | Performed by: FAMILY MEDICINE

## 2024-04-22 PROCEDURE — 85652 RBC SED RATE AUTOMATED: CPT | Performed by: FAMILY MEDICINE

## 2024-04-22 PROCEDURE — 80053 COMPREHEN METABOLIC PANEL: CPT | Performed by: FAMILY MEDICINE

## 2024-04-22 PROCEDURE — 83036 HEMOGLOBIN GLYCOSYLATED A1C: CPT | Performed by: FAMILY MEDICINE

## 2024-04-22 PROCEDURE — 36415 COLL VENOUS BLD VENIPUNCTURE: CPT | Mod: PO | Performed by: FAMILY MEDICINE

## 2024-04-23 ENCOUNTER — PATIENT MESSAGE (OUTPATIENT)
Dept: FAMILY MEDICINE | Facility: CLINIC | Age: 59
End: 2024-04-23
Payer: MEDICARE

## 2024-04-23 LAB
ESTIMATED AVG GLUCOSE: 100 MG/DL (ref 68–131)
HBA1C MFR BLD: 5.1 % (ref 4–5.6)

## 2024-05-02 ENCOUNTER — PATIENT MESSAGE (OUTPATIENT)
Dept: PSYCHIATRY | Facility: CLINIC | Age: 59
End: 2024-05-02
Payer: MEDICARE

## 2024-05-08 ENCOUNTER — TELEPHONE (OUTPATIENT)
Dept: NEUROLOGY | Facility: CLINIC | Age: 59
End: 2024-05-08
Payer: MEDICARE

## 2024-05-08 ENCOUNTER — PATIENT MESSAGE (OUTPATIENT)
Dept: NEUROLOGY | Facility: CLINIC | Age: 59
End: 2024-05-08
Payer: MEDICARE

## 2024-05-08 ENCOUNTER — PATIENT OUTREACH (OUTPATIENT)
Dept: ADMINISTRATIVE | Facility: HOSPITAL | Age: 59
End: 2024-05-08
Payer: MEDICARE

## 2024-05-08 DIAGNOSIS — G35 MULTIPLE SCLEROSIS: ICD-10-CM

## 2024-05-08 RX ORDER — INTERFERON BETA-1A 30MCG/.5ML
30 KIT INTRAMUSCULAR WEEKLY
Qty: 6 ML | Refills: 1 | Status: ACTIVE | OUTPATIENT
Start: 2024-05-08

## 2024-05-08 NOTE — TELEPHONE ENCOUNTER
----- Message from Deysi Garcia RN sent at 5/8/2024  3:38 PM CDT -----    ----- Message -----  From: Sivakumar Colbert  Sent: 5/8/2024  12:08 PM CDT  To: Vanessa RHODES Staff    Zoey Cali calling regarding Refills  (message) for #interferon beta-1a (AVONEX) 30 mcg/0.5 mL [facility] [place] [address] [place] [address] [address] [place] [address]  Phone: [phone] Fax: [phone]

## 2024-05-08 NOTE — PROGRESS NOTES
Health Maintenance Due   Topic Date Due    HIV Screening  Never done    TETANUS VACCINE  Never done    Shingles Vaccine (1 of 2) Never done    Pneumococcal Vaccines (Age 0-64) (2 of 2 - PCV) 01/17/2019    LDCT Lung Screen  06/21/2019    Mammogram  08/16/2022     Chart review done.  updated. Immunizations reviewed & updated. Care Everywhere updated.

## 2024-05-09 NOTE — TELEPHONE ENCOUNTER
Spoke to pt and let her know she is overdue for her f/u appt. I explained to pt that she was last seen by Denia in Sept. 2023. I told pt she was supposed to f/u with Dr. Mendez in March, but that appt never happened. I gave pt the option of an in person with Dr. Mendez in September, in person with Denia in June, or virtual with Dr. Mendez on May 27th. I advised pt that she might want to see Dr. Mendez soon to touch base since the last time she was seen by PK was in 2021. Pt agreed to virtual on 5/27. Pt is scheduled for a virtual with Dr. Mendez in 5/27 at 11:00 AM.

## 2024-05-13 ENCOUNTER — DOCUMENTATION ONLY (OUTPATIENT)
Dept: NEUROLOGY | Facility: CLINIC | Age: 59
End: 2024-05-13
Payer: MEDICARE

## 2024-05-13 NOTE — PROGRESS NOTES
Zoey Cali (Key: [id number])  PA Case ID #: [id number]    PA for Avonex submitted via [facility] on 5/13

## 2024-05-21 DIAGNOSIS — I10 PRIMARY HYPERTENSION: ICD-10-CM

## 2024-05-21 NOTE — TELEPHONE ENCOUNTER
No care due was identified.  [facility] Embedded Care Due Messages. Reference number: [id number].   5/21/2024 12:10:39 PM CDT

## 2024-05-22 RX ORDER — TRIAMTERENE/HYDROCHLOROTHIAZID 37.5-25 MG
1 TABLET ORAL EVERY OTHER DAY
Qty: 45 TABLET | Refills: 3 | Status: SHIPPED | OUTPATIENT
Start: 2024-05-22

## 2024-05-22 NOTE — TELEPHONE ENCOUNTER
Refill Routing Note   Medication(s) are not appropriate for processing by [facility] for the following reason(s):        Due for refill >6 months ago    [facility] action(s):  Defer        Medication Therapy Plan: Last dispensed on 10/23/23. Maxzide 37.5-25 mg-Take 1 TAB PO EOD- 45 + 3 ordered 16 months ago.      Appointments  past 12m or future 3m with PCP    Date Provider   Last Visit   4/18/2024 Devon Collier MD   Next Visit   6/19/2024 Devon Collier MD   ED visits in past 90 days: 0        Note composed:10:11 PM 05/21/2024

## 2024-05-27 ENCOUNTER — OFFICE VISIT (OUTPATIENT)
Dept: NEUROLOGY | Facility: CLINIC | Age: 59
End: 2024-05-27
Payer: MEDICARE

## 2024-05-27 DIAGNOSIS — G35 MS (MULTIPLE SCLEROSIS): ICD-10-CM

## 2024-05-27 DIAGNOSIS — Z29.89 PROPHYLACTIC IMMUNOTHERAPY: ICD-10-CM

## 2024-05-27 DIAGNOSIS — R26.9 GAIT DISTURBANCE: ICD-10-CM

## 2024-05-27 DIAGNOSIS — Z71.89 COUNSELING REGARDING GOALS OF CARE: ICD-10-CM

## 2024-05-27 DIAGNOSIS — E55.9 VITAMIN D DEFICIENCY: Primary | ICD-10-CM

## 2024-05-27 PROCEDURE — 99214 OFFICE O/P EST MOD 30 MIN: CPT | Mod: 95,,, | Performed by: PSYCHIATRY & NEUROLOGY

## 2024-05-27 PROCEDURE — G2211 COMPLEX E/M VISIT ADD ON: HCPCS | Mod: 95,,, | Performed by: PSYCHIATRY & NEUROLOGY

## 2024-05-27 PROCEDURE — 1159F MED LIST DOCD IN RCRD: CPT | Mod: CPTII,95,, | Performed by: PSYCHIATRY & NEUROLOGY

## 2024-05-27 PROCEDURE — 3044F HG A1C LEVEL LT 7.0%: CPT | Mod: CPTII,95,, | Performed by: PSYCHIATRY & NEUROLOGY

## 2024-05-27 PROCEDURE — 1160F RVW MEDS BY RX/DR IN RCRD: CPT | Mod: CPTII,95,, | Performed by: PSYCHIATRY & NEUROLOGY

## 2024-05-27 NOTE — Clinical Note
1. Add Vit D lab to already scheduled labs on Friday 2. Pls schedule the MRI brain that [facility] ordered last September to be done in October  F/u Denia in 6 mo

## 2024-05-27 NOTE — PROGRESS NOTES
The patient location is: home  The chief complaint leading to consultation is: MS    Visit type: audiovisual    Face to Face time with patient: 27  35 minutes of total time spent on the encounter, which includes face to face time and non-face to face time preparing to see the patient (eg, review of tests), Obtaining and/or reviewing separately obtained history, Documenting clinical information in the electronic or other health record, Independently interpreting results (not separately reported) and communicating results to the patient/family/caregiver, or Care coordination (not separately reported).         Each patient to whom he or she provides medical services by telemedicine is:  (1) informed of the relationship between the physician and patient and the respective role of any other health care provider with respect to management of the patient; and (2) notified that he or she may decline to receive medical services by telemedicine and may withdraw from such care at any time.    Notes:       Subjective:          Patient ID: Zoey Cali is a 58 y.o. female who presents today for a routine clinic visit for MS.      MS HPI:  DMT: interferon beta-1a IM  Side effects from DMT? Flu like sx 24 hour after injections  Taking vitamin D3 as recommended? Yes - 2,000/day KELSEA  Was off Avonex for 1.5 months due to copay issues - back on as of 2 weeks ago.   Did have a bad fall recently.  She tripped in her garage, and she hit her head and had black eyes. PCP did head CT which showed no acute process.  She also injured her foot from the fall.   She plans to schedule appointment with podiatrist soon.  The fall took a toll on her -- she wonders if she had a concussion.  She does not think she had LOC but does not know for sure.   She had a to use a walker for a few weeks after the fall, and then used a cane, and now is not using any assist.  This is mostly due to right foot pain  She is not sure whether the fall was MS  related or not  Other than this fall, she's felt stable from MS perspective   Neuropathic pain controlled with Tegretol and gabapentin  Mood stable on Lexapro  Takes tizaninde hs  Does report chronic sense of tightness in mid thoracic area --improved with hs tizanidne     Medications:  Current Outpatient Medications   Medication Sig    gabapentin (NEURONTIN) 600 MG tablet TAKE ONE TABLET BY MOUTH THREE TIMES DAILY    ALPRAZolam (XANAX) 0.5 MG tablet Take 1 tab 30 to 90 minutes before MRI; if needed due to incomplete response, may take 1/2 to 1 tab after 30 to 60 minutes    carBAMazepine (TEGRETOL XR) 200 MG 12 hr tablet TAKE ONE TABLET BY MOUTH TWICE DAILY    cetirizine (ZYRTEC) 10 MG tablet Take 1 tablet (10 mg total) by mouth once daily.    cholecalciferol, vitamin D3, (VITAMIN D3) 125 mcg (5,000 unit) Tab Take 1 tablet (5,000 Units total) by mouth once daily.    diazePAM (VALIUM) 5 MG tablet TAKE ONE TABLET BY MOUTH EVERY 6 HOURS AS NEEDED FOR ANXIETY    EScitalopram oxalate (LEXAPRO) 20 MG tablet TAKE ONE TABLET BY MOUTH EVERY DAY    gabapentin (NEURONTIN) 400 MG capsule TAKE 2 CAPSULES BY MOUTH EVERY EVENING    HYDROcodone-acetaminophen (NORCO)  mg per tablet Take 1 tablet by mouth every 6 (six) hours as needed for Pain.    hydrOXYzine HCL (ATARAX) 25 MG tablet Take 1 tablet (25 mg total) by mouth 3 (three) times daily.    ibuprofen (ADVIL,MOTRIN) 800 MG tablet TAKE ONE TABLET BY MOUTH EVERY 8 HOURS WITH FOOD AND WATER    interferon beta-1a (AVONEX) 30 mcg/0.5 mL PnKt Inject 0.5 mLs (30 mcg total) into the muscle once a week.    metoprolol succinate (TOPROL-XL) 100 MG 24 hr tablet TAKE ONE TABLET BY MOUTH ONCE DAILY    ondansetron (ZOFRAN-ODT) 8 MG TbDL DISSOLVE 1 TABLET ON THE TONGUE THREE TIMES DAILY AS NEEDED Strength: 8 mg    simvastatin (ZOCOR) 40 MG tablet TAKE ONE TABLET BY MOUTH EVERY EVENING    tiotropium-olodateroL (STIOLTO RESPIMAT) 2.5-2.5 mcg/actuation Mist inhale TWO puffs INTO THE LUNGS  DAILY    tiZANidine (ZANAFLEX) 4 MG tablet TAKE 2 TABLETS IN THE MORNING, AT NOON AND IN THE EVENING AND TAKE 3 TABLETS AT NIGHT (9 TABLETS PER DAY); only takes 3 at bedtime and none during the day    triamterene-hydrochlorothiazide 37.5-25 mg (MAXZIDE-25) 37.5-25 mg per tablet TAKE ONE TABLET BY MOUTH EVERY OTHER DAY     No current facility-administered medications for this visit.       SOCIAL HISTORY  Social History     Tobacco Use    Smoking status: Every Day     Current packs/day: 1.00     Average packs/day: 1 pack/day for 30.0 years (30.0 ttl pk-yrs)     Types: Cigarettes    Smokeless tobacco: Current   Substance Use Topics    Alcohol use: No     Alcohol/week: 0.0 standard drinks of alcohol    Drug use: No             8/2/2023     9:36 AM   REVIEW OF SYMPTOMS   Do you feel abnormally tired on most days? No   Do you feel you generally sleep well? Yes   Do you have difficulty controlling your bladder?  No   Do you have difficulty controlling your bowels?  No   Do you have frequent muscle cramps, tightness or spasms in your limbs?  No   Do you have new visual symptoms?  No   Do you have worsening difficulty with your memory or thinking? No   Do you have worsening symptoms of anxiety or depression?  No   For patients who walk, Do you have more difficulty walking?  No   Have you fallen since your last visit?  No   For patients who use wheelchairs: Do you have any skin wounds or breakdown? Not Applicable   Do you have difficulty using your hands?  No   Do you have shooting or burning pain? No   Do you have difficulty with sexual function?  No   If you are sexually active, are you using birth control? Y/N  N/A No   Do you often choke when swallowing liquids or solid food?  No   Do you experience worsening symptoms when overheated? No   Do you need any new equipment such as a wheelchair, walker or shower chair? No   Do you receive co-pay financial assistance for your principal MS medicine? Yes   Would you be interested  in participating in an MS research trial in the future? No   For patients on Gilenya, Tecfidera, Aubagio, Rituxan, Ocrevus, Tysabri, Lemtrada or Methotrexate, are you aware that you should NOT receive live virus vaccines?  Not Applicable   Do you feel you have adequate family/friend support?  Yes   Do you have health insurance?   Yes   Are you currently employed? No   Do you receive SSDI/SSI?  Yes   Do you use marijuana or cannabis products? No   Have you been diagnosed with a urinary tract infection since your last visit here? No   Have you been diagnosed with a respiratory tract infection since your last visit here? No   Have you been to the emergency room since your last visit here? No   Have you been hospitalized since your last visit here?  No           Imaging:     Results for orders placed during the hospital encounter of 03/13/21    MRI Brain Demyelinating Without Contrast    Impression  Brain appears stable from prior exam as above.  No new discrete lesions to indicate ongoing demyelination.      Electronically signed by: Magno Bahena MD  Date:    03/14/2021  Time:    08:34    No results found for this or any previous visit.    No results found for this or any previous visit.    Results for orders placed during the hospital encounter of 05/11/18    MRI Brain Demyelinating W W/O Contrast    Impression  Overall mild to moderately degraded examination related to motion artifact.  Scattered supratentorial white matter lesions are somewhat nonspecific but in keeping with the reported history of multiple sclerosis.    No definite new lesions and no enhancing lesions to indicate ongoing or active demyelination.      Electronically signed by: Dre Abrams MD  Date:    05/11/2018  Time:    17:15    No results found for this or any previous visit.    No results found for this or any previous visit.        Labs:     Lab Results   Component Value Date    LDAKCCAM52PB 117 (H) 10/12/2022    QVNPVYNH25LJ 78 10/13/2021     "HAZQNUAI71NQ 72 10/28/2019     No results found for: "JCVINDEX", "JCVANTIBODY"  No results found for: "EF6SMYAM", "ABSOLUTECD3", "KY9VDGXR", "ABSOLUTECD8", "LM1ZPDOA", "ABSOLUTECD4", "LABCD48"  Lab Results   Component Value Date    WBC 7.93 04/22/2024    RBC 4.91 04/22/2024    HGB 15.5 04/22/2024    HCT 45.2 04/22/2024    MCV 92 04/22/2024    MCH 31.6 (H) 04/22/2024    MCHC 34.3 04/22/2024    RDW 13.2 04/22/2024     04/22/2024    MPV 9.2 04/22/2024    GRAN 5.7 04/22/2024    GRAN 72.0 04/22/2024    LYMPH 1.5 04/22/2024    LYMPH 18.5 04/22/2024    MONO 0.5 04/22/2024    MONO 6.8 04/22/2024    EOS 0.1 04/22/2024    BASO 0.04 04/22/2024    EOSINOPHIL 1.8 04/22/2024    BASOPHIL 0.5 04/22/2024     Sodium   Date Value Ref Range Status   04/22/2024 137 136 - 145 mmol/L Final     Potassium   Date Value Ref Range Status   04/22/2024 3.6 3.5 - 5.1 mmol/L Final     Chloride   Date Value Ref Range Status   04/22/2024 101 95 - 110 mmol/L Final     CO2   Date Value Ref Range Status   04/22/2024 26 23 - 29 mmol/L Final     Glucose   Date Value Ref Range Status   04/22/2024 91 70 - 110 mg/dL Final     BUN   Date Value Ref Range Status   04/22/2024 13 6 - 20 mg/dL Final     Creatinine   Date Value Ref Range Status   04/22/2024 0.7 0.5 - 1.4 mg/dL Final     Calcium   Date Value Ref Range Status   04/22/2024 9.8 8.7 - 10.5 mg/dL Final     Total Protein   Date Value Ref Range Status   04/22/2024 7.1 6.0 - 8.4 g/dL Final     Albumin   Date Value Ref Range Status   04/22/2024 3.8 3.5 - 5.2 g/dL Final     Total Bilirubin   Date Value Ref Range Status   04/22/2024 0.5 0.1 - 1.0 mg/dL Final     Comment:     For infants and newborns, interpretation of results should be based  on gestational age, weight and in agreement with clinical  observations.    Premature Infant recommended reference ranges:  Up to 24 hours.............<8.0 mg/dL  Up to 48 hours............<12.0 mg/dL  3-5 days..................<15.0 mg/dL  6-29 " "days.................<15.0 mg/dL       Alkaline Phosphatase   Date Value Ref Range Status   04/22/2024 102 55 - 135 U/L Final     AST   Date Value Ref Range Status   04/22/2024 12 10 - 40 U/L Final     ALT   Date Value Ref Range Status   04/22/2024 8 (L) 10 - 44 U/L Final     Anion Gap   Date Value Ref Range Status   04/22/2024 10 8 - 16 mmol/L Final     eGFR if    Date Value Ref Range Status   05/15/2019 >60 >60 mL/min/1.73 m^2 Final     eGFR if non    Date Value Ref Range Status   05/15/2019 >60 >60 mL/min/1.73 m^2 Final     Comment:     Calculation used to obtain the estimated glomerular filtration  rate (eGFR) is the CKD-EPI equation.        No results found for: "HEPBSAG", "HEPBSAB", "HEPBCAB"        MS Impression and Plan:     NEURO MULTIPLE SCLEROSIS IMPRESSION:   Number of relapses in the past year?:  0  Clinical Progression:  Clinically Stable  MS Classification:  Relapsing-Remitting MS  DMT:  No change in management  Symptom Management:  No change in symptom management   Relatively stable  One fall recently which was not clearly MS related, but which likely has resulted in a foot injury; she will see podiatry soon. She's overdue for MS and will schedule the one that we ordered last fall. Will check vit D as well.  F/u Denia in 6 m             Problem List Items Addressed This Visit          1 - High    MS (multiple sclerosis)       Unprioritized    Counseling regarding goals of care    Prophylactic immunotherapy    Gait disturbance     Other Visit Diagnoses       Vitamin D deficiency    -  Primary    Relevant Orders    Vitamin D            Heidy Mendez MD    Visit today included increased complexity associated with the care of the episodic problem : MRI surveillance: addressed and managing the longitudinal care of the patient due to the serious and/or complex managed problem(s) MS.    "

## 2024-05-29 ENCOUNTER — TELEPHONE (OUTPATIENT)
Dept: NEUROLOGY | Facility: CLINIC | Age: 59
End: 2024-05-29
Payer: MEDICARE

## 2024-05-29 NOTE — TELEPHONE ENCOUNTER
----- Message from REEMA Weston, CNS sent at 5/28/2024  4:45 PM CDT -----  New order is in :)  ----- Message -----  From: Chelsey Page MA  Sent: 5/28/2024  12:07 PM CDT  To: REEMA Weston CNS    AP, can you please put a new MRI brain order in? The current order expires in September and pt is due in October.  ----- Message -----  From: Heidy Mendez MD  Sent: 5/27/2024  11:37 AM CDT  To: Chelsey Page MA    1. Add Vit D lab to already scheduled labs on Friday  2. Pls schedule the MRI brain that AP ordered last September to be done in October   F/u Denia in 6 mo

## 2024-05-29 NOTE — TELEPHONE ENCOUNTER
LVM telling pt to call us back to schedule MRI in October and 6 mo f/u with AP. I added Vit D lab with the other scheduled labs on 5/31.

## 2024-05-31 ENCOUNTER — LAB VISIT (OUTPATIENT)
Dept: LAB | Facility: HOSPITAL | Age: 59
End: 2024-05-31
Attending: FAMILY MEDICINE
Payer: MEDICARE

## 2024-05-31 DIAGNOSIS — E55.9 VITAMIN D DEFICIENCY: ICD-10-CM

## 2024-05-31 DIAGNOSIS — D50.9 IRON DEFICIENCY ANEMIA, UNSPECIFIED IRON DEFICIENCY ANEMIA TYPE: ICD-10-CM

## 2024-05-31 LAB
25(OH)D3+25(OH)D2 SERPL-MCNC: 74 NG/ML (ref 30–96)
BASOPHILS # BLD AUTO: 0.03 K/UL (ref 0–0.2)
BASOPHILS NFR BLD: 0.5 % (ref 0–1.9)
DIFFERENTIAL METHOD BLD: ABNORMAL
EOSINOPHIL # BLD AUTO: 0.1 K/UL (ref 0–0.5)
EOSINOPHIL NFR BLD: 2.3 % (ref 0–8)
ERYTHROCYTE [DISTWIDTH] IN BLOOD BY AUTOMATED COUNT: 13.3 % (ref 11.5–14.5)
FERRITIN SERPL-MCNC: 234 NG/ML (ref 20–300)
HCT VFR BLD AUTO: 45.8 % (ref 37–48.5)
HGB BLD-MCNC: 15.4 G/DL (ref 12–16)
IMM GRANULOCYTES # BLD AUTO: 0.02 K/UL (ref 0–0.04)
IMM GRANULOCYTES NFR BLD AUTO: 0.3 % (ref 0–0.5)
IRON SERPL-MCNC: 60 UG/DL (ref 30–160)
LYMPHOCYTES # BLD AUTO: 1.2 K/UL (ref 1–4.8)
LYMPHOCYTES NFR BLD: 19.2 % (ref 18–48)
MCH RBC QN AUTO: 31.3 PG (ref 27–31)
MCHC RBC AUTO-ENTMCNC: 33.6 G/DL (ref 32–36)
MCV RBC AUTO: 93 FL (ref 82–98)
MONOCYTES # BLD AUTO: 0.4 K/UL (ref 0.3–1)
MONOCYTES NFR BLD: 6.8 % (ref 4–15)
NEUTROPHILS # BLD AUTO: 4.3 K/UL (ref 1.8–7.7)
NEUTROPHILS NFR BLD: 70.9 % (ref 38–73)
NRBC BLD-RTO: 0 /100 WBC
PLATELET # BLD AUTO: 224 K/UL (ref 150–450)
PMV BLD AUTO: 9 FL (ref 9.2–12.9)
RBC # BLD AUTO: 4.92 M/UL (ref 4–5.4)
SATURATED IRON: 18 % (ref 20–50)
TOTAL IRON BINDING CAPACITY: 334 UG/DL (ref 250–450)
TRANSFERRIN SERPL-MCNC: 226 MG/DL (ref 200–375)
WBC # BLD AUTO: 6.05 K/UL (ref 3.9–12.7)

## 2024-05-31 PROCEDURE — 83540 ASSAY OF IRON: CPT | Performed by: INTERNAL MEDICINE

## 2024-05-31 PROCEDURE — 82728 ASSAY OF FERRITIN: CPT | Performed by: INTERNAL MEDICINE

## 2024-05-31 PROCEDURE — 85025 COMPLETE CBC W/AUTO DIFF WBC: CPT | Performed by: INTERNAL MEDICINE

## 2024-05-31 PROCEDURE — 36415 COLL VENOUS BLD VENIPUNCTURE: CPT | Mod: PO | Performed by: PSYCHIATRY & NEUROLOGY

## 2024-05-31 PROCEDURE — 82306 VITAMIN D 25 HYDROXY: CPT | Performed by: PSYCHIATRY & NEUROLOGY

## 2024-06-04 ENCOUNTER — OFFICE VISIT (OUTPATIENT)
Dept: HEMATOLOGY/ONCOLOGY | Facility: CLINIC | Age: 59
End: 2024-06-04
Payer: MEDICARE

## 2024-06-04 DIAGNOSIS — D50.9 IRON DEFICIENCY ANEMIA, UNSPECIFIED IRON DEFICIENCY ANEMIA TYPE: Primary | ICD-10-CM

## 2024-06-04 DIAGNOSIS — G35 MS (MULTIPLE SCLEROSIS): ICD-10-CM

## 2024-06-04 PROCEDURE — 3044F HG A1C LEVEL LT 7.0%: CPT | Mod: CPTII,95,, | Performed by: INTERNAL MEDICINE

## 2024-06-04 PROCEDURE — 99213 OFFICE O/P EST LOW 20 MIN: CPT | Mod: 95,,, | Performed by: INTERNAL MEDICINE

## 2024-06-04 NOTE — PROGRESS NOTES
Subjective:      The patient location is: home  The chief complaint leading to consultation is: TRACIE    Visit type: audiovisual    Face to Face time with patient:  4 min  8 minutes of total time spent on the encounter, which includes face to face time and non-face to face time preparing to see the patient (eg, review of tests), Obtaining and/or reviewing separately obtained history, Documenting clinical information in the electronic or other health record, Independently interpreting results (not separately reported) and communicating results to the patient/family/caregiver, or Care coordination (not separately reported).         Each patient to whom he or she provides medical services by telemedicine is:  (1) informed of the relationship between the physician and patient and the respective role of any other health care provider with respect to management of the patient; and (2) notified that he or she may decline to receive medical services by telemedicine and may withdraw from such care at any time.    Notes:      Patient ID: Zoey Cali is a 58 y.o. female.    Chief Complaint: No chief complaint on file.    Diagnosis: TRACIE       Prior Hx: 57 y/o female with history of MS seen  today for f/u for TRACIE, She undergoes intermittent IV iron therapy.She has been intolerant of oral Fe supp. She is s/p GI eval with EGD and colonoscopy 2015  which was unremarkable She is followed by Dr. Mendez for long standing history of MS and was recently  on therapy with Aubagio  She was previously on Avonex for >20 yrsShe was experiencing  increasing pain with injection and hypersensitivity to bilateral injection site areas    Interval Hx: She suffered a fall since last visit  She reports she  tripped in her garage, and she hit her head and had black eyes.   Workup includes head CT which showed no acute process.   She also injured her foot from the fall and has f/u with podiatrist soon   Last  received Fe infusion completed 4/2023    She remains on Avonex and tolerating well   No SOB/CP/cough  Continues with mild fatigue         Review of Systems   Constitutional:  Positive for fatigue (mild). Negative for appetite change and unexpected weight change.   Eyes:  Negative for pain and visual disturbance.   Respiratory:  Negative for cough and shortness of breath.    Cardiovascular:  Negative for chest pain and leg swelling.   Gastrointestinal:  Negative for abdominal pain, blood in stool, constipation, diarrhea, nausea and vomiting.   Genitourinary:  Negative for frequency.   Musculoskeletal:  Negative for arthralgias, back pain, joint swelling and neck pain.   Skin:  Negative for rash.        No petechiae, ecchymoses   Neurological:  Negative for light-headedness and headaches.   Hematological:  Does not bruise/bleed easily.   Psychiatric/Behavioral:  Negative for confusion and dysphoric mood.        Objective:       There were no vitals filed for this visit.    Televisit         Lab Results   Component Value Date    WBC 6.05 05/31/2024    HGB 15.4 05/31/2024    HCT 45.8 05/31/2024    MCV 93 05/31/2024     05/31/2024     Lab Results   Component Value Date    IRON 60 05/31/2024    TIBC 334 05/31/2024    FERRITIN 234 05/31/2024             1. Iron deficiency anemia, unspecified iron deficiency anemia type    2. MS (multiple sclerosis)                  Plan:   Zoey FRASER was seen today for follow-up.    Diagnoses and all orders for this visit:    Iron deficiency anemia, unspecified iron deficiency       S/p GI eval-unremarkable       S/p IV Fe completed 4/2023       CBC reveals stable Hb 15.4  g/dl on 5/31/24        Fe parameters wnl with borderline Fe sats       Ferritin 234 on 5/31/24        Cont to monitor    Multiple Sclerosis    stable    F/b Neurology    Pt on   Avonex      Cbc,Fe studies prior to f/u 3 mos televisit ok    Advance Care Planning     Power of   I previously initiated the process of advance care planning today and  explained the importance of this process to the patient.  I introduced the concept of advance directives to the patient, as well. Then the patient received detailed information about the importance of designating a Health Care Power of  (HCPOA). She was also instructed to communicate with this person about their wishes for future healthcare, should she become sick and lose decision-making capacity. The patient has not previously appointed a HCPOA. After our discussion, the patient has decided to complete a HCPOA and has appointed her significant other and NAME:Jaredjose Cadeaux   I spent a total time of 16 minutes discussing this issue with the patient.              Cc: MD Heidy Lancaster MD

## 2024-06-05 ENCOUNTER — HOSPITAL ENCOUNTER (OUTPATIENT)
Dept: RADIOLOGY | Facility: HOSPITAL | Age: 59
Discharge: HOME | End: 2024-06-05
Attending: PODIATRIST
Payer: MEDICARE

## 2024-06-05 ENCOUNTER — OFFICE VISIT (OUTPATIENT)
Dept: PODIATRY | Facility: CLINIC | Age: 59
End: 2024-06-05
Payer: MEDICARE

## 2024-06-05 VITALS — WEIGHT: 121.94 LBS | HEIGHT: 60 IN | BODY MASS INDEX: 23.94 KG/M2

## 2024-06-05 DIAGNOSIS — M79.671 RIGHT FOOT PAIN: Primary | ICD-10-CM

## 2024-06-05 DIAGNOSIS — B35.1 ONYCHOMYCOSIS DUE TO DERMATOPHYTE: ICD-10-CM

## 2024-06-05 DIAGNOSIS — L84 CORN OR CALLUS: ICD-10-CM

## 2024-06-05 DIAGNOSIS — M79.671 RIGHT FOOT PAIN: ICD-10-CM

## 2024-06-05 PROCEDURE — 99203 OFFICE O/P NEW LOW 30 MIN: CPT | Mod: S$GLB,,, | Performed by: PODIATRIST

## 2024-06-05 PROCEDURE — 3008F BODY MASS INDEX DOCD: CPT | Mod: CPTII,S$GLB,, | Performed by: PODIATRIST

## 2024-06-05 PROCEDURE — 99999 PR PBB SHADOW E&M-EST. PATIENT-LVL IV: CPT | Mod: PBBFAC,,, | Performed by: PODIATRIST

## 2024-06-05 PROCEDURE — 1159F MED LIST DOCD IN RCRD: CPT | Mod: CPTII,S$GLB,, | Performed by: PODIATRIST

## 2024-06-05 PROCEDURE — 73630 X-RAY EXAM OF FOOT: CPT | Mod: TC,FY,PO,RT

## 2024-06-05 PROCEDURE — 73630 X-RAY EXAM OF FOOT: CPT | Mod: 26,RT,, | Performed by: RADIOLOGY

## 2024-06-05 PROCEDURE — 3044F HG A1C LEVEL LT 7.0%: CPT | Mod: CPTII,S$GLB,, | Performed by: PODIATRIST

## 2024-06-05 RX ORDER — CICLOPIROX 80 MG/ML
SOLUTION TOPICAL NIGHTLY
Qty: 6.6 ML | Refills: 3 | Status: SHIPPED | OUTPATIENT
Start: 2024-06-05

## 2024-06-05 NOTE — PROGRESS NOTES
Subjective:     Patient ID: Zoey Cali is a 58 y.o. female.    Chief Complaint: Foot Injury    Zoey is a 58 y.o. female who presents to the podiatry clinic  with complaint of  right foot pain. Onset of the symptoms was several weeks ago. Precipitating event: none known. Current symptoms include: ability to bear weight, but with some pain. Aggravating factors: any weight bearing. Symptoms have progressed to a point and plateaued. Patient has had no prior foot problems. Evaluation to date: none. Treatment to date: none. Patients rates pain 3/10 on pain scale.    Review of Systems   Constitutional: Negative for chills.   Cardiovascular:  Negative for chest pain and claudication.   Respiratory:  Negative for cough.    Skin:  Positive for color change, dry skin and nail changes.   Musculoskeletal:  Positive for joint pain.   Gastrointestinal:  Negative for nausea.   Neurological:  Positive for paresthesias. Negative for numbness.   Psychiatric/Behavioral:  The patient is not nervous/anxious.         Objective:     Physical Exam  Constitutional:       Appearance: She is well-developed.      Comments: Oriented to time, place, and person.   Cardiovascular:      Comments: DP and PT pulses are palpable bilaterally. 3 sec capillary refill time and toes and feet are warm to touch proximally .  There is  hair growth on the feet and toes b/l. There is no edema b/l. No spider veins or varicosities present b/l.     Musculoskeletal:      Comments: Equinus noted b/l ankles with < 10 deg DF noted. MMT 5/5 in DF/PF/Inv/Ev resistance with no reproduction of pain in any direction. Passive range of motion of ankle and pedal joints is painless b/l.  Right 2nd MPJ pain    Feet:      Right foot:      Skin integrity: No callus or dry skin.      Left foot:      Skin integrity: No callus or dry skin.   Lymphadenopathy:      Comments: Negative lymphadenopathy bilateral popliteal fossa and tarsal tunnel.   Skin:     Comments: No open  lesions, lacerations or wounds noted.Interdigital spaces clean, dry and intact b/l. No erythema noted to b/l foot.    Toenails 1-5 bilaterally are elongated by 2-3 mm, thickened by 2-3 mm, discolored/yellowed, dystrophic, brittle with subungual debris.    Focal hyperkeratotic lesion consisting entirely of hyperkeratotic tissue without open skin, drainage, pus, fluctuance, malodor, or signs of infection: B/L forefoot.        Neurological:      Mental Status: She is alert.      Comments: Light touch, proprioception, and sharp/dull sensation are all intact bilaterally. Protective threshold with the Fletcher-Wienstein monofilament is intact bilaterally.    Psychiatric:         Behavior: Behavior is cooperative.           Assessment:      Encounter Diagnoses   Name Primary?    Right foot pain Yes    Corn or callus     Onychomycosis due to dermatophyte      Plan:     Zoey was seen today for foot injury.    Diagnoses and all orders for this visit:    Right foot pain  -     X-Ray Foot Complete Right; Future    Corn or callus    Onychomycosis due to dermatophyte    Other orders  -     ciclopirox (PENLAC) 8 % Soln; Apply topically nightly.      I counseled the patient on her conditions, their implications and medical management.        Xray right foot ordered    At patient's request, I discussed different treatments for toenail fungus. We discussed oral antifungals but I did not recommend them as a first line treatment since the medication is taken internally and can have side effects such as rash, taste disturbances, and liver enzyme elevation. We discussed topical Penlac to be applied daily and removed weekly. Pt. Expresses understanding and would like to try the Penlac. Rx sent to the pharmacy.     Discussed conservative treatment with shoes of adequate dimensions, material, and style to alleviate symptoms and delay or prevent surgical intervention.    RTC  PRN

## 2024-06-05 NOTE — PATIENT INSTRUCTIONS
Kerasal for fungal nails apply daily to all toenails.  Can be found at [facility]

## 2024-06-07 ENCOUNTER — TELEPHONE (OUTPATIENT)
Dept: PODIATRY | Facility: CLINIC | Age: 59
End: 2024-06-07
Payer: MEDICARE

## 2024-06-11 NOTE — TELEPHONE ENCOUNTER
Call attempt #2: LVM telling pt to call us back to schedule MRI in October and 6 mo f/u with Denia. I advised pt to either call us back or message me back through the portal.

## 2024-06-19 ENCOUNTER — OFFICE VISIT (OUTPATIENT)
Dept: FAMILY MEDICINE | Facility: CLINIC | Age: 59
End: 2024-06-19
Payer: MEDICARE

## 2024-06-19 DIAGNOSIS — E78.5 HYPERLIPIDEMIA WITH TARGET LOW DENSITY LIPOPROTEIN (LDL) CHOLESTEROL LESS THAN 130 MG/DL: ICD-10-CM

## 2024-06-19 DIAGNOSIS — J44.9 CHRONIC OBSTRUCTIVE PULMONARY DISEASE, UNSPECIFIED COPD TYPE: ICD-10-CM

## 2024-06-19 DIAGNOSIS — G35 MULTIPLE SCLEROSIS: Primary | ICD-10-CM

## 2024-06-19 PROCEDURE — 99214 OFFICE O/P EST MOD 30 MIN: CPT | Mod: 95,,, | Performed by: FAMILY MEDICINE

## 2024-06-19 PROCEDURE — 3044F HG A1C LEVEL LT 7.0%: CPT | Mod: CPTII,95,, | Performed by: FAMILY MEDICINE

## 2024-06-19 PROCEDURE — G2211 COMPLEX E/M VISIT ADD ON: HCPCS | Mod: 95,,, | Performed by: FAMILY MEDICINE

## 2024-06-20 RX ORDER — DIAZEPAM 5 MG/1
5 TABLET ORAL EVERY 6 HOURS PRN
Qty: 45 TABLET | Refills: 5 | Status: SHIPPED | OUTPATIENT
Start: 2024-06-20

## 2024-06-21 NOTE — ASSESSMENT & PLAN NOTE
The current medical regimen is effective;  continue present plan and medications.  Avonex to continue for now

## 2024-06-21 NOTE — ASSESSMENT & PLAN NOTE
The current medical regimen is effective;  continue present plan and medications.  The 10-year ASCVD risk score (Beni JENSEN, et al., 2019) is: 8.9%    Values used to calculate the score:      Age: 58 years      Sex: Female      Is Non- : No      Diabetic: No      Tobacco smoker: Yes      Systolic Blood Pressure: 112 mmHg      Is BP treated: Yes      HDL Cholesterol: 32 mg/dL      Total Cholesterol: 179 mg/dL

## 2024-06-21 NOTE — ASSESSMENT & PLAN NOTE
The current medical regimen is effective;  continue present plan and medications.  Monitor closely   12-Dec-2023 07:45

## 2024-06-21 NOTE — PROGRESS NOTES
No chief complaint on file.      SUBJECTIVE:  Zoey Cali is a 58 y.o. female presents with No chief complaint on file.    Conservative treatment with tylenol, NSAIDs, alternative treatments, complementary non opioid nerve/epileptic medication has failed with primary use.  Now opioid dependent for relief.  Has tried PT/OT, injections with mixed success.  The patient location is: [place]  The chief complaint leading to consultation is: medication refill/recovery from fall    Visit type: audiovisual    Face to Face time with patient: 13  24 minutes of total time spent on the encounter, which includes face to face time and non-face to face time preparing to see the patient (eg, review of tests), Obtaining and/or reviewing separately obtained history, Documenting clinical information in the electronic or other health record, Independently interpreting results (not separately reported) and communicating results to the patient/family/caregiver, or Care coordination (not separately reported).         Each patient to whom he or she provides medical services by telemedicine is:  (1) informed of the relationship between the physician and patient and the respective role of any other health care provider with respect to management of the patient; and (2) notified that he or she may decline to receive medical services by telemedicine and may withdraw from such care at any time.    Notes:   Answers submitted by the patient for this visit:  Review of Systems Questionnaire (Submitted on 6/19/2024)  activity change: No  unexpected weight change: No  rhinorrhea: No  trouble swallowing: No  visual disturbance: No  chest tightness: No  polyuria: No  difficulty urinating: No  menstrual problem: No  joint swelling: No  arthralgias: No  confusion: No  dysphoric mood: No      Past Medical History:   Diagnosis Date    Allergy     Anemia     Anxiety     Breast cyst     Chronic kidney disease     Depression     Fibrocystic breast      Hypertension     Multiple sclerosis     Multiple sclerosis     Neuromuscular disorder     Osteoporosis     Rib fracture 2015     Past Surgical History:   Procedure Laterality Date    APPENDECTOMY      BREAST BIOPSY Left 2012    BREAST CYST ASPIRATION      BREAST SURGERY  2004    excisional biopsy      SECTION, CLASSIC      CHALAZION EXCISION  at age 9    CHOLECYSTECTOMY      EYE SURGERY      HYSTERECTOMY  2001    OOPHORECTOMY  2001     Social History     Socioeconomic History    Marital status:    Tobacco Use    Smoking status: Every Day     Current packs/day: 1.00     Average packs/day: 1 pack/day for 30.0 years (30.0 ttl pk-yrs)     Types: Cigarettes    Smokeless tobacco: Current   Substance and Sexual Activity    Alcohol use: No     Alcohol/week: 0.0 standard drinks of alcohol    Drug use: No    Sexual activity: Not Currently     Birth control/protection: See Surgical Hx     Comment: Hysterectomy     Social Determinants of Health     Financial Resource Strain: Low Risk  (11/15/2023)    Overall Financial Resource Strain (CARDIA)     Difficulty of Paying Living Expenses: Not hard at all   Food Insecurity: No Food Insecurity (11/15/2023)    Hunger Vital Sign     Worried About Running Out of Food in the Last Year: Never true     Ran Out of Food in the Last Year: Never true   Transportation Needs: No Transportation Needs (11/15/2023)    PRAPARE - Transportation     Lack of Transportation (Medical): No     Lack of Transportation (Non-Medical): No   Physical Activity: Unknown (11/15/2023)    Exercise Vital Sign     Days of Exercise per Week: 0 days   Recent Concern: Physical Activity - Inactive (11/15/2023)    Exercise Vital Sign     Days of Exercise per Week: 0 days     Minutes of Exercise per Session: 0 min   Stress: No Stress Concern Present (11/15/2023)    [employer] - Occupational Stress Questionnaire     Feeling of Stress : Not at all   Housing Stability: Low Risk   (11/15/2023)    Housing Stability Vital Sign     Unable to Pay for Housing in the Last Year: No     Number of Places Lived in the Last Year: 1     Unstable Housing in the Last Year: No     Family History   Problem Relation Name Age of Onset    Arthritis Mother      Diabetes Father      Heart disease Father      Hyperlipidemia Father      Hypertension Father      Stroke Father      Arthritis Son      Drug abuse Son      Hypertension Maternal Grandmother      Stroke Maternal Grandmother      Arthritis Maternal Grandmother      Arthritis Paternal Grandmother      Heart disease Paternal Grandfather      Heart attack Paternal Grandfather      Colon cancer Neg Hx      Ovarian cancer Neg Hx      Amblyopia Neg Hx      Blindness Neg Hx      Glaucoma Neg Hx      Macular degeneration Neg Hx      Retinal detachment Neg Hx      Strabismus Neg Hx      Thyroid disease Neg Hx       Current Outpatient Medications on File Prior to Visit   Medication Sig Dispense Refill    ALPRAZolam (XANAX) 0.5 MG tablet Take 1 tab 30 to 90 minutes before MRI; if needed due to incomplete response, may take 1/2 to 1 tab after 30 to 60 minutes 2 tablet 0    carBAMazepine (TEGRETOL XR) 200 MG 12 hr tablet TAKE ONE TABLET BY MOUTH TWICE DAILY 180 tablet 3    cetirizine (ZYRTEC) 10 MG tablet Take 1 tablet (10 mg total) by mouth once daily. 30 tablet 0    cholecalciferol, vitamin D3, (VITAMIN D3) 125 mcg (5,000 unit) Tab Take 1 tablet (5,000 Units total) by mouth once daily. 90 tablet 3    ciclopirox (PENLAC) 8 % Soln Apply topically nightly. 6.6 mL 3    diazePAM (VALIUM) 5 MG tablet TAKE ONE TABLET BY MOUTH EVERY 6 HOURS AS NEEDED FOR ANXIETY 45 tablet 5    EScitalopram oxalate (LEXAPRO) 20 MG tablet TAKE ONE TABLET BY MOUTH EVERY DAY 90 tablet 1    gabapentin (NEURONTIN) 400 MG capsule TAKE 2 CAPSULES BY MOUTH EVERY EVENING 180 capsule 3    gabapentin (NEURONTIN) 600 MG tablet TAKE ONE TABLET BY MOUTH THREE TIMES DAILY 270 tablet 3     HYDROcodone-acetaminophen (NORCO)  mg per tablet Take 1 tablet by mouth every 6 (six) hours as needed for Pain. 60 tablet 0    hydrOXYzine HCL (ATARAX) 25 MG tablet Take 1 tablet (25 mg total) by mouth 3 (three) times daily. 90 tablet 0    ibuprofen (ADVIL,MOTRIN) 800 MG tablet TAKE ONE TABLET BY MOUTH EVERY 8 HOURS WITH FOOD AND WATER 180 tablet 1    interferon beta-1a (AVONEX) 30 mcg/0.5 mL PnKt Inject 0.5 mLs (30 mcg total) into the muscle once a week. 6 mL 1    metoprolol succinate (TOPROL-XL) 100 MG 24 hr tablet TAKE ONE TABLET BY MOUTH ONCE DAILY 90 tablet 0    ondansetron (ZOFRAN-ODT) 8 MG TbDL DISSOLVE 1 TABLET ON THE TONGUE THREE TIMES DAILY AS NEEDED Strength: 8 mg 20 tablet 11    simvastatin (ZOCOR) 40 MG tablet TAKE ONE TABLET BY MOUTH EVERY EVENING 90 tablet 3    tiotropium-olodateroL (STIOLTO RESPIMAT) 2.5-2.5 mcg/actuation Mist inhale TWO puffs INTO THE LUNGS DAILY 12 g 1    tiZANidine (ZANAFLEX) 4 MG tablet TAKE 2 TABLETS IN THE MORNING, AT NOON AND IN THE EVENING AND TAKE 3 TABLETS AT NIGHT (9 TABLETS PER DAY); only takes 3 at bedtime and none during the day 810 tablet 3    triamterene-hydrochlorothiazide 37.5-25 mg (MAXZIDE-25) 37.5-25 mg per tablet TAKE ONE TABLET BY MOUTH EVERY OTHER DAY 45 tablet 3     No current facility-administered medications on file prior to visit.     Review of patient's allergies indicates:   Allergen Reactions    Lomotil [diphenoxylate-atropine]      Causes stomach spasms    Dilaudid [hydromorphone (bulk)] Itching       Review of Systems   HENT:  Negative for hearing loss.    Eyes:  Negative for discharge.   Respiratory:  Negative for wheezing.    Cardiovascular:  Negative for chest pain and palpitations.   Gastrointestinal:  Negative for blood in stool, constipation, diarrhea and vomiting.   Genitourinary:  Negative for dysuria and hematuria.   Musculoskeletal:  Negative for neck pain.   Neurological:  Negative for weakness and headaches.   Endo/Heme/Allergies:   Negative for polydipsia.       OBJECTIVE:  LMP 01/01/2001 (Approximate) Comment: 2001  Wt Readings from Last 5 Encounters:   06/05/24 55.3 kg (121 lb 14.6 oz)   06/21/23 55.3 kg (121 lb 14.6 oz)   04/25/23 58 kg (127 lb 13.9 oz)   02/20/23 58 kg (127 lb 13.9 oz)   01/13/23 59.7 kg (131 lb 9.8 oz)       In usual state of health without signs of drug misuse/abuse today.  Specifically no alteration in cognition, signs of injections into the skin.  Patient areas of chronic pain   No new focal neurological findings noted.    Chronic aids to ambulation    Reviewed  and NARx: reviewed    Assessment/Plan:    1. Multiple sclerosis    2. Chronic obstructive pulmonary disease, unspecified COPD type    3. Hyperlipidemia LDL goal < 130        Discussed chronic pain diagnosis again, reviewed pain contract and patient is still in agreement with it. Continued to stress the importance of smoking cessation/avoiding tobacco products which can exacerbate the pain.  Continued to discuss the importance of good nutrition and some type of exercise program even if it's very basic and light to help sustain function.  Continued to discuss risks, benefits and alternatives of care with high risk medication and we have decided to continue to use them.  Our goal continues to be stabilizing of function, quality of life and avoidance of medication side effects.  Patient voiced understanding.    Problem List Items Addressed This Visit       Multiple sclerosis - Primary    Overview     Unable to tolerate Aubagio due to GI side effects--stopped 3/18/2021. Will plan to return to [facility]         Current Assessment & Plan     The current medical regimen is effective;  continue present plan and medications.  [facility]x to continue for now         Hyperlipidemia LDL goal < 130    Overview     Dx updated per 2019 IMO Load         Current Assessment & Plan     The current medical regimen is effective;  continue present plan and medications.  The 10-year ASCVD  risk score (Beni JENSEN, et al., 2019) is: 8.9%    Values used to calculate the score:      Age: 58 years      Sex: Female      Is Non- : No      Diabetic: No      Tobacco smoker: Yes      Systolic Blood Pressure: 112 mmHg      Is BP treated: Yes      HDL Cholesterol: 32 mg/dL      Total Cholesterol: 179 mg/dL           Chronic obstructive pulmonary disease, unspecified    Overview     Noted by KVNG SEWELL MD  last documented on [id number]         Current Assessment & Plan     The current medical regimen is effective;  continue present plan and medications.  Monitor closely             Follow up in 4 month check up

## 2024-07-24 ENCOUNTER — TELEPHONE (OUTPATIENT)
Dept: NEUROLOGY | Facility: CLINIC | Age: 59
End: 2024-07-24
Payer: MEDICARE

## 2024-07-24 NOTE — TELEPHONE ENCOUNTER
----- Message from Heidy Mendez MD sent at 5/27/2024 11:37 AM CDT -----  1. Add Vit D lab to already scheduled labs on Friday  2. Pls schedule the MRI brain that [facility] ordered last September to be done in October   F/u Denia in 6 mo

## 2024-07-25 ENCOUNTER — TELEPHONE (OUTPATIENT)
Dept: NEUROLOGY | Facility: CLINIC | Age: 59
End: 2024-07-25
Payer: MEDICARE

## 2024-07-25 ENCOUNTER — PATIENT MESSAGE (OUTPATIENT)
Dept: PSYCHIATRY | Facility: CLINIC | Age: 59
End: 2024-07-25
Payer: MEDICARE

## 2024-07-26 DIAGNOSIS — J44.9 COPD MIXED TYPE: ICD-10-CM

## 2024-07-28 RX ORDER — TIOTROPIUM BROMIDE AND OLODATEROL 3.124; 2.736 UG/1; UG/1
SPRAY, METERED RESPIRATORY (INHALATION)
Qty: 12 G | Refills: 2 | Status: SHIPPED | OUTPATIENT
Start: 2024-07-28

## 2024-07-28 NOTE — TELEPHONE ENCOUNTER
No care due was identified.  [facility] Embedded Care Due Messages. Reference number: [id number].   7/28/2024 1:55:51 AM CDT

## 2024-07-28 NOTE — TELEPHONE ENCOUNTER
Refill Decision Note   Zoey Cali  is requesting a refill authorization.  Brief Assessment and Rationale for Refill:  Approve     Medication Therapy Plan:         Comments:     Note composed:2:09 AM 07/28/2024

## 2024-07-29 DIAGNOSIS — I10 ESSENTIAL HYPERTENSION: ICD-10-CM

## 2024-07-29 NOTE — TELEPHONE ENCOUNTER
No care due was identified.  [facility] Embedded Care Due Messages. Reference number: [id number].   7/29/2024 2:15:12 PM CDT

## 2024-07-30 DIAGNOSIS — G35 MULTIPLE SCLEROSIS: ICD-10-CM

## 2024-07-30 RX ORDER — DIAZEPAM 5 MG/1
5 TABLET ORAL EVERY 6 HOURS PRN
Qty: 45 TABLET | Refills: 5 | Status: SHIPPED | OUTPATIENT
Start: 2024-07-30

## 2024-07-30 RX ORDER — METOPROLOL SUCCINATE 100 MG/1
100 TABLET, EXTENDED RELEASE ORAL
Qty: 90 TABLET | Refills: 2 | Status: SHIPPED | OUTPATIENT
Start: 2024-07-30

## 2024-07-30 NOTE — TELEPHONE ENCOUNTER
Refill Decision Note   Zoey Karely  is requesting a refill authorization.  Brief Assessment and Rationale for Refill:  Approve     Medication Therapy Plan:         Comments:     Note composed:10:15 AM 07/30/2024

## 2024-08-05 ENCOUNTER — PATIENT MESSAGE (OUTPATIENT)
Dept: PSYCHIATRY | Facility: CLINIC | Age: 59
End: 2024-08-05
Payer: MEDICARE

## 2024-08-07 ENCOUNTER — HOSPITAL ENCOUNTER (OUTPATIENT)
Dept: RADIOLOGY | Facility: HOSPITAL | Age: 59
Discharge: HOME | End: 2024-08-07
Attending: PODIATRIST
Payer: MEDICARE

## 2024-08-07 ENCOUNTER — OFFICE VISIT (OUTPATIENT)
Dept: PODIATRY | Facility: CLINIC | Age: 59
End: 2024-08-07
Payer: MEDICARE

## 2024-08-07 VITALS — BODY MASS INDEX: 23.94 KG/M2 | WEIGHT: 121.94 LBS | HEIGHT: 60 IN

## 2024-08-07 DIAGNOSIS — M79.671 RIGHT FOOT PAIN: Primary | ICD-10-CM

## 2024-08-07 DIAGNOSIS — M79.671 RIGHT FOOT PAIN: ICD-10-CM

## 2024-08-07 PROCEDURE — 73630 X-RAY EXAM OF FOOT: CPT | Mod: 26,RT,, | Performed by: RADIOLOGY

## 2024-08-07 PROCEDURE — 99999 PR PBB SHADOW E&M-EST. PATIENT-LVL IV: CPT | Mod: PBBFAC,,, | Performed by: PODIATRIST

## 2024-08-07 PROCEDURE — 99213 OFFICE O/P EST LOW 20 MIN: CPT | Mod: S$GLB,,, | Performed by: PODIATRIST

## 2024-08-07 PROCEDURE — 3044F HG A1C LEVEL LT 7.0%: CPT | Mod: CPTII,S$GLB,, | Performed by: PODIATRIST

## 2024-08-07 PROCEDURE — 1159F MED LIST DOCD IN RCRD: CPT | Mod: CPTII,S$GLB,, | Performed by: PODIATRIST

## 2024-08-07 PROCEDURE — 73630 X-RAY EXAM OF FOOT: CPT | Mod: TC,FY,PO,RT

## 2024-08-07 PROCEDURE — 3008F BODY MASS INDEX DOCD: CPT | Mod: CPTII,S$GLB,, | Performed by: PODIATRIST

## 2024-08-09 ENCOUNTER — TELEPHONE (OUTPATIENT)
Dept: PODIATRY | Facility: CLINIC | Age: 59
End: 2024-08-09
Payer: MEDICARE

## 2024-08-23 ENCOUNTER — TELEPHONE (OUTPATIENT)
Dept: PODIATRY | Facility: CLINIC | Age: 59
End: 2024-08-23
Payer: MEDICARE

## 2024-09-26 ENCOUNTER — TELEPHONE (OUTPATIENT)
Dept: HEMATOLOGY/ONCOLOGY | Facility: CLINIC | Age: 59
End: 2024-09-26
Payer: MEDICARE

## 2024-09-26 NOTE — TELEPHONE ENCOUNTER
----- Message from MICHAEL Sullivan sent at 9/25/2024  2:23 PM CDT -----  Regarding: reschedule  Schedule conflict with her appointment with Dr. Mejia needs to reschedule

## 2024-10-08 DIAGNOSIS — G35 MULTIPLE SCLEROSIS: ICD-10-CM

## 2024-10-08 NOTE — TELEPHONE ENCOUNTER
----- Message from TIFFANI Box sent at 10/8/2024 10:16 AM CDT -----  Regarding: FW: Refill  Contact: [phone]    ----- Message -----  From: Abiel Akins  Sent: 10/8/2024   9:27 AM CDT  To: Evan PATEL Staff  Subject: Refill                                           Rx Refill/Request         Is this a Refill or New Rx:  New Order  Rx Name and Strength:    interferon beta-1a (AVONEX) 30 mcg/0.5 mL PnKt     Preferred Pharmacy with phone number:      [facility] - [place], [place] - [address]  [address] [place] [address]  Phone: [phone] Fax: [phone]         Communication Preference: [phone]  Additional Information: Thank you.

## 2024-10-18 ENCOUNTER — OFFICE VISIT (OUTPATIENT)
Dept: FAMILY MEDICINE | Facility: CLINIC | Age: 59
End: 2024-10-18
Payer: MEDICARE

## 2024-10-18 ENCOUNTER — LAB VISIT (OUTPATIENT)
Dept: LAB | Facility: HOSPITAL | Age: 59
End: 2024-10-18
Attending: INTERNAL MEDICINE
Payer: MEDICARE

## 2024-10-18 DIAGNOSIS — M79.2 NEUROPATHIC PAIN OF FOOT, RIGHT: ICD-10-CM

## 2024-10-18 DIAGNOSIS — R25.2 SPASTICITY: ICD-10-CM

## 2024-10-18 DIAGNOSIS — M79.10 MYALGIA: Primary | ICD-10-CM

## 2024-10-18 DIAGNOSIS — M79.2 NEUROPATHIC PAIN OF FOOT, LEFT: ICD-10-CM

## 2024-10-18 DIAGNOSIS — M79.10 MYALGIA: ICD-10-CM

## 2024-10-18 DIAGNOSIS — M25.50 ARTHRALGIA, UNSPECIFIED JOINT: ICD-10-CM

## 2024-10-18 DIAGNOSIS — F32.A DEPRESSION, UNSPECIFIED DEPRESSION TYPE: ICD-10-CM

## 2024-10-18 DIAGNOSIS — E78.5 HYPERLIPIDEMIA, UNSPECIFIED HYPERLIPIDEMIA TYPE: ICD-10-CM

## 2024-10-18 DIAGNOSIS — D84.9 IMMUNOSUPPRESSION: ICD-10-CM

## 2024-10-18 DIAGNOSIS — I10 ESSENTIAL HYPERTENSION: ICD-10-CM

## 2024-10-18 DIAGNOSIS — I10 PRIMARY HYPERTENSION: ICD-10-CM

## 2024-10-18 DIAGNOSIS — G35 MULTIPLE SCLEROSIS: ICD-10-CM

## 2024-10-18 DIAGNOSIS — M79.2 NEUROPATHIC PAIN: ICD-10-CM

## 2024-10-18 DIAGNOSIS — J44.9 COPD MIXED TYPE: ICD-10-CM

## 2024-10-18 DIAGNOSIS — D50.9 IRON DEFICIENCY ANEMIA, UNSPECIFIED IRON DEFICIENCY ANEMIA TYPE: ICD-10-CM

## 2024-10-18 LAB
CRP SERPL-MCNC: 3 MG/L (ref 0–8.2)
ERYTHROCYTE [SEDIMENTATION RATE] IN BLOOD BY PHOTOMETRIC METHOD: 9 MM/HR (ref 0–36)
FERRITIN SERPL-MCNC: 141 NG/ML (ref 20–300)
IRON SERPL-MCNC: 68 UG/DL (ref 30–160)
IRON SERPL-MCNC: 68 UG/DL (ref 30–160)
SATURATED IRON: 21 % (ref 20–50)
TOTAL IRON BINDING CAPACITY: 329 UG/DL (ref 250–450)
TRANSFERRIN SERPL-MCNC: 222 MG/DL (ref 200–375)

## 2024-10-18 PROCEDURE — 86039 ANTINUCLEAR ANTIBODIES (ANA): CPT | Performed by: INTERNAL MEDICINE

## 2024-10-18 PROCEDURE — 86431 RHEUMATOID FACTOR QUANT: CPT | Performed by: FAMILY MEDICINE

## 2024-10-18 PROCEDURE — 83540 ASSAY OF IRON: CPT | Performed by: INTERNAL MEDICINE

## 2024-10-18 PROCEDURE — 86200 CCP ANTIBODY: CPT | Performed by: FAMILY MEDICINE

## 2024-10-18 PROCEDURE — 86038 ANTINUCLEAR ANTIBODIES: CPT | Performed by: FAMILY MEDICINE

## 2024-10-18 PROCEDURE — 3044F HG A1C LEVEL LT 7.0%: CPT | Mod: CPTII,S$GLB,, | Performed by: FAMILY MEDICINE

## 2024-10-18 PROCEDURE — 99215 OFFICE O/P EST HI 40 MIN: CPT | Mod: S$GLB,,, | Performed by: FAMILY MEDICINE

## 2024-10-18 PROCEDURE — 86235 NUCLEAR ANTIGEN ANTIBODY: CPT | Mod: 59 | Performed by: FAMILY MEDICINE

## 2024-10-18 PROCEDURE — 86225 DNA ANTIBODY NATIVE: CPT | Mod: 59 | Performed by: FAMILY MEDICINE

## 2024-10-18 PROCEDURE — 82728 ASSAY OF FERRITIN: CPT | Performed by: INTERNAL MEDICINE

## 2024-10-18 PROCEDURE — 36415 COLL VENOUS BLD VENIPUNCTURE: CPT | Mod: PO | Performed by: INTERNAL MEDICINE

## 2024-10-18 PROCEDURE — 85652 RBC SED RATE AUTOMATED: CPT | Performed by: FAMILY MEDICINE

## 2024-10-18 PROCEDURE — G2211 COMPLEX E/M VISIT ADD ON: HCPCS | Mod: S$GLB,,, | Performed by: FAMILY MEDICINE

## 2024-10-18 PROCEDURE — 86225 DNA ANTIBODY NATIVE: CPT | Performed by: INTERNAL MEDICINE

## 2024-10-18 PROCEDURE — 86140 C-REACTIVE PROTEIN: CPT | Performed by: FAMILY MEDICINE

## 2024-10-18 RX ORDER — CARBAMAZEPINE 200 MG/1
200 TABLET, EXTENDED RELEASE ORAL 2 TIMES DAILY
Qty: 180 TABLET | Refills: 3 | Status: SHIPPED | OUTPATIENT
Start: 2024-10-18

## 2024-10-18 RX ORDER — SIMVASTATIN 40 MG/1
40 TABLET, FILM COATED ORAL NIGHTLY
Qty: 90 TABLET | Refills: 3 | Status: SHIPPED | OUTPATIENT
Start: 2024-10-18

## 2024-10-18 RX ORDER — ONDANSETRON 8 MG/1
TABLET, ORALLY DISINTEGRATING ORAL
Qty: 20 TABLET | Refills: 11 | Status: SHIPPED | OUTPATIENT
Start: 2024-10-18

## 2024-10-18 RX ORDER — HYDROCODONE BITARTRATE AND ACETAMINOPHEN 10; 325 MG/1; MG/1
1 TABLET ORAL EVERY 6 HOURS PRN
Qty: 60 TABLET | Refills: 0 | Status: SHIPPED | OUTPATIENT
Start: 2024-10-18

## 2024-10-18 RX ORDER — TIZANIDINE 4 MG/1
TABLET ORAL
Qty: 810 TABLET | Refills: 3 | Status: SHIPPED | OUTPATIENT
Start: 2024-10-18

## 2024-10-18 RX ORDER — TRIAMTERENE/HYDROCHLOROTHIAZID 37.5-25 MG
1 TABLET ORAL EVERY OTHER DAY
Qty: 45 TABLET | Refills: 3 | Status: SHIPPED | OUTPATIENT
Start: 2024-10-18

## 2024-10-18 RX ORDER — DIAZEPAM 5 MG/1
5 TABLET ORAL EVERY 6 HOURS PRN
Qty: 45 TABLET | Refills: 5 | Status: SHIPPED | OUTPATIENT
Start: 2024-10-18

## 2024-10-18 RX ORDER — GABAPENTIN 600 MG/1
600 TABLET ORAL 3 TIMES DAILY
Qty: 270 TABLET | Refills: 3 | Status: SHIPPED | OUTPATIENT
Start: 2024-10-18

## 2024-10-18 RX ORDER — ESCITALOPRAM OXALATE 20 MG/1
20 TABLET ORAL DAILY
Qty: 90 TABLET | Refills: 1 | Status: SHIPPED | OUTPATIENT
Start: 2024-10-18

## 2024-10-18 RX ORDER — TIOTROPIUM BROMIDE AND OLODATEROL 3.124; 2.736 UG/1; UG/1
SPRAY, METERED RESPIRATORY (INHALATION)
Qty: 12 G | Refills: 2 | Status: SHIPPED | OUTPATIENT
Start: 2024-10-18

## 2024-10-18 RX ORDER — METOPROLOL SUCCINATE 100 MG/1
100 TABLET, EXTENDED RELEASE ORAL DAILY
Qty: 90 TABLET | Refills: 2 | Status: SHIPPED | OUTPATIENT
Start: 2024-10-18

## 2024-10-19 LAB — CCP AB SER IA-ACNC: <0.5 U/ML

## 2024-10-20 LAB — RHEUMATOID FACT SERPL-ACNC: <13 IU/ML (ref 0–15)

## 2024-10-21 ENCOUNTER — OFFICE VISIT (OUTPATIENT)
Dept: HEMATOLOGY/ONCOLOGY | Facility: CLINIC | Age: 59
End: 2024-10-21
Payer: MEDICARE

## 2024-10-21 DIAGNOSIS — D50.9 IRON DEFICIENCY ANEMIA, UNSPECIFIED IRON DEFICIENCY ANEMIA TYPE: Primary | ICD-10-CM

## 2024-10-21 DIAGNOSIS — G35 MS (MULTIPLE SCLEROSIS): ICD-10-CM

## 2024-10-21 PROCEDURE — 99214 OFFICE O/P EST MOD 30 MIN: CPT | Mod: 95,,, | Performed by: INTERNAL MEDICINE

## 2024-10-21 PROCEDURE — 3044F HG A1C LEVEL LT 7.0%: CPT | Mod: CPTII,95,, | Performed by: INTERNAL MEDICINE

## 2024-10-21 PROCEDURE — G2211 COMPLEX E/M VISIT ADD ON: HCPCS | Mod: 95,,, | Performed by: INTERNAL MEDICINE

## 2024-10-21 RX ORDER — INTERFERON BETA-1A 30MCG/.5ML
30 KIT INTRAMUSCULAR WEEKLY
Qty: 6 ML | Refills: 1 | OUTPATIENT
Start: 2024-10-21

## 2024-10-21 NOTE — PROGRESS NOTES
No chief complaint on file.      SUBJECTIVE:  Zoey Cali is a 58 y.o. female presents with No chief complaint on file.    Conservative treatment with tylenol, NSAIDs, alternative treatments, complementary non opioid nerve/epileptic medication has failed with primary use.  Now opioid dependent for relief.  Has tried PT/OT, injections with mixed success.      Past Medical History:   Diagnosis Date    Allergy     Anemia     Anxiety     Breast cyst     Chronic kidney disease     Depression     Fibrocystic breast     Hypertension     Multiple sclerosis     Multiple sclerosis     Neuromuscular disorder     Osteoporosis     Rib fracture 2015     Past Surgical History:   Procedure Laterality Date    APPENDECTOMY      BREAST BIOPSY Left 2012    BREAST CYST ASPIRATION      BREAST SURGERY  2004    excisional biopsy      SECTION, CLASSIC      CHALAZION EXCISION  at age 9    CHOLECYSTECTOMY      EYE SURGERY      HYSTERECTOMY  2001    OOPHORECTOMY       Social History     Socioeconomic History    Marital status:    Tobacco Use    Smoking status: Every Day     Current packs/day: 1.00     Average packs/day: 1 pack/day for 30.0 years (30.0 ttl pk-yrs)     Types: Cigarettes    Smokeless tobacco: Current   Substance and Sexual Activity    Alcohol use: No     Alcohol/week: 0.0 standard drinks of alcohol    Drug use: No    Sexual activity: Not Currently     Birth control/protection: See Surgical Hx     Comment: Hysterectomy     Social Drivers of Health     Financial Resource Strain: Low Risk  (11/15/2023)    Overall Financial Resource Strain (CARDIA)     Difficulty of Paying Living Expenses: Not hard at all   Food Insecurity: No Food Insecurity (11/15/2023)    Hunger Vital Sign     Worried About Running Out of Food in the Last Year: Never true     Ran Out of Food in the Last Year: Never true   Transportation Needs: No Transportation Needs (11/15/2023)    PRAPARE - Transportation     Lack of Transportation  (Medical): No     Lack of Transportation (Non-Medical): No   Physical Activity: Unknown (11/15/2023)    Exercise Vital Sign     Days of Exercise per Week: 0 days   Recent Concern: Physical Activity - Inactive (11/15/2023)    Exercise Vital Sign     Days of Exercise per Week: 0 days     Minutes of Exercise per Session: 0 min   Stress: No Stress Concern Present (11/15/2023)    [employer] [employer] - Occupational Stress Questionnaire     Feeling of Stress : Not at all   Housing Stability: Low Risk  (11/15/2023)    Housing Stability Vital Sign     Unable to Pay for Housing in the Last Year: No     Number of Places Lived in the Last Year: 1     Unstable Housing in the Last Year: No     Family History   Problem Relation Name Age of Onset    Arthritis Mother      Diabetes Father      Heart disease Father      Hyperlipidemia Father      Hypertension Father      Stroke Father      Arthritis Son      Drug abuse Son      Hypertension Maternal Grandmother      Stroke Maternal Grandmother      Arthritis Maternal Grandmother      Arthritis Paternal Grandmother      Heart disease Paternal Grandfather      Heart attack Paternal Grandfather      Colon cancer Neg Hx      Ovarian cancer Neg Hx      Amblyopia Neg Hx      Blindness Neg Hx      Glaucoma Neg Hx      Macular degeneration Neg Hx      Retinal detachment Neg Hx      Strabismus Neg Hx      Thyroid disease Neg Hx       Current Outpatient Medications on File Prior to Visit   Medication Sig Dispense Refill    cetirizine (ZYRTEC) 10 MG tablet Take 1 tablet (10 mg total) by mouth once daily. 30 tablet 0    cholecalciferol, vitamin D3, (VITAMIN D3) 125 mcg (5,000 unit) Tab Take 1 tablet (5,000 Units total) by mouth once daily. 90 tablet 3    ciclopirox (PENLAC) 8 % Soln Apply topically nightly. 6.6 mL 3    gabapentin (NEURONTIN) 400 MG capsule TAKE 2 CAPSULES BY MOUTH EVERY EVENING 180 capsule 3    hydrOXYzine HCL (ATARAX) 25 MG tablet Take 1 tablet (25 mg total)  by mouth 3 (three) times daily. 90 tablet 0    ibuprofen (ADVIL,MOTRIN) 800 MG tablet TAKE ONE TABLET BY MOUTH EVERY 8 HOURS WITH FOOD AND WATER 180 tablet 1    interferon beta-1a (AVONEX) 30 mcg/0.5 mL PnKt Inject 0.5 mLs (30 mcg total) into the muscle once a week. 6 mL 1     No current facility-administered medications on file prior to visit.     Review of patient's allergies indicates:   Allergen Reactions    Lomotil [diphenoxylate-atropine]      Causes stomach spasms    Dilaudid [hydromorphone (bulk)] Itching       ROS    OBJECTIVE:  LMP 01/01/2001 (Approximate) Comment: 2001  Wt Readings from Last 5 Encounters:   08/07/24 55.3 kg (121 lb 14.6 oz)   06/05/24 55.3 kg (121 lb 14.6 oz)   06/21/23 55.3 kg (121 lb 14.6 oz)   04/25/23 58 kg (127 lb 13.9 oz)   02/20/23 58 kg (127 lb 13.9 oz)       In usual state of health without signs of drug misuse/abuse today.  Specifically no alteration in cognition, signs of injections into the skin.  Patient areas of chronic pain in the back and legs  No new focal neurological findings noted.    Chronic aids to ambulation    Reviewed  and NARx: reviewed    Assessment/Plan:    1. Myalgia    2. Arthralgia, unspecified joint    3. Immunosuppression    4. Multiple sclerosis    5. Neuropathic pain of foot, right    6. Neuropathic pain    7. Depression, unspecified depression type    8. Neuropathic pain of foot, left    9. Essential hypertension    10. Primary hypertension    11. Hyperlipidemia, unspecified hyperlipidemia type    12. COPD mixed type    13. Spasticity        Discussed chronic pain diagnosis again, reviewed pain contract and patient is still in agreement with it. Continued to stress the importance of smoking cessation/avoiding tobacco products which can exacerbate the pain.  Continued to discuss the importance of good nutrition and some type of exercise program even if it's very basic and light to help sustain function.  Continued to discuss risks, benefits and  alternatives of care with high risk medication and we have decided to continue to use them.  Our goal continues to be stabilizing of function, quality of life and avoidance of medication side effects.  Patient voiced understanding.    Problem List Items Addressed This Visit       HTN (hypertension)    Relevant Medications    metoprolol succinate (TOPROL-XL) 100 MG 24 hr tablet    ondansetron (ZOFRAN-ODT) 8 MG TbDL    triamterene-hydrochlorothiazide 37.5-25 mg (MAXZIDE-25) 37.5-25 mg per tablet    Multiple sclerosis    Overview     Unable to tolerate Aubagio due to GI side effects--stopped 3/18/2021. Will plan to return to [facility]         Relevant Medications    diazePAM (VALIUM) 5 MG tablet    gabapentin (NEURONTIN) 600 MG tablet    Neuropathic pain    Relevant Medications    carBAMazepine (TEGRETOL XR) 200 MG 12 hr tablet    Immunosuppression    Current Assessment & Plan     Get the labs  Get flu shot pending labs          Other Visit Diagnoses       Myalgia    -  Primary    Relevant Orders    Sedimentation rate (Completed)    C-REACTIVE PROTEIN (Completed)    RHEUMATOID FACTOR (Completed)    CYCLIC CITRUL PEPTIDE ANTIBODY, IGG (Completed)    RONAL    Arthralgia, unspecified joint        Relevant Orders    Sedimentation rate (Completed)    C-REACTIVE PROTEIN (Completed)    RHEUMATOID FACTOR (Completed)    CYCLIC CITRUL PEPTIDE ANTIBODY, IGG (Completed)    RONAL    Neuropathic pain of foot, right        Relevant Medications    HYDROcodone-acetaminophen (NORCO)  mg per tablet    gabapentin (NEURONTIN) 600 MG tablet    Depression, unspecified depression type        Relevant Medications    EScitalopram oxalate (LEXAPRO) 20 MG tablet    Neuropathic pain of foot, left        Relevant Medications    gabapentin (NEURONTIN) 600 MG tablet    Essential hypertension        Relevant Medications    metoprolol succinate (TOPROL-XL) 100 MG 24 hr tablet    Hyperlipidemia, unspecified hyperlipidemia type        Relevant Medications     simvastatin (ZOCOR) 40 MG tablet    COPD mixed type        Relevant Medications    tiotropium-olodateroL (STIOLTO RESPIMAT) 2.5-2.5 mcg/actuation Mist    Spasticity        Relevant Medications    tiZANidine (ZANAFLEX) 4 MG tablet            Follow up in 3 months

## 2024-10-21 NOTE — PROGRESS NOTES
Subjective:      The patient location is: home  The chief complaint leading to consultation is: TRACIE    Visit type: audiovisual    Face to Face time with patient:  4 min  7 minutes of total time spent on the encounter, which includes face to face time and non-face to face time preparing to see the patient (eg, review of tests), Obtaining and/or reviewing separately obtained history, Documenting clinical information in the electronic or other health record, Independently interpreting results (not separately reported) and communicating results to the patient/family/caregiver, or Care coordination (not separately reported).         Each patient to whom he or she provides medical services by telemedicine is:  (1) informed of the relationship between the physician and patient and the respective role of any other health care provider with respect to management of the patient; and (2) notified that he or she may decline to receive medical services by telemedicine and may withdraw from such care at any time.    Notes:      Patient ID: Zoey Cali is a 58 y.o. female.    Chief Complaint: No chief complaint on file.    Diagnosis: TRACIE       Prior Hx: 59 y/o female with history of MS seen  today for f/u for TRACIE, She undergoes intermittent IV iron therapy.She has been intolerant of oral Fe supp. She is s/p GI eval with EGD and colonoscopy 2015  which was unremarkable She is followed by Dr. Mendez for long standing history of MS and was recently  on therapy with Aubagio  She was previously on Avonex for >20 yrsShe was experiencing  increasing pain with injection and hypersensitivity to bilateral injection site areas    Interval Hx: She suffered a fall since last visit  Last  received Fe infusion completed 4/2023   She remains on Avonex and tolerating well   No SOB/CP/cough  Less  fatigued         Review of Systems   Constitutional:  Positive for fatigue (mild). Negative for appetite change and unexpected weight change.    Eyes:  Negative for pain and visual disturbance.   Respiratory:  Negative for cough and shortness of breath.    Cardiovascular:  Negative for chest pain and leg swelling.   Gastrointestinal:  Negative for abdominal pain, blood in stool, constipation, diarrhea, nausea and vomiting.   Genitourinary:  Negative for frequency.   Musculoskeletal:  Negative for arthralgias, back pain, joint swelling and neck pain.   Skin:  Negative for rash.        No petechiae, ecchymoses   Neurological:  Negative for light-headedness and headaches.   Hematological:  Does not bruise/bleed easily.   Psychiatric/Behavioral:  Negative for confusion and dysphoric mood.        Objective:       There were no vitals filed for this visit.    Televisit         Lab Results   Component Value Date    WBC 6.05 05/31/2024    HGB 15.4 05/31/2024    HCT 45.8 05/31/2024    MCV 93 05/31/2024     05/31/2024     Lab Results   Component Value Date    IRON 68 10/18/2024    IRON 68 10/18/2024    TIBC 329 10/18/2024    FERRITIN 141 10/18/2024             1. Iron deficiency anemia, unspecified iron deficiency anemia type    2. MS (multiple sclerosis)              Plan:   Zoey FRASER was seen today for follow-up.    Diagnoses and all orders for this visit:    Iron deficiency anemia, unspecified iron deficiency       S/p GI eval-unremarkable       S/p IV Fe completed 4/2023       CBC reveals stable Hb 15.4  g/dl on 5/31/24        Fe parameters 10/18/24 wnl with borderline Fe sats       Ferritin 141  on 10/18/24        Cont to monitor    Multiple Sclerosis    stable    F/b Neurology    Pt on   Avonex      Cbc,Fe studies prior to f/u 3 mos televisit ok    Advance Care Planning     Power of   I previously initiated the process of advance care planning today and explained the importance of this process to the patient.  I introduced the concept of advance directives to the patient, as well. Then the patient received detailed information about the  importance of designating a Health Care Power of  (HCPOA). She was also instructed to communicate with this person about their wishes for future healthcare, should she become sick and lose decision-making capacity. The patient has not previously appointed a HCPOA. After our discussion, the patient has decided to complete a HCPOA and has appointed her significant other and NAME:Jared Cali   I spent a total time of 16 minutes discussing this issue with the patient.        Cc: MD Heidy Lancaster MD

## 2024-10-22 LAB
ANA PATTERN 1: NORMAL
ANA SER QL IF: POSITIVE
ANA TITR SER IF: NORMAL {TITER}
ANTI SM ANTIBODY: 0.07 RATIO (ref 0–0.99)
ANTI SM/RNP ANTIBODY: 0.05 RATIO (ref 0–0.99)
ANTI-SM INTERPRETATION: NEGATIVE
ANTI-SM/RNP INTERPRETATION: NEGATIVE
ANTI-SSA ANTIBODY: 0.05 RATIO (ref 0–0.99)
ANTI-SSA INTERPRETATION: NEGATIVE
ANTI-SSB ANTIBODY: 0.05 RATIO (ref 0–0.99)
ANTI-SSB INTERPRETATION: NEGATIVE
DNA TITER: 0
DSDNA AB SER-ACNC: NORMAL [IU]/ML

## 2024-10-31 ENCOUNTER — PATIENT MESSAGE (OUTPATIENT)
Dept: NEUROLOGY | Facility: CLINIC | Age: 59
End: 2024-10-31
Payer: MEDICARE

## 2024-10-31 DIAGNOSIS — G35 MULTIPLE SCLEROSIS: Primary | ICD-10-CM

## 2024-11-01 RX ORDER — INTERFERON BETA-1A 30MCG/.5ML
30 KIT INTRAMUSCULAR WEEKLY
Qty: 2 ML | Refills: 0 | Status: ACTIVE | OUTPATIENT
Start: 2024-11-01

## 2024-11-04 ENCOUNTER — TELEPHONE (OUTPATIENT)
Dept: NEUROLOGY | Facility: CLINIC | Age: 59
End: 2024-11-04
Payer: MEDICARE

## 2024-11-04 NOTE — TELEPHONE ENCOUNTER
----- Message from TIFFANI Box sent at 11/4/2024  2:35 PM CST -----  Regarding: FW: quanity of medication  Contact: [phone]    ----- Message -----  From: Cari Jacob  Sent: 11/4/2024   2:26 PM CST  To: Evan PATEL Staff  Subject: quanity of medication                            Manuela with [employer] is calling because patient is approved for 90 supply and would like this medication changed for 30days to 90days if there are any questions please contact anyone in the pharmacy at [phone]

## 2024-11-18 ENCOUNTER — PATIENT MESSAGE (OUTPATIENT)
Dept: NEUROLOGY | Facility: CLINIC | Age: 59
End: 2024-11-18
Payer: MEDICARE

## 2024-11-19 ENCOUNTER — OFFICE VISIT (OUTPATIENT)
Dept: NEUROLOGY | Facility: CLINIC | Age: 59
End: 2024-11-19
Payer: MEDICARE

## 2024-11-19 DIAGNOSIS — F41.9 ANXIETY: ICD-10-CM

## 2024-11-19 DIAGNOSIS — G35 MULTIPLE SCLEROSIS: Primary | ICD-10-CM

## 2024-11-19 DIAGNOSIS — Z29.89 PROPHYLACTIC IMMUNOTHERAPY: ICD-10-CM

## 2024-11-19 DIAGNOSIS — Z71.89 COUNSELING REGARDING GOALS OF CARE: ICD-10-CM

## 2024-11-19 DIAGNOSIS — G35 MULTIPLE SCLEROSIS: ICD-10-CM

## 2024-11-19 PROCEDURE — G2211 COMPLEX E/M VISIT ADD ON: HCPCS | Mod: 95,,, | Performed by: CLINICAL NURSE SPECIALIST

## 2024-11-19 PROCEDURE — 3044F HG A1C LEVEL LT 7.0%: CPT | Mod: CPTII,95,, | Performed by: CLINICAL NURSE SPECIALIST

## 2024-11-19 PROCEDURE — 99215 OFFICE O/P EST HI 40 MIN: CPT | Mod: 95,,, | Performed by: CLINICAL NURSE SPECIALIST

## 2024-11-19 PROCEDURE — 1159F MED LIST DOCD IN RCRD: CPT | Mod: CPTII,95,, | Performed by: CLINICAL NURSE SPECIALIST

## 2024-11-19 NOTE — Clinical Note
TH, please schedule her labs at the clinic in [place] (not [facility]) on Friday.  F/U in person with Dr. Mendez in April

## 2024-11-19 NOTE — Clinical Note
Do you think [facility] would be able/willing to help with an MRI copay?

## 2024-11-19 NOTE — PROGRESS NOTES
Subjective:          Patient ID: Zoey Cali is a 58 y.o. female who presents today for a routine virtual visit for MS.  She was last seen virtually in May 2024. She was last seen in person in October 2022.       The patient location is: her home   The chief complaint leading to consultation is: MS     Visit type: audiovisual    Face to Face time with patient: 26 minutes   40 minutes of total time spent on the encounter, which includes face to face time and non-face to face time preparing to see the patient (eg, review of tests), Obtaining and/or reviewing separately obtained history, Documenting clinical information in the electronic or other health record, Independently interpreting results (not separately reported) and communicating results to the patient/family/caregiver, or Care coordination (not separately reported).         Each patient to whom he or she provides medical services by telemedicine is:  (1) informed of the relationship between the physician and patient and the respective role of any other health care provider with respect to management of the patient; and (2) notified that he or she may decline to receive medical services by telemedicine and may withdraw from such care at any time.      MS HPI:  DMT: Avonex   Side effects from DMT? Flu-like symptoms are manageable.   Taking vitamin D3 as recommended? 5000 units daily   She still has not completed her brain MRI. She has not had an MRI since 2022. She cannot afford the $300 copay.   She [place] fear[place]  This is why she has not been seen in clinic in person. She is seeing a therapist and discussing this.  She denies any falls since the last visit.   She has aching in her arms and legs; this is random. It is not constant. This does not limit her movements or mobility.   She walks independently; uses cane when she is feeling off balance. She is not exercising.   She denies any recent  infections.     Medications:  Current Outpatient Medications   Medication Sig    carBAMazepine (TEGRETOL XR) 200 MG 12 hr tablet Take 1 tablet (200 mg total) by mouth 2 (two) times daily.    cetirizine (ZYRTEC) 10 MG tablet Take 1 tablet (10 mg total) by mouth once daily.    cholecalciferol, vitamin D3, (VITAMIN D3) 125 mcg (5,000 unit) Tab Take 1 tablet (5,000 Units total) by mouth once daily.    ciclopirox (PENLAC) 8 % Soln Apply topically nightly.    diazePAM (VALIUM) 5 MG tablet Take 1 tablet (5 mg total) by mouth every 6 (six) hours as needed.    EScitalopram oxalate (LEXAPRO) 20 MG tablet Take 1 tablet (20 mg total) by mouth once daily.    gabapentin (NEURONTIN) 400 MG capsule TAKE 2 CAPSULES BY MOUTH EVERY EVENING    gabapentin (NEURONTIN) 600 MG tablet Take 1 tablet (600 mg total) by mouth 3 (three) times daily.    HYDROcodone-acetaminophen (NORCO)  mg per tablet Take 1 tablet by mouth every 6 (six) hours as needed for Pain.    hydrOXYzine HCL (ATARAX) 25 MG tablet Take 1 tablet (25 mg total) by mouth 3 (three) times daily.    ibuprofen (ADVIL,MOTRIN) 800 MG tablet TAKE ONE TABLET BY MOUTH EVERY 8 HOURS WITH FOOD AND WATER    interferon beta-1a (AVONEX) 30 mcg/0.5 mL PnKt Inject 0.5 mLs (30 mcg total) into the muscle once a week.    metoprolol succinate (TOPROL-XL) 100 MG 24 hr tablet Take 1 tablet (100 mg total) by mouth once daily.    ondansetron (ZOFRAN-ODT) 8 MG TbDL DISSOLVE 1 TABLET ON THE TONGUE THREE TIMES DAILY AS NEEDED Strength: 8 mg    simvastatin (ZOCOR) 40 MG tablet Take 1 tablet (40 mg total) by mouth every evening.    tiotropium-olodateroL (STIOLTO RESPIMAT) 2.5-2.5 mcg/actuation Mist inhale TWO puffs INTO THE LUNGS DAILY    tiZANidine (ZANAFLEX) 4 MG tablet TAKE 2 TABLETS IN THE MORNING, AT NOON AND IN THE EVENING AND TAKE 3 TABLETS AT NIGHT (9 TABLETS PER DAY); only takes 3 at bedtime and none during the day    triamterene-hydrochlorothiazide 37.5-25 mg (MAXZIDE-25) 37.5-25 mg per  tablet Take 1 tablet by mouth every other day.     No current facility-administered medications for this visit.       SOCIAL HISTORY  Social History     Tobacco Use    Smoking status: Every Day     Current packs/day: 1.00     Average packs/day: 1 pack/day for 30.0 years (30.0 ttl pk-yrs)     Types: Cigarettes    Smokeless tobacco: Current   Substance Use Topics    Alcohol use: No     Alcohol/week: 0.0 standard drinks of alcohol    Drug use: No       Living arrangements - the patient lives with her      ROS:      11/19/24   REVIEW OF SYMPTOMS   Do you feel abnormally tired on most days? No --takes a nap during the day    Do you feel you generally sleep well? Yes    Do you have difficulty controlling your bladder?  No    Do you have difficulty controlling your bowels?  No --has diarrhea sometimes when she is nervous    Do you have frequent muscle cramps, tightness or spasms in your limbs?  No --sometimes in feet; has spasms in her torso when she is taking a bath and stretching her legs out. She takes tizanidine at night.    Do you have new visual symptoms?  No --thinks she needs to see an eye doctor soon for a new Rx    Do you have worsening difficulty with your memory or thinking? No    Do you have worsening symptoms of anxiety or depression?  No --the holidays are a tough time for her   For patients who walk, Do you have more difficulty walking?  No --as above    Have you fallen since your last visit?  No    For patients who use wheelchairs: Do you have any skin wounds or breakdown? Not Applicable    Do you have difficulty using your hands?  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 No --controlled with gabapentin and carbamazepine    Do you have difficulty with sexual function?  Not assessed    If you are sexually active, are you using birth control? Y/N  N/A No    Do you often choke when swallowing liquids or solid food?  No    Do you  experience worsening symptoms when overheated? No --knows her limits with the heat    Do you need any new equipment such as a wheelchair, walker or shower chair? No    Do you receive co-pay financial assistance for your principal MS medicine? Yes --Avonex covered by [employer]    Would you be interested in participating in an MS research trial in the future? No    For patients on Gilenya, Tecfidera, Aubagio, Rituxan, Ocrevus, Tysabri, Lemtrada or Methotrexate, are you aware that you should NOT receive live virus vaccines?  Not Applicable    Do you feel you have adequate family/friend support?  Yes --her  and 2 daughters are good supports; she also has a good friend    Do you have health insurance?   Yes    Are you currently employed? No    Do you receive SSDI/SSI?  Yes    Do you use marijuana or cannabis products? No    Have you been diagnosed with a urinary tract infection since your last visit here? No    Have you been diagnosed with a respiratory tract infection since your last visit here? No    Have you been to the emergency room since your last visit here? No    Have you been hospitalized since your last visit here?  No        Patient-reported                Objective:        1. 25 foot timed walk:      5/2/2017    12:45 PM 6/29/2021     1:40 PM   Timed 25 Foot Walk:   Did patient wear an AFO? No No   Was assistive device used? No No   Time for 25 Foot Walk (seconds) 4.4 4.8       Neurological Exam    Deferred   Imaging:     No new imaging to review        Labs:     Lab Results   Component Value Date    VJSQCTYM34SD 74 05/31/2024    IFPWMICN99OO 117 (H) 10/12/2022    INFZQYXD82JG 78 10/13/2021     Lab Results   Component Value Date    WBC 6.05 05/31/2024    RBC 4.92 05/31/2024    HGB 15.4 05/31/2024    HCT 45.8 05/31/2024    MCV 93 05/31/2024    MCH 31.3 (H) 05/31/2024    MCHC 33.6 05/31/2024    RDW 13.3 05/31/2024     05/31/2024    MPV 9.0 (L) 05/31/2024    GRAN 4.3 05/31/2024    GRAN 70.9 05/31/2024     LYMPH 1.2 05/31/2024    LYMPH 19.2 05/31/2024    MONO 0.4 05/31/2024    MONO 6.8 05/31/2024    EOS 0.1 05/31/2024    BASO 0.03 05/31/2024    EOSINOPHIL 2.3 05/31/2024    BASOPHIL 0.5 05/31/2024     Sodium   Date Value Ref Range Status   04/22/2024 137 136 - 145 mmol/L Final     Potassium   Date Value Ref Range Status   04/22/2024 3.6 3.5 - 5.1 mmol/L Final     Chloride   Date Value Ref Range Status   04/22/2024 101 95 - 110 mmol/L Final     CO2   Date Value Ref Range Status   04/22/2024 26 23 - 29 mmol/L Final     Glucose   Date Value Ref Range Status   04/22/2024 91 70 - 110 mg/dL Final     BUN   Date Value Ref Range Status   04/22/2024 13 6 - 20 mg/dL Final     Creatinine   Date Value Ref Range Status   04/22/2024 0.7 0.5 - 1.4 mg/dL Final     Calcium   Date Value Ref Range Status   04/22/2024 9.8 8.7 - 10.5 mg/dL Final     Total Protein   Date Value Ref Range Status   04/22/2024 7.1 6.0 - 8.4 g/dL Final     Albumin   Date Value Ref Range Status   04/22/2024 3.8 3.5 - 5.2 g/dL Final     Total Bilirubin   Date Value Ref Range Status   04/22/2024 0.5 0.1 - 1.0 mg/dL Final     Comment:     For infants and newborns, interpretation of results should be based  on gestational age, weight and in agreement with clinical  observations.    Premature Infant recommended reference ranges:  Up to 24 hours.............<8.0 mg/dL  Up to 48 hours............<12.0 mg/dL  3-5 days..................<15.0 mg/dL  6-29 days.................<15.0 mg/dL       Alkaline Phosphatase   Date Value Ref Range Status   04/22/2024 102 55 - 135 U/L Final     AST   Date Value Ref Range Status   04/22/2024 12 10 - 40 U/L Final     ALT   Date Value Ref Range Status   04/22/2024 8 (L) 10 - 44 U/L Final     Anion Gap   Date Value Ref Range Status   04/22/2024 10 8 - 16 mmol/L Final     eGFR if    Date Value Ref Range Status   05/15/2019 >60 >60 mL/min/1.73 m^2 Final     eGFR if non    Date Value Ref Range Status    05/15/2019 >60 >60 mL/min/1.73 m^2 Final     Comment:     Calculation used to obtain the estimated glomerular filtration  rate (eGFR) is the CKD-EPI equation.            MS Impression and Plan:     NEURO MULTIPLE SCLEROSIS IMPRESSION:   Number of relapses in the past year?:  0  Clinical Progression:  Clinically Stable  MS Classification:  Relapsing-Remitting MS  Current DMT: interferon beta-1a IM  Current DMT comment: She is due for safety labs, and we will schedule these on Friday in [place].   Additional Impressions:   She is overdue for her brain MRI, but is unable to afford the copay. Will explore options for assistance with our social work team.   We discussed that she will need to be seen in person next year so she can have a proper neuro exam. We will plan to see her back in April. She will coordinate with a family member to drive her.         The visit today is associated with current or anticipated ongoing medical care related to this patient's single serious condition/complex condition of multiple sclerosis.    REEMA Ford, CNS    Problem List Items Addressed This Visit          Neurologic Problems    Multiple sclerosis - Primary    Overview     Unable to tolerate Aubagio due to GI side effects--stopped 3/18/2021. Will plan to return to Avonex            Other    Counseling regarding goals of care    Prophylactic immunotherapy     Other Visit Diagnoses       Anxiety

## 2024-11-25 ENCOUNTER — LAB VISIT (OUTPATIENT)
Dept: LAB | Facility: HOSPITAL | Age: 59
End: 2024-11-25
Attending: FAMILY MEDICINE
Payer: MEDICARE

## 2024-11-25 DIAGNOSIS — G35 MULTIPLE SCLEROSIS: ICD-10-CM

## 2024-11-25 LAB
ALBUMIN SERPL BCP-MCNC: 3.7 G/DL (ref 3.5–5.2)
ALP SERPL-CCNC: 106 U/L (ref 40–150)
ALT SERPL W/O P-5'-P-CCNC: 7 U/L (ref 10–44)
AST SERPL-CCNC: 12 U/L (ref 10–40)
BASOPHILS # BLD AUTO: 0.03 K/UL (ref 0–0.2)
BASOPHILS NFR BLD: 0.5 % (ref 0–1.9)
BILIRUB DIRECT SERPL-MCNC: 0.1 MG/DL (ref 0.1–0.3)
BILIRUB SERPL-MCNC: 0.4 MG/DL (ref 0.1–1)
DIFFERENTIAL METHOD BLD: ABNORMAL
EOSINOPHIL # BLD AUTO: 0.1 K/UL (ref 0–0.5)
EOSINOPHIL NFR BLD: 2.2 % (ref 0–8)
ERYTHROCYTE [DISTWIDTH] IN BLOOD BY AUTOMATED COUNT: 13.3 % (ref 11.5–14.5)
HCT VFR BLD AUTO: 44.3 % (ref 37–48.5)
HGB BLD-MCNC: 14.6 G/DL (ref 12–16)
IMM GRANULOCYTES # BLD AUTO: 0.02 K/UL (ref 0–0.04)
IMM GRANULOCYTES NFR BLD AUTO: 0.3 % (ref 0–0.5)
LYMPHOCYTES # BLD AUTO: 1.4 K/UL (ref 1–4.8)
LYMPHOCYTES NFR BLD: 23.9 % (ref 18–48)
MCH RBC QN AUTO: 32 PG (ref 27–31)
MCHC RBC AUTO-ENTMCNC: 33 G/DL (ref 32–36)
MCV RBC AUTO: 97 FL (ref 82–98)
MONOCYTES # BLD AUTO: 0.4 K/UL (ref 0.3–1)
MONOCYTES NFR BLD: 6.3 % (ref 4–15)
NEUTROPHILS # BLD AUTO: 4 K/UL (ref 1.8–7.7)
NEUTROPHILS NFR BLD: 66.8 % (ref 38–73)
NRBC BLD-RTO: 0 /100 WBC
PLATELET # BLD AUTO: 199 K/UL (ref 150–450)
PMV BLD AUTO: 8.7 FL (ref 9.2–12.9)
PROT SERPL-MCNC: 6.7 G/DL (ref 6–8.4)
RBC # BLD AUTO: 4.56 M/UL (ref 4–5.4)
WBC # BLD AUTO: 5.99 K/UL (ref 3.9–12.7)

## 2024-11-25 PROCEDURE — 85025 COMPLETE CBC W/AUTO DIFF WBC: CPT | Performed by: CLINICAL NURSE SPECIALIST

## 2024-11-25 PROCEDURE — 36415 COLL VENOUS BLD VENIPUNCTURE: CPT | Mod: PO | Performed by: CLINICAL NURSE SPECIALIST

## 2024-11-25 PROCEDURE — 80076 HEPATIC FUNCTION PANEL: CPT | Performed by: CLINICAL NURSE SPECIALIST

## 2024-11-26 RX ORDER — INTERFERON BETA-1A 30MCG/.5ML
30 KIT INTRAMUSCULAR WEEKLY
Qty: 6 ML | Refills: 3 | Status: SHIPPED | OUTPATIENT
Start: 2024-11-26

## 2024-12-06 ENCOUNTER — TELEPHONE (OUTPATIENT)
Dept: PSYCHIATRY | Facility: CLINIC | Age: 59
End: 2024-12-06
Payer: MEDICARE

## 2024-12-06 NOTE — TELEPHONE ENCOUNTER
MSW team was referred by CARMELO Gordon to reach out to pt about MRI cost assistance. Phoned pt to let her know [facility] is the only organization at the moment to provide cost assistance for MRIs. Asked pt if she would be comfortable if I shared her number and email to Ember, President of [facility] so that she may reach out to her. Pt said it was fine.      Emailed Ember at [facility] with pt's name, number, and email and let her know pt needs assistance with MRI cost.

## 2024-12-16 ENCOUNTER — PATIENT MESSAGE (OUTPATIENT)
Dept: PSYCHIATRY | Facility: CLINIC | Age: 59
End: 2024-12-16
Payer: MEDICARE

## 2024-12-18 ENCOUNTER — TELEPHONE (OUTPATIENT)
Dept: NEUROLOGY | Facility: CLINIC | Age: 59
End: 2024-12-18
Payer: MEDICARE

## 2024-12-18 DIAGNOSIS — G35 MULTIPLE SCLEROSIS: ICD-10-CM

## 2024-12-18 RX ORDER — INTERFERON BETA-1A 30MCG/.5ML
30 KIT INTRAMUSCULAR WEEKLY
Qty: 2 ML | Refills: 0 | Status: SHIPPED | OUTPATIENT
Start: 2024-12-18

## 2024-12-18 NOTE — TELEPHONE ENCOUNTER
Spoke to the patient and advised of dis-enrollment in the patient assistance program in 2025. Discuss [facility] cost of $2000 for ALL prescriptions. Also discussed Medicare smoothing plan. Sent information via portal. Advised RX has been sent to [facility] and they will work on authorization through her insurance / set delivery of prescription to her home. Patient aware to contact me with any questions

## 2024-12-19 ENCOUNTER — TELEPHONE (OUTPATIENT)
Dept: NEUROLOGY | Facility: CLINIC | Age: 59
End: 2024-12-19
Payer: MEDICARE

## 2024-12-19 NOTE — TELEPHONE ENCOUNTER
[facility] for pt to contact our office and schedule her MRI's   Bilobed Transposition Flap Text: Because of the orientation and full-thickness nature of the defect, and in order to avoid distortion of the surrounding structures, a bilobed flap was planned.  After prep and local anesthesia, the flap was then outlined, incised and elevated.  The skin was widely undermined to allow for closure of the donor site defect.  After hemostasis obtained, the flaps were transposed into place and sutured in a layered fashion.

## 2025-01-13 DIAGNOSIS — Z00.00 ENCOUNTER FOR MEDICARE ANNUAL WELLNESS EXAM: ICD-10-CM

## 2025-01-21 DIAGNOSIS — M79.2 NEUROPATHIC PAIN: ICD-10-CM

## 2025-01-24 RX ORDER — CARBAMAZEPINE 200 MG/1
200 TABLET, EXTENDED RELEASE ORAL 2 TIMES DAILY
Qty: 180 TABLET | Refills: 3 | Status: SHIPPED | OUTPATIENT
Start: 2025-01-24

## 2025-01-24 NOTE — TELEPHONE ENCOUNTER
Refill Routing Note   Medication(s) are not appropriate for processing by [facility] for the following reason(s):        Outside of protocol    [facility] action(s):  Route             Appointments  past 12m or future 3m with PCP    Date Provider   Last Visit   10/18/2024 Devon Collier MD   Next Visit   Visit date not found Devon Collier MD   ED visits in past 90 days: 0        Note composed:8:39 AM 01/24/2025

## 2025-02-19 DIAGNOSIS — G35 MULTIPLE SCLEROSIS: ICD-10-CM

## 2025-02-20 RX ORDER — INTERFERON BETA-1A 30MCG/.5ML
30 KIT INTRAMUSCULAR WEEKLY
Qty: 6 ML | Refills: 0 | Status: ACTIVE | OUTPATIENT
Start: 2025-02-20

## 2025-03-07 ENCOUNTER — PATIENT MESSAGE (OUTPATIENT)
Dept: PSYCHIATRY | Facility: CLINIC | Age: 60
End: 2025-03-07
Payer: MEDICARE

## 2025-03-11 DIAGNOSIS — M79.2 NEUROPATHIC PAIN OF FOOT, LEFT: ICD-10-CM

## 2025-03-11 DIAGNOSIS — G35 MS (MULTIPLE SCLEROSIS): ICD-10-CM

## 2025-03-11 DIAGNOSIS — G35 MULTIPLE SCLEROSIS: ICD-10-CM

## 2025-03-11 DIAGNOSIS — M79.2 NEUROPATHIC PAIN OF FOOT, RIGHT: ICD-10-CM

## 2025-03-11 RX ORDER — GABAPENTIN 400 MG/1
800 CAPSULE ORAL NIGHTLY
Qty: 180 CAPSULE | Refills: 3 | Status: SHIPPED | OUTPATIENT
Start: 2025-03-11

## 2025-03-11 NOTE — TELEPHONE ENCOUNTER
Care Due:                  Date            Visit Type   Department     Provider  --------------------------------------------------------------------------------                                [facility]                              PRIMARY      MEDICINE/INTERN  Last Visit: 10-      CARE ([facility])   [facility]         Devon Collier  Next Visit: None Scheduled  None         None Found                                                            Last  Test          Frequency    Reason                     Performed    Due Date  --------------------------------------------------------------------------------    CMP.........  12 months..  ibuprofen,                 04- 04-                             triamterene-hydrochloroth                             iazide...................    Lipid Panel.  12 months..  simvastatin..............  04- 04-    [employer] Embedded Care Due Messages. Reference number: [id number].   3/11/2025 11:12:51 AM CDT

## 2025-03-14 ENCOUNTER — PATIENT MESSAGE (OUTPATIENT)
Dept: NEUROLOGY | Facility: CLINIC | Age: 60
End: 2025-03-14
Payer: MEDICARE

## 2025-03-17 ENCOUNTER — HOSPITAL ENCOUNTER (OUTPATIENT)
Facility: HOSPITAL | Age: 60
Discharge: HOME | End: 2025-03-19
Attending: EMERGENCY MEDICINE | Admitting: HOSPITALIST
Payer: MEDICARE

## 2025-03-17 DIAGNOSIS — M79.2 NEUROPATHIC PAIN OF FOOT, LEFT: ICD-10-CM

## 2025-03-17 DIAGNOSIS — G35 MS (MULTIPLE SCLEROSIS): ICD-10-CM

## 2025-03-17 DIAGNOSIS — R26.0 ATAXIC GAIT: Primary | ICD-10-CM

## 2025-03-17 DIAGNOSIS — G35 MULTIPLE SCLEROSIS: ICD-10-CM

## 2025-03-17 DIAGNOSIS — M79.2 NEUROPATHIC PAIN OF FOOT, RIGHT: ICD-10-CM

## 2025-03-17 DIAGNOSIS — R42 DIZZY: ICD-10-CM

## 2025-03-17 DIAGNOSIS — R42 DIZZINESS: ICD-10-CM

## 2025-03-17 LAB
ALBUMIN SERPL BCP-MCNC: 3.7 G/DL (ref 3.5–5.2)
ALP SERPL-CCNC: 120 U/L (ref 40–150)
ALT SERPL W/O P-5'-P-CCNC: 13 U/L (ref 10–44)
ANION GAP SERPL CALC-SCNC: 10 MMOL/L (ref 8–16)
AST SERPL-CCNC: 14 U/L (ref 10–40)
BASOPHILS # BLD AUTO: 0.04 K/UL (ref 0–0.2)
BASOPHILS NFR BLD: 0.5 % (ref 0–1.9)
BILIRUB SERPL-MCNC: 0.4 MG/DL (ref 0.1–1)
BILIRUB UR QL STRIP: NEGATIVE
BUN SERPL-MCNC: 11 MG/DL (ref 6–20)
CALCIUM SERPL-MCNC: 8.8 MG/DL (ref 8.7–10.5)
CHLORIDE SERPL-SCNC: 100 MMOL/L (ref 95–110)
CLARITY UR: CLEAR
CO2 SERPL-SCNC: 23 MMOL/L (ref 23–29)
COLOR UR: YELLOW
CREAT SERPL-MCNC: 0.7 MG/DL (ref 0.5–1.4)
CTP QC/QA: YES
DIFFERENTIAL METHOD BLD: ABNORMAL
EOSINOPHIL # BLD AUTO: 0.2 K/UL (ref 0–0.5)
EOSINOPHIL NFR BLD: 2 % (ref 0–8)
ERYTHROCYTE [DISTWIDTH] IN BLOOD BY AUTOMATED COUNT: 12.3 % (ref 11.5–14.5)
EST. GFR  (NO RACE VARIABLE): >60 ML/MIN/1.73 M^2
GLUCOSE SERPL-MCNC: 89 MG/DL (ref 70–110)
GLUCOSE UR QL STRIP: NEGATIVE
HCT VFR BLD AUTO: 43.4 % (ref 37–48.5)
HGB BLD-MCNC: 15.9 G/DL (ref 12–16)
HGB UR QL STRIP: NEGATIVE
IMM GRANULOCYTES # BLD AUTO: 0.02 K/UL (ref 0–0.04)
IMM GRANULOCYTES NFR BLD AUTO: 0.2 % (ref 0–0.5)
KETONES UR QL STRIP: NEGATIVE
LEUKOCYTE ESTERASE UR QL STRIP: NEGATIVE
LYMPHOCYTES # BLD AUTO: 1.4 K/UL (ref 1–4.8)
LYMPHOCYTES NFR BLD: 16.7 % (ref 18–48)
MCH RBC QN AUTO: 32.6 PG (ref 27–31)
MCHC RBC AUTO-ENTMCNC: 36.6 G/DL (ref 32–36)
MCV RBC AUTO: 89 FL (ref 82–98)
MONOCYTES # BLD AUTO: 0.4 K/UL (ref 0.3–1)
MONOCYTES NFR BLD: 4.3 % (ref 4–15)
NEUTROPHILS # BLD AUTO: 6.1 K/UL (ref 1.8–7.7)
NEUTROPHILS NFR BLD: 76.3 % (ref 38–73)
NITRITE UR QL STRIP: NEGATIVE
NRBC BLD-RTO: 0 /100 WBC
PH UR STRIP: 8 [PH] (ref 5–8)
PLATELET # BLD AUTO: 213 K/UL (ref 150–450)
PMV BLD AUTO: 8.8 FL (ref 9.2–12.9)
POC MOLECULAR INFLUENZA A AGN: NEGATIVE
POC MOLECULAR INFLUENZA B AGN: NEGATIVE
POCT GLUCOSE: 100 MG/DL (ref 70–110)
POTASSIUM SERPL-SCNC: 3.9 MMOL/L (ref 3.5–5.1)
PROT SERPL-MCNC: 7.1 G/DL (ref 6–8.4)
PROT UR QL STRIP: ABNORMAL
RBC # BLD AUTO: 4.87 M/UL (ref 4–5.4)
SODIUM SERPL-SCNC: 133 MMOL/L (ref 136–145)
SP GR UR STRIP: >1.03 (ref 1–1.03)
URN SPEC COLLECT METH UR: ABNORMAL
UROBILINOGEN UR STRIP-ACNC: NEGATIVE EU/DL
WBC # BLD AUTO: 8.06 K/UL (ref 3.9–12.7)

## 2025-03-17 PROCEDURE — 80307 DRUG TEST PRSMV CHEM ANLYZR: CPT | Mod: HCNC

## 2025-03-17 PROCEDURE — G0378 HOSPITAL OBSERVATION PER HR: HCPCS | Mod: HCNC

## 2025-03-17 PROCEDURE — 99285 EMERGENCY DEPT VISIT HI MDM: CPT | Mod: 25,HCNC

## 2025-03-17 PROCEDURE — 25000003 PHARM REV CODE 250: Mod: HCNC | Performed by: EMERGENCY MEDICINE

## 2025-03-17 PROCEDURE — 80053 COMPREHEN METABOLIC PANEL: CPT | Mod: HCNC

## 2025-03-17 PROCEDURE — 93005 ELECTROCARDIOGRAM TRACING: CPT | Mod: HCNC

## 2025-03-17 PROCEDURE — 81003 URINALYSIS AUTO W/O SCOPE: CPT | Mod: HCNC

## 2025-03-17 PROCEDURE — 85025 COMPLETE CBC W/AUTO DIFF WBC: CPT | Mod: HCNC

## 2025-03-17 PROCEDURE — 93010 ELECTROCARDIOGRAM REPORT: CPT | Mod: HCNC,,, | Performed by: INTERNAL MEDICINE

## 2025-03-17 PROCEDURE — 25500020 PHARM REV CODE 255: Mod: HCNC | Performed by: EMERGENCY MEDICINE

## 2025-03-17 RX ORDER — HYDROXYZINE HYDROCHLORIDE 25 MG/1
25 TABLET, FILM COATED ORAL 3 TIMES DAILY PRN
Status: DISCONTINUED | OUTPATIENT
Start: 2025-03-17 | End: 2025-03-19 | Stop reason: HOSPADM

## 2025-03-17 RX ORDER — IPRATROPIUM BROMIDE AND ALBUTEROL SULFATE 2.5; .5 MG/3ML; MG/3ML
3 SOLUTION RESPIRATORY (INHALATION) DAILY
Status: DISCONTINUED | OUTPATIENT
Start: 2025-03-18 | End: 2025-03-19 | Stop reason: HOSPADM

## 2025-03-17 RX ORDER — MECLIZINE HYDROCHLORIDE 25 MG/1
25 TABLET ORAL 3 TIMES DAILY PRN
Status: DISCONTINUED | OUTPATIENT
Start: 2025-03-18 | End: 2025-03-19 | Stop reason: HOSPADM

## 2025-03-17 RX ORDER — BISACODYL 10 MG/1
10 SUPPOSITORY RECTAL DAILY PRN
Status: DISCONTINUED | OUTPATIENT
Start: 2025-03-18 | End: 2025-03-19 | Stop reason: HOSPADM

## 2025-03-17 RX ORDER — DIAZEPAM 2 MG/1
2 TABLET ORAL ONCE AS NEEDED
Status: DISCONTINUED | OUTPATIENT
Start: 2025-03-17 | End: 2025-03-17

## 2025-03-17 RX ORDER — DIAZEPAM 5 MG/1
5 TABLET ORAL EVERY 6 HOURS PRN
Status: DISCONTINUED | OUTPATIENT
Start: 2025-03-17 | End: 2025-03-19 | Stop reason: HOSPADM

## 2025-03-17 RX ORDER — HYDROCODONE BITARTRATE AND ACETAMINOPHEN 10; 325 MG/1; MG/1
1 TABLET ORAL EVERY 6 HOURS PRN
Refills: 0 | Status: DISCONTINUED | OUTPATIENT
Start: 2025-03-17 | End: 2025-03-19 | Stop reason: HOSPADM

## 2025-03-17 RX ORDER — ONDANSETRON HYDROCHLORIDE 2 MG/ML
4 INJECTION, SOLUTION INTRAVENOUS EVERY 12 HOURS PRN
Status: DISCONTINUED | OUTPATIENT
Start: 2025-03-18 | End: 2025-03-19 | Stop reason: HOSPADM

## 2025-03-17 RX ORDER — TRIAMTERENE AND HYDROCHLOROTHIAZIDE 37.5; 25 MG/1; MG/1
1 CAPSULE ORAL EVERY OTHER DAY
Status: DISCONTINUED | OUTPATIENT
Start: 2025-03-19 | End: 2025-03-18

## 2025-03-17 RX ORDER — ESCITALOPRAM OXALATE 10 MG/1
20 TABLET ORAL DAILY
Status: DISCONTINUED | OUTPATIENT
Start: 2025-03-18 | End: 2025-03-19 | Stop reason: HOSPADM

## 2025-03-17 RX ORDER — CARBAMAZEPINE 200 MG/1
200 TABLET ORAL
Status: COMPLETED | OUTPATIENT
Start: 2025-03-17 | End: 2025-03-17

## 2025-03-17 RX ORDER — ASPIRIN 325 MG
325 TABLET ORAL
Status: COMPLETED | OUTPATIENT
Start: 2025-03-17 | End: 2025-03-17

## 2025-03-17 RX ORDER — CARBAMAZEPINE 100 MG/1
200 TABLET, EXTENDED RELEASE ORAL 2 TIMES DAILY
Status: DISCONTINUED | OUTPATIENT
Start: 2025-03-18 | End: 2025-03-19 | Stop reason: HOSPADM

## 2025-03-17 RX ORDER — CETIRIZINE HYDROCHLORIDE 10 MG/1
10 TABLET ORAL DAILY
Status: DISCONTINUED | OUTPATIENT
Start: 2025-03-18 | End: 2025-03-19 | Stop reason: HOSPADM

## 2025-03-17 RX ORDER — AMOXICILLIN 250 MG
1 CAPSULE ORAL DAILY PRN
Status: DISCONTINUED | OUTPATIENT
Start: 2025-03-18 | End: 2025-03-19 | Stop reason: HOSPADM

## 2025-03-17 RX ORDER — GABAPENTIN 300 MG/1
600 CAPSULE ORAL 3 TIMES DAILY
Status: DISCONTINUED | OUTPATIENT
Start: 2025-03-17 | End: 2025-03-19 | Stop reason: HOSPADM

## 2025-03-17 RX ORDER — ASPIRIN 81 MG/1
81 TABLET ORAL DAILY
Status: DISCONTINUED | OUTPATIENT
Start: 2025-03-18 | End: 2025-03-18

## 2025-03-17 RX ORDER — SODIUM CHLORIDE 0.9 % (FLUSH) 0.9 %
10 SYRINGE (ML) INJECTION
Status: DISCONTINUED | OUTPATIENT
Start: 2025-03-18 | End: 2025-03-19 | Stop reason: HOSPADM

## 2025-03-17 RX ORDER — GABAPENTIN 400 MG/1
800 CAPSULE ORAL ONCE
Status: COMPLETED | OUTPATIENT
Start: 2025-03-17 | End: 2025-03-17

## 2025-03-17 RX ORDER — METOPROLOL SUCCINATE 50 MG/1
100 TABLET, EXTENDED RELEASE ORAL DAILY
Status: DISCONTINUED | OUTPATIENT
Start: 2025-03-18 | End: 2025-03-18

## 2025-03-17 RX ORDER — MECLIZINE HYDROCHLORIDE 25 MG/1
25 TABLET ORAL
Status: COMPLETED | OUTPATIENT
Start: 2025-03-17 | End: 2025-03-17

## 2025-03-17 RX ORDER — HYDRALAZINE HYDROCHLORIDE 20 MG/ML
10 INJECTION INTRAMUSCULAR; INTRAVENOUS EVERY 6 HOURS PRN
Status: DISCONTINUED | OUTPATIENT
Start: 2025-03-18 | End: 2025-03-18

## 2025-03-17 RX ORDER — TIZANIDINE 4 MG/1
12 TABLET ORAL ONCE
Status: COMPLETED | OUTPATIENT
Start: 2025-03-17 | End: 2025-03-17

## 2025-03-17 RX ORDER — ATORVASTATIN CALCIUM 40 MG/1
40 TABLET, FILM COATED ORAL DAILY
Status: DISCONTINUED | OUTPATIENT
Start: 2025-03-18 | End: 2025-03-19 | Stop reason: HOSPADM

## 2025-03-17 RX ADMIN — MECLIZINE HYDROCHLORIDE 25 MG: 25 TABLET ORAL at 08:03

## 2025-03-17 RX ADMIN — ASPIRIN 325 MG ORAL TABLET 325 MG: 325 PILL ORAL at 10:03

## 2025-03-17 RX ADMIN — TIZANIDINE 12 MG: 4 TABLET ORAL at 10:03

## 2025-03-17 RX ADMIN — GABAPENTIN 800 MG: 400 CAPSULE ORAL at 10:03

## 2025-03-17 RX ADMIN — CARBAMAZEPINE 200 MG: 200 TABLET ORAL at 10:03

## 2025-03-17 RX ADMIN — IOHEXOL 85 ML: 350 INJECTION, SOLUTION INTRAVENOUS at 08:03

## 2025-03-17 NOTE — TELEPHONE ENCOUNTER
F/u with pt. She said her symptoms returned around 1:30PM today and have been constant since this this. She said the symptoms are not as bad today. Pt seems as if she is having trouble processing her thoughts. Her , Jared, joined our conversation and confirms this as well. Advised pt and her  to go to the ER for evaluation. They v/u.

## 2025-03-17 NOTE — TELEPHONE ENCOUNTER
"Spoke with pt who reports she experienced severe dizziness she describes as "being on a boat" on Friday. It happened around lunch on Friday and 1.5 hours that night. During the second episode she experienced extreme nausea. The symptoms happened with movement and without. Unsure if she had vision issues. Denies having a headache at this time. She is currently taking her daughter's meclizine 25 mg TID which seems to be helping as the symptoms are no longer present but pt is frightened they will return. She states she has felt lightheaded in the past but has not experienced dizziness like this in the past. She is taking Avonex as prescribed. Denies infections.     "

## 2025-03-18 ENCOUNTER — PATIENT OUTREACH (OUTPATIENT)
Dept: ADMINISTRATIVE | Facility: OTHER | Age: 60
End: 2025-03-18
Payer: MEDICARE

## 2025-03-18 LAB
ALBUMIN SERPL BCP-MCNC: 3.4 G/DL (ref 3.5–5.2)
ALP SERPL-CCNC: 111 U/L (ref 40–150)
ALT SERPL W/O P-5'-P-CCNC: 11 U/L (ref 10–44)
AMPHET+METHAMPHET UR QL: NEGATIVE
ANION GAP SERPL CALC-SCNC: 9 MMOL/L (ref 8–16)
AST SERPL-CCNC: 10 U/L (ref 10–40)
AV INDEX (PROSTH): 0.96
AV MEAN GRADIENT: 3 MMHG
AV PEAK GRADIENT: 6 MMHG
AV VALVE AREA BY VELOCITY RATIO: 2.6 CM²
AV VALVE AREA: 2.7 CM²
AV VELOCITY RATIO: 0.92
BARBITURATES UR QL SCN>200 NG/ML: NEGATIVE
BASOPHILS # BLD AUTO: 0.02 K/UL (ref 0–0.2)
BASOPHILS NFR BLD: 0.3 % (ref 0–1.9)
BENZODIAZ UR QL SCN>200 NG/ML: NEGATIVE
BILIRUB SERPL-MCNC: 0.5 MG/DL (ref 0.1–1)
BSA FOR ECHO PROCEDURE: 1.58 M2
BUN SERPL-MCNC: 11 MG/DL (ref 6–20)
BZE UR QL SCN: NEGATIVE
CALCIUM SERPL-MCNC: 8.8 MG/DL (ref 8.7–10.5)
CANNABINOIDS UR QL SCN: NEGATIVE
CHLORIDE SERPL-SCNC: 102 MMOL/L (ref 95–110)
CO2 SERPL-SCNC: 23 MMOL/L (ref 23–29)
CREAT SERPL-MCNC: 0.6 MG/DL (ref 0.5–1.4)
CREAT UR-MCNC: 33.9 MG/DL (ref 15–325)
CV ECHO LV RWT: 0.47 CM
DIFFERENTIAL METHOD BLD: ABNORMAL
DOP CALC AO PEAK VEL: 1.2 M/S
DOP CALC AO VTI: 31 CM
DOP CALC LVOT AREA: 2.8 CM2
DOP CALC LVOT DIAMETER: 1.9 CM
DOP CALC LVOT PEAK VEL: 1.1 M/S
DOP CALC LVOT STROKE VOLUME: 84.7 CM3
DOP CALCLVOT PEAK VEL VTI: 29.9 CM
E WAVE DECELERATION TIME: 223 MSEC
E/A RATIO: 1.15
E/E' RATIO: 10 M/S
ECHO LV POSTERIOR WALL: 0.9 CM (ref 0.6–1.1)
EOSINOPHIL # BLD AUTO: 0.2 K/UL (ref 0–0.5)
EOSINOPHIL NFR BLD: 3.1 % (ref 0–8)
ERYTHROCYTE [DISTWIDTH] IN BLOOD BY AUTOMATED COUNT: 12.3 % (ref 11.5–14.5)
EST. GFR  (NO RACE VARIABLE): >60 ML/MIN/1.73 M^2
FRACTIONAL SHORTENING: 39.5 % (ref 28–44)
GLUCOSE SERPL-MCNC: 90 MG/DL (ref 70–110)
HCT VFR BLD AUTO: 39.1 % (ref 37–48.5)
HGB BLD-MCNC: 13.7 G/DL (ref 12–16)
IMM GRANULOCYTES # BLD AUTO: 0.03 K/UL (ref 0–0.04)
IMM GRANULOCYTES NFR BLD AUTO: 0.5 % (ref 0–0.5)
INTERVENTRICULAR SEPTUM: 0.8 CM (ref 0.6–1.1)
IVC DIAMETER: 1.72 CM
IVRT: 80 MSEC
LA MAJOR: 4.9 CM
LA MINOR: 4.9 CM
LA WIDTH: 3.2 CM
LEFT ATRIUM SIZE: 3.9 CM
LEFT ATRIUM VOLUME INDEX: 34 ML/M2
LEFT ATRIUM VOLUME: 52 CM3
LEFT INTERNAL DIMENSION IN SYSTOLE: 2.3 CM (ref 2.1–4)
LEFT VENTRICLE DIASTOLIC VOLUME INDEX: 40 ML/M2
LEFT VENTRICLE DIASTOLIC VOLUME: 62 ML
LEFT VENTRICLE MASS INDEX: 60.2 G/M2
LEFT VENTRICLE SYSTOLIC VOLUME INDEX: 11.6 ML/M2
LEFT VENTRICLE SYSTOLIC VOLUME: 18 ML
LEFT VENTRICULAR INTERNAL DIMENSION IN DIASTOLE: 3.8 CM (ref 3.5–6)
LEFT VENTRICULAR MASS: 93.4 G
LV LATERAL E/E' RATIO: 9 M/S
LV SEPTAL E/E' RATIO: 11.3 M/S
LVED V (TEICH): 62.01 ML
LVES V (TEICH): 18.3 ML
LVOT MG: 2.75 MMHG
LVOT MV: 0.79 CM/S
LYMPHOCYTES # BLD AUTO: 1.7 K/UL (ref 1–4.8)
LYMPHOCYTES NFR BLD: 29 % (ref 18–48)
MAGNESIUM SERPL-MCNC: 1.8 MG/DL (ref 1.6–2.6)
MCH RBC QN AUTO: 31.8 PG (ref 27–31)
MCHC RBC AUTO-ENTMCNC: 35 G/DL (ref 32–36)
MCV RBC AUTO: 91 FL (ref 82–98)
METHADONE UR QL SCN>300 NG/ML: NEGATIVE
MONOCYTES # BLD AUTO: 0.4 K/UL (ref 0.3–1)
MONOCYTES NFR BLD: 7.5 % (ref 4–15)
MV PEAK A VEL: 0.78 M/S
MV PEAK E VEL: 0.9 M/S
MV STENOSIS PRESSURE HALF TIME: 64.79 MS
MV VALVE AREA P 1/2 METHOD: 3.4 CM2
NEUTROPHILS # BLD AUTO: 3.4 K/UL (ref 1.8–7.7)
NEUTROPHILS NFR BLD: 59.6 % (ref 38–73)
NRBC BLD-RTO: 0 /100 WBC
OHS CV RV/LV RATIO: 0.84 CM
OHS QRS DURATION: 82 MS
OHS QTC CALCULATION: 409 MS
OPIATES UR QL SCN: NEGATIVE
PCP UR QL SCN>25 NG/ML: NEGATIVE
PHOSPHATE SERPL-MCNC: 4.2 MG/DL (ref 2.7–4.5)
PISA TR MAX VEL: 3 M/S
PLATELET # BLD AUTO: 179 K/UL (ref 150–450)
PMV BLD AUTO: 9.1 FL (ref 9.2–12.9)
POTASSIUM SERPL-SCNC: 3.3 MMOL/L (ref 3.5–5.1)
PROT SERPL-MCNC: 6.3 G/DL (ref 6–8.4)
PULM VEIN S/D RATIO: 1.1
PV PEAK D VEL: 0.51 M/S
PV PEAK GRADIENT: 3 MMHG
PV PEAK S VEL: 0.56 M/S
PV PEAK VELOCITY: 0.84 M/S
RA MAJOR: 3.69 CM
RA PRESSURE ESTIMATED: 3 MMHG
RA WIDTH: 3.5 CM
RBC # BLD AUTO: 4.31 M/UL (ref 4–5.4)
RIGHT VENTRICLE DIASTOLIC BASEL DIMENSION: 3.2 CM
RIGHT VENTRICULAR END-DIASTOLIC DIMENSION: 3.19 CM
RV TB RVSP: 6 MMHG
RV TISSUE DOPPLER FREE WALL SYSTOLIC VELOCITY 1 (APICAL 4 CHAMBER VIEW): 8.05 CM/S
SINUS: 3.03 CM
SODIUM SERPL-SCNC: 134 MMOL/L (ref 136–145)
STJ: 2.7 CM
TDI LATERAL: 0.1 M/S
TDI SEPTAL: 0.08 M/S
TDI: 0.09 M/S
TOXICOLOGY INFORMATION: NORMAL
TR MAX PG: 35 MMHG
TRICUSPID ANNULAR PLANE SYSTOLIC EXCURSION: 1.57 CM
TV PEAK GRADIENT: 1 MMHG
TV REST PULMONARY ARTERY PRESSURE: 39 MMHG
WBC # BLD AUTO: 5.73 K/UL (ref 3.9–12.7)
Z-SCORE OF LEFT VENTRICULAR DIMENSION IN END DIASTOLE: -1.66
Z-SCORE OF LEFT VENTRICULAR DIMENSION IN END SYSTOLE: -1.51

## 2025-03-18 PROCEDURE — 80053 COMPREHEN METABOLIC PANEL: CPT | Mod: HCNC | Performed by: INTERNAL MEDICINE

## 2025-03-18 PROCEDURE — 97161 PT EVAL LOW COMPLEX 20 MIN: CPT | Mod: HCNC

## 2025-03-18 PROCEDURE — 85025 COMPLETE CBC W/AUTO DIFF WBC: CPT | Mod: HCNC | Performed by: INTERNAL MEDICINE

## 2025-03-18 PROCEDURE — 94640 AIRWAY INHALATION TREATMENT: CPT | Mod: HCNC

## 2025-03-18 PROCEDURE — 83735 ASSAY OF MAGNESIUM: CPT | Mod: HCNC | Performed by: INTERNAL MEDICINE

## 2025-03-18 PROCEDURE — 25000242 PHARM REV CODE 250 ALT 637 W/ HCPCS: Mod: HCNC | Performed by: HOSPITALIST

## 2025-03-18 PROCEDURE — 25000003 PHARM REV CODE 250: Mod: HCNC | Performed by: INTERNAL MEDICINE

## 2025-03-18 PROCEDURE — G0378 HOSPITAL OBSERVATION PER HR: HCPCS | Mod: HCNC

## 2025-03-18 PROCEDURE — 99214 OFFICE O/P EST MOD 30 MIN: CPT | Mod: HCNC,,, | Performed by: STUDENT IN AN ORGANIZED HEALTH CARE EDUCATION/TRAINING PROGRAM

## 2025-03-18 PROCEDURE — 84100 ASSAY OF PHOSPHORUS: CPT | Mod: HCNC | Performed by: INTERNAL MEDICINE

## 2025-03-18 PROCEDURE — 94760 N-INVAS EAR/PLS OXIMETRY 1: CPT | Mod: HCNC

## 2025-03-18 RX ORDER — DIAZEPAM 10 MG/2ML
2.5 INJECTION INTRAMUSCULAR
Status: DISCONTINUED | OUTPATIENT
Start: 2025-03-18 | End: 2025-03-19 | Stop reason: HOSPADM

## 2025-03-18 RX ORDER — ENOXAPARIN SODIUM 100 MG/ML
40 INJECTION SUBCUTANEOUS EVERY 24 HOURS
Status: DISCONTINUED | OUTPATIENT
Start: 2025-03-18 | End: 2025-03-19 | Stop reason: HOSPADM

## 2025-03-18 RX ADMIN — IPRATROPIUM BROMIDE AND ALBUTEROL SULFATE 3 ML: 2.5; .5 SOLUTION RESPIRATORY (INHALATION) at 08:03

## 2025-03-18 RX ADMIN — CARBAMAZEPINE 200 MG: 100 TABLET, EXTENDED RELEASE ORAL at 09:03

## 2025-03-18 RX ADMIN — GABAPENTIN 600 MG: 300 CAPSULE ORAL at 09:03

## 2025-03-18 RX ADMIN — CETIRIZINE HYDROCHLORIDE 10 MG: 10 TABLET, FILM COATED ORAL at 09:03

## 2025-03-18 RX ADMIN — ATORVASTATIN CALCIUM 40 MG: 40 TABLET, FILM COATED ORAL at 09:03

## 2025-03-18 RX ADMIN — TIZANIDINE 6 MG: 4 TABLET ORAL at 09:03

## 2025-03-18 RX ADMIN — ESCITALOPRAM OXALATE 20 MG: 10 TABLET ORAL at 09:03

## 2025-03-18 RX ADMIN — GABAPENTIN 600 MG: 300 CAPSULE ORAL at 02:03

## 2025-03-18 NOTE — CONSULTS
[facility] - Emergency Dept  Neurology  Consult Note    Patient Name: Zoey Cali  MRN: [id number]  Admission Date: 3/17/2025  Hospital Length of Stay: 0 days  Code Status: Full Code   Attending Provider: Vickey Farrar MD   Consulting Provider: Melvin Cobos MD  Primary Care Physician: Devon Collier MD  Principal Problem:Dizziness    Inpatient Consult to Neurology Services (General Neurology)  Consult performed by: Melvin Cobos MD  Consult ordered by: Fredi Maddox MD         Subjective:     Chief Complaint:  Dizziness      HPI:   59 year old female with medical history of relapsing and remitting MS on Avonex weekly with admission concerns neurological complaints / And neurology consulted for the same.    Symptoms in the spectrum of dizziness / non vertiginous / with associated gait instability - intermittent episodes over the last 1 week / no other new focal motor or sensory or cranial nerve deficits. No compressive neuropathy pattern of presentation. No associated complex headaches or clinical seizures. No history of strokes, epilepsy or complex migraines.     Exam: awake, alert, following commands, no obvious aphasia or neglect or visual field deficits, no focal motor or sensory or cranial nerve deficits     MRI brain wo contrast negative for acute findings   CTA head and neck negative for correlating pathology       Past Medical History:   Diagnosis Date    Allergy     Anemia     Anxiety     Breast cyst     Chronic kidney disease     Depression     Fibrocystic breast     Hypertension     Multiple sclerosis     Multiple sclerosis     Neuromuscular disorder     Osteoporosis     Rib fracture 2015       Past Surgical History:   Procedure Laterality Date    APPENDECTOMY      BREAST BIOPSY Left 2012    BREAST CYST ASPIRATION      BREAST SURGERY  2004    excisional biopsy      SECTION, CLASSIC      CHALAZION EXCISION  at age 9    CHOLECYSTECTOMY      EYE SURGERY       HYSTERECTOMY  2001    OOPHORECTOMY  2001       Review of patient's allergies indicates:   Allergen Reactions    Lomotil [diphenoxylate-atropine]      Causes stomach spasms    Dilaudid [hydromorphone (bulk)] Itching       Current Neurological Medications:     No current facility-administered medications on file prior to encounter.     Current Outpatient Medications on File Prior to Encounter   Medication Sig    carBAMazepine (TEGRETOL XR) 200 MG 12 hr tablet TAKE ONE TABLET BY MOUTH TWICE DAILY    cholecalciferol, vitamin D3, (VITAMIN D3) 125 mcg (5,000 unit) Tab Take 1 tablet (5,000 Units total) by mouth once daily.    EScitalopram oxalate (LEXAPRO) 20 MG tablet Take 1 tablet (20 mg total) by mouth once daily.    gabapentin (NEURONTIN) 600 MG tablet Take 1 tablet (600 mg total) by mouth 3 (three) times daily.    metoprolol succinate (TOPROL-XL) 100 MG 24 hr tablet Take 1 tablet (100 mg total) by mouth once daily.    simvastatin (ZOCOR) 40 MG tablet Take 1 tablet (40 mg total) by mouth every evening.    cetirizine (ZYRTEC) 10 MG tablet Take 1 tablet (10 mg total) by mouth once daily.    ciclopirox (PENLAC) 8 % Soln Apply topically nightly.    diazePAM (VALIUM) 5 MG tablet Take 1 tablet (5 mg total) by mouth every 6 (six) hours as needed.    gabapentin (NEURONTIN) 400 MG capsule TAKE 2 CAPSULES BY MOUTH EVERY EVENING    HYDROcodone-acetaminophen (NORCO)  mg per tablet Take 1 tablet by mouth every 6 (six) hours as needed for Pain.    hydrOXYzine HCL (ATARAX) 25 MG tablet Take 1 tablet (25 mg total) by mouth 3 (three) times daily.    ibuprofen (ADVIL,MOTRIN) 800 MG tablet TAKE ONE TABLET BY MOUTH EVERY 8 HOURS WITH FOOD AND WATER    interferon beta-1a (AVONEX) 30 mcg/0.5 mL PnKt Inject 0.5 mLs (30 mcg total) into the muscle once a week.    ondansetron (ZOFRAN-ODT) 8 MG TbDL DISSOLVE 1 TABLET ON THE TONGUE THREE TIMES DAILY AS NEEDED Strength: 8 mg    tiotropium-olodateroL (STIOLTO RESPIMAT) 2.5-2.5 mcg/actuation  Mist inhale TWO puffs INTO THE LUNGS DAILY    tiZANidine (ZANAFLEX) 4 MG tablet TAKE 2 TABLETS IN THE MORNING, AT NOON AND IN THE EVENING AND TAKE 3 TABLETS AT NIGHT (9 TABLETS PER DAY); only takes 3 at bedtime and none during the day    triamterene-hydrochlorothiazide 37.5-25 mg (MAXZIDE-25) 37.5-25 mg per tablet Take 1 tablet by mouth every other day.     Family History       Problem Relation (Age of Onset)    Arthritis Mother, Son, Maternal Grandmother, Paternal Grandmother    Diabetes Father    Drug abuse Son    Heart attack Paternal Grandfather    Heart disease Father, Paternal Grandfather    Hyperlipidemia Father    Hypertension Father, Maternal Grandmother    Stroke Father, Maternal Grandmother          Tobacco Use    Smoking status: Every Day     Current packs/day: 1.00     Average packs/day: 1 pack/day for 30.0 years (30.0 ttl pk-yrs)     Types: Cigarettes    Smokeless tobacco: Current   Substance and Sexual Activity    Alcohol use: No     Alcohol/week: 0.0 standard drinks of alcohol    Drug use: No    Sexual activity: Not Currently     Birth control/protection: See Surgical Hx     Comment: Hysterectomy     Review of Systems   Constitutional:  Negative for chills and fever.   Respiratory:  Negative for shortness of breath.    Cardiovascular:  Positive for chest pain.   Neurological:  Positive for dizziness. Negative for facial asymmetry, weakness, light-headedness and numbness.   Psychiatric/Behavioral:  Negative for agitation and confusion.      Objective:     Vital Signs (Most Recent):  Temp: 97.9 °F (36.6 °C) (03/18/25 0629)  Pulse: (!) 51 (03/18/25 1400)  Resp: 15 (03/18/25 1400)  BP: (!) 141/63 (03/18/25 1300)  SpO2: 98 % (03/18/25 1400) Vital Signs (24h Range):  Temp:  [97.7 °F (36.5 °C)-98.3 °F (36.8 °C)] 97.9 °F (36.6 °C)  Pulse:  [44-63] 51  Resp:  [11-37] 15  SpO2:  [87 %-99 %] 98 %  BP: ()/(45-80) 141/63     Weight: 59 kg (130 lb 1.1 oz)  Body mass index is 25.4 kg/m².     Physical  "Exam  HENT:      Head: Normocephalic and atraumatic.   Eyes:      Extraocular Movements: Extraocular movements intact and EOM normal.   Pulmonary:      Effort: No respiratory distress.   Neurological:      Mental Status: She is alert and oriented to person, place, and time.   Psychiatric:         Speech: Speech normal.          NEUROLOGICAL EXAMINATION:     MENTAL STATUS   Oriented to person, place, and time.   Attention: normal. Concentration: normal.   Speech: speech is normal   Level of consciousness: alert    CRANIAL NERVES     CN III, IV, VI   Extraocular motions are normal.   Ophthalmoparesis: none    CN VII   Facial expression full, symmetric.     MOTOR EXAM        No new focal motor deficits       SENSORY EXAM        No new focal sensory deficits       GAIT AND COORDINATION     Tremor   Resting tremor: absent      Significant Labs: Hemoglobin A1c: No results for input(s): "HGBA1C" in the last 720 hours.  Blood Culture: No results for input(s): "LABBLOO" in the last 48 hours.  BMP:   Recent Labs   Lab 03/17/25  1800 03/18/25  0400   GLU 89 90   * 134*   K 3.9 3.3*    102   CO2 23 23   BUN 11 11   CREATININE 0.7 0.6   CALCIUM 8.8 8.8   MG  --  1.8     CBC:   Recent Labs   Lab 03/17/25  1800 03/18/25  0400   WBC 8.06 5.73   HGB 15.9 13.7   HCT 43.4 39.1    179     CMP:   Recent Labs   Lab 03/17/25  1800 03/18/25  0400   GLU 89 90   * 134*   K 3.9 3.3*    102   CO2 23 23   BUN 11 11   CREATININE 0.7 0.6   CALCIUM 8.8 8.8   MG  --  1.8   PROT 7.1 6.3   ALBUMIN 3.7 3.4*   BILITOT 0.4 0.5   ALKPHOS 120 111   AST 14 10   ALT 13 11   ANIONGAP 10 9     CSF Culture: No results for input(s): "CSFCULTURE" in the last 48 hours.  CSF Studies: No results for input(s): "ALIQUT", "APPEARCSF", "COLORCSF", "CSFWBC", "CSFRBC", "GLUCCSF", "LDHCSF", "PROTEINCSF", "VDRLCSF" in the last 48 hours.  Inflammatory Markers: No results for input(s): "SEDRATE", "CRP", "PROCAL" in the last 48 hours.  POCT " "Glucose:   Recent Labs   Lab 03/17/25  1747   POCTGLUCOSE 100     Prealbumin: No results for input(s): "PREALBUMIN" in the last 48 hours.  Respiratory Culture: No results for input(s): "GSRESP", "RESPIRATORYC" in the last 48 hours.  Urine Culture: No results for input(s): "LABURIN" in the last 48 hours.  Urine Studies:   Recent Labs   Lab 03/17/25 2138   COLORU Yellow   APPEARANCEUA Clear   PHUR 8.0   SPECGRAV >1.030*   PROTEINUA Trace*   GLUCUA Negative   KETONESU Negative   BILIRUBINUA Negative   OCCULTUA Negative   NITRITE Negative   UROBILINOGEN Negative   LEUKOCYTESUR Negative     Recent Lab Results  (Last 5 results in the past 24 hours)        03/18/25  0907   03/18/25  0400   03/17/25  2313   03/17/25  2138   03/17/25 2020        Benzodiazepines     Negative           Methadone metabolites     Negative           Phencyclidine     Negative           POC Molecular Influenza A Ag         Negative       POC Molecular Influenza B Ag         Negative       Albumin   3.4             ALP   111             ALT   11             Amphetamines, Urine     Negative           Anion Gap   9             Ao peak mira 1.2               Ao VTI 31.0               Appearance, UA       Clear         AST   10             AV valve area 2.7               TAWANDA by Velocity Ratio 2.6               AV mean gradient 3               AV index (prosthetic) 0.96               AV peak gradient 6               AV Velocity Ratio 0.92               Barbituates, Urine     Negative           Baso #   0.02             Basophil %   0.3             Bilirubin (UA)       Negative         BILIRUBIN TOTAL   0.5  Comment: For infants and newborns, interpretation of results should be based  on gestational age, weight and in agreement with clinical  observations.    Premature Infant recommended reference ranges:  Up to 24 hours.............<8.0 mg/dL  Up to 48 hours............<12.0 mg/dL  3-5 days..................<15.0 mg/dL  6-29 days.................<15.0 " mg/dL               BSA 1.58               BUN   11             Calcium   8.8             Chloride   102             CO2   23             Cocaine, Urine     Negative           Color, UA       Yellow         Creatinine   0.6             Urine Creatinine     33.9           Left Ventricle Relative Wall Thickness 0.47               Differential Method   Automated             E/A ratio 1.15               E/E' ratio 10               eGFR   >60             Eos #   0.2             Eos %   3.1             E wave deceleration time 223               FS 39.5               Glucose   90             Glucose, UA       Negative         Gran # (ANC)   3.4             Gran %   59.6             Hematocrit   39.1             Hemoglobin   13.7             Immature Grans (Abs)   0.03  Comment: Mild elevation in immature granulocytes is non specific and   can be seen in a variety of conditions including stress response,   acute inflammation, trauma and pregnancy. Correlation with other   laboratory and clinical findings is essential.               Immature Granulocytes   0.5             IVC diameter 1.72               IVRT 80               IVSd 0.8               Ketones, UA       Negative         LA WIDTH 3.2               Left Atrium Major Axis 4.9               Left Atrium Minor Axis 4.9               LA size 3.9               LA Vol 52               LA vol index 34               LVOT area 2.8               Leukocyte Esterase, UA       Negative         LV LATERAL E/E' RATIO 9.0               LV SEPTAL E/E' RATIO 11.3               LV EDV BP 62               LV Diastolic Volume Index 40.00               Left Ventricular End Diastolic Volume by Teichholz Method 62.01               Left Ventricular End Systolic Volume by Teichholz Method 18.30               LVIDd 3.8               LVIDs 2.3               LV mass 93.4               LV Mass Index 60.2               Left Ventricular Outflow Tract Mean Gradient 2.75               Left  Ventricular Outflow Tract Mean Velocity 0.79               LVOT diameter 1.9               LVOT peak juan m 1.1               LVOT stroke volume 84.7               LVOT peak VTI 29.9               LV ESV BP 18               LV Systolic Volume Index 11.6               Lymph #   1.7             Lymph %   29.0             Magnesium    1.8             MCH   31.8             MCHC   35.0             MCV   91             Mean e' 0.09               Mono #   0.4             Mono %   7.5             MPV   9.1             MV valve area p 1/2 method 3.40               MV Peak A Juan M 0.78               MV Peak E Juan M 0.90               MV stenosis pressure 1/2 time 64.79               NITRITE UA       Negative         nRBC   0             Blood, UA       Negative         Opiates, Urine     Negative           pH, UA       8.0         Phosphorus Level   4.2             Platelet Count   179             Potassium   3.3             PROTEIN TOTAL   6.3             Protein, UA       Trace  Comment: Recommend a 24 hour urine protein or a urine   protein/creatinine ratio if globulin induced proteinuria is  clinically suspected.           PV peak gradient 3               PV Peak D Juan M 0.51               PV Peak S Juan M 0.56               PV PEAK VELOCITY 0.84               Pulm vein S/D ratio 1.10               PW 0.9                Acceptable         Yes       RA Major Axis 3.69               Est. RA pres 3               RA Width 3.5               RBC   4.31             RDW   12.3             RV S' 8.05               RV TB RVSP 6               RV/LV Ratio 0.84               RVDD 3.19               RV- harrington basal diam 3.2               Sinus 3.03               Sodium   134             Spec Grav UA       >1.030         Specimen UA       Urine, Clean Catch         STJ 2.70               TAPSE 1.57               TDI SEPTAL 0.08               TDI LATERAL 0.10               Marijuana (THC) Metabolite     Negative           Toxicology  Information     SEE COMMENT  Comment: This screen includes the following classes of drugs at the listed   cut-off:    Benzodiazepines 200 ng/ml  Methadone 300 ng/ml  Cocaine metabolite 300 ng/ml  Opiates 300 ng/ml  Barbiturates 200 ng/ml  Amphetamines 1000 ng/ml  Marijuana metabs (THC) 50 ng/ml  Phencyclidine (PCP) 25 ng/ml    This is a screening test. If results do not correlate with clinical   presentation, then a confirmatory send out test is advised.     This report is intended for use in clinical monitoring and management   of   patients. It is not intended for use in employment related drug   testing.             Triscuspid Valve Regurgitation Peak Gradient 35               TR Max Juan M 3.0               TV PG 1               TV resting pulmonary artery pressure 39               UROBILINOGEN UA       Negative         WBC   5.73             ZLVIDD -1.66               ZLVIDS -1.51                                    All pertinent lab results from the past 24 hours have been reviewed.    Significant Imaging: I have reviewed and interpreted all pertinent imaging results/findings within the past 24 hours.  Assessment and Plan:     Multiple sclerosis  59 year old female with medical history of relapsing and remitting MS on Avonex weekly with admission concerns neurological complaints / And neurology consulted for the same.    Symptoms in the spectrum of dizziness / non vertiginous / with associated gait instability - intermittent episodes over the last 1 week / no other new focal motor or sensory or cranial nerve deficits. No compressive neuropathy pattern of presentation. No associated complex headaches or clinical seizures. No history of strokes, epilepsy or complex migraines.     Exam: awake, alert, following commands, no obvious aphasia or neglect or visual field deficits, no focal motor or sensory or cranial nerve deficits     MRI brain wo contrast negative for acute findings   CTA head and neck negative for  correlating pathology    Recs:  Suspect spectrum of multiple sclerosis / Needs evaluation for flare up / been a while since last imaging     MRI brain w wo contrast demyelinating protocol / including C-T spine with wo contrast   If any concerns for active demyelination / shall consider high dose IV Solumedrol 1 g x 3 days   If negative supportive care - f/u primary MS specialist     -- Correct lytes as needed / investigate and control infection if any / avoid hypoxia or hypercapnia / avoid hypoglycemia / control uremia - avoid rapid correction / avoid-correct metabolic-respiratory acidosis or alkalosis   -- Correct any nutritional deficiencies / correct anemia / provide nutritional support as appropriate / ambulate when appropriate   -- PT/OT evaluation and management / gait assistance / fall precautions         VTE Risk Mitigation (From admission, onward)           Ordered     enoxaparin injection 40 mg  Every 24 hours         03/18/25 0005     IP VTE LOW RISK PATIENT  Once         03/17/25 2303     Place sequential compression device  Until discontinued         03/17/25 2303                    Thank you for your consult.     Melvin Cobos MD  Neurology  [facility] - [place] - Emergency Dept

## 2025-03-18 NOTE — ASSESSMENT & PLAN NOTE
Patient's COPD is controlled currently.  Patient is currently off COPD Pathway. Continue scheduled inhalers

## 2025-03-18 NOTE — ASSESSMENT & PLAN NOTE
blood pressure range in the last 24 hours was: BP  Min: 90/53  Max: 146/80.The patient's inpatient anti-hypertensive regimen is listed below:  Current Antihypertensives       Possible contributing to dizziness  Hold metoprolol due to bradycardia and hypotension  Continue to hold diuretics

## 2025-03-18 NOTE — HPI
59-year-old female with multiple sclerosis, COPD who presents with 2 weeks of an intermittent sensation that the world is moving as if she is on a rocking boat and when she is experiencing this sensation, she has difficulty ambulating.    This sensation has been intermittent over the past 2 weeks and of varying severity.  She only has difficulty ambulating when she is experiencing the oral moving.  No febrile illness.  She denies focal weakness.  No difficulty with vision or swallowing.    She denies feeling lightheaded as if she is going to pass out tolerating oral intake.

## 2025-03-18 NOTE — ASSESSMENT & PLAN NOTE
This has waxed and waned over the past 2 weeks.  Over the course the day it has continued to improve.  She feels significantly better by the time of my interview and exam than when she presented.  -meclizine p.r.n.  -MRI pending.  Neurology consult.

## 2025-03-18 NOTE — PROGRESS NOTES
CHW - Initial Contact    This Community Health Worker completed Social Determinant of Health questionnaire with patient  at bedside  today.    Pt identified barriers of most importance are: has no needs at this time.   Referrals to community agencies completed with patient/caregiver consent outside of [facility] include: no: none at this time.  Referrals were put through [facility] - no: none at this time.  Support and Services: has no support at this time.  Other information discussed the patient needs / wants help with: Completed SDOH with patient at bedside today, pt has no needs at this time. Will follow up in two weeks to check pt's future needs.   Follow up required: yes.  Follow-up Outreach - Due: 3/24/2025

## 2025-03-18 NOTE — H&P
[facility]  [facility] Medicine  History & Physical    Patient Name: Zoey Cali  MRN: [id number]  Patient Class: OP- Observation  Admission Date: 3/17/2025  Attending Physician: Sánchez Vivas, *   Primary Care Provider: Devon Collier MD         Patient information was obtained from patient and ER records.     Subjective:     Principal Problem:Vertigo    Chief Complaint:   Chief Complaint   Patient presents with    Dizziness     Intermittent dizziness x2 week. Current episode started at 1330. Pt's family also reports delayed responses. Pt says she feels weak all over. Denies numbness/tingling, vision changes. No facial droop or slurred speech in triage. Pt is being assessed by PA in triage        HPI: 59-year-old female with multiple sclerosis, COPD who presents with 2 weeks of an intermittent sensation that the world is moving as if she is on a rocking boat and when she is experiencing this sensation, she has difficulty ambulating.    This sensation has been intermittent over the past 2 weeks and of varying severity.  She only has difficulty ambulating when she is experiencing the oral moving.  No febrile illness.  She denies focal weakness.  No difficulty with vision or swallowing.    She denies feeling lightheaded as if she is going to pass out tolerating oral intake.    Past Medical History:   Diagnosis Date    Allergy     Anemia     Anxiety     Breast cyst     Chronic kidney disease     Depression     Fibrocystic breast     Hypertension     Multiple sclerosis     Multiple sclerosis     Neuromuscular disorder     Osteoporosis     Rib fracture 2015       Past Surgical History:   Procedure Laterality Date    APPENDECTOMY      BREAST BIOPSY Left 2012    BREAST CYST ASPIRATION      BREAST SURGERY  2004    excisional biopsy      SECTION, CLASSIC      CHALAZION EXCISION  at age 9    CHOLECYSTECTOMY      EYE SURGERY      HYSTERECTOMY  2001    OOPHORECTOMY         Review of  patient's allergies indicates:   Allergen Reactions    Lomotil [diphenoxylate-atropine]      Causes stomach spasms    Dilaudid [hydromorphone (bulk)] Itching       No current facility-administered medications on file prior to encounter.     Current Outpatient Medications on File Prior to Encounter   Medication Sig    carBAMazepine (TEGRETOL XR) 200 MG 12 hr tablet TAKE ONE TABLET BY MOUTH TWICE DAILY    cholecalciferol, vitamin D3, (VITAMIN D3) 125 mcg (5,000 unit) Tab Take 1 tablet (5,000 Units total) by mouth once daily.    EScitalopram oxalate (LEXAPRO) 20 MG tablet Take 1 tablet (20 mg total) by mouth once daily.    gabapentin (NEURONTIN) 600 MG tablet Take 1 tablet (600 mg total) by mouth 3 (three) times daily.    metoprolol succinate (TOPROL-XL) 100 MG 24 hr tablet Take 1 tablet (100 mg total) by mouth once daily.    simvastatin (ZOCOR) 40 MG tablet Take 1 tablet (40 mg total) by mouth every evening.    cetirizine (ZYRTEC) 10 MG tablet Take 1 tablet (10 mg total) by mouth once daily.    ciclopirox (PENLAC) 8 % Soln Apply topically nightly.    diazePAM (VALIUM) 5 MG tablet Take 1 tablet (5 mg total) by mouth every 6 (six) hours as needed.    gabapentin (NEURONTIN) 400 MG capsule TAKE 2 CAPSULES BY MOUTH EVERY EVENING    HYDROcodone-acetaminophen (NORCO)  mg per tablet Take 1 tablet by mouth every 6 (six) hours as needed for Pain.    hydrOXYzine HCL (ATARAX) 25 MG tablet Take 1 tablet (25 mg total) by mouth 3 (three) times daily.    ibuprofen (ADVIL,MOTRIN) 800 MG tablet TAKE ONE TABLET BY MOUTH EVERY 8 HOURS WITH FOOD AND WATER    interferon beta-1a (AVONEX) 30 mcg/0.5 mL PnKt Inject 0.5 mLs (30 mcg total) into the muscle once a week.    ondansetron (ZOFRAN-ODT) 8 MG TbDL DISSOLVE 1 TABLET ON THE TONGUE THREE TIMES DAILY AS NEEDED Strength: 8 mg    tiotropium-olodateroL (STIOLTO RESPIMAT) 2.5-2.5 mcg/actuation Mist inhale TWO puffs INTO THE LUNGS DAILY    tiZANidine (ZANAFLEX) 4 MG tablet TAKE 2 TABLETS  IN THE MORNING, AT NOON AND IN THE EVENING AND TAKE 3 TABLETS AT NIGHT (9 TABLETS PER DAY); only takes 3 at bedtime and none during the day    triamterene-hydrochlorothiazide 37.5-25 mg (MAXZIDE-25) 37.5-25 mg per tablet Take 1 tablet by mouth every other day.     Family History       Problem Relation (Age of Onset)    Arthritis Mother, Son, Maternal Grandmother, Paternal Grandmother    Diabetes Father    Drug abuse Son    Heart attack Paternal Grandfather    Heart disease Father, Paternal Grandfather    Hyperlipidemia Father    Hypertension Father, Maternal Grandmother    Stroke Father, Maternal Grandmother          Tobacco Use    Smoking status: Every Day     Current packs/day: 1.00     Average packs/day: 1 pack/day for 30.0 years (30.0 ttl pk-yrs)     Types: Cigarettes    Smokeless tobacco: Current   Substance and Sexual Activity    Alcohol use: No     Alcohol/week: 0.0 standard drinks of alcohol    Drug use: No    Sexual activity: Not Currently     Birth control/protection: See Surgical Hx     Comment: Hysterectomy     Review of Systems   Constitutional:  Negative for appetite change, chills and fever.   Eyes:  Negative for visual disturbance.   Respiratory:  Negative for cough, chest tightness and shortness of breath.    Cardiovascular:  Negative for chest pain, palpitations and leg swelling.   Gastrointestinal:  Negative for abdominal pain, diarrhea, nausea and vomiting.        + heartburn the past couple of weeks.    Musculoskeletal:  Positive for gait problem. Negative for neck pain and neck stiffness.   Neurological:  Positive for dizziness. Negative for tremors, syncope, speech difficulty, weakness, numbness and headaches.     Objective:     Vital Signs (Most Recent):  Temp: 97.7 °F (36.5 °C) (03/17/25 1743)  Pulse: (!) 53 (03/17/25 2100)  Resp: 18 (03/17/25 2100)  BP: (!) 141/62 (03/17/25 2100)  SpO2: 96 % (03/17/25 2100) Vital Signs (24h Range):  Temp:  [97.7 °F (36.5 °C)] 97.7 °F (36.5 °C)  Pulse:   "[49-63] 53  Resp:  [18-33] 18  SpO2:  [96 %-97 %] 96 %  BP: (113-146)/(59-80) 141/62     Weight: 59 kg (130 lb)  Body mass index is 25.39 kg/m².     Physical Exam         At the time by interview the patient is not experiencing the sensation for which she presented.  In this context, she has a globally unremarkable exam including the absence of nystagmus and ataxia observed by other providers.    NAD, A/O 3   RRR   CTAB  Soft nontender nondistended, bowel sounds present   No edema   No abnormality in heel shin to toe testing, no nystagmus.  PERRL, EOMI.  Symmetric strength in major muscle groups.  Sensation to light touch intact.      Significant Labs: All pertinent labs within the past 24 hours have been reviewed.  CBC:   Recent Labs   Lab 03/17/25  1800   WBC 8.06   HGB 15.9   HCT 43.4        CMP:   Recent Labs   Lab 03/17/25  1800   *   K 3.9      CO2 23   GLU 89   BUN 11   CREATININE 0.7   CALCIUM 8.8   PROT 7.1   ALBUMIN 3.7   BILITOT 0.4   ALKPHOS 120   AST 14   ALT 13   ANIONGAP 10     Magnesium: No results for input(s): "MG" in the last 48 hours.    Significant Imaging: I have reviewed all pertinent imaging results/findings within the past 24 hours.  Assessment/Plan:     * Vertigo    This has waxed and waned over the past 2 weeks.  Over the course the day it has continued to improve.  She feels significantly better by the time of my interview and exam than when she presented.  -meclizine p.r.n.  -MRI without acute CVA; not continuing aspirin at this point.  Neurology consult.    Chronic obstructive pulmonary disease, unspecified  Patient's COPD is controlled currently.  Patient is currently off COPD Pathway. Continue scheduled inhalers     Multiple sclerosis    Neurology consulted.    HTN (hypertension)  Patient's blood pressure range in the last 24 hours was: BP  Min: 113/59  Max: 146/80.The patient's inpatient anti-hypertensive regimen is listed below:  Current " Antihypertensives  metoprolol succinate (TOPROL-XL) 24 hr tablet 100 mg, Daily, Oral  triamterene-hydrochlorothiazide 37.5-25 mg per capsule 1 capsule, Every other day, Oral  hydrALAZINE injection 10 mg, Every 6 hours PRN, Intravenous    Plan  - BP is controlled, no changes needed to their regimen  -       VTE Risk Mitigation (From admission, onward)           Ordered     IP VTE LOW RISK PATIENT  Once         03/17/25 2303     Place sequential compression device  Until discontinued         03/17/25 2303                       On 03/17/2025, patient should be placed in hospital observation services under my care.             Fredi Maddox MD  Department of Hospital Medicine  [facility] [place] - Emergency Dept

## 2025-03-18 NOTE — ED NOTES
Admit provider notified of 16mg of tizanidine administered instead of 12mg of tizanidine. Provider states to continue patient on cardiac monitoring. Primary nurse also made aware.

## 2025-03-18 NOTE — CONSULTS
Food & Nutrition  Education    Diet Education: Cardiac Diet Education  Time Spent: -  Learners: -      Nutrition Education provided with handouts (attached to d/c paperwork): Heart Healthy Diet, Mediterranean Diet      Comments: Consult received for heart healthy diet education. Unable to educate pt at this time, pt still in ED. RD to follow up at a later date when pt moved to floors to educate pt in person. Educational handouts attached to d/c paperwork.         Follow-Up: 3/19    Please Re-consult as needed        Thanks!

## 2025-03-18 NOTE — SUBJECTIVE & OBJECTIVE
Interval History: dizziness resolved. BLE neuropathy no change from baseline. BP and HR improved. Update Daughter at bedside     Review of Systems   Constitutional:  Negative for appetite change, chills and fever.   Eyes:  Negative for visual disturbance.   Respiratory:  Negative for cough, chest tightness and shortness of breath.    Cardiovascular:  Negative for chest pain, palpitations and leg swelling.   Gastrointestinal:  Negative for abdominal pain, diarrhea, nausea and vomiting.        + heartburn the past couple of weeks.    Musculoskeletal:  Positive for gait problem. Negative for neck pain and neck stiffness.   Neurological:  Positive for dizziness. Negative for tremors, syncope, speech difficulty, weakness, numbness and headaches.     Objective:     Vital Signs (Most Recent):  Temp: 97.9 °F (36.6 °C) (03/18/25 0629)  Pulse: (!) 52 (03/18/25 1000)  Resp: 17 (03/18/25 1000)  BP: (!) 114/57 (03/18/25 1000)  SpO2: (!) 94 % (03/18/25 1000) Vital Signs (24h Range):  Temp:  [97.7 °F (36.5 °C)-98.3 °F (36.8 °C)] 97.9 °F (36.6 °C)  Pulse:  [44-63] 52  Resp:  [11-37] 17  SpO2:  [87 %-99 %] 94 %  BP: ()/(45-80) 114/57     Weight: 59 kg (130 lb 1.1 oz)  Body mass index is 25.4 kg/m².  No intake or output data in the 24 hours ending 03/18/25 1103      Physical Exam  Constitutional:       Appearance: She is not ill-appearing.   Cardiovascular:      Rate and Rhythm: Bradycardia present.   Pulmonary:      Effort: Pulmonary effort is normal.      Breath sounds: Normal breath sounds.   Abdominal:      Palpations: Abdomen is soft.   Musculoskeletal:      Right lower leg: No edema.      Left lower leg: Edema present.   Skin:     General: Skin is warm and dry.   Neurological:      General: No focal deficit present.      Mental Status: She is alert and oriented to person, place, and time. Mental status is at baseline.      Sensory: Sensory deficit present.               Significant Labs: All pertinent labs within the past  24 hours have been reviewed.    Significant Imaging: I have reviewed all pertinent imaging results/findings within the past 24 hours.

## 2025-03-18 NOTE — SUBJECTIVE & OBJECTIVE
Past Medical History:   Diagnosis Date    Allergy     Anemia     Anxiety     Breast cyst     Chronic kidney disease     Depression     Fibrocystic breast     Hypertension     Multiple sclerosis     Multiple sclerosis     Neuromuscular disorder     Osteoporosis     Rib fracture 2015       Past Surgical History:   Procedure Laterality Date    APPENDECTOMY      BREAST BIOPSY Left 2012    BREAST CYST ASPIRATION      BREAST SURGERY  2004    excisional biopsy      SECTION, CLASSIC      CHALAZION EXCISION  at age 9    CHOLECYSTECTOMY      EYE SURGERY      HYSTERECTOMY      OOPHORECTOMY         Review of patient's allergies indicates:   Allergen Reactions    Lomotil [diphenoxylate-atropine]      Causes stomach spasms    Dilaudid [hydromorphone (bulk)] Itching       No current facility-administered medications on file prior to encounter.     Current Outpatient Medications on File Prior to Encounter   Medication Sig    carBAMazepine (TEGRETOL XR) 200 MG 12 hr tablet TAKE ONE TABLET BY MOUTH TWICE DAILY    cholecalciferol, vitamin D3, (VITAMIN D3) 125 mcg (5,000 unit) Tab Take 1 tablet (5,000 Units total) by mouth once daily.    EScitalopram oxalate (LEXAPRO) 20 MG tablet Take 1 tablet (20 mg total) by mouth once daily.    gabapentin (NEURONTIN) 600 MG tablet Take 1 tablet (600 mg total) by mouth 3 (three) times daily.    metoprolol succinate (TOPROL-XL) 100 MG 24 hr tablet Take 1 tablet (100 mg total) by mouth once daily.    simvastatin (ZOCOR) 40 MG tablet Take 1 tablet (40 mg total) by mouth every evening.    cetirizine (ZYRTEC) 10 MG tablet Take 1 tablet (10 mg total) by mouth once daily.    ciclopirox (PENLAC) 8 % Soln Apply topically nightly.    diazePAM (VALIUM) 5 MG tablet Take 1 tablet (5 mg total) by mouth every 6 (six) hours as needed.    gabapentin (NEURONTIN) 400 MG capsule TAKE 2 CAPSULES BY MOUTH EVERY EVENING    HYDROcodone-acetaminophen (NORCO)  mg per tablet Take 1 tablet by mouth  every 6 (six) hours as needed for Pain.    hydrOXYzine HCL (ATARAX) 25 MG tablet Take 1 tablet (25 mg total) by mouth 3 (three) times daily.    ibuprofen (ADVIL,MOTRIN) 800 MG tablet TAKE ONE TABLET BY MOUTH EVERY 8 HOURS WITH FOOD AND WATER    interferon beta-1a (AVONEX) 30 mcg/0.5 mL PnKt Inject 0.5 mLs (30 mcg total) into the muscle once a week.    ondansetron (ZOFRAN-ODT) 8 MG TbDL DISSOLVE 1 TABLET ON THE TONGUE THREE TIMES DAILY AS NEEDED Strength: 8 mg    tiotropium-olodateroL (STIOLTO RESPIMAT) 2.5-2.5 mcg/actuation Mist inhale TWO puffs INTO THE LUNGS DAILY    tiZANidine (ZANAFLEX) 4 MG tablet TAKE 2 TABLETS IN THE MORNING, AT NOON AND IN THE EVENING AND TAKE 3 TABLETS AT NIGHT (9 TABLETS PER DAY); only takes 3 at bedtime and none during the day    triamterene-hydrochlorothiazide 37.5-25 mg (MAXZIDE-25) 37.5-25 mg per tablet Take 1 tablet by mouth every other day.     Family History       Problem Relation (Age of Onset)    Arthritis Mother, Son, Maternal Grandmother, Paternal Grandmother    Diabetes Father    Drug abuse Son    Heart attack Paternal Grandfather    Heart disease Father, Paternal Grandfather    Hyperlipidemia Father    Hypertension Father, Maternal Grandmother    Stroke Father, Maternal Grandmother          Tobacco Use    Smoking status: Every Day     Current packs/day: 1.00     Average packs/day: 1 pack/day for 30.0 years (30.0 ttl pk-yrs)     Types: Cigarettes    Smokeless tobacco: Current   Substance and Sexual Activity    Alcohol use: No     Alcohol/week: 0.0 standard drinks of alcohol    Drug use: No    Sexual activity: Not Currently     Birth control/protection: See Surgical Hx     Comment: Hysterectomy     Review of Systems   Constitutional:  Negative for appetite change, chills and fever.   Eyes:  Negative for visual disturbance.   Respiratory:  Negative for cough, chest tightness and shortness of breath.    Cardiovascular:  Negative for chest pain, palpitations and leg swelling.  "  Gastrointestinal:  Negative for abdominal pain, diarrhea, nausea and vomiting.        + heartburn the past couple of weeks.    Musculoskeletal:  Positive for gait problem. Negative for neck pain and neck stiffness.   Neurological:  Positive for dizziness. Negative for tremors, syncope, speech difficulty, weakness, numbness and headaches.     Objective:     Vital Signs (Most Recent):  Temp: 97.7 °F (36.5 °C) (03/17/25 1743)  Pulse: (!) 53 (03/17/25 2100)  Resp: 18 (03/17/25 2100)  BP: (!) 141/62 (03/17/25 2100)  SpO2: 96 % (03/17/25 2100) Vital Signs (24h Range):  Temp:  [97.7 °F (36.5 °C)] 97.7 °F (36.5 °C)  Pulse:  [49-63] 53  Resp:  [18-33] 18  SpO2:  [96 %-97 %] 96 %  BP: (113-146)/(59-80) 141/62     Weight: 59 kg (130 lb)  Body mass index is 25.39 kg/m².     Physical Exam         At the time by interview the patient is not experiencing the sensation for which she presented.  In this context, she has a globally unremarkable exam including the absence of nystagmus and ataxia observed by other providers.    NAD, A/O 3   RRR   CTAB  Soft nontender nondistended, bowel sounds present   No edema   No abnormality in heel shin to toe testing, no nystagmus.  PERRL, EOMI.  Symmetric strength in major muscle groups.  Sensation to light touch intact.      Significant Labs: All pertinent labs within the past 24 hours have been reviewed.  CBC:   Recent Labs   Lab 03/17/25  1800   WBC 8.06   HGB 15.9   HCT 43.4        CMP:   Recent Labs   Lab 03/17/25  1800   *   K 3.9      CO2 23   GLU 89   BUN 11   CREATININE 0.7   CALCIUM 8.8   PROT 7.1   ALBUMIN 3.7   BILITOT 0.4   ALKPHOS 120   AST 14   ALT 13   ANIONGAP 10     Magnesium: No results for input(s): "MG" in the last 48 hours.    Significant Imaging: I have reviewed all pertinent imaging results/findings within the past 24 hours.  "

## 2025-03-18 NOTE — NURSING
Pt arrived to the unit  via stretcher. Pt able to ambulate to bed. Family at bedside. All questions answered. Call light in reach. NAD noted.       [facility] [place]  Nurses Note -- 4 Eyes      3/18/2025       Skin assessed on: Q Shift      [x] No Pressure Injuries Present    []Prevention Measures Documented    [] Yes LDA  for Pressure Injury Previously documented     [] Yes New Pressure Injury Discovered   [] LDA for New Pressure Injury Added      Attending RN:  Cathy Noland LPN     Second RN:  Desi HUMPHREY

## 2025-03-18 NOTE — PT/OT/SLP EVAL
Physical Therapy Evaluation and Discharge Note    Patient Name:  Zoey Cali   MRN:  [id number]    Recommendations:     Discharge Recommendations: No Therapy Indicated  Discharge Equipment Recommendations: none   Barriers to discharge: None    Assessment:     Zoey Cali is a 59 y.o. female admitted with a medical diagnosis of Dizziness.  At this time, patient is functioning at their prior level of function and does not require further acute PT services.     Recent Surgery: * No surgery found *      Plan:     During this hospitalization, patient does not require further acute PT services.  Please re-consult if situation changes.      Subjective     Chief Complaint: N/A  Patient/Family Comments/goals: Pt ready to go home, back to baseline.   Pain/Comfort:  Pain Rating 1: 0/10      Living Environment:  Pt lives with spouse in a [facility] with no concerns at entry.   Prior to admission, patients level of function was independent and driving.  Pt reported mostly sedentary.  Equipment at home: rollator.  Upon discharge, patient will have assistance from spouse.    Objective:     Patient found HOB elevated in the ED with blood pressure cuff, pulse ox (continuous), telemetry, peripheral IV upon PT entry to room.    General Precautions: Standard  Orthopedic Precautions:N/A   Braces: N/A  Respiratory Status: Room air    Exams:  Cognitive Exam:  Patient was able to follow multiple commands.   Gross Motor Coordination:  WFL  Postural Exam:  Patient presented with the following abnormalities:    -       No postural abnormalities identified  Sensation:    -       Intact  light/touch BUE/BLE, pt reported occasional numbness to B feet  Skin Integrity/Edema:      -       Skin integrity: Visible skin intact  -       Edema: None noted BUE/BLE  BUE ROM: WFL  BUE Strength: WFL  BLE ROM: WFL  BLE Strength: WFL    Functional Mobility:  Bed Mobility:     Scooting: modified independence  Supine to Sit: modified independence  Sit to  Supine: modified independence  Transfers:     Sit to Stand: modified independence with no AD  Gait: Pt ambulated ~100 ft with mod I using no AD.  Pt reported no dizziness at this time.  Pt with no major gait deviations.    Balance: Pt with fair+ dynamic standing balance.     AM-PAC 6 CLICK MOBILITY  Total Score:24       Treatment and Education:  Pt encouraged daily ambulation, verbalized good understanding.        Patient left with bed in chair position in the EDwith all lines intact and call button in reach.  Lunch tray set-up for pt.     GOALS:   Multidisciplinary Problems       Physical Therapy Goals       Not on file              Multidisciplinary Problems (Resolved)          Problem: Physical Therapy    Goal Priority Disciplines Outcome Interventions   Physical Therapy Goal   (Resolved)     PT, PT/OT Met                        History:     Past Medical History:   Diagnosis Date    Allergy     Anemia     Anxiety     Breast cyst     Chronic kidney disease     Depression     Fibrocystic breast     Hypertension     Multiple sclerosis     Multiple sclerosis     Neuromuscular disorder     Osteoporosis     Rib fracture 2015       Past Surgical History:   Procedure Laterality Date    APPENDECTOMY      BREAST BIOPSY Left 2012    BREAST CYST ASPIRATION      BREAST SURGERY  2004    excisional biopsy      SECTION, CLASSIC      CHALAZION EXCISION  at age 9    CHOLECYSTECTOMY      EYE SURGERY      HYSTERECTOMY      OOPHORECTOMY         Time Tracking:     PT Received On: 25  PT Start Time: 1115     PT Stop Time: 1129  PT Total Time (min): 14 min     Billable Minutes: Evaluation 14 min      2025

## 2025-03-18 NOTE — HPI
59 year old female with medical history of relapsing and remitting MS on Avonex weekly with admission concerns neurological complaints / And neurology consulted for the same.    Symptoms in the spectrum of dizziness / non vertiginous / with associated gait instability - intermittent episodes over the last 1 week / no other new focal motor or sensory or cranial nerve deficits. No compressive neuropathy pattern of presentation. No associated complex headaches or clinical seizures. No history of strokes, epilepsy or complex migraines.     Exam: awake, alert, following commands, no obvious aphasia or neglect or visual field deficits, no focal motor or sensory or cranial nerve deficits     MRI brain wo contrast negative for acute findings   CTA head and neck negative for correlating pathology

## 2025-03-18 NOTE — ED PROVIDER NOTES
Encounter Date: 3/17/2025       History     Chief Complaint   Patient presents with    Dizziness     Intermittent dizziness x2 week. Current episode started at 1330. Pt's family also reports delayed responses. Pt says she feels weak all over. Denies numbness/tingling, vision changes. No facial droop or slurred speech in triage. Pt is being assessed by PA in triage     59-year-old female history of hypertension, multiple sclerosis, hyperlipidemia, COPD, tobacco dependence.  Patient reports she has noted some dizziness episodes over the last few days.  Today's symptoms began at about 1:30 p.m..  Patient reports non fatigable lightheadedness/dizziness that is worse with standing and movement.  Denies any headache, vision changes, nausea, chest pain.  Denies any changes to medications.      The patient is on metoprolol.      Review of patient's allergies indicates:   Allergen Reactions    Lomotil [diphenoxylate-atropine]      Causes stomach spasms    Dilaudid [hydromorphone (bulk)] Itching     Past Medical History:   Diagnosis Date    Allergy     Anemia     Anxiety     Breast cyst     Chronic kidney disease     Depression     Fibrocystic breast     Hypertension     Multiple sclerosis     Multiple sclerosis     Neuromuscular disorder     Osteoporosis     Rib fracture 2015     Past Surgical History:   Procedure Laterality Date    APPENDECTOMY      BREAST BIOPSY Left 2012    BREAST CYST ASPIRATION      BREAST SURGERY  2004    excisional biopsy      SECTION, CLASSIC      CHALAZION EXCISION  at age 9    CHOLECYSTECTOMY      EYE SURGERY      HYSTERECTOMY      OOPHORECTOMY       Family History   Problem Relation Name Age of Onset    Arthritis Mother      Diabetes Father      Heart disease Father      Hyperlipidemia Father      Hypertension Father      Stroke Father      Arthritis Son      Drug abuse Son      Hypertension Maternal Grandmother      Stroke Maternal Grandmother      Arthritis Maternal  Grandmother      Arthritis Paternal Grandmother      Heart disease Paternal Grandfather      Heart attack Paternal Grandfather      Colon cancer Neg Hx      Ovarian cancer Neg Hx      Amblyopia Neg Hx      Blindness Neg Hx      Glaucoma Neg Hx      Macular degeneration Neg Hx      Retinal detachment Neg Hx      Strabismus Neg Hx      Thyroid disease Neg Hx       Social History[1]  Review of Systems   Constitutional:  Negative for fatigue and fever.   HENT:  Negative for congestion and sore throat.    Respiratory:  Negative for cough and shortness of breath.    Cardiovascular:  Negative for chest pain.   Gastrointestinal:  Negative for abdominal pain and vomiting.   Genitourinary:  Negative for dysuria.   Skin:  Negative for rash.   Neurological:  Positive for dizziness and light-headedness. Negative for weakness and headaches.       Physical Exam     Initial Vitals [03/17/25 1743]   BP Pulse Resp Temp SpO2   (!) 146/80 63 18 97.7 °F (36.5 °C) 97 %      MAP       --         Physical Exam    Nursing note and vitals reviewed.  Constitutional: She appears well-developed and well-nourished. She does not appear ill. No distress.   HENT:   Head: Normocephalic.   Right Ear: Tympanic membrane, external ear and ear canal normal.   Left Ear: Tympanic membrane, external ear and ear canal normal.   Eyes: Right eye exhibits nystagmus. Left eye exhibits nystagmus.   Bilateral horizontal nystagmus.   Neck:   Normal range of motion.  Cardiovascular:  Normal rate, regular rhythm and normal heart sounds.           No murmur heard.  Pulmonary/Chest: Breath sounds normal. No respiratory distress. She has no wheezes.   Abdominal: Abdomen is soft. There is no abdominal tenderness.   Musculoskeletal:         General: No edema.      Cervical back: Normal range of motion.     Neurological: She is alert and oriented to person, place, and time. She has normal strength. No cranial nerve deficit or sensory deficit.   Abnormal left lower  heel-to-shin.  Normal right heel-to-shin.  Finger-nose-finger intact bilateral upper extremities.   Skin: Skin is warm.         ED Course   Procedures  Labs Reviewed   CBC W/ AUTO DIFFERENTIAL - Abnormal       Result Value    WBC 8.06      RBC 4.87      Hemoglobin 15.9      Hematocrit 43.4      MCV 89      MCH 32.6 (*)     MCHC 36.6 (*)     RDW 12.3      Platelets 213      MPV 8.8 (*)     Immature Granulocytes 0.2      Gran # (ANC) 6.1      Immature Grans (Abs) 0.02      Lymph # 1.4      Mono # 0.4      Eos # 0.2      Baso # 0.04      nRBC 0      Gran % 76.3 (*)     Lymph % 16.7 (*)     Mono % 4.3      Eosinophil % 2.0      Basophil % 0.5      Differential Method Automated     COMPREHENSIVE METABOLIC PANEL - Abnormal    Sodium 133 (*)     Potassium 3.9      Chloride 100      CO2 23      Glucose 89      BUN 11      Creatinine 0.7      Calcium 8.8      Total Protein 7.1      Albumin 3.7      Total Bilirubin 0.4      Alkaline Phosphatase 120      AST 14      ALT 13      eGFR >60      Anion Gap 10     URINALYSIS, REFLEX TO URINE CULTURE - Abnormal    Specimen UA Urine, Clean Catch      Color, UA Yellow      Appearance, UA Clear      pH, UA 8.0      Specific Gravity, UA >1.030 (*)     Protein, UA Trace (*)     Glucose, UA Negative      Ketones, UA Negative      Bilirubin (UA) Negative      Occult Blood UA Negative      Nitrite, UA Negative      Urobilinogen, UA Negative      Leukocytes, UA Negative      Narrative:     Specimen Source->Urine   COMPREHENSIVE METABOLIC PANEL - Abnormal    Sodium 134 (*)     Potassium 3.3 (*)     Chloride 102      CO2 23      Glucose 90      BUN 11      Creatinine 0.6      Calcium 8.8      Total Protein 6.3      Albumin 3.4 (*)     Total Bilirubin 0.5      Alkaline Phosphatase 111      AST 10      ALT 11      eGFR >60      Anion Gap 9     CBC W/ AUTO DIFFERENTIAL - Abnormal    WBC 5.73      RBC 4.31      Hemoglobin 13.7      Hematocrit 39.1      MCV 91      MCH 31.8 (*)     MCHC 35.0       RDW 12.3      Platelets 179      MPV 9.1 (*)     Immature Granulocytes 0.5      Gran # (ANC) 3.4      Immature Grans (Abs) 0.03      Lymph # 1.7      Mono # 0.4      Eos # 0.2      Baso # 0.02      nRBC 0      Gran % 59.6      Lymph % 29.0      Mono % 7.5      Eosinophil % 3.1      Basophil % 0.3      Differential Method Automated     MAGNESIUM    Magnesium 1.8     PHOSPHORUS    Phosphorus 4.2     DRUG SCREEN PANEL, URINE EMERGENCY   DRUG SCREEN PANEL, URINE EMERGENCY    Benzodiazepines Negative      Methadone metabolites Negative      Cocaine (Metab.) Negative      Opiate Scrn, Ur Negative      Barbiturate Screen, Ur Negative      Amphetamine Screen, Ur Negative      THC Negative      Phencyclidine Negative      Toxicology Information SEE COMMENT      Narrative:     Specimen Source->Urine   POCT INFLUENZA A/B MOLECULAR    POC Molecular Influenza A Ag Negative      POC Molecular Influenza B Ag Negative       Acceptable Yes     POCT GLUCOSE    POCT Glucose 100            Imaging Results              MRI Brain Without Contrast (Final result)  Result time 03/17/25 23:22:53      Final result by Marianne Burgos MD (03/17/25 23:22:53)                   Impression:      No acute intracranial abnormality detected.      Electronically signed by: Marianne Burgos  Date:    03/17/2025  Time:    23:22               Narrative:    EXAMINATION:  MRI OF THE BRAIN WITHOUT    CLINICAL HISTORY:  Intermittent dizziness x2 weeks.  Displayed responses.  Feels weak all over.    TECHNIQUE:  Multiplanar multidetector MRI imaging of the brain without gadolinium was performed.    COMPARISON:  CTA head and neck 03/17/2025    FINDINGS:  Mild cerebral atrophy and chronic small vessel changes are present.  Diffusion-weighted imaging demonstrates no areas of restricted diffusion to suggest an acute infarct. There is no acute intracranial hemorrhage, mass effect, or midline shift. The major flow voids appear patent.                                        CTA Head and Neck (xpd) (Final result)  Result time 03/17/25 22:38:27      Final result by Marianne Burgos MD (03/17/25 22:38:27)                   Impression:      No acute abnormality. No large vessel occlusion.      Electronically signed by: Marianne Burgos  Date:    03/17/2025  Time:    22:38               Narrative:    EXAMINATION:  CTA HEAD AND NECK (XPD)    CLINICAL HISTORY:  Intermittent dizziness x2 weeks.  Delayed response cysts.  Feels weak all over.    TECHNIQUE:  Non contrast low dose axial images were obtained through the head. CT angiogram was performed from the level of the magali to the top of the head following the IV administration of 85mL of Omnipaque 350.   Sagittal and coronal reconstructions and maximum intensity projection reconstructions were performed. Arterial stenosis percentages are based on NASCET measurement criteria.    COMPARISON:  None    FINDINGS:  Intracranial Compartment:    Mild cerebral atrophy and chronic small vessel ischemic changes.  No extra-axial blood or fluid collections.    The brain parenchyma appears normal. No parenchymal mass, hemorrhage, edema, or major vascular distribution infarct.    Skull/Extracranial Contents (limited evaluation): No fracture. Mastoid air cells and paranasal sinuses are essentially clear.    Non-Vascular Structures of the Neck/Thoracic Inlet (limited evaluation): Emphysematous changes.    Aorta: Normal 3 vessel arch.    Extracranial carotid circulation: No hemodynamically significant stenosis, aneurysmal dilatation, or dissection.  Vascular calcifications seen at the left carotid.    Extracranial vertebral circulation: No hemodynamically significant stenosis, aneurysmal dilatation, or dissection.    Intracranial Arteries: No focal high-grade stenosis, occlusion, or aneurysm.    Venous structures (limited evaluation): Normal.                                       Medications   carBAMazepine 12 hr tablet 200 mg  (has no administration in time range)   cetirizine tablet 10 mg (has no administration in time range)   diazePAM tablet 5 mg (has no administration in time range)   EScitalopram oxalate tablet 20 mg (has no administration in time range)   gabapentin capsule 600 mg (600 mg Oral Not Given 3/17/25 2315)   HYDROcodone-acetaminophen  mg per tablet 1 tablet (has no administration in time range)   hydrOXYzine HCL tablet 25 mg (has no administration in time range)   tiZANidine tablet 6 mg (has no administration in time range)   sodium chloride 0.9% flush 10 mL (has no administration in time range)   sodium chloride 0.9% bolus 500 mL 500 mL (has no administration in time range)   bisacodyL suppository 10 mg (has no administration in time range)   atorvastatin tablet 40 mg (has no administration in time range)   ondansetron injection 4 mg (has no administration in time range)   senna-docusate 8.6-50 mg per tablet 1 tablet (has no administration in time range)   meclizine tablet 25 mg (has no administration in time range)   albuterol-ipratropium 2.5 mg-0.5 mg/3 mL nebulizer solution 3 mL (3 mLs Nebulization Given 3/18/25 0828)   enoxaparin injection 40 mg (has no administration in time range)   meclizine tablet 25 mg (25 mg Oral Given 3/17/25 2024)   iohexoL (OMNIPAQUE 350) injection 85 mL (85 mLs Intravenous Given 3/17/25 2008)   carBAMazepine tablet 200 mg (200 mg Oral Given 3/17/25 2226)   gabapentin capsule 800 mg (800 mg Oral Given 3/17/25 2225)   tiZANidine tablet 12 mg (12 mg Oral Given 3/17/25 2226)   aspirin tablet 325 mg (325 mg Oral Given 3/17/25 2225)     Medical Decision Making  59-year-old female history of multiple sclerosis presenting with dizziness and unsteady gait.  Physical exam significant for bilateral horizontal nystagmus, ataxic gait and ataxia of the left lower extremity.  Throughout the ER evaluation patient has dizziness did improve however the ataxic gait continued.  No other focal  neurological deficit was noted during the exam.  Differential includes CVA versus possible MS flare.  Given continued symptoms patient hospitalized for Neurology evaluation and possible multiple sclerosis flare and pending MRI.  Patient was provided aspirin and possible CVA.      Medical Decision Making:     A. Problem List:  1. Dizziness  2. Ataxia     B. Differential diagnosis:    Multiple sclerosis flare, CVA     ECG:  Please check workup area for ECG interpretation.    Part of the note was done using electronic dictation services.         Amount and/or Complexity of Data Reviewed  Labs: ordered.  Radiology: ordered.  ECG/medicine tests:  Decision-making details documented in ED Course.    Risk  OTC drugs.  Prescription drug management.               ED Course as of 03/18/25 0924   Mon Mar 17, 2025   2009 EKG 12-lead  Time 5:52 p.m.     Rate 51, sinus, regular rhythm, normal axis.   QRS 82 .  No ST elevation or depression no T-wave inversion no hyperacute T-waves.      Normal sinus rhythm. [JM]   2704 The patient reports her symptoms have resolved however patient does have an ataxic gait.  I discussed being hospitalized for further Neurology evaluation and MRI. [JM]      ED Course User Index  [JM] Chris Lu MD                           Clinical Impression:  Final diagnoses:  [R42] Dizzy  [R26.0] Ataxic gait (Primary)          ED Disposition Condition    Observation                   [1]   Social History  Tobacco Use    Smoking status: Every Day     Current packs/day: 1.00     Average packs/day: 1 pack/day for 30.0 years (30.0 ttl pk-yrs)     Types: Cigarettes    Smokeless tobacco: Current   Substance Use Topics    Alcohol use: No     Alcohol/week: 0.0 standard drinks of alcohol    Drug use: No        Chris Lu MD  03/18/25 0921

## 2025-03-18 NOTE — ASSESSMENT & PLAN NOTE
Patient's blood pressure range in the last 24 hours was: BP  Min: 113/59  Max: 146/80.The patient's inpatient anti-hypertensive regimen is listed below:  Current Antihypertensives  metoprolol succinate (TOPROL-XL) 24 hr tablet 100 mg, Daily, Oral  triamterene-hydrochlorothiazide 37.5-25 mg per capsule 1 capsule, Every other day, Oral  hydrALAZINE injection 10 mg, Every 6 hours PRN, Intravenous    Plan  - BP is controlled, no changes needed to their regimen  -

## 2025-03-18 NOTE — SUBJECTIVE & OBJECTIVE
Past Medical History:   Diagnosis Date    Allergy     Anemia     Anxiety     Breast cyst     Chronic kidney disease     Depression     Fibrocystic breast     Hypertension     Multiple sclerosis     Multiple sclerosis     Neuromuscular disorder     Osteoporosis     Rib fracture 2015       Past Surgical History:   Procedure Laterality Date    APPENDECTOMY      BREAST BIOPSY Left 2012    BREAST CYST ASPIRATION      BREAST SURGERY  2004    excisional biopsy      SECTION, CLASSIC      CHALAZION EXCISION  at age 9    CHOLECYSTECTOMY      EYE SURGERY      HYSTERECTOMY      OOPHORECTOMY         Review of patient's allergies indicates:   Allergen Reactions    Lomotil [diphenoxylate-atropine]      Causes stomach spasms    Dilaudid [hydromorphone (bulk)] Itching       Current Neurological Medications:     No current facility-administered medications on file prior to encounter.     Current Outpatient Medications on File Prior to Encounter   Medication Sig    carBAMazepine (TEGRETOL XR) 200 MG 12 hr tablet TAKE ONE TABLET BY MOUTH TWICE DAILY    cholecalciferol, vitamin D3, (VITAMIN D3) 125 mcg (5,000 unit) Tab Take 1 tablet (5,000 Units total) by mouth once daily.    EScitalopram oxalate (LEXAPRO) 20 MG tablet Take 1 tablet (20 mg total) by mouth once daily.    gabapentin (NEURONTIN) 600 MG tablet Take 1 tablet (600 mg total) by mouth 3 (three) times daily.    metoprolol succinate (TOPROL-XL) 100 MG 24 hr tablet Take 1 tablet (100 mg total) by mouth once daily.    simvastatin (ZOCOR) 40 MG tablet Take 1 tablet (40 mg total) by mouth every evening.    cetirizine (ZYRTEC) 10 MG tablet Take 1 tablet (10 mg total) by mouth once daily.    ciclopirox (PENLAC) 8 % Soln Apply topically nightly.    diazePAM (VALIUM) 5 MG tablet Take 1 tablet (5 mg total) by mouth every 6 (six) hours as needed.    gabapentin (NEURONTIN) 400 MG capsule TAKE 2 CAPSULES BY MOUTH EVERY EVENING    HYDROcodone-acetaminophen (NORCO)   mg per tablet Take 1 tablet by mouth every 6 (six) hours as needed for Pain.    hydrOXYzine HCL (ATARAX) 25 MG tablet Take 1 tablet (25 mg total) by mouth 3 (three) times daily.    ibuprofen (ADVIL,MOTRIN) 800 MG tablet TAKE ONE TABLET BY MOUTH EVERY 8 HOURS WITH FOOD AND WATER    interferon beta-1a (AVONEX) 30 mcg/0.5 mL PnKt Inject 0.5 mLs (30 mcg total) into the muscle once a week.    ondansetron (ZOFRAN-ODT) 8 MG TbDL DISSOLVE 1 TABLET ON THE TONGUE THREE TIMES DAILY AS NEEDED Strength: 8 mg    tiotropium-olodateroL (STIOLTO RESPIMAT) 2.5-2.5 mcg/actuation Mist inhale TWO puffs INTO THE LUNGS DAILY    tiZANidine (ZANAFLEX) 4 MG tablet TAKE 2 TABLETS IN THE MORNING, AT NOON AND IN THE EVENING AND TAKE 3 TABLETS AT NIGHT (9 TABLETS PER DAY); only takes 3 at bedtime and none during the day    triamterene-hydrochlorothiazide 37.5-25 mg (MAXZIDE-25) 37.5-25 mg per tablet Take 1 tablet by mouth every other day.     Family History       Problem Relation (Age of Onset)    Arthritis Mother, Son, Maternal Grandmother, Paternal Grandmother    Diabetes Father    Drug abuse Son    Heart attack Paternal Grandfather    Heart disease Father, Paternal Grandfather    Hyperlipidemia Father    Hypertension Father, Maternal Grandmother    Stroke Father, Maternal Grandmother          Tobacco Use    Smoking status: Every Day     Current packs/day: 1.00     Average packs/day: 1 pack/day for 30.0 years (30.0 ttl pk-yrs)     Types: Cigarettes    Smokeless tobacco: Current   Substance and Sexual Activity    Alcohol use: No     Alcohol/week: 0.0 standard drinks of alcohol    Drug use: No    Sexual activity: Not Currently     Birth control/protection: See Surgical Hx     Comment: Hysterectomy     Review of Systems   Constitutional:  Negative for chills and fever.   Respiratory:  Negative for shortness of breath.    Cardiovascular:  Positive for chest pain.   Neurological:  Positive for dizziness. Negative for facial asymmetry, weakness,  "light-headedness and numbness.   Psychiatric/Behavioral:  Negative for agitation and confusion.      Objective:     Vital Signs (Most Recent):  Temp: 97.9 °F (36.6 °C) (03/18/25 0629)  Pulse: (!) 51 (03/18/25 1400)  Resp: 15 (03/18/25 1400)  BP: (!) 141/63 (03/18/25 1300)  SpO2: 98 % (03/18/25 1400) Vital Signs (24h Range):  Temp:  [97.7 °F (36.5 °C)-98.3 °F (36.8 °C)] 97.9 °F (36.6 °C)  Pulse:  [44-63] 51  Resp:  [11-37] 15  SpO2:  [87 %-99 %] 98 %  BP: ()/(45-80) 141/63     Weight: 59 kg (130 lb 1.1 oz)  Body mass index is 25.4 kg/m².     Physical Exam  HENT:      Head: Normocephalic and atraumatic.   Eyes:      Extraocular Movements: Extraocular movements intact and EOM normal.   Pulmonary:      Effort: No respiratory distress.   Neurological:      Mental Status: She is alert and oriented to person, place, and time.   Psychiatric:         Speech: Speech normal.          NEUROLOGICAL EXAMINATION:     MENTAL STATUS   Oriented to person, place, and time.   Attention: normal. Concentration: normal.   Speech: speech is normal   Level of consciousness: alert    CRANIAL NERVES     CN III, IV, VI   Extraocular motions are normal.   Ophthalmoparesis: none    CN VII   Facial expression full, symmetric.     MOTOR EXAM        No new focal motor deficits       SENSORY EXAM        No new focal sensory deficits       GAIT AND COORDINATION     Tremor   Resting tremor: absent      Significant Labs: Hemoglobin A1c: No results for input(s): "HGBA1C" in the last 720 hours.  Blood Culture: No results for input(s): "LABBLOO" in the last 48 hours.  BMP:   Recent Labs   Lab 03/17/25  1800 03/18/25  0400   GLU 89 90   * 134*   K 3.9 3.3*    102   CO2 23 23   BUN 11 11   CREATININE 0.7 0.6   CALCIUM 8.8 8.8   MG  --  1.8     CBC:   Recent Labs   Lab 03/17/25  1800 03/18/25  0400   WBC 8.06 5.73   HGB 15.9 13.7   HCT 43.4 39.1    179     CMP:   Recent Labs   Lab 03/17/25  1800 03/18/25  0400   GLU 89 90   * " "134*   K 3.9 3.3*    102   CO2 23 23   BUN 11 11   CREATININE 0.7 0.6   CALCIUM 8.8 8.8   MG  --  1.8   PROT 7.1 6.3   ALBUMIN 3.7 3.4*   BILITOT 0.4 0.5   ALKPHOS 120 111   AST 14 10   ALT 13 11   ANIONGAP 10 9     CSF Culture: No results for input(s): "CSFCULTURE" in the last 48 hours.  CSF Studies: No results for input(s): "ALIQUT", "APPEARCSF", "COLORCSF", "CSFWBC", "CSFRBC", "GLUCCSF", "LDHCSF", "PROTEINCSF", "VDRLCSF" in the last 48 hours.  Inflammatory Markers: No results for input(s): "SEDRATE", "CRP", "PROCAL" in the last 48 hours.  POCT Glucose:   Recent Labs   Lab 03/17/25  1747   POCTGLUCOSE 100     Prealbumin: No results for input(s): "PREALBUMIN" in the last 48 hours.  Respiratory Culture: No results for input(s): "GSRESP", "RESPIRATORYC" in the last 48 hours.  Urine Culture: No results for input(s): "LABURIN" in the last 48 hours.  Urine Studies:   Recent Labs   Lab 03/17/25 2138   COLORU Yellow   APPEARANCEUA Clear   PHUR 8.0   SPECGRAV >1.030*   PROTEINUA Trace*   GLUCUA Negative   KETONESU Negative   BILIRUBINUA Negative   OCCULTUA Negative   NITRITE Negative   UROBILINOGEN Negative   LEUKOCYTESUR Negative     Recent Lab Results  (Last 5 results in the past 24 hours)        03/18/25  0907   03/18/25  0400   03/17/25  2313   03/17/25  2138   03/17/25  2020        Benzodiazepines     Negative           Methadone metabolites     Negative           Phencyclidine     Negative           POC Molecular Influenza A Ag         Negative       POC Molecular Influenza B Ag         Negative       Albumin   3.4             ALP   111             ALT   11             Amphetamines, Urine     Negative           Anion Gap   9             Ao peak mira 1.2               Ao VTI 31.0               Appearance, UA       Clear         AST   10             AV valve area 2.7               TAWANDA by Velocity Ratio 2.6               AV mean gradient 3               AV index (prosthetic) 0.96               AV peak gradient 6   "             AV Velocity Ratio 0.92               Barbituates, Urine     Negative           Baso #   0.02             Basophil %   0.3             Bilirubin (UA)       Negative         BILIRUBIN TOTAL   0.5  Comment: For infants and newborns, interpretation of results should be based  on gestational age, weight and in agreement with clinical  observations.    Premature Infant recommended reference ranges:  Up to 24 hours.............<8.0 mg/dL  Up to 48 hours............<12.0 mg/dL  3-5 days..................<15.0 mg/dL  6-29 days.................<15.0 mg/dL               BSA 1.58               BUN   11             Calcium   8.8             Chloride   102             CO2   23             Cocaine, Urine     Negative           Color, UA       Yellow         Creatinine   0.6             Urine Creatinine     33.9           Left Ventricle Relative Wall Thickness 0.47               Differential Method   Automated             E/A ratio 1.15               E/E' ratio 10               eGFR   >60             Eos #   0.2             Eos %   3.1             E wave deceleration time 223               FS 39.5               Glucose   90             Glucose, UA       Negative         Gran # (ANC)   3.4             Gran %   59.6             Hematocrit   39.1             Hemoglobin   13.7             Immature Grans (Abs)   0.03  Comment: Mild elevation in immature granulocytes is non specific and   can be seen in a variety of conditions including stress response,   acute inflammation, trauma and pregnancy. Correlation with other   laboratory and clinical findings is essential.               Immature Granulocytes   0.5             IVC diameter 1.72               IVRT 80               IVSd 0.8               Ketones, UA       Negative         LA WIDTH 3.2               Left Atrium Major Axis 4.9               Left Atrium Minor Axis 4.9               LA size 3.9               LA Vol 52               LA vol index 34               LVOT area  2.8               Leukocyte Esterase, UA       Negative         LV LATERAL E/E' RATIO 9.0               LV SEPTAL E/E' RATIO 11.3               LV EDV BP 62               LV Diastolic Volume Index 40.00               Left Ventricular End Diastolic Volume by Teichholz Method 62.01               Left Ventricular End Systolic Volume by Teichholz Method 18.30               LVIDd 3.8               LVIDs 2.3               LV mass 93.4               LV Mass Index 60.2               Left Ventricular Outflow Tract Mean Gradient 2.75               Left Ventricular Outflow Tract Mean Velocity 0.79               LVOT diameter 1.9               LVOT peak juan m 1.1               LVOT stroke volume 84.7               LVOT peak VTI 29.9               LV ESV BP 18               LV Systolic Volume Index 11.6               Lymph #   1.7             Lymph %   29.0             Magnesium    1.8             MCH   31.8             MCHC   35.0             MCV   91             Mean e' 0.09               Mono #   0.4             Mono %   7.5             MPV   9.1             MV valve area p 1/2 method 3.40               MV Peak A Juan M 0.78               MV Peak E Juan M 0.90               MV stenosis pressure 1/2 time 64.79               NITRITE UA       Negative         nRBC   0             Blood, UA       Negative         Opiates, Urine     Negative           pH, UA       8.0         Phosphorus Level   4.2             Platelet Count   179             Potassium   3.3             PROTEIN TOTAL   6.3             Protein, UA       Trace  Comment: Recommend a 24 hour urine protein or a urine   protein/creatinine ratio if globulin induced proteinuria is  clinically suspected.           PV peak gradient 3               PV Peak D Juan M 0.51               PV Peak S Juan M 0.56               PV PEAK VELOCITY 0.84               Pulm vein S/D ratio 1.10               PW 0.9                Acceptable         Yes       RA Major Axis 3.69                Est. RA pres 3               RA Width 3.5               RBC   4.31             RDW   12.3             RV S' 8.05               RV TB RVSP 6               RV/LV Ratio 0.84               RVDD 3.19               RV- harrington basal diam 3.2               Sinus 3.03               Sodium   134             Spec Grav UA       >1.030         Specimen UA       Urine, Clean Catch         STJ 2.70               TAPSE 1.57               TDI SEPTAL 0.08               TDI LATERAL 0.10               Marijuana (THC) Metabolite     Negative           Toxicology Information     SEE COMMENT  Comment: This screen includes the following classes of drugs at the listed   cut-off:    Benzodiazepines 200 ng/ml  Methadone 300 ng/ml  Cocaine metabolite 300 ng/ml  Opiates 300 ng/ml  Barbiturates 200 ng/ml  Amphetamines 1000 ng/ml  Marijuana metabs (THC) 50 ng/ml  Phencyclidine (PCP) 25 ng/ml    This is a screening test. If results do not correlate with clinical   presentation, then a confirmatory send out test is advised.     This report is intended for use in clinical monitoring and management   of   patients. It is not intended for use in employment related drug   testing.             Triscuspid Valve Regurgitation Peak Gradient 35               TR Max Juan M 3.0               TV PG 1               TV resting pulmonary artery pressure 39               UROBILINOGEN UA       Negative         WBC   5.73             ZLVIDD -1.66               ZLVIDS -1.51                                    All pertinent lab results from the past 24 hours have been reviewed.    Significant Imaging: I have reviewed and interpreted all pertinent imaging results/findings within the past 24 hours.

## 2025-03-18 NOTE — ASSESSMENT & PLAN NOTE
On Avonex weekly. Missed yesterday dose.   Instruct  to bring   See above #1 getting MRI brain CT w wo demyelinating

## 2025-03-18 NOTE — ASSESSMENT & PLAN NOTE
Patient's COPD is controlled currently.  Patient is currently off COPD Pathway. Continue scheduled inhalers   Encourage smoking cessation

## 2025-03-18 NOTE — PROGRESS NOTES
[facility] Emergency Dept  [facility] Medicine  Progress Note    Patient Name: Zoey Cali  MRN: [id number]  Patient Class: OP- Observation   Admission Date: 3/17/2025  Length of Stay: 0 days  Attending Physician: Vickey Farrar MD  Primary Care Provider: Devon Collier MD        Subjective     Principal Problem:Dizziness        HPI:  59-year-old female with multiple sclerosis, COPD who presents with 2 weeks of an intermittent sensation that the world is moving as if she is on a rocking boat and when she is experiencing this sensation, she has difficulty ambulating.    This sensation has been intermittent over the past 2 weeks and of varying severity.  She only has difficulty ambulating when she is experiencing the oral moving.  No febrile illness.  She denies focal weakness.  No difficulty with vision or swallowing.    She denies feeling lightheaded as if she is going to pass out tolerating oral intake.    Overview/Hospital Course:  No notes on file    Interval History: dizziness resolved. BLE neuropathy no change from baseline. BP and HR improved. Update Daughter at bedside     Review of Systems   Constitutional:  Negative for appetite change, chills and fever.   Eyes:  Negative for visual disturbance.   Respiratory:  Negative for cough, chest tightness and shortness of breath.    Cardiovascular:  Negative for chest pain, palpitations and leg swelling.   Gastrointestinal:  Negative for abdominal pain, diarrhea, nausea and vomiting.        + heartburn the past couple of weeks.    Musculoskeletal:  Positive for gait problem. Negative for neck pain and neck stiffness.   Neurological:  Positive for dizziness. Negative for tremors, syncope, speech difficulty, weakness, numbness and headaches.     Objective:     Vital Signs (Most Recent):  Temp: 97.9 °F (36.6 °C) (03/18/25 0629)  Pulse: (!) 52 (03/18/25 1000)  Resp: 17 (03/18/25 1000)  BP: (!) 114/57 (03/18/25 1000)  SpO2: (!) 94 % (03/18/25 1000) Vital Signs  (24h Range):  Temp:  [97.7 °F (36.5 °C)-98.3 °F (36.8 °C)] 97.9 °F (36.6 °C)  Pulse:  [44-63] 52  Resp:  [11-37] 17  SpO2:  [87 %-99 %] 94 %  BP: ()/(45-80) 114/57     Weight: 59 kg (130 lb 1.1 oz)  Body mass index is 25.4 kg/m².  No intake or output data in the 24 hours ending 03/18/25 1103      Physical Exam  Constitutional:       Appearance: She is not ill-appearing.   Cardiovascular:      Rate and Rhythm: Bradycardia present.   Pulmonary:      Effort: Pulmonary effort is normal.      Breath sounds: Normal breath sounds.   Abdominal:      Palpations: Abdomen is soft.   Musculoskeletal:      Right lower leg: No edema.      Left lower leg: Edema present.   Skin:     General: Skin is warm and dry.   Neurological:      General: No focal deficit present.      Mental Status: She is alert and oriented to person, place, and time. Mental status is at baseline.      Sensory: Sensory deficit present.               Significant Labs: All pertinent labs within the past 24 hours have been reviewed.    Significant Imaging: I have reviewed all pertinent imaging results/findings within the past 24 hours.      Assessment & Plan  Dizziness and abnormal gait  This has waxed and waned over the past 2 weeks.  Over the course the day it has continued to improve.  She feels significantly better by the time of my interview and exam than when she presented.  MRI without acute CVA;, CTA head neck no LVO  Add B 12  Neurology following, Given hx MS, MRI brain CT w wo demyelinating protocol    HTN (hypertension)   blood pressure range in the last 24 hours was: BP  Min: 90/53  Max: 146/80.The patient's inpatient anti-hypertensive regimen is listed below:  Current Antihypertensives       Possible contributing to dizziness  Hold metoprolol due to bradycardia and hypotension  Continue to hold diuretics   Multiple sclerosis  On Avonex weekly. Missed yesterday dose.   Instruct  to bring   See above #1 getting MRI brain CT w wo  demyelinating   Tobacco abuse  Encourage smoking cessation   Neuropathic pain  Continue carbamazepine 200 mg BID  Gabapentin 600 am 800 pm       Chronic obstructive pulmonary disease, unspecified  Patient's COPD is controlled currently.  Patient is currently off COPD Pathway. Continue scheduled inhalers   Encourage smoking cessation   VTE Risk Mitigation (From admission, onward)           Ordered     enoxaparin injection 40 mg  Every 24 hours         03/18/25 0005     IP VTE LOW RISK PATIENT  Once         03/17/25 2303     Place sequential compression device  Until discontinued         03/17/25 2303                    Discharge Planning   VIDA:      Code Status: Full Code   Medical Readiness for Discharge Date:                            Orin Townsend NP  Department of Hospital Medicine   [facility] - Emergency Dept

## 2025-03-18 NOTE — PLAN OF CARE
Problem: Physical Therapy  Goal: Physical Therapy Goal  Outcome: Met     Pt independent with functional mobility and ambulation.  No therapy indicated at this time.

## 2025-03-18 NOTE — ASSESSMENT & PLAN NOTE
59 year old female with medical history of relapsing and remitting MS on Avonex weekly with admission concerns neurological complaints / And neurology consulted for the same.    Symptoms in the spectrum of dizziness / non vertiginous / with associated gait instability - intermittent episodes over the last 1 week / no other new focal motor or sensory or cranial nerve deficits. No compressive neuropathy pattern of presentation. No associated complex headaches or clinical seizures. No history of strokes, epilepsy or complex migraines.     Exam: awake, alert, following commands, no obvious aphasia or neglect or visual field deficits, no focal motor or sensory or cranial nerve deficits     MRI brain wo contrast negative for acute findings   CTA head and neck negative for correlating pathology    Recs:  Suspect spectrum of multiple sclerosis / Needs evaluation for flare up / been a while since last imaging     MRI brain w wo contrast demyelinating protocol / including C-T spine with wo contrast   If any concerns for active demyelination / shall consider high dose IV Solumedrol 1 g x 3 days   If negative supportive care - f/u primary MS specialist     -- Correct lytes as needed / investigate and control infection if any / avoid hypoxia or hypercapnia / avoid hypoglycemia / control uremia - avoid rapid correction / avoid-correct metabolic-respiratory acidosis or alkalosis   -- Correct any nutritional deficiencies / correct anemia / provide nutritional support as appropriate / ambulate when appropriate   -- PT/OT evaluation and management / gait assistance / fall precautions

## 2025-03-18 NOTE — ASSESSMENT & PLAN NOTE
This has waxed and waned over the past 2 weeks.  Over the course the day it has continued to improve.  She feels significantly better by the time of my interview and exam than when she presented.  MRI without acute CVA;, CTA head neck no LVO  Add B 12  Neurology following, Given hx MS, MRI brain CT w wo demyelinating protocol

## 2025-03-18 NOTE — ED NOTES
Pt placed backed on cardiac monitor after returning from MRI. Vitals noted to be abnormal; sats in the upper 80s, bradycardic and hypotensive. Pt denies SOB, AAOx4; placed on 2L d/t to the hypoxia. Primary RN and charge RN made aware.

## 2025-03-19 VITALS
HEIGHT: 60 IN | TEMPERATURE: 99 F | DIASTOLIC BLOOD PRESSURE: 60 MMHG | BODY MASS INDEX: 25.53 KG/M2 | HEART RATE: 65 BPM | SYSTOLIC BLOOD PRESSURE: 131 MMHG | WEIGHT: 130.06 LBS | RESPIRATION RATE: 18 BRPM | OXYGEN SATURATION: 95 %

## 2025-03-19 LAB
ALBUMIN SERPL BCP-MCNC: 3.4 G/DL (ref 3.5–5.2)
ALP SERPL-CCNC: 107 U/L (ref 40–150)
ALT SERPL W/O P-5'-P-CCNC: 10 U/L (ref 10–44)
ANION GAP SERPL CALC-SCNC: 10 MMOL/L (ref 8–16)
AST SERPL-CCNC: 15 U/L (ref 10–40)
BASOPHILS # BLD AUTO: 0.02 K/UL (ref 0–0.2)
BASOPHILS NFR BLD: 0.4 % (ref 0–1.9)
BILIRUB SERPL-MCNC: 0.4 MG/DL (ref 0.1–1)
BUN SERPL-MCNC: 11 MG/DL (ref 6–20)
CALCIUM SERPL-MCNC: 8.5 MG/DL (ref 8.7–10.5)
CHLORIDE SERPL-SCNC: 100 MMOL/L (ref 95–110)
CO2 SERPL-SCNC: 25 MMOL/L (ref 23–29)
CREAT SERPL-MCNC: 0.6 MG/DL (ref 0.5–1.4)
DIFFERENTIAL METHOD BLD: ABNORMAL
EOSINOPHIL # BLD AUTO: 0.2 K/UL (ref 0–0.5)
EOSINOPHIL NFR BLD: 3.7 % (ref 0–8)
ERYTHROCYTE [DISTWIDTH] IN BLOOD BY AUTOMATED COUNT: 12.4 % (ref 11.5–14.5)
EST. GFR  (NO RACE VARIABLE): >60 ML/MIN/1.73 M^2
GLUCOSE SERPL-MCNC: 94 MG/DL (ref 70–110)
HCT VFR BLD AUTO: 40.7 % (ref 37–48.5)
HGB BLD-MCNC: 14 G/DL (ref 12–16)
IMM GRANULOCYTES # BLD AUTO: 0.02 K/UL (ref 0–0.04)
IMM GRANULOCYTES NFR BLD AUTO: 0.4 % (ref 0–0.5)
LYMPHOCYTES # BLD AUTO: 1.4 K/UL (ref 1–4.8)
LYMPHOCYTES NFR BLD: 25.6 % (ref 18–48)
MCH RBC QN AUTO: 31.5 PG (ref 27–31)
MCHC RBC AUTO-ENTMCNC: 34.4 G/DL (ref 32–36)
MCV RBC AUTO: 92 FL (ref 82–98)
MONOCYTES # BLD AUTO: 0.3 K/UL (ref 0.3–1)
MONOCYTES NFR BLD: 6.3 % (ref 4–15)
NEUTROPHILS # BLD AUTO: 3.5 K/UL (ref 1.8–7.7)
NEUTROPHILS NFR BLD: 63.6 % (ref 38–73)
NRBC BLD-RTO: 0 /100 WBC
PLATELET # BLD AUTO: 183 K/UL (ref 150–450)
PMV BLD AUTO: 8.9 FL (ref 9.2–12.9)
POTASSIUM SERPL-SCNC: 3.8 MMOL/L (ref 3.5–5.1)
PROT SERPL-MCNC: 6.3 G/DL (ref 6–8.4)
RBC # BLD AUTO: 4.45 M/UL (ref 4–5.4)
SODIUM SERPL-SCNC: 135 MMOL/L (ref 136–145)
WBC # BLD AUTO: 5.44 K/UL (ref 3.9–12.7)

## 2025-03-19 PROCEDURE — 36415 COLL VENOUS BLD VENIPUNCTURE: CPT | Mod: HCNC | Performed by: INTERNAL MEDICINE

## 2025-03-19 PROCEDURE — 80053 COMPREHEN METABOLIC PANEL: CPT | Mod: HCNC | Performed by: INTERNAL MEDICINE

## 2025-03-19 PROCEDURE — 96374 THER/PROPH/DIAG INJ IV PUSH: CPT | Mod: HCNC

## 2025-03-19 PROCEDURE — 25000242 PHARM REV CODE 250 ALT 637 W/ HCPCS: Mod: HCNC | Performed by: HOSPITALIST

## 2025-03-19 PROCEDURE — A9585 GADOBUTROL INJECTION: HCPCS | Mod: HCNC | Performed by: HOSPITALIST

## 2025-03-19 PROCEDURE — 63600175 PHARM REV CODE 636 W HCPCS: Mod: HCNC | Performed by: NURSE PRACTITIONER

## 2025-03-19 PROCEDURE — G0378 HOSPITAL OBSERVATION PER HR: HCPCS | Mod: HCNC

## 2025-03-19 PROCEDURE — 94761 N-INVAS EAR/PLS OXIMETRY MLT: CPT | Mod: HCNC

## 2025-03-19 PROCEDURE — 25500020 PHARM REV CODE 255: Mod: HCNC | Performed by: HOSPITALIST

## 2025-03-19 PROCEDURE — 85025 COMPLETE CBC W/AUTO DIFF WBC: CPT | Mod: HCNC | Performed by: INTERNAL MEDICINE

## 2025-03-19 PROCEDURE — 25000003 PHARM REV CODE 250: Mod: HCNC | Performed by: INTERNAL MEDICINE

## 2025-03-19 PROCEDURE — 96374 THER/PROPH/DIAG INJ IV PUSH: CPT

## 2025-03-19 PROCEDURE — 94640 AIRWAY INHALATION TREATMENT: CPT | Mod: HCNC,XB

## 2025-03-19 RX ORDER — GABAPENTIN 400 MG/1
800 CAPSULE ORAL NIGHTLY
Start: 2025-03-19 | End: 2025-03-25 | Stop reason: DRUGHIGH

## 2025-03-19 RX ORDER — GABAPENTIN 600 MG/1
600 TABLET ORAL DAILY
Start: 2025-03-19 | End: 2025-03-25 | Stop reason: SDUPTHER

## 2025-03-19 RX ORDER — GADOBUTROL 604.72 MG/ML
6 INJECTION INTRAVENOUS
Status: COMPLETED | OUTPATIENT
Start: 2025-03-19 | End: 2025-03-19

## 2025-03-19 RX ADMIN — IPRATROPIUM BROMIDE AND ALBUTEROL SULFATE 3 ML: 2.5; .5 SOLUTION RESPIRATORY (INHALATION) at 07:03

## 2025-03-19 RX ADMIN — CETIRIZINE HYDROCHLORIDE 10 MG: 10 TABLET, FILM COATED ORAL at 08:03

## 2025-03-19 RX ADMIN — ATORVASTATIN CALCIUM 40 MG: 40 TABLET, FILM COATED ORAL at 08:03

## 2025-03-19 RX ADMIN — ESCITALOPRAM OXALATE 20 MG: 10 TABLET ORAL at 08:03

## 2025-03-19 RX ADMIN — DIAZEPAM 2.5 MG: 10 INJECTION, SOLUTION INTRAMUSCULAR; INTRAVENOUS at 12:03

## 2025-03-19 RX ADMIN — CARBAMAZEPINE 200 MG: 100 TABLET, EXTENDED RELEASE ORAL at 08:03

## 2025-03-19 RX ADMIN — GADOBUTROL 6 ML: 604.72 INJECTION INTRAVENOUS at 01:03

## 2025-03-19 RX ADMIN — GABAPENTIN 600 MG: 300 CAPSULE ORAL at 08:03

## 2025-03-19 RX ADMIN — GABAPENTIN 600 MG: 300 CAPSULE ORAL at 02:03

## 2025-03-19 NOTE — PLAN OF CARE
03/19/25 1136   Final Note   Assessment Type Final Discharge Note   Anticipated Discharge Disposition Home   Hospital Resources/Appts/Education Provided Appointments scheduled and added to AVS   Post-Acute Status   Post-Acute Authorization Other   Other Status No Post-Acute Service Needs     Pts nurse Sara notified that the pt can d/c from CM standpoint

## 2025-03-19 NOTE — ASSESSMENT & PLAN NOTE
blood pressure range in the last 24 hours was: BP  Min: 95/54  Max: 141/64.The patient's inpatient anti-hypertensive regimen is listed below:  Current Antihypertensives       Possible contributing to dizziness  Hold metoprolol due to bradycardia and hypotension  Continue to hold diuretics

## 2025-03-19 NOTE — DISCHARGE SUMMARY
[facility] Medicine  Discharge Summary      Patient Name: Zoey Cali  MRN: [id number]  ENRIQUE: [id number]  Patient Class: OP- Observation  Admission Date: 3/17/2025  Hospital Length of Stay: 0 days  Discharge Date and Time: No discharge date for patient encounter.  Attending Physician: Vickey Farrar MD   Discharging Provider: Emiliano Max NP  Primary Care Provider: Devon Collier MD    Primary Care Team: EMILIANO MAX    HPI:   59-year-old female with multiple sclerosis, COPD who presents with 2 weeks of an intermittent sensation that the world is moving as if she is on a rocking boat and when she is experiencing this sensation, she has difficulty ambulating.    This sensation has been intermittent over the past 2 weeks and of varying severity.  She only has difficulty ambulating when she is experiencing the oral moving.  No febrile illness.  She denies focal weakness.  No difficulty with vision or swallowing.    She denies feeling lightheaded as if she is going to pass out tolerating oral intake.    * No surgery found *      Hospital Course:   Admission for dizziness suspect due to low blood pressure in sinus bradycardia.  Blood pressure and heart rate improved with holding home metoprolol and diuretics.  MRI no acute stroke.  CTA of head and neck no large vessel occlusion.  Given history of multiple sclerosis MRI of the brain cervical and thoracic with and without contrast demyelinating no acute abnormality.  No further dizziness during hospital stay.  Evaluation by PT completed and no acute therapy indicated at this time.  Patient hemodynamically stable for discharge home with close follow up with PCP and Neurology.      Goals of Care Treatment Preferences:  Code Status: Full Code         Consults:   Consults (From admission, onward)          Status Ordering Provider     Inpatient consult to Registered Dietitian/Nutritionist  Once        Provider:  (Not yet assigned)    Completed  WARD MENDENHALL     IP consult to case management/social work  Once        Provider:  (Not yet assigned)    Completed WARD MENDENHALL     Inpatient Consult to Neurology Services (General Neurology)  Once        Provider:  Melvin Cobos MD    Completed WARD MENDENHALL     Inpatient Consult to Neurology Services (General Neurology)  Once        Provider:  Melvin Cobos MD    Completed LUIS HU            Assessment & Plan  Dizziness and abnormal gait  This has waxed and waned over the past 2 weeks.  Over the course the day it has continued to improve.  She feels significantly better by the time of my interview and exam than when she presented.  MRI without acute CVA;, CTA head neck no LVO  Add B 12  Neurology following, Given hx MS, MRI brain CT w wo demyelinating protocol    HTN (hypertension)   blood pressure range in the last 24 hours was: BP  Min: 95/54  Max: 141/64.The patient's inpatient anti-hypertensive regimen is listed below:  Current Antihypertensives       Possible contributing to dizziness  Hold metoprolol due to bradycardia and hypotension  Continue to hold diuretics   Multiple sclerosis  On Avonex weekly. Missed yesterday dose.   Instruct  to bring   See above #1 getting MRI brain CT w wo demyelinating   Tobacco abuse  Encourage smoking cessation   Neuropathic pain  Continue carbamazepine 200 mg BID  Gabapentin 600 am 800 pm       Chronic obstructive pulmonary disease, unspecified  Patient's COPD is controlled currently.  Patient is currently off COPD Pathway. Continue scheduled inhalers   Encourage smoking cessation   Final Active Diagnoses:    Diagnosis Date Noted POA    PRINCIPAL PROBLEM:  Dizziness and abnormal gait [R42] 03/17/2025 Yes    HTN (hypertension) [I10] 07/17/2012 Yes    Chronic obstructive pulmonary disease, unspecified [J44.9] 01/26/2022 Yes    Neuropathic pain [M79.2] 05/02/2017 Yes    Tobacco abuse [Z72.0] 06/02/2015 Yes    Multiple sclerosis [G35]  07/17/2012 Yes      Problems Resolved During this Admission:       Discharged Condition: stable    Disposition: Home or Self Care    Follow Up:   Follow-up Information       Devon Collier MD Follow up.    Specialty: Family Medicine  Contact information:  [address] [address]  [phone]               Heidy Mendez MD Follow up.    Specialty: Neurology  Contact information:  [address]  [place] [address]  [phone]                           Patient Instructions:      Diet Adult Regular     Notify your health care provider if you experience any of the following:  temperature >100.4     Notify your health care provider if you experience any of the following:  difficulty breathing or increased cough     Notify your health care provider if you experience any of the following:  increased confusion or weakness     Activity as tolerated       Significant Diagnostic Studies: N/A    Pending Diagnostic Studies:       None           Medications:  Reconciled Home Medications:      Medication List        CHANGE how you take these medications      * gabapentin 600 MG tablet  Commonly known as: NEURONTIN  Take 1 tablet (600 mg total) by mouth once daily.  What changed: when to take this     * gabapentin 400 MG capsule  Commonly known as: NEURONTIN  Take 2 capsules (800 mg total) by mouth every evening.  What changed: Another medication with the same name was changed. Make sure you understand how and when to take each.           * This list has 2 medication(s) that are the same as other medications prescribed for you. Read the directions carefully, and ask your doctor or other care provider to review them with you.                CONTINUE taking these medications      AVONEX 30 mcg/0.5 mL Pnkt  Generic drug: interferon beta-1a  Inject 0.5 mLs (30 mcg total) into the muscle once a week.     carBAMazepine 200 MG 12 hr tablet  Commonly known as: TEGRETOL XR  TAKE ONE TABLET BY MOUTH TWICE  DAILY     cetirizine 10 MG tablet  Commonly known as: ZYRTEC  Take 1 tablet (10 mg total) by mouth once daily.     cholecalciferol (vitamin D3) 125 mcg (5,000 unit) Tab  Commonly known as: VITAMIN D3  Take 1 tablet (5,000 Units total) by mouth once daily.     ciclopirox 8 % Soln  Commonly known as: PENLAC  Apply topically nightly.     EScitalopram oxalate 20 MG tablet  Commonly known as: LEXAPRO  Take 1 tablet (20 mg total) by mouth once daily.     HYDROcodone-acetaminophen  mg per tablet  Commonly known as: NORCO  Take 1 tablet by mouth every 6 (six) hours as needed for Pain.     hydrOXYzine HCL 25 MG tablet  Commonly known as: ATARAX  Take 1 tablet (25 mg total) by mouth 3 (three) times daily.     ondansetron 8 MG Tbdl  Commonly known as: ZOFRAN-ODT  DISSOLVE 1 TABLET ON THE TONGUE THREE TIMES DAILY AS NEEDED Strength: 8 mg     simvastatin 40 MG tablet  Commonly known as: ZOCOR  Take 1 tablet (40 mg total) by mouth every evening.     STIOLTO RESPIMAT 2.5-2.5 mcg/actuation Mist  Generic drug: tiotropium-olodateroL  inhale TWO puffs INTO THE LUNGS DAILY     tiZANidine 4 MG tablet  Commonly known as: ZANAFLEX  TAKE 2 TABLETS IN THE MORNING, AT NOON AND IN THE EVENING AND TAKE 3 TABLETS AT NIGHT (9 TABLETS PER DAY); only takes 3 at bedtime and none during the day            STOP taking these medications      diazePAM 5 MG tablet  Commonly known as: VALIUM     ibuprofen 800 MG tablet  Commonly known as: ADVIL,MOTRIN     metoprolol succinate 100 MG 24 hr tablet  Commonly known as: TOPROL-XL     triamterene-hydrochlorothiazide 37.5-25 mg 37.5-25 mg per tablet  Commonly known as: MAXZIDE-25              Indwelling Lines/Drains at time of discharge:   Lines/Drains/Airways       None                   Time spent on the discharge of patient: 30 minutes         Orin Townsend NP  Department of Hospital Medicine  [facility] [place] [facility]

## 2025-03-19 NOTE — HOSPITAL COURSE
Admission for dizziness suspect due to low blood pressure in sinus bradycardia.  Blood pressure and heart rate improved with holding home metoprolol and diuretics.  MRI no acute stroke.  CTA of head and neck no large vessel occlusion.  Given history of multiple sclerosis MRI of the brain cervical and thoracic with and without contrast demyelinating no acute abnormality.  No further dizziness during hospital stay.  Evaluation by PT completed and no acute therapy indicated at this time.  Patient hemodynamically stable for discharge home with close follow up with PCP and Neurology.

## 2025-03-19 NOTE — NURSING
Patient escorted by transport via wheelchair to family vehicle for discharge home. No apparent distress noted.

## 2025-03-19 NOTE — NURSING
Report received from rachel Velasco RN. Patient resting quietly, no apparent distress noted at this time. Wheels locked in lowest position, call light within reach, Telemetry monitoring in place.    [facility] [place] 4 [place]      3/19/2025       Skin assessed on: Q Shift      [x] No Pressure Injuries Present    []Prevention Measures Documented    [] Yes LDA  for Pressure Injury Previously documented     [] Yes New Pressure Injury Discovered   [] LDA for New Pressure Injury Added      Attending RN:  Sara Shannon LPN     Second RN:  Diana Velasco,RN

## 2025-03-19 NOTE — DISCHARGE INSTRUCTIONS
Our goal at [facility] is to always give you outstanding care and exceptional service. You may receive a survey from [employer] by mail, text or e-mail in the next 24-48 hours asking about the care you received with us. The survey should only take 5-10 minutes to complete and is very important to us.     Your feedback provides us with a way to recognize our staff who work tirelessly to provide the best care! Also, your responses help us learn how to improve when your experience was below our aspiration of excellence. We are always looking for ways to improve your stay. We WILL use your feedback to continue making improvements to help us provide the highest quality care. We keep your personal information and feedback confidential. We appreciate your time completing this survey and can't wait to hear from you!!!    We look forward to your continued care with us! Thanks so much for choosing [facility] for your healthcare needs!

## 2025-03-19 NOTE — PROGRESS NOTES
Consult received for salt and fluid restricted diet for stroke pathway. Pt not candidate for diet education, stroke ruled out. Provided heart healthy diet educational handout at bedside and briefly explained. Pt voiced understanding.

## 2025-03-20 ENCOUNTER — PATIENT OUTREACH (OUTPATIENT)
Dept: ADMINISTRATIVE | Facility: CLINIC | Age: 60
End: 2025-03-20
Payer: MEDICARE

## 2025-03-20 NOTE — PROGRESS NOTES
C3 nurse attempted to contact Zoey Cali  for a TCC post hospital discharge follow up call. No answer. Left voicemail with callback information. The patient has a scheduled HOSFU appointment with Devon Collier MD on 04/26/25 @ 1264.

## 2025-03-21 NOTE — PROGRESS NOTES
C3 nurse spoke with Zoey Cali for a TCC post hospital discharge follow up call. The patient has a scheduled HOSFU appointment, see below.

## 2025-03-25 ENCOUNTER — PATIENT MESSAGE (OUTPATIENT)
Dept: FAMILY MEDICINE | Facility: CLINIC | Age: 60
End: 2025-03-25
Payer: MEDICARE

## 2025-03-25 ENCOUNTER — PATIENT MESSAGE (OUTPATIENT)
Dept: ADMINISTRATIVE | Facility: OTHER | Age: 60
End: 2025-03-25
Payer: MEDICARE

## 2025-03-25 ENCOUNTER — OFFICE VISIT (OUTPATIENT)
Dept: NEUROLOGY | Facility: CLINIC | Age: 60
End: 2025-03-25
Payer: MEDICARE

## 2025-03-25 ENCOUNTER — PATIENT MESSAGE (OUTPATIENT)
Dept: NEUROLOGY | Facility: CLINIC | Age: 60
End: 2025-03-25

## 2025-03-25 VITALS
BODY MASS INDEX: 23.41 KG/M2 | SYSTOLIC BLOOD PRESSURE: 165 MMHG | HEIGHT: 60 IN | HEART RATE: 81 BPM | DIASTOLIC BLOOD PRESSURE: 85 MMHG | WEIGHT: 119.25 LBS

## 2025-03-25 DIAGNOSIS — G35 MULTIPLE SCLEROSIS: ICD-10-CM

## 2025-03-25 DIAGNOSIS — M79.2 NEUROPATHIC PAIN OF FOOT, RIGHT: ICD-10-CM

## 2025-03-25 DIAGNOSIS — M79.2 NEUROPATHIC PAIN OF FOOT, LEFT: ICD-10-CM

## 2025-03-25 DIAGNOSIS — Z71.89 COUNSELING REGARDING GOALS OF CARE: ICD-10-CM

## 2025-03-25 DIAGNOSIS — Z29.89 PROPHYLACTIC IMMUNOTHERAPY: ICD-10-CM

## 2025-03-25 DIAGNOSIS — R26.9 GAIT DISTURBANCE: Primary | ICD-10-CM

## 2025-03-25 DIAGNOSIS — G35 MS (MULTIPLE SCLEROSIS): ICD-10-CM

## 2025-03-25 PROCEDURE — G2211 COMPLEX E/M VISIT ADD ON: HCPCS | Mod: HCNC,S$GLB,, | Performed by: PSYCHIATRY & NEUROLOGY

## 2025-03-25 PROCEDURE — 99999 PR PBB SHADOW E&M-EST. PATIENT-LVL IV: CPT | Mod: PBBFAC,HCNC,, | Performed by: PSYCHIATRY & NEUROLOGY

## 2025-03-25 PROCEDURE — 1159F MED LIST DOCD IN RCRD: CPT | Mod: HCNC,CPTII,S$GLB, | Performed by: PSYCHIATRY & NEUROLOGY

## 2025-03-25 PROCEDURE — 3079F DIAST BP 80-89 MM HG: CPT | Mod: HCNC,CPTII,S$GLB, | Performed by: PSYCHIATRY & NEUROLOGY

## 2025-03-25 PROCEDURE — 3077F SYST BP >= 140 MM HG: CPT | Mod: HCNC,CPTII,S$GLB, | Performed by: PSYCHIATRY & NEUROLOGY

## 2025-03-25 PROCEDURE — 99215 OFFICE O/P EST HI 40 MIN: CPT | Mod: HCNC,S$GLB,, | Performed by: PSYCHIATRY & NEUROLOGY

## 2025-03-25 PROCEDURE — 1160F RVW MEDS BY RX/DR IN RCRD: CPT | Mod: HCNC,CPTII,S$GLB, | Performed by: PSYCHIATRY & NEUROLOGY

## 2025-03-25 PROCEDURE — 3008F BODY MASS INDEX DOCD: CPT | Mod: HCNC,CPTII,S$GLB, | Performed by: PSYCHIATRY & NEUROLOGY

## 2025-03-25 RX ORDER — GABAPENTIN 800 MG/1
800 TABLET ORAL NIGHTLY
Qty: 90 TABLET | Refills: 1 | Status: SHIPPED | OUTPATIENT
Start: 2025-03-25 | End: 2025-03-27

## 2025-03-25 RX ORDER — GABAPENTIN 600 MG/1
600 TABLET ORAL 2 TIMES DAILY
Qty: 180 TABLET | Refills: 1 | Status: SHIPPED | OUTPATIENT
Start: 2025-03-25 | End: 2025-03-27

## 2025-03-25 NOTE — PROGRESS NOTES
"Subjective:          Patient ID: Zoey Cali is a 59 y.o. female who presents today for a routine clinic visit for MS.      MS HPI:  DMT: interferon beta-1a IM  Side effects from DMT? No  Taking vitamin D3 as recommended? Yes -   Recently admitted for a couple of days for dizziness - found to have low BP and low HR - meds changed and feeling better now; plans to see PCP soon   No issues with Avonex;    Gait is stable in general;  uses a cane if walking any distance.   Has a sense of "swelling in her fore foot" , worse at night; she states her feet are not swollen however, but she has a sensation of this. States this sensation is new in the past 6 months;    States she has sense of RLS frequently at night; - she describes an uncomfortable sensation that is relieved by movement or walking;     Medications:  Current Outpatient Medications   Medication Sig    carBAMazepine (TEGRETOL XR) 200 MG 12 hr tablet TAKE ONE TABLET BY MOUTH TWICE DAILY    cetirizine (ZYRTEC) 10 MG tablet Take 1 tablet (10 mg total) by mouth once daily. (Patient not taking: Reported on 3/27/2025)    cholecalciferol, vitamin D3, (VITAMIN D3) 125 mcg (5,000 unit) Tab Take 1 tablet (5,000 Units total) by mouth once daily.    EScitalopram oxalate (LEXAPRO) 20 MG tablet Take 1 tablet (20 mg total) by mouth once daily.    HYDROcodone-acetaminophen (NORCO)  mg per tablet Take 1 tablet by mouth every 6 (six) hours as needed for Pain. (Patient not taking: Reported on 3/27/2025)    interferon beta-1a (AVONEX) 30 mcg/0.5 mL PnKt Inject 0.5 mLs (30 mcg total) into the muscle once a week.    ondansetron (ZOFRAN-ODT) 8 MG TbDL DISSOLVE 1 TABLET ON THE TONGUE THREE TIMES DAILY AS NEEDED Strength: 8 mg    simvastatin (ZOCOR) 40 MG tablet Take 1 tablet (40 mg total) by mouth every evening.    tiotropium-olodateroL (STIOLTO RESPIMAT) 2.5-2.5 mcg/actuation Mist inhale TWO puffs INTO THE LUNGS DAILY    tiZANidine (ZANAFLEX) 4 MG tablet TAKE 2 TABLETS IN " THE MORNING, AT NOON AND IN THE EVENING AND TAKE 3 TABLETS AT NIGHT (9 TABLETS PER DAY); only takes 3 at bedtime and none during the day (Patient taking differently: Take 3 tablets by mouth every evening. TAKE 2 TABLETS IN THE MORNING, AT NOON AND IN THE EVENING AND TAKE 3 TABLETS AT NIGHT (9 TABLETS PER DAY); only takes 3 at bedtime and none during the day)    gabapentin (NEURONTIN) 600 MG tablet Take 2 tablets (1,200 mg total) by mouth every evening. (Bedtime)    gabapentin (NEURONTIN) 800 MG tablet Take 1 tablet (800 mg total) by mouth 2 (two) times daily.    hydrOXYzine HCL (ATARAX) 25 MG tablet Take 1 tablet (25 mg total) by mouth 3 (three) times daily as needed for Itching.    ibuprofen (ADVIL,MOTRIN) 600 MG tablet TAKE ONE TABLET BY MOUTH EVERY 6 HOURS AS NEEDED FOR PAIN    ibuprofen (ADVIL,MOTRIN) 800 MG tablet Take 800 mg by mouth every 6 (six) hours as needed.     No current facility-administered medications for this visit.     SOCIAL HISTORY  Social History[1]    Living arrangements - the patient lives with their spouse.        3/25/2025    12:13 PM   REVIEW OF SYMPTOMS   Do you feel abnormally tired on most days? No   Do you feel you generally sleep well? Yes   Do you have difficulty controlling your bladder?  No   Do you have difficulty controlling your bowels?  No   Do you have frequent muscle cramps, tightness or spasms in your limbs?  No   Do you have new visual symptoms?  No   Do you have worsening difficulty with your memory or thinking? No   Do you have worsening symptoms of anxiety or depression?  Yes   For patients who walk, Do you have more difficulty walking?  Yes   Have you fallen since your last visit?  No   For patients who use wheelchairs: Do you have any skin wounds or breakdown? Not Applicable   Do you have difficulty using your hands?  No   Do you have shooting or burning pain? No   Do you have difficulty with sexual function?  No   If you are sexually active, are you using birth  control? Y/N  N/A No   Do you often choke when swallowing liquids or solid food?  No   Do you experience worsening symptoms when overheated? Yes   Do you need any new equipment such as a wheelchair, walker or shower chair? No   Do you receive co-pay financial assistance for your principal MS medicine? No   Would you be interested in participating in an MS research trial in the future? No   For patients on Gilenya, Tecfidera, Aubagio, Rituxan, Ocrevus, Tysabri, Lemtrada or Methotrexate, are you aware that you should NOT receive live virus vaccines?  Not Applicable   Do you feel you have adequate family/friend support?  Yes   Do you have health insurance?   Yes   Are you currently employed? No   Do you receive SSDI/SSI?  Yes   Do you use marijuana or cannabis products? No   Have you been diagnosed with a urinary tract infection since your last visit here? No   Have you been diagnosed with a respiratory tract infection since your last visit here? No   Have you been to the emergency room since your last visit here? Yes   Have you been hospitalized since your last visit here?  Yes                Objective:            6/29/2021     1:40 PM 3/25/2025     1:00 PM   Timed 25 Foot Walk:   Did patient wear an AFO? No No   Was assistive device used? No No   Time for 25 Foot Walk (seconds) 4.8 5.48     Neurological Exam  MS: fluent, normal comprehension and attention  CN: no dysarthria; no facial asymmetry  MOTOR: moves all limbs well  COORD: normal FTN  GAIT: normal causal gait    Imaging:     Results for orders placed during the hospital encounter of 03/13/21    MRI Brain Demyelinating Without Contrast    Impression  Brain appears stable from prior exam as above.  No new discrete lesions to indicate ongoing demyelination.      Electronically signed by: Magno Bahena MD  Date:    03/14/2021  Time:    08:34    No results found for this or any previous visit.    No results found for this or any previous visit.    Results for  "orders placed during the hospital encounter of 03/17/25    MRI Brain Demyelinating W W/O Contrast    Impression  1. Motion compromised study.  2. No acute intracranial findings.  3. No acute findings involving the cervical or thoracic spine.  4. Additional details, as provided in the body report.      Electronically signed by: Jm Montanez  Date:    03/19/2025  Time:    08:59    Results for orders placed during the hospital encounter of 03/17/25    MRI Cervical Spine Demyelinating W W/O Contrast    Impression  1. Motion compromised study.  2. No acute intracranial findings.  3. No acute findings involving the cervical or thoracic spine.  4. Additional details, as provided in the body report.      Electronically signed by: Jm Montanez  Date:    03/19/2025  Time:    08:59    Results for orders placed during the hospital encounter of 03/17/25    MRI Thoracic Spine Demyelinating W W/O Contrast    Impression  1. Motion compromised study.  2. No acute intracranial findings.  3. No acute findings involving the cervical or thoracic spine.  4. Additional details, as provided in the body report.      Electronically signed by: Jm Montanez  Date:    03/19/2025  Time:    08:59        Labs:     Lab Results   Component Value Date    TRTWAVMD81FP 74 05/31/2024    [id number] 117 (H) 10/12/2022    EJLZDEOX74FE 78 10/13/2021     No results found for: "JCVINDEX", "JCVANTIBODY"  No results found for: "UQ8VRMUW", "ABSOLUTECD3", "PI7ZBTKV", "ABSOLUTECD8", "HG9BLKFO", "ABSOLUTECD4", "LABCD48"  Lab Results   Component Value Date    WBC 5.44 03/19/2025    RBC 4.45 03/19/2025    HGB 14.0 03/19/2025    HCT 40.7 03/19/2025    MCV 92 03/19/2025    MCH 31.5 (H) 03/19/2025    MCHC 34.4 03/19/2025    RDW 12.4 03/19/2025     03/19/2025    MPV 8.9 (L) 03/19/2025    GRAN 3.5 03/19/2025    GRAN 63.6 03/19/2025    LYMPH 1.4 03/19/2025    LYMPH 25.6 03/19/2025    MONO 0.3 03/19/2025    MONO 6.3 03/19/2025    EOS 0.2 03/19/2025    LOCO " 0.02 03/19/2025    EOSINOPHIL 3.7 03/19/2025    BASOPHIL 0.4 03/19/2025     Sodium   Date Value Ref Range Status   03/19/2025 135 (L) 136 - 145 mmol/L Final     Potassium   Date Value Ref Range Status   03/19/2025 3.8 3.5 - 5.1 mmol/L Final     Chloride   Date Value Ref Range Status   03/19/2025 100 95 - 110 mmol/L Final     CO2   Date Value Ref Range Status   03/19/2025 25 23 - 29 mmol/L Final     Glucose   Date Value Ref Range Status   03/19/2025 94 70 - 110 mg/dL Final     BUN   Date Value Ref Range Status   03/19/2025 11 6 - 20 mg/dL Final     Creatinine   Date Value Ref Range Status   03/19/2025 0.6 0.5 - 1.4 mg/dL Final     Calcium   Date Value Ref Range Status   03/19/2025 8.5 (L) 8.7 - 10.5 mg/dL Final     Total Protein   Date Value Ref Range Status   03/19/2025 6.3 6.0 - 8.4 g/dL Final     Albumin   Date Value Ref Range Status   03/19/2025 3.4 (L) 3.5 - 5.2 g/dL Final     Total Bilirubin   Date Value Ref Range Status   03/19/2025 0.4 0.1 - 1.0 mg/dL Final     Comment:     For infants and newborns, interpretation of results should be based  on gestational age, weight and in agreement with clinical  observations.    Premature Infant recommended reference ranges:  Up to 24 hours.............<8.0 mg/dL  Up to 48 hours............<12.0 mg/dL  3-5 days..................<15.0 mg/dL  6-29 days.................<15.0 mg/dL       Alkaline Phosphatase   Date Value Ref Range Status   03/19/2025 107 40 - 150 U/L Final     AST   Date Value Ref Range Status   03/19/2025 15 10 - 40 U/L Final     ALT   Date Value Ref Range Status   03/19/2025 10 10 - 44 U/L Final     Anion Gap   Date Value Ref Range Status   03/19/2025 10 8 - 16 mmol/L Final     eGFR if    Date Value Ref Range Status   05/15/2019 >60 >60 mL/min/1.73 m^2 Final     eGFR if non    Date Value Ref Range Status   05/15/2019 >60 >60 mL/min/1.73 m^2 Final     Comment:     Calculation used to obtain the estimated glomerular  "filtration  rate (eGFR) is the CKD-EPI equation.        No results found for: "HEPBSAG", "HEPBSAB", "HEPBCAB"        MS Impression and Plan:     NEURO MULTIPLE SCLEROSIS IMPRESSION:   Number of relapses in the past year?:  0  Clinical Progression:  Clinically Stable  MRI Progression:  Stable  MS Classification:  Relapsing-Remitting MS  Current DMT: interferon beta-1a IM  DMT:  No change in management  Symptom Management:  Implement change in symptom management  Implement Change in Symptom Management:  Gait  Implement Change in Symptom Management comment: NeuroPT ordered  Additional Impressions: Suspect recent dizziness was due to low BP / meds; better now ;  She is very deconditioned however - she's agreed to outpatient neuro PT; ordered  Continue Avonex  F/u [facility] in 6mo         Problem List Items Addressed This Visit          Unprioritized    Counseling regarding goals of care    Prophylactic immunotherapy    Gait disturbance - Primary    Relevant Orders    Ambulatory referral/consult to Physical/Occupational Therapy    Multiple sclerosis    Relevant Orders    Ambulatory referral/consult to Physical/Occupational Therapy     Other Visit Diagnoses         Neuropathic pain of foot, right          Neuropathic pain of foot, left                Heidy A MD Vanessa    I spent a total of 40 minutes on the day of the visit.This includes face to face time and non-face to face time preparing to see the patient (eg, review of tests), obtaining and/or reviewing separately obtained history, documenting clinical information in the electronic or other health record, independently interpreting results and communicating results to the patient/family/caregiver, or care coordinator.  Visit today included increased complexity associated with the care of the episodic problem : chronic immunotherapy; addressed and managing the longitudinal care of the patient due to the serious and/or complex managed problem(s) MS.       "     [1]   Social History  Tobacco Use    Smoking status: Every Day     Current packs/day: 1.00     Average packs/day: 1 pack/day for 30.0 years (30.0 ttl pk-yrs)     Types: Cigarettes    Smokeless tobacco: Never   Substance Use Topics    Alcohol use: No    Drug use: No

## 2025-03-27 ENCOUNTER — OFFICE VISIT (OUTPATIENT)
Dept: FAMILY MEDICINE | Facility: CLINIC | Age: 60
End: 2025-03-27
Payer: MEDICARE

## 2025-03-27 VITALS
WEIGHT: 117.31 LBS | DIASTOLIC BLOOD PRESSURE: 72 MMHG | HEIGHT: 60 IN | BODY MASS INDEX: 23.03 KG/M2 | OXYGEN SATURATION: 97 % | TEMPERATURE: 98 F | RESPIRATION RATE: 18 BRPM | SYSTOLIC BLOOD PRESSURE: 126 MMHG | HEART RATE: 91 BPM

## 2025-03-27 DIAGNOSIS — M79.2 NEUROPATHIC PAIN OF FOOT, RIGHT: ICD-10-CM

## 2025-03-27 DIAGNOSIS — R10.84 GENERALIZED ABDOMINAL PAIN: ICD-10-CM

## 2025-03-27 DIAGNOSIS — M79.2 NEUROPATHIC PAIN OF FOOT, LEFT: ICD-10-CM

## 2025-03-27 DIAGNOSIS — Z12.31 ENCOUNTER FOR SCREENING MAMMOGRAM FOR BREAST CANCER: ICD-10-CM

## 2025-03-27 DIAGNOSIS — L29.9 ITCHING: ICD-10-CM

## 2025-03-27 DIAGNOSIS — G35 MULTIPLE SCLEROSIS: ICD-10-CM

## 2025-03-27 DIAGNOSIS — R42 DIZZINESS: ICD-10-CM

## 2025-03-27 DIAGNOSIS — Z09 HOSPITAL DISCHARGE FOLLOW-UP: Primary | ICD-10-CM

## 2025-03-27 PROCEDURE — 99999 PR PBB SHADOW E&M-EST. PATIENT-LVL V: CPT | Mod: PBBFAC,HCNC,, | Performed by: NURSE PRACTITIONER

## 2025-03-27 RX ORDER — IBUPROFEN 800 MG/1
800 TABLET ORAL EVERY 6 HOURS PRN
COMMUNITY

## 2025-03-27 RX ORDER — GABAPENTIN 800 MG/1
800 TABLET ORAL 2 TIMES DAILY
Qty: 60 TABLET | Refills: 0 | Status: SHIPPED | OUTPATIENT
Start: 2025-03-27 | End: 2025-04-26

## 2025-03-27 RX ORDER — HYDROXYZINE HYDROCHLORIDE 25 MG/1
25 TABLET, FILM COATED ORAL 3 TIMES DAILY PRN
Start: 2025-03-27

## 2025-03-27 RX ORDER — GABAPENTIN 600 MG/1
1200 TABLET ORAL NIGHTLY
Qty: 60 TABLET | Refills: 0 | Status: SHIPPED | OUTPATIENT
Start: 2025-03-27 | End: 2025-04-26

## 2025-03-27 NOTE — PROGRESS NOTES
"Subjective:       Patient ID: Zoey Cali is a 59 y.o. female.    Chief Complaint: Hospital Follow Up    HPI     Zoey Cali is a 59 y.o. female patient that presents to clinic for hospital discharge follow up. Past medical and surgical history reviewed as listed. PCP is Devon Collier MD , she is  new  to me. Recent hospital admission from 3/17-3/19/2025 with a primary diagnosis of dizziness and abnormal gait.  Hospital discharge summary reviewed at length and discussed with patient.  Patient reports that dizziness and lightheadedness has resolved.  She now reports that she continues to have recurrent abdominal muscle spasms states she experiences severe pain and will drink milk to help with pain and needs to lay in fetal position".      She is also requesting medication clarification with gabapentin orders.     She also reports itching.     ROS as listed.    Past Medical History:   Diagnosis Date    Allergy     Anemia     Anxiety     Breast cyst     Chronic kidney disease     Depression     Fibrocystic breast     Hypertension     Multiple sclerosis     Multiple sclerosis     Neuromuscular disorder     Osteoporosis     Rib fracture 2015      Past Surgical History:   Procedure Laterality Date    APPENDECTOMY      BREAST BIOPSY Left 2012    BREAST CYST ASPIRATION      BREAST SURGERY  2004    excisional biopsy      SECTION, CLASSIC      CHALAZION EXCISION  at age 9    CHOLECYSTECTOMY      EYE SURGERY      HYSTERECTOMY      OOPHORECTOMY        Family History   Problem Relation Name Age of Onset    Arthritis Mother R     Diabetes Father Jr     Heart disease Father Jr     Hyperlipidemia Father Jr     Hypertension Father Jr     Stroke Father Jr     Arthritis Son Vamsi     Drug abuse Son Vamsi     Early death Son Vamsi         OD    Hypertension Maternal Grandmother F     Stroke Maternal Grandmother F     Arthritis Maternal Grandmother F     Heart disease Maternal Grandmother F     " Arthritis Paternal Grandmother E     Heart disease Paternal Grandmother E     Hypertension Paternal Grandmother E     Stroke Paternal Grandmother E     Heart disease Paternal Grandfather HH         Massive heart attack    Heart attack Paternal Grandfather HH     Heart disease Maternal Grandfather L     Drug abuse Maternal Uncle E     Hypertension Paternal Aunt X     Heart disease Paternal Uncle U     Hypertension Paternal Uncle Yelitza     Early death Son Antoni         Suicide    Colon cancer Neg Hx      Ovarian cancer Neg Hx      Amblyopia Neg Hx      Blindness Neg Hx      Glaucoma Neg Hx      Macular degeneration Neg Hx      Retinal detachment Neg Hx      Strabismus Neg Hx      Thyroid disease Neg Hx        Review of patient's allergies indicates:   Allergen Reactions    Lomotil [diphenoxylate-atropine]      Causes stomach spasms    Dilaudid [hydromorphone (bulk)] Itching     Review of Systems   Constitutional:  Negative for fever.   Cardiovascular:  Negative for chest pain, palpitations and leg swelling.   Gastrointestinal:  Negative for abdominal distention, abdominal pain, anal bleeding, blood in stool, nausea, rectal pain and vomiting.   Neurological:  Negative for dizziness.       Objective:      Vitals:    03/27/25 1354   BP: 126/72   Pulse: 91   Resp: 18   Temp: 98.1 °F (36.7 °C)   TempSrc: Oral   SpO2: 97%   Weight: 53.2 kg (117 lb 4.6 oz)   Height: 5' (1.524 m)      Physical Exam  Vitals and nursing note reviewed.   Constitutional:       General: She is not in acute distress.  HENT:      Head: Normocephalic.   Eyes:      Conjunctiva/sclera: Conjunctivae normal.      Pupils: Pupils are equal, round, and reactive to light.   Cardiovascular:      Rate and Rhythm: Normal rate and regular rhythm.      Heart sounds: Normal heart sounds. No murmur heard.  Pulmonary:      Effort: Pulmonary effort is normal. No respiratory distress.   Musculoskeletal:      Cervical back: Normal range of motion.   Skin:     General:  Skin is warm and dry.   Neurological:      Mental Status: She is alert and oriented to person, place, and time.      Gait: Gait normal.   Psychiatric:         Mood and Affect: Mood normal.         Behavior: Behavior normal.         Lab Results   Component Value Date    WBC 5.44 03/19/2025    HGB 14.0 03/19/2025    HCT 40.7 03/19/2025     03/19/2025    CHOL 179 04/22/2024    TRIG 169 (H) 04/22/2024    HDL 32 (L) 04/22/2024    ALT 10 03/19/2025    AST 15 03/19/2025     (L) 03/19/2025    K 3.8 03/19/2025     03/19/2025    CREATININE 0.6 03/19/2025    BUN 11 03/19/2025    CO2 25 03/19/2025    HGBA1C 5.1 04/22/2024      Assessment:       1. Hospital discharge follow-up    2. Dizziness and abnormal gait    3. Itching    4. Multiple sclerosis    5. Neuropathic pain of foot, right    6. Neuropathic pain of foot, left    7. Generalized abdominal pain    8. Encounter for screening mammogram for breast cancer        Plan:       Hospital discharge follow-up  Stable.    Dizziness and abnormal gait  Resolved.   Follow up with Neurology.     Itching  -     hydrOXYzine HCL (ATARAX) 25 MG tablet; Take 1 tablet (25 mg total) by mouth 3 (three) times daily as needed for Itching.    Multiple sclerosis  -     gabapentin (NEURONTIN) 800 MG tablet; Take 1 tablet (800 mg total) by mouth 2 (two) times daily.  Dispense: 60 tablet; Refill: 0  -     gabapentin (NEURONTIN) 600 MG tablet; Take 2 tablets (1,200 mg total) by mouth every evening. (Bedtime)  Dispense: 60 tablet; Refill: 0    Neuropathic pain of foot, right  -     gabapentin (NEURONTIN) 600 MG tablet; Take 2 tablets (1,200 mg total) by mouth every evening. (Bedtime)  Dispense: 60 tablet; Refill: 0    Neuropathic pain of foot, left  -     gabapentin (NEURONTIN) 600 MG tablet; Take 2 tablets (1,200 mg total) by mouth every evening. (Bedtime)  Dispense: 60 tablet; Refill: 0    Generalized abdominal pain  -     Ambulatory referral/consult to Gastroenterology; Future;  Expected date: 03/27/2025    Encounter for screening mammogram for breast cancer  -     Mammo Digital Screening Bilat w/ Aron (XPD); Future; Expected date: 03/27/2025      Medication List with Changes/Refills   Current Medications    CARBAMAZEPINE (TEGRETOL XR) 200 MG 12 HR TABLET    TAKE ONE TABLET BY MOUTH TWICE DAILY    CETIRIZINE (ZYRTEC) 10 MG TABLET    Take 1 tablet (10 mg total) by mouth once daily.    CHOLECALCIFEROL, VITAMIN D3, (VITAMIN D3) 125 MCG (5,000 UNIT) TAB    Take 1 tablet (5,000 Units total) by mouth once daily.    ESCITALOPRAM OXALATE (LEXAPRO) 20 MG TABLET    Take 1 tablet (20 mg total) by mouth once daily.    HYDROCODONE-ACETAMINOPHEN (NORCO)  MG PER TABLET    Take 1 tablet by mouth every 6 (six) hours as needed for Pain.    IBUPROFEN (ADVIL,MOTRIN) 800 MG TABLET    Take 800 mg by mouth every 6 (six) hours as needed.    INTERFERON BETA-1A (AVONEX) 30 MCG/0.5 ML PNKT    Inject 0.5 mLs (30 mcg total) into the muscle once a week.    ONDANSETRON (ZOFRAN-ODT) 8 MG TBDL    DISSOLVE 1 TABLET ON THE TONGUE THREE TIMES DAILY AS NEEDED Strength: 8 mg    SIMVASTATIN (ZOCOR) 40 MG TABLET    Take 1 tablet (40 mg total) by mouth every evening.    TIOTROPIUM-OLODATEROL (STIOLTO RESPIMAT) 2.5-2.5 MCG/ACTUATION MIST    inhale TWO puffs INTO THE LUNGS DAILY    TIZANIDINE (ZANAFLEX) 4 MG TABLET    TAKE 2 TABLETS IN THE MORNING, AT NOON AND IN THE EVENING AND TAKE 3 TABLETS AT NIGHT (9 TABLETS PER DAY); only takes 3 at bedtime and none during the day   Changed and/or Refilled Medications    Modified Medication Previous Medication    GABAPENTIN (NEURONTIN) 600 MG TABLET gabapentin (NEURONTIN) 600 MG tablet       Take 2 tablets (1,200 mg total) by mouth every evening. (Bedtime)    Take 1 tablet (600 mg total) by mouth 2 (two) times daily.    GABAPENTIN (NEURONTIN) 800 MG TABLET gabapentin (NEURONTIN) 800 MG tablet       Take 1 tablet (800 mg total) by mouth 2 (two) times daily.    Take 1 tablet (800 mg total)  by mouth every evening.    HYDROXYZINE HCL (ATARAX) 25 MG TABLET hydrOXYzine HCL (ATARAX) 25 MG tablet       Take 1 tablet (25 mg total) by mouth 3 (three) times daily as needed for Itching.    Take 1 tablet (25 mg total) by mouth 3 (three) times daily.   Discontinued Medications    CICLOPIROX (PENLAC) 8 % SOLN    Apply topically nightly.                Gabbie Melendez, MARSHALL, APRN, FNP-C  Family [facility]  03/28/2025 2:13 PM

## 2025-04-03 RX ORDER — IBUPROFEN 600 MG/1
600 TABLET ORAL EVERY 6 HOURS PRN
Qty: 20 TABLET | Refills: 1 | Status: SHIPPED | OUTPATIENT
Start: 2025-04-03

## 2025-04-03 NOTE — TELEPHONE ENCOUNTER
No care due was identified.  [facility] Embedded Care Due Messages. Reference number: [id number].   4/03/2025 8:47:35 AM CDT

## 2025-04-11 ENCOUNTER — HOSPITAL ENCOUNTER (OUTPATIENT)
Dept: RADIOLOGY | Facility: HOSPITAL | Age: 60
Discharge: HOME | End: 2025-04-11
Attending: NURSE PRACTITIONER
Payer: MEDICARE

## 2025-04-11 DIAGNOSIS — Z12.31 ENCOUNTER FOR SCREENING MAMMOGRAM FOR BREAST CANCER: ICD-10-CM

## 2025-04-11 PROCEDURE — 77063 BREAST TOMOSYNTHESIS BI: CPT | Mod: TC,HCNC

## 2025-04-11 PROCEDURE — 77063 BREAST TOMOSYNTHESIS BI: CPT | Mod: 26,HCNC,, | Performed by: RADIOLOGY

## 2025-04-11 PROCEDURE — 77067 SCR MAMMO BI INCL CAD: CPT | Mod: 26,HCNC,, | Performed by: RADIOLOGY

## 2025-04-14 ENCOUNTER — RESULTS FOLLOW-UP (OUTPATIENT)
Dept: FAMILY MEDICINE | Facility: CLINIC | Age: 60
End: 2025-04-14

## 2025-04-22 ENCOUNTER — PATIENT MESSAGE (OUTPATIENT)
Dept: ADMINISTRATIVE | Facility: OTHER | Age: 60
End: 2025-04-22
Payer: MEDICARE

## 2025-04-29 ENCOUNTER — OFFICE VISIT (OUTPATIENT)
Dept: FAMILY MEDICINE | Facility: CLINIC | Age: 60
End: 2025-04-29
Payer: MEDICARE

## 2025-04-29 VITALS
HEIGHT: 60 IN | OXYGEN SATURATION: 97 % | SYSTOLIC BLOOD PRESSURE: 122 MMHG | WEIGHT: 117.31 LBS | TEMPERATURE: 98 F | HEART RATE: 84 BPM | BODY MASS INDEX: 23.03 KG/M2 | DIASTOLIC BLOOD PRESSURE: 70 MMHG

## 2025-04-29 DIAGNOSIS — Z00.00 ANNUAL PHYSICAL EXAM: Primary | ICD-10-CM

## 2025-04-29 DIAGNOSIS — Z87.891 PERSONAL HISTORY OF NICOTINE DEPENDENCE: ICD-10-CM

## 2025-04-29 DIAGNOSIS — F17.200 TOBACCO DEPENDENCE: ICD-10-CM

## 2025-04-29 DIAGNOSIS — Z12.12 ENCOUNTER FOR COLORECTAL CANCER SCREENING: ICD-10-CM

## 2025-04-29 DIAGNOSIS — Z12.11 ENCOUNTER FOR COLORECTAL CANCER SCREENING: ICD-10-CM

## 2025-04-29 PROCEDURE — 99999 PR PBB SHADOW E&M-EST. PATIENT-LVL IV: CPT | Mod: PBBFAC,HCNC,, | Performed by: FAMILY MEDICINE

## 2025-04-29 RX ORDER — GABAPENTIN 400 MG/1
800 CAPSULE ORAL NIGHTLY
COMMUNITY
Start: 2025-04-09

## 2025-04-29 NOTE — PROGRESS NOTES
Chief Complaint   Patient presents with    Hospital Follow Up       SUBJECTIVE:   59 y.o. female for annual routine checkup.    Current Medications[1]  Allergies: Lomotil [diphenoxylate-atropine] and Dilaudid [hydromorphone (bulk)]   Patient's last menstrual period was 01/01/2001 (approximate).    ROS:  Feeling well. No dyspnea or chest pain on exertion.  No abdominal pain, change in bowel habits, black or bloody stools.  No urinary tract symptoms. GYN ROS: s/p hospital, feeling better but no progression with her MS diagnosis on the imaging, but maybe with symptoms. No neurological complaints.    OBJECTIVE:   The patient appears well, alert, oriented x 3, in no distress.  /70   Pulse 84   Temp 98.1 °F (36.7 °C) (Oral)   Ht 5' (1.524 m)   Wt 53.2 kg (117 lb 4.6 oz)   LMP 01/01/2001 (Approximate) Comment: 2001  SpO2 97%   BMI 22.91 kg/m²   Wt Readings from Last 5 Encounters:   04/29/25 53.2 kg (117 lb 4.6 oz)   03/27/25 53.2 kg (117 lb 4.6 oz)   03/25/25 54.1 kg (119 lb 4.3 oz)   03/18/25 59 kg (130 lb 1.1 oz)   08/07/24 55.3 kg (121 lb 14.6 oz)       ENT abnormal.  Neck supple. No adenopathy or thyromegaly. YAN. Lungs are clear, good air entry, no wheezes, rhonchi or rales. S1 and S2 normal, no murmurs, regular rate and rhythm. Abdomen soft without tenderness, guarding, mass or organomegaly. Extremities show no edema, normal peripheral pulses. Neurological is normal, no focal findings.  Weight slowly going down again    BREAST EXAM: deferred    PELVIC EXAM: deferred    ASSESSMENT:   1. Annual physical exam    2. Encounter for colorectal cancer screening    3. Tobacco dependence    4. Personal history of nicotine dependence          PLAN:   Counseled on age appropriate medical preventative services, including age appropriate cancer screenings, over all nutritional health, need for a consistent exercise regimen and an over all push towards maintaining a vigorous and active lifestyle.  Counseled on age  appropriate vaccines and discussed upcoming health care needs based on age/gender.  Spent time with patient counseling on need for a good patient/doctor relationship moving forward.  Discussed use of common OTC medications and supplements.  Discussed common dietary aids and use of caffeine and the need for good sleep hygiene and stress management.    Problem List Items Addressed This Visit    None  Visit Diagnoses         Annual physical exam    -  Primary      Encounter for colorectal cancer screening        Relevant Orders    Cologuard Screening (Multitarget Stool DNA)    D-Dimer, Quantitative      Tobacco dependence        Relevant Orders    CT Chest Lung Screening Low Dose    D-Dimer, Quantitative      Personal history of nicotine dependence        Relevant Orders    CT Chest Lung Screening Low Dose    D-Dimer, Quantitative          No plans to quit tobacco currently but she has continued to cut back  F/u in 1 year for wellness       [1]   Current Outpatient Medications   Medication Sig Dispense Refill    carBAMazepine (TEGRETOL XR) 200 MG 12 hr tablet TAKE ONE TABLET BY MOUTH TWICE DAILY 180 tablet 3    cholecalciferol, vitamin D3, (VITAMIN D3) 125 mcg (5,000 unit) Tab Take 1 tablet (5,000 Units total) by mouth once daily. 90 tablet 3    EScitalopram oxalate (LEXAPRO) 20 MG tablet Take 1 tablet (20 mg total) by mouth once daily. 90 tablet 1    gabapentin (NEURONTIN) 400 MG capsule Take 800 mg by mouth every evening.      hydrOXYzine HCL (ATARAX) 25 MG tablet Take 1 tablet (25 mg total) by mouth 3 (three) times daily as needed for Itching.      ibuprofen (ADVIL,MOTRIN) 600 MG tablet TAKE ONE TABLET BY MOUTH EVERY 6 HOURS AS NEEDED FOR PAIN 20 tablet 1    ibuprofen (ADVIL,MOTRIN) 800 MG tablet Take 800 mg by mouth every 6 (six) hours as needed.      interferon beta-1a (AVONEX) 30 mcg/0.5 mL PnKt Inject 0.5 mLs (30 mcg total) into the muscle once a week. 6 mL 0    ondansetron (ZOFRAN-ODT) 8 MG TbDL DISSOLVE 1  TABLET ON THE TONGUE THREE TIMES DAILY AS NEEDED Strength: 8 mg 20 tablet 11    simvastatin (ZOCOR) 40 MG tablet Take 1 tablet (40 mg total) by mouth every evening. 90 tablet 3    tiotropium-olodateroL (STIOLTO RESPIMAT) 2.5-2.5 mcg/actuation Mist inhale TWO puffs INTO THE LUNGS DAILY 12 g 2    tiZANidine (ZANAFLEX) 4 MG tablet TAKE 2 TABLETS IN THE MORNING, AT NOON AND IN THE EVENING AND TAKE 3 TABLETS AT NIGHT (9 TABLETS PER DAY); only takes 3 at bedtime and none during the day (Patient taking differently: Take 3 tablets by mouth every evening. TAKE 2 TABLETS IN THE MORNING, AT NOON AND IN THE EVENING AND TAKE 3 TABLETS AT NIGHT (9 TABLETS PER DAY); only takes 3 at bedtime and none during the day) 810 tablet 3    cetirizine (ZYRTEC) 10 MG tablet Take 1 tablet (10 mg total) by mouth once daily. (Patient not taking: Reported on 4/29/2025) 30 tablet 0    HYDROcodone-acetaminophen (NORCO)  mg per tablet Take 1 tablet by mouth every 6 (six) hours as needed for Pain. (Patient not taking: Reported on 4/29/2025) 60 tablet 0     No current facility-administered medications for this visit.

## 2025-05-05 ENCOUNTER — HOSPITAL ENCOUNTER (OUTPATIENT)
Dept: RADIOLOGY | Facility: HOSPITAL | Age: 60
Discharge: HOME | End: 2025-05-05
Attending: FAMILY MEDICINE
Payer: MEDICARE

## 2025-05-05 DIAGNOSIS — Z87.891 PERSONAL HISTORY OF NICOTINE DEPENDENCE: ICD-10-CM

## 2025-05-05 DIAGNOSIS — F17.200 TOBACCO DEPENDENCE: ICD-10-CM

## 2025-05-05 PROCEDURE — 71271 CT THORAX LUNG CANCER SCR C-: CPT | Mod: TC,HCNC

## 2025-05-05 PROCEDURE — 71271 CT THORAX LUNG CANCER SCR C-: CPT | Mod: 26,HCNC,, | Performed by: RADIOLOGY

## 2025-05-06 ENCOUNTER — RESULTS FOLLOW-UP (OUTPATIENT)
Dept: FAMILY MEDICINE | Facility: CLINIC | Age: 60
End: 2025-05-06

## 2025-05-08 ENCOUNTER — HOSPITAL ENCOUNTER (INPATIENT)
Facility: HOSPITAL | Age: 60
LOS: 6 days | Discharge: HOME HEALTH | DRG: 871 | End: 2025-05-14
Attending: STUDENT IN AN ORGANIZED HEALTH CARE EDUCATION/TRAINING PROGRAM | Admitting: STUDENT IN AN ORGANIZED HEALTH CARE EDUCATION/TRAINING PROGRAM
Payer: MEDICARE

## 2025-05-08 DIAGNOSIS — K85.90 PANCREATITIS: Primary | ICD-10-CM

## 2025-05-08 DIAGNOSIS — I10 PRIMARY HYPERTENSION: ICD-10-CM

## 2025-05-08 DIAGNOSIS — R07.9 CHEST PAIN: ICD-10-CM

## 2025-05-08 DIAGNOSIS — R79.89 ELEVATED LFTS: ICD-10-CM

## 2025-05-08 DIAGNOSIS — R41.82 ALTERED MENTAL STATUS, UNSPECIFIED ALTERED MENTAL STATUS TYPE: ICD-10-CM

## 2025-05-08 DIAGNOSIS — B96.1 BACTEREMIA DUE TO KLEBSIELLA PNEUMONIAE: ICD-10-CM

## 2025-05-08 DIAGNOSIS — D50.9 IRON DEFICIENCY ANEMIA, UNSPECIFIED IRON DEFICIENCY ANEMIA TYPE: ICD-10-CM

## 2025-05-08 DIAGNOSIS — J90 PLEURAL EFFUSION: ICD-10-CM

## 2025-05-08 DIAGNOSIS — E87.6 HYPOKALEMIA: ICD-10-CM

## 2025-05-08 DIAGNOSIS — J44.9 CHRONIC OBSTRUCTIVE PULMONARY DISEASE, UNSPECIFIED COPD TYPE: ICD-10-CM

## 2025-05-08 DIAGNOSIS — J96.01 ACUTE RESPIRATORY FAILURE WITH HYPOXEMIA: ICD-10-CM

## 2025-05-08 DIAGNOSIS — E83.42 HYPOMAGNESEMIA: ICD-10-CM

## 2025-05-08 DIAGNOSIS — Z13.6 SCREENING FOR CARDIOVASCULAR CONDITION: ICD-10-CM

## 2025-05-08 DIAGNOSIS — R78.81 BACTEREMIA DUE TO KLEBSIELLA PNEUMONIAE: ICD-10-CM

## 2025-05-08 PROBLEM — N20.0 BILATERAL NEPHROLITHIASIS: Status: ACTIVE | Noted: 2025-05-08

## 2025-05-08 PROBLEM — G93.40 ACUTE ENCEPHALOPATHY: Status: ACTIVE | Noted: 2025-05-08

## 2025-05-08 PROBLEM — A41.9 SEPSIS: Status: ACTIVE | Noted: 2025-05-08

## 2025-05-08 PROBLEM — F17.200 TOBACCO DEPENDENCY: Status: ACTIVE | Noted: 2025-05-08

## 2025-05-08 LAB
ALBUMIN SERPL BCP-MCNC: 3.1 G/DL (ref 3.5–5.2)
ALLENS TEST: ABNORMAL
ALP SERPL-CCNC: 194 UNIT/L (ref 40–150)
ALT SERPL W/O P-5'-P-CCNC: 273 UNIT/L (ref 10–44)
AMMONIA PLAS-SCNC: 29 UMOL/L (ref 10–50)
AMPHET UR QL SCN: NEGATIVE
ANION GAP (OHS): 15 MMOL/L (ref 8–16)
AST SERPL-CCNC: 413 UNIT/L (ref 11–45)
BACTERIA #/AREA URNS AUTO: ABNORMAL /HPF
BARBITURATE SCN PRESENT UR: NEGATIVE
BENZODIAZ UR QL SCN: NEGATIVE
BILIRUB SERPL-MCNC: 6.9 MG/DL (ref 0.1–1)
BILIRUB UR QL STRIP.AUTO: ABNORMAL
BUN SERPL-MCNC: 24 MG/DL (ref 6–20)
CALCIUM SERPL-MCNC: 8.8 MG/DL (ref 8.7–10.5)
CANNABINOIDS UR QL SCN: NEGATIVE
CHLORIDE SERPL-SCNC: 102 MMOL/L (ref 95–110)
CLARITY UR: CLEAR
CO2 SERPL-SCNC: 21 MMOL/L (ref 23–29)
COCAINE UR QL SCN: NEGATIVE
COLOR UR AUTO: YELLOW
CREAT SERPL-MCNC: 1.3 MG/DL (ref 0.5–1.4)
CREAT UR-MCNC: 65.4 MG/DL (ref 15–325)
CTP QC/QA: YES
CTP QC/QA: YES
GFR SERPLBLD CREATININE-BSD FMLA CKD-EPI: 47 ML/MIN/1.73/M2
GLUCOSE SERPL-MCNC: 76 MG/DL (ref 70–110)
GLUCOSE UR QL STRIP: NEGATIVE
HGB UR QL STRIP: ABNORMAL
HOLD SPECIMEN: NORMAL
KETONES UR QL STRIP: NEGATIVE
LACTATE SERPL-SCNC: 2 MMOL/L (ref 0.5–2.2)
LACTATE SERPL-SCNC: 3.9 MMOL/L (ref 0.5–2.2)
LDH SERPL L TO P-CCNC: 3.54 MMOL/L (ref 0.5–2.2)
LEUKOCYTE ESTERASE UR QL STRIP: NEGATIVE
LIPASE SERPL-CCNC: >1000 U/L (ref 4–60)
MAGNESIUM SERPL-MCNC: 1.3 MG/DL (ref 1.6–2.6)
METHADONE UR QL SCN: NEGATIVE
MICROSCOPIC COMMENT: ABNORMAL
NITRITE UR QL STRIP: NEGATIVE
NON-SQ EPI CELLS #/AREA URNS AUTO: 2 /HPF
OPIATES UR QL SCN: ABNORMAL
PCP UR QL: NEGATIVE
PH UR STRIP: 6 [PH]
PHOSPHATE SERPL-MCNC: 4.6 MG/DL (ref 2.7–4.5)
POC MOLECULAR INFLUENZA A AGN: NEGATIVE
POC MOLECULAR INFLUENZA B AGN: NEGATIVE
POTASSIUM SERPL-SCNC: 3.5 MMOL/L (ref 3.5–5.1)
PROCALCITONIN SERPL-MCNC: 68.66 NG/ML
PROT SERPL-MCNC: 6.1 GM/DL (ref 6–8.4)
PROT UR QL STRIP: ABNORMAL
RBC #/AREA URNS AUTO: 23 /HPF (ref 0–4)
SAMPLE: ABNORMAL
SARS-COV-2 RDRP RESP QL NAA+PROBE: NEGATIVE
SITE: ABNORMAL
SODIUM SERPL-SCNC: 138 MMOL/L (ref 136–145)
SP GR UR STRIP: >=1.03
SQUAMOUS #/AREA URNS AUTO: 1 /HPF
TRIGL SERPL-MCNC: 208 MG/DL (ref 30–150)
TROPONIN I SERPL DL<=0.01 NG/ML-MCNC: 0.02 NG/ML
UROBILINOGEN UR STRIP-ACNC: ABNORMAL EU/DL
WBC #/AREA URNS AUTO: 16 /HPF (ref 0–5)
YEAST UR QL AUTO: ABNORMAL /HPF

## 2025-05-08 PROCEDURE — 21400001 HC TELEMETRY ROOM: Mod: HCNC

## 2025-05-08 PROCEDURE — 25000003 PHARM REV CODE 250: Mod: HCNC | Performed by: STUDENT IN AN ORGANIZED HEALTH CARE EDUCATION/TRAINING PROGRAM

## 2025-05-08 PROCEDURE — 25000003 PHARM REV CODE 250: Mod: HCNC

## 2025-05-08 PROCEDURE — 63600175 PHARM REV CODE 636 W HCPCS: Mod: HCNC | Performed by: STUDENT IN AN ORGANIZED HEALTH CARE EDUCATION/TRAINING PROGRAM

## 2025-05-08 PROCEDURE — 87502 INFLUENZA DNA AMP PROBE: CPT | Mod: HCNC

## 2025-05-08 PROCEDURE — 36415 COLL VENOUS BLD VENIPUNCTURE: CPT | Mod: HCNC | Performed by: STUDENT IN AN ORGANIZED HEALTH CARE EDUCATION/TRAINING PROGRAM

## 2025-05-08 PROCEDURE — 96367 TX/PROPH/DG ADDL SEQ IV INF: CPT | Mod: HCNC

## 2025-05-08 PROCEDURE — 25500020 PHARM REV CODE 255: Mod: HCNC | Performed by: STUDENT IN AN ORGANIZED HEALTH CARE EDUCATION/TRAINING PROGRAM

## 2025-05-08 PROCEDURE — 87154 CUL TYP ID BLD PTHGN 6+ TRGT: CPT | Mod: HCNC | Performed by: STUDENT IN AN ORGANIZED HEALTH CARE EDUCATION/TRAINING PROGRAM

## 2025-05-08 PROCEDURE — 83735 ASSAY OF MAGNESIUM: CPT | Mod: HCNC | Performed by: STUDENT IN AN ORGANIZED HEALTH CARE EDUCATION/TRAINING PROGRAM

## 2025-05-08 PROCEDURE — 93010 ELECTROCARDIOGRAM REPORT: CPT | Mod: HCNC,,, | Performed by: INTERNAL MEDICINE

## 2025-05-08 PROCEDURE — 96366 THER/PROPH/DIAG IV INF ADDON: CPT | Mod: HCNC

## 2025-05-08 PROCEDURE — 82140 ASSAY OF AMMONIA: CPT | Mod: HCNC | Performed by: STUDENT IN AN ORGANIZED HEALTH CARE EDUCATION/TRAINING PROGRAM

## 2025-05-08 PROCEDURE — 83605 ASSAY OF LACTIC ACID: CPT | Mod: HCNC

## 2025-05-08 PROCEDURE — 93005 ELECTROCARDIOGRAM TRACING: CPT | Mod: HCNC

## 2025-05-08 PROCEDURE — 81003 URINALYSIS AUTO W/O SCOPE: CPT | Mod: HCNC | Performed by: STUDENT IN AN ORGANIZED HEALTH CARE EDUCATION/TRAINING PROGRAM

## 2025-05-08 PROCEDURE — 83690 ASSAY OF LIPASE: CPT | Mod: HCNC | Performed by: STUDENT IN AN ORGANIZED HEALTH CARE EDUCATION/TRAINING PROGRAM

## 2025-05-08 PROCEDURE — 84145 PROCALCITONIN (PCT): CPT | Mod: HCNC | Performed by: STUDENT IN AN ORGANIZED HEALTH CARE EDUCATION/TRAINING PROGRAM

## 2025-05-08 PROCEDURE — 85025 COMPLETE CBC W/AUTO DIFF WBC: CPT | Mod: HCNC | Performed by: STUDENT IN AN ORGANIZED HEALTH CARE EDUCATION/TRAINING PROGRAM

## 2025-05-08 PROCEDURE — 96375 TX/PRO/DX INJ NEW DRUG ADDON: CPT | Mod: HCNC

## 2025-05-08 PROCEDURE — 96376 TX/PRO/DX INJ SAME DRUG ADON: CPT | Mod: HCNC

## 2025-05-08 PROCEDURE — 99900035 HC TECH TIME PER 15 MIN (STAT): Mod: HCNC

## 2025-05-08 PROCEDURE — 87635 SARS-COV-2 COVID-19 AMP PRB: CPT | Mod: HCNC | Performed by: STUDENT IN AN ORGANIZED HEALTH CARE EDUCATION/TRAINING PROGRAM

## 2025-05-08 PROCEDURE — 84100 ASSAY OF PHOSPHORUS: CPT | Mod: HCNC | Performed by: STUDENT IN AN ORGANIZED HEALTH CARE EDUCATION/TRAINING PROGRAM

## 2025-05-08 PROCEDURE — 94761 N-INVAS EAR/PLS OXIMETRY MLT: CPT | Mod: HCNC

## 2025-05-08 PROCEDURE — 84478 ASSAY OF TRIGLYCERIDES: CPT | Mod: HCNC | Performed by: STUDENT IN AN ORGANIZED HEALTH CARE EDUCATION/TRAINING PROGRAM

## 2025-05-08 PROCEDURE — 80307 DRUG TEST PRSMV CHEM ANLYZR: CPT | Mod: HCNC

## 2025-05-08 PROCEDURE — 87086 URINE CULTURE/COLONY COUNT: CPT | Mod: HCNC | Performed by: STUDENT IN AN ORGANIZED HEALTH CARE EDUCATION/TRAINING PROGRAM

## 2025-05-08 PROCEDURE — 83605 ASSAY OF LACTIC ACID: CPT | Mod: HCNC | Performed by: STUDENT IN AN ORGANIZED HEALTH CARE EDUCATION/TRAINING PROGRAM

## 2025-05-08 PROCEDURE — 96365 THER/PROPH/DIAG IV INF INIT: CPT | Mod: HCNC

## 2025-05-08 PROCEDURE — 82787 IGG 1 2 3 OR 4 EACH: CPT | Mod: HCNC | Performed by: STUDENT IN AN ORGANIZED HEALTH CARE EDUCATION/TRAINING PROGRAM

## 2025-05-08 PROCEDURE — 99285 EMERGENCY DEPT VISIT HI MDM: CPT | Mod: 25,HCNC

## 2025-05-08 PROCEDURE — 80053 COMPREHEN METABOLIC PANEL: CPT | Mod: HCNC | Performed by: STUDENT IN AN ORGANIZED HEALTH CARE EDUCATION/TRAINING PROGRAM

## 2025-05-08 PROCEDURE — 87040 BLOOD CULTURE FOR BACTERIA: CPT | Mod: HCNC | Performed by: STUDENT IN AN ORGANIZED HEALTH CARE EDUCATION/TRAINING PROGRAM

## 2025-05-08 PROCEDURE — 84484 ASSAY OF TROPONIN QUANT: CPT | Mod: HCNC | Performed by: STUDENT IN AN ORGANIZED HEALTH CARE EDUCATION/TRAINING PROGRAM

## 2025-05-08 RX ORDER — OLANZAPINE 10 MG/2ML
5 INJECTION, POWDER, FOR SOLUTION INTRAMUSCULAR EVERY 4 HOURS PRN
Status: COMPLETED | OUTPATIENT
Start: 2025-05-08 | End: 2025-05-09

## 2025-05-08 RX ORDER — MORPHINE SULFATE 4 MG/ML
4 INJECTION, SOLUTION INTRAMUSCULAR; INTRAVENOUS
Refills: 0 | Status: COMPLETED | OUTPATIENT
Start: 2025-05-08 | End: 2025-05-08

## 2025-05-08 RX ORDER — IBUPROFEN 200 MG
16 TABLET ORAL
Status: DISCONTINUED | OUTPATIENT
Start: 2025-05-08 | End: 2025-05-14 | Stop reason: HOSPADM

## 2025-05-08 RX ORDER — FLUTICASONE FUROATE AND VILANTEROL 100; 25 UG/1; UG/1
1 POWDER RESPIRATORY (INHALATION) DAILY
Status: DISCONTINUED | OUTPATIENT
Start: 2025-05-09 | End: 2025-05-08

## 2025-05-08 RX ORDER — MORPHINE SULFATE 4 MG/ML
6 INJECTION, SOLUTION INTRAMUSCULAR; INTRAVENOUS EVERY 4 HOURS PRN
Status: DISCONTINUED | OUTPATIENT
Start: 2025-05-08 | End: 2025-05-13

## 2025-05-08 RX ORDER — HALOPERIDOL LACTATE 5 MG/ML
2.5 INJECTION, SOLUTION INTRAMUSCULAR
Status: COMPLETED | OUTPATIENT
Start: 2025-05-08 | End: 2025-05-08

## 2025-05-08 RX ORDER — GLUCAGON 1 MG
1 KIT INJECTION
Status: DISCONTINUED | OUTPATIENT
Start: 2025-05-08 | End: 2025-05-14 | Stop reason: HOSPADM

## 2025-05-08 RX ORDER — NALOXONE HCL 0.4 MG/ML
0.02 VIAL (ML) INJECTION
Status: DISCONTINUED | OUTPATIENT
Start: 2025-05-08 | End: 2025-05-14 | Stop reason: HOSPADM

## 2025-05-08 RX ORDER — BUDESONIDE 0.5 MG/2ML
0.5 INHALANT ORAL EVERY 12 HOURS
Status: DISCONTINUED | OUTPATIENT
Start: 2025-05-09 | End: 2025-05-14 | Stop reason: HOSPADM

## 2025-05-08 RX ORDER — IPRATROPIUM BROMIDE AND ALBUTEROL SULFATE 2.5; .5 MG/3ML; MG/3ML
3 SOLUTION RESPIRATORY (INHALATION) EVERY 6 HOURS PRN
Status: DISCONTINUED | OUTPATIENT
Start: 2025-05-08 | End: 2025-05-14 | Stop reason: HOSPADM

## 2025-05-08 RX ORDER — SODIUM CHLORIDE 0.9 % (FLUSH) 0.9 %
10 SYRINGE (ML) INJECTION EVERY 12 HOURS PRN
Status: DISCONTINUED | OUTPATIENT
Start: 2025-05-08 | End: 2025-05-14 | Stop reason: HOSPADM

## 2025-05-08 RX ORDER — MORPHINE SULFATE 4 MG/ML
2 INJECTION, SOLUTION INTRAMUSCULAR; INTRAVENOUS EVERY 4 HOURS PRN
Status: DISCONTINUED | OUTPATIENT
Start: 2025-05-08 | End: 2025-05-13

## 2025-05-08 RX ORDER — ONDANSETRON HYDROCHLORIDE 2 MG/ML
4 INJECTION, SOLUTION INTRAVENOUS EVERY 8 HOURS PRN
Status: DISCONTINUED | OUTPATIENT
Start: 2025-05-08 | End: 2025-05-14 | Stop reason: HOSPADM

## 2025-05-08 RX ORDER — IBUPROFEN 200 MG
24 TABLET ORAL
Status: DISCONTINUED | OUTPATIENT
Start: 2025-05-08 | End: 2025-05-14 | Stop reason: HOSPADM

## 2025-05-08 RX ORDER — TALC
6 POWDER (GRAM) TOPICAL NIGHTLY PRN
Status: DISCONTINUED | OUTPATIENT
Start: 2025-05-08 | End: 2025-05-14 | Stop reason: HOSPADM

## 2025-05-08 RX ORDER — SODIUM CHLORIDE 9 MG/ML
INJECTION, SOLUTION INTRAVENOUS CONTINUOUS
Status: DISCONTINUED | OUTPATIENT
Start: 2025-05-08 | End: 2025-05-09

## 2025-05-08 RX ORDER — ARFORMOTEROL TARTRATE 15 UG/2ML
15 SOLUTION RESPIRATORY (INHALATION) 2 TIMES DAILY
Status: DISCONTINUED | OUTPATIENT
Start: 2025-05-09 | End: 2025-05-14 | Stop reason: HOSPADM

## 2025-05-08 RX ORDER — PROCHLORPERAZINE EDISYLATE 5 MG/ML
5 INJECTION INTRAMUSCULAR; INTRAVENOUS EVERY 6 HOURS PRN
Status: DISCONTINUED | OUTPATIENT
Start: 2025-05-08 | End: 2025-05-14 | Stop reason: HOSPADM

## 2025-05-08 RX ADMIN — MORPHINE SULFATE 4 MG: 4 INJECTION INTRAVENOUS at 02:05

## 2025-05-08 RX ADMIN — SODIUM CHLORIDE, POTASSIUM CHLORIDE, SODIUM LACTATE AND CALCIUM CHLORIDE 1000 ML: 600; 310; 30; 20 INJECTION, SOLUTION INTRAVENOUS at 12:05

## 2025-05-08 RX ADMIN — MORPHINE SULFATE 4 MG: 4 INJECTION INTRAVENOUS at 01:05

## 2025-05-08 RX ADMIN — VANCOMYCIN HYDROCHLORIDE 1250 MG: 1.25 INJECTION, POWDER, LYOPHILIZED, FOR SOLUTION INTRAVENOUS at 02:05

## 2025-05-08 RX ADMIN — HALOPERIDOL LACTATE 2.5 MG: 5 INJECTION, SOLUTION INTRAMUSCULAR at 03:05

## 2025-05-08 RX ADMIN — SODIUM CHLORIDE: 9 INJECTION, SOLUTION INTRAVENOUS at 11:05

## 2025-05-08 RX ADMIN — SODIUM CHLORIDE: 9 INJECTION, SOLUTION INTRAVENOUS at 05:05

## 2025-05-08 RX ADMIN — IOHEXOL 75 ML: 350 INJECTION, SOLUTION INTRAVENOUS at 02:05

## 2025-05-08 RX ADMIN — SODIUM CHLORIDE, POTASSIUM CHLORIDE, SODIUM LACTATE AND CALCIUM CHLORIDE 1000 ML: 600; 310; 30; 20 INJECTION, SOLUTION INTRAVENOUS at 02:05

## 2025-05-08 RX ADMIN — PIPERACILLIN SODIUM AND TAZOBACTAM SODIUM 4.5 G: 4; .5 INJECTION, POWDER, FOR SOLUTION INTRAVENOUS at 08:05

## 2025-05-08 RX ADMIN — PIPERACILLIN SODIUM AND TAZOBACTAM SODIUM 4.5 G: 4; .5 INJECTION, POWDER, FOR SOLUTION INTRAVENOUS at 12:05

## 2025-05-08 NOTE — ASSESSMENT & PLAN NOTE
Patient presents with abdominal pain, nausea, vomiting, waxing/waning mentation from acute pancreatitis vs. unknown infectious process.  states patient has been acting strangely. Patient not tolerating po and not taking medications over the past few days.   Metabolic workup: COVID/influenza negative, ammonia normal, elevated lactic acid 3.9, elevated procalcitonin 68.6, Blood cultures and urine cultures pending.   Likely 2/2 unknown infectious process and pancreatitis contributing  Continue IV antibiotics and f/u cultures   Prn Zyprexa IM for agitation  Fall precautions/delirium precautions  Gabapentin and Carbamazepine on hold.  Avoid other mental status altering medications.

## 2025-05-08 NOTE — ASSESSMENT & PLAN NOTE
Patient's COPD is controlled currently.  Patient is currently off COPD Pathway. Continue scheduled inhalers Duonebs and Supplemental oxygen and monitor respiratory status closely.

## 2025-05-08 NOTE — SUBJECTIVE & OBJECTIVE
Past Medical History:   Diagnosis Date    Allergy     Anemia     Anxiety     Breast cyst     Chronic kidney disease     Depression     Fibrocystic breast     Hypertension     Multiple sclerosis     Multiple sclerosis     Neuromuscular disorder     Osteoporosis     Rib fracture 2015       Past Surgical History:   Procedure Laterality Date    APPENDECTOMY      BREAST BIOPSY Left 2012    BREAST CYST ASPIRATION      BREAST SURGERY  2004    excisional biopsy      SECTION, CLASSIC      CHALAZION EXCISION  at age 9    CHOLECYSTECTOMY      EYE SURGERY      HYSTERECTOMY      OOPHORECTOMY         Review of patient's allergies indicates:   Allergen Reactions    Lomotil [diphenoxylate-atropine]      Causes stomach spasms    Dilaudid [hydromorphone (bulk)] Itching       No current facility-administered medications on file prior to encounter.     Current Outpatient Medications on File Prior to Encounter   Medication Sig    carBAMazepine (TEGRETOL XR) 200 MG 12 hr tablet TAKE ONE TABLET BY MOUTH TWICE DAILY    cetirizine (ZYRTEC) 10 MG tablet Take 1 tablet (10 mg total) by mouth once daily.    cholecalciferol, vitamin D3, (VITAMIN D3) 125 mcg (5,000 unit) Tab Take 1 tablet (5,000 Units total) by mouth once daily.    EScitalopram oxalate (LEXAPRO) 20 MG tablet Take 1 tablet (20 mg total) by mouth once daily.    gabapentin (NEURONTIN) 400 MG capsule Take 800 mg by mouth every evening.    hydrOXYzine HCL (ATARAX) 25 MG tablet Take 1 tablet (25 mg total) by mouth 3 (three) times daily as needed for Itching.    interferon beta-1a (AVONEX) 30 mcg/0.5 mL PnKt Inject 0.5 mLs (30 mcg total) into the muscle once a week.    simvastatin (ZOCOR) 40 MG tablet Take 1 tablet (40 mg total) by mouth every evening.    tiZANidine (ZANAFLEX) 4 MG tablet TAKE 2 TABLETS IN THE MORNING, AT NOON AND IN THE EVENING AND TAKE 3 TABLETS AT NIGHT (9 TABLETS PER DAY); only takes 3 at bedtime and none during the day (Patient taking  differently: Take 3 tablets by mouth every evening. TAKE 2 TABLETS IN THE MORNING, AT NOON AND IN THE EVENING AND TAKE 3 TABLETS AT NIGHT (9 TABLETS PER DAY); only takes 3 at bedtime and none during the day)    HYDROcodone-acetaminophen (NORCO)  mg per tablet Take 1 tablet by mouth every 6 (six) hours as needed for Pain.    ibuprofen (ADVIL,MOTRIN) 600 MG tablet TAKE ONE TABLET BY MOUTH EVERY 6 HOURS AS NEEDED FOR PAIN    ibuprofen (ADVIL,MOTRIN) 800 MG tablet Take 800 mg by mouth every 6 (six) hours as needed.    ondansetron (ZOFRAN-ODT) 8 MG TbDL DISSOLVE 1 TABLET ON THE TONGUE THREE TIMES DAILY AS NEEDED Strength: 8 mg    tiotropium-olodateroL (STIOLTO RESPIMAT) 2.5-2.5 mcg/actuation Mist inhale TWO puffs INTO THE LUNGS DAILY     Family History       Problem Relation (Age of Onset)    Arthritis Mother, Son, Maternal Grandmother, Paternal Grandmother    Diabetes Father    Drug abuse Son, Maternal Uncle    Early death Son, Son    Heart attack Paternal Grandfather    Heart disease Father, Maternal Grandmother, Paternal Grandmother, Paternal Grandfather, Maternal Grandfather, Paternal Uncle    Hyperlipidemia Father    Hypertension Father, Maternal Grandmother, Paternal Grandmother, Paternal Aunt, Paternal Uncle    Stroke Father, Maternal Grandmother, Paternal Grandmother          Tobacco Use    Smoking status: Every Day     Current packs/day: 1.00     Average packs/day: 1 pack/day for 30.0 years (30.0 ttl pk-yrs)     Types: Cigarettes    Smokeless tobacco: Never   Substance and Sexual Activity    Alcohol use: No    Drug use: No    Sexual activity: Not Currently     Partners: Male     Birth control/protection: See Surgical Hx     Comment: Hysterectomy     Review of Systems   Unable to perform ROS: Mental status change     Objective:     Vital Signs (Most Recent):  Pulse: 100 (05/08/25 1700)  Resp: 18 (05/08/25 1459)  BP: (!) 112/53 (05/08/25 1400)  SpO2: 96 % (05/08/25 1700) Vital Signs (24h Range):  Pulse:   [100-110] 100  Resp:  [18] 18  SpO2:  [94 %-100 %] 96 %  BP: ()/(53-78) 112/53     Weight: 54.4 kg (120 lb)  Body mass index is 23.44 kg/m².     Physical Exam  Constitutional:       Appearance: She is ill-appearing. She is not diaphoretic.      Comments: Patient A&O x 3 (name, , location)  Patients mentation is waxing and waning.    Cardiovascular:      Rate and Rhythm: Normal rate and regular rhythm.   Pulmonary:      Comments: On RA  Abdominal:      General: Abdomen is flat.      Tenderness: There is abdominal tenderness. There is no guarding or rebound.      Comments: Generalized abdominal tenderness.  No rebound, no guarding   Neurological:      General: No focal deficit present.      Mental Status: She is disoriented.      Cranial Nerves: No cranial nerve deficit.      Motor: No weakness.   Psychiatric:         Mood and Affect: Mood is anxious.         Speech: Speech is delayed.         Behavior: Behavior is agitated.                Significant Labs: All pertinent labs within the past 24 hours have been reviewed.    Bilirubin:   Recent Labs   Lab 25  1250   BILITOT 6.9*     BMP:   Recent Labs   Lab 25  1250   GLU 76      K 3.5      CO2 21*   BUN 24*   CREATININE 1.3   CALCIUM 8.8   MG 1.3*     CBC:   Recent Labs   Lab 25  1250   WBC 15.87*   HGB 13.6   HCT 38.7   PLT 95*     CMP:   Recent Labs   Lab 25  1250      K 3.5      CO2 21*   GLU 76   BUN 24*   CREATININE 1.3   CALCIUM 8.8   PROT 6.1   ALBUMIN 3.1*   BILITOT 6.9*   ALKPHOS 194*   *   *   ANIONGAP 15     Lactic Acid:   Recent Labs   Lab 25  1706   LACTATE 3.9*     Lipase:   Recent Labs   Lab 25  1250   LIPASE >1,000*     Lipid Panel:   Recent Labs   Lab 25  1706   TRIG 208*     Magnesium:   Recent Labs   Lab 25  1250   MG 1.3*     Troponin:   Recent Labs   Lab 25  1250   TROPONINI 0.023     Significant Imaging: I have reviewed all pertinent imaging  results/findings within the past 24 hours.  Imaging Results              US Abdomen Limited (Final result)  Result time 05/08/25 16:35:15      Final result by Quinton Sandoval MD (05/08/25 16:35:15)                   Impression:      Dilated common bile duct, commonly seen post cholecystectomy.  No obstructing stone identified.      Electronically signed by: Quinton Sandoval MD  Date:    05/08/2025  Time:    16:35               Narrative:    EXAMINATION:  US ABDOMEN LIMITED    CLINICAL HISTORY:  abdominal pain, elevated LFTs;    TECHNIQUE:  Limited ultrasound of the right upper quadrant of the abdomen (including pancreas, liver, gallbladder, common bile duct, and spleen) was performed.    COMPARISON:  None.    FINDINGS:  The visualized portions of the pancreas, abdominal aorta, and IVC are unremarkable.    Gallbladder: Absent.  Negative sonographic Tate's sign.    Biliary system: The common duct is enlarged, measuring 9 mm.  Mild intrahepatic ductal dilatation.    Liver: Measures 13.3 cm.  There is homogeneous echotexture. No focal hepatic lesions.    Spleen: Normal in size  measuring 11.6 cm.                                        CT Abdomen Pelvis With IV Contrast NO Oral Contrast (Final result)  Result time 05/08/25 14:57:54      Final result by Solomon Gonzalez MD (05/08/25 14:57:54)                   Impression:      This report was flagged in Epic as abnormal.    1. Inflammation about the pancreas particularly at the pancreatic head, correlation with laboratory values recommended as findings are concerning for pancreatitis.  There is adjacent reactive lymphadenopathy.  2. There is surgical change of cholecystectomy noting prominent intra and extrahepatic biliary dilation.  No recent exams are available for comparison.  There is a questionable filling defect within the distal aspect of the common duct versus superimposition of duodenal soft tissue, choledocholithiasis cannot be definitively excluded.  3.  Bilateral nonobstructive nephrolithiasis.  4. Please see above for several additional findings.      Electronically signed by: Solomon Gonzalez MD  Date:    05/08/2025  Time:    14:57               Narrative:    EXAMINATION:  CT ABDOMEN PELVIS WITH IV CONTRAST    CLINICAL HISTORY:  Nausea/vomiting;Epigastric pain;    TECHNIQUE:  Low dose axial images, sagittal and coronal reformations were obtained from the lung bases to the pubic symphysis following the IV administration of 75 mL of Omnipaque 350 .  Oral contrast was not given.    COMPARISON:  None.    FINDINGS:  Images of the lower thorax are remarkable for bilateral dependent atelectasis/scarring.  There are several lymph nodes along the aortic hiatus particularly on the right.    Allowing for motion artifact, the liver, and adrenal glands are grossly unremarkable.  The spleen is enlarged.  There is some indistinctness about the pancreatic head, the pancreas enhances homogeneously.  The gallbladder is surgically absent noting intra and extrahepatic biliary dilation status post cholecystectomy.  At the distal aspect of the common duct, there is a questionable filling defect versus redundancy of the duodenal parenchyma in the region.  This is best seen on coronal image 601, and measures 3 mm.  There is mild fluid at the level of the ashish hepatis.  The portal vein, splenic vein, SMV, celiac axis and SMA all are patent.  No significant abdominal lymphadenopathy.  There is a small amount of strand-like fluid in the mid abdomen.    The kidneys enhance symmetrically without hydronephrosis.  There is bilateral nonobstructive nephrolithiasis.  Low attenuating lesions arise from the kidneys, too small for characterization.  The bilateral ureters are unable to be followed in their entirety to the urinary bladder, no definite calculi seen or secondary findings to suggest obstructive uropathy.  The urinary bladder is nondistended.  The uterus is absent the adnexa is  unremarkable.    The distal large bowel is for the most part decompressed.  The terminal ileum is unremarkable.  The appendix is not confidently identified, no pericecal inflammation.  The small bowel is grossly unremarkable.  There are a few scattered shotty periaortic, pericaval, and mesenteric lymph nodes.  There is atherosclerotic calcification of the aorta and its branches.  No focal organized pelvic fluid collection.    There is osteopenia.  There are degenerative changes of the bilateral sacroiliac joints, pubic symphysis, and spine.  No significant inguinal lymphadenopathy.                                       X-Ray Chest AP Portable (Final result)  Result time 05/08/25 13:07:25      Final result by Solomon Gonzalez MD (05/08/25 13:07:25)                   Impression:      1. Chronic appearing interstitial findings, underlying edema is a consideration.  Correlation with any history of COPD/emphysema.      Electronically signed by: Solomon Gonzalez MD  Date:    05/08/2025  Time:    13:07               Narrative:    EXAMINATION:  XR CHEST AP PORTABLE    CLINICAL HISTORY:  Sepsis;    TECHNIQUE:  Single frontal view of the chest was performed.    COMPARISON:  07/08/2019    FINDINGS:  The cardiomediastinal silhouette is not enlarged noting calcification of the aorta..  There is no pleural effusion.  The trachea is midline.  The lungs are symmetrically expanded bilaterally with mildly coarse interstitial attenuation and bilateral basilar subsegmental atelectasis..  No large focal consolidation seen.  There is no pneumothorax.  The osseous structures are remarkable for degenerative change..

## 2025-05-08 NOTE — ED NOTES
Pt to ED today via EMS, pts  is at the bedside to answer questions at this time. Per the pts , pt has not had anything to eat/drink in about 2-3 days.  reports that pt has a hx of MS and states that the pt has been hunched over complaining of abdominal pain for the past 2 days.  also reports that she has had a couple of episodes of emesis on yesterday but none today. Pt appears to be ill.

## 2025-05-08 NOTE — ASSESSMENT & PLAN NOTE
Patient's blood pressure range in the last 24 hours was: BP  Min: 93/50  Max: 112/53.The patient's inpatient anti-hypertensive regimen is listed below:  Current Antihypertensives       Plan  - BP is controlled, no changes needed to their regimen  - Patient currently not on any antihypertensives

## 2025-05-08 NOTE — NURSING
Patient arrived to unit via stretcher, eyes opened, on 2 L via NC, NAD noted at this time, alert to self, daughter at bedside, safely transferred into bed, tele box intact, skin assessment completed, skin intact, IV fluids infusing without difficulty noted, vitals obtained, bed locked and lowered, side rails up x's 3, call light within reach, redirection needed.

## 2025-05-08 NOTE — HPI
Zoey Cali is a 59 y.o. female with a pmh of hypertension, hyperlipidemia, multiple sclerosis, COPD, iron-deficiency anemia, osteoporosis presents to the hospital via EMS with weakness and altered mental status.    History obtained by independent historian  Jared on the phone who states that patient has been acting inappropriately over the past few days. Jared states that her symptoms started few days ago with the abdominal pain and spasms and patient was unable to tolerate p.o.. This has happened in the past and patient would drink some milk and it would go away. However, this time pain and associated symptoms got worse. Patient has a had several episodes of vomiting.  states that patient has become more confused. Patient unable to take any of her home medications. Unable to express exactly where her pain is located.  Review of systems complicated by patient's change in mentation.    In the ED: Patient afebrile with leukocytosis (WBC 15.87), thrombocytopenic with a platelet count 95, BUN 24, GFR 47, phosphorus 4.6, magnesium 1.3, elevated , T bili 6.9, elevated AST//273 respectively, normal ammonia level, lipase>1000, initial troponin normal, elevated lactic acid 3.54, elevated procalcitonin 68.66, COVID influenza negative, blood cultures pending. CT abdomen pelvis shows (1) pancreatic inflammation concerning for pancreatitis (2) surgical change of cholecystectomy noting prominent intra and extrahepatic biliary dilation (3) bilateral nonobstructive nephrolithiasis.  Chest x-ray shows chronic appearing interstitial findings consistent with history of COPD/emphysema.  Ultrasound abdomen pending.  Patient given 1 L LR bolus x2 IV morphine 4 mg x 2, IV Zosyn 4.5 g, IV vancomycin, and IV Haldol 2.5 mg in the ED. Case discussed with ED provider and patient admitted to hospital medicine for further medical management.

## 2025-05-08 NOTE — ASSESSMENT & PLAN NOTE
"This patient does not have evidence of infective focus  My overall impression is sepsis.  Source: Unknown  Antibiotics given-   Antibiotics (72h ago, onward)      Start     Stop Route Frequency Ordered    05/08/25 1949  vancomycin - pharmacy to dose  (vancomycin IVPB (PEDS and ADULTS))        Placed in "And" Linked Group    -- IV pharmacy to manage frequency 05/08/25 1849    05/08/25 1215  piperacillin-tazobactam (ZOSYN) 4.5 g in D5W 100 mL IVPB (MB+)  (ED Adult Sepsis Treatment)         05/09/25 1214 IV Every 8 hours (non-standard times) 05/08/25 1208          Latest lactate reviewed-  Recent Labs   Lab 05/08/25  1244 05/08/25  1706   LACTATE  --  3.9*   POCLAC 3.54*  --      Organ dysfunction indicated by Encephalopathy and Thrombocytopenia     Fluid challenge Ideal Body Weight- The patient is obese (BMI>30) and their ideal body weight of Ideal body weight: 45.5 kg (100 lb 4.9 oz) will be used to calculate fluid bolus of 30 ml/kg.     Post- resuscitation assessment No - Post resuscitation assessment not needed       Will Not start Pressors- Levophed for MAP of 65  Source control achieved by: IV Zosyn and Vancomycin   -Blood and urine cultures pending   "

## 2025-05-08 NOTE — ED NOTES
Pt becoming agitated due to wanting to use the restroom, RN and tech at bedside, attempted to redirect pt, however unsuccessful due to pts AMS.

## 2025-05-08 NOTE — ASSESSMENT & PLAN NOTE
Patient with a history of smoking 1ppd, 30 pack year history   Will need smoking cessation counseling once mentation improves

## 2025-05-08 NOTE — ASSESSMENT & PLAN NOTE
Likely secondary to pancreatitis. US abdomen shows dilated common bile duct commonly seen s/p cholecystectomy and no obstructing stone seen.  Trend daily LFTs

## 2025-05-08 NOTE — H&P
[facility] Medicine  History & Physical    Patient Name: Zoey Cali  MRN: [id number]  Patient Class: IP- Inpatient  Admission Date: 5/8/2025  Attending Physician: Cleve Monae MD   Primary Care Provider: Devon Collier MD         Patient information was obtained from patient, spouse/SO, past medical records, and ER records.     Subjective:     Principal Problem:Acute pancreatitis    Chief Complaint:   Chief Complaint   Patient presents with    Weakness     Pt arrived via ems, pt chief complaint is weakness. Per family pt has not been acting appropriately, also has not been taking prescribed medications. Pt also has been having some N/V.        HPI: oZey Cali is a 59 y.o. female with a pmh of hypertension, hyperlipidemia, multiple sclerosis, COPD, iron-deficiency anemia, osteoporosis presents to the hospital via EMS with weakness and altered mental status.    History obtained by independent historian  Jared on the phone who states that patient has been acting inappropriately over the past few days. Jared states that her symptoms started few days ago with the abdominal pain and spasms and patient was unable to tolerate p.o.. This has happened in the past and patient would drink some milk and it would go away. However, this time pain and associated symptoms got worse. Patient has a had several episodes of vomiting.  states that patient has become more confused. Patient unable to take any of her home medications. Unable to express exactly where her pain is located.  Review of systems complicated by patient's change in mentation.    In the ED: Patient afebrile with leukocytosis (WBC 15.87), thrombocytopenic with a platelet count 95, BUN 24, GFR 47, phosphorus 4.6, magnesium 1.3, elevated , T bili 6.9, elevated AST//273 respectively, normal ammonia level, lipase>1000, initial troponin normal, elevated lactic acid 3.54, elevated procalcitonin 68.66, COVID  influenza negative, blood cultures pending. CT abdomen pelvis shows (1) pancreatic inflammation concerning for pancreatitis (2) surgical change of cholecystectomy noting prominent intra and extrahepatic biliary dilation (3) bilateral nonobstructive nephrolithiasis.  Chest x-ray shows chronic appearing interstitial findings consistent with history of COPD/emphysema.  Ultrasound abdomen pending.  Patient given 1 L LR bolus x2 IV morphine 4 mg x 2, IV Zosyn 4.5 g, IV vancomycin, and IV Haldol 2.5 mg in the ED. Case discussed with ED provider and patient admitted to hospital medicine for further medical management.    Past Medical History:   Diagnosis Date    Allergy     Anemia     Anxiety     Breast cyst     Chronic kidney disease     Depression     Fibrocystic breast     Hypertension     Multiple sclerosis     Multiple sclerosis     Neuromuscular disorder     Osteoporosis     Rib fracture 2015       Past Surgical History:   Procedure Laterality Date    APPENDECTOMY      BREAST BIOPSY Left 2012    BREAST CYST ASPIRATION      BREAST SURGERY  2004    excisional biopsy      SECTION, CLASSIC      CHALAZION EXCISION  at age 9    CHOLECYSTECTOMY      EYE SURGERY      HYSTERECTOMY      OOPHORECTOMY         Review of patient's allergies indicates:   Allergen Reactions    Lomotil [diphenoxylate-atropine]      Causes stomach spasms    Dilaudid [hydromorphone (bulk)] Itching       No current facility-administered medications on file prior to encounter.     Current Outpatient Medications on File Prior to Encounter   Medication Sig    carBAMazepine (TEGRETOL XR) 200 MG 12 hr tablet TAKE ONE TABLET BY MOUTH TWICE DAILY    cetirizine (ZYRTEC) 10 MG tablet Take 1 tablet (10 mg total) by mouth once daily.    cholecalciferol, vitamin D3, (VITAMIN D3) 125 mcg (5,000 unit) Tab Take 1 tablet (5,000 Units total) by mouth once daily.    EScitalopram oxalate (LEXAPRO) 20 MG tablet Take 1 tablet (20 mg total) by mouth once  daily.    gabapentin (NEURONTIN) 400 MG capsule Take 800 mg by mouth every evening.    hydrOXYzine HCL (ATARAX) 25 MG tablet Take 1 tablet (25 mg total) by mouth 3 (three) times daily as needed for Itching.    interferon beta-1a (AVONEX) 30 mcg/0.5 mL PnKt Inject 0.5 mLs (30 mcg total) into the muscle once a week.    simvastatin (ZOCOR) 40 MG tablet Take 1 tablet (40 mg total) by mouth every evening.    tiZANidine (ZANAFLEX) 4 MG tablet TAKE 2 TABLETS IN THE MORNING, AT NOON AND IN THE EVENING AND TAKE 3 TABLETS AT NIGHT (9 TABLETS PER DAY); only takes 3 at bedtime and none during the day (Patient taking differently: Take 3 tablets by mouth every evening. TAKE 2 TABLETS IN THE MORNING, AT NOON AND IN THE EVENING AND TAKE 3 TABLETS AT NIGHT (9 TABLETS PER DAY); only takes 3 at bedtime and none during the day)    HYDROcodone-acetaminophen (NORCO)  mg per tablet Take 1 tablet by mouth every 6 (six) hours as needed for Pain.    ibuprofen (ADVIL,MOTRIN) 600 MG tablet TAKE ONE TABLET BY MOUTH EVERY 6 HOURS AS NEEDED FOR PAIN    ibuprofen (ADVIL,MOTRIN) 800 MG tablet Take 800 mg by mouth every 6 (six) hours as needed.    ondansetron (ZOFRAN-ODT) 8 MG TbDL DISSOLVE 1 TABLET ON THE TONGUE THREE TIMES DAILY AS NEEDED Strength: 8 mg    tiotropium-olodateroL (STIOLTO RESPIMAT) 2.5-2.5 mcg/actuation Mist inhale TWO puffs INTO THE LUNGS DAILY     Family History       Problem Relation (Age of Onset)    Arthritis Mother, Son, Maternal Grandmother, Paternal Grandmother    Diabetes Father    Drug abuse Son, Maternal Uncle    Early death Son, Son    Heart attack Paternal Grandfather    Heart disease Father, Maternal Grandmother, Paternal Grandmother, Paternal Grandfather, Maternal Grandfather, Paternal Uncle    Hyperlipidemia Father    Hypertension Father, Maternal Grandmother, Paternal Grandmother, Paternal Aunt, Paternal Uncle    Stroke Father, Maternal Grandmother, Paternal Grandmother          Tobacco Use    Smoking status:  Every Day     Current packs/day: 1.00     Average packs/day: 1 pack/day for 30.0 years (30.0 ttl pk-yrs)     Types: Cigarettes    Smokeless tobacco: Never   Substance and Sexual Activity    Alcohol use: No    Drug use: No    Sexual activity: Not Currently     Partners: Male     Birth control/protection: See Surgical Hx     Comment: Hysterectomy     Review of Systems   Unable to perform ROS: Mental status change     Objective:     Vital Signs (Most Recent):  Pulse: 100 (25 1700)  Resp: 18 (25 1459)  BP: (!) 112/53 (25 1400)  SpO2: 96 % (25 1700) Vital Signs (24h Range):  Pulse:  [100-110] 100  Resp:  [18] 18  SpO2:  [94 %-100 %] 96 %  BP: ()/(53-78) 112/53     Weight: 54.4 kg (120 lb)  Body mass index is 23.44 kg/m².     Physical Exam  Constitutional:       Appearance: She is ill-appearing. She is not diaphoretic.      Comments: Patient A&O x 3 (name, , location)  Patients mentation is waxing and waning.    Cardiovascular:      Rate and Rhythm: Normal rate and regular rhythm.   Pulmonary:      Comments: On RA  Abdominal:      General: Abdomen is flat.      Tenderness: There is abdominal tenderness. There is no guarding or rebound.      Comments: Generalized abdominal tenderness.  No rebound, no guarding   Neurological:      General: No focal deficit present.      Mental Status: She is disoriented.      Cranial Nerves: No cranial nerve deficit.      Motor: No weakness.   Psychiatric:         Mood and Affect: Mood is anxious.         Speech: Speech is delayed.         Behavior: Behavior is agitated.                Significant Labs: All pertinent labs within the past 24 hours have been reviewed.    Bilirubin:   Recent Labs   Lab 25  1250   BILITOT 6.9*     BMP:   Recent Labs   Lab 25  1250   GLU 76      K 3.5      CO2 21*   BUN 24*   CREATININE 1.3   CALCIUM 8.8   MG 1.3*     CBC:   Recent Labs   Lab 25  1250   WBC 15.87*   HGB 13.6   HCT 38.7   PLT 95*      CMP:   Recent Labs   Lab 05/08/25  1250      K 3.5      CO2 21*   GLU 76   BUN 24*   CREATININE 1.3   CALCIUM 8.8   PROT 6.1   ALBUMIN 3.1*   BILITOT 6.9*   ALKPHOS 194*   *   *   ANIONGAP 15     Lactic Acid:   Recent Labs   Lab 05/08/25  1706   LACTATE 3.9*     Lipase:   Recent Labs   Lab 05/08/25  1250   LIPASE >1,000*     Lipid Panel:   Recent Labs   Lab 05/08/25  1706   TRIG 208*     Magnesium:   Recent Labs   Lab 05/08/25  1250   MG 1.3*     Troponin:   Recent Labs   Lab 05/08/25  1250   TROPONINI 0.023     Significant Imaging: I have reviewed all pertinent imaging results/findings within the past 24 hours.  Imaging Results              US Abdomen Limited (Final result)  Result time 05/08/25 16:35:15      Final result by Quinton Sandoval MD (05/08/25 16:35:15)                   Impression:      Dilated common bile duct, commonly seen post cholecystectomy.  No obstructing stone identified.      Electronically signed by: Quinton Sandoval MD  Date:    05/08/2025  Time:    16:35               Narrative:    EXAMINATION:  US ABDOMEN LIMITED    CLINICAL HISTORY:  abdominal pain, elevated LFTs;    TECHNIQUE:  Limited ultrasound of the right upper quadrant of the abdomen (including pancreas, liver, gallbladder, common bile duct, and spleen) was performed.    COMPARISON:  None.    FINDINGS:  The visualized portions of the pancreas, abdominal aorta, and IVC are unremarkable.    Gallbladder: Absent.  Negative sonographic Tate's sign.    Biliary system: The common duct is enlarged, measuring 9 mm.  Mild intrahepatic ductal dilatation.    Liver: Measures 13.3 cm.  There is homogeneous echotexture. No focal hepatic lesions.    Spleen: Normal in size  measuring 11.6 cm.                                        CT Abdomen Pelvis With IV Contrast NO Oral Contrast (Final result)  Result time 05/08/25 14:57:54      Final result by Solomon Gonzalez MD (05/08/25 14:57:54)                   Impression:       This report was flagged in Epic as abnormal.    1. Inflammation about the pancreas particularly at the pancreatic head, correlation with laboratory values recommended as findings are concerning for pancreatitis.  There is adjacent reactive lymphadenopathy.  2. There is surgical change of cholecystectomy noting prominent intra and extrahepatic biliary dilation.  No recent exams are available for comparison.  There is a questionable filling defect within the distal aspect of the common duct versus superimposition of duodenal soft tissue, choledocholithiasis cannot be definitively excluded.  3. Bilateral nonobstructive nephrolithiasis.  4. Please see above for several additional findings.      Electronically signed by: Solomon Gonzalez MD  Date:    05/08/2025  Time:    14:57               Narrative:    EXAMINATION:  CT ABDOMEN PELVIS WITH IV CONTRAST    CLINICAL HISTORY:  Nausea/vomiting;Epigastric pain;    TECHNIQUE:  Low dose axial images, sagittal and coronal reformations were obtained from the lung bases to the pubic symphysis following the IV administration of 75 mL of Omnipaque 350 .  Oral contrast was not given.    COMPARISON:  None.    FINDINGS:  Images of the lower thorax are remarkable for bilateral dependent atelectasis/scarring.  There are several lymph nodes along the aortic hiatus particularly on the right.    Allowing for motion artifact, the liver, and adrenal glands are grossly unremarkable.  The spleen is enlarged.  There is some indistinctness about the pancreatic head, the pancreas enhances homogeneously.  The gallbladder is surgically absent noting intra and extrahepatic biliary dilation status post cholecystectomy.  At the distal aspect of the common duct, there is a questionable filling defect versus redundancy of the duodenal parenchyma in the region.  This is best seen on coronal image 601, and measures 3 mm.  There is mild fluid at the level of the ashish hepatis.  The portal vein, splenic  vein, SMV, celiac axis and SMA all are patent.  No significant abdominal lymphadenopathy.  There is a small amount of strand-like fluid in the mid abdomen.    The kidneys enhance symmetrically without hydronephrosis.  There is bilateral nonobstructive nephrolithiasis.  Low attenuating lesions arise from the kidneys, too small for characterization.  The bilateral ureters are unable to be followed in their entirety to the urinary bladder, no definite calculi seen or secondary findings to suggest obstructive uropathy.  The urinary bladder is nondistended.  The uterus is absent the adnexa is unremarkable.    The distal large bowel is for the most part decompressed.  The terminal ileum is unremarkable.  The appendix is not confidently identified, no pericecal inflammation.  The small bowel is grossly unremarkable.  There are a few scattered shotty periaortic, pericaval, and mesenteric lymph nodes.  There is atherosclerotic calcification of the aorta and its branches.  No focal organized pelvic fluid collection.    There is osteopenia.  There are degenerative changes of the bilateral sacroiliac joints, pubic symphysis, and spine.  No significant inguinal lymphadenopathy.                                       X-Ray Chest AP Portable (Final result)  Result time 05/08/25 13:07:25      Final result by Solomon Gonzalez MD (05/08/25 13:07:25)                   Impression:      1. Chronic appearing interstitial findings, underlying edema is a consideration.  Correlation with any history of COPD/emphysema.      Electronically signed by: Solomon Gonzalez MD  Date:    05/08/2025  Time:    13:07               Narrative:    EXAMINATION:  XR CHEST AP PORTABLE    CLINICAL HISTORY:  Sepsis;    TECHNIQUE:  Single frontal view of the chest was performed.    COMPARISON:  07/08/2019    FINDINGS:  The cardiomediastinal silhouette is not enlarged noting calcification of the aorta..  There is no pleural effusion.  The trachea is midline.   The lungs are symmetrically expanded bilaterally with mildly coarse interstitial attenuation and bilateral basilar subsegmental atelectasis..  No large focal consolidation seen.  There is no pneumothorax.  The osseous structures are remarkable for degenerative change..                                      Assessment/Plan:     Assessment & Plan  Acute pancreatitis  Patient presents with worsening abdominal pain with associated nausea, vomiting, and altered mentation.  Lipase >1000.  CT with pancreatic head inflammation concerning for pancreatitis.  Patient denies alcohol use or any new medications.  No gallstones noted on imaging. Unclear etiology of pancreatitis at this time.  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    Plan:  NPO   IVF with  cc/hr for 20 hours  TG and IgG4 ordered and pending   Pain control with IV morphine 2 mg q.4 hours for moderate pain and IV morphine 6 mg q.4 hours for severe pain  Maintain on telemetry  Continue supportive care  HTN (hypertension)  Patient's blood pressure range in the last 24 hours was: BP  Min: 93/50  Max: 112/53.The patient's inpatient anti-hypertensive regimen is listed below:  Current Antihypertensives       Plan  - BP is controlled, no changes needed to their regimen  - Patient currently not on any antihypertensives  Multiple sclerosis  History noted, on Avonex injections weekly   Iron deficiency anemia  Anemia is likely due to Iron deficiency. Most recent hemoglobin and hematocrit are listed below.  Recent Labs     05/08/25  1250   HGB 13.6   HCT 38.7     Plan  - Monitor serial CBC: Daily  - Transfuse PRBC if patient becomes hemodynamically unstable, symptomatic or H/H drops below 7/21.  - Patient has not received any PRBC transfusions to date  - Patient's anemia is currently stable  Tobacco abuse  Patient with a history of smoking 1ppd, 30 pack year history   Will need smoking cessation  "counseling once mentation improves   Neuropathic pain  Hold home gabapentin and carbamazepine in the setting of pancreatitis and acute encephalopathy  Chronic obstructive pulmonary disease, unspecified  Patient's COPD is controlled currently.  Patient is currently off COPD Pathway. Continue scheduled inhalers Duonebs and Supplemental oxygen and monitor respiratory status closely.   Sepsis  This patient does not have evidence of infective focus  My overall impression is sepsis.  Source: Unknown  Antibiotics given-   Antibiotics (72h ago, onward)      Start     Stop Route Frequency Ordered    05/08/25 1949  vancomycin - pharmacy to dose  (vancomycin IVPB (PEDS and ADULTS))        Placed in "And" Linked Group    -- IV pharmacy to manage frequency 05/08/25 1849    05/08/25 1215  piperacillin-tazobactam (ZOSYN) 4.5 g in D5W 100 mL IVPB (MB+)  (ED Adult Sepsis Treatment)         05/09/25 1214 IV Every 8 hours (non-standard times) 05/08/25 1208          Latest lactate reviewed-  Recent Labs   Lab 05/08/25  1244 05/08/25  1706   LACTATE  --  3.9*   POCLAC 3.54*  --      Organ dysfunction indicated by Encephalopathy and Thrombocytopenia     Fluid challenge Ideal Body Weight- The patient is obese (BMI>30) and their ideal body weight of Ideal body weight: 45.5 kg (100 lb 4.9 oz) will be used to calculate fluid bolus of 30 ml/kg.     Post- resuscitation assessment No - Post resuscitation assessment not needed       Will Not start Pressors- Levophed for MAP of 65  Source control achieved by: IV Zosyn and Vancomycin   -Blood and urine cultures pending   Elevated LFTs  Likely secondary to pancreatitis. US abdomen shows dilated common bile duct commonly seen s/p cholecystectomy and no obstructing stone seen.  Trend daily LFTs  Bilateral nephrolithiasis  Noted on CT abdomen/pelvis today  F/u outpatient   Acute encephalopathy  Patient presents with abdominal pain, nausea, vomiting, waxing/waning mentation from acute pancreatitis vs. " unknown infectious process.  states patient has been acting strangely. Patient not tolerating po and not taking medications over the past few days.   Metabolic workup: COVID/influenza negative, ammonia normal, elevated lactic acid 3.9, elevated procalcitonin 68.6, Blood cultures and urine cultures pending.   Likely 2/2 unknown infectious process and pancreatitis contributing  Continue IV antibiotics and f/u cultures   Prn Zyprexa IM for agitation  Fall precautions/delirium precautions  Gabapentin and Carbamazepine on hold.  Avoid other mental status altering medications.  VTE Risk Mitigation (From admission, onward)           Ordered     IP VTE LOW RISK PATIENT  Once         05/08/25 1604     Place sequential compression device  Until discontinued         05/08/25 1604                     As clarification, on 5/8/2025, patient should be admitted to inpatient services under my care in collaboration with Cleve Monae MD. LI Roberts PA-C  Department of Hospital Medicine  [facility] - Med Surg

## 2025-05-08 NOTE — ASSESSMENT & PLAN NOTE
Anemia is likely due to Iron deficiency. Most recent hemoglobin and hematocrit are listed below.  Recent Labs     05/08/25  1250   HGB 13.6   HCT 38.7     Plan  - Monitor serial CBC: Daily  - Transfuse PRBC if patient becomes hemodynamically unstable, symptomatic or H/H drops below 7/21.  - Patient has not received any PRBC transfusions to date  - Patient's anemia is currently stable

## 2025-05-08 NOTE — ASSESSMENT & PLAN NOTE
Patient presents with worsening abdominal pain with associated nausea, vomiting, and altered mentation.  Lipase >1000.  CT with pancreatic head inflammation concerning for pancreatitis.  Patient denies alcohol use or any new medications.  No gallstones noted on imaging. Unclear etiology of pancreatitis at this time.  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    Plan:  NPO   IVF with  cc/hr for 20 hours  TG and IgG4 ordered and pending   Pain control with IV morphine 2 mg q.4 hours for moderate pain and IV morphine 6 mg q.4 hours for severe pain  Maintain on telemetry  Continue supportive care

## 2025-05-08 NOTE — PROGRESS NOTES
"Pharmacokinetic Initial Assessment: IV Vancomycin    Assessment/Plan:    Initiate intravenous vancomycin with loading dose of 1250 mg once followed by a maintenance dose of vancomycin 750 mg IV every 24 hours  Desired empiric serum trough concentration is 10 to 20 mcg/mL  Draw vancomycin trough level 60 min prior to third dose on 05/10/25 at approximately 1330  Pharmacy will continue to follow and monitor vancomycin.      Please contact pharmacy at extension [phone] with any questions regarding this assessment.     Thank you for the consult,   Joanie Sarah       Patient brief summary:  Zoey Cali is a 59 y.o. female initiated on antimicrobial therapy with IV Vancomycin for treatment of suspected sepsis    Drug Allergies:   Review of patient's allergies indicates:   Allergen Reactions    Lomotil [diphenoxylate-atropine]      Causes stomach spasms    Dilaudid [hydromorphone (bulk)] Itching       Actual Body Weight:   54.4 kg    Renal Function:   Estimated Creatinine Clearance: 33.1 mL/min (based on SCr of 1.3 mg/dL).,     Dialysis Method (if applicable):  N/A    CBC (last 72 hours):  Recent Labs   Lab Result Units 05/08/25  1250   WBC K/uL 15.87*   HGB gm/dL 13.6   HCT % 38.7   Platelet Count K/uL 95*   Lymphocyte % % 1.0*   Monocyte % % 2.0*       Metabolic Panel (last 72 hours):  Recent Labs   Lab Result Units 05/08/25  1250 05/08/25  1708   Sodium mmol/L 138  --    Potassium mmol/L 3.5  --    Chloride mmol/L 102  --    CO2 mmol/L 21*  --    Glucose mg/dL 76  --    Glucose, UA   --  Negative   BUN mg/dL 24*  --    Creatinine mg/dL 1.3  --    Albumin g/dL 3.1*  --    Bilirubin Total mg/dL 6.9*  --    ALP unit/L 194*  --    AST unit/L 413*  --    ALT unit/L 273*  --    Magnesium  mg/dL 1.3*  --    Phosphorus Level mg/dL 4.6*  --        Drug levels (last 3 results):  No results for input(s): "VANCOMYCINRA", "VANCORANDOM", "VANCOMYCINPE", "VANCOPEAK", "VANCOMYCINTR", "VANCOTROUGH" in the last 72 " hours.    Microbiologic Results:  Microbiology Results (last 7 days)       Procedure Component Value Units Date/Time    Urine culture [[id number]] Collected: 05/08/25 1708    Order Status: Sent Specimen: Urine, Clean Catch Updated: 05/08/25 1811    Blood culture x two cultures. Draw prior to antibiotics. [[id number]] Collected: 05/08/25 1250    Order Status: Resulted Specimen: Blood from Peripheral, Antecubital, Right Updated: 05/08/25 1259    Blood culture x two cultures. Draw prior to antibiotics. [[id number]] Collected: 05/08/25 1250    Order Status: Resulted Specimen: Blood from Peripheral, Antecubital, Left Updated: 05/08/25 1259

## 2025-05-08 NOTE — NURSING
[facility] [place] 4 [place]        Date:  05/08/2025        Skin assessed on:  Admit        [x] No Pressure Injuries Present                 []Prevention Measures Documented     [] Yes LDA Previously documented      [] Yes New Pressure Injury Discovered              [] LDA Added        Attending RN: TIFFANI Jones     Second RN:   TIFFANI Weeks

## 2025-05-08 NOTE — ED PROVIDER NOTES
"Encounter Date: 2025       History     Chief Complaint   Patient presents with    Weakness     Pt arrived via ems, pt chief complaint is weakness. Per family pt has not been acting appropriately, also has not been taking prescribed medications. Pt also has been having some N/V.     Patient is a 59-year-old with a history of MS, CKD, anemia who presents with altered mental status.  Majority of history taken from patient's  at the bedside due to patient's mental status.  Patient's has been reports that on Tuesday she had acute onset of lower abdominal" muscle spasms".  Patient reportedly has these chronically and usually drinks milk to help with the pain.  On Tuesday they were more severe than normal and patient was unable to tolerate any p.o..  Patient has had multiple episodes of vomiting since Tuesday.  She has become progressively more confused.  Patient's  reports that she is more yellow appearing than usual.  She has not taken any of her medications last few days because of her symptoms.  Patient endorses being in pain but is unable to tell me any further information.    The history is provided by the patient and the spouse. The history is limited by the condition of the patient. No  was used.     Review of patient's allergies indicates:   Allergen Reactions    Lomotil [diphenoxylate-atropine]      Causes stomach spasms    Dilaudid [hydromorphone (bulk)] Itching     Past Medical History:   Diagnosis Date    Allergy     Anemia     Anxiety     Breast cyst     Chronic kidney disease     Depression     Fibrocystic breast     Hypertension     Multiple sclerosis     Multiple sclerosis     Neuromuscular disorder     Osteoporosis     Rib fracture 2015     Past Surgical History:   Procedure Laterality Date    APPENDECTOMY      BREAST BIOPSY Left 2012    BREAST CYST ASPIRATION      BREAST SURGERY  2004    excisional biopsy      SECTION, CLASSIC      CHALAZION EXCISION  at " age 9    CHOLECYSTECTOMY      EYE SURGERY      HYSTERECTOMY  2001    OOPHORECTOMY  2001     Family History   Problem Relation Name Age of Onset    Arthritis Mother R     Diabetes Father Jr     Heart disease Father Jr     Hyperlipidemia Father Jr     Hypertension Father Jr     Stroke Father Jr     Arthritis Son Vamsi     Drug abuse Son Vamsi     Early death Son Vamsi         OD    Hypertension Maternal Grandmother F     Stroke Maternal Grandmother F     Arthritis Maternal Grandmother F     Heart disease Maternal Grandmother F     Arthritis Paternal Grandmother E     Heart disease Paternal Grandmother E     Hypertension Paternal Grandmother E     Stroke Paternal Grandmother E     Heart disease Paternal Grandfather HH         Massive heart attack    Heart attack Paternal Grandfather HH     Heart disease Maternal Grandfather L     Drug abuse Maternal Uncle E     Hypertension Paternal Aunt X     Heart disease Paternal Uncle U     Hypertension Paternal Uncle Yelitza     Early death Son Antoni         Suicide    Colon cancer Neg Hx      Ovarian cancer Neg Hx      Amblyopia Neg Hx      Blindness Neg Hx      Glaucoma Neg Hx      Macular degeneration Neg Hx      Retinal detachment Neg Hx      Strabismus Neg Hx      Thyroid disease Neg Hx       Social History[1]  Review of Systems   Unable to perform ROS: Mental status change       Physical Exam     Initial Vitals   BP Pulse Resp Temp SpO2   05/08/25 1136 05/08/25 1151 05/08/25 1136 -- 05/08/25 1136   104/78 102 18  100 %      MAP       --                Physical Exam    Constitutional:   Ill-appearing, jaundiced   HENT:   Head: Normocephalic and atraumatic.   Eyes: Pupils are equal, round, and reactive to light.   Neck:   Normal range of motion.  Cardiovascular:  Intact distal pulses.           Tachycardic   Pulmonary/Chest:   Tachypneic, increased work of breathing, clear lungs to auscultation   Abdominal: She exhibits no distension. There is abdominal tenderness  (Significantly tender, worst in the epigastric region).   Musculoskeletal:      Cervical back: Normal range of motion.     Neurological:   Oriented to self and place, not to situation, mildly confused but moving all 4 extremities equally, no focal neurologic deficits   Skin: Skin is warm.         ED Course   Critical Care    Date/Time: 5/8/2025 5:18 PM    Performed by: Radha Rock MD  Authorized by: Radha Rock MD  Direct patient critical care time: 20 minutes  Additional history critical care time: 10 minutes  Ordering / reviewing critical care time: 10 minutes  Documentation critical care time: 5 minutes  Consulting other physicians critical care time: 10 minutes  Total critical care time (exclusive of procedural time) : 55 minutes  Critical care time was exclusive of separately billable procedures and treating other patients.  Critical care was necessary to treat or prevent imminent or life-threatening deterioration of the following conditions: sepsis.        Labs Reviewed   COMPREHENSIVE METABOLIC PANEL - Abnormal       Result Value    Sodium 138      Potassium 3.5      Chloride 102      CO2 21 (*)     Glucose 76      BUN 24 (*)     Creatinine 1.3      Calcium 8.8      Protein Total 6.1      Albumin 3.1 (*)     Bilirubin Total 6.9 (*)      (*)      (*)      (*)     Anion Gap 15      eGFR 47 (*)    MAGNESIUM - Abnormal    Magnesium  1.3 (*)    PHOSPHORUS - Abnormal    Phosphorus Level 4.6 (*)    LIPASE - Abnormal    Lipase Level >1,000 (*)    PROCALCITONIN - Abnormal    Procalcitonin 68.66 (*)    CBC WITH DIFFERENTIAL - Abnormal    WBC 15.87 (*)     RBC 4.21      HGB 13.6      HCT 38.7      MCV 92      MCH 32.3 (*)     MCHC 35.1      RDW 14.5      Platelet Count 95 (*)     MPV 10.4      Nucleated RBC 0     MANUAL DIFFERENTIAL - Abnormal    Gran # (ANC) 15.4      Segmented Neutrophil % 56.0      Bands % 41.0      Lymphocyte % 1.0 (*)     Monocyte % 2.0 (*)     Platelet  Estimate Decreased (*)     Anisocytosis Slight      Poik Slight      Ovalocytes Occasional      Toxic Gran Present     ISTAT LACTATE - Abnormal    POC Lactate 3.54 (*)     Sample VENOUS      Site Other      Allens Test N/A     TROPONIN I - Normal    Troponin-I 0.023     AMMONIA - Normal    Ammonia 29     CULTURE, BLOOD   CULTURE, BLOOD   CBC W/ AUTO DIFFERENTIAL    Narrative:     The following orders were created for panel order CBC auto differential.  Procedure                               Abnormality         Status                     ---------                               -----------         ------                     CBC with Differential[[id number]]       Abnormal            Final result               Manual Differential[[id number]]         Abnormal            Final result                 Please view results for these tests on the individual orders.   URINALYSIS, REFLEX TO URINE CULTURE   POCT INFLUENZA A/B MOLECULAR    POC Molecular Influenza A Ag Negative      POC Molecular Influenza B Ag Negative       Acceptable Yes     SARS-COV-2 RDRP GENE    POC Rapid COVID Negative       Acceptable Yes     ISTAT CHEM8     EKG Readings: (Independently Interpreted)   Sinus tachycardia, rate of 102, normal axis normal intervals, no STEMI       Imaging Results              US Abdomen Limited (In process)                       CT Abdomen Pelvis With IV Contrast NO Oral Contrast (Final result)  Result time 05/08/25 14:57:54      Final result by Solomon Gonzalez MD (05/08/25 14:57:54)                   Impression:      This report was flagged in Epic as abnormal.    1. Inflammation about the pancreas particularly at the pancreatic head, correlation with laboratory values recommended as findings are concerning for pancreatitis.  There is adjacent reactive lymphadenopathy.  2. There is surgical change of cholecystectomy noting prominent intra and extrahepatic biliary dilation.  No recent exams  are available for comparison.  There is a questionable filling defect within the distal aspect of the common duct versus superimposition of duodenal soft tissue, choledocholithiasis cannot be definitively excluded.  3. Bilateral nonobstructive nephrolithiasis.  4. Please see above for several additional findings.      Electronically signed by: Solomon Gonzalez MD  Date:    05/08/2025  Time:    14:57               Narrative:    EXAMINATION:  CT ABDOMEN PELVIS WITH IV CONTRAST    CLINICAL HISTORY:  Nausea/vomiting;Epigastric pain;    TECHNIQUE:  Low dose axial images, sagittal and coronal reformations were obtained from the lung bases to the pubic symphysis following the IV administration of 75 mL of Omnipaque 350 .  Oral contrast was not given.    COMPARISON:  None.    FINDINGS:  Images of the lower thorax are remarkable for bilateral dependent atelectasis/scarring.  There are several lymph nodes along the aortic hiatus particularly on the right.    Allowing for motion artifact, the liver, and adrenal glands are grossly unremarkable.  The spleen is enlarged.  There is some indistinctness about the pancreatic head, the pancreas enhances homogeneously.  The gallbladder is surgically absent noting intra and extrahepatic biliary dilation status post cholecystectomy.  At the distal aspect of the common duct, there is a questionable filling defect versus redundancy of the duodenal parenchyma in the region.  This is best seen on coronal image 601, and measures 3 mm.  There is mild fluid at the level of the ashish hepatis.  The portal vein, splenic vein, SMV, celiac axis and SMA all are patent.  No significant abdominal lymphadenopathy.  There is a small amount of strand-like fluid in the mid abdomen.    The kidneys enhance symmetrically without hydronephrosis.  There is bilateral nonobstructive nephrolithiasis.  Low attenuating lesions arise from the kidneys, too small for characterization.  The bilateral ureters are unable  to be followed in their entirety to the urinary bladder, no definite calculi seen or secondary findings to suggest obstructive uropathy.  The urinary bladder is nondistended.  The uterus is absent the adnexa is unremarkable.    The distal large bowel is for the most part decompressed.  The terminal ileum is unremarkable.  The appendix is not confidently identified, no pericecal inflammation.  The small bowel is grossly unremarkable.  There are a few scattered shotty periaortic, pericaval, and mesenteric lymph nodes.  There is atherosclerotic calcification of the aorta and its branches.  No focal organized pelvic fluid collection.    There is osteopenia.  There are degenerative changes of the bilateral sacroiliac joints, pubic symphysis, and spine.  No significant inguinal lymphadenopathy.                                       X-Ray Chest AP Portable (Final result)  Result time 05/08/25 13:07:25      Final result by Solomon Gonzalez MD (05/08/25 13:07:25)                   Impression:      1. Chronic appearing interstitial findings, underlying edema is a consideration.  Correlation with any history of COPD/emphysema.      Electronically signed by: Solomon Gonzalez MD  Date:    05/08/2025  Time:    13:07               Narrative:    EXAMINATION:  XR CHEST AP PORTABLE    CLINICAL HISTORY:  Sepsis;    TECHNIQUE:  Single frontal view of the chest was performed.    COMPARISON:  07/08/2019    FINDINGS:  The cardiomediastinal silhouette is not enlarged noting calcification of the aorta..  There is no pleural effusion.  The trachea is midline.  The lungs are symmetrically expanded bilaterally with mildly coarse interstitial attenuation and bilateral basilar subsegmental atelectasis..  No large focal consolidation seen.  There is no pneumothorax.  The osseous structures are remarkable for degenerative change..                                       Medications   piperacillin-tazobactam (ZOSYN) 4.5 g in D5W 100 mL IVPB (MB+)  (0 g Intravenous Stopped 5/8/25 1415)   vancomycin 1,250 mg in 0.9% NaCl 250 mL IVPB (admixture device) (1,250 mg Intravenous New Bag 5/8/25 1426)   lactated ringers bolus 1,000 mL (1,000 mLs Intravenous New Bag 5/8/25 1249)   lactated ringers bolus 1,000 mL (1,000 mLs Intravenous New Bag 5/8/25 1426)   morphine injection 4 mg (4 mg Intravenous Given 5/8/25 1302)   iohexoL (OMNIPAQUE 350) injection 75 mL (75 mLs Intravenous Given 5/8/25 1419)   morphine injection 4 mg (4 mg Intravenous Given 5/8/25 1459)     Medical Decision Making  Zoey Cali is a 59 y.o. female with h/o MS, CKD who presents to the ED with abdominal pain and altered mental status    Initial vitals notable for tachycardia, tachypnea and soft blood pressure. Physical exam reveals a thin, confused, ill-appearing woman who has dry mucous membranes, significant abdominal tenderness.  She has no focal neurologic deficits and does appear slightly jaundiced    DDx of abdominal pain and confusion is broad and includes pancreatitis, GERD, cholecystitis, cholelithiasis, appendicitis, pyelonephritis, kidney stone, diverticulitis, diverticulosis, constipation and UTI. Also considered SBO, abdominal aortic aneurysm, aortic dissection.     Given patient's tachypnea and tachycardia she meets SIRS criteria.  Sepsis source appears most likely intra-abdominal however given confusion, differential is broad    I will obtain the following to better assess:  CBC, CMP, Mag, phos, trop, lipase, UA, lactic acid, blood cultures, EKG, CTA/P, chest x-ray, COVID/flu. Immediate interventions include LR, broad-spectrum antibiotics, morphine for pain.     Amount and/or Complexity of Data Reviewed  Labs: ordered.  Radiology: ordered.    Risk  Prescription drug management.  Decision regarding hospitalization.               ED Course as of 05/08/25 1718   Thu May 08, 2025   1350 Patient's CMP notable for significantly elevated T bili of 6.9, AST and ALT elevations at an alk  phos elevation.  I have added on a right upper quadrant ultrasound for further evaluation of the liver and gallbladder.  Her bicarb is 21, she is receiving fluid resuscitation [KI]   1411 Patient's lipase greater than a 1000 [KI]   1514 Patient's CT scan shows pancreatitis.  We will discuss admission with Hospital Medicine. [KI]   1550 Patient has been accepted to Hospital Medicine [KI]      ED Course User Index  [KI] Radha Rock MD                           Clinical Impression:  Final diagnoses:  [Z13.6] Screening for cardiovascular condition  [K85.90] Pancreatitis (Primary)  [R41.82] Altered mental status, unspecified altered mental status type                     [1]   Social History  Tobacco Use    Smoking status: Every Day     Current packs/day: 1.00     Average packs/day: 1 pack/day for 30.0 years (30.0 ttl pk-yrs)     Types: Cigarettes    Smokeless tobacco: Never   Substance Use Topics    Alcohol use: No    Drug use: No        Radha Rock MD  05/08/25 [id number]

## 2025-05-09 LAB
ABSOLUTE NEUTROPHIL MANUAL (OHS): 10.8 K/UL
ACB COMPLEX DNA BLD POS QL NAA+NON-PROBE: NOT DETECTED
ALBUMIN SERPL BCP-MCNC: 2.4 G/DL (ref 3.5–5.2)
ALP SERPL-CCNC: 123 UNIT/L (ref 40–150)
ALT SERPL W/O P-5'-P-CCNC: 149 UNIT/L (ref 10–44)
ANION GAP (OHS): 11 MMOL/L (ref 8–16)
AST SERPL-CCNC: 180 UNIT/L (ref 11–45)
B FRAGILIS DNA BLD POS QL NAA+PROBE: NOT DETECTED
BILIRUB SERPL-MCNC: 3 MG/DL (ref 0.1–1)
BUN SERPL-MCNC: 19 MG/DL (ref 6–20)
C ALBICANS DNA BLD POS QL NAA+PROBE: NOT DETECTED
C AURIS DNA BLD POS QL NAA+NON-PROBE: NOT DETECTED
C GATTII+NEOFOR DNA CSF QL NAA+NON-PROBE: NOT DETECTED
C GLABRATA DNA BLD POS QL NAA+PROBE: NOT DETECTED
C KRUSEI DNA BLD POS QL NAA+PROBE: NOT DETECTED
C PARAP DNA BLD POS QL NAA+PROBE: NOT DETECTED
C TROPICLS DNA BLD POS QL NAA+PROBE: NOT DETECTED
CALCIUM SERPL-MCNC: 7.9 MG/DL (ref 8.7–10.5)
CHLORIDE SERPL-SCNC: 112 MMOL/L (ref 95–110)
CHOLEST SERPL-MCNC: 105 MG/DL (ref 120–199)
CHOLEST/HDLC SERPL: 8.8 {RATIO} (ref 2–5)
CO2 SERPL-SCNC: 17 MMOL/L (ref 23–29)
COLISTIN RES MCR-1 ISLT/SPM QL: NOT DETECTED
CREAT SERPL-MCNC: 0.6 MG/DL (ref 0.5–1.4)
CRP SERPL-MCNC: 229.2 MG/L
E CLOAC COMP DNA BLD POS QL NAA+PROBE: NOT DETECTED
E COLI DNA BLD POS QL NAA+PROBE: NOT DETECTED
E FAECALIS+OTHR E SP RRNA BLD POS FISH: NOT DETECTED
E FAECIUM HSP60 BLD POS QL PROBE: NOT DETECTED
ENTEROBACTERALES DNA BLD POS NAA+N-PRB: ABNORMAL
ERYTHROCYTE [DISTWIDTH] IN BLOOD BY AUTOMATED COUNT: 15 % (ref 11.5–14.5)
ESBL CFT TO CFT-CLAV IC RTO BD POS IMP: NOT DETECTED
GFR SERPLBLD CREATININE-BSD FMLA CKD-EPI: >60 ML/MIN/1.73/M2
GLUCOSE SERPL-MCNC: 69 MG/DL (ref 70–110)
GP B STREP DNA CSF QL NAA+NON-PROBE: NOT DETECTED
HAEM INFLU DNA CSF QL NAA+NON-PROBE: NOT DETECTED
HAV IGM SERPL QL IA: NORMAL
HBV CORE IGM SERPL QL IA: NORMAL
HBV SURFACE AG SERPL QL IA: NORMAL
HCT VFR BLD AUTO: 35.4 % (ref 37–48.5)
HCV AB SERPL QL IA: NORMAL
HDLC SERPL-MCNC: 12 MG/DL (ref 40–75)
HDLC SERPL: 11.4 % (ref 20–50)
HGB BLD-MCNC: 12.1 GM/DL (ref 12–16)
IMP CARBAPENEMASE ISLT QL IA.RAPID: NOT DETECTED
K OXYTOCA OMPA BLD POS QL PROBE: NOT DETECTED
KLEBSIELLA SP DNA BLD POS QL NAA+NON-PRB: DETECTED
KLEBSIELLA SP DNA BLD POS QL NAA+NON-PRB: NOT DETECTED
KPC CARBAPENEMASE ISLT QL IA.RAPID: NOT DETECTED
L MONOCYTOG DNA CSF QL NAA+NON-PROBE: NOT DETECTED
LACTATE SERPL-SCNC: 1.4 MMOL/L (ref 0.5–2.2)
LDLC SERPL CALC-MCNC: 28.6 MG/DL (ref 63–159)
LIPASE SERPL-CCNC: 670 U/L (ref 4–60)
LYMPHOCYTES NFR BLD MANUAL: 4 % (ref 18–48)
MAGNESIUM SERPL-MCNC: 1.5 MG/DL (ref 1.6–2.6)
MCH RBC QN AUTO: 31.9 PG (ref 27–31)
MCHC RBC AUTO-ENTMCNC: 34.2 G/DL (ref 32–36)
MCV RBC AUTO: 93 FL (ref 82–98)
MECA+MECC NOSE QL NAA+PROBE: ABNORMAL
MECA+MECC+MREJ ISLT/SPM QL: ABNORMAL
MONOCYTES NFR BLD MANUAL: 4 % (ref 4–15)
N MEN DNA CSF QL NAA+NON-PROBE: NOT DETECTED
NDM CARBAPENEMASE ISLT QL IA.RAPID: NOT DETECTED
NEUTROPHILS NFR BLD MANUAL: 71.3 % (ref 38–73)
NEUTS BAND NFR BLD MANUAL: 20.8 %
NONHDLC SERPL-MCNC: 93 MG/DL
NUCLEATED RBC (/100WBC) (OHS): 0 /100 WBC
OHS QRS DURATION: 86 MS
OHS QTC CALCULATION: 437 MS
OXA-48-LIKE CRBPNASE ISLT QL IA.RAPID: NOT DETECTED
P AERUGINOSA DNA BLD POS QL NAA+PROBE: NOT DETECTED
PHOSPHATE SERPL-MCNC: 3.7 MG/DL (ref 2.7–4.5)
PLATELET # BLD AUTO: 65 K/UL (ref 150–450)
PLATELET BLD QL SMEAR: ABNORMAL
PMV BLD AUTO: 10.3 FL (ref 9.2–12.9)
POTASSIUM SERPL-SCNC: 3.7 MMOL/L (ref 3.5–5.1)
PROT SERPL-MCNC: 5.3 GM/DL (ref 6–8.4)
PROTEUS SP DNA BLD POS QL NAA+PROBE: NOT DETECTED
RBC # BLD AUTO: 3.79 M/UL (ref 4–5.4)
S AUREUS DNA BLD POS QL NAA+PROBE: NOT DETECTED
S ENT+BONG DNA STL QL NAA+NON-PROBE: NOT DETECTED
S EPIDERMIDIS HSP60 BLD POS QL PROBE: NOT DETECTED
S LUGDUNENSIS SODA BLD POS QL PROBE: NOT DETECTED
S MALTOPH DNA BLD POS QL NAA+PROBE: NOT DETECTED
S MARCESCENS DNA BLD POS QL NAA+PROBE: NOT DETECTED
S PNEUM DNA CSF QL NAA+NON-PROBE: NOT DETECTED
S PYOGENES HSP60 BLD POS QL PROBE: NOT DETECTED
SODIUM SERPL-SCNC: 140 MMOL/L (ref 136–145)
STAPH SP TUF BLD POS QL PROBE: NOT DETECTED
STREPTOCOCCUS SP TUF BLD POS QL PROBE: NOT DETECTED
TRIGL SERPL-MCNC: 322 MG/DL (ref 30–150)
VAN(A+B+C1+C2) GENES ISLT/SPM: ABNORMAL
VIM CARBAPENEMASE ISLT QL IA.RAPID: NOT DETECTED
WBC # BLD AUTO: 11.69 K/UL (ref 3.9–12.7)

## 2025-05-09 PROCEDURE — 25000242 PHARM REV CODE 250 ALT 637 W/ HCPCS: Mod: HCNC

## 2025-05-09 PROCEDURE — 63600175 PHARM REV CODE 636 W HCPCS: Mod: HCNC | Performed by: INTERNAL MEDICINE

## 2025-05-09 PROCEDURE — 63600175 PHARM REV CODE 636 W HCPCS: Mod: HCNC | Performed by: STUDENT IN AN ORGANIZED HEALTH CARE EDUCATION/TRAINING PROGRAM

## 2025-05-09 PROCEDURE — 25000242 PHARM REV CODE 250 ALT 637 W/ HCPCS: Mod: HCNC | Performed by: STUDENT IN AN ORGANIZED HEALTH CARE EDUCATION/TRAINING PROGRAM

## 2025-05-09 PROCEDURE — 25000003 PHARM REV CODE 250: Mod: HCNC | Performed by: STUDENT IN AN ORGANIZED HEALTH CARE EDUCATION/TRAINING PROGRAM

## 2025-05-09 PROCEDURE — 25000003 PHARM REV CODE 250: Mod: HCNC | Performed by: INTERNAL MEDICINE

## 2025-05-09 PROCEDURE — 83735 ASSAY OF MAGNESIUM: CPT | Mod: HCNC

## 2025-05-09 PROCEDURE — 82040 ASSAY OF SERUM ALBUMIN: CPT | Mod: HCNC

## 2025-05-09 PROCEDURE — 25000003 PHARM REV CODE 250: Mod: HCNC

## 2025-05-09 PROCEDURE — 63600175 PHARM REV CODE 636 W HCPCS: Mod: HCNC

## 2025-05-09 PROCEDURE — 36415 COLL VENOUS BLD VENIPUNCTURE: CPT | Mod: HCNC

## 2025-05-09 PROCEDURE — 84100 ASSAY OF PHOSPHORUS: CPT | Mod: HCNC

## 2025-05-09 PROCEDURE — 85027 COMPLETE CBC AUTOMATED: CPT | Mod: HCNC

## 2025-05-09 PROCEDURE — 99223 1ST HOSP IP/OBS HIGH 75: CPT | Mod: HCNC,,, | Performed by: INTERNAL MEDICINE

## 2025-05-09 PROCEDURE — 83718 ASSAY OF LIPOPROTEIN: CPT | Mod: HCNC | Performed by: NURSE PRACTITIONER

## 2025-05-09 PROCEDURE — 21400001 HC TELEMETRY ROOM: Mod: HCNC

## 2025-05-09 PROCEDURE — 36415 COLL VENOUS BLD VENIPUNCTURE: CPT | Mod: HCNC | Performed by: NURSE PRACTITIONER

## 2025-05-09 PROCEDURE — 99223 1ST HOSP IP/OBS HIGH 75: CPT | Mod: HCNC,,, | Performed by: NURSE PRACTITIONER

## 2025-05-09 PROCEDURE — 83605 ASSAY OF LACTIC ACID: CPT | Mod: HCNC

## 2025-05-09 PROCEDURE — 80074 ACUTE HEPATITIS PANEL: CPT | Mod: HCNC | Performed by: NURSE PRACTITIONER

## 2025-05-09 PROCEDURE — 94761 N-INVAS EAR/PLS OXIMETRY MLT: CPT | Mod: HCNC

## 2025-05-09 PROCEDURE — 86140 C-REACTIVE PROTEIN: CPT | Mod: HCNC | Performed by: STUDENT IN AN ORGANIZED HEALTH CARE EDUCATION/TRAINING PROGRAM

## 2025-05-09 PROCEDURE — 94640 AIRWAY INHALATION TREATMENT: CPT | Mod: HCNC

## 2025-05-09 PROCEDURE — 27000221 HC OXYGEN, UP TO 24 HOURS: Mod: HCNC

## 2025-05-09 PROCEDURE — S4991 NICOTINE PATCH NONLEGEND: HCPCS | Mod: HCNC | Performed by: STUDENT IN AN ORGANIZED HEALTH CARE EDUCATION/TRAINING PROGRAM

## 2025-05-09 PROCEDURE — 83690 ASSAY OF LIPASE: CPT | Mod: HCNC

## 2025-05-09 RX ORDER — SODIUM CHLORIDE 9 MG/ML
INJECTION, SOLUTION INTRAVENOUS CONTINUOUS
Status: ACTIVE | OUTPATIENT
Start: 2025-05-09 | End: 2025-05-10

## 2025-05-09 RX ORDER — OLANZAPINE 10 MG/2ML
5 INJECTION, POWDER, FOR SOLUTION INTRAMUSCULAR EVERY 4 HOURS PRN
Status: DISCONTINUED | OUTPATIENT
Start: 2025-05-09 | End: 2025-05-14 | Stop reason: HOSPADM

## 2025-05-09 RX ORDER — IBUPROFEN 200 MG
1 TABLET ORAL DAILY
Status: DISCONTINUED | OUTPATIENT
Start: 2025-05-09 | End: 2025-05-14 | Stop reason: HOSPADM

## 2025-05-09 RX ORDER — MAGNESIUM SULFATE HEPTAHYDRATE 40 MG/ML
2 INJECTION, SOLUTION INTRAVENOUS ONCE
Status: COMPLETED | OUTPATIENT
Start: 2025-05-09 | End: 2025-05-09

## 2025-05-09 RX ORDER — SODIUM CHLORIDE 9 MG/ML
INJECTION, SOLUTION INTRAVENOUS CONTINUOUS
Status: ACTIVE | OUTPATIENT
Start: 2025-05-09 | End: 2025-05-09

## 2025-05-09 RX ORDER — DIPHENHYDRAMINE HYDROCHLORIDE 50 MG/ML
25 INJECTION, SOLUTION INTRAMUSCULAR; INTRAVENOUS ONCE
Status: COMPLETED | OUTPATIENT
Start: 2025-05-09 | End: 2025-05-09

## 2025-05-09 RX ORDER — SODIUM CHLORIDE 9 MG/ML
INJECTION, SOLUTION INTRAVENOUS CONTINUOUS
Status: ACTIVE | OUTPATIENT
Start: 2025-05-10 | End: 2025-05-11

## 2025-05-09 RX ADMIN — OLANZAPINE 5 MG: 10 INJECTION, POWDER, FOR SOLUTION INTRAMUSCULAR at 10:05

## 2025-05-09 RX ADMIN — PIPERACILLIN SODIUM AND TAZOBACTAM SODIUM 4.5 G: 4; .5 INJECTION, POWDER, FOR SOLUTION INTRAVENOUS at 04:05

## 2025-05-09 RX ADMIN — SODIUM CHLORIDE: 9 INJECTION, SOLUTION INTRAVENOUS at 08:05

## 2025-05-09 RX ADMIN — PIPERACILLIN SODIUM AND TAZOBACTAM SODIUM 4.5 G: 4; .5 INJECTION, POWDER, FOR SOLUTION INTRAVENOUS at 09:05

## 2025-05-09 RX ADMIN — SODIUM CHLORIDE: 9 INJECTION, SOLUTION INTRAVENOUS at 12:05

## 2025-05-09 RX ADMIN — IPRATROPIUM BROMIDE AND ALBUTEROL SULFATE 3 ML: 2.5; .5 SOLUTION RESPIRATORY (INHALATION) at 11:05

## 2025-05-09 RX ADMIN — ARFORMOTEROL TARTRATE 15 MCG: 15 SOLUTION RESPIRATORY (INHALATION) at 07:05

## 2025-05-09 RX ADMIN — PIPERACILLIN SODIUM AND TAZOBACTAM SODIUM 4.5 G: 4; .5 INJECTION, POWDER, FOR SOLUTION INTRAVENOUS at 12:05

## 2025-05-09 RX ADMIN — SODIUM CHLORIDE: 9 INJECTION, SOLUTION INTRAVENOUS at 10:05

## 2025-05-09 RX ADMIN — SODIUM CHLORIDE: 9 INJECTION, SOLUTION INTRAVENOUS at 06:05

## 2025-05-09 RX ADMIN — OLANZAPINE 5 MG: 10 INJECTION, POWDER, FOR SOLUTION INTRAMUSCULAR at 12:05

## 2025-05-09 RX ADMIN — MAGNESIUM SULFATE HEPTAHYDRATE 2 G: 40 INJECTION, SOLUTION INTRAVENOUS at 10:05

## 2025-05-09 RX ADMIN — BUDESONIDE 0.5 MG: 0.5 INHALANT RESPIRATORY (INHALATION) at 07:05

## 2025-05-09 RX ADMIN — MAGNESIUM SULFATE HEPTAHYDRATE 2 G: 40 INJECTION, SOLUTION INTRAVENOUS at 12:05

## 2025-05-09 RX ADMIN — OLANZAPINE 5 MG: 10 INJECTION, POWDER, FOR SOLUTION INTRAMUSCULAR at 02:05

## 2025-05-09 RX ADMIN — Medication 1 PATCH: at 12:05

## 2025-05-09 RX ADMIN — SODIUM CHLORIDE: 9 INJECTION, SOLUTION INTRAVENOUS at 04:05

## 2025-05-09 RX ADMIN — DIPHENHYDRAMINE HYDROCHLORIDE 25 MG: 50 INJECTION INTRAMUSCULAR; INTRAVENOUS at 09:05

## 2025-05-09 RX ADMIN — SODIUM CHLORIDE: 9 INJECTION, SOLUTION INTRAVENOUS at 03:05

## 2025-05-09 NOTE — CONSULTS
[facility] Consultation Note    Reason for consult: acute pancreatitis with tbili elevation to 6.9 and GNR bacteremia    PCP:   Devon Collier       LOS: 1        Initial History of Present Illness (HPI):  This is a 59 y.o. female consulted to GI service for acute pancreatitis with tbili elevation to 6.9 and GNR bacteremia. PMH hypertension, hyperlipidemia, multiple sclerosis, COPD, iron-deficiency anemia, osteoporosis.  Patient lethargic with confusion,  at bedside with collateral information.  reports patient with worsening confusion with associated symptoms of n/v, and abdominal pain that began on yesterday.  reports abdominal pain has been ongoing, intermittent and self limiting for a year. Reports she is a 1 pk a day smoker, denies drinking. Denies use of GLP-1, denies oac or any bariatric surgeries.       Wbc 15.8, plt 95, alk phos 194, tbili 6.9,3.0  lfts 413/273, 180/149 lipase over 1000      Medical History:  has a past medical history of Allergy, Anemia, Anxiety, Breast cyst, Chronic kidney disease, Depression, Fibrocystic breast, Hypertension, Multiple sclerosis, Multiple sclerosis, Neuromuscular disorder, Osteoporosis, and Rib fracture (2015).    Surgical History:  has a past surgical history that includes Appendectomy; Cholecystectomy; Eye surgery;  section, classic; Hysterectomy (); Oophorectomy (); Breast surgery (); Breast cyst aspiration; Breast biopsy (Left, 2012); and Chalazion excision (at age 9).      Objective Findings:    Vital Signs:  Temp:  [98.1 °F (36.7 °C)-99.8 °F (37.7 °C)]   Pulse:  []   Resp:  [18]   BP: ()/(50-78)   SpO2:  [88 %-100 %]   Body mass index is 23.77 kg/m².      Physical Exam  Vitals and nursing note reviewed.   Constitutional:       Appearance: Normal appearance.   HENT:      Head: Normocephalic.   Pulmonary:      Effort: Pulmonary effort is normal.   Abdominal:      General: Bowel sounds are  normal.      Palpations: Abdomen is soft.   Skin:     General: Skin is warm and dry.   Neurological:      Mental Status: She is lethargic and disoriented.               Labs:  Lab Results   Component Value Date    WBC 11.69 2025    HGB 12.1 2025    HCT 35.4 (L) 2025    PLT 65 (L) 2025    CHOL 105 (L) 2025    TRIG 322 (H) 2025    HDL 12 (L) 2025     (H) 2025     (H) 2025     2025    K 3.7 2025     (H) 2025    CREATININE 0.6 2025    BUN 19 2025    CO2 17 (L) 2025    HGBA1C 5.1 2024             Imagin25 CT Abdomen pelvis- 1. Inflammation about the pancreas particularly at the pancreatic head, correlation with laboratory values recommended as findings are concerning for pancreatitis.  There is adjacent reactive lymphadenopathy.  2. There is surgical change of cholecystectomy noting prominent intra and extrahepatic biliary dilation.  No recent exams are available for comparison.  There is a questionable filling defect within the distal aspect of the common duct versus superimposition of duodenal soft tissue, choledocholithiasis cannot be definitively excluded.  3. Bilateral nonobstructive nephrolithiasis.        Endoscopy: [facility] 3/10/2015 Colonoscopy- normal. Rec repeat in 10 years           Acute Pancreatitis. GNR Bacteremia. Abdominal pain. Altered mental status. Transaminitis. Hyperbilirubinemia. Abnormal CT abdomen. Colon cancer screening.    Plan/ Recommendations:  1.  Patient lethargic and confused at this time. Blood culture positive for GNR currently on zosyn with ID following. Bili, alk phos and lfts elevated with CT suspicious for possible biliary obstruction on admit. On am labs bili from 6.9 to 3.0 with decreased lfts and alk phos, US negative for biliary stone, possibly passed a stone? Plan for MRCP today to get r/o on choledocholithiasis. Continue to monitor counts, will f/u  MRCP results. Lipid profile, Acute hepatitis panel ordered.  2. Patient due for repeat screening colonoscopy, will order outpatient.       Thank you so much for allowing us to participate in the care of Zoey Cali . Please contact us if you have any additional questions.    Shasha Torrez NP  Gastroenterology  [facility] - Med Surg

## 2025-05-09 NOTE — NURSING
[facility], [facility]  Nurses Note -- 4 Eyes      5/8/2025       Skin assessed on: Q Shift      [x] No Pressure Injuries Present    []Prevention Measures Documented    [] Yes LDA  for Pressure Injury Previously documented     [] Yes New Pressure Injury Discovered   [] LDA for New Pressure Injury Added      Attending RN:  Josue Lee LPN     Second RN:  TIFFANI Jones

## 2025-05-09 NOTE — NURSING
[facility] [place] 4 Eyes      5/9/2025       Skin assessed on: Q Shift      [x] No Pressure Injuries Present    [x]Prevention Measures Documented    [] Yes LDA  for Pressure Injury Previously documented     [] Yes New Pressure Injury Discovered   [] LDA for New Pressure Injury Added      Attending RN:  Araseli Massey LPN     Second RN:  CHIRAG Salas

## 2025-05-09 NOTE — PLAN OF CARE
Problem: Adult Inpatient Plan of Care  Goal: Plan of Care Review  Outcome: Progressing  Goal: Patient-Specific Goal (Individualized)  Outcome: Progressing  Goal: Absence of Hospital-Acquired Illness or Injury  Outcome: Progressing  Goal: Optimal Comfort and Wellbeing  Outcome: Progressing  Goal: Readiness for Transition of Care  Outcome: Progressing     Problem: Sepsis/Septic Shock  Goal: Optimal Coping  Outcome: Progressing  Goal: Absence of Bleeding  Outcome: Progressing  Goal: Blood Glucose Level Within Targeted Range  Outcome: Progressing  Goal: Absence of Infection Signs and Symptoms  Outcome: Progressing  Goal: Optimal Nutrition Intake  Outcome: Progressing     Problem: Skin Injury Risk Increased  Goal: Skin Health and Integrity  Outcome: Progressing     Problem: Fall Injury Risk  Goal: Absence of Fall and Fall-Related Injury  Outcome: Progressing     Problem: Restraint, Nonviolent  Goal: Absence of Harm or Injury  Outcome: Progressing

## 2025-05-09 NOTE — PROGRESS NOTES
[facility] Medicine  Progress Note    Patient Name: Zoey Cali  MRN: [id number]  Patient Class: IP- Inpatient   Admission Date: 5/8/2025  Length of Stay: 1 days  Attending Physician: Cleve Monae MD  Primary Care Provider: Devon Collier MD        Subjective     Principal Problem:Acute pancreatitis        HPI:  Zoey Cali is a 59 y.o. female with a pmh of hypertension, hyperlipidemia, multiple sclerosis, COPD, iron-deficiency anemia, osteoporosis presents to the hospital via EMS with weakness and altered mental status.    History obtained by independent historian  Jared on the phone who states that patient has been acting inappropriately over the past few days. Jared states that her symptoms started few days ago with the abdominal pain and spasms and patient was unable to tolerate p.o.. This has happened in the past and patient would drink some milk and it would go away. However, this time pain and associated symptoms got worse. Patient has a had several episodes of vomiting.  states that patient has become more confused. Patient unable to take any of her home medications. Unable to express exactly where her pain is located.  Review of systems complicated by patient's change in mentation.    In the ED: Patient afebrile with leukocytosis (WBC 15.87), thrombocytopenic with a platelet count 95, BUN 24, GFR 47, phosphorus 4.6, magnesium 1.3, elevated , T bili 6.9, elevated AST//273 respectively, normal ammonia level, lipase>1000, initial troponin normal, elevated lactic acid 3.54, elevated procalcitonin 68.66, COVID influenza negative, blood cultures pending. CT abdomen pelvis shows (1) pancreatic inflammation concerning for pancreatitis (2) surgical change of cholecystectomy noting prominent intra and extrahepatic biliary dilation (3) bilateral nonobstructive nephrolithiasis.  Chest x-ray shows chronic appearing interstitial findings consistent with history of  COPD/emphysema.  Ultrasound abdomen pending.  Patient given 1 L LR bolus x2 IV morphine 4 mg x 2, IV Zosyn 4.5 g, IV vancomycin, and IV Haldol 2.5 mg in the ED. Case discussed with ED provider and patient admitted to hospital medicine for further medical management.    Overview/Hospital Course:  Acute pancreatitis (Lipase >1000) with intractable n/v and sepsis. Very high procalcitonin at 69 and toxic granulation suggest severe systemic bacterial infection. Started on V+Z. Mild LUBNA with Cr 1.3, resolving to 0.6 after fluids. TG wnl/not cause of pancreatitis,  states she is not a big drinker, and no stones appreciated. Tbili is elevated at 6.9 and LFTs up. Will consult GI. She did have a positive RONAL a few months ago- IgG4 sent off to r/o IgG4 -related pancreatitis.     GNR bacteremia resulted. Will stop Vanc, continue Zosyn for now. Do not see any drainable fluid/abscess on CT. Will need 2 weeks of abx from clear cultures for bacteremia.     Interval History:  NAEON.  No new issues.   CC- n/v, abdo pain  All questions answered and updates on care given.       ROS:  Unable to participate, acuity      Vitals:    05/09/25 0418 05/09/25 0705 05/09/25 0717 05/09/25 0828   BP:    (!) 107/53   BP Location:    Right arm   Patient Position:    Lying   Pulse: 95 102 (!) 58 94   Resp:   18 18   Temp:    98.1 °F (36.7 °C)   TempSrc:    Oral   SpO2:   (!) 90% (!) 94%   Weight:              Body mass index is 23.44 kg/m².      PHYSICAL EXAM:  GENERAL APPEARANCE: alert, unable to cooperate     HEAD: NC/AT, +scleral icterus  CARDIAC: There is no cyanosis or pallor.   LUNGS: No apparent wheezing or stridor.  ABDOMEN: Non-distended. No guarding.  MSK: No joint erythema or tenderness.   EXTREMITIES: No significant new deformity or new joint abnormality.   NEUROLOGICAL: Frequent movements, hyperactive features. +Confusion   SKIN: No lesions or eruptions. +Jaundiced          Recent Results (from the past 24 hours)   ISTAT Lactate     Collection Time: 05/08/25 12:44 PM   Result Value Ref Range    POC Lactate 3.54 (HH) 0.5 - 2.2 mmol/L    Sample VENOUS     Site Other     Allens Test N/A    Blood culture x two cultures. Draw prior to antibiotics.    Collection Time: 05/08/25 12:50 PM    Specimen: Peripheral, Antecubital, Right; Blood   Result Value Ref Range    Blood Culture Positive - Aerobic and Anaerobic Bottles     GRAM STAIN Gram Negative Rods (AA)    Blood culture x two cultures. Draw prior to antibiotics.    Collection Time: 05/08/25 12:50 PM    Specimen: Peripheral, Antecubital, Left; Blood   Result Value Ref Range    Blood Culture Positive - Aerobic and Anaerobic Bottles     GRAM STAIN Gram Negative Rods (AA)    Comprehensive metabolic panel    Collection Time: 05/08/25 12:50 PM   Result Value Ref Range    Sodium 138 136 - 145 mmol/L    Potassium 3.5 3.5 - 5.1 mmol/L    Chloride 102 95 - 110 mmol/L    CO2 21 (L) 23 - 29 mmol/L    Glucose 76 70 - 110 mg/dL    BUN 24 (H) 6 - 20 mg/dL    Creatinine 1.3 0.5 - 1.4 mg/dL    Calcium 8.8 8.7 - 10.5 mg/dL    Protein Total 6.1 6.0 - 8.4 gm/dL    Albumin 3.1 (L) 3.5 - 5.2 g/dL    Bilirubin Total 6.9 (H) 0.1 - 1.0 mg/dL     (H) 40 - 150 unit/L     (H) 11 - 45 unit/L     (H) 10 - 44 unit/L    Anion Gap 15 8 - 16 mmol/L    eGFR 47 (L) >60 mL/min/1.73/m2   Magnesium    Collection Time: 05/08/25 12:50 PM   Result Value Ref Range    Magnesium  1.3 (L) 1.6 - 2.6 mg/dL   Phosphorus    Collection Time: 05/08/25 12:50 PM   Result Value Ref Range    Phosphorus Level 4.6 (H) 2.7 - 4.5 mg/dL   Lipase    Collection Time: 05/08/25 12:50 PM   Result Value Ref Range    Lipase Level >1,000 (H) 4 - 60 U/L   Troponin I    Collection Time: 05/08/25 12:50 PM   Result Value Ref Range    Troponin-I 0.023 <=0.026 ng/mL   Procalcitonin    Collection Time: 05/08/25 12:50 PM   Result Value Ref Range    Procalcitonin 68.66 (H) <0.25 ng/mL   CBC with Differential    Collection Time: 05/08/25 12:50 PM    Result Value Ref Range    WBC 15.87 (H) 3.90 - 12.70 K/uL    RBC 4.21 4.00 - 5.40 M/uL    HGB 13.6 12.0 - 16.0 gm/dL    HCT 38.7 37.0 - 48.5 %    MCV 92 82 - 98 fL    MCH 32.3 (H) 27.0 - 31.0 pg    MCHC 35.1 32.0 - 36.0 g/dL    RDW 14.5 11.5 - 14.5 %    Platelet Count 95 (L) 150 - 450 K/uL    MPV 10.4 9.2 - 12.9 fL    Nucleated RBC 0 <=0 /100 WBC   Manual Differential    Collection Time: 05/08/25 12:50 PM   Result Value Ref Range    Gran # (ANC) 15.4 K/uL    Segmented Neutrophil % 56.0 38.0 - 73.0 %    Bands % 41.0 %    Lymphocyte % 1.0 (L) 18.0 - 48.0 %    Monocyte % 2.0 (L) 4.0 - 15.0 %    Platelet Estimate Decreased (A)      Anisocytosis Slight     Poik Slight     Ovalocytes Occasional     Toxic Gran Present    POCT Influenza A/B Molecular    Collection Time: 05/08/25 12:55 PM   Result Value Ref Range    POC Molecular Influenza A Ag Negative Negative    POC Molecular Influenza B Ag Negative Negative     Acceptable Yes    POCT COVID-19 Rapid Screening    Collection Time: 05/08/25 12:55 PM   Result Value Ref Range    POC Rapid COVID Negative Negative     Acceptable Yes    Ammonia    Collection Time: 05/08/25  3:04 PM   Result Value Ref Range    Ammonia 29 10 - 50 umol/L   Triglycerides    Collection Time: 05/08/25  5:06 PM   Result Value Ref Range    Triglyceride 208 (H) 30 - 150 mg/dL   Lactic acid, plasma #2    Collection Time: 05/08/25  5:06 PM   Result Value Ref Range    Lactic Acid Level 3.9 (HH) 0.5 - 2.2 mmol/L   Urinalysis, Reflex to Urine Culture Urine, Clean Catch    Collection Time: 05/08/25  5:08 PM    Specimen: Urine, Clean Catch   Result Value Ref Range    Color, UA Yellow Straw, Denia, Yellow, Light-Orange    Appearance, UA Clear Clear    pH, UA 6.0 5.0 - 8.0    Spec Grav UA >=1.030 (A) 1.005 - 1.030    Protein, UA Trace (A) Negative    Glucose, UA Negative Negative    Ketones, UA Negative Negative    Bilirubin, UA 2+ (A) Negative    Blood, UA 1+ (A) Negative     Nitrites, UA Negative Negative    Urobilinogen, UA 2.0-3.0 (A) <2.0 EU/dL    Leukocyte Esterase, UA Negative Negative   GREY TOP URINE HOLD    Collection Time: 05/08/25  5:08 PM   Result Value Ref Range    Extra Tube Hold for add-ons.    Urinalysis Microscopic    Collection Time: 05/08/25  5:08 PM   Result Value Ref Range    RBC, UA 23 (H) 0 - 4 /HPF    WBC, UA 16 (H) 0 - 5 /HPF    Bacteria, UA Moderate (A) None, Rare, Occasional /HPF    Yeast, UA Few (A) None /HPF    Squamous Epithelial Cells, UA 1 /HPF    Non-Squamous Epithelial Cells 2 (H) <=0 /HPF    Microscopic Comment     Drug screen panel, emergency    Collection Time: 05/08/25  5:08 PM   Result Value Ref Range    Benzodiazepine, Urine Negative Negative    Methadone, Urine Negative Negative    Cocaine, Urine Negative Negative    Opiates, Urine Presumptive Positive (A) Negative    Barbiturates, Urine Negative Negative    Amphetamines, Urine Negative Negative    THC Negative Negative    Phencyclidine, Urine Negative Negative    Urine Creatinine 65.4 15.0 - 325.0 mg/dL   Lactic Acid, Plasma    Collection Time: 05/08/25  7:18 PM   Result Value Ref Range    Lactic Acid Level 2.0 0.5 - 2.2 mmol/L   Comprehensive Metabolic Panel (CMP)    Collection Time: 05/09/25  7:09 AM   Result Value Ref Range    Sodium 140 136 - 145 mmol/L    Potassium 3.7 3.5 - 5.1 mmol/L    Chloride 112 (H) 95 - 110 mmol/L    CO2 17 (L) 23 - 29 mmol/L    Glucose 69 (L) 70 - 110 mg/dL    BUN 19 6 - 20 mg/dL    Creatinine 0.6 0.5 - 1.4 mg/dL    Calcium 7.9 (L) 8.7 - 10.5 mg/dL    Protein Total 5.3 (L) 6.0 - 8.4 gm/dL    Albumin 2.4 (L) 3.5 - 5.2 g/dL    Bilirubin Total 3.0 (H) 0.1 - 1.0 mg/dL     40 - 150 unit/L     (H) 11 - 45 unit/L     (H) 10 - 44 unit/L    Anion Gap 11 8 - 16 mmol/L    eGFR >60 >60 mL/min/1.73/m2   Phosphorus    Collection Time: 05/09/25  7:09 AM   Result Value Ref Range    Phosphorus Level 3.7 2.7 - 4.5 mg/dL   Lipase    Collection Time: 05/09/25  7:09  AM   Result Value Ref Range    Lipase Level 670 (H) 4 - 60 U/L       Microbiology Results (last 7 days)       Procedure Component Value Units Date/Time    Rapid Organism ID by PCR (from Blood culture) [[id number]] Collected: 05/08/25 1250    Order Status: Sent Specimen: Blood from Peripheral, Antecubital, Right Updated: 05/09/25 0651    Blood culture x two cultures. Draw prior to antibiotics. [[id number]]  (Abnormal) Collected: 05/08/25 1250    Order Status: Completed Specimen: Blood from Peripheral, Antecubital, Left Updated: 05/09/25 0228     Blood Culture Positive - Aerobic and Anaerobic Bottles     GRAM STAIN Gram Negative Rods    Narrative:      Aerobic and Anaerobic Bottles Positive     Blood culture x two cultures. Draw prior to antibiotics. [[id number]]  (Abnormal) Collected: 05/08/25 1250    Order Status: Completed Specimen: Blood from Peripheral, Antecubital, Right Updated: 05/09/25 0227     Blood Culture Positive - Aerobic and Anaerobic Bottles     GRAM STAIN Gram Negative Rods    Narrative:      Aerobic and Anaerobic Bottles Positive     Urine culture [[id number]] Collected: 05/08/25 1708    Order Status: Sent Specimen: Urine, Clean Catch Updated: 05/08/25 1811             Imaging Results              US Abdomen Limited (Final result)  Result time 05/08/25 16:35:15      Final result by Quinton Sandoval MD (05/08/25 16:35:15)                   Impression:      Dilated common bile duct, commonly seen post cholecystectomy.  No obstructing stone identified.      Electronically signed by: Quinton Sandoval MD  Date:    05/08/2025  Time:    16:35               Narrative:    EXAMINATION:  US ABDOMEN LIMITED    CLINICAL HISTORY:  abdominal pain, elevated LFTs;    TECHNIQUE:  Limited ultrasound of the right upper quadrant of the abdomen (including pancreas, liver, gallbladder, common bile duct, and spleen) was performed.    COMPARISON:  None.    FINDINGS:  The visualized portions of the pancreas, abdominal aorta, and  IVC are unremarkable.    Gallbladder: Absent.  Negative sonographic Tate's sign.    Biliary system: The common duct is enlarged, measuring 9 mm.  Mild intrahepatic ductal dilatation.    Liver: Measures 13.3 cm.  There is homogeneous echotexture. No focal hepatic lesions.    Spleen: Normal in size  measuring 11.6 cm.                                        CT Abdomen Pelvis With IV Contrast NO Oral Contrast (Final result)  Result time 05/08/25 14:57:54      Final result by Solomon Gonzalez MD (05/08/25 14:57:54)                   Impression:      This report was flagged in Epic as abnormal.    1. Inflammation about the pancreas particularly at the pancreatic head, correlation with laboratory values recommended as findings are concerning for pancreatitis.  There is adjacent reactive lymphadenopathy.  2. There is surgical change of cholecystectomy noting prominent intra and extrahepatic biliary dilation.  No recent exams are available for comparison.  There is a questionable filling defect within the distal aspect of the common duct versus superimposition of duodenal soft tissue, choledocholithiasis cannot be definitively excluded.  3. Bilateral nonobstructive nephrolithiasis.  4. Please see above for several additional findings.      Electronically signed by: Solomon Gonzalze MD  Date:    05/08/2025  Time:    14:57               Narrative:    EXAMINATION:  CT ABDOMEN PELVIS WITH IV CONTRAST    CLINICAL HISTORY:  Nausea/vomiting;Epigastric pain;    TECHNIQUE:  Low dose axial images, sagittal and coronal reformations were obtained from the lung bases to the pubic symphysis following the IV administration of 75 mL of Omnipaque 350 .  Oral contrast was not given.    COMPARISON:  None.    FINDINGS:  Images of the lower thorax are remarkable for bilateral dependent atelectasis/scarring.  There are several lymph nodes along the aortic hiatus particularly on the right.    Allowing for motion artifact, the liver, and  adrenal glands are grossly unremarkable.  The spleen is enlarged.  There is some indistinctness about the pancreatic head, the pancreas enhances homogeneously.  The gallbladder is surgically absent noting intra and extrahepatic biliary dilation status post cholecystectomy.  At the distal aspect of the common duct, there is a questionable filling defect versus redundancy of the duodenal parenchyma in the region.  This is best seen on coronal image 601, and measures 3 mm.  There is mild fluid at the level of the ashish hepatis.  The portal vein, splenic vein, SMV, celiac axis and SMA all are patent.  No significant abdominal lymphadenopathy.  There is a small amount of strand-like fluid in the mid abdomen.    The kidneys enhance symmetrically without hydronephrosis.  There is bilateral nonobstructive nephrolithiasis.  Low attenuating lesions arise from the kidneys, too small for characterization.  The bilateral ureters are unable to be followed in their entirety to the urinary bladder, no definite calculi seen or secondary findings to suggest obstructive uropathy.  The urinary bladder is nondistended.  The uterus is absent the adnexa is unremarkable.    The distal large bowel is for the most part decompressed.  The terminal ileum is unremarkable.  The appendix is not confidently identified, no pericecal inflammation.  The small bowel is grossly unremarkable.  There are a few scattered shotty periaortic, pericaval, and mesenteric lymph nodes.  There is atherosclerotic calcification of the aorta and its branches.  No focal organized pelvic fluid collection.    There is osteopenia.  There are degenerative changes of the bilateral sacroiliac joints, pubic symphysis, and spine.  No significant inguinal lymphadenopathy.                                       X-Ray Chest AP Portable (Final result)  Result time 05/08/25 13:07:25      Final result by Solomon Gonzalez MD (05/08/25 13:07:25)                   Impression:       1. Chronic appearing interstitial findings, underlying edema is a consideration.  Correlation with any history of COPD/emphysema.      Electronically signed by: Solomon Gonzalez MD  Date:    05/08/2025  Time:    13:07               Narrative:    EXAMINATION:  XR CHEST AP PORTABLE    CLINICAL HISTORY:  Sepsis;    TECHNIQUE:  Single frontal view of the chest was performed.    COMPARISON:  07/08/2019    FINDINGS:  The cardiomediastinal silhouette is not enlarged noting calcification of the aorta..  There is no pleural effusion.  The trachea is midline.  The lungs are symmetrically expanded bilaterally with mildly coarse interstitial attenuation and bilateral basilar subsegmental atelectasis..  No large focal consolidation seen.  There is no pneumothorax.  The osseous structures are remarkable for degenerative change..                                               Assessment & Plan  Acute pancreatitis  Patient presents with worsening abdominal pain with associated nausea, vomiting, and altered mentation.  Lipase >1000.  CT with pancreatic head inflammation concerning for pancreatitis.  Patient denies alcohol use or any new medications.  No gallstones noted on imaging. Unclear etiology of pancreatitis at this time.  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    Plan:  NPO   IVF with  cc/hr for 20 hours  TG and IgG4 ordered and pending   Pain control with IV morphine 2 mg q.4 hours for moderate pain and IV morphine 6 mg q.4 hours for severe pain  Maintain on telemetry  Continue supportive care  HTN (hypertension)  Patient's blood pressure range in the last 24 hours was: BP  Min: 93/50  Max: 112/53.The patient's inpatient anti-hypertensive regimen is listed below:  Current Antihypertensives       Plan  - BP is controlled, no changes needed to their regimen  - Patient currently not on any antihypertensives  Multiple sclerosis  History noted, on Avonex  injections weekly   Iron deficiency anemia  Anemia is likely due to Iron deficiency. Most recent hemoglobin and hematocrit are listed below.  Recent Labs     05/08/25  1250   HGB 13.6   HCT 38.7     Plan  - Monitor serial CBC: Daily  - Transfuse PRBC if patient becomes hemodynamically unstable, symptomatic or H/H drops below 7/21.  - Patient has not received any PRBC transfusions to date  - Patient's anemia is currently stable  Tobacco abuse  Patient with a history of smoking 1ppd, 30 pack year history   Will need smoking cessation counseling once mentation improves   Neuropathic pain  Hold home gabapentin and carbamazepine in the setting of pancreatitis and acute encephalopathy  Chronic obstructive pulmonary disease, unspecified  Patient's COPD is controlled currently.  Patient is currently off COPD Pathway. Continue scheduled inhalers Duonebs and Supplemental oxygen and monitor respiratory status closely.   Sepsis  This patient does not have evidence of infective focus  My overall impression is sepsis.  Source: Unknown  Antibiotics given-   Antibiotics (72h ago, onward)      None          Latest lactate reviewed-  Recent Labs   Lab 05/08/25  1244 05/08/25  1706 05/08/25  1918   LACTATE  --    < > 2.0   POCLAC 3.54*  --   --     < > = values in this interval not displayed.     Organ dysfunction indicated by Encephalopathy and Thrombocytopenia     Fluid challenge Ideal Body Weight- The patient is obese (BMI>30) and their ideal body weight of Ideal body weight: 45.5 kg (100 lb 4.9 oz) will be used to calculate fluid bolus of 30 ml/kg.     Post- resuscitation assessment No - Post resuscitation assessment not needed       Will Not start Pressors- Levophed for MAP of 65  Source control achieved by: IV Zosyn and Vancomycin   -Blood and urine cultures pending   Elevated LFTs  Likely secondary to pancreatitis. US abdomen shows dilated common bile duct commonly seen s/p cholecystectomy and no obstructing stone seen.  Trend  daily LFTs  Bilateral nephrolithiasis  Noted on CT abdomen/pelvis today  F/u outpatient   Acute encephalopathy  Patient presents with abdominal pain, nausea, vomiting, waxing/waning mentation from acute pancreatitis vs. unknown infectious process.  states patient has been acting strangely. Patient not tolerating po and not taking medications over the past few days.   Metabolic workup: COVID/influenza negative, ammonia normal, elevated lactic acid 3.9, elevated procalcitonin 68.6, Blood cultures and urine cultures pending.   Likely 2/2 unknown infectious process and pancreatitis contributing  Continue IV antibiotics and f/u cultures   Prn Zyprexa IM for agitation  Fall precautions/delirium precautions  Gabapentin and Carbamazepine on hold.  Avoid other mental status altering medications.  VTE Risk Mitigation (From admission, onward)           Ordered     IP VTE LOW RISK PATIENT  Once         05/08/25 1604     Place sequential compression device  Until discontinued         05/08/25 1604                    Discharge Planning   VIDA:      Code Status: Full Code   Medical Readiness for Discharge Date:                            Cleve Monae MD  Department of Hospital Medicine   [facility]

## 2025-05-09 NOTE — SUBJECTIVE & OBJECTIVE
Past Medical History:   Diagnosis Date    Allergy     Anemia     Anxiety     Breast cyst     Chronic kidney disease     Depression     Fibrocystic breast     Hypertension     Multiple sclerosis     Multiple sclerosis     Neuromuscular disorder     Osteoporosis     Rib fracture 2015       Past Surgical History:   Procedure Laterality Date    APPENDECTOMY      BREAST BIOPSY Left 2012    BREAST CYST ASPIRATION      BREAST SURGERY  2004    excisional biopsy      SECTION, CLASSIC      CHALAZION EXCISION  at age 9    CHOLECYSTECTOMY      EYE SURGERY      HYSTERECTOMY      OOPHORECTOMY         Review of patient's allergies indicates:   Allergen Reactions    Lomotil [diphenoxylate-atropine]      Causes stomach spasms    Dilaudid [hydromorphone (bulk)] Itching       Medications:  Medications Prior to Admission   Medication Sig    carBAMazepine (TEGRETOL XR) 200 MG 12 hr tablet TAKE ONE TABLET BY MOUTH TWICE DAILY    cetirizine (ZYRTEC) 10 MG tablet Take 1 tablet (10 mg total) by mouth once daily.    cholecalciferol, vitamin D3, (VITAMIN D3) 125 mcg (5,000 unit) Tab Take 1 tablet (5,000 Units total) by mouth once daily.    EScitalopram oxalate (LEXAPRO) 20 MG tablet Take 1 tablet (20 mg total) by mouth once daily.    gabapentin (NEURONTIN) 400 MG capsule Take 800 mg by mouth every evening.    hydrOXYzine HCL (ATARAX) 25 MG tablet Take 1 tablet (25 mg total) by mouth 3 (three) times daily as needed for Itching.    interferon beta-1a (AVONEX) 30 mcg/0.5 mL PnKt Inject 0.5 mLs (30 mcg total) into the muscle once a week.    simvastatin (ZOCOR) 40 MG tablet Take 1 tablet (40 mg total) by mouth every evening.    tiZANidine (ZANAFLEX) 4 MG tablet TAKE 2 TABLETS IN THE MORNING, AT NOON AND IN THE EVENING AND TAKE 3 TABLETS AT NIGHT (9 TABLETS PER DAY); only takes 3 at bedtime and none during the day (Patient taking differently: Take 3 tablets by mouth every evening. TAKE 2 TABLETS IN THE MORNING, AT NOON AND IN  THE EVENING AND TAKE 3 TABLETS AT NIGHT (9 TABLETS PER DAY); only takes 3 at bedtime and none during the day)    HYDROcodone-acetaminophen (NORCO)  mg per tablet Take 1 tablet by mouth every 6 (six) hours as needed for Pain.    ibuprofen (ADVIL,MOTRIN) 600 MG tablet TAKE ONE TABLET BY MOUTH EVERY 6 HOURS AS NEEDED FOR PAIN    ibuprofen (ADVIL,MOTRIN) 800 MG tablet Take 800 mg by mouth every 6 (six) hours as needed.    ondansetron (ZOFRAN-ODT) 8 MG TbDL DISSOLVE 1 TABLET ON THE TONGUE THREE TIMES DAILY AS NEEDED Strength: 8 mg    tiotropium-olodateroL (STIOLTO RESPIMAT) 2.5-2.5 mcg/actuation Mist inhale TWO puffs INTO THE LUNGS DAILY     Antibiotics (From admission, onward)      None          Antifungals (From admission, onward)      None          Antivirals (From admission, onward)      None             Immunization History   Administered Date(s) Administered    COVID-19, MRNA, LN-S, PF (Pfizer) (Purple Cap) 02/23/2021, 03/16/2021, 08/26/2021    Influenza 11/22/2005, 11/27/2007, 11/12/2010, 10/06/2011, 10/19/2012    Influenza (FLUAD) - Quadrivalent - Adjuvanted - PF *Preferred* (65+) 10/06/2020    Influenza - Quadrivalent 12/07/2016    Influenza - Quadrivalent - PF *Preferred* (6 months and older) 09/14/2015, 11/10/2017, 11/05/2018, 10/28/2019, 11/05/2021    Influenza A (H1N1) 2009 Monovalent - IM - PF 12/09/2009    Pneumococcal Polysaccharide - 23 Valent 01/17/2018       Family History       Problem Relation (Age of Onset)    Arthritis Mother, Son, Maternal Grandmother, Paternal Grandmother    Diabetes Father    Drug abuse Son, Maternal Uncle    Early death Son, Son    Heart attack Paternal Grandfather    Heart disease Father, Maternal Grandmother, Paternal Grandmother, Paternal Grandfather, Maternal Grandfather, Paternal Uncle    Hyperlipidemia Father    Hypertension Father, Maternal Grandmother, Paternal Grandmother, Paternal Aunt, Paternal Uncle    Stroke Father, Maternal Grandmother, Paternal Grandmother           Social History     Socioeconomic History    Marital status:    Tobacco Use    Smoking status: Every Day     Current packs/day: 1.00     Average packs/day: 1 pack/day for 30.0 years (30.0 ttl pk-yrs)     Types: Cigarettes    Smokeless tobacco: Never   Substance and Sexual Activity    Alcohol use: No    Drug use: No    Sexual activity: Not Currently     Partners: Male     Birth control/protection: See Surgical Hx     Comment: Hysterectomy     Social Drivers of Health     Financial Resource Strain: Patient Declined (5/9/2025)    Overall Financial Resource Strain (CARDIA)     Difficulty of Paying Living Expenses: Patient declined   Food Insecurity: Patient Declined (5/9/2025)    Hunger Vital Sign     Worried About Running Out of Food in the Last Year: Patient declined     Ran Out of Food in the Last Year: Patient declined   Transportation Needs: Patient Declined (5/9/2025)    PRAPARE - Transportation     Lack of Transportation (Medical): Patient declined     Lack of Transportation (Non-Medical): Patient declined   Physical Activity: Insufficiently Active (3/18/2025)    Exercise Vital Sign     Days of Exercise per Week: 5 days     Minutes of Exercise per Session: 20 min   Stress: Patient Declined (5/9/2025)    [employer] - Occupational Stress Questionnaire     Feeling of Stress : Patient declined   Housing Stability: Patient Declined (5/9/2025)    Housing Stability Vital Sign     Unable to Pay for Housing in the Last Year: Patient declined     Homeless in the Last Year: Patient declined     Review of Systems   Unable to perform ROS: Mental status change     Objective:     Vital Signs (Most Recent):  Temp: 98.1 °F (36.7 °C) (05/09/25 0828)  Pulse: 94 (05/09/25 0828)  Resp: 18 (05/09/25 0828)  BP: (!) 107/53 (05/09/25 0828)  SpO2: (!) 94 % (05/09/25 0828) Vital Signs (24h Range):  Temp:  [98.1 °F (36.7 °C)-99.8 °F (37.7 °C)] 98.1 °F (36.7 °C)  Pulse:  [] 94  Resp:  [18]  18  SpO2:  [88 %-100 %] 94 %  BP: ()/(50-78) 107/53     Weight: 55.2 kg (121 lb 11.2 oz)  Body mass index is 23.77 kg/m².    Estimated Creatinine Clearance: 77.8 mL/min (based on SCr of 0.6 mg/dL).     Physical Exam  Vitals and nursing note reviewed.   Constitutional:       General: She is not in acute distress.     Appearance: Normal appearance. She is ill-appearing. She is not toxic-appearing or diaphoretic.   HENT:      Head: Normocephalic.   Eyes:      Extraocular Movements: Extraocular movements intact.      Pupils: Pupils are equal, round, and reactive to light.   Cardiovascular:      Rate and Rhythm: Normal rate and regular rhythm.   Pulmonary:      Breath sounds: No wheezing or rhonchi.   Abdominal:      Tenderness: There is no right CVA tenderness, left CVA tenderness, guarding or rebound.   Musculoskeletal:         General: No swelling, tenderness, deformity or signs of injury.   Neurological:      Mental Status: She is alert. She is disoriented.   Psychiatric:         Mood and Affect: Mood normal.          Significant Labs: CBC:   Recent Labs   Lab 05/08/25  1250 05/09/25  0849   WBC 15.87* 11.69   HGB 13.6 12.1   HCT 38.7 35.4*   PLT 95* 65*     CMP:   Recent Labs   Lab 05/08/25  1250 05/09/25  0709    140   K 3.5 3.7    112*   CO2 21* 17*   GLU 76 69*   BUN 24* 19   CREATININE 1.3 0.6   CALCIUM 8.8 7.9*   PROT 6.1 5.3*   ALBUMIN 3.1* 2.4*   BILITOT 6.9* 3.0*   ALKPHOS 194* 123   * 180*   * 149*   ANIONGAP 15 11     Microbiology Results (last 7 days)       Procedure Component Value Units Date/Time    Blood culture x two cultures. Draw prior to antibiotics. [[id number]]  (Abnormal) Collected: 05/08/25 1250    Order Status: Completed Specimen: Blood from Peripheral, Antecubital, Right Updated: 05/09/25 0900     Blood Culture Positive - Aerobic and Anaerobic Bottles     GRAM STAIN Gram Negative Rods    Narrative:      Aerobic and Anaerobic Bottles Positive     Blood culture x  two cultures. Draw prior to antibiotics. [[id number]]  (Abnormal) Collected: 05/08/25 1250    Order Status: Completed Specimen: Blood from Peripheral, Antecubital, Left Updated: 05/09/25 0900     Blood Culture Positive - Aerobic and Anaerobic Bottles     GRAM STAIN Gram Negative Rods    Narrative:      Aerobic and Anaerobic Bottles Positive     Rapid Organism ID by PCR (from Blood culture) [[id number]] Collected: 05/08/25 1250    Order Status: Sent Specimen: Blood from Peripheral, Antecubital, Right Updated: 05/09/25 0651    Urine culture [[id number]] Collected: 05/08/25 1708    Order Status: Sent Specimen: Urine, Clean Catch Updated: 05/08/25 1811            Significant Imaging: I have reviewed all pertinent imaging results/findings within the past 24 hours.

## 2025-05-09 NOTE — PLAN OF CARE
Case Management Assessment     PCP: Devon Collier  Pharmacy: [facility]    Patient Arrived From: Home  Existing Help at Home: Jared- spouse    Barriers to Discharge: None    Discharge Plan:    A. Home with family   B. Other- TBD      Pt unable to complete assessment but pt's daughter's, Kristi and Deysi assisted. Pt is independent and uses a straight cane. Pt's family will provide transportation home when discharged.     05/09/25 1522   Discharge Assessment   Assessment Type Discharge Planning Assessment   Confirmed/corrected address, phone number and insurance Yes   Confirmed Demographics Correct on Facesheet   Source of Information family   When was your last doctors appointment? 04/29/25   Communicated VIDA with patient/caregiver Yes   Reason For Admission Acute pancreatitis   People in Home spouse   Facility Arrived From: Home   Do you expect to return to your current living situation? Yes   Do you have help at home or someone to help you manage your care at home? Yes   Who are your caregiver(s) and their phone number(s)? Jared Cali (Spouse)  [phone]  Kristi [phone] and Deysi [phone]   Prior to hospitilization cognitive status: Alert/Oriented   Current cognitive status: Unable to Assess   Walking or Climbing Stairs Difficulty yes   Walking or Climbing Stairs ambulation difficulty, requires equipment   Mobility Management straight cane   Dressing/Bathing Difficulty no   Equipment Currently Used at Home cane, straight   Readmission within 30 days? No   Patient currently being followed by outpatient case management? No   Do you currently have service(s) that help you manage your care at home? No   Do you take prescription medications? Yes   Do you have prescription coverage? Yes   Coverage [employer] MANAGED MEDICARE - [employer] MEDICARE PPO   Do you have any problems affording any of your prescribed medications? No   Is the patient taking medications as prescribed? yes   Who is going to help you get home  at discharge? family   How do you get to doctors appointments? family or friend will provide   Are you on dialysis? No   Do you take coumadin? No   Discharge Plan A Home with family   Discharge Plan B Other  (tbd)   DME Needed Upon Discharge  other (see comments)  (tbd)   Discharge Plan discussed with: Adult children   Transition of Care Barriers None   SDOH   (none)   OTHER   Name(s) of People in Home Jared

## 2025-05-09 NOTE — ASSESSMENT & PLAN NOTE
History noted, on Avonex injections weekly    Dutasteride Counseling: Dustasteride Counseling:  I discussed with the patient the risks of use of dutasteride including but not limited to decreased libido, decreased ejaculate volume, and gynecomastia. Women who can become pregnant should not handle medication.  All of the patient's questions and concerns were addressed. Dutasteride Male Counseling: Dustasteride Counseling:  I discussed with the patient the risks of use of dutasteride including but not limited to decreased libido, decreased ejaculate volume, and gynecomastia. Women who can become pregnant should not handle medication.  All of the patient's questions and concerns were addressed.

## 2025-05-09 NOTE — ASSESSMENT & PLAN NOTE
This patient does not have evidence of infective focus  My overall impression is sepsis.  Source: Unknown  Antibiotics given-   Antibiotics (72h ago, onward)      None          Latest lactate reviewed-  Recent Labs   Lab 05/08/25  1244 05/08/25  1706 05/08/25  1918   LACTATE  --    < > 2.0   POCLAC 3.54*  --   --     < > = values in this interval not displayed.     Organ dysfunction indicated by Encephalopathy and Thrombocytopenia     Fluid challenge Ideal Body Weight- The patient is obese (BMI>30) and their ideal body weight of Ideal body weight: 45.5 kg (100 lb 4.9 oz) will be used to calculate fluid bolus of 30 ml/kg.     Post- resuscitation assessment No - Post resuscitation assessment not needed       Will Not start Pressors- Levophed for MAP of 65  Source control achieved by: IV Zosyn and Vancomycin   -Blood and urine cultures pending

## 2025-05-09 NOTE — ASSESSMENT & PLAN NOTE
Mrs. Cali is a 58 yo woman with multiple sclerosis on Avonex injections admitted with pancreatitis and found to have Gram-negative sepsis. The etiology is likely GI but possibly . I wonder if she could have had choledocholithiasis with resultant pancreatitis? GI following. Her AMS could be partially explained by this infection but otherwise her confusion is somewhat atypical for sepsis alone.     Agree with empiric Zosyn pending BCID and culture results. Discussed with Dr. Monae.    Recommendations  Continue Zosyn pending further results  Consider Neurology input: MS exacerbation?  Follow-up hepatitis panel  Repeat blood cultures until cleared  Discussed with Mr. Cali

## 2025-05-09 NOTE — HOSPITAL COURSE
59 year old female with multiple sclerosis on interferon beta-1a (avonex) and COPD who was admitted 5/8/25 for acute pancreatitis and encephalopathy.  Started on aggressive IVF fluid resuscitation and pain control.  TG wnl/not cause of pancreatitis,  states she is not a big drinker, and no stones appreciated. Tbili is elevated at 6.9 and LFTs up. GI consulted- US negative for biliary stone, possibly passed a stone?  MRCP negative for choledocholithiasis. Rec f/u in panc/bili clinic. I She was also found to have klebsiella pneumoniae bacteremia. No symptoms of UTI, urine culture with no significant growth. CT A/P with pancreatitis and questionable filling defect at CBD. No evidence of choledocholithiasis on MRCP.  Source either GI or . Repeat blood cultures 5/10 also positive. CT A/P with apparently incidentally noted abdominal aneurysm so not a fluoroquinolone candidate. Repeat blood cx on 05/12 with no growth to date at 24 hrs.  Infectious Disease consulted-recommend ceftriaxone.   repeat blood cultures remained negative at 48 hours, plan for ceftriaxone for total of 14 days, estimated end date 05/26/2025. Midline placed. Also, unable to wean of supplemental oxygen.  Attempted 6 minute walk test, but hypoxic at rest with O2 sats of 83% on room air, improved with 2 L of nasal cannula.  She will need home oxygen on discharge    Patient admits feeling better overall.  No longer having any abdominal pain, nausea, vomiting.  Admits shortness a breath, but denies any wheezing.  Urinating without difficulty.  Tolerating antibiotics. Pt denies any fever, headaches, vision changes, chest pain, shortness of breath, palpitations, abdominal pain, nausea, vomiting, or any new weaknesses. Feels ready to go home. Patient's exam on discharge was as follow: Patient is alert and oriented, appears in no acute distress, heart with regular rate and rhythm, lungs clear with diminished breath sounds in lower lung fields, no  wheezing, on 2 L nasal cannula, abdomen soft, and no new weaknesses or focal deficits seen. Bilateral lower extremities without any edema or calf tenderness.     Patient was counseled regarding any abnormal labs, differential diagnosis, treatment options, risk-benefit, lifestyle changes, prognosis, current condition, and medications.  Family at bedside and present for discussion.  Patient was interactive and attentive.  Patient's questions were answered in a respectful and timely manner. Patient was instructed to follow-up with PCP within 1 week and to continue taking medications as prescribed.   Also, extensively discussed the risks, benefits, and side effects of patient's medications. Discussed with patient about any medication changes. Patient verbalized understanding and agrees to treatment plan.  Patient is stable for discharge.  Patient has no other questions or concerns at this time.  ED precautions discussed with the patient.    Vital signs are stable. Ambulating without any difficulty. Tolerating p.o. intake without any nausea or vomiting. Afebrile for over 24 hours. Patient is in stable condition and has no questions or concerns. Patient will be discharge to home with home IV infusion once IV infusion provide education and transportation secured . Prescriptions sent to pharmacy.  CM/SW to assist with discharge planning.     Vitals:    05/14/25 0444 05/14/25 0732 05/14/25 0745 05/14/25 1130   BP: 129/61  119/76 138/73   BP Location: Left arm      Patient Position: Lying      Pulse: 83 72 78 86   Resp: 18 19 20 18   Temp: 98.2 °F (36.8 °C)  98.4 °F (36.9 °C) 98 °F (36.7 °C)   TempSrc: Oral      SpO2: 95% (!) 94% 98% (!) 91%   Weight:       Height:

## 2025-05-09 NOTE — CONSULTS
[facility] Surg  Infectious Disease  Consult Note    Patient Name: Zoey Cali  MRN: [id number]  Admission Date: 5/8/2025  Hospital Length of Stay: 1 days  Attending Physician: Cleve Monae MD  Primary Care Provider: Devon Collier MD     Isolation Status: No active isolations    Patient information was obtained from spouse/SO, past medical records, ER records, and primary team.      Inpatient consult to Infectious Diseases  Consult performed by: Salvador Temple MD  Consult ordered by: Cleve Monae MD        Assessment/Plan:     ID  Sepsis  Mrs. Cali is a 60 yo woman with multiple sclerosis on Avonex injections admitted with pancreatitis and found to have Gram-negative sepsis. The etiology is likely GI but possibly . I wonder if she could have had choledocholithiasis with resultant pancreatitis? GI following. Her AMS could be partially explained by this infection but otherwise her confusion is somewhat atypical for sepsis alone.     Agree with empiric Zosyn pending BCID and culture results. Discussed with Dr. Monae.    Recommendations  Continue Zosyn pending further results  Consider Neurology input: MS exacerbation?  Follow-up hepatitis panel  Repeat blood cultures until cleared  Discussed with Mr. Cali    Thank you for your consult. I will follow-up with patient. Please contact us if you have any additional questions.    Salvador Temple MD  Infectious Disease  [facility] - Med Surg    Subjective:     Principal Problem: Acute pancreatitis    HPI: Mrs. Cali is a 59 y.o. woman with a multiple sclerosis presents to the hospital via EMS with weakness and altered mental status. Her  reports that she had abdominal pain on Tuesday and became confused on Wednesday. He denies that she complained of headache, dysuria, or diarrhea. Denies EtOH use. In the ED, she had a mild leukocytosis, abn LFTs, and increased lipase. A CT abdomen pelvis shows (1) pancreatic inflammation concerning for  pancreatitis (2) surgical change of cholecystectomy noting prominent intra and extrahepatic biliary dilation (3) bilateral nonobstructive nephrolithiasis.  The patient was started on Zosyn and vancomycin. This morning, her blood cultures started growing a GNB (BCID pending) and ID is consulted for that. Her vancomycin was discontinued. Currently, the patient is encephalopathic and does not follow commands. She appears non-toxic, however.     Past Medical History:   Diagnosis Date    Allergy     Anemia     Anxiety     Breast cyst     Chronic kidney disease     Depression     Fibrocystic breast     Hypertension     Multiple sclerosis     Multiple sclerosis     Neuromuscular disorder     Osteoporosis     Rib fracture 2015       Past Surgical History:   Procedure Laterality Date    APPENDECTOMY      BREAST BIOPSY Left 2012    BREAST CYST ASPIRATION      BREAST SURGERY  2004    excisional biopsy      SECTION, CLASSIC      CHALAZION EXCISION  at age 9    CHOLECYSTECTOMY      EYE SURGERY      HYSTERECTOMY      OOPHORECTOMY         Review of patient's allergies indicates:   Allergen Reactions    Lomotil [diphenoxylate-atropine]      Causes stomach spasms    Dilaudid [hydromorphone (bulk)] Itching       Medications:  Medications Prior to Admission   Medication Sig    carBAMazepine (TEGRETOL XR) 200 MG 12 hr tablet TAKE ONE TABLET BY MOUTH TWICE DAILY    cetirizine (ZYRTEC) 10 MG tablet Take 1 tablet (10 mg total) by mouth once daily.    cholecalciferol, vitamin D3, (VITAMIN D3) 125 mcg (5,000 unit) Tab Take 1 tablet (5,000 Units total) by mouth once daily.    EScitalopram oxalate (LEXAPRO) 20 MG tablet Take 1 tablet (20 mg total) by mouth once daily.    gabapentin (NEURONTIN) 400 MG capsule Take 800 mg by mouth every evening.    hydrOXYzine HCL (ATARAX) 25 MG tablet Take 1 tablet (25 mg total) by mouth 3 (three) times daily as needed for Itching.    interferon beta-1a (AVONEX) 30 mcg/0.5 mL PnKt Inject  0.5 mLs (30 mcg total) into the muscle once a week.    simvastatin (ZOCOR) 40 MG tablet Take 1 tablet (40 mg total) by mouth every evening.    tiZANidine (ZANAFLEX) 4 MG tablet TAKE 2 TABLETS IN THE MORNING, AT NOON AND IN THE EVENING AND TAKE 3 TABLETS AT NIGHT (9 TABLETS PER DAY); only takes 3 at bedtime and none during the day (Patient taking differently: Take 3 tablets by mouth every evening. TAKE 2 TABLETS IN THE MORNING, AT NOON AND IN THE EVENING AND TAKE 3 TABLETS AT NIGHT (9 TABLETS PER DAY); only takes 3 at bedtime and none during the day)    HYDROcodone-acetaminophen (NORCO)  mg per tablet Take 1 tablet by mouth every 6 (six) hours as needed for Pain.    ibuprofen (ADVIL,MOTRIN) 600 MG tablet TAKE ONE TABLET BY MOUTH EVERY 6 HOURS AS NEEDED FOR PAIN    ibuprofen (ADVIL,MOTRIN) 800 MG tablet Take 800 mg by mouth every 6 (six) hours as needed.    ondansetron (ZOFRAN-ODT) 8 MG TbDL DISSOLVE 1 TABLET ON THE TONGUE THREE TIMES DAILY AS NEEDED Strength: 8 mg    tiotropium-olodateroL (STIOLTO RESPIMAT) 2.5-2.5 mcg/actuation Mist inhale TWO puffs INTO THE LUNGS DAILY     Antibiotics (From admission, onward)      None          Antifungals (From admission, onward)      None          Antivirals (From admission, onward)      None             Immunization History   Administered Date(s) Administered    COVID-19, MRNA, LN-S, PF (Pfizer) (Purple Cap) 02/23/2021, 03/16/2021, 08/26/2021    Influenza 11/22/2005, 11/27/2007, 11/12/2010, 10/06/2011, 10/19/2012    Influenza (FLUAD) - Quadrivalent - Adjuvanted - PF *Preferred* (65+) 10/06/2020    Influenza - Quadrivalent 12/07/2016    Influenza - Quadrivalent - PF *Preferred* (6 months and older) 09/14/2015, 11/10/2017, 11/05/2018, 10/28/2019, 11/05/2021    Influenza A (H1N1) 2009 Monovalent - IM - PF 12/09/2009    Pneumococcal Polysaccharide - 23 Valent 01/17/2018       Family History       Problem Relation (Age of Onset)    Arthritis Mother, Son, Maternal Grandmother,  Paternal Grandmother    Diabetes Father    Drug abuse Son, Maternal Uncle    Early death Son, Son    Heart attack Paternal Grandfather    Heart disease Father, Maternal Grandmother, Paternal Grandmother, Paternal Grandfather, Maternal Grandfather, Paternal Uncle    Hyperlipidemia Father    Hypertension Father, Maternal Grandmother, Paternal Grandmother, Paternal Aunt, Paternal Uncle    Stroke Father, Maternal Grandmother, Paternal Grandmother          Social History     Socioeconomic History    Marital status:    Tobacco Use    Smoking status: Every Day     Current packs/day: 1.00     Average packs/day: 1 pack/day for 30.0 years (30.0 ttl pk-yrs)     Types: Cigarettes    Smokeless tobacco: Never   Substance and Sexual Activity    Alcohol use: No    Drug use: No    Sexual activity: Not Currently     Partners: Male     Birth control/protection: See Surgical Hx     Comment: Hysterectomy     Social Drivers of Health     Financial Resource Strain: Patient Declined (5/9/2025)    Overall Financial Resource Strain (CARDIA)     Difficulty of Paying Living Expenses: Patient declined   Food Insecurity: Patient Declined (5/9/2025)    Hunger Vital Sign     Worried About Running Out of Food in the Last Year: Patient declined     Ran Out of Food in the Last Year: Patient declined   Transportation Needs: Patient Declined (5/9/2025)    PRAPARE - Transportation     Lack of Transportation (Medical): Patient declined     Lack of Transportation (Non-Medical): Patient declined   Physical Activity: Insufficiently Active (3/18/2025)    Exercise Vital Sign     Days of Exercise per Week: 5 days     Minutes of Exercise per Session: 20 min   Stress: Patient Declined (5/9/2025)    [employer] - Occupational Stress Questionnaire     Feeling of Stress : Patient declined   Housing Stability: Patient Declined (5/9/2025)    Housing Stability Vital Sign     Unable to Pay for Housing in the Last Year: Patient  declined     Homeless in the Last Year: Patient declined     Review of Systems   Unable to perform ROS: Mental status change     Objective:     Vital Signs (Most Recent):  Temp: 98.1 °F (36.7 °C) (05/09/25 0828)  Pulse: 94 (05/09/25 0828)  Resp: 18 (05/09/25 0828)  BP: (!) 107/53 (05/09/25 0828)  SpO2: (!) 94 % (05/09/25 0828) Vital Signs (24h Range):  Temp:  [98.1 °F (36.7 °C)-99.8 °F (37.7 °C)] 98.1 °F (36.7 °C)  Pulse:  [] 94  Resp:  [18] 18  SpO2:  [88 %-100 %] 94 %  BP: ()/(50-78) 107/53     Weight: 55.2 kg (121 lb 11.2 oz)  Body mass index is 23.77 kg/m².    Estimated Creatinine Clearance: 77.8 mL/min (based on SCr of 0.6 mg/dL).     Physical Exam  Vitals and nursing note reviewed.   Constitutional:       General: She is not in acute distress.     Appearance: Normal appearance. She is ill-appearing. She is not toxic-appearing or diaphoretic.   HENT:      Head: Normocephalic.   Eyes:      Extraocular Movements: Extraocular movements intact.      Pupils: Pupils are equal, round, and reactive to light.   Cardiovascular:      Rate and Rhythm: Normal rate and regular rhythm.   Pulmonary:      Breath sounds: No wheezing or rhonchi.   Abdominal:      Tenderness: There is no right CVA tenderness, left CVA tenderness, guarding or rebound.   Musculoskeletal:         General: No swelling, tenderness, deformity or signs of injury.   Neurological:      Mental Status: She is alert. She is disoriented.   Psychiatric:         Mood and Affect: Mood normal.          Significant Labs: CBC:   Recent Labs   Lab 05/08/25  1250 05/09/25  0849   WBC 15.87* 11.69   HGB 13.6 12.1   HCT 38.7 35.4*   PLT 95* 65*     CMP:   Recent Labs   Lab 05/08/25  1250 05/09/25  0709    140   K 3.5 3.7    112*   CO2 21* 17*   GLU 76 69*   BUN 24* 19   CREATININE 1.3 0.6   CALCIUM 8.8 7.9*   PROT 6.1 5.3*   ALBUMIN 3.1* 2.4*   BILITOT 6.9* 3.0*   ALKPHOS 194* 123   * 180*   * 149*   ANIONGAP 15 11     Microbiology  Results (last 7 days)       Procedure Component Value Units Date/Time    Blood culture x two cultures. Draw prior to antibiotics. [[id number]]  (Abnormal) Collected: 05/08/25 1250    Order Status: Completed Specimen: Blood from Peripheral, Antecubital, Right Updated: 05/09/25 0900     Blood Culture Positive - Aerobic and Anaerobic Bottles     GRAM STAIN Gram Negative Rods    Narrative:      Aerobic and Anaerobic Bottles Positive     Blood culture x two cultures. Draw prior to antibiotics. [[id number]]  (Abnormal) Collected: 05/08/25 1250    Order Status: Completed Specimen: Blood from Peripheral, Antecubital, Left Updated: 05/09/25 0900     Blood Culture Positive - Aerobic and Anaerobic Bottles     GRAM STAIN Gram Negative Rods    Narrative:      Aerobic and Anaerobic Bottles Positive     Rapid Organism ID by PCR (from Blood culture) [[id number]] Collected: 05/08/25 1250    Order Status: Sent Specimen: Blood from Peripheral, Antecubital, Right Updated: 05/09/25 0651    Urine culture [[id number]] Collected: 05/08/25 1708    Order Status: Sent Specimen: Urine, Clean Catch Updated: 05/08/25 1811            Significant Imaging: I have reviewed all pertinent imaging results/findings within the past 24 hours.

## 2025-05-09 NOTE — PLAN OF CARE
Problem: Adult Inpatient Plan of Care  Goal: Plan of Care Review  Outcome: Progressing  Goal: Patient-Specific Goal (Individualized)  Outcome: Progressing     Problem: Sepsis/Septic Shock  Goal: Optimal Coping  Outcome: Progressing     Problem: Skin Injury Risk Increased  Goal: Skin Health and Integrity  Outcome: Progressing

## 2025-05-09 NOTE — HPI
Mrs. Cali is a 59 y.o. woman with a multiple sclerosis presents to the hospital via EMS with weakness and altered mental status. Her  reports that she had abdominal pain on Tuesday and became confused on Wednesday. He denies that she complained of headache, dysuria, or diarrhea. Denies EtOH use. In the ED, she had a mild leukocytosis, abn LFTs, and increased lipase. A CT abdomen pelvis shows (1) pancreatic inflammation concerning for pancreatitis (2) surgical change of cholecystectomy noting prominent intra and extrahepatic biliary dilation (3) bilateral nonobstructive nephrolithiasis.  The patient was started on Zosyn and vancomycin. This morning, her blood cultures started growing a GNB (BCID pending) and ID is consulted for that. Her vancomycin was discontinued. Currently, the patient is encephalopathic and does not follow commands. She appears non-toxic, however.

## 2025-05-10 LAB
ABSOLUTE EOSINOPHIL (OHS): 0.02 K/UL
ABSOLUTE MONOCYTE (OHS): 0.18 K/UL (ref 0.3–1)
ABSOLUTE NEUTROPHIL COUNT (OHS): 6.62 K/UL (ref 1.8–7.7)
ABSOLUTE NEUTROPHIL MANUAL (OHS): 7.2 K/UL
ALBUMIN SERPL BCP-MCNC: 2.3 G/DL (ref 3.5–5.2)
ALBUMIN SERPL BCP-MCNC: 2.5 G/DL (ref 3.5–5.2)
ALLENS TEST: ABNORMAL
ALP SERPL-CCNC: 113 UNIT/L (ref 40–150)
ALP SERPL-CCNC: 121 UNIT/L (ref 40–150)
ALT SERPL W/O P-5'-P-CCNC: 102 UNIT/L (ref 10–44)
ALT SERPL W/O P-5'-P-CCNC: 81 UNIT/L (ref 10–44)
ANION GAP (OHS): 11 MMOL/L (ref 8–16)
ANION GAP (OHS): 11 MMOL/L (ref 8–16)
ANISOCYTOSIS BLD QL SMEAR: SLIGHT
AST SERPL-CCNC: 62 UNIT/L (ref 11–45)
AST SERPL-CCNC: 99 UNIT/L (ref 11–45)
BACTERIA BLD CULT: ABNORMAL
BACTERIA UR CULT: NORMAL
BASOPHILS # BLD AUTO: 0.02 K/UL
BASOPHILS NFR BLD AUTO: 0.3 %
BILIRUB SERPL-MCNC: 1.8 MG/DL (ref 0.1–1)
BILIRUB SERPL-MCNC: 1.9 MG/DL (ref 0.1–1)
BNP SERPL-MCNC: 658 PG/ML (ref 0–99)
BUN SERPL-MCNC: 10 MG/DL (ref 6–20)
BUN SERPL-MCNC: 7 MG/DL (ref 6–20)
CALCIUM SERPL-MCNC: 8 MG/DL (ref 8.7–10.5)
CALCIUM SERPL-MCNC: 8.3 MG/DL (ref 8.7–10.5)
CHLORIDE SERPL-SCNC: 114 MMOL/L (ref 95–110)
CHLORIDE SERPL-SCNC: 116 MMOL/L (ref 95–110)
CO2 SERPL-SCNC: 14 MMOL/L (ref 23–29)
CO2 SERPL-SCNC: 14 MMOL/L (ref 23–29)
CREAT SERPL-MCNC: 0.5 MG/DL (ref 0.5–1.4)
CREAT SERPL-MCNC: 0.6 MG/DL (ref 0.5–1.4)
ERYTHROCYTE [DISTWIDTH] IN BLOOD BY AUTOMATED COUNT: 15.3 % (ref 11.5–14.5)
ERYTHROCYTE [DISTWIDTH] IN BLOOD BY AUTOMATED COUNT: 15.9 % (ref 11.5–14.5)
GFR SERPLBLD CREATININE-BSD FMLA CKD-EPI: >60 ML/MIN/1.73/M2
GFR SERPLBLD CREATININE-BSD FMLA CKD-EPI: >60 ML/MIN/1.73/M2
GLUCOSE SERPL-MCNC: 57 MG/DL (ref 70–110)
GLUCOSE SERPL-MCNC: 64 MG/DL (ref 70–110)
GRAM STN SPEC: ABNORMAL
GRAM STN SPEC: ABNORMAL
HCO3 UR-SCNC: 14.8 MMOL/L (ref 24–28)
HCT VFR BLD AUTO: 32.2 % (ref 37–48.5)
HCT VFR BLD AUTO: 32.3 % (ref 37–48.5)
HGB BLD-MCNC: 10.8 GM/DL (ref 12–16)
HGB BLD-MCNC: 11 GM/DL (ref 12–16)
IMM GRANULOCYTES # BLD AUTO: 0.03 K/UL (ref 0–0.04)
IMM GRANULOCYTES NFR BLD AUTO: 0.4 % (ref 0–0.5)
LACTATE SERPL-SCNC: 0.6 MMOL/L (ref 0.5–2.2)
LYMPHOCYTES # BLD AUTO: 0.46 K/UL (ref 1–4.8)
LYMPHOCYTES NFR BLD MANUAL: 8 % (ref 18–48)
MAGNESIUM SERPL-MCNC: 1.5 MG/DL (ref 1.6–2.6)
MAGNESIUM SERPL-MCNC: 1.7 MG/DL (ref 1.6–2.6)
MCH RBC QN AUTO: 32 PG (ref 27–31)
MCH RBC QN AUTO: 32.2 PG (ref 27–31)
MCHC RBC AUTO-ENTMCNC: 33.4 G/DL (ref 32–36)
MCHC RBC AUTO-ENTMCNC: 34.2 G/DL (ref 32–36)
MCV RBC AUTO: 94 FL (ref 82–98)
MCV RBC AUTO: 96 FL (ref 82–98)
MONOCYTES NFR BLD MANUAL: 2 % (ref 4–15)
NEUTROPHILS NFR BLD MANUAL: 78 % (ref 38–73)
NEUTS BAND NFR BLD MANUAL: 12 %
NUCLEATED RBC (/100WBC) (OHS): 0 /100 WBC
NUCLEATED RBC (/100WBC) (OHS): 0 /100 WBC
PCO2 BLDA: 31.1 MMHG (ref 35–45)
PH SMN: 7.28 [PH] (ref 7.35–7.45)
PHOSPHATE SERPL-MCNC: 2.4 MG/DL (ref 2.7–4.5)
PLATELET # BLD AUTO: 69 K/UL (ref 150–450)
PLATELET # BLD AUTO: 74 K/UL (ref 150–450)
PLATELET BLD QL SMEAR: ABNORMAL
PMV BLD AUTO: 10.8 FL (ref 9.2–12.9)
PMV BLD AUTO: 9.8 FL (ref 9.2–12.9)
PO2 BLDA: 19 MMHG (ref 40–60)
POC BE: -11 MMOL/L (ref -2–2)
POC SATURATED O2: 25 % (ref 95–100)
POC TCO2: 16 MMOL/L (ref 24–29)
POIKILOCYTOSIS BLD QL SMEAR: SLIGHT
POTASSIUM SERPL-SCNC: 2.7 MMOL/L (ref 3.5–5.1)
POTASSIUM SERPL-SCNC: 3.3 MMOL/L (ref 3.5–5.1)
PROT SERPL-MCNC: 5.4 GM/DL (ref 6–8.4)
PROT SERPL-MCNC: 5.5 GM/DL (ref 6–8.4)
RBC # BLD AUTO: 3.38 M/UL (ref 4–5.4)
RBC # BLD AUTO: 3.42 M/UL (ref 4–5.4)
RELATIVE EOSINOPHIL (OHS): 0.3 %
RELATIVE LYMPHOCYTE (OHS): 6.3 % (ref 18–48)
RELATIVE MONOCYTE (OHS): 2.5 % (ref 4–15)
RELATIVE NEUTROPHIL (OHS): 90.2 % (ref 38–73)
SAMPLE: ABNORMAL
SITE: ABNORMAL
SODIUM SERPL-SCNC: 139 MMOL/L (ref 136–145)
SODIUM SERPL-SCNC: 141 MMOL/L (ref 136–145)
WBC # BLD AUTO: 7.33 K/UL (ref 3.9–12.7)
WBC # BLD AUTO: 7.97 K/UL (ref 3.9–12.7)

## 2025-05-10 PROCEDURE — 27000221 HC OXYGEN, UP TO 24 HOURS: Mod: HCNC

## 2025-05-10 PROCEDURE — 99900035 HC TECH TIME PER 15 MIN (STAT): Mod: HCNC

## 2025-05-10 PROCEDURE — 21400001 HC TELEMETRY ROOM: Mod: HCNC

## 2025-05-10 PROCEDURE — 36415 COLL VENOUS BLD VENIPUNCTURE: CPT | Mod: HCNC | Performed by: STUDENT IN AN ORGANIZED HEALTH CARE EDUCATION/TRAINING PROGRAM

## 2025-05-10 PROCEDURE — 94640 AIRWAY INHALATION TREATMENT: CPT | Mod: HCNC

## 2025-05-10 PROCEDURE — 82803 BLOOD GASES ANY COMBINATION: CPT | Mod: HCNC

## 2025-05-10 PROCEDURE — 85025 COMPLETE CBC W/AUTO DIFF WBC: CPT | Mod: HCNC | Performed by: INTERNAL MEDICINE

## 2025-05-10 PROCEDURE — 25000003 PHARM REV CODE 250: Mod: HCNC | Performed by: INTERNAL MEDICINE

## 2025-05-10 PROCEDURE — 83735 ASSAY OF MAGNESIUM: CPT | Mod: HCNC

## 2025-05-10 PROCEDURE — 25000242 PHARM REV CODE 250 ALT 637 W/ HCPCS: Mod: HCNC

## 2025-05-10 PROCEDURE — 63600175 PHARM REV CODE 636 W HCPCS: Mod: HCNC | Performed by: STUDENT IN AN ORGANIZED HEALTH CARE EDUCATION/TRAINING PROGRAM

## 2025-05-10 PROCEDURE — 83880 ASSAY OF NATRIURETIC PEPTIDE: CPT | Mod: HCNC | Performed by: INTERNAL MEDICINE

## 2025-05-10 PROCEDURE — 36415 COLL VENOUS BLD VENIPUNCTURE: CPT | Mod: HCNC | Performed by: INTERNAL MEDICINE

## 2025-05-10 PROCEDURE — 84100 ASSAY OF PHOSPHORUS: CPT | Mod: HCNC

## 2025-05-10 PROCEDURE — 85025 COMPLETE CBC W/AUTO DIFF WBC: CPT | Mod: HCNC

## 2025-05-10 PROCEDURE — 87040 BLOOD CULTURE FOR BACTERIA: CPT | Mod: HCNC | Performed by: STUDENT IN AN ORGANIZED HEALTH CARE EDUCATION/TRAINING PROGRAM

## 2025-05-10 PROCEDURE — 82040 ASSAY OF SERUM ALBUMIN: CPT | Mod: HCNC | Performed by: INTERNAL MEDICINE

## 2025-05-10 PROCEDURE — 83605 ASSAY OF LACTIC ACID: CPT | Mod: HCNC | Performed by: INTERNAL MEDICINE

## 2025-05-10 PROCEDURE — 63600175 PHARM REV CODE 636 W HCPCS: Mod: HCNC | Performed by: INTERNAL MEDICINE

## 2025-05-10 PROCEDURE — 83735 ASSAY OF MAGNESIUM: CPT | Mod: HCNC | Performed by: INTERNAL MEDICINE

## 2025-05-10 PROCEDURE — 25000003 PHARM REV CODE 250: Mod: HCNC | Performed by: STUDENT IN AN ORGANIZED HEALTH CARE EDUCATION/TRAINING PROGRAM

## 2025-05-10 PROCEDURE — 25000242 PHARM REV CODE 250 ALT 637 W/ HCPCS: Mod: HCNC | Performed by: STUDENT IN AN ORGANIZED HEALTH CARE EDUCATION/TRAINING PROGRAM

## 2025-05-10 PROCEDURE — 94761 N-INVAS EAR/PLS OXIMETRY MLT: CPT | Mod: HCNC

## 2025-05-10 PROCEDURE — 80053 COMPREHEN METABOLIC PANEL: CPT | Mod: HCNC

## 2025-05-10 PROCEDURE — G0545 PR VISIT INHERENT TO INPT OR OBS CARE, INFECTIOUS DISEASE: HCPCS | Mod: HCNC,,, | Performed by: INTERNAL MEDICINE

## 2025-05-10 PROCEDURE — 99233 SBSQ HOSP IP/OBS HIGH 50: CPT | Mod: HCNC,,, | Performed by: INTERNAL MEDICINE

## 2025-05-10 PROCEDURE — S4991 NICOTINE PATCH NONLEGEND: HCPCS | Mod: HCNC | Performed by: STUDENT IN AN ORGANIZED HEALTH CARE EDUCATION/TRAINING PROGRAM

## 2025-05-10 RX ORDER — MORPHINE SULFATE 4 MG/ML
2 INJECTION, SOLUTION INTRAMUSCULAR; INTRAVENOUS ONCE
Status: DISCONTINUED | OUTPATIENT
Start: 2025-05-10 | End: 2025-05-11

## 2025-05-10 RX ORDER — POTASSIUM CHLORIDE 20 MEQ/1
40 TABLET, EXTENDED RELEASE ORAL ONCE
Status: COMPLETED | OUTPATIENT
Start: 2025-05-10 | End: 2025-05-10

## 2025-05-10 RX ORDER — ESCITALOPRAM OXALATE 10 MG/1
20 TABLET ORAL DAILY
Status: DISCONTINUED | OUTPATIENT
Start: 2025-05-10 | End: 2025-05-14 | Stop reason: HOSPADM

## 2025-05-10 RX ORDER — INDOMETHACIN 25 MG/1
25 CAPSULE ORAL ONCE
Status: COMPLETED | OUTPATIENT
Start: 2025-05-10 | End: 2025-05-11

## 2025-05-10 RX ORDER — ACETAMINOPHEN 325 MG/1
650 TABLET ORAL EVERY 8 HOURS PRN
Status: DISCONTINUED | OUTPATIENT
Start: 2025-05-10 | End: 2025-05-14 | Stop reason: HOSPADM

## 2025-05-10 RX ORDER — POTASSIUM CHLORIDE 7.45 MG/ML
10 INJECTION INTRAVENOUS
Status: COMPLETED | OUTPATIENT
Start: 2025-05-10 | End: 2025-05-10

## 2025-05-10 RX ORDER — DEXTROSE MONOHYDRATE AND SODIUM CHLORIDE 5; .9 G/100ML; G/100ML
INJECTION, SOLUTION INTRAVENOUS CONTINUOUS
Status: DISCONTINUED | OUTPATIENT
Start: 2025-05-10 | End: 2025-05-12

## 2025-05-10 RX ORDER — POTASSIUM CHLORIDE 7.45 MG/ML
10 INJECTION INTRAVENOUS
Status: DISCONTINUED | OUTPATIENT
Start: 2025-05-10 | End: 2025-05-10

## 2025-05-10 RX ORDER — CARBAMAZEPINE 100 MG/1
200 TABLET, EXTENDED RELEASE ORAL 2 TIMES DAILY
Status: DISCONTINUED | OUTPATIENT
Start: 2025-05-10 | End: 2025-05-14 | Stop reason: HOSPADM

## 2025-05-10 RX ORDER — HYDRALAZINE HYDROCHLORIDE 20 MG/ML
30 INJECTION INTRAMUSCULAR; INTRAVENOUS EVERY 4 HOURS PRN
Status: DISCONTINUED | OUTPATIENT
Start: 2025-05-10 | End: 2025-05-14 | Stop reason: HOSPADM

## 2025-05-10 RX ORDER — HYDRALAZINE HYDROCHLORIDE 20 MG/ML
20 INJECTION INTRAMUSCULAR; INTRAVENOUS EVERY 4 HOURS PRN
Status: DISCONTINUED | OUTPATIENT
Start: 2025-05-10 | End: 2025-05-14 | Stop reason: HOSPADM

## 2025-05-10 RX ORDER — HYDRALAZINE HYDROCHLORIDE 20 MG/ML
10 INJECTION INTRAMUSCULAR; INTRAVENOUS EVERY 4 HOURS PRN
Status: DISCONTINUED | OUTPATIENT
Start: 2025-05-10 | End: 2025-05-14 | Stop reason: HOSPADM

## 2025-05-10 RX ADMIN — MORPHINE SULFATE 2 MG: 4 INJECTION INTRAVENOUS at 09:05

## 2025-05-10 RX ADMIN — POTASSIUM CHLORIDE 10 MEQ: 7.46 INJECTION, SOLUTION INTRAVENOUS at 08:05

## 2025-05-10 RX ADMIN — PIPERACILLIN SODIUM AND TAZOBACTAM SODIUM 4.5 G: 4; .5 INJECTION, POWDER, FOR SOLUTION INTRAVENOUS at 12:05

## 2025-05-10 RX ADMIN — IPRATROPIUM BROMIDE AND ALBUTEROL SULFATE 3 ML: 2.5; .5 SOLUTION RESPIRATORY (INHALATION) at 12:05

## 2025-05-10 RX ADMIN — ARFORMOTEROL TARTRATE 15 MCG: 15 SOLUTION RESPIRATORY (INHALATION) at 07:05

## 2025-05-10 RX ADMIN — ESCITALOPRAM OXALATE 20 MG: 10 TABLET ORAL at 08:05

## 2025-05-10 RX ADMIN — SODIUM CHLORIDE: 9 INJECTION, SOLUTION INTRAVENOUS at 04:05

## 2025-05-10 RX ADMIN — ACETAMINOPHEN 650 MG: 325 TABLET ORAL at 12:05

## 2025-05-10 RX ADMIN — POTASSIUM CHLORIDE 10 MEQ: 7.46 INJECTION, SOLUTION INTRAVENOUS at 11:05

## 2025-05-10 RX ADMIN — CARBAMAZEPINE 200 MG: 100 TABLET, EXTENDED RELEASE ORAL at 09:05

## 2025-05-10 RX ADMIN — SODIUM CHLORIDE: 9 INJECTION, SOLUTION INTRAVENOUS at 02:05

## 2025-05-10 RX ADMIN — SODIUM CHLORIDE: 9 INJECTION, SOLUTION INTRAVENOUS at 09:05

## 2025-05-10 RX ADMIN — SODIUM CHLORIDE: 9 INJECTION, SOLUTION INTRAVENOUS at 01:05

## 2025-05-10 RX ADMIN — PIPERACILLIN SODIUM AND TAZOBACTAM SODIUM 4.5 G: 4; .5 INJECTION, POWDER, FOR SOLUTION INTRAVENOUS at 08:05

## 2025-05-10 RX ADMIN — CARBAMAZEPINE 200 MG: 100 TABLET, EXTENDED RELEASE ORAL at 08:05

## 2025-05-10 RX ADMIN — SODIUM CHLORIDE: 9 INJECTION, SOLUTION INTRAVENOUS at 06:05

## 2025-05-10 RX ADMIN — IPRATROPIUM BROMIDE AND ALBUTEROL SULFATE 3 ML: 2.5; .5 SOLUTION RESPIRATORY (INHALATION) at 01:05

## 2025-05-10 RX ADMIN — PIPERACILLIN SODIUM AND TAZOBACTAM SODIUM 4.5 G: 4; .5 INJECTION, POWDER, FOR SOLUTION INTRAVENOUS at 04:05

## 2025-05-10 RX ADMIN — POTASSIUM CHLORIDE 10 MEQ: 7.46 INJECTION, SOLUTION INTRAVENOUS at 10:05

## 2025-05-10 RX ADMIN — SODIUM CHLORIDE: 9 INJECTION, SOLUTION INTRAVENOUS at 07:05

## 2025-05-10 RX ADMIN — BUDESONIDE 0.5 MG: 0.5 INHALANT RESPIRATORY (INHALATION) at 07:05

## 2025-05-10 RX ADMIN — POTASSIUM CHLORIDE 10 MEQ: 7.46 INJECTION, SOLUTION INTRAVENOUS at 09:05

## 2025-05-10 RX ADMIN — Medication 1 PATCH: at 08:05

## 2025-05-10 RX ADMIN — POTASSIUM CHLORIDE 40 MEQ: 1500 TABLET, EXTENDED RELEASE ORAL at 08:05

## 2025-05-10 NOTE — PROGRESS NOTES
[facility] Medicine  Progress Note    Patient Name: Zoey Cali  MRN: [id number]  Patient Class: IP- Inpatient   Admission Date: 5/8/2025  Length of Stay: 2 days  Attending Physician: Cleve Monae MD  Primary Care Provider: Devon Collier MD        Subjective     Principal Problem:Acute pancreatitis        HPI:  Zoey Cali is a 59 y.o. female with a pmh of hypertension, hyperlipidemia, multiple sclerosis, COPD, iron-deficiency anemia, osteoporosis presents to the hospital via EMS with weakness and altered mental status.    History obtained by independent historian  Jared on the phone who states that patient has been acting inappropriately over the past few days. Jared states that her symptoms started few days ago with the abdominal pain and spasms and patient was unable to tolerate p.o.. This has happened in the past and patient would drink some milk and it would go away. However, this time pain and associated symptoms got worse. Patient has a had several episodes of vomiting.  states that patient has become more confused. Patient unable to take any of her home medications. Unable to express exactly where her pain is located.  Review of systems complicated by patient's change in mentation.    In the ED: Patient afebrile with leukocytosis (WBC 15.87), thrombocytopenic with a platelet count 95, BUN 24, GFR 47, phosphorus 4.6, magnesium 1.3, elevated , T bili 6.9, elevated AST//273 respectively, normal ammonia level, lipase>1000, initial troponin normal, elevated lactic acid 3.54, elevated procalcitonin 68.66, COVID influenza negative, blood cultures pending. CT abdomen pelvis shows (1) pancreatic inflammation concerning for pancreatitis (2) surgical change of cholecystectomy noting prominent intra and extrahepatic biliary dilation (3) bilateral nonobstructive nephrolithiasis.  Chest x-ray shows chronic appearing interstitial findings consistent with history of  COPD/emphysema.  Ultrasound abdomen pending.  Patient given 1 L LR bolus x2 IV morphine 4 mg x 2, IV Zosyn 4.5 g, IV vancomycin, and IV Haldol 2.5 mg in the ED. Case discussed with ED provider and patient admitted to hospital medicine for further medical management.    Overview/Hospital Course:  Acute pancreatitis (Lipase >1000) with intractable n/v and sepsis. Very high procalcitonin at 69 and toxic granulation suggest severe systemic bacterial infection. Started on V+Z. Mild LUBNA with Cr 1.3, resolving to 0.6 after fluids. TG wnl/not cause of pancreatitis,  states she is not a big drinker, and no stones appreciated. Tbili is elevated at 6.9 and LFTs up. Will consult GI. She did have a positive RONAL a few months ago- IgG4 sent off to r/o IgG4 -related pancreatitis.     GNR bacteremia resulted. Will stop Vanc, continue Zosyn for now. Do not see any drainable fluid/abscess on CT. Will need 2 weeks of abx from clear cultures for bacteremia.     PCR showing Kleb. Mentation improving a bit. , she likely has Pancreatitis Encephalopathy superimposed on Sepsis AMS. Will control BP a bit more than usually with sepsis, as PRES can play a role in this.     Interval History:  NAEON.  No new issues.   CC- n/v, abdo pain  All questions answered and updates on care given.       ROS:  Unable to participate, acuity      Vitals:    05/10/25 0022 05/10/25 0027 05/10/25 0311 05/10/25 0410   BP: (!) 150/65   (!) 129/59   BP Location: Right arm   Right arm   Patient Position: Lying   Lying   Pulse: 88 84 87 85   Resp:  20  20   Temp: 99.7 °F (37.6 °C)   98.3 °F (36.8 °C)   TempSrc: Oral   Oral   SpO2: 99% 98%  (!) 94%   Weight:       Height:              Body mass index is 23.77 kg/m².      PHYSICAL EXAM:  GENERAL APPEARANCE: alert and cooperative.      HEAD: NC/AT, +scleral icterus  CARDIAC: There is no cyanosis or pallor.   LUNGS: No apparent wheezing or stridor.  ABDOMEN: Non-distended. No guarding.  MSK: No joint  erythema or tenderness.   EXTREMITIES: No significant new deformity or new joint abnormality.   NEUROLOGICAL: +confusion, AMS, improving  SKIN: No lesions or eruptions. +Jaundiced  PSYCHIATRIC: No tangential speech. No Hyperactive features.        Recent Results (from the past 24 hours)   Lactic acid, plasma    Collection Time: 05/09/25  8:49 AM   Result Value Ref Range    Lactic Acid Level 1.4 0.5 - 2.2 mmol/L   CBC with Differential    Collection Time: 05/09/25  8:49 AM   Result Value Ref Range    WBC 11.69 3.90 - 12.70 K/uL    RBC 3.79 (L) 4.00 - 5.40 M/uL    HGB 12.1 12.0 - 16.0 gm/dL    HCT 35.4 (L) 37.0 - 48.5 %    MCV 93 82 - 98 fL    MCH 31.9 (H) 27.0 - 31.0 pg    MCHC 34.2 32.0 - 36.0 g/dL    RDW 15.0 (H) 11.5 - 14.5 %    Platelet Count 65 (L) 150 - 450 K/uL    MPV 10.3 9.2 - 12.9 fL    Nucleated RBC 0 <=0 /100 WBC   Manual Differential    Collection Time: 05/09/25  8:49 AM   Result Value Ref Range    Gran # (ANC) 10.8 K/uL    Segmented Neutrophil % 71.3 38.0 - 73.0 %    Bands % 20.8 %    Lymphocyte % 4.0 (L) 18.0 - 48.0 %    Monocyte % 4.0 4.0 - 15.0 %    Platelet Estimate Decreased (A)     Hepatitis panel, acute    Collection Time: 05/09/25  9:36 AM   Result Value Ref Range    Hep BsAg Interp Non-Reactive Non-Reactive    Hep B Core IgM Interp Non-Reactive Non-Reactive    Hep A IgM Interp Non-Reactive Non-Reactive    Hep C Ab Interp Non-Reactive Non-Reactive   Comprehensive Metabolic Panel (CMP)    Collection Time: 05/10/25  5:08 AM   Result Value Ref Range    Sodium 141 136 - 145 mmol/L    Potassium 2.7 (LL) 3.5 - 5.1 mmol/L    Chloride 116 (H) 95 - 110 mmol/L    CO2 14 (L) 23 - 29 mmol/L    Glucose 64 (L) 70 - 110 mg/dL    BUN 10 6 - 20 mg/dL    Creatinine 0.6 0.5 - 1.4 mg/dL    Calcium 8.0 (L) 8.7 - 10.5 mg/dL    Protein Total 5.5 (L) 6.0 - 8.4 gm/dL    Albumin 2.5 (L) 3.5 - 5.2 g/dL    Bilirubin Total 1.9 (H) 0.1 - 1.0 mg/dL     40 - 150 unit/L    AST 99 (H) 11 - 45 unit/L     (H) 10 - 44  unit/L    Anion Gap 11 8 - 16 mmol/L    eGFR >60 >60 mL/min/1.73/m2   Magnesium    Collection Time: 05/10/25  5:08 AM   Result Value Ref Range    Magnesium  1.7 1.6 - 2.6 mg/dL   Phosphorus    Collection Time: 05/10/25  5:08 AM   Result Value Ref Range    Phosphorus Level 2.4 (L) 2.7 - 4.5 mg/dL       Microbiology Results (last 7 days)       Procedure Component Value Units Date/Time    Blood culture [[id number]] Collected: 05/10/25 0508    Order Status: Resulted Specimen: Blood Updated: 05/10/25 0541    Blood culture [[id number]] Collected: 05/10/25 0508    Order Status: Resulted Specimen: Blood Updated: 05/10/25 0541    Rapid Organism ID by PCR (from Blood culture) [[id number]]  (Abnormal) Collected: 05/08/25 1250    Order Status: Completed Specimen: Blood from Peripheral, Antecubital, Right Updated: 05/09/25 1415     Enterococcus faecalis Not Detected     Enterococcus faecium Not Detected     Listeria monocytogenes Not Detected     Staphylococcus spp. Not Detected     Staphylococcus aureus Not Detected     Staphylococcus epidermidis Not Detected     Staphylococcus lugdunensis Not Detected     Streptococcus spp. Not Detected     Streptococcus agalactiae (Group B) Not Detected     Streptococcus pneumoniae Not Detected     Streptococcus pyogenes (Group A) Not Detected     Acinetobacter calcoaceticus/baumannii complex Not Detected     Bacteroides fragilis Not Detected     Enterobacterales See Species for ID     Enterobacter cloacae complex Not Detected     Escherichia coli Not Detected     Klebsiella aerogenes Not Detected     Klebsiella oxytoca Not Detected     Klebsiella pneumoniae group Detected     Proteus spp. Not Detected     Salmonella spp. Not Detected     Serratia marcescens Not Detected     Haemophilus influenzae Not Detected     Neisseria meningitidis Not Detected     Pseudomonas aeruginosa Not Detected     Stenotrophomonas maltophilia Not Detected     Candida albicans Not Detected     Candida auris Not  Detected     Candida glabrata Not Detected     Candida krusei Not Detected     Candida parapsilosis Not Detected     Candida tropicalis Not Detected     Cryptococcus neoformans/gattii Not Detected     CTX-M (ESBL ) Not Detected     Comment: Note: Antimicrobial resistance can occur via multiple mechanisms. A Not Detected result for antimicrobial resistance gene(s) does not indicate antimicrobial susceptibility. Subculturing is required for species identification and susceptibility testing of   isolates.        IMP (Cabapenemase ) Not Detected     Comment: Note: Antimicrobial resistance can occur via multiple mechanisms. A Not Detected result for antimicrobial resistance gene(s) does not indicate antimicrobial susceptibility. Subculturing is required for species identification and susceptibility testing of   isolates.        KPC resistance gene (Carbapenemase ) Not Detected     Comment: Note: Antimicrobial resistance can occur via multiple mechanisms. A Not Detected result for antimicrobial resistance gene(s) does not indicate antimicrobial susceptibility. Subculturing is required for species identification and susceptibility testing of   isolates.        mcr-1 Not Detected     Comment: Note: Antimicrobial resistance can occur via multiple mechanisms. A Not Detected result for antimicrobial resistance gene(s) does not indicate antimicrobial susceptibility. Subculturing is required for species identification and susceptibility testing of   isolates.        mecA ID N/A     Comment: Note: Antimicrobial resistance can occur via multiple mechanisms. A Not Detected result for antimicrobial resistance gene(s) does not indicate antimicrobial susceptibility. Subculturing is required for species identification and susceptibility testing of   isolates.        mecA/C and MREJ (MRSA) gene N/A     Comment: Note: Antimicrobial resistance can occur via multiple mechanisms. A Not Detected result for  antimicrobial resistance gene(s) does not indicate antimicrobial susceptibility. Subculturing is required for species identification and susceptibility testing of   isolates.        NDM (Carbapenemase ) Not Detected     Comment: Note: Antimicrobial resistance can occur via multiple mechanisms. A Not Detected result for antimicrobial resistance gene(s) does not indicate antimicrobial susceptibility. Subculturing is required for species identification and susceptibility testing of   isolates.        OXA-48-like (Carbapenemase ) Not Detected     Comment: Note: Antimicrobial resistance can occur via multiple mechanisms. A Not Detected result for antimicrobial resistance gene(s) does not indicate antimicrobial susceptibility. Subculturing is required for species identification and susceptibility testing of   isolates.        Pro/B (VRE gene) N/A     Comment: Note: Antimicrobial resistance can occur via multiple mechanisms. A Not Detected result for antimicrobial resistance gene(s) does not indicate antimicrobial susceptibility. Subculturing is required for species identification and susceptibility testing of   isolates.        VIM (Carbapenemase ) Not Detected     Comment: Note: Antimicrobial resistance can occur via multiple mechanisms. A Not Detected result for antimicrobial resistance gene(s) does not indicate antimicrobial susceptibility. Subculturing is required for species identification and susceptibility testing of   isolates.       Urine culture [[id number]] Collected: 05/08/25 1708    Order Status: Completed Specimen: Urine, Clean Catch Updated: 05/09/25 1032     Urine Culture No Growth To Date    Blood culture x two cultures. Draw prior to antibiotics. [[id number]]  (Abnormal) Collected: 05/08/25 1250    Order Status: Completed Specimen: Blood from Peripheral, Antecubital, Right Updated: 05/09/25 0900     Blood Culture Positive - Aerobic and Anaerobic Bottles     GRAM STAIN Gram  Negative Rods    Narrative:      Aerobic and Anaerobic Bottles Positive     Blood culture x two cultures. Draw prior to antibiotics. [[id number]]  (Abnormal) Collected: 05/08/25 1250    Order Status: Completed Specimen: Blood from Peripheral, Antecubital, Left Updated: 05/09/25 0900     Blood Culture Positive - Aerobic and Anaerobic Bottles     GRAM STAIN Gram Negative Rods    Narrative:      Aerobic and Anaerobic Bottles Positive              Imaging Results              US Abdomen Limited (Final result)  Result time 05/08/25 16:35:15      Final result by Quinton Sandoval MD (05/08/25 16:35:15)                   Impression:      Dilated common bile duct, commonly seen post cholecystectomy.  No obstructing stone identified.      Electronically signed by: Quinton Sandoval MD  Date:    05/08/2025  Time:    16:35               Narrative:    EXAMINATION:  US ABDOMEN LIMITED    CLINICAL HISTORY:  abdominal pain, elevated LFTs;    TECHNIQUE:  Limited ultrasound of the right upper quadrant of the abdomen (including pancreas, liver, gallbladder, common bile duct, and spleen) was performed.    COMPARISON:  None.    FINDINGS:  The visualized portions of the pancreas, abdominal aorta, and IVC are unremarkable.    Gallbladder: Absent.  Negative sonographic Tate's sign.    Biliary system: The common duct is enlarged, measuring 9 mm.  Mild intrahepatic ductal dilatation.    Liver: Measures 13.3 cm.  There is homogeneous echotexture. No focal hepatic lesions.    Spleen: Normal in size  measuring 11.6 cm.                                        CT Abdomen Pelvis With IV Contrast NO Oral Contrast (Final result)  Result time 05/08/25 14:57:54      Final result by Solomon Gonzalez MD (05/08/25 14:57:54)                   Impression:      This report was flagged in Epic as abnormal.    1. Inflammation about the pancreas particularly at the pancreatic head, correlation with laboratory values recommended as findings are concerning for  pancreatitis.  There is adjacent reactive lymphadenopathy.  2. There is surgical change of cholecystectomy noting prominent intra and extrahepatic biliary dilation.  No recent exams are available for comparison.  There is a questionable filling defect within the distal aspect of the common duct versus superimposition of duodenal soft tissue, choledocholithiasis cannot be definitively excluded.  3. Bilateral nonobstructive nephrolithiasis.  4. Please see above for several additional findings.      Electronically signed by: Solomon Gonzalez MD  Date:    05/08/2025  Time:    14:57               Narrative:    EXAMINATION:  CT ABDOMEN PELVIS WITH IV CONTRAST    CLINICAL HISTORY:  Nausea/vomiting;Epigastric pain;    TECHNIQUE:  Low dose axial images, sagittal and coronal reformations were obtained from the lung bases to the pubic symphysis following the IV administration of 75 mL of Omnipaque 350 .  Oral contrast was not given.    COMPARISON:  None.    FINDINGS:  Images of the lower thorax are remarkable for bilateral dependent atelectasis/scarring.  There are several lymph nodes along the aortic hiatus particularly on the right.    Allowing for motion artifact, the liver, and adrenal glands are grossly unremarkable.  The spleen is enlarged.  There is some indistinctness about the pancreatic head, the pancreas enhances homogeneously.  The gallbladder is surgically absent noting intra and extrahepatic biliary dilation status post cholecystectomy.  At the distal aspect of the common duct, there is a questionable filling defect versus redundancy of the duodenal parenchyma in the region.  This is best seen on coronal image 601, and measures 3 mm.  There is mild fluid at the level of the ashish hepatis.  The portal vein, splenic vein, SMV, celiac axis and SMA all are patent.  No significant abdominal lymphadenopathy.  There is a small amount of strand-like fluid in the mid abdomen.    The kidneys enhance symmetrically without  hydronephrosis.  There is bilateral nonobstructive nephrolithiasis.  Low attenuating lesions arise from the kidneys, too small for characterization.  The bilateral ureters are unable to be followed in their entirety to the urinary bladder, no definite calculi seen or secondary findings to suggest obstructive uropathy.  The urinary bladder is nondistended.  The uterus is absent the adnexa is unremarkable.    The distal large bowel is for the most part decompressed.  The terminal ileum is unremarkable.  The appendix is not confidently identified, no pericecal inflammation.  The small bowel is grossly unremarkable.  There are a few scattered shotty periaortic, pericaval, and mesenteric lymph nodes.  There is atherosclerotic calcification of the aorta and its branches.  No focal organized pelvic fluid collection.    There is osteopenia.  There are degenerative changes of the bilateral sacroiliac joints, pubic symphysis, and spine.  No significant inguinal lymphadenopathy.                                       X-Ray Chest AP Portable (Final result)  Result time 05/08/25 13:07:25      Final result by Solomon Gonzalez MD (05/08/25 13:07:25)                   Impression:      1. Chronic appearing interstitial findings, underlying edema is a consideration.  Correlation with any history of COPD/emphysema.      Electronically signed by: Solomon Gonzalez MD  Date:    05/08/2025  Time:    13:07               Narrative:    EXAMINATION:  XR CHEST AP PORTABLE    CLINICAL HISTORY:  Sepsis;    TECHNIQUE:  Single frontal view of the chest was performed.    COMPARISON:  07/08/2019    FINDINGS:  The cardiomediastinal silhouette is not enlarged noting calcification of the aorta..  There is no pleural effusion.  The trachea is midline.  The lungs are symmetrically expanded bilaterally with mildly coarse interstitial attenuation and bilateral basilar subsegmental atelectasis..  No large focal consolidation seen.  There is no  pneumothorax.  The osseous structures are remarkable for degenerative change..                                               Assessment & Plan  Acute pancreatitis  Patient presents with worsening abdominal pain with associated nausea, vomiting, and altered mentation.  Lipase >1000.  CT with pancreatic head inflammation concerning for pancreatitis.  Patient denies alcohol use or any new medications.  No gallstones noted on imaging. Unclear etiology of pancreatitis at this time.  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    Plan:  NPO   IVF with  cc/hr for 20 hours  TG and IgG4 ordered and pending   Pain control with IV morphine 2 mg q.4 hours for moderate pain and IV morphine 6 mg q.4 hours for severe pain  Maintain on telemetry  Continue supportive care  HTN (hypertension)  Patient's blood pressure range in the last 24 hours was: BP  Min: 107/53  Max: 171/72.The patient's inpatient anti-hypertensive regimen is listed below:  Current Antihypertensives  hydrALAZINE injection 10 mg, Every 4 hours PRN, Intravenous  hydrALAZINE injection 30 mg, Every 4 hours PRN, Intravenous  hydrALAZINE injection 20 mg, Every 4 hours PRN, Intravenous    Plan  - BP is controlled, no changes needed to their regimen  - Patient currently not on any antihypertensives  Multiple sclerosis  History noted, on Avonex injections weekly   Iron deficiency anemia  Anemia is likely due to Iron deficiency. Most recent hemoglobin and hematocrit are listed below.  Recent Labs     05/08/25  1250 05/09/25  0849   HGB 13.6 12.1   HCT 38.7 35.4*     Plan  - Monitor serial CBC: Daily  - Transfuse PRBC if patient becomes hemodynamically unstable, symptomatic or H/H drops below 7/21.  - Patient has not received any PRBC transfusions to date  - Patient's anemia is currently stable  Tobacco abuse  Patient with a history of smoking 1ppd, 30 pack year history   Will need smoking cessation  counseling once mentation improves   Neuropathic pain  Hold home gabapentin and carbamazepine in the setting of pancreatitis and acute encephalopathy  Chronic obstructive pulmonary disease, unspecified  Patient's COPD is controlled currently.  Patient is currently off COPD Pathway. Continue scheduled inhalers Duonebs and Supplemental oxygen and monitor respiratory status closely.   Sepsis  This patient does not have evidence of infective focus  My overall impression is sepsis.  Source: Unknown  Antibiotics given-   Antibiotics (72h ago, onward)      Start     Stop Route Frequency Ordered    05/09/25 1215  piperacillin-tazobactam (ZOSYN) 4.5 g in D5W 100 mL IVPB (MB+)         -- IV Every 8 hours (non-standard times) 05/09/25 1014          Latest lactate reviewed-  Recent Labs   Lab 05/08/25  1244 05/08/25  1706 05/09/25  0849   LACTATE  --    < > 1.4   POCLAC 3.54*  --   --     < > = values in this interval not displayed.     Organ dysfunction indicated by Encephalopathy and Thrombocytopenia     Fluid challenge Ideal Body Weight- The patient is obese (BMI>30) and their ideal body weight of Ideal body weight: 45.5 kg (100 lb 4.9 oz) will be used to calculate fluid bolus of 30 ml/kg.     Post- resuscitation assessment No - Post resuscitation assessment not needed       Will Not start Pressors- Levophed for MAP of 65  Source control achieved by: IV Zosyn and Vancomycin   -Blood and urine cultures pending   Elevated LFTs  Likely secondary to pancreatitis. US abdomen shows dilated common bile duct commonly seen s/p cholecystectomy and no obstructing stone seen.  Trend daily LFTs  Bilateral nephrolithiasis  Noted on CT abdomen/pelvis today  F/u outpatient   Acute encephalopathy  Patient presents with abdominal pain, nausea, vomiting, waxing/waning mentation from acute pancreatitis vs. unknown infectious process.  states patient has been acting strangely. Patient not tolerating po and not taking medications over the  past few days.   Metabolic workup: COVID/influenza negative, ammonia normal, elevated lactic acid 3.9, elevated procalcitonin 68.6, Blood cultures and urine cultures pending.   Likely 2/2 unknown infectious process and pancreatitis contributing  Continue IV antibiotics and f/u cultures   Prn Zyprexa IM for agitation  Fall precautions/delirium precautions  Gabapentin and Carbamazepine on hold.  Avoid other mental status altering medications.    VTE Risk Mitigation (From admission, onward)           Ordered     IP VTE LOW RISK PATIENT  Once         05/08/25 1604     Place sequential compression device  Until discontinued         05/08/25 1604                    Discharge Planning   VIDA: 5/13/2025     Code Status: Full Code   Medical Readiness for Discharge Date:   Discharge Plan A: Home with family                        Cleve Monae MD  Department of Hospital Medicine   [facility]

## 2025-05-10 NOTE — PROGRESS NOTES
[facility]  Infectious Disease  Progress Note    Patient Name: Zoey Cali  MRN: [id number]  Admission Date: 5/8/2025  Length of Stay: 2 days  Attending Physician: Cleve Monae MD  Primary Care Provider: Devon Collier MD    Isolation Status: No active isolations  Assessment/Plan:      ID  Sepsis  Mrs. Cali is a 58 yo woman with multiple sclerosis on Avonex injections admitted with pancreatitis and found to have Gram-negative sepsis (Klebsiella by BCID). The etiology is likely GI but possibly . I wonder if she could have had choledocholithiasis with resultant pancreatitis? GI following. Her AMS has resolved and is her leukocytosis.    Agree with empiric Zosyn pending final culture results.     Recommendations  Continue Zosyn pending further results  FU MRCP  Follow-up hepatitis panel  Repeat blood cultures until cleared  Discussed with patient and daughter    Please call for any questions.    Salvador Temple MD  Infectious Disease  [facility] - Med Surg    Subjective:     Principal Problem:Acute pancreatitis    HPI: Mrs. Cali is a 59 y.o. woman with a multiple sclerosis presents to the hospital via EMS with weakness and altered mental status. Her  reports that she had abdominal pain on Tuesday and became confused on Wednesday. He denies that she complained of headache, dysuria, or diarrhea. Denies EtOH use. In the ED, she had a mild leukocytosis, abn LFTs, and increased lipase. A CT abdomen pelvis shows (1) pancreatic inflammation concerning for pancreatitis (2) surgical change of cholecystectomy noting prominent intra and extrahepatic biliary dilation (3) bilateral nonobstructive nephrolithiasis.  The patient was started on Zosyn and vancomycin. This morning, her blood cultures started growing a GNB (BCID pending) and ID is consulted for that. Her vancomycin was discontinued. Currently, the patient is encephalopathic and does not follow commands. She appears non-toxic, however.    Interval History: Doing much better today. Less confused. MRCP reading pending.    Review of Systems   Respiratory:  Positive for cough.    All other systems reviewed and are negative.    Objective:     Vital Signs (Most Recent):  Temp: 98.3 °F (36.8 °C) (05/10/25 0410)  Pulse: 85 (05/10/25 0410)  Resp: 20 (05/10/25 0410)  BP: (!) 129/59 (05/10/25 0410)  SpO2: (!) 94 % (05/10/25 0410) Vital Signs (24h Range):  Temp:  [98.1 °F (36.7 °C)-99.7 °F (37.6 °C)] 98.3 °F (36.8 °C)  Pulse:  [84-99] 85  Resp:  [18-20] 20  SpO2:  [88 %-100 %] 94 %  BP: (107-171)/() 129/59     Weight: 55.2 kg (121 lb 11.2 oz)  Body mass index is 23.77 kg/m².    Estimated Creatinine Clearance: 77.8 mL/min (based on SCr of 0.6 mg/dL).     Physical Exam  Vitals and nursing note reviewed.   Constitutional:       Appearance: Normal appearance.   HENT:      Head: Normocephalic.   Eyes:      Extraocular Movements: Extraocular movements intact.      Pupils: Pupils are equal, round, and reactive to light.   Cardiovascular:      Rate and Rhythm: Normal rate.   Pulmonary:      Effort: Pulmonary effort is normal.      Breath sounds: No wheezing.   Abdominal:      General: There is no distension.      Tenderness: There is no abdominal tenderness. There is no guarding.   Neurological:      General: No focal deficit present.      Mental Status: She is alert.   Psychiatric:         Mood and Affect: Mood normal.         Behavior: Behavior normal.          Significant Labs: CBC:   Recent Labs   Lab 05/08/25  1250 05/09/25  0849   WBC 15.87* 11.69   HGB 13.6 12.1   HCT 38.7 35.4*   PLT 95* 65*     CMP:   Recent Labs   Lab 05/08/25  1250 05/09/25  0709 05/10/25  0508    140 141   K 3.5 3.7 2.7*    112* 116*   CO2 21* 17* 14*   GLU 76 69* 64*   BUN 24* 19 10   CREATININE 1.3 0.6 0.6   CALCIUM 8.8 7.9* 8.0*   PROT 6.1 5.3* 5.5*   ALBUMIN 3.1* 2.4* 2.5*   BILITOT 6.9* 3.0* 1.9*   ALKPHOS 194* 123 121   * 180* 99*   * 149* 102*    ANIONGAP 15 11 11     Microbiology Results (last 7 days)       Procedure Component Value Units Date/Time    Blood culture [[id number]] Collected: 05/10/25 0508    Order Status: Resulted Specimen: Blood Updated: 05/10/25 0541    Blood culture [[id number]] Collected: 05/10/25 0508    Order Status: Resulted Specimen: Blood Updated: 05/10/25 0541    Rapid Organism ID by PCR (from Blood culture) [[id number]]  (Abnormal) Collected: 05/08/25 1250    Order Status: Completed Specimen: Blood from Peripheral, Antecubital, Right Updated: 05/09/25 1415     Enterococcus faecalis Not Detected     Enterococcus faecium Not Detected     Listeria monocytogenes Not Detected     Staphylococcus spp. Not Detected     Staphylococcus aureus Not Detected     Staphylococcus epidermidis Not Detected     Staphylococcus lugdunensis Not Detected     Streptococcus spp. Not Detected     Streptococcus agalactiae (Group B) Not Detected     Streptococcus pneumoniae Not Detected     Streptococcus pyogenes (Group A) Not Detected     Acinetobacter calcoaceticus/baumannii complex Not Detected     Bacteroides fragilis Not Detected     Enterobacterales See Species for ID     Enterobacter cloacae complex Not Detected     Escherichia coli Not Detected     Klebsiella aerogenes Not Detected     Klebsiella oxytoca Not Detected     Klebsiella pneumoniae group Detected     Proteus spp. Not Detected     Salmonella spp. Not Detected     Serratia marcescens Not Detected     Haemophilus influenzae Not Detected     Neisseria meningitidis Not Detected     Pseudomonas aeruginosa Not Detected     Stenotrophomonas maltophilia Not Detected     Candida albicans Not Detected     Candida auris Not Detected     Candida glabrata Not Detected     Candida krusei Not Detected     Candida parapsilosis Not Detected     Candida tropicalis Not Detected     Cryptococcus neoformans/gattii Not Detected     CTX-M (ESBL ) Not Detected     Comment: Note: Antimicrobial resistance  can occur via multiple mechanisms. A Not Detected result for antimicrobial resistance gene(s) does not indicate antimicrobial susceptibility. Subculturing is required for species identification and susceptibility testing of   isolates.        IMP (Cabapenemase ) Not Detected     Comment: Note: Antimicrobial resistance can occur via multiple mechanisms. A Not Detected result for antimicrobial resistance gene(s) does not indicate antimicrobial susceptibility. Subculturing is required for species identification and susceptibility testing of   isolates.        KPC resistance gene (Carbapenemase ) Not Detected     Comment: Note: Antimicrobial resistance can occur via multiple mechanisms. A Not Detected result for antimicrobial resistance gene(s) does not indicate antimicrobial susceptibility. Subculturing is required for species identification and susceptibility testing of   isolates.        mcr-1 Not Detected     Comment: Note: Antimicrobial resistance can occur via multiple mechanisms. A Not Detected result for antimicrobial resistance gene(s) does not indicate antimicrobial susceptibility. Subculturing is required for species identification and susceptibility testing of   isolates.        mecA ID N/A     Comment: Note: Antimicrobial resistance can occur via multiple mechanisms. A Not Detected result for antimicrobial resistance gene(s) does not indicate antimicrobial susceptibility. Subculturing is required for species identification and susceptibility testing of   isolates.        mecA/C and MREJ (MRSA) gene N/A     Comment: Note: Antimicrobial resistance can occur via multiple mechanisms. A Not Detected result for antimicrobial resistance gene(s) does not indicate antimicrobial susceptibility. Subculturing is required for species identification and susceptibility testing of   isolates.        NDM (Carbapenemase ) Not Detected     Comment: Note: Antimicrobial resistance can occur via multiple  mechanisms. A Not Detected result for antimicrobial resistance gene(s) does not indicate antimicrobial susceptibility. Subculturing is required for species identification and susceptibility testing of   isolates.        OXA-48-like (Carbapenemase ) Not Detected     Comment: Note: Antimicrobial resistance can occur via multiple mechanisms. A Not Detected result for antimicrobial resistance gene(s) does not indicate antimicrobial susceptibility. Subculturing is required for species identification and susceptibility testing of   isolates.        Pro/B (VRE gene) N/A     Comment: Note: Antimicrobial resistance can occur via multiple mechanisms. A Not Detected result for antimicrobial resistance gene(s) does not indicate antimicrobial susceptibility. Subculturing is required for species identification and susceptibility testing of   isolates.        VIM (Carbapenemase ) Not Detected     Comment: Note: Antimicrobial resistance can occur via multiple mechanisms. A Not Detected result for antimicrobial resistance gene(s) does not indicate antimicrobial susceptibility. Subculturing is required for species identification and susceptibility testing of   isolates.       Urine culture [[id number]] Collected: 05/08/25 1708    Order Status: Completed Specimen: Urine, Clean Catch Updated: 05/09/25 1032     Urine Culture No Growth To Date    Blood culture x two cultures. Draw prior to antibiotics. [[id number]]  (Abnormal) Collected: 05/08/25 1250    Order Status: Completed Specimen: Blood from Peripheral, Antecubital, Right Updated: 05/09/25 0900     Blood Culture Positive - Aerobic and Anaerobic Bottles     GRAM STAIN Gram Negative Rods    Narrative:      Aerobic and Anaerobic Bottles Positive     Blood culture x two cultures. Draw prior to antibiotics. [[id number]]  (Abnormal) Collected: 05/08/25 1250    Order Status: Completed Specimen: Blood from Peripheral, Antecubital, Left Updated: 05/09/25 0900     Blood  Culture Positive - Aerobic and Anaerobic Bottles     GRAM STAIN Gram Negative Rods    Narrative:      Aerobic and Anaerobic Bottles Positive             Significant Imaging: I have reviewed all pertinent imaging results/findings within the past 24 hours.   comfortable appearance/smiling/interactive

## 2025-05-10 NOTE — NURSING
Discontinued vanc. Ordered 2 bags of magnesium sulfate. Discontinue NS continuous fluids. Ordered NS @400mL/hr. At 7:45PM, will change to NS @300mL/hr. At 5:45AM 5/10, will change to NS @200mL/hr. Consult to ID placed. Consult to gastroenterology placed. MRI MRCP abdomen without contrast ordered. Bilateral soft wrist restraints ordered. Patient agitated, gave PRN olanzapine. When reassessed, no relief was obtained. Per Dr. Monae, gave a second dose of olanzapine. When reassessed, mild relief was obtained. Patient wheezing. Ordered PRN breathing treatment. 1 time dose of benadryl ordered for patient for sleep tonight. Patient disoriented x4. Family at bedside. Patient did not complain of any pain throughout shift. Patient not showing signs of distress. Bilateral soft wrist restraints in place. Bed in lowest position, wheels locked, call bell in reach, side rails up x3.

## 2025-05-10 NOTE — ASSESSMENT & PLAN NOTE
Patient's blood pressure range in the last 24 hours was: BP  Min: 107/53  Max: 171/72.The patient's inpatient anti-hypertensive regimen is listed below:  Current Antihypertensives  hydrALAZINE injection 10 mg, Every 4 hours PRN, Intravenous  hydrALAZINE injection 30 mg, Every 4 hours PRN, Intravenous  hydrALAZINE injection 20 mg, Every 4 hours PRN, Intravenous    Plan  - BP is controlled, no changes needed to their regimen  - Patient currently not on any antihypertensives

## 2025-05-10 NOTE — NURSING
[facility], [facility]  Nurses Note -- 4 Eyes      5/10/2025       Skin assessed on: Q Shift      [x] No Pressure Injuries Present    [x]Prevention Measures Documented    [] Yes LDA  for Pressure Injury Previously documented     [] Yes New Pressure Injury Discovered   [] LDA for New Pressure Injury Added      Attending RN:  Araseli Massey LPN     Second RN:  TIFFANI Erickson

## 2025-05-10 NOTE — ASSESSMENT & PLAN NOTE
Anemia is likely due to Iron deficiency. Most recent hemoglobin and hematocrit are listed below.  Recent Labs     05/08/25  1250 05/09/25  0849   HGB 13.6 12.1   HCT 38.7 35.4*     Plan  - Monitor serial CBC: Daily  - Transfuse PRBC if patient becomes hemodynamically unstable, symptomatic or H/H drops below 7/21.  - Patient has not received any PRBC transfusions to date  - Patient's anemia is currently stable

## 2025-05-10 NOTE — NURSING
[facility], [facility] [place]  Nurses Note -- 4 Eyes    Received pt awake on bed, alert and oriented to herself. On 2L NC oxygen support. With both soft wrist restraint noted. Fall risk monitoring at  bedside. No complains as of the moment. Bed in low position. Instructed to call for assistance. Bed alarm set.   5/9/2025       Skin assessed on: Q Shift      [x] No Pressure Injuries Present    [x]Prevention Measures Documented    [] Yes LDA  for Pressure Injury Previously documented     [] Yes New Pressure Injury Discovered   [] LDA for New Pressure Injury Added      Attending RN:  Georgina Soares, TIFFANI     Second RN:  CHIRAG Marx

## 2025-05-10 NOTE — ASSESSMENT & PLAN NOTE
Mrs. Cali is a 58 yo woman with multiple sclerosis on Avonex injections admitted with pancreatitis and found to have Gram-negative sepsis (Klebsiella by BCID). The etiology is likely GI but possibly . I wonder if she could have had choledocholithiasis with resultant pancreatitis? GI following. Her AMS has resolved and is her leukocytosis.    Agree with empiric Zosyn pending final culture results.     Recommendations  Continue Zosyn pending further results  FU MRCP  Follow-up hepatitis panel  Repeat blood cultures until cleared  Discussed with patient and daughter

## 2025-05-10 NOTE — PLAN OF CARE
Problem: Adult Inpatient Plan of Care  Goal: Plan of Care Review  Outcome: Progressing  Flowsheets (Taken 5/10/2025 0615)  Plan of Care Reviewed With: patient  Goal: Optimal Comfort and Wellbeing  Outcome: Progressing  Intervention: Monitor Pain and Promote Comfort  Flowsheets (Taken 5/10/2025 0615)  Pain Management Interventions:   pillow support provided   quiet environment facilitated     Problem: Sepsis/Septic Shock  Goal: Optimal Coping  Outcome: Progressing  Goal: Absence of Bleeding  Outcome: Progressing

## 2025-05-10 NOTE — PLAN OF CARE
Weekend discharge planning review completed. No changes to discharge plan today; will follow up on next business day to continue coordination of appropriate discharge services.

## 2025-05-10 NOTE — NURSING
Administered morning meds to patient. Patient tolerated well. Patient's potassium was 2.7. 4 K-riders ordered. Patient complained of 10/10. Gave PRN tylenol. When reassessed, patient stated pain was 0/10 and full relief was obtained. NPO except for oral water ordered. Patient had BM. Patient had wheezing. Ordered PRN breathing treatment. When reassessed, moderate relief was obtained. Patient oriented x4. Family at bedside. Patient not showing signs of distress.Bed in lowest position, wheels locked, call bell in reach, side rails up x3.

## 2025-05-10 NOTE — ASSESSMENT & PLAN NOTE
This patient does not have evidence of infective focus  My overall impression is sepsis.  Source: Unknown  Antibiotics given-   Antibiotics (72h ago, onward)      Start     Stop Route Frequency Ordered    05/09/25 1215  piperacillin-tazobactam (ZOSYN) 4.5 g in D5W 100 mL IVPB (MB+)         -- IV Every 8 hours (non-standard times) 05/09/25 1014          Latest lactate reviewed-  Recent Labs   Lab 05/08/25  1244 05/08/25  1706 05/09/25  0849   LACTATE  --    < > 1.4   POCLAC 3.54*  --   --     < > = values in this interval not displayed.     Organ dysfunction indicated by Encephalopathy and Thrombocytopenia     Fluid challenge Ideal Body Weight- The patient is obese (BMI>30) and their ideal body weight of Ideal body weight: 45.5 kg (100 lb 4.9 oz) will be used to calculate fluid bolus of 30 ml/kg.     Post- resuscitation assessment No - Post resuscitation assessment not needed       Will Not start Pressors- Levophed for MAP of 65  Source control achieved by: IV Zosyn and Vancomycin   -Blood and urine cultures pending

## 2025-05-10 NOTE — SUBJECTIVE & OBJECTIVE
Interval History: Doing much better today. Less confused. MRCP reading pending.    Review of Systems   Respiratory:  Positive for cough.    All other systems reviewed and are negative.    Objective:     Vital Signs (Most Recent):  Temp: 98.3 °F (36.8 °C) (05/10/25 0410)  Pulse: 85 (05/10/25 0410)  Resp: 20 (05/10/25 0410)  BP: (!) 129/59 (05/10/25 0410)  SpO2: (!) 94 % (05/10/25 0410) Vital Signs (24h Range):  Temp:  [98.1 °F (36.7 °C)-99.7 °F (37.6 °C)] 98.3 °F (36.8 °C)  Pulse:  [84-99] 85  Resp:  [18-20] 20  SpO2:  [88 %-100 %] 94 %  BP: (107-171)/() 129/59     Weight: 55.2 kg (121 lb 11.2 oz)  Body mass index is 23.77 kg/m².    Estimated Creatinine Clearance: 77.8 mL/min (based on SCr of 0.6 mg/dL).     Physical Exam  Vitals and nursing note reviewed.   Constitutional:       Appearance: Normal appearance.   HENT:      Head: Normocephalic.   Eyes:      Extraocular Movements: Extraocular movements intact.      Pupils: Pupils are equal, round, and reactive to light.   Cardiovascular:      Rate and Rhythm: Normal rate.   Pulmonary:      Effort: Pulmonary effort is normal.      Breath sounds: No wheezing.   Abdominal:      General: There is no distension.      Tenderness: There is no abdominal tenderness. There is no guarding.   Neurological:      General: No focal deficit present.      Mental Status: She is alert.   Psychiatric:         Mood and Affect: Mood normal.         Behavior: Behavior normal.          Significant Labs: CBC:   Recent Labs   Lab 05/08/25  1250 05/09/25  0849   WBC 15.87* 11.69   HGB 13.6 12.1   HCT 38.7 35.4*   PLT 95* 65*     CMP:   Recent Labs   Lab 05/08/25  1250 05/09/25  0709 05/10/25  0508    140 141   K 3.5 3.7 2.7*    112* 116*   CO2 21* 17* 14*   GLU 76 69* 64*   BUN 24* 19 10   CREATININE 1.3 0.6 0.6   CALCIUM 8.8 7.9* 8.0*   PROT 6.1 5.3* 5.5*   ALBUMIN 3.1* 2.4* 2.5*   BILITOT 6.9* 3.0* 1.9*   ALKPHOS 194* 123 121   * 180* 99*   * 149* 102*    ANIONGAP 15 11 11     Microbiology Results (last 7 days)       Procedure Component Value Units Date/Time    Blood culture [[id number]] Collected: 05/10/25 0508    Order Status: Resulted Specimen: Blood Updated: 05/10/25 0541    Blood culture [[id number]] Collected: 05/10/25 0508    Order Status: Resulted Specimen: Blood Updated: 05/10/25 0541    Rapid Organism ID by PCR (from Blood culture) [[id number]]  (Abnormal) Collected: 05/08/25 1250    Order Status: Completed Specimen: Blood from Peripheral, Antecubital, Right Updated: 05/09/25 1415     Enterococcus faecalis Not Detected     Enterococcus faecium Not Detected     Listeria monocytogenes Not Detected     Staphylococcus spp. Not Detected     Staphylococcus aureus Not Detected     Staphylococcus epidermidis Not Detected     Staphylococcus lugdunensis Not Detected     Streptococcus spp. Not Detected     Streptococcus agalactiae (Group B) Not Detected     Streptococcus pneumoniae Not Detected     Streptococcus pyogenes (Group A) Not Detected     Acinetobacter calcoaceticus/baumannii complex Not Detected     Bacteroides fragilis Not Detected     Enterobacterales See Species for ID     Enterobacter cloacae complex Not Detected     Escherichia coli Not Detected     Klebsiella aerogenes Not Detected     Klebsiella oxytoca Not Detected     Klebsiella pneumoniae group Detected     Proteus spp. Not Detected     Salmonella spp. Not Detected     Serratia marcescens Not Detected     Haemophilus influenzae Not Detected     Neisseria meningitidis Not Detected     Pseudomonas aeruginosa Not Detected     Stenotrophomonas maltophilia Not Detected     Candida albicans Not Detected     Candida auris Not Detected     Candida glabrata Not Detected     Candida krusei Not Detected     Candida parapsilosis Not Detected     Candida tropicalis Not Detected     Cryptococcus neoformans/gattii Not Detected     CTX-M (ESBL ) Not Detected     Comment: Note: Antimicrobial resistance  can occur via multiple mechanisms. A Not Detected result for antimicrobial resistance gene(s) does not indicate antimicrobial susceptibility. Subculturing is required for species identification and susceptibility testing of   isolates.        IMP (Cabapenemase ) Not Detected     Comment: Note: Antimicrobial resistance can occur via multiple mechanisms. A Not Detected result for antimicrobial resistance gene(s) does not indicate antimicrobial susceptibility. Subculturing is required for species identification and susceptibility testing of   isolates.        KPC resistance gene (Carbapenemase ) Not Detected     Comment: Note: Antimicrobial resistance can occur via multiple mechanisms. A Not Detected result for antimicrobial resistance gene(s) does not indicate antimicrobial susceptibility. Subculturing is required for species identification and susceptibility testing of   isolates.        mcr-1 Not Detected     Comment: Note: Antimicrobial resistance can occur via multiple mechanisms. A Not Detected result for antimicrobial resistance gene(s) does not indicate antimicrobial susceptibility. Subculturing is required for species identification and susceptibility testing of   isolates.        mecA ID N/A     Comment: Note: Antimicrobial resistance can occur via multiple mechanisms. A Not Detected result for antimicrobial resistance gene(s) does not indicate antimicrobial susceptibility. Subculturing is required for species identification and susceptibility testing of   isolates.        mecA/C and MREJ (MRSA) gene N/A     Comment: Note: Antimicrobial resistance can occur via multiple mechanisms. A Not Detected result for antimicrobial resistance gene(s) does not indicate antimicrobial susceptibility. Subculturing is required for species identification and susceptibility testing of   isolates.        NDM (Carbapenemase ) Not Detected     Comment: Note: Antimicrobial resistance can occur via multiple  mechanisms. A Not Detected result for antimicrobial resistance gene(s) does not indicate antimicrobial susceptibility. Subculturing is required for species identification and susceptibility testing of   isolates.        OXA-48-like (Carbapenemase ) Not Detected     Comment: Note: Antimicrobial resistance can occur via multiple mechanisms. A Not Detected result for antimicrobial resistance gene(s) does not indicate antimicrobial susceptibility. Subculturing is required for species identification and susceptibility testing of   isolates.        Pro/B (VRE gene) N/A     Comment: Note: Antimicrobial resistance can occur via multiple mechanisms. A Not Detected result for antimicrobial resistance gene(s) does not indicate antimicrobial susceptibility. Subculturing is required for species identification and susceptibility testing of   isolates.        VIM (Carbapenemase ) Not Detected     Comment: Note: Antimicrobial resistance can occur via multiple mechanisms. A Not Detected result for antimicrobial resistance gene(s) does not indicate antimicrobial susceptibility. Subculturing is required for species identification and susceptibility testing of   isolates.       Urine culture [[id number]] Collected: 05/08/25 1708    Order Status: Completed Specimen: Urine, Clean Catch Updated: 05/09/25 1032     Urine Culture No Growth To Date    Blood culture x two cultures. Draw prior to antibiotics. [[id number]]  (Abnormal) Collected: 05/08/25 1250    Order Status: Completed Specimen: Blood from Peripheral, Antecubital, Right Updated: 05/09/25 0900     Blood Culture Positive - Aerobic and Anaerobic Bottles     GRAM STAIN Gram Negative Rods    Narrative:      Aerobic and Anaerobic Bottles Positive     Blood culture x two cultures. Draw prior to antibiotics. [[id number]]  (Abnormal) Collected: 05/08/25 1250    Order Status: Completed Specimen: Blood from Peripheral, Antecubital, Left Updated: 05/09/25 0900     Blood  Culture Positive - Aerobic and Anaerobic Bottles     GRAM STAIN Gram Negative Rods    Narrative:      Aerobic and Anaerobic Bottles Positive             Significant Imaging: I have reviewed all pertinent imaging results/findings within the past 24 hours.

## 2025-05-10 NOTE — NURSING
Pt went back to room from MRI. Reported having sob. Placed on 3L NC. HOB elevated. RT called for prn breathing treatment.    Pt asked if she can drink some water. Dr Loza made aware. Still on NPO but have some ice chips as ordered.     Pt is calm as of the moment. Off from restraint at 2230.     0100: Pt noted sleeping on bed. RR is even and unlabored. On 3L NC. Still off from restraints.    Care ongoing.

## 2025-05-11 LAB
ALBUMIN SERPL BCP-MCNC: 2.3 G/DL (ref 3.5–5.2)
ALP SERPL-CCNC: 107 UNIT/L (ref 40–150)
ALT SERPL W/O P-5'-P-CCNC: 68 UNIT/L (ref 10–44)
ANION GAP (OHS): 8 MMOL/L (ref 8–16)
AST SERPL-CCNC: 44 UNIT/L (ref 11–45)
BILIRUB SERPL-MCNC: 1.7 MG/DL (ref 0.1–1)
BUN SERPL-MCNC: 6 MG/DL (ref 6–20)
CALCIUM SERPL-MCNC: 8.2 MG/DL (ref 8.7–10.5)
CHLORIDE SERPL-SCNC: 114 MMOL/L (ref 95–110)
CO2 SERPL-SCNC: 18 MMOL/L (ref 23–29)
CREAT SERPL-MCNC: 0.5 MG/DL (ref 0.5–1.4)
GFR SERPLBLD CREATININE-BSD FMLA CKD-EPI: >60 ML/MIN/1.73/M2
GLUCOSE SERPL-MCNC: 114 MG/DL (ref 70–110)
MAGNESIUM SERPL-MCNC: 1.3 MG/DL (ref 1.6–2.6)
PHOSPHATE SERPL-MCNC: 2.1 MG/DL (ref 2.7–4.5)
POCT GLUCOSE: 134 MG/DL (ref 70–110)
POTASSIUM SERPL-SCNC: 2.8 MMOL/L (ref 3.5–5.1)
PROT SERPL-MCNC: 5.2 GM/DL (ref 6–8.4)
SODIUM SERPL-SCNC: 140 MMOL/L (ref 136–145)

## 2025-05-11 PROCEDURE — 25000003 PHARM REV CODE 250: Mod: HCNC

## 2025-05-11 PROCEDURE — 83735 ASSAY OF MAGNESIUM: CPT | Mod: HCNC

## 2025-05-11 PROCEDURE — 36415 COLL VENOUS BLD VENIPUNCTURE: CPT | Mod: HCNC

## 2025-05-11 PROCEDURE — S4991 NICOTINE PATCH NONLEGEND: HCPCS | Mod: HCNC | Performed by: STUDENT IN AN ORGANIZED HEALTH CARE EDUCATION/TRAINING PROGRAM

## 2025-05-11 PROCEDURE — 94640 AIRWAY INHALATION TREATMENT: CPT | Mod: HCNC

## 2025-05-11 PROCEDURE — 63600175 PHARM REV CODE 636 W HCPCS: Mod: HCNC | Performed by: STUDENT IN AN ORGANIZED HEALTH CARE EDUCATION/TRAINING PROGRAM

## 2025-05-11 PROCEDURE — 94761 N-INVAS EAR/PLS OXIMETRY MLT: CPT | Mod: HCNC

## 2025-05-11 PROCEDURE — 84100 ASSAY OF PHOSPHORUS: CPT | Mod: HCNC

## 2025-05-11 PROCEDURE — 63600175 PHARM REV CODE 636 W HCPCS: Mod: HCNC | Performed by: INTERNAL MEDICINE

## 2025-05-11 PROCEDURE — 25000003 PHARM REV CODE 250: Mod: HCNC | Performed by: INTERNAL MEDICINE

## 2025-05-11 PROCEDURE — 25000242 PHARM REV CODE 250 ALT 637 W/ HCPCS: Mod: HCNC

## 2025-05-11 PROCEDURE — 27000221 HC OXYGEN, UP TO 24 HOURS: Mod: HCNC

## 2025-05-11 PROCEDURE — 25000242 PHARM REV CODE 250 ALT 637 W/ HCPCS: Mod: HCNC | Performed by: STUDENT IN AN ORGANIZED HEALTH CARE EDUCATION/TRAINING PROGRAM

## 2025-05-11 PROCEDURE — 80053 COMPREHEN METABOLIC PANEL: CPT | Mod: HCNC

## 2025-05-11 PROCEDURE — 21400001 HC TELEMETRY ROOM: Mod: HCNC

## 2025-05-11 PROCEDURE — 94760 N-INVAS EAR/PLS OXIMETRY 1: CPT | Mod: HCNC

## 2025-05-11 PROCEDURE — 25000003 PHARM REV CODE 250: Mod: HCNC | Performed by: STUDENT IN AN ORGANIZED HEALTH CARE EDUCATION/TRAINING PROGRAM

## 2025-05-11 RX ORDER — FUROSEMIDE 10 MG/ML
40 INJECTION INTRAMUSCULAR; INTRAVENOUS ONCE
Status: COMPLETED | OUTPATIENT
Start: 2025-05-11 | End: 2025-05-11

## 2025-05-11 RX ORDER — POTASSIUM CHLORIDE 20 MEQ/1
40 TABLET, EXTENDED RELEASE ORAL EVERY 4 HOURS
Status: COMPLETED | OUTPATIENT
Start: 2025-05-11 | End: 2025-05-11

## 2025-05-11 RX ORDER — MAGNESIUM SULFATE HEPTAHYDRATE 40 MG/ML
2 INJECTION, SOLUTION INTRAVENOUS ONCE
Status: COMPLETED | OUTPATIENT
Start: 2025-05-11 | End: 2025-05-11

## 2025-05-11 RX ORDER — HYDRALAZINE HYDROCHLORIDE 10 MG/1
10 TABLET, FILM COATED ORAL EVERY 8 HOURS
Status: DISCONTINUED | OUTPATIENT
Start: 2025-05-11 | End: 2025-05-11

## 2025-05-11 RX ADMIN — ARFORMOTEROL TARTRATE 15 MCG: 15 SOLUTION RESPIRATORY (INHALATION) at 07:05

## 2025-05-11 RX ADMIN — Medication 6 MG: at 09:05

## 2025-05-11 RX ADMIN — DEXTROSE AND SODIUM CHLORIDE: 5; 900 INJECTION, SOLUTION INTRAVENOUS at 12:05

## 2025-05-11 RX ADMIN — BUDESONIDE 0.5 MG: 0.5 INHALANT RESPIRATORY (INHALATION) at 07:05

## 2025-05-11 RX ADMIN — PIPERACILLIN SODIUM AND TAZOBACTAM SODIUM 4.5 G: 4; .5 INJECTION, POWDER, FOR SOLUTION INTRAVENOUS at 01:05

## 2025-05-11 RX ADMIN — POTASSIUM CHLORIDE 40 MEQ: 1500 TABLET, EXTENDED RELEASE ORAL at 06:05

## 2025-05-11 RX ADMIN — IPRATROPIUM BROMIDE AND ALBUTEROL SULFATE 3 ML: 2.5; .5 SOLUTION RESPIRATORY (INHALATION) at 07:05

## 2025-05-11 RX ADMIN — DEXTROSE AND SODIUM CHLORIDE: 5; 900 INJECTION, SOLUTION INTRAVENOUS at 08:05

## 2025-05-11 RX ADMIN — IPRATROPIUM BROMIDE AND ALBUTEROL SULFATE 3 ML: 2.5; .5 SOLUTION RESPIRATORY (INHALATION) at 01:05

## 2025-05-11 RX ADMIN — POTASSIUM BICARBONATE 50 MEQ: 978 TABLET, EFFERVESCENT ORAL at 02:05

## 2025-05-11 RX ADMIN — POTASSIUM CHLORIDE 40 MEQ: 1500 TABLET, EXTENDED RELEASE ORAL at 08:05

## 2025-05-11 RX ADMIN — Medication 1 PATCH: at 08:05

## 2025-05-11 RX ADMIN — ESCITALOPRAM OXALATE 20 MG: 10 TABLET ORAL at 08:05

## 2025-05-11 RX ADMIN — POTASSIUM CHLORIDE 40 MEQ: 1500 TABLET, EXTENDED RELEASE ORAL at 02:05

## 2025-05-11 RX ADMIN — SODIUM BICARBONATE 25 MEQ: 84 INJECTION, SOLUTION INTRAVENOUS at 02:05

## 2025-05-11 RX ADMIN — PIPERACILLIN SODIUM AND TAZOBACTAM SODIUM 4.5 G: 4; .5 INJECTION, POWDER, FOR SOLUTION INTRAVENOUS at 03:05

## 2025-05-11 RX ADMIN — MAGNESIUM SULFATE HEPTAHYDRATE 2 G: 40 INJECTION, SOLUTION INTRAVENOUS at 11:05

## 2025-05-11 RX ADMIN — MORPHINE SULFATE 2 MG: 4 INJECTION INTRAVENOUS at 08:05

## 2025-05-11 RX ADMIN — DEXTROSE AND SODIUM CHLORIDE: 5; 900 INJECTION, SOLUTION INTRAVENOUS at 09:05

## 2025-05-11 RX ADMIN — PIPERACILLIN SODIUM AND TAZOBACTAM SODIUM 4.5 G: 4; .5 INJECTION, POWDER, FOR SOLUTION INTRAVENOUS at 08:05

## 2025-05-11 RX ADMIN — IPRATROPIUM BROMIDE AND ALBUTEROL SULFATE 3 ML: 2.5; .5 SOLUTION RESPIRATORY (INHALATION) at 12:05

## 2025-05-11 RX ADMIN — HYDRALAZINE HYDROCHLORIDE 10 MG: 10 TABLET ORAL at 08:05

## 2025-05-11 RX ADMIN — CARBAMAZEPINE 200 MG: 100 TABLET, EXTENDED RELEASE ORAL at 09:05

## 2025-05-11 RX ADMIN — FUROSEMIDE 40 MG: 10 INJECTION, SOLUTION INTRAVENOUS at 08:05

## 2025-05-11 RX ADMIN — MAGNESIUM SULFATE HEPTAHYDRATE 2 G: 40 INJECTION, SOLUTION INTRAVENOUS at 01:05

## 2025-05-11 RX ADMIN — CARBAMAZEPINE 200 MG: 100 TABLET, EXTENDED RELEASE ORAL at 08:05

## 2025-05-11 NOTE — NURSING
[facility], [facility] [place]  Nurses Note -- 4 Eyes      5/11/2025       Skin assessed on: Q Shift      [x] No Pressure Injuries Present    []Prevention Measures Documented    [] Yes LDA  for Pressure Injury Previously documented     [] Yes New Pressure Injury Discovered   [] LDA for New Pressure Injury Added      Attending RN:  Rosibel Kennedy RN     Second RN:  CHIRAG Marx

## 2025-05-11 NOTE — NURSING
[facility], [facility] - [place]  Nurses Note -- 4 Eyes      5/11/2025       Skin assessed on: Q Shift      [x] No Pressure Injuries Present    [x]Prevention Measures Documented    [] Yes LDA  for Pressure Injury Previously documented     [] Yes New Pressure Injury Discovered   [] LDA for New Pressure Injury Added      Attending RN:  Leola Melendez RN     Second RN:  TIFFANI Gleason

## 2025-05-11 NOTE — PLAN OF CARE
Problem: Adult Inpatient Plan of Care  Goal: Plan of Care Review  5/11/2025 1656 by Leola Melendez RN  Outcome: Progressing  5/11/2025 1655 by Leola Melendez RN  Outcome: Progressing  Flowsheets (Taken 5/11/2025 1655)  Plan of Care Reviewed With: patient  Goal: Patient-Specific Goal (Individualized)  Outcome: Progressing  Goal: Absence of Hospital-Acquired Illness or Injury  Outcome: Progressing  Intervention: Identify and Manage Fall Risk  Flowsheets (Taken 5/11/2025 1655)  Safety Promotion/Fall Prevention:   assistive device/personal item within reach   bed alarm set   nonskid shoes/socks when out of bed   side rails raised x 2   medications reviewed   high risk medications identified   room near unit station  Intervention: Prevent Skin Injury  Flowsheets (Taken 5/11/2025 1655)  Body Position:   weight shifting   turned  Device Skin Pressure Protection: absorbent pad utilized/changed  Intervention: Prevent and Manage VTE (Venous Thromboembolism) Risk  Flowsheets (Taken 5/11/2025 1655)  VTE Prevention/Management:   bleeding risk assessed   bleeding precautions maintained   ambulation promoted  Goal: Optimal Comfort and Wellbeing  Outcome: Progressing  Intervention: Monitor Pain and Promote Comfort  Flowsheets (Taken 5/11/2025 1655)  Pain Management Interventions:   pillow support provided   quiet environment facilitated   relaxation techniques promoted  Intervention: Provide Person-Centered Care  Flowsheets (Taken 5/11/2025 1655)  Trust Relationship/Rapport:   questions encouraged   care explained   choices provided   reassurance provided   thoughts/feelings acknowledged   emotional support provided   empathic listening provided   questions answered  Goal: Readiness for Transition of Care  Outcome: Progressing     Problem: Sepsis/Septic Shock  Goal: Optimal Coping  Outcome: Progressing  Goal: Absence of Bleeding  Outcome: Progressing  Goal: Blood Glucose Level Within Targeted Range  Outcome:  Progressing  Goal: Absence of Infection Signs and Symptoms  Outcome: Progressing  Goal: Optimal Nutrition Intake  Outcome: Progressing     Problem: Skin Injury Risk Increased  Goal: Skin Health and Integrity  Outcome: Progressing     Problem: Fall Injury Risk  Goal: Absence of Fall and Fall-Related Injury  Outcome: Progressing     Problem: Restraint, Nonviolent  Goal: Absence of Harm or Injury  Outcome: Progressing    Pt alert and able to make needs known. Oral meds tolerated well, no s/s of adverse effects. IVF in progress, mario well. IV abx in progress, mario well. Bed in lowest position, call light within reach, and instructed to call for any assistance. Continue with plan of care.

## 2025-05-11 NOTE — NURSING
2115 Pt c/o SOB and RR even and regular at 32/min.  Virtual doctor notified and new orders given. Pt noted more relax after being given Morphine 2mg iV P.  Pt SO at the bedside.  IVF decreased to d5nS at 125cc/hr. Pt admits that SOB decreased.  Pulse oximeter from 96-98% saturation. Lungs with diminished breath sounds but clear in all lung fields. Will continue to monitor.

## 2025-05-11 NOTE — NURSING
Report received from the off - going nurse. Pt noted with RR even and regular  with O2 at  2L/NC per minute. Skin warm  and dry  . Pt noted alert and oriented x 4. Abdomen soft and tender . Pt moves all extremities x4 . Urinary elimination via pure wick . Call light within reach and pt is instructed how to use the call light.  Bed in the lowest position with SR's elevated x 2.  Pt encouraged to call for staff before getting up OOB.  Bed alarm on.  Saline lock intact with no evidence of redness or infiltration noted in the rt. 20 gauge cathlon in  position with NS at 200cc/hr. Will continue to monitor.

## 2025-05-11 NOTE — ASSESSMENT & PLAN NOTE
Patient's blood pressure range in the last 24 hours was: BP  Min: 140/68  Max: 158/83.The patient's inpatient anti-hypertensive regimen is listed below:  Current Antihypertensives  hydrALAZINE injection 10 mg, Every 4 hours PRN, Intravenous  hydrALAZINE injection 30 mg, Every 4 hours PRN, Intravenous  hydrALAZINE injection 20 mg, Every 4 hours PRN, Intravenous    Plan  - BP is controlled, no changes needed to their regimen  - Patient currently not on any antihypertensives

## 2025-05-11 NOTE — ASSESSMENT & PLAN NOTE
Anemia is likely due to Iron deficiency. Most recent hemoglobin and hematocrit are listed below.  Recent Labs     05/09/25  0849 05/10/25  0508 05/10/25  2032   HGB 12.1 10.8* 11.0*   HCT 35.4* 32.3* 32.2*     Plan  - Monitor serial CBC: Daily  - Transfuse PRBC if patient becomes hemodynamically unstable, symptomatic or H/H drops below 7/21.  - Patient has not received any PRBC transfusions to date  - Patient's anemia is currently stable

## 2025-05-11 NOTE — ASSESSMENT & PLAN NOTE
This patient does not have evidence of infective focus  My overall impression is sepsis.  Source: Unknown  Antibiotics given-   Antibiotics (72h ago, onward)      Start     Stop Route Frequency Ordered    05/09/25 1215  piperacillin-tazobactam (ZOSYN) 4.5 g in D5W 100 mL IVPB (MB+)         -- IV Every 8 hours (non-standard times) 05/09/25 1014          Latest lactate reviewed-  Recent Labs   Lab 05/08/25  1244 05/08/25  1706 05/10/25  2032   LACTATE  --    < > 0.6   POCLAC 3.54*  --   --     < > = values in this interval not displayed.     Organ dysfunction indicated by Encephalopathy and Thrombocytopenia     Fluid challenge Ideal Body Weight- The patient is obese (BMI>30) and their ideal body weight of Ideal body weight: 45.5 kg (100 lb 4.9 oz) will be used to calculate fluid bolus of 30 ml/kg.     Post- resuscitation assessment No - Post resuscitation assessment not needed       Will Not start Pressors- Levophed for MAP of 65  Source control achieved by: IV Zosyn and Vancomycin   -Blood and urine cultures pending

## 2025-05-11 NOTE — PROGRESS NOTES
[facility]  Progress Note    Patient Name: Zoey Cali  MRN: [id number]  Patient Class: IP- Inpatient   Admission Date: 5/8/2025  Length of Stay: 3 days  Attending Physician: Cleve Monae MD  Primary Care Provider: Devon Collier MD        Subjective     Principal Problem:Acute pancreatitis        HPI:  Zoey Cali is a 59 y.o. female with a pmh of hypertension, hyperlipidemia, multiple sclerosis, COPD, iron-deficiency anemia, osteoporosis presents to the hospital via EMS with weakness and altered mental status.    History obtained by independent historian  Jared on the phone who states that patient has been acting inappropriately over the past few days. Jared states that her symptoms started few days ago with the abdominal pain and spasms and patient was unable to tolerate p.o.. This has happened in the past and patient would drink some milk and it would go away. However, this time pain and associated symptoms got worse. Patient has a had several episodes of vomiting.  states that patient has become more confused. Patient unable to take any of her home medications. Unable to express exactly where her pain is located.  Review of systems complicated by patient's change in mentation.    In the ED: Patient afebrile with leukocytosis (WBC 15.87), thrombocytopenic with a platelet count 95, BUN 24, GFR 47, phosphorus 4.6, magnesium 1.3, elevated , T bili 6.9, elevated AST//273 respectively, normal ammonia level, lipase>1000, initial troponin normal, elevated lactic acid 3.54, elevated procalcitonin 68.66, COVID influenza negative, blood cultures pending. CT abdomen pelvis shows (1) pancreatic inflammation concerning for pancreatitis (2) surgical change of cholecystectomy noting prominent intra and extrahepatic biliary dilation (3) bilateral nonobstructive nephrolithiasis.  Chest x-ray shows chronic appearing interstitial findings consistent with history of  COPD/emphysema.  Ultrasound abdomen pending.  Patient given 1 L LR bolus x2 IV morphine 4 mg x 2, IV Zosyn 4.5 g, IV vancomycin, and IV Haldol 2.5 mg in the ED. Case discussed with ED provider and patient admitted to hospital medicine for further medical management.    Overview/Hospital Course:  Acute pancreatitis (Lipase >1000) with intractable n/v and sepsis. Very high procalcitonin at 69 and toxic granulation suggest severe systemic bacterial infection. Started on V+Z. Mild LUBNA with Cr 1.3, resolving to 0.6 after fluids. TG wnl/not cause of pancreatitis,  states she is not a big drinker, and no stones appreciated. Tbili is elevated at 6.9 and LFTs up. Will consult GI. She did have a positive RONAL a few months ago- IgG4 sent off to r/o IgG4 -related pancreatitis.     GNR bacteremia resulted. Will stop Vanc, continue Zosyn for now. Do not see any drainable fluid/abscess on CT. Will need 2 weeks of abx from clear cultures for bacteremia.     PCR showing Kleb. Mentation improving a bit. , she likely has Pancreatitis Encephalopathy superimposed on Sepsis AMS. Will control BP a bit more than usually with sepsis, as PRES can play a role in this. Improving overall. A bit SOB after much fluid, will give a dose of lasix. Replace electrolytes. Stringer-sensitive Kleb, suspect can de-escalate to CTX- defer to ID.    Interval History:  NAEON.  No new issues.   CC- n/v, abdo pain  All questions answered and updates on care given.       ROS:  Unable to participate, acuity      Vitals:    05/11/25 0737 05/11/25 0747 05/11/25 0754 05/11/25 0818   BP:   (!) 158/83    BP Location:       Patient Position:       Pulse: 95 96 103    Resp: (!) 30  Comment: rapid RN aware  18 20   Temp:   100.2 °F (37.9 °C)    TempSrc:       SpO2: 95%  100%    Weight:       Height:              Body mass index is 23.77 kg/m².      PHYSICAL EXAM:  GENERAL APPEARANCE: alert and cooperative.      HEAD: NC/AT, +scleral icterus  CARDIAC: There is no  cyanosis or pallor.   LUNGS: No apparent wheezing or stridor.  ABDOMEN: Non-distended. No guarding.  MSK: No joint erythema or tenderness.   EXTREMITIES: No significant new deformity or new joint abnormality.   NEUROLOGICAL: +confusion, AMS, improving  SKIN: No lesions or eruptions. +Jaundiced  PSYCHIATRIC: No tangential speech. No Hyperactive features.        Recent Results (from the past 24 hours)   CBC with Differential    Collection Time: 05/10/25  8:32 PM   Result Value Ref Range    WBC 7.33 3.90 - 12.70 K/uL    RBC 3.42 (L) 4.00 - 5.40 M/uL    HGB 11.0 (L) 12.0 - 16.0 gm/dL    HCT 32.2 (L) 37.0 - 48.5 %    MCV 94 82 - 98 fL    MCH 32.2 (H) 27.0 - 31.0 pg    MCHC 34.2 32.0 - 36.0 g/dL    RDW 15.9 (H) 11.5 - 14.5 %    Platelet Count 74 (L) 150 - 450 K/uL    MPV 9.8 9.2 - 12.9 fL    Nucleated RBC 0 <=0 /100 WBC    Neut % 90.2 (H) 38 - 73 %    Lymph % 6.3 (L) 18 - 48 %    Mono % 2.5 (L) 4 - 15 %    Eos % 0.3 <=8 %    Basophil % 0.3 <=1.9 %    Imm Grans % 0.4 0.0 - 0.5 %    Neut # 6.62 1.8 - 7.7 K/uL    Lymph # 0.46 (L) 1 - 4.8 K/uL    Mono # 0.18 (L) 0.3 - 1 K/uL    Eos # 0.02 <=0.5 K/uL    Baso # 0.02 <=0.2 K/uL    Imm Grans # 0.03 0.00 - 0.04 K/uL   Brain natriuretic peptide    Collection Time: 05/10/25  8:32 PM   Result Value Ref Range     (H) 0 - 99 pg/mL   Comprehensive metabolic panel    Collection Time: 05/10/25  8:32 PM   Result Value Ref Range    Sodium 139 136 - 145 mmol/L    Potassium 3.3 (L) 3.5 - 5.1 mmol/L    Chloride 114 (H) 95 - 110 mmol/L    CO2 14 (L) 23 - 29 mmol/L    Glucose 57 (L) 70 - 110 mg/dL    BUN 7 6 - 20 mg/dL    Creatinine 0.5 0.5 - 1.4 mg/dL    Calcium 8.3 (L) 8.7 - 10.5 mg/dL    Protein Total 5.4 (L) 6.0 - 8.4 gm/dL    Albumin 2.3 (L) 3.5 - 5.2 g/dL    Bilirubin Total 1.8 (H) 0.1 - 1.0 mg/dL     40 - 150 unit/L    AST 62 (H) 11 - 45 unit/L    ALT 81 (H) 10 - 44 unit/L    Anion Gap 11 8 - 16 mmol/L    eGFR >60 >60 mL/min/1.73/m2   Lactic acid, plasma    Collection Time:  05/10/25  8:32 PM   Result Value Ref Range    Lactic Acid Level 0.6 0.5 - 2.2 mmol/L   Magnesium    Collection Time: 05/10/25  8:32 PM   Result Value Ref Range    Magnesium  1.5 (L) 1.6 - 2.6 mg/dL   ISTAT PROCEDURE    Collection Time: 05/10/25  9:12 PM   Result Value Ref Range    POC PH 7.284 (LL) 7.35 - 7.45    POC PCO2 31.1 (L) 35 - 45 mmHg    POC PO2 19 (L) 40 - 60 mmHg    POC HCO3 14.8 (L) 24 - 28 mmol/L    POC BE -11 (L) -2 to 2 mmol/L    POC SATURATED O2 25 95 - 100 %    POC TCO2 16 (L) 24 - 29 mmol/L    Sample VENOUS     Site Other     Allens Test N/A    Comprehensive Metabolic Panel (CMP)    Collection Time: 05/11/25  5:22 AM   Result Value Ref Range    Sodium 140 136 - 145 mmol/L    Potassium 2.8 (L) 3.5 - 5.1 mmol/L    Chloride 114 (H) 95 - 110 mmol/L    CO2 18 (L) 23 - 29 mmol/L    Glucose 114 (H) 70 - 110 mg/dL    BUN 6 6 - 20 mg/dL    Creatinine 0.5 0.5 - 1.4 mg/dL    Calcium 8.2 (L) 8.7 - 10.5 mg/dL    Protein Total 5.2 (L) 6.0 - 8.4 gm/dL    Albumin 2.3 (L) 3.5 - 5.2 g/dL    Bilirubin Total 1.7 (H) 0.1 - 1.0 mg/dL     40 - 150 unit/L    AST 44 11 - 45 unit/L    ALT 68 (H) 10 - 44 unit/L    Anion Gap 8 8 - 16 mmol/L    eGFR >60 >60 mL/min/1.73/m2   Magnesium    Collection Time: 05/11/25  5:22 AM   Result Value Ref Range    Magnesium  1.3 (L) 1.6 - 2.6 mg/dL   Phosphorus    Collection Time: 05/11/25  5:22 AM   Result Value Ref Range    Phosphorus Level 2.1 (L) 2.7 - 4.5 mg/dL       Microbiology Results (last 7 days)       Procedure Component Value Units Date/Time    Blood culture [[id number]]  (Normal) Collected: 05/10/25 0508    Order Status: Completed Specimen: Blood Updated: 05/11/25 0601     Blood Culture No Growth After 24 Hours    Blood culture [[id number]]  (Normal) Collected: 05/10/25 0508    Order Status: Completed Specimen: Blood Updated: 05/11/25 0601     Blood Culture No Growth After 24 Hours    Urine culture [[id number]] Collected: 05/08/25 1708    Order Status: Completed Specimen:  Urine, Clean Catch Updated: 05/10/25 0832     Urine Culture No Significant Growth    Blood culture x two cultures. Draw prior to antibiotics. [[id number]]  (Abnormal) Collected: 05/08/25 1250    Order Status: Completed Specimen: Blood from Peripheral, Antecubital, Right Updated: 05/10/25 0739     Blood Culture Positive - Aerobic and Anaerobic Bottles      Klebsiella pneumoniae     GRAM STAIN Gram Negative Rods    Narrative:      Aerobic and Anaerobic Bottles Positive   Refer ID and Sensitivity at [id number]    Blood culture x two cultures. Draw prior to antibiotics. [[id number]]  (Abnormal)  (Susceptibility) Collected: 05/08/25 1250    Order Status: Completed Specimen: Blood from Peripheral, Antecubital, Left Updated: 05/10/25 0737     Blood Culture Positive - Aerobic and Anaerobic Bottles      Klebsiella pneumoniae     GRAM STAIN Gram Negative Rods    Narrative:      Aerobic and Anaerobic Bottles Positive     Rapid Organism ID by PCR (from Blood culture) [[id number]]  (Abnormal) Collected: 05/08/25 1250    Order Status: Completed Specimen: Blood from Peripheral, Antecubital, Right Updated: 05/09/25 1415     Enterococcus faecalis Not Detected     Enterococcus faecium Not Detected     Listeria monocytogenes Not Detected     Staphylococcus spp. Not Detected     Staphylococcus aureus Not Detected     Staphylococcus epidermidis Not Detected     Staphylococcus lugdunensis Not Detected     Streptococcus spp. Not Detected     Streptococcus agalactiae (Group B) Not Detected     Streptococcus pneumoniae Not Detected     Streptococcus pyogenes (Group A) Not Detected     Acinetobacter calcoaceticus/baumannii complex Not Detected     Bacteroides fragilis Not Detected     Enterobacterales See Species for ID     Enterobacter cloacae complex Not Detected     Escherichia coli Not Detected     Klebsiella aerogenes Not Detected     Klebsiella oxytoca Not Detected     Klebsiella pneumoniae group Detected     Proteus spp. Not  Detected     Salmonella spp. Not Detected     Serratia marcescens Not Detected     Haemophilus influenzae Not Detected     Neisseria meningitidis Not Detected     Pseudomonas aeruginosa Not Detected     Stenotrophomonas maltophilia Not Detected     Candida albicans Not Detected     Candida auris Not Detected     Candida glabrata Not Detected     Candida krusei Not Detected     Candida parapsilosis Not Detected     Candida tropicalis Not Detected     Cryptococcus neoformans/gattii Not Detected     CTX-M (ESBL ) Not Detected     Comment: Note: Antimicrobial resistance can occur via multiple mechanisms. A Not Detected result for antimicrobial resistance gene(s) does not indicate antimicrobial susceptibility. Subculturing is required for species identification and susceptibility testing of   isolates.        IMP (Cabapenemase ) Not Detected     Comment: Note: Antimicrobial resistance can occur via multiple mechanisms. A Not Detected result for antimicrobial resistance gene(s) does not indicate antimicrobial susceptibility. Subculturing is required for species identification and susceptibility testing of   isolates.        KPC resistance gene (Carbapenemase ) Not Detected     Comment: Note: Antimicrobial resistance can occur via multiple mechanisms. A Not Detected result for antimicrobial resistance gene(s) does not indicate antimicrobial susceptibility. Subculturing is required for species identification and susceptibility testing of   isolates.        mcr-1 Not Detected     Comment: Note: Antimicrobial resistance can occur via multiple mechanisms. A Not Detected result for antimicrobial resistance gene(s) does not indicate antimicrobial susceptibility. Subculturing is required for species identification and susceptibility testing of   isolates.        mecA ID N/A     Comment: Note: Antimicrobial resistance can occur via multiple mechanisms. A Not Detected result for antimicrobial resistance  gene(s) does not indicate antimicrobial susceptibility. Subculturing is required for species identification and susceptibility testing of   isolates.        mecA/C and MREJ (MRSA) gene N/A     Comment: Note: Antimicrobial resistance can occur via multiple mechanisms. A Not Detected result for antimicrobial resistance gene(s) does not indicate antimicrobial susceptibility. Subculturing is required for species identification and susceptibility testing of   isolates.        NDM (Carbapenemase ) Not Detected     Comment: Note: Antimicrobial resistance can occur via multiple mechanisms. A Not Detected result for antimicrobial resistance gene(s) does not indicate antimicrobial susceptibility. Subculturing is required for species identification and susceptibility testing of   isolates.        OXA-48-like (Carbapenemase ) Not Detected     Comment: Note: Antimicrobial resistance can occur via multiple mechanisms. A Not Detected result for antimicrobial resistance gene(s) does not indicate antimicrobial susceptibility. Subculturing is required for species identification and susceptibility testing of   isolates.        Pro/B (VRE gene) N/A     Comment: Note: Antimicrobial resistance can occur via multiple mechanisms. A Not Detected result for antimicrobial resistance gene(s) does not indicate antimicrobial susceptibility. Subculturing is required for species identification and susceptibility testing of   isolates.        VIM (Carbapenemase ) Not Detected     Comment: Note: Antimicrobial resistance can occur via multiple mechanisms. A Not Detected result for antimicrobial resistance gene(s) does not indicate antimicrobial susceptibility. Subculturing is required for species identification and susceptibility testing of   isolates.                Imaging Results              US Abdomen Limited (Final result)  Result time 05/08/25 16:35:15      Final result by Quinton Sandoval MD (05/08/25 16:35:15)                    Impression:      Dilated common bile duct, commonly seen post cholecystectomy.  No obstructing stone identified.      Electronically signed by: Quinton Sandoval MD  Date:    05/08/2025  Time:    16:35               Narrative:    EXAMINATION:  US ABDOMEN LIMITED    CLINICAL HISTORY:  abdominal pain, elevated LFTs;    TECHNIQUE:  Limited ultrasound of the right upper quadrant of the abdomen (including pancreas, liver, gallbladder, common bile duct, and spleen) was performed.    COMPARISON:  None.    FINDINGS:  The visualized portions of the pancreas, abdominal aorta, and IVC are unremarkable.    Gallbladder: Absent.  Negative sonographic Tate's sign.    Biliary system: The common duct is enlarged, measuring 9 mm.  Mild intrahepatic ductal dilatation.    Liver: Measures 13.3 cm.  There is homogeneous echotexture. No focal hepatic lesions.    Spleen: Normal in size  measuring 11.6 cm.                                        CT Abdomen Pelvis With IV Contrast NO Oral Contrast (Final result)  Result time 05/08/25 14:57:54      Final result by Solomon Gonzalez MD (05/08/25 14:57:54)                   Impression:      This report was flagged in Epic as abnormal.    1. Inflammation about the pancreas particularly at the pancreatic head, correlation with laboratory values recommended as findings are concerning for pancreatitis.  There is adjacent reactive lymphadenopathy.  2. There is surgical change of cholecystectomy noting prominent intra and extrahepatic biliary dilation.  No recent exams are available for comparison.  There is a questionable filling defect within the distal aspect of the common duct versus superimposition of duodenal soft tissue, choledocholithiasis cannot be definitively excluded.  3. Bilateral nonobstructive nephrolithiasis.  4. Please see above for several additional findings.      Electronically signed by: Solomon Gonzalez MD  Date:    05/08/2025  Time:    14:57               Narrative:     EXAMINATION:  CT ABDOMEN PELVIS WITH IV CONTRAST    CLINICAL HISTORY:  Nausea/vomiting;Epigastric pain;    TECHNIQUE:  Low dose axial images, sagittal and coronal reformations were obtained from the lung bases to the pubic symphysis following the IV administration of 75 mL of Omnipaque 350 .  Oral contrast was not given.    COMPARISON:  None.    FINDINGS:  Images of the lower thorax are remarkable for bilateral dependent atelectasis/scarring.  There are several lymph nodes along the aortic hiatus particularly on the right.    Allowing for motion artifact, the liver, and adrenal glands are grossly unremarkable.  The spleen is enlarged.  There is some indistinctness about the pancreatic head, the pancreas enhances homogeneously.  The gallbladder is surgically absent noting intra and extrahepatic biliary dilation status post cholecystectomy.  At the distal aspect of the common duct, there is a questionable filling defect versus redundancy of the duodenal parenchyma in the region.  This is best seen on coronal image 601, and measures 3 mm.  There is mild fluid at the level of the ashish hepatis.  The portal vein, splenic vein, SMV, celiac axis and SMA all are patent.  No significant abdominal lymphadenopathy.  There is a small amount of strand-like fluid in the mid abdomen.    The kidneys enhance symmetrically without hydronephrosis.  There is bilateral nonobstructive nephrolithiasis.  Low attenuating lesions arise from the kidneys, too small for characterization.  The bilateral ureters are unable to be followed in their entirety to the urinary bladder, no definite calculi seen or secondary findings to suggest obstructive uropathy.  The urinary bladder is nondistended.  The uterus is absent the adnexa is unremarkable.    The distal large bowel is for the most part decompressed.  The terminal ileum is unremarkable.  The appendix is not confidently identified, no pericecal inflammation.  The small bowel is grossly  unremarkable.  There are a few scattered shotty periaortic, pericaval, and mesenteric lymph nodes.  There is atherosclerotic calcification of the aorta and its branches.  No focal organized pelvic fluid collection.    There is osteopenia.  There are degenerative changes of the bilateral sacroiliac joints, pubic symphysis, and spine.  No significant inguinal lymphadenopathy.                                       X-Ray Chest AP Portable (Final result)  Result time 05/08/25 13:07:25      Final result by Solomon Gonzalez MD (05/08/25 13:07:25)                   Impression:      1. Chronic appearing interstitial findings, underlying edema is a consideration.  Correlation with any history of COPD/emphysema.      Electronically signed by: Solomon Gonzalez MD  Date:    05/08/2025  Time:    13:07               Narrative:    EXAMINATION:  XR CHEST AP PORTABLE    CLINICAL HISTORY:  Sepsis;    TECHNIQUE:  Single frontal view of the chest was performed.    COMPARISON:  07/08/2019    FINDINGS:  The cardiomediastinal silhouette is not enlarged noting calcification of the aorta..  There is no pleural effusion.  The trachea is midline.  The lungs are symmetrically expanded bilaterally with mildly coarse interstitial attenuation and bilateral basilar subsegmental atelectasis..  No large focal consolidation seen.  There is no pneumothorax.  The osseous structures are remarkable for degenerative change..                                               Assessment & Plan  Acute pancreatitis  Patient presents with worsening abdominal pain with associated nausea, vomiting, and altered mentation.  Lipase >1000.  CT with pancreatic head inflammation concerning for pancreatitis.  Patient denies alcohol use or any new medications.  No gallstones noted on imaging. Unclear etiology of pancreatitis at this time.  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    Plan:  NPO   IVF with   cc/hr for 20 hours  TG and IgG4 ordered and pending   Pain control with IV morphine 2 mg q.4 hours for moderate pain and IV morphine 6 mg q.4 hours for severe pain  Maintain on telemetry  Continue supportive care  HTN (hypertension)  Patient's blood pressure range in the last 24 hours was: BP  Min: 140/68  Max: 158/83.The patient's inpatient anti-hypertensive regimen is listed below:  Current Antihypertensives  hydrALAZINE injection 10 mg, Every 4 hours PRN, Intravenous  hydrALAZINE injection 30 mg, Every 4 hours PRN, Intravenous  hydrALAZINE injection 20 mg, Every 4 hours PRN, Intravenous    Plan  - BP is controlled, no changes needed to their regimen  - Patient currently not on any antihypertensives  Multiple sclerosis  History noted, on Avonex injections weekly   Iron deficiency anemia  Anemia is likely due to Iron deficiency. Most recent hemoglobin and hematocrit are listed below.  Recent Labs     05/09/25  0849 05/10/25  0508 05/10/25  2032   HGB 12.1 10.8* 11.0*   HCT 35.4* 32.3* 32.2*     Plan  - Monitor serial CBC: Daily  - Transfuse PRBC if patient becomes hemodynamically unstable, symptomatic or H/H drops below 7/21.  - Patient has not received any PRBC transfusions to date  - Patient's anemia is currently stable  Tobacco abuse  Patient with a history of smoking 1ppd, 30 pack year history   Will need smoking cessation counseling once mentation improves   Neuropathic pain  Hold home gabapentin and carbamazepine in the setting of pancreatitis and acute encephalopathy  Chronic obstructive pulmonary disease, unspecified  Patient's COPD is controlled currently.  Patient is currently off COPD Pathway. Continue scheduled inhalers Duonebs and Supplemental oxygen and monitor respiratory status closely.   Sepsis  This patient does not have evidence of infective focus  My overall impression is sepsis.  Source: Unknown  Antibiotics given-   Antibiotics (72h ago, onward)      Start     Stop Route Frequency  Ordered    05/09/25 1215  piperacillin-tazobactam (ZOSYN) 4.5 g in D5W 100 mL IVPB (MB+)         -- IV Every 8 hours (non-standard times) 05/09/25 1014          Latest lactate reviewed-  Recent Labs   Lab 05/08/25  1244 05/08/25  1706 05/10/25  2032   LACTATE  --    < > 0.6   POCLAC 3.54*  --   --     < > = values in this interval not displayed.     Organ dysfunction indicated by Encephalopathy and Thrombocytopenia     Fluid challenge Ideal Body Weight- The patient is obese (BMI>30) and their ideal body weight of Ideal body weight: 45.5 kg (100 lb 4.9 oz) will be used to calculate fluid bolus of 30 ml/kg.     Post- resuscitation assessment No - Post resuscitation assessment not needed       Will Not start Pressors- Levophed for MAP of 65  Source control achieved by: IV Zosyn and Vancomycin   -Blood and urine cultures pending   Elevated LFTs  Likely secondary to pancreatitis. US abdomen shows dilated common bile duct commonly seen s/p cholecystectomy and no obstructing stone seen.  Trend daily LFTs  Bilateral nephrolithiasis  Noted on CT abdomen/pelvis today  F/u outpatient   Acute encephalopathy  Patient presents with abdominal pain, nausea, vomiting, waxing/waning mentation from acute pancreatitis vs. unknown infectious process.  states patient has been acting strangely. Patient not tolerating po and not taking medications over the past few days.   Metabolic workup: COVID/influenza negative, ammonia normal, elevated lactic acid 3.9, elevated procalcitonin 68.6, Blood cultures and urine cultures pending.   Likely 2/2 unknown infectious process and pancreatitis contributing  Continue IV antibiotics and f/u cultures   Prn Zyprexa IM for agitation  Fall precautions/delirium precautions  Gabapentin and Carbamazepine on hold.  Avoid other mental status altering medications.    VTE Risk Mitigation (From admission, onward)           Ordered     IP VTE LOW RISK PATIENT  Once         05/08/25 1604     Place sequential  compression device  Until discontinued         05/08/25 1604                    Discharge Planning   VIDA: 5/13/2025     Code Status: Full Code   Medical Readiness for Discharge Date:   Discharge Plan A: Home with family                        Cleve Monae MD  Department of Hospital Medicine   [facility]

## 2025-05-12 ENCOUNTER — PATIENT OUTREACH (OUTPATIENT)
Dept: ADMINISTRATIVE | Facility: OTHER | Age: 60
End: 2025-05-12
Payer: MEDICARE

## 2025-05-12 PROBLEM — R78.81 BACTEREMIA DUE TO KLEBSIELLA PNEUMONIAE: Status: ACTIVE | Noted: 2025-05-12

## 2025-05-12 PROBLEM — B96.1 BACTEREMIA DUE TO KLEBSIELLA PNEUMONIAE: Status: ACTIVE | Noted: 2025-05-12

## 2025-05-12 LAB
ABSOLUTE NEUTROPHIL MANUAL (OHS): 15.4 K/UL
ALBUMIN SERPL BCP-MCNC: 2.2 G/DL (ref 3.5–5.2)
ALP SERPL-CCNC: 95 UNIT/L (ref 40–150)
ALT SERPL W/O P-5'-P-CCNC: 45 UNIT/L (ref 10–44)
ANION GAP (OHS): 6 MMOL/L (ref 8–16)
ANISOCYTOSIS BLD QL SMEAR: SLIGHT
AST SERPL-CCNC: 21 UNIT/L (ref 11–45)
BILIRUB DIRECT SERPL-MCNC: 0.8 MG/DL (ref 0.1–0.3)
BILIRUB SERPL-MCNC: 1.5 MG/DL (ref 0.1–1)
BUN SERPL-MCNC: 4 MG/DL (ref 6–20)
CALCIUM SERPL-MCNC: 7.9 MG/DL (ref 8.7–10.5)
CHLORIDE SERPL-SCNC: 113 MMOL/L (ref 95–110)
CO2 SERPL-SCNC: 21 MMOL/L (ref 23–29)
CREAT SERPL-MCNC: 0.5 MG/DL (ref 0.5–1.4)
ERYTHROCYTE [DISTWIDTH] IN BLOOD BY AUTOMATED COUNT: 14.5 % (ref 11.5–14.5)
GFR SERPLBLD CREATININE-BSD FMLA CKD-EPI: >60 ML/MIN/1.73/M2
GLUCOSE SERPL-MCNC: 124 MG/DL (ref 70–110)
HCT VFR BLD AUTO: 38.7 % (ref 37–48.5)
HGB BLD-MCNC: 13.6 GM/DL (ref 12–16)
LYMPHOCYTES NFR BLD MANUAL: 1 % (ref 18–48)
MAGNESIUM SERPL-MCNC: 1.5 MG/DL (ref 1.6–2.6)
MCH RBC QN AUTO: 32.3 PG (ref 27–31)
MCHC RBC AUTO-ENTMCNC: 35.1 G/DL (ref 32–36)
MCV RBC AUTO: 92 FL (ref 82–98)
MONOCYTES NFR BLD MANUAL: 2 % (ref 4–15)
NEUTROPHILS NFR BLD MANUAL: 56 % (ref 38–73)
NEUTS BAND NFR BLD MANUAL: 41 %
NUCLEATED RBC (/100WBC) (OHS): 0 /100 WBC
OVALOCYTES BLD QL SMEAR: ABNORMAL
PATHOLOGIST REVIEW - CBC/DIFF (OHS): NORMAL
PLATELET # BLD AUTO: 95 K/UL (ref 150–450)
PLATELET BLD QL SMEAR: ABNORMAL
PMV BLD AUTO: 10.4 FL (ref 9.2–12.9)
POIKILOCYTOSIS BLD QL SMEAR: SLIGHT
POTASSIUM SERPL-SCNC: 2.9 MMOL/L (ref 3.5–5.1)
PROT SERPL-MCNC: 5.3 GM/DL (ref 6–8.4)
RBC # BLD AUTO: 4.21 M/UL (ref 4–5.4)
SODIUM SERPL-SCNC: 140 MMOL/L (ref 136–145)
TOXIC GRANULES BLD QL SMEAR: PRESENT
W IMMUNOGLOBULIN G SUBCLASS 4: <1 MG/DL
WBC # BLD AUTO: 15.87 K/UL (ref 3.9–12.7)

## 2025-05-12 PROCEDURE — 25000242 PHARM REV CODE 250 ALT 637 W/ HCPCS: Mod: HCNC

## 2025-05-12 PROCEDURE — 36415 COLL VENOUS BLD VENIPUNCTURE: CPT | Mod: HCNC | Performed by: STUDENT IN AN ORGANIZED HEALTH CARE EDUCATION/TRAINING PROGRAM

## 2025-05-12 PROCEDURE — 21400001 HC TELEMETRY ROOM: Mod: HCNC

## 2025-05-12 PROCEDURE — 99232 SBSQ HOSP IP/OBS MODERATE 35: CPT | Mod: HCNC,,, | Performed by: NURSE PRACTITIONER

## 2025-05-12 PROCEDURE — 25000003 PHARM REV CODE 250: Mod: HCNC | Performed by: INTERNAL MEDICINE

## 2025-05-12 PROCEDURE — 94640 AIRWAY INHALATION TREATMENT: CPT | Mod: HCNC

## 2025-05-12 PROCEDURE — G0545 PR VISIT INHERENT TO INPT OR OBS CARE, INFECTIOUS DISEASE: HCPCS | Mod: HCNC,,, | Performed by: STUDENT IN AN ORGANIZED HEALTH CARE EDUCATION/TRAINING PROGRAM

## 2025-05-12 PROCEDURE — 80321 ALCOHOLS BIOMARKERS 1OR 2: CPT | Mod: HCNC | Performed by: NURSE PRACTITIONER

## 2025-05-12 PROCEDURE — 94760 N-INVAS EAR/PLS OXIMETRY 1: CPT | Mod: HCNC

## 2025-05-12 PROCEDURE — 27000221 HC OXYGEN, UP TO 24 HOURS: Mod: HCNC

## 2025-05-12 PROCEDURE — 83735 ASSAY OF MAGNESIUM: CPT | Mod: HCNC | Performed by: STUDENT IN AN ORGANIZED HEALTH CARE EDUCATION/TRAINING PROGRAM

## 2025-05-12 PROCEDURE — 80076 HEPATIC FUNCTION PANEL: CPT | Mod: HCNC | Performed by: NURSE PRACTITIONER

## 2025-05-12 PROCEDURE — 63600175 PHARM REV CODE 636 W HCPCS: Mod: HCNC | Performed by: STUDENT IN AN ORGANIZED HEALTH CARE EDUCATION/TRAINING PROGRAM

## 2025-05-12 PROCEDURE — 87040 BLOOD CULTURE FOR BACTERIA: CPT | Mod: HCNC | Performed by: STUDENT IN AN ORGANIZED HEALTH CARE EDUCATION/TRAINING PROGRAM

## 2025-05-12 PROCEDURE — 99233 SBSQ HOSP IP/OBS HIGH 50: CPT | Mod: HCNC,,, | Performed by: STUDENT IN AN ORGANIZED HEALTH CARE EDUCATION/TRAINING PROGRAM

## 2025-05-12 PROCEDURE — 25000242 PHARM REV CODE 250 ALT 637 W/ HCPCS: Mod: HCNC | Performed by: STUDENT IN AN ORGANIZED HEALTH CARE EDUCATION/TRAINING PROGRAM

## 2025-05-12 PROCEDURE — S4991 NICOTINE PATCH NONLEGEND: HCPCS | Mod: HCNC | Performed by: STUDENT IN AN ORGANIZED HEALTH CARE EDUCATION/TRAINING PROGRAM

## 2025-05-12 PROCEDURE — 94761 N-INVAS EAR/PLS OXIMETRY MLT: CPT | Mod: HCNC

## 2025-05-12 PROCEDURE — 63600175 PHARM REV CODE 636 W HCPCS: Mod: HCNC | Performed by: INTERNAL MEDICINE

## 2025-05-12 PROCEDURE — 25000003 PHARM REV CODE 250: Mod: HCNC | Performed by: STUDENT IN AN ORGANIZED HEALTH CARE EDUCATION/TRAINING PROGRAM

## 2025-05-12 PROCEDURE — 80051 ELECTROLYTE PANEL: CPT | Mod: HCNC | Performed by: STUDENT IN AN ORGANIZED HEALTH CARE EDUCATION/TRAINING PROGRAM

## 2025-05-12 RX ORDER — CEFTRIAXONE 2 G/1
2 INJECTION, POWDER, FOR SOLUTION INTRAMUSCULAR; INTRAVENOUS
Status: DISCONTINUED | OUTPATIENT
Start: 2025-05-12 | End: 2025-05-14

## 2025-05-12 RX ADMIN — IPRATROPIUM BROMIDE AND ALBUTEROL SULFATE 3 ML: 2.5; .5 SOLUTION RESPIRATORY (INHALATION) at 04:05

## 2025-05-12 RX ADMIN — BUDESONIDE 0.5 MG: 0.5 INHALANT RESPIRATORY (INHALATION) at 06:05

## 2025-05-12 RX ADMIN — IPRATROPIUM BROMIDE AND ALBUTEROL SULFATE 3 ML: 2.5; .5 SOLUTION RESPIRATORY (INHALATION) at 02:05

## 2025-05-12 RX ADMIN — CARBAMAZEPINE 200 MG: 100 TABLET, EXTENDED RELEASE ORAL at 08:05

## 2025-05-12 RX ADMIN — CARBAMAZEPINE 200 MG: 100 TABLET, EXTENDED RELEASE ORAL at 09:05

## 2025-05-12 RX ADMIN — ACETAMINOPHEN 650 MG: 325 TABLET ORAL at 04:05

## 2025-05-12 RX ADMIN — PIPERACILLIN SODIUM AND TAZOBACTAM SODIUM 4.5 G: 4; .5 INJECTION, POWDER, FOR SOLUTION INTRAVENOUS at 04:05

## 2025-05-12 RX ADMIN — Medication 1 PATCH: at 08:05

## 2025-05-12 RX ADMIN — MORPHINE SULFATE 2 MG: 4 INJECTION INTRAVENOUS at 11:05

## 2025-05-12 RX ADMIN — MORPHINE SULFATE 2 MG: 4 INJECTION INTRAVENOUS at 09:05

## 2025-05-12 RX ADMIN — DEXTROSE AND SODIUM CHLORIDE: 5; 900 INJECTION, SOLUTION INTRAVENOUS at 04:05

## 2025-05-12 RX ADMIN — BUDESONIDE 0.5 MG: 0.5 INHALANT RESPIRATORY (INHALATION) at 07:05

## 2025-05-12 RX ADMIN — CEFTRIAXONE SODIUM 2 G: 2 INJECTION, POWDER, FOR SOLUTION INTRAMUSCULAR; INTRAVENOUS at 08:05

## 2025-05-12 RX ADMIN — PIPERACILLIN SODIUM AND TAZOBACTAM SODIUM 4.5 G: 4; .5 INJECTION, POWDER, FOR SOLUTION INTRAVENOUS at 12:05

## 2025-05-12 RX ADMIN — ESCITALOPRAM OXALATE 20 MG: 10 TABLET ORAL at 08:05

## 2025-05-12 RX ADMIN — ARFORMOTEROL TARTRATE 15 MCG: 15 SOLUTION RESPIRATORY (INHALATION) at 06:05

## 2025-05-12 RX ADMIN — ARFORMOTEROL TARTRATE 15 MCG: 15 SOLUTION RESPIRATORY (INHALATION) at 07:05

## 2025-05-12 NOTE — ASSESSMENT & PLAN NOTE
Zoey Cali is a 59 year old woman with multiple sclerosis on interferon beta-1a (avonex) who was admitted 5/8 for pancreatitis and encephalopathy, found to have klebsiella pneumoniae bacteremia. No symptoms of UTI, urine culture with no significant growth. CT A/P with pancreatitis and questionable filling defect at CBD. No evidence of choledocholithiasis on MRCP.  Source either GI or . Repeat blood cultures 5/10 also positive. CT A/P with apparently incidentally noted abdominal aneurysm so not a fluoroquinolone candidate.     Recommendations  - stop pip-tazo  - start ceftriaxone 2 grams q24 hours  - will follow blood cultures

## 2025-05-12 NOTE — ASSESSMENT & PLAN NOTE
This patient does not have evidence of infective focus  My overall impression is sepsis.  Source: Unknown  Antibiotics given-   Antibiotics (72h ago, onward)      Start     Stop Route Frequency Ordered    05/09/25 1215  piperacillin-tazobactam (ZOSYN) 4.5 g in D5W 100 mL IVPB (MB+)         -- IV Every 8 hours (non-standard times) 05/09/25 1014          Latest lactate reviewed-  Recent Labs   Lab 05/10/25  2032   LACTATE 0.6     Organ dysfunction indicated by Encephalopathy and Thrombocytopenia     Fluid challenge Ideal Body Weight- The patient is obese (BMI>30) and their ideal body weight of Ideal body weight: 45.5 kg (100 lb 4.9 oz) will be used to calculate fluid bolus of 30 ml/kg.     Post- resuscitation assessment No - Post resuscitation assessment not needed       Will Not start Pressors- Levophed for MAP of 65  Source control achieved by: IV Zosyn and Vancomycin   -Blood and urine cultures pending

## 2025-05-12 NOTE — SUBJECTIVE & OBJECTIVE
Interval History: feeling greatly improved, able to tolerate PO.     Review of Systems   All other systems reviewed and are negative.    Objective:     Vital Signs (Most Recent):  Temp: 99.4 °F (37.4 °C) (05/12/25 1559)  Pulse: 93 (05/12/25 1559)  Resp: 18 (05/12/25 1559)  BP: (!) 147/82 (05/12/25 1559)  SpO2: (!) 94 % (05/12/25 1559) Vital Signs (24h Range):  Temp:  [98.1 °F (36.7 °C)-99.4 °F (37.4 °C)] 99.4 °F (37.4 °C)  Pulse:  [73-93] 93  Resp:  [14-20] 18  SpO2:  [91 %-99 %] 94 %  BP: (125-147)/(71-82) 147/82     Weight: 55.2 kg (121 lb 11.2 oz)  Body mass index is 23.77 kg/m².    Estimated Creatinine Clearance: 93.3 mL/min (based on SCr of 0.5 mg/dL).     Physical Exam  Vitals reviewed.   Constitutional:       Appearance: Normal appearance. She is not ill-appearing.   HENT:      Nose: Nose normal.   Abdominal:      Palpations: Abdomen is soft.      Tenderness: There is no abdominal tenderness.   Skin:     Findings: No lesion.   Neurological:      Mental Status: She is alert.   Psychiatric:         Mood and Affect: Mood normal.         Behavior: Behavior normal.          Significant Labs:   Microbiology Results (last 7 days)       Procedure Component Value Units Date/Time    Blood culture [[id number]]  (Normal) Collected: 05/12/25 1001    Order Status: Completed Specimen: Blood Updated: 05/12/25 1701     Blood Culture No Growth After 6 Hours    Blood culture [[id number]]  (Normal) Collected: 05/12/25 1001    Order Status: Completed Specimen: Blood Updated: 05/12/25 1701     Blood Culture No Growth After 6 Hours    Blood culture [[id number]]  (Abnormal) Collected: 05/10/25 0508    Order Status: Completed Specimen: Blood Updated: 05/12/25 0750     Blood Culture Positive - Aerobic/Pediatric Bottle     GRAM STAIN Gram Negative Rods     Comment: Aerobic Bottle Positive         Blood culture [[id number]]  (Normal) Collected: 05/10/25 0508    Order Status: Completed Specimen: Blood Updated: 05/12/25 0602     Blood  Culture No Growth After 48 Hours    Urine culture [[id number]] Collected: 05/08/25 1708    Order Status: Completed Specimen: Urine, Clean Catch Updated: 05/10/25 0832     Urine Culture No Significant Growth    Blood culture x two cultures. Draw prior to antibiotics. [[id number]]  (Abnormal) Collected: 05/08/25 1250    Order Status: Completed Specimen: Blood from Peripheral, Antecubital, Right Updated: 05/10/25 0739     Blood Culture Positive - Aerobic and Anaerobic Bottles      Klebsiella pneumoniae     GRAM STAIN Gram Negative Rods    Narrative:      Aerobic and Anaerobic Bottles Positive   Refer ID and Sensitivity at [id number]    Blood culture x two cultures. Draw prior to antibiotics. [[id number]]  (Abnormal)  (Susceptibility) Collected: 05/08/25 1250    Order Status: Completed Specimen: Blood from Peripheral, Antecubital, Left Updated: 05/10/25 0737     Blood Culture Positive - Aerobic and Anaerobic Bottles      Klebsiella pneumoniae     GRAM STAIN Gram Negative Rods    Narrative:      Aerobic and Anaerobic Bottles Positive     Rapid Organism ID by PCR (from Blood culture) [[id number]]  (Abnormal) Collected: 05/08/25 1250    Order Status: Completed Specimen: Blood from Peripheral, Antecubital, Right Updated: 05/09/25 1415     Enterococcus faecalis Not Detected     Enterococcus faecium Not Detected     Listeria monocytogenes Not Detected     Staphylococcus spp. Not Detected     Staphylococcus aureus Not Detected     Staphylococcus epidermidis Not Detected     Staphylococcus lugdunensis Not Detected     Streptococcus spp. Not Detected     Streptococcus agalactiae (Group B) Not Detected     Streptococcus pneumoniae Not Detected     Streptococcus pyogenes (Group A) Not Detected     Acinetobacter calcoaceticus/baumannii complex Not Detected     Bacteroides fragilis Not Detected     Enterobacterales See Species for ID     Enterobacter cloacae complex Not Detected     Escherichia coli Not Detected     Klebsiella  aerogenes Not Detected     Klebsiella oxytoca Not Detected     Klebsiella pneumoniae group Detected     Proteus spp. Not Detected     Salmonella spp. Not Detected     Serratia marcescens Not Detected     Haemophilus influenzae Not Detected     Neisseria meningitidis Not Detected     Pseudomonas aeruginosa Not Detected     Stenotrophomonas maltophilia Not Detected     Candida albicans Not Detected     Candida auris Not Detected     Candida glabrata Not Detected     Candida krusei Not Detected     Candida parapsilosis Not Detected     Candida tropicalis Not Detected     Cryptococcus neoformans/gattii Not Detected     CTX-M (ESBL ) Not Detected     Comment: Note: Antimicrobial resistance can occur via multiple mechanisms. A Not Detected result for antimicrobial resistance gene(s) does not indicate antimicrobial susceptibility. Subculturing is required for species identification and susceptibility testing of   isolates.        IMP (Cabapenemase ) Not Detected     Comment: Note: Antimicrobial resistance can occur via multiple mechanisms. A Not Detected result for antimicrobial resistance gene(s) does not indicate antimicrobial susceptibility. Subculturing is required for species identification and susceptibility testing of   isolates.        KPC resistance gene (Carbapenemase ) Not Detected     Comment: Note: Antimicrobial resistance can occur via multiple mechanisms. A Not Detected result for antimicrobial resistance gene(s) does not indicate antimicrobial susceptibility. Subculturing is required for species identification and susceptibility testing of   isolates.        mcr-1 Not Detected     Comment: Note: Antimicrobial resistance can occur via multiple mechanisms. A Not Detected result for antimicrobial resistance gene(s) does not indicate antimicrobial susceptibility. Subculturing is required for species identification and susceptibility testing of   isolates.        mecA ID N/A     Comment:  Note: Antimicrobial resistance can occur via multiple mechanisms. A Not Detected result for antimicrobial resistance gene(s) does not indicate antimicrobial susceptibility. Subculturing is required for species identification and susceptibility testing of   isolates.        mecA/C and MREJ (MRSA) gene N/A     Comment: Note: Antimicrobial resistance can occur via multiple mechanisms. A Not Detected result for antimicrobial resistance gene(s) does not indicate antimicrobial susceptibility. Subculturing is required for species identification and susceptibility testing of   isolates.        NDM (Carbapenemase ) Not Detected     Comment: Note: Antimicrobial resistance can occur via multiple mechanisms. A Not Detected result for antimicrobial resistance gene(s) does not indicate antimicrobial susceptibility. Subculturing is required for species identification and susceptibility testing of   isolates.        OXA-48-like (Carbapenemase ) Not Detected     Comment: Note: Antimicrobial resistance can occur via multiple mechanisms. A Not Detected result for antimicrobial resistance gene(s) does not indicate antimicrobial susceptibility. Subculturing is required for species identification and susceptibility testing of   isolates.        Pro/B (VRE gene) N/A     Comment: Note: Antimicrobial resistance can occur via multiple mechanisms. A Not Detected result for antimicrobial resistance gene(s) does not indicate antimicrobial susceptibility. Subculturing is required for species identification and susceptibility testing of   isolates.        VIM (Carbapenemase ) Not Detected     Comment: Note: Antimicrobial resistance can occur via multiple mechanisms. A Not Detected result for antimicrobial resistance gene(s) does not indicate antimicrobial susceptibility. Subculturing is required for species identification and susceptibility testing of   isolates.               Significant Imaging: I have reviewed all  pertinent imaging results/findings within the past 24 hours.

## 2025-05-12 NOTE — PROGRESS NOTES
[facility]urg  Infectious Disease  Progress Note    Patient Name: Zoey Cali  MRN: [id number]  Admission Date: 5/8/2025  Length of Stay: 4 days  Attending Physician: Cleve Monae MD  Primary Care Provider: Devon Collier MD    Isolation Status: No active isolations  Assessment/Plan:      ID  Bacteremia due to Klebsiella pneumoniae  Zoey Cali is a 59 year old woman with multiple sclerosis on interferon beta-1a (avonex) who was admitted 5/8 for pancreatitis and encephalopathy, found to have klebsiella pneumoniae bacteremia. No symptoms of UTI, urine culture with no significant growth. CT A/P with pancreatitis and questionable filling defect at CBD. No evidence of choledocholithiasis on MRCP.  Source either GI or . Repeat blood cultures 5/10 also positive. CT A/P with apparently incidentally noted abdominal aneurysm so not a fluoroquinolone candidate.     Recommendations  - stop pip-tazo  - start ceftriaxone 2 grams q24 hours  - will follow blood cultures        Above discussed with primary team.     Time: 50 minutes   50% of time spent on face-to-face counseling and coordination of care. Counseling included review of test results, diagnosis, and treatment plan with patient and/or family.  I have reviewed hospital notes from HM service and other specialty providers as well as outside medical records. I have also reviewed CBC, CMP/BMP,  cultures and imaging with my interpretation as documented. Patient is high risk of morbidity, on antibiotics requiring intensive monitoring for toxicity.     Anticipated Disposition: TBD    Thank you for your consult. I will follow-up with patient. Please contact us if you have any additional questions.    Darleen Garcia MD  Infectious Disease  [facility] - Med Surg    Subjective:     Principal Problem:Acute pancreatitis    HPI: Mrs. Cali is a 59 y.o. woman with a multiple sclerosis presents to the hospital via EMS with weakness and altered mental status. Her   reports that she had abdominal pain on Tuesday and became confused on Wednesday. He denies that she complained of headache, dysuria, or diarrhea. Denies EtOH use. In the ED, she had a mild leukocytosis, abn LFTs, and increased lipase. A CT abdomen pelvis shows (1) pancreatic inflammation concerning for pancreatitis (2) surgical change of cholecystectomy noting prominent intra and extrahepatic biliary dilation (3) bilateral nonobstructive nephrolithiasis.  The patient was started on Zosyn and vancomycin. This morning, her blood cultures started growing a GNB (BCID pending) and ID is consulted for that. Her vancomycin was discontinued. Currently, the patient is encephalopathic and does not follow commands. She appears non-toxic, however.   Interval History: feeling greatly improved, able to tolerate PO.     Review of Systems   All other systems reviewed and are negative.    Objective:     Vital Signs (Most Recent):  Temp: 99.4 °F (37.4 °C) (05/12/25 1559)  Pulse: 93 (05/12/25 1559)  Resp: 18 (05/12/25 1559)  BP: (!) 147/82 (05/12/25 1559)  SpO2: (!) 94 % (05/12/25 1559) Vital Signs (24h Range):  Temp:  [98.1 °F (36.7 °C)-99.4 °F (37.4 °C)] 99.4 °F (37.4 °C)  Pulse:  [73-93] 93  Resp:  [14-20] 18  SpO2:  [91 %-99 %] 94 %  BP: (125-147)/(71-82) 147/82     Weight: 55.2 kg (121 lb 11.2 oz)  Body mass index is 23.77 kg/m².    Estimated Creatinine Clearance: 93.3 mL/min (based on SCr of 0.5 mg/dL).     Physical Exam  Vitals reviewed.   Constitutional:       Appearance: Normal appearance. She is not ill-appearing.   HENT:      Nose: Nose normal.   Abdominal:      Palpations: Abdomen is soft.      Tenderness: There is no abdominal tenderness.   Skin:     Findings: No lesion.   Neurological:      Mental Status: She is alert.   Psychiatric:         Mood and Affect: Mood normal.         Behavior: Behavior normal.          Significant Labs:   Microbiology Results (last 7 days)       Procedure Component Value Units  Date/Time    Blood culture [[id number]]  (Normal) Collected: 05/12/25 1001    Order Status: Completed Specimen: Blood Updated: 05/12/25 1701     Blood Culture No Growth After 6 Hours    Blood culture [[id number]]  (Normal) Collected: 05/12/25 1001    Order Status: Completed Specimen: Blood Updated: 05/12/25 1701     Blood Culture No Growth After 6 Hours    Blood culture [[id number]]  (Abnormal) Collected: 05/10/25 0508    Order Status: Completed Specimen: Blood Updated: 05/12/25 0750     Blood Culture Positive - Aerobic/Pediatric Bottle     GRAM STAIN Gram Negative Rods     Comment: Aerobic Bottle Positive         Blood culture [[id number]]  (Normal) Collected: 05/10/25 0508    Order Status: Completed Specimen: Blood Updated: 05/12/25 0602     Blood Culture No Growth After 48 Hours    Urine culture [[id number]] Collected: 05/08/25 1708    Order Status: Completed Specimen: Urine, Clean Catch Updated: 05/10/25 0832     Urine Culture No Significant Growth    Blood culture x two cultures. Draw prior to antibiotics. [[id number]]  (Abnormal) Collected: 05/08/25 1250    Order Status: Completed Specimen: Blood from Peripheral, Antecubital, Right Updated: 05/10/25 0739     Blood Culture Positive - Aerobic and Anaerobic Bottles      Klebsiella pneumoniae     GRAM STAIN Gram Negative Rods    Narrative:      Aerobic and Anaerobic Bottles Positive   Refer ID and Sensitivity at [id number]    Blood culture x two cultures. Draw prior to antibiotics. [[id number]]  (Abnormal)  (Susceptibility) Collected: 05/08/25 1250    Order Status: Completed Specimen: Blood from Peripheral, Antecubital, Left Updated: 05/10/25 0737     Blood Culture Positive - Aerobic and Anaerobic Bottles      Klebsiella pneumoniae     GRAM STAIN Gram Negative Rods    Narrative:      Aerobic and Anaerobic Bottles Positive     Rapid Organism ID by PCR (from Blood culture) [[id number]]  (Abnormal) Collected: 05/08/25 1250    Order Status: Completed Specimen:  Blood from Peripheral, Antecubital, Right Updated: 05/09/25 1415     Enterococcus faecalis Not Detected     Enterococcus faecium Not Detected     Listeria monocytogenes Not Detected     Staphylococcus spp. Not Detected     Staphylococcus aureus Not Detected     Staphylococcus epidermidis Not Detected     Staphylococcus lugdunensis Not Detected     Streptococcus spp. Not Detected     Streptococcus agalactiae (Group B) Not Detected     Streptococcus pneumoniae Not Detected     Streptococcus pyogenes (Group A) Not Detected     Acinetobacter calcoaceticus/baumannii complex Not Detected     Bacteroides fragilis Not Detected     Enterobacterales See Species for ID     Enterobacter cloacae complex Not Detected     Escherichia coli Not Detected     Klebsiella aerogenes Not Detected     Klebsiella oxytoca Not Detected     Klebsiella pneumoniae group Detected     Proteus spp. Not Detected     Salmonella spp. Not Detected     Serratia marcescens Not Detected     Haemophilus influenzae Not Detected     Neisseria meningitidis Not Detected     Pseudomonas aeruginosa Not Detected     Stenotrophomonas maltophilia Not Detected     Candida albicans Not Detected     Candida auris Not Detected     Candida glabrata Not Detected     Candida krusei Not Detected     Candida parapsilosis Not Detected     Candida tropicalis Not Detected     Cryptococcus neoformans/gattii Not Detected     CTX-M (ESBL ) Not Detected     Comment: Note: Antimicrobial resistance can occur via multiple mechanisms. A Not Detected result for antimicrobial resistance gene(s) does not indicate antimicrobial susceptibility. Subculturing is required for species identification and susceptibility testing of   isolates.        IMP (Cabapenemase ) Not Detected     Comment: Note: Antimicrobial resistance can occur via multiple mechanisms. A Not Detected result for antimicrobial resistance gene(s) does not indicate antimicrobial susceptibility. Subculturing is  required for species identification and susceptibility testing of   isolates.        KPC resistance gene (Carbapenemase ) Not Detected     Comment: Note: Antimicrobial resistance can occur via multiple mechanisms. A Not Detected result for antimicrobial resistance gene(s) does not indicate antimicrobial susceptibility. Subculturing is required for species identification and susceptibility testing of   isolates.        mcr-1 Not Detected     Comment: Note: Antimicrobial resistance can occur via multiple mechanisms. A Not Detected result for antimicrobial resistance gene(s) does not indicate antimicrobial susceptibility. Subculturing is required for species identification and susceptibility testing of   isolates.        mecA ID N/A     Comment: Note: Antimicrobial resistance can occur via multiple mechanisms. A Not Detected result for antimicrobial resistance gene(s) does not indicate antimicrobial susceptibility. Subculturing is required for species identification and susceptibility testing of   isolates.        mecA/C and MREJ (MRSA) gene N/A     Comment: Note: Antimicrobial resistance can occur via multiple mechanisms. A Not Detected result for antimicrobial resistance gene(s) does not indicate antimicrobial susceptibility. Subculturing is required for species identification and susceptibility testing of   isolates.        NDM (Carbapenemase ) Not Detected     Comment: Note: Antimicrobial resistance can occur via multiple mechanisms. A Not Detected result for antimicrobial resistance gene(s) does not indicate antimicrobial susceptibility. Subculturing is required for species identification and susceptibility testing of   isolates.        OXA-48-like (Carbapenemase ) Not Detected     Comment: Note: Antimicrobial resistance can occur via multiple mechanisms. A Not Detected result for antimicrobial resistance gene(s) does not indicate antimicrobial susceptibility. Subculturing is required for  species identification and susceptibility testing of   isolates.        Pro/B (VRE gene) N/A     Comment: Note: Antimicrobial resistance can occur via multiple mechanisms. A Not Detected result for antimicrobial resistance gene(s) does not indicate antimicrobial susceptibility. Subculturing is required for species identification and susceptibility testing of   isolates.        VIM (Carbapenemase ) Not Detected     Comment: Note: Antimicrobial resistance can occur via multiple mechanisms. A Not Detected result for antimicrobial resistance gene(s) does not indicate antimicrobial susceptibility. Subculturing is required for species identification and susceptibility testing of   isolates.               Significant Imaging: I have reviewed all pertinent imaging results/findings within the past 24 hours.

## 2025-05-12 NOTE — PLAN OF CARE
Changes in medical condition or discharge plan:Repeating blood cultures. Last culture growing Kleb.    Does patient need new DME? TBD    Follow up appts needed: PCP, ID    Medically stable: No    Estimated Discharge Date:  5/14/25    CM will assist as needed.     05/12/25 1316   Discharge Reassessment   Assessment Type Discharge Planning Reassessment   Did the patient's condition or plan change since previous assessment? Yes   Discharge Plan discussed with: Patient;Adult children   Communicated VIDA with patient/caregiver Yes   Discharge Plan A Home with family   Discharge Plan B Other  (tbd)   DME Needed Upon Discharge  other (see comments)  (tbd)   Transition of Care Barriers None   Why the patient remains in the hospital Requires continued medical care   Post-Acute Status   Coverage [employer] MANAGED MEDICARE - [employer] MEDICARE PPO   Discharge Delays None known at this time

## 2025-05-12 NOTE — NURSING
[facility], [facility]  Nurses Note -- 4 Eyes      5/12/2025       Skin assessed on: Q Shift      [x] No Pressure Injuries Present    [x]Prevention Measures Documented    [] Yes LDA  for Pressure Injury Previously documented     [] Yes New Pressure Injury Discovered   [] LDA for New Pressure Injury Added      Attending RN:  Leola Melendez RN     Second RN:  TIFFANI Gleason

## 2025-05-12 NOTE — NURSING
Pt  noted on the computer in her room on her tablet and c/o H.A discomforts.  Tylenol 650mg given p.o for pain .  Pt began to breath approximately 28 breaths per min stating that she think that she was breathing hard because though she did not have a hx of COPD but that she had broken one of her ribs in the pass and have been on a ventolin inhaler for years.  Respiratory therapy came and pt was given breathing treatment then calm down. RR even and regular at 24 breaths /min. O2 at 3 L/min. And pulse oximeter at 96/min.  Will continue to monitor.

## 2025-05-12 NOTE — NURSING
Report received from the off - going nurse. Pt noted with RR even and regular  with O2 at  @ 2Lper NC per minute. Skin warm and dry . Pt noted alert and oriented x 4. Abdomen large and round and nondistended . Pt moves all extremities well . Urinary elimination via perewick . Call light within reach and pt is instructed how to use the call light.  Bed in the lowest position with SR's elevated x 3.  Pt encouraged to call for staff before getting up OOB.  Bed alarm on.  Saline lock intact with no evidence of redness or infiltration noted in the lt lateral and media arm position. Will continue to monitor.

## 2025-05-12 NOTE — NURSING
[facility], [facility] [place]  Nurses Note -- 4 Eyes      5/12/2025       Skin assessed on: Q Shift      [x] No Pressure Injuries Present    []Prevention Measures Documented    [] Yes LDA  for Pressure Injury Previously documented     [] Yes New Pressure Injury Discovered   [] LDA for New Pressure Injury Added      Attending RN:  Rosibel Kennedy RN     Second RN:  TIFFANI Bhagat

## 2025-05-12 NOTE — PLAN OF CARE
Encourage pt to TCDB; teach pt coping skills and how to calm down before breathing is effected; Encourage self management and personal hygiene care  Problem: Adult Inpatient Plan of Care  Goal: Plan of Care Review  Outcome: Progressing  Goal: Patient-Specific Goal (Individualized)  Outcome: Progressing  Goal: Absence of Hospital-Acquired Illness or Injury  Outcome: Progressing  Goal: Optimal Comfort and Wellbeing  Outcome: Progressing  Goal: Readiness for Transition of Care  Outcome: Progressing

## 2025-05-12 NOTE — ASSESSMENT & PLAN NOTE
Patient's blood pressure range in the last 24 hours was: BP  Min: 116/58  Max: 133/71.The patient's inpatient anti-hypertensive regimen is listed below:  Current Antihypertensives  hydrALAZINE injection 10 mg, Every 4 hours PRN, Intravenous  hydrALAZINE injection 30 mg, Every 4 hours PRN, Intravenous  hydrALAZINE injection 20 mg, Every 4 hours PRN, Intravenous    Plan  - BP is controlled, no changes needed to their regimen  - Patient currently not on any antihypertensives

## 2025-05-12 NOTE — PROGRESS NOTES
[facility] Medicine  Progress Note    Patient Name: Zoey Cali  MRN: [id number]  Patient Class: IP- Inpatient   Admission Date: 5/8/2025  Length of Stay: 4 days  Attending Physician: Cleve Monae MD  Primary Care Provider: Devon Collier MD        Subjective     Principal Problem:Acute pancreatitis        HPI:  Zoey Cali is a 59 y.o. female with a pmh of hypertension, hyperlipidemia, multiple sclerosis, COPD, iron-deficiency anemia, osteoporosis presents to the hospital via EMS with weakness and altered mental status.    History obtained by independent historian  Jared on the phone who states that patient has been acting inappropriately over the past few days. Jared states that her symptoms started few days ago with the abdominal pain and spasms and patient was unable to tolerate p.o.. This has happened in the past and patient would drink some milk and it would go away. However, this time pain and associated symptoms got worse. Patient has a had several episodes of vomiting.  states that patient has become more confused. Patient unable to take any of her home medications. Unable to express exactly where her pain is located.  Review of systems complicated by patient's change in mentation.    In the ED: Patient afebrile with leukocytosis (WBC 15.87), thrombocytopenic with a platelet count 95, BUN 24, GFR 47, phosphorus 4.6, magnesium 1.3, elevated , T bili 6.9, elevated AST//273 respectively, normal ammonia level, lipase>1000, initial troponin normal, elevated lactic acid 3.54, elevated procalcitonin 68.66, COVID influenza negative, blood cultures pending. CT abdomen pelvis shows (1) pancreatic inflammation concerning for pancreatitis (2) surgical change of cholecystectomy noting prominent intra and extrahepatic biliary dilation (3) bilateral nonobstructive nephrolithiasis.  Chest x-ray shows chronic appearing interstitial findings consistent with history of  COPD/emphysema.  Ultrasound abdomen pending.  Patient given 1 L LR bolus x2 IV morphine 4 mg x 2, IV Zosyn 4.5 g, IV vancomycin, and IV Haldol 2.5 mg in the ED. Case discussed with ED provider and patient admitted to hospital medicine for further medical management.    Overview/Hospital Course:  Acute pancreatitis (Lipase >1000) with intractable n/v and sepsis. Very high procalcitonin at 69 and toxic granulation suggest severe systemic bacterial infection. Started on V+Z. Mild LUBNA with Cr 1.3, resolving to 0.6 after fluids. TG wnl/not cause of pancreatitis,  states she is not a big drinker, and no stones appreciated. Tbili is elevated at 6.9 and LFTs up. Will consult GI. She did have a positive RONAL a few months ago- IgG4 sent off to r/o IgG4 -related pancreatitis.     GNR bacteremia resulted. Will stop Vanc, continue Zosyn for now. Do not see any drainable fluid/abscess on CT. Will need 2 weeks of abx from clear cultures for bacteremia.     PCR showing Kleb. Mentation improving a bit. , she likely has Pancreatitis Encephalopathy superimposed on Sepsis AMS. Will control BP a bit more than usually with sepsis, as PRES can play a role in this. Improving overall. A bit SOB after much fluid, will give a dose of lasix. Replace electrolytes.     Stringer-sensitive Kleb, suspect can de-escalate to CTX- defer to ID. Repeat blcx have come back positive again, will re-order.     Interval History:  NAEON.  No new issues.   CC- n/v, abdo pain  All questions answered and updates on care given.       ROS:  Unable to participate, acuity      Vitals:    05/12/25 0441 05/12/25 0654 05/12/25 0722 05/12/25 0751   BP: 128/79  125/74    BP Location: Left arm      Patient Position: Lying      Pulse: 85 80 82 79   Resp: 18 14 16    Temp: 98.4 °F (36.9 °C)  98.4 °F (36.9 °C)    TempSrc: Oral  Oral    SpO2: 96% 98% 99%    Weight:       Height:              Body mass index is 23.77 kg/m².      PHYSICAL EXAM:  GENERAL APPEARANCE:  alert and cooperative.      HEAD: NC/AT, +scleral icterus  CARDIAC: There is no cyanosis or pallor.   LUNGS: No apparent wheezing or stridor.  ABDOMEN: Non-distended. No guarding.  MSK: No joint erythema or tenderness.   EXTREMITIES: No significant new deformity or new joint abnormality.   NEUROLOGICAL: +confusion, AMS, improving  SKIN: No lesions or eruptions. +Jaundiced  PSYCHIATRIC: No tangential speech. No Hyperactive features.        Recent Results (from the past 24 hours)   POCT glucose    Collection Time: 05/11/25  8:29 PM   Result Value Ref Range    POCT Glucose 134 (H) 70 - 110 mg/dL       Microbiology Results (last 7 days)       Procedure Component Value Units Date/Time    Blood culture [[id number]]     Order Status: Sent Specimen: Blood     Blood culture [[id number]]     Order Status: Sent Specimen: Blood     Blood culture [[id number]]  (Abnormal) Collected: 05/10/25 0508    Order Status: Completed Specimen: Blood Updated: 05/12/25 0750     Blood Culture Positive - Aerobic/Pediatric Bottle     GRAM STAIN Gram Negative Rods     Comment: Aerobic Bottle Positive         Blood culture [[id number]]  (Normal) Collected: 05/10/25 0508    Order Status: Completed Specimen: Blood Updated: 05/12/25 0602     Blood Culture No Growth After 48 Hours    Urine culture [[id number]] Collected: 05/08/25 1708    Order Status: Completed Specimen: Urine, Clean Catch Updated: 05/10/25 0832     Urine Culture No Significant Growth    Blood culture x two cultures. Draw prior to antibiotics. [[id number]]  (Abnormal) Collected: 05/08/25 1250    Order Status: Completed Specimen: Blood from Peripheral, Antecubital, Right Updated: 05/10/25 0739     Blood Culture Positive - Aerobic and Anaerobic Bottles      Klebsiella pneumoniae     GRAM STAIN Gram Negative Rods    Narrative:      Aerobic and Anaerobic Bottles Positive   Refer ID and Sensitivity at [id number]    Blood culture x two cultures. Draw prior to antibiotics. [[id number]]   (Abnormal)  (Susceptibility) Collected: 05/08/25 1250    Order Status: Completed Specimen: Blood from Peripheral, Antecubital, Left Updated: 05/10/25 0737     Blood Culture Positive - Aerobic and Anaerobic Bottles      Klebsiella pneumoniae     GRAM STAIN Gram Negative Rods    Narrative:      Aerobic and Anaerobic Bottles Positive     Rapid Organism ID by PCR (from Blood culture) [[id number]]  (Abnormal) Collected: 05/08/25 1250    Order Status: Completed Specimen: Blood from Peripheral, Antecubital, Right Updated: 05/09/25 1415     Enterococcus faecalis Not Detected     Enterococcus faecium Not Detected     Listeria monocytogenes Not Detected     Staphylococcus spp. Not Detected     Staphylococcus aureus Not Detected     Staphylococcus epidermidis Not Detected     Staphylococcus lugdunensis Not Detected     Streptococcus spp. Not Detected     Streptococcus agalactiae (Group B) Not Detected     Streptococcus pneumoniae Not Detected     Streptococcus pyogenes (Group A) Not Detected     Acinetobacter calcoaceticus/baumannii complex Not Detected     Bacteroides fragilis Not Detected     Enterobacterales See Species for ID     Enterobacter cloacae complex Not Detected     Escherichia coli Not Detected     Klebsiella aerogenes Not Detected     Klebsiella oxytoca Not Detected     Klebsiella pneumoniae group Detected     Proteus spp. Not Detected     Salmonella spp. Not Detected     Serratia marcescens Not Detected     Haemophilus influenzae Not Detected     Neisseria meningitidis Not Detected     Pseudomonas aeruginosa Not Detected     Stenotrophomonas maltophilia Not Detected     Candida albicans Not Detected     Candida auris Not Detected     Candida glabrata Not Detected     Candida krusei Not Detected     Candida parapsilosis Not Detected     Candida tropicalis Not Detected     Cryptococcus neoformans/gattii Not Detected     CTX-M (ESBL ) Not Detected     Comment: Note: Antimicrobial resistance can occur via  multiple mechanisms. A Not Detected result for antimicrobial resistance gene(s) does not indicate antimicrobial susceptibility. Subculturing is required for species identification and susceptibility testing of   isolates.        IMP (Cabapenemase ) Not Detected     Comment: Note: Antimicrobial resistance can occur via multiple mechanisms. A Not Detected result for antimicrobial resistance gene(s) does not indicate antimicrobial susceptibility. Subculturing is required for species identification and susceptibility testing of   isolates.        KPC resistance gene (Carbapenemase ) Not Detected     Comment: Note: Antimicrobial resistance can occur via multiple mechanisms. A Not Detected result for antimicrobial resistance gene(s) does not indicate antimicrobial susceptibility. Subculturing is required for species identification and susceptibility testing of   isolates.        mcr-1 Not Detected     Comment: Note: Antimicrobial resistance can occur via multiple mechanisms. A Not Detected result for antimicrobial resistance gene(s) does not indicate antimicrobial susceptibility. Subculturing is required for species identification and susceptibility testing of   isolates.        mecA ID N/A     Comment: Note: Antimicrobial resistance can occur via multiple mechanisms. A Not Detected result for antimicrobial resistance gene(s) does not indicate antimicrobial susceptibility. Subculturing is required for species identification and susceptibility testing of   isolates.        mecA/C and MREJ (MRSA) gene N/A     Comment: Note: Antimicrobial resistance can occur via multiple mechanisms. A Not Detected result for antimicrobial resistance gene(s) does not indicate antimicrobial susceptibility. Subculturing is required for species identification and susceptibility testing of   isolates.        NDM (Carbapenemase ) Not Detected     Comment: Note: Antimicrobial resistance can occur via multiple mechanisms. A  Not Detected result for antimicrobial resistance gene(s) does not indicate antimicrobial susceptibility. Subculturing is required for species identification and susceptibility testing of   isolates.        OXA-48-like (Carbapenemase ) Not Detected     Comment: Note: Antimicrobial resistance can occur via multiple mechanisms. A Not Detected result for antimicrobial resistance gene(s) does not indicate antimicrobial susceptibility. Subculturing is required for species identification and susceptibility testing of   isolates.        Pro/B (VRE gene) N/A     Comment: Note: Antimicrobial resistance can occur via multiple mechanisms. A Not Detected result for antimicrobial resistance gene(s) does not indicate antimicrobial susceptibility. Subculturing is required for species identification and susceptibility testing of   isolates.        VIM (Carbapenemase ) Not Detected     Comment: Note: Antimicrobial resistance can occur via multiple mechanisms. A Not Detected result for antimicrobial resistance gene(s) does not indicate antimicrobial susceptibility. Subculturing is required for species identification and susceptibility testing of   isolates.                Imaging Results              US Abdomen Limited (Final result)  Result time 05/08/25 16:35:15      Final result by Quinton Sandoval MD (05/08/25 16:35:15)                   Impression:      Dilated common bile duct, commonly seen post cholecystectomy.  No obstructing stone identified.      Electronically signed by: Quinton Sandoval MD  Date:    05/08/2025  Time:    16:35               Narrative:    EXAMINATION:  US ABDOMEN LIMITED    CLINICAL HISTORY:  abdominal pain, elevated LFTs;    TECHNIQUE:  Limited ultrasound of the right upper quadrant of the abdomen (including pancreas, liver, gallbladder, common bile duct, and spleen) was performed.    COMPARISON:  None.    FINDINGS:  The visualized portions of the pancreas, abdominal aorta, and IVC are  unremarkable.    Gallbladder: Absent.  Negative sonographic Tate's sign.    Biliary system: The common duct is enlarged, measuring 9 mm.  Mild intrahepatic ductal dilatation.    Liver: Measures 13.3 cm.  There is homogeneous echotexture. No focal hepatic lesions.    Spleen: Normal in size  measuring 11.6 cm.                                        CT Abdomen Pelvis With IV Contrast NO Oral Contrast (Final result)  Result time 05/08/25 14:57:54      Final result by Solomon Gonzalez MD (05/08/25 14:57:54)                   Impression:      This report was flagged in Epic as abnormal.    1. Inflammation about the pancreas particularly at the pancreatic head, correlation with laboratory values recommended as findings are concerning for pancreatitis.  There is adjacent reactive lymphadenopathy.  2. There is surgical change of cholecystectomy noting prominent intra and extrahepatic biliary dilation.  No recent exams are available for comparison.  There is a questionable filling defect within the distal aspect of the common duct versus superimposition of duodenal soft tissue, choledocholithiasis cannot be definitively excluded.  3. Bilateral nonobstructive nephrolithiasis.  4. Please see above for several additional findings.      Electronically signed by: Solomon Gonzalez MD  Date:    05/08/2025  Time:    14:57               Narrative:    EXAMINATION:  CT ABDOMEN PELVIS WITH IV CONTRAST    CLINICAL HISTORY:  Nausea/vomiting;Epigastric pain;    TECHNIQUE:  Low dose axial images, sagittal and coronal reformations were obtained from the lung bases to the pubic symphysis following the IV administration of 75 mL of Omnipaque 350 .  Oral contrast was not given.    COMPARISON:  None.    FINDINGS:  Images of the lower thorax are remarkable for bilateral dependent atelectasis/scarring.  There are several lymph nodes along the aortic hiatus particularly on the right.    Allowing for motion artifact, the liver, and adrenal glands  are grossly unremarkable.  The spleen is enlarged.  There is some indistinctness about the pancreatic head, the pancreas enhances homogeneously.  The gallbladder is surgically absent noting intra and extrahepatic biliary dilation status post cholecystectomy.  At the distal aspect of the common duct, there is a questionable filling defect versus redundancy of the duodenal parenchyma in the region.  This is best seen on coronal image 601, and measures 3 mm.  There is mild fluid at the level of the ashish hepatis.  The portal vein, splenic vein, SMV, celiac axis and SMA all are patent.  No significant abdominal lymphadenopathy.  There is a small amount of strand-like fluid in the mid abdomen.    The kidneys enhance symmetrically without hydronephrosis.  There is bilateral nonobstructive nephrolithiasis.  Low attenuating lesions arise from the kidneys, too small for characterization.  The bilateral ureters are unable to be followed in their entirety to the urinary bladder, no definite calculi seen or secondary findings to suggest obstructive uropathy.  The urinary bladder is nondistended.  The uterus is absent the adnexa is unremarkable.    The distal large bowel is for the most part decompressed.  The terminal ileum is unremarkable.  The appendix is not confidently identified, no pericecal inflammation.  The small bowel is grossly unremarkable.  There are a few scattered shotty periaortic, pericaval, and mesenteric lymph nodes.  There is atherosclerotic calcification of the aorta and its branches.  No focal organized pelvic fluid collection.    There is osteopenia.  There are degenerative changes of the bilateral sacroiliac joints, pubic symphysis, and spine.  No significant inguinal lymphadenopathy.                                       X-Ray Chest AP Portable (Final result)  Result time 05/08/25 13:07:25      Final result by Solomon Gonzalez MD (05/08/25 13:07:25)                   Impression:      1. Chronic  appearing interstitial findings, underlying edema is a consideration.  Correlation with any history of COPD/emphysema.      Electronically signed by: Solomon Gonzalez MD  Date:    05/08/2025  Time:    13:07               Narrative:    EXAMINATION:  XR CHEST AP PORTABLE    CLINICAL HISTORY:  Sepsis;    TECHNIQUE:  Single frontal view of the chest was performed.    COMPARISON:  07/08/2019    FINDINGS:  The cardiomediastinal silhouette is not enlarged noting calcification of the aorta..  There is no pleural effusion.  The trachea is midline.  The lungs are symmetrically expanded bilaterally with mildly coarse interstitial attenuation and bilateral basilar subsegmental atelectasis..  No large focal consolidation seen.  There is no pneumothorax.  The osseous structures are remarkable for degenerative change..                                               Assessment & Plan  Acute pancreatitis  Patient presents with worsening abdominal pain with associated nausea, vomiting, and altered mentation.  Lipase >1000.  CT with pancreatic head inflammation concerning for pancreatitis.  Patient denies alcohol use or any new medications.  No gallstones noted on imaging. Unclear etiology of pancreatitis at this time.  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    Plan:  NPO   IVF with  cc/hr for 20 hours  TG and IgG4 ordered and pending   Pain control with IV morphine 2 mg q.4 hours for moderate pain and IV morphine 6 mg q.4 hours for severe pain  Maintain on telemetry  Continue supportive care  HTN (hypertension)  Patient's blood pressure range in the last 24 hours was: BP  Min: 116/58  Max: 133/71.The patient's inpatient anti-hypertensive regimen is listed below:  Current Antihypertensives  hydrALAZINE injection 10 mg, Every 4 hours PRN, Intravenous  hydrALAZINE injection 30 mg, Every 4 hours PRN, Intravenous  hydrALAZINE injection 20 mg, Every 4 hours PRN,  Intravenous    Plan  - BP is controlled, no changes needed to their regimen  - Patient currently not on any antihypertensives  Multiple sclerosis  History noted, on Avonex injections weekly   Iron deficiency anemia  Anemia is likely due to Iron deficiency. Most recent hemoglobin and hematocrit are listed below.  Recent Labs     05/10/25  0508 05/10/25  2032   HGB 10.8* 11.0*   HCT 32.3* 32.2*     Plan  - Monitor serial CBC: Daily  - Transfuse PRBC if patient becomes hemodynamically unstable, symptomatic or H/H drops below 7/21.  - Patient has not received any PRBC transfusions to date  - Patient's anemia is currently stable  Tobacco abuse  Patient with a history of smoking 1ppd, 30 pack year history   Will need smoking cessation counseling once mentation improves   Neuropathic pain  Hold home gabapentin and carbamazepine in the setting of pancreatitis and acute encephalopathy  Chronic obstructive pulmonary disease, unspecified  Patient's COPD is controlled currently.  Patient is currently off COPD Pathway. Continue scheduled inhalers Duonebs and Supplemental oxygen and monitor respiratory status closely.   Sepsis  This patient does not have evidence of infective focus  My overall impression is sepsis.  Source: Unknown  Antibiotics given-   Antibiotics (72h ago, onward)      Start     Stop Route Frequency Ordered    05/09/25 1215  piperacillin-tazobactam (ZOSYN) 4.5 g in D5W 100 mL IVPB (MB+)         -- IV Every 8 hours (non-standard times) 05/09/25 1014          Latest lactate reviewed-  Recent Labs   Lab 05/10/25  2032   LACTATE 0.6     Organ dysfunction indicated by Encephalopathy and Thrombocytopenia     Fluid challenge Ideal Body Weight- The patient is obese (BMI>30) and their ideal body weight of Ideal body weight: 45.5 kg (100 lb 4.9 oz) will be used to calculate fluid bolus of 30 ml/kg.     Post- resuscitation assessment No - Post resuscitation assessment not needed       Will Not start Pressors- Levophed for  MAP of 65  Source control achieved by: IV Zosyn and Vancomycin   -Blood and urine cultures pending   Elevated LFTs  Likely secondary to pancreatitis. US abdomen shows dilated common bile duct commonly seen s/p cholecystectomy and no obstructing stone seen.  Trend daily LFTs  Bilateral nephrolithiasis  Noted on CT abdomen/pelvis today  F/u outpatient   Acute encephalopathy  Patient presents with abdominal pain, nausea, vomiting, waxing/waning mentation from acute pancreatitis vs. unknown infectious process.  states patient has been acting strangely. Patient not tolerating po and not taking medications over the past few days.   Metabolic workup: COVID/influenza negative, ammonia normal, elevated lactic acid 3.9, elevated procalcitonin 68.6, Blood cultures and urine cultures pending.   Likely 2/2 unknown infectious process and pancreatitis contributing  Continue IV antibiotics and f/u cultures   Prn Zyprexa IM for agitation  Fall precautions/delirium precautions  Gabapentin and Carbamazepine on hold.  Avoid other mental status altering medications.    VTE Risk Mitigation (From admission, onward)           Ordered     IP VTE LOW RISK PATIENT  Once         05/08/25 1604     Place sequential compression device  Until discontinued         05/08/25 1604                    Discharge Planning   VIDA: 5/13/2025     Code Status: Full Code   Medical Readiness for Discharge Date:   Discharge Plan A: Home with family                        Cleve Monae MD  Department of Hospital Medicine   [facility]

## 2025-05-12 NOTE — ASSESSMENT & PLAN NOTE
Anemia is likely due to Iron deficiency. Most recent hemoglobin and hematocrit are listed below.  Recent Labs     05/10/25  0508 05/10/25  2032   HGB 10.8* 11.0*   HCT 32.3* 32.2*     Plan  - Monitor serial CBC: Daily  - Transfuse PRBC if patient becomes hemodynamically unstable, symptomatic or H/H drops below 7/21.  - Patient has not received any PRBC transfusions to date  - Patient's anemia is currently stable

## 2025-05-12 NOTE — PLAN OF CARE
Problem: Adult Inpatient Plan of Care  Goal: Plan of Care Review  Outcome: Progressing  Flowsheets (Taken 5/12/2025 1210)  Plan of Care Reviewed With: patient  Goal: Patient-Specific Goal (Individualized)  Outcome: Progressing  Goal: Absence of Hospital-Acquired Illness or Injury  Outcome: Progressing  Intervention: Identify and Manage Fall Risk  Flowsheets (Taken 5/12/2025 1210)  Safety Promotion/Fall Prevention:   assistive device/personal item within reach   bed alarm set   side rails raised x 2   medications reviewed   high risk medications identified   room near unit station  Intervention: Prevent and Manage VTE (Venous Thromboembolism) Risk  Flowsheets (Taken 5/12/2025 1210)  VTE Prevention/Management:   bleeding risk assessed   bleeding precautions maintained   ambulation promoted   dorsiflexion/plantar flexion performed  Goal: Optimal Comfort and Wellbeing  Outcome: Progressing  Intervention: Monitor Pain and Promote Comfort  Flowsheets (Taken 5/12/2025 1210)  Pain Management Interventions:   pillow support provided   quiet environment facilitated   relaxation techniques promoted  Intervention: Provide Person-Centered Care  Flowsheets (Taken 5/12/2025 1210)  Trust Relationship/Rapport:   care explained   choices provided   emotional support provided   empathic listening provided   questions answered   thoughts/feelings acknowledged   reassurance provided   questions encouraged  Goal: Readiness for Transition of Care  Outcome: Progressing     Problem: Sepsis/Septic Shock  Goal: Optimal Coping  Outcome: Progressing  Goal: Absence of Bleeding  Outcome: Progressing  Goal: Blood Glucose Level Within Targeted Range  Outcome: Progressing  Goal: Absence of Infection Signs and Symptoms  Outcome: Progressing  Goal: Optimal Nutrition Intake  Outcome: Progressing     Problem: Skin Injury Risk Increased  Goal: Skin Health and Integrity  Outcome: Progressing     Problem: Fall Injury Risk  Goal: Absence of Fall and  Fall-Related Injury  Outcome: Progressing     Pt alert and able to make needs known. Oral meds tolerated well, no s/s of adverse effects. IV abx in progress, mario well. Bed in lowest position, call light within reach, and instructed to call for any assistance. Continue with plan of care.

## 2025-05-12 NOTE — PROGRESS NOTES
[facility] Progress Note    Reason for consult: acute pancreatitis with tbili elevation to 6.9 and GNR bacteremia    PCP:   Devon Collier       LOS: 4        Initial History of Present Illness (HPI):  This is a 59 y.o. female consulted to GI service for acute pancreatitis with tbili elevation to 6.9 and GNR bacteremia. PMH hypertension, hyperlipidemia, multiple sclerosis, COPD, iron-deficiency anemia, osteoporosis.  Patient lethargic with confusion,  at bedside with collateral information.  reports patient with worsening confusion with associated symptoms of n/v, and abdominal pain that began on yesterday.  reports abdominal pain has been ongoing, intermittent and self limiting for a year. Reports she is a 1 pk a day smoker, denies drinking. Denies use of GLP-1, denies oac or any bariatric surgeries.       Wbc 15.8, plt 95, alk phos 194, tbili 6.9,3.0  lfts 413/273, 180/149 lipase over 1000    Interval hx  MRCP negative. Alert and oriented today without complaints      Medical History:  has a past medical history of Allergy, Anemia, Anxiety, Breast cyst, Chronic kidney disease, Depression, Fibrocystic breast, Hypertension, Multiple sclerosis, Multiple sclerosis, Neuromuscular disorder, Osteoporosis, and Rib fracture (2015).    Surgical History:  has a past surgical history that includes Appendectomy; Cholecystectomy; Eye surgery;  section, classic; Hysterectomy (); Oophorectomy (); Breast surgery (); Breast cyst aspiration; Breast biopsy (Left, 2012); and Chalazion excision (at age 9).      Objective Findings:    Vital Signs:  Temp:  [98.1 °F (36.7 °C)-98.6 °F (37 °C)]   Pulse:  [73-96]   Resp:  [14-20]   BP: (116-133)/(58-79)   SpO2:  [89 %-99 %]   Body mass index is 23.77 kg/m².      Physical Exam  Vitals and nursing note reviewed.   Constitutional:       Appearance: Normal appearance.   HENT:      Head: Normocephalic.   Eyes:      Pupils: Pupils are  equal, round, and reactive to light.   Pulmonary:      Effort: Pulmonary effort is normal.   Abdominal:      General: Bowel sounds are normal.      Palpations: Abdomen is soft.   Skin:     General: Skin is warm and dry.   Neurological:      Mental Status: She is alert and oriented to person, place, and time.   Psychiatric:         Mood and Affect: Mood normal.         Behavior: Behavior normal.         Thought Content: Thought content normal.         Judgment: Judgment normal.               Labs:  Lab Results   Component Value Date    WBC 7.33 05/10/2025    HGB 11.0 (L) 05/10/2025    HCT 32.2 (L) 05/10/2025    PLT 74 (L) 05/10/2025    CHOL 105 (L) 2025    TRIG 322 (H) 2025    HDL 12 (L) 2025    ALT 45 (H) 2025    AST 21 2025     2025    K 2.9 (L) 2025     (H) 2025    CREATININE 0.5 2025    BUN 4 (L) 2025    CO2 21 (L) 2025    HGBA1C 5.1 2024             Imagin25 CT Abdomen pelvis- 1. Inflammation about the pancreas particularly at the pancreatic head, correlation with laboratory values recommended as findings are concerning for pancreatitis.  There is adjacent reactive lymphadenopathy.  2. There is surgical change of cholecystectomy noting prominent intra and extrahepatic biliary dilation.  No recent exams are available for comparison.  There is a questionable filling defect within the distal aspect of the common duct versus superimposition of duodenal soft tissue, choledocholithiasis cannot be definitively excluded.  3. Bilateral nonobstructive nephrolithiasis.        Endoscopy: [facility] 3/10/2015 Colonoscopy- normal. Rec repeat in 10 years           Acute Pancreatitis. GNR Bacteremia. Abdominal pain. Altered mental status. Transaminitis. Hyperbilirubinemia. Abnormal CT abdomen. Colon cancer screening.    Plan/ Recommendations:  1.  Patient alert and oriented. Blood culture positive for GNR  Klebsiella pna currently on zosyn  with ID following. Bili, alk phos and lfts elevated with CT suspicious for possible biliary obstruction on admit. On am labs bili from 6.9 to 3.0 now 1.5 with decreased lfts and alk phos, US negative for biliary stone, possibly passed a stone?  MRCP negative for choledocholithiasis. Rec f/u in panc/bili clinic, will request appt once discharged.     2. Patient due for repeat screening colonoscopy,requested for outpatient.     Will sign off    Thank you so much for allowing us to participate in the care of Zoey Cali . Please contact us if you have any additional questions.    Shasha Torrez NP  Gastroenterology  [facility] [place] - Med Surg

## 2025-05-13 ENCOUNTER — PATIENT MESSAGE (OUTPATIENT)
Dept: PSYCHIATRY | Facility: CLINIC | Age: 60
End: 2025-05-13
Payer: MEDICARE

## 2025-05-13 PROBLEM — E83.42 HYPOMAGNESEMIA: Status: ACTIVE | Noted: 2025-05-13

## 2025-05-13 PROBLEM — E87.6 HYPOKALEMIA: Status: ACTIVE | Noted: 2025-05-13

## 2025-05-13 LAB
ALBUMIN SERPL BCP-MCNC: 2.3 G/DL (ref 3.5–5.2)
ALP SERPL-CCNC: 88 UNIT/L (ref 40–150)
ALT SERPL W/O P-5'-P-CCNC: 37 UNIT/L (ref 10–44)
ANION GAP (OHS): 9 MMOL/L (ref 8–16)
AST SERPL-CCNC: 17 UNIT/L (ref 11–45)
BACTERIA BLD CULT: ABNORMAL
BACTERIA BLD CULT: ABNORMAL
BILIRUB SERPL-MCNC: 1.2 MG/DL (ref 0.1–1)
BUN SERPL-MCNC: 6 MG/DL (ref 6–20)
CALCIUM SERPL-MCNC: 8.1 MG/DL (ref 8.7–10.5)
CHLORIDE SERPL-SCNC: 105 MMOL/L (ref 95–110)
CO2 SERPL-SCNC: 23 MMOL/L (ref 23–29)
CREAT SERPL-MCNC: 0.5 MG/DL (ref 0.5–1.4)
GFR SERPLBLD CREATININE-BSD FMLA CKD-EPI: >60 ML/MIN/1.73/M2
GLUCOSE SERPL-MCNC: 87 MG/DL (ref 70–110)
GRAM STN SPEC: ABNORMAL
MAGNESIUM SERPL-MCNC: 1.4 MG/DL (ref 1.6–2.6)
PHOSPHATE SERPL-MCNC: 3.5 MG/DL (ref 2.7–4.5)
POTASSIUM SERPL-SCNC: 2.7 MMOL/L (ref 3.5–5.1)
PROT SERPL-MCNC: 5.5 GM/DL (ref 6–8.4)
SODIUM SERPL-SCNC: 137 MMOL/L (ref 136–145)

## 2025-05-13 PROCEDURE — 94640 AIRWAY INHALATION TREATMENT: CPT | Mod: HCNC

## 2025-05-13 PROCEDURE — 94761 N-INVAS EAR/PLS OXIMETRY MLT: CPT | Mod: HCNC

## 2025-05-13 PROCEDURE — 63600175 PHARM REV CODE 636 W HCPCS: Mod: HCNC | Performed by: INTERNAL MEDICINE

## 2025-05-13 PROCEDURE — 63600175 PHARM REV CODE 636 W HCPCS: Mod: HCNC | Performed by: STUDENT IN AN ORGANIZED HEALTH CARE EDUCATION/TRAINING PROGRAM

## 2025-05-13 PROCEDURE — 11000001 HC ACUTE MED/SURG PRIVATE ROOM: Mod: HCNC

## 2025-05-13 PROCEDURE — 27000221 HC OXYGEN, UP TO 24 HOURS: Mod: HCNC

## 2025-05-13 PROCEDURE — 25000003 PHARM REV CODE 250: Mod: HCNC | Performed by: INTERNAL MEDICINE

## 2025-05-13 PROCEDURE — 25000242 PHARM REV CODE 250 ALT 637 W/ HCPCS: Mod: HCNC | Performed by: STUDENT IN AN ORGANIZED HEALTH CARE EDUCATION/TRAINING PROGRAM

## 2025-05-13 PROCEDURE — 84100 ASSAY OF PHOSPHORUS: CPT | Mod: HCNC | Performed by: STUDENT IN AN ORGANIZED HEALTH CARE EDUCATION/TRAINING PROGRAM

## 2025-05-13 PROCEDURE — S4991 NICOTINE PATCH NONLEGEND: HCPCS | Mod: HCNC | Performed by: STUDENT IN AN ORGANIZED HEALTH CARE EDUCATION/TRAINING PROGRAM

## 2025-05-13 PROCEDURE — 36415 COLL VENOUS BLD VENIPUNCTURE: CPT | Mod: HCNC | Performed by: STUDENT IN AN ORGANIZED HEALTH CARE EDUCATION/TRAINING PROGRAM

## 2025-05-13 PROCEDURE — 25000003 PHARM REV CODE 250: Mod: HCNC | Performed by: STUDENT IN AN ORGANIZED HEALTH CARE EDUCATION/TRAINING PROGRAM

## 2025-05-13 PROCEDURE — 94799 UNLISTED PULMONARY SVC/PX: CPT | Mod: HCNC,XB

## 2025-05-13 PROCEDURE — 80053 COMPREHEN METABOLIC PANEL: CPT | Mod: HCNC | Performed by: STUDENT IN AN ORGANIZED HEALTH CARE EDUCATION/TRAINING PROGRAM

## 2025-05-13 PROCEDURE — 83735 ASSAY OF MAGNESIUM: CPT | Mod: HCNC | Performed by: STUDENT IN AN ORGANIZED HEALTH CARE EDUCATION/TRAINING PROGRAM

## 2025-05-13 RX ORDER — GABAPENTIN 400 MG/1
800 CAPSULE ORAL NIGHTLY
Status: DISCONTINUED | OUTPATIENT
Start: 2025-05-13 | End: 2025-05-14 | Stop reason: HOSPADM

## 2025-05-13 RX ORDER — POTASSIUM CHLORIDE 7.45 MG/ML
10 INJECTION INTRAVENOUS
Status: COMPLETED | OUTPATIENT
Start: 2025-05-13 | End: 2025-05-13

## 2025-05-13 RX ORDER — HYDROCODONE BITARTRATE AND ACETAMINOPHEN 10; 325 MG/1; MG/1
1 TABLET ORAL EVERY 4 HOURS PRN
Refills: 0 | Status: DISCONTINUED | OUTPATIENT
Start: 2025-05-13 | End: 2025-05-14 | Stop reason: HOSPADM

## 2025-05-13 RX ORDER — HYDROCODONE BITARTRATE AND ACETAMINOPHEN 5; 325 MG/1; MG/1
1 TABLET ORAL EVERY 4 HOURS PRN
Refills: 0 | Status: DISCONTINUED | OUTPATIENT
Start: 2025-05-13 | End: 2025-05-14 | Stop reason: HOSPADM

## 2025-05-13 RX ORDER — POTASSIUM CHLORIDE 20 MEQ/1
40 TABLET, EXTENDED RELEASE ORAL ONCE
Status: COMPLETED | OUTPATIENT
Start: 2025-05-13 | End: 2025-05-13

## 2025-05-13 RX ORDER — MAGNESIUM SULFATE HEPTAHYDRATE 40 MG/ML
2 INJECTION, SOLUTION INTRAVENOUS ONCE
Status: COMPLETED | OUTPATIENT
Start: 2025-05-13 | End: 2025-05-13

## 2025-05-13 RX ORDER — BUTALBITAL, ACETAMINOPHEN AND CAFFEINE 50; 325; 40 MG/1; MG/1; MG/1
1 TABLET ORAL EVERY 4 HOURS PRN
Status: DISCONTINUED | OUTPATIENT
Start: 2025-05-13 | End: 2025-05-14 | Stop reason: HOSPADM

## 2025-05-13 RX ADMIN — ARFORMOTEROL TARTRATE 15 MCG: 15 SOLUTION RESPIRATORY (INHALATION) at 07:05

## 2025-05-13 RX ADMIN — ARFORMOTEROL TARTRATE 15 MCG: 15 SOLUTION RESPIRATORY (INHALATION) at 08:05

## 2025-05-13 RX ADMIN — POTASSIUM CHLORIDE 10 MEQ: 7.46 INJECTION, SOLUTION INTRAVENOUS at 06:05

## 2025-05-13 RX ADMIN — POTASSIUM CHLORIDE 10 MEQ: 7.46 INJECTION, SOLUTION INTRAVENOUS at 07:05

## 2025-05-13 RX ADMIN — POTASSIUM CHLORIDE 10 MEQ: 7.46 INJECTION, SOLUTION INTRAVENOUS at 08:05

## 2025-05-13 RX ADMIN — CEFTRIAXONE SODIUM 2 G: 2 INJECTION, POWDER, FOR SOLUTION INTRAMUSCULAR; INTRAVENOUS at 10:05

## 2025-05-13 RX ADMIN — HYDROCODONE BITARTRATE AND ACETAMINOPHEN 1 TABLET: 10; 325 TABLET ORAL at 10:05

## 2025-05-13 RX ADMIN — CARBAMAZEPINE 200 MG: 100 TABLET, EXTENDED RELEASE ORAL at 09:05

## 2025-05-13 RX ADMIN — GABAPENTIN 800 MG: 400 CAPSULE ORAL at 09:05

## 2025-05-13 RX ADMIN — ESCITALOPRAM OXALATE 20 MG: 10 TABLET ORAL at 07:05

## 2025-05-13 RX ADMIN — POTASSIUM CHLORIDE 40 MEQ: 1500 TABLET, EXTENDED RELEASE ORAL at 06:05

## 2025-05-13 RX ADMIN — Medication 1 PATCH: at 07:05

## 2025-05-13 RX ADMIN — POTASSIUM CHLORIDE 10 MEQ: 7.46 INJECTION, SOLUTION INTRAVENOUS at 10:05

## 2025-05-13 RX ADMIN — BUDESONIDE 0.5 MG: 0.5 INHALANT RESPIRATORY (INHALATION) at 07:05

## 2025-05-13 RX ADMIN — CARBAMAZEPINE 200 MG: 100 TABLET, EXTENDED RELEASE ORAL at 08:05

## 2025-05-13 RX ADMIN — BUDESONIDE 0.5 MG: 0.5 INHALANT RESPIRATORY (INHALATION) at 08:05

## 2025-05-13 RX ADMIN — MAGNESIUM SULFATE HEPTAHYDRATE 2 G: 40 INJECTION, SOLUTION INTRAVENOUS at 07:05

## 2025-05-13 NOTE — ASSESSMENT & PLAN NOTE
Patient has Abnormal Magnesium: hypomagnesemia. Will continue to monitor electrolytes closely. Will replace the affected electrolytes and repeat labs to be done after interventions completed. The patient's magnesium results have been reviewed and are listed below.  Recent Labs   Lab 05/13/25  0426   MG 1.4*      -continue replace as needed

## 2025-05-13 NOTE — ASSESSMENT & PLAN NOTE
Hold home gabapentin and carbamazepine in the setting of pancreatitis and acute encephalopathy on admission  Mental status not baseline, and pancreatitis pain improved  Okay to resume home carbamazepine and gabapentin

## 2025-05-13 NOTE — ASSESSMENT & PLAN NOTE
Patient's most recent potassium results are listed below.   Recent Labs     05/11/25  0522 05/12/25  1001 05/13/25  0426   K 2.8* 2.9* 2.7*     Plan  - Replete potassium per protocol  - Monitor potassium Daily  - Patient's hypokalemia is worsening. Will continue replace as needed  - replace as needed

## 2025-05-13 NOTE — PLAN OF CARE
Problem: Adult Inpatient Plan of Care  Goal: Plan of Care Review  Outcome: Progressing  Flowsheets (Taken 5/13/2025 1723)  Plan of Care Reviewed With: patient  Goal: Patient-Specific Goal (Individualized)  Outcome: Progressing  Goal: Absence of Hospital-Acquired Illness or Injury  Outcome: Progressing  Intervention: Identify and Manage Fall Risk  Flowsheets (Taken 5/13/2025 1723)  Safety Promotion/Fall Prevention:   assistive device/personal item within reach   bed alarm set   side rails raised x 2   high risk medications identified   medications reviewed   room near unit station  Intervention: Prevent Skin Injury  Flowsheets (Taken 5/13/2025 1723)  Body Position: position changed independently  Skin Protection: incontinence pads utilized  Device Skin Pressure Protection: absorbent pad utilized/changed  Intervention: Prevent and Manage VTE (Venous Thromboembolism) Risk  Flowsheets (Taken 5/13/2025 1723)  VTE Prevention/Management:   bleeding risk assessed   bleeding precautions maintained   ambulation promoted  Goal: Optimal Comfort and Wellbeing  Outcome: Progressing  Intervention: Monitor Pain and Promote Comfort  Flowsheets (Taken 5/13/2025 1723)  Pain Management Interventions:   pillow support provided   quiet environment facilitated   relaxation techniques promoted  Intervention: Provide Person-Centered Care  Flowsheets (Taken 5/13/2025 1723)  Trust Relationship/Rapport:   care explained   choices provided   emotional support provided   empathic listening provided   questions answered   questions encouraged   reassurance provided   thoughts/feelings acknowledged  Goal: Readiness for Transition of Care  Outcome: Progressing     Problem: Skin Injury Risk Increased  Goal: Skin Health and Integrity  Outcome: Progressing    Pt alert and able to make needs known. Oral meds tolerated well, no s/s of adverse effects. IV abx in progress, mario well. Bed in lowest position, call light within reach, and instructed to call  for any assistance. Continue with plan of care.

## 2025-05-13 NOTE — ASSESSMENT & PLAN NOTE
Patient's blood pressure range in the last 24 hours was: BP  Min: 110/64  Max: 149/69.The patient's inpatient anti-hypertensive regimen is listed below:  Current Antihypertensives  hydrALAZINE injection 10 mg, Every 4 hours PRN, Intravenous  hydrALAZINE injection 30 mg, Every 4 hours PRN, Intravenous  hydrALAZINE injection 20 mg, Every 4 hours PRN, Intravenous    Plan  - BP is controlled, no changes needed to their regimen  - Patient currently not on any antihypertensives

## 2025-05-13 NOTE — PLAN OF CARE
Infectious Diseases Plan of Care Note    Chart reviewed only.     Repeat blood cultures no growth x 24 hours. If negative x 48 hours, will plan on ceftriaxone via midline x 14 days until 5/25.     Darleen Garcia MD  Infectious Diseases Staff

## 2025-05-13 NOTE — ASSESSMENT & PLAN NOTE
-klebsiella pneumoniae bacteremia.   -No symptoms of UTI, urine culture with no significant growth.   -CT A/P with pancreatitis and questionable filling defect at CBD. No evidence of choledocholithiasis on MRCP.  Source either GI or .   -Repeat blood cultures 5/10 also positive. Repeat blood cx on 05/12 with no growth to date at 24 hrs.  -CT A/P with apparently incidentally noted abdominal aneurysm so not a fluoroquinolone candidate.    -Infectious Disease consulted: recommend ceftriaxone.    --If repeat blood cultures remained negative at 48 hours, plan for ceftriaxone for total of 14 days, estimated  end date 05/25/2025

## 2025-05-13 NOTE — ASSESSMENT & PLAN NOTE
Likely secondary to pancreatitis.   US abdomen shows dilated common bile duct commonly seen s/p cholecystectomy and no obstructing stone seen.  Trend daily LFTs  Resolved

## 2025-05-13 NOTE — NURSING
[facility], [facility]  Nurses Note -- 4 Eyes      5/13/2025       Skin assessed on: Q Shift      [x] No Pressure Injuries Present    [x]Prevention Measures Documented    [] Yes LDA  for Pressure Injury Previously documented     [] Yes New Pressure Injury Discovered   [] LDA for New Pressure Injury Added      Attending RN:  Leola Melendez RN     Second RN:  TIFFANI Gleason

## 2025-05-13 NOTE — NURSING
Report received from the off - going nurse. Pt noted with RR even and regular  with O2 at  2L/NC per minute. Skin warm and dry . Pt noted alert and oriented x 4. Abdomen large and soft and nontender . Pt moves all extremities well . Urinary elimination via pure wick . Call light within reach and pt is instructed how to use the call light.  Bed in the lowest position with SR's elevated x 3.  Pt encouraged to call for staff before getting up OOB.  Bed alarm on.  Saline lock intact with no evidence of redness or infiltration noted in the rt inner and lateral arm  position. Will continue to monitor.

## 2025-05-13 NOTE — SUBJECTIVE & OBJECTIVE
Interval History:  No acute overnight events.  Patient remained afebrile.  Feeling much better this morning.  Denies abdominal pain, nausea, vomiting.  Admits frontal headache, requesting for headache medications.  No vision changes.    Review of Systems   Constitutional:  Negative for fever.   Eyes:  Negative for visual disturbance.   Respiratory:  Positive for shortness of breath (improving). Negative for cough.    Cardiovascular:  Negative for chest pain and palpitations.   Gastrointestinal:  Negative for abdominal pain, nausea and vomiting.   Genitourinary:  Negative for difficulty urinating and dysuria.   Musculoskeletal:  Negative for back pain.   Neurological:  Positive for headaches. Negative for dizziness and light-headedness.   Psychiatric/Behavioral:  Negative for confusion and hallucinations.      Objective:     Vital Signs (Most Recent):  Temp: 98.6 °F (37 °C) (05/13/25 1131)  Pulse: 80 (05/13/25 1131)  Resp: 18 (05/13/25 1131)  BP: (!) 147/87 (05/13/25 1131)  SpO2: (!) 94 % (05/13/25 1131) Vital Signs (24h Range):  Temp:  [98.4 °F (36.9 °C)-99.9 °F (37.7 °C)] 98.6 °F (37 °C)  Pulse:  [80-95] 80  Resp:  [16-22] 18  SpO2:  [92 %-99 %] 94 %  BP: (110-149)/(63-87) 147/87     Weight: 55.2 kg (121 lb 11.2 oz)  Body mass index is 23.77 kg/m².    Intake/Output Summary (Last 24 hours) at 5/13/2025 1549  Last data filed at 5/13/2025 0621  Gross per 24 hour   Intake 240 ml   Output 500 ml   Net -260 ml         Physical Exam  Vitals and nursing note reviewed.   Constitutional:       General: She is not in acute distress.     Appearance: She is not ill-appearing.   HENT:      Mouth/Throat:      Mouth: Mucous membranes are moist.   Cardiovascular:      Rate and Rhythm: Normal rate and regular rhythm.   Pulmonary:      Effort: Pulmonary effort is normal. No respiratory distress.      Breath sounds: Examination of the right-lower field reveals decreased breath sounds. Examination of the left-lower field reveals  decreased breath sounds. Decreased breath sounds present. No wheezing.      Comments: On room air, diminished breath sounds in lower lung fields  Abdominal:      General: There is no distension.      Palpations: Abdomen is soft.      Tenderness: There is no abdominal tenderness.   Musculoskeletal:         General: No swelling or tenderness.      Right lower leg: No edema.      Left lower leg: No edema.   Skin:     General: Skin is warm and dry.   Neurological:      General: No focal deficit present.      Mental Status: She is alert and oriented to person, place, and time.   Psychiatric:         Mood and Affect: Mood normal.         Thought Content: Thought content normal.               Significant Labs: All pertinent labs within the past 24 hours have been reviewed.    Significant Imaging: I have reviewed all pertinent imaging results/findings within the past 24 hours.   Bruno

## 2025-05-13 NOTE — PLAN OF CARE
05/13/25 1338   Medicare Message   Important Message from Medicare regarding Discharge Appeal Rights Given to patient/caregiver;Explained to patient/caregiver;Signed/date by patient/caregiver   Date IMM was signed 05/13/25   Time IMM was signed 1122

## 2025-05-13 NOTE — ASSESSMENT & PLAN NOTE
-Patient presents with abdominal pain, nausea, vomiting, waxing/waning mentation from acute pancreatitis vs. unknown infectious process.  states patient has been acting strangely. Patient not tolerating po and not taking medications over the past few days.   -Metabolic workup: COVID/influenza negative, ammonia normal, elevated lactic acid 3.9, elevated procalcitonin 68.6, -Blood cultures with Klebsiella pneumoniae.    -urine cultures with no growth.   -Likely 2/2 unknown infectious process and pancreatitis contributing  -Continue IV antibiotics as above  -mental status back at baseline   -resolved

## 2025-05-13 NOTE — ASSESSMENT & PLAN NOTE
This patient does have evidence of infective focus  My overall impression is sepsis.  Source: Bacteremia  Antibiotics given-   Antibiotics (72h ago, onward)      Start     Stop Route Frequency Ordered    05/12/25 2000  cefTRIAXone injection 2 g         05/26/25 1959 IV Every 24 hours (non-standard times) 05/12/25 1336          Latest lactate reviewed-  Recent Labs   Lab 05/10/25  2032   LACTATE 0.6     Organ dysfunction indicated by Encephalopathy and Thrombocytopenia     Fluid challenge Ideal Body Weight- The patient is obese (BMI>30) and their ideal body weight of Ideal body weight: 45.5 kg (100 lb 4.9 oz) will be used to calculate fluid bolus of 30 ml/kg.     Post- resuscitation assessment No - Post resuscitation assessment not needed       Will Not start Pressors- Levophed for MAP of 65  Source control achieved by: IV Zosyn and Vancomycin     -klebsiella pneumoniae bacteremia.   -No symptoms of UTI, urine culture with no significant growth.   -CT A/P with pancreatitis and questionable filling defect at CBD. No evidence of choledocholithiasis on MRCP.  Source either GI or .   -Repeat blood cultures 5/10 also positive. Repeat blood cx on 05/12 with no growth to date at 24 hrs.  -CT A/P with apparently incidentally noted abdominal aneurysm so not a fluoroquinolone candidate.    -Infectious Disease consulted: recommend ceftriaxone.    --If repeat blood cultures remained negative at 48 hours, plan for ceftriaxone for total of 14 days, estimated  end date 05/25/2025

## 2025-05-13 NOTE — NURSING
Report received from the off - going nurse. Pt noted with RR even and regular  with O2 at  ***. Skin *** and *** . Pt noted *** and *** x ***. Abdomen *** and *** . Pt moves *** extremities *** . Urinary elimination via *** . Call light within reach and pt is instructed how to use the call light.  Bed in the lowest position with SR's elevated x ***.  Pt encouraged to call for staff before getting up OOB.  Bed alarm on.  Saline lock intact with no evidence of rednWess or infiltration noted in the *** position. Will continue to monitor.

## 2025-05-13 NOTE — PROGRESS NOTES
[facility] Medicine  Progress Note    Patient Name: Zoey Cali  MRN: [id number]  Patient Class: IP- Inpatient   Admission Date: 5/8/2025  Length of Stay: 5 days  Attending Physician: Katheryn Lucas DO  Primary Care Provider: Devon Collier MD        Subjective     Principal Problem:Acute pancreatitis        HPI:  Zoey Cali is a 59 y.o. female with a pmh of hypertension, hyperlipidemia, multiple sclerosis, COPD, iron-deficiency anemia, osteoporosis presents to the hospital via EMS with weakness and altered mental status.    History obtained by independent historian  Jared on the phone who states that patient has been acting inappropriately over the past few days. Jared states that her symptoms started few days ago with the abdominal pain and spasms and patient was unable to tolerate p.o.. This has happened in the past and patient would drink some milk and it would go away. However, this time pain and associated symptoms got worse. Patient has a had several episodes of vomiting.  states that patient has become more confused. Patient unable to take any of her home medications. Unable to express exactly where her pain is located.  Review of systems complicated by patient's change in mentation.    In the ED: Patient afebrile with leukocytosis (WBC 15.87), thrombocytopenic with a platelet count 95, BUN 24, GFR 47, phosphorus 4.6, magnesium 1.3, elevated , T bili 6.9, elevated AST//273 respectively, normal ammonia level, lipase>1000, initial troponin normal, elevated lactic acid 3.54, elevated procalcitonin 68.66, COVID influenza negative, blood cultures pending. CT abdomen pelvis shows (1) pancreatic inflammation concerning for pancreatitis (2) surgical change of cholecystectomy noting prominent intra and extrahepatic biliary dilation (3) bilateral nonobstructive nephrolithiasis.  Chest x-ray shows chronic appearing interstitial findings consistent with  history of COPD/emphysema.  Ultrasound abdomen pending.  Patient given 1 L LR bolus x2 IV morphine 4 mg x 2, IV Zosyn 4.5 g, IV vancomycin, and IV Haldol 2.5 mg in the ED. Case discussed with ED provider and patient admitted to hospital medicine for further medical management.    Overview/Hospital Course:   59 year old female with multiple sclerosis on interferon beta-1a (avonex) who was admitted 5/8/25 for acute pancreatitis and encephalopathy.  Started on aggressive IVF fluid resuscitation and pain control.  TG wnl/not cause of pancreatitis,  states she is not a big drinker, and no stones appreciated. Tbili is elevated at 6.9 and LFTs up. GI consulted- US negative for biliary stone, possibly passed a stone?  MRCP negative for choledocholithiasis. Rec f/u in panc/bili clinic. I She was also found to have klebsiella pneumoniae bacteremia. No symptoms of UTI, urine culture with no significant growth. CT A/P with pancreatitis and questionable filling defect at CBD. No evidence of choledocholithiasis on MRCP.  Source either GI or . Repeat blood cultures 5/10 also positive. CT A/P with apparently incidentally noted abdominal aneurysm so not a fluoroquinolone candidate. Repeat blood cx on 05/12 with no growth to date at 24 hrs.  Infectious Disease consulted-recommend ceftriaxone.  If repeat blood cultures remained negative at 48 hours, plan for ceftriaxone for total of 14 days, estimated end date 05/25/2025    Interval History:  No acute overnight events.  Patient remained afebrile.  Feeling much better this morning.  Denies abdominal pain, nausea, vomiting.  Admits frontal headache, requesting for headache medications.  No vision changes.    Review of Systems   Constitutional:  Negative for fever.   Eyes:  Negative for visual disturbance.   Respiratory:  Positive for shortness of breath (improving). Negative for cough.    Cardiovascular:  Negative for chest pain and palpitations.   Gastrointestinal:  Negative  for abdominal pain, nausea and vomiting.   Genitourinary:  Negative for difficulty urinating and dysuria.   Musculoskeletal:  Negative for back pain.   Neurological:  Positive for headaches. Negative for dizziness and light-headedness.   Psychiatric/Behavioral:  Negative for confusion and hallucinations.      Objective:     Vital Signs (Most Recent):  Temp: 98.6 °F (37 °C) (05/13/25 1131)  Pulse: 80 (05/13/25 1131)  Resp: 18 (05/13/25 1131)  BP: (!) 147/87 (05/13/25 1131)  SpO2: (!) 94 % (05/13/25 1131) Vital Signs (24h Range):  Temp:  [98.4 °F (36.9 °C)-99.9 °F (37.7 °C)] 98.6 °F (37 °C)  Pulse:  [80-95] 80  Resp:  [16-22] 18  SpO2:  [92 %-99 %] 94 %  BP: (110-149)/(63-87) 147/87     Weight: 55.2 kg (121 lb 11.2 oz)  Body mass index is 23.77 kg/m².    Intake/Output Summary (Last 24 hours) at 5/13/2025 1549  Last data filed at 5/13/2025 0621  Gross per 24 hour   Intake 240 ml   Output 500 ml   Net -260 ml         Physical Exam  Vitals and nursing note reviewed.   Constitutional:       General: She is not in acute distress.     Appearance: She is not ill-appearing.   HENT:      Mouth/Throat:      Mouth: Mucous membranes are moist.   Cardiovascular:      Rate and Rhythm: Normal rate and regular rhythm.   Pulmonary:      Effort: Pulmonary effort is normal. No respiratory distress.      Breath sounds: Examination of the right-lower field reveals decreased breath sounds. Examination of the left-lower field reveals decreased breath sounds. Decreased breath sounds present. No wheezing.      Comments: On room air, diminished breath sounds in lower lung fields  Abdominal:      General: There is no distension.      Palpations: Abdomen is soft.      Tenderness: There is no abdominal tenderness.   Musculoskeletal:         General: No swelling or tenderness.      Right lower leg: No edema.      Left lower leg: No edema.   Skin:     General: Skin is warm and dry.   Neurological:      General: No focal deficit present.      Mental  Status: She is alert and oriented to person, place, and time.   Psychiatric:         Mood and Affect: Mood normal.         Thought Content: Thought content normal.               Significant Labs: All pertinent labs within the past 24 hours have been reviewed.    Significant Imaging: I have reviewed all pertinent imaging results/findings within the past 24 hours.      Assessment & Plan  Acute pancreatitis  Patient presents with worsening abdominal pain with associated nausea, vomiting, and altered mentation.  Lipase >1000.  CT with pancreatic head inflammation concerning for pancreatitis.  Patient denies alcohol use or any new medications.  No gallstones noted on imaging. Unclear etiology of pancreatitis at this time.  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    Plan:  -Ordered lipid panel. TG wnl/not cause of pancreatitis  -received aggressive IV fluids with improvement   -continue supportive care, and pain control  -diet advanced as tolerated, currently tolerating regular diet    HTN (hypertension)  Patient's blood pressure range in the last 24 hours was: BP  Min: 110/64  Max: 149/69.The patient's inpatient anti-hypertensive regimen is listed below:  Current Antihypertensives  hydrALAZINE injection 10 mg, Every 4 hours PRN, Intravenous  hydrALAZINE injection 30 mg, Every 4 hours PRN, Intravenous  hydrALAZINE injection 20 mg, Every 4 hours PRN, Intravenous    Plan  - BP is controlled, no changes needed to their regimen  - Patient currently not on any antihypertensives  Multiple sclerosis  History noted, on Avonex injections weekly   Iron deficiency anemia  Anemia is likely due to Iron deficiency. Most recent hemoglobin and hematocrit are listed below.  Recent Labs     05/10/25  2032   HGB 11.0*   HCT 32.2*     Plan  - Monitor serial CBC: Daily  - Transfuse PRBC if patient becomes hemodynamically unstable, symptomatic or H/H drops below 7/21.  -  Patient has not received any PRBC transfusions to date  - Patient's anemia is currently stable  Tobacco abuse  Patient with a history of smoking 1ppd, 30 pack year history   Will need smoking cessation counseling once mentation improves   Neuropathic pain  Hold home gabapentin and carbamazepine in the setting of pancreatitis and acute encephalopathy on admission  Mental status not baseline, and pancreatitis pain improved  Okay to resume home carbamazepine and gabapentin  Chronic obstructive pulmonary disease, unspecified  Patient's COPD is controlled currently.  Patient is currently off COPD Pathway. Continue scheduled inhalers Duonebs and Supplemental oxygen and monitor respiratory status closely.   Sepsis  This patient does have evidence of infective focus  My overall impression is sepsis.  Source: Bacteremia  Antibiotics given-   Antibiotics (72h ago, onward)      Start     Stop Route Frequency Ordered    05/12/25 2000  cefTRIAXone injection 2 g         05/26/25 1959 IV Every 24 hours (non-standard times) 05/12/25 1336          Latest lactate reviewed-  Recent Labs   Lab 05/10/25  2032   LACTATE 0.6     Organ dysfunction indicated by Encephalopathy and Thrombocytopenia     Fluid challenge Ideal Body Weight- The patient is obese (BMI>30) and their ideal body weight of Ideal body weight: 45.5 kg (100 lb 4.9 oz) will be used to calculate fluid bolus of 30 ml/kg.     Post- resuscitation assessment No - Post resuscitation assessment not needed       Will Not start Pressors- Levophed for MAP of 65  Source control achieved by: IV Zosyn and Vancomycin     -klebsiella pneumoniae bacteremia.   -No symptoms of UTI, urine culture with no significant growth.   -CT A/P with pancreatitis and questionable filling defect at CBD. No evidence of choledocholithiasis on MRCP.  Source either GI or .   -Repeat blood cultures 5/10 also positive. Repeat blood cx on 05/12 with no growth to date at 24 hrs.  -CT A/P with apparently  incidentally noted abdominal aneurysm so not a fluoroquinolone candidate.    -Infectious Disease consulted: recommend ceftriaxone.    --If repeat blood cultures remained negative at 48 hours, plan for ceftriaxone for total of 14 days, estimated  end date 05/25/2025     Elevated LFTs  Likely secondary to pancreatitis.   US abdomen shows dilated common bile duct commonly seen s/p cholecystectomy and no obstructing stone seen.  Trend daily LFTs  Resolved  Bilateral nephrolithiasis  Noted on CT abdomen/pelvis   F/u outpatient   Acute encephalopathy  -Patient presents with abdominal pain, nausea, vomiting, waxing/waning mentation from acute pancreatitis vs. unknown infectious process.  states patient has been acting strangely. Patient not tolerating po and not taking medications over the past few days.   -Metabolic workup: COVID/influenza negative, ammonia normal, elevated lactic acid 3.9, elevated procalcitonin 68.6, -Blood cultures with Klebsiella pneumoniae.    -urine cultures with no growth.   -Likely 2/2 unknown infectious process and pancreatitis contributing  -Continue IV antibiotics as above  -mental status back at baseline   -resolved  Bacteremia due to Klebsiella pneumoniae  -klebsiella pneumoniae bacteremia.   -No symptoms of UTI, urine culture with no significant growth.   -CT A/P with pancreatitis and questionable filling defect at CBD. No evidence of choledocholithiasis on MRCP.  Source either GI or .   -Repeat blood cultures 5/10 also positive. Repeat blood cx on 05/12 with no growth to date at 24 hrs.  -CT A/P with apparently incidentally noted abdominal aneurysm so not a fluoroquinolone candidate.    -Infectious Disease consulted: recommend ceftriaxone.    --If repeat blood cultures remained negative at 48 hours, plan for ceftriaxone for total of 14 days, estimated  end date 05/25/2025       Hypokalemia  Patient's most recent potassium results are listed below.   Recent Labs     05/11/25  0522  05/12/25  1001 05/13/25  0426   K 2.8* 2.9* 2.7*     Plan  - Replete potassium per protocol  - Monitor potassium Daily  - Patient's hypokalemia is worsening. Will continue replace as needed  - replace as needed  Hypomagnesemia  Patient has Abnormal Magnesium: hypomagnesemia. Will continue to monitor electrolytes closely. Will replace the affected electrolytes and repeat labs to be done after interventions completed. The patient's magnesium results have been reviewed and are listed below.  Recent Labs   Lab 05/13/25  0426   MG 1.4*      -continue replace as needed  VTE Risk Mitigation (From admission, onward)           Ordered     IP VTE LOW RISK PATIENT  Once         05/08/25 1604     Place sequential compression device  Until discontinued         05/08/25 1604                    Discharge Planning   VIDA: 5/15/2025     Code Status: Full Code   Medical Readiness for Discharge Date:   Discharge Plan A: Home with family   Discharge Delays: None known at this time                    Katheryn Lucas DO  Department of Hospital Medicine   [facility] [place] [facility] [place]

## 2025-05-14 ENCOUNTER — TELEPHONE (OUTPATIENT)
Dept: ENDOSCOPY | Facility: HOSPITAL | Age: 60
End: 2025-05-14
Payer: MEDICARE

## 2025-05-14 ENCOUNTER — TELEPHONE (OUTPATIENT)
Dept: INFECTIOUS DISEASES | Facility: CLINIC | Age: 60
End: 2025-05-14
Payer: MEDICARE

## 2025-05-14 VITALS
RESPIRATION RATE: 18 BRPM | WEIGHT: 121.69 LBS | HEIGHT: 60 IN | HEART RATE: 97 BPM | TEMPERATURE: 98 F | OXYGEN SATURATION: 96 % | SYSTOLIC BLOOD PRESSURE: 115 MMHG | DIASTOLIC BLOOD PRESSURE: 65 MMHG | BODY MASS INDEX: 23.89 KG/M2

## 2025-05-14 LAB
ALBUMIN SERPL BCP-MCNC: 2.3 G/DL (ref 3.5–5.2)
ALP SERPL-CCNC: 84 UNIT/L (ref 40–150)
ALT SERPL W/O P-5'-P-CCNC: 25 UNIT/L (ref 10–44)
ANION GAP (OHS): 7 MMOL/L (ref 8–16)
AST SERPL-CCNC: 14 UNIT/L (ref 11–45)
BILIRUB SERPL-MCNC: 0.7 MG/DL (ref 0.1–1)
BUN SERPL-MCNC: 8 MG/DL (ref 6–20)
CALCIUM SERPL-MCNC: 8 MG/DL (ref 8.7–10.5)
CHLORIDE SERPL-SCNC: 106 MMOL/L (ref 95–110)
CO2 SERPL-SCNC: 24 MMOL/L (ref 23–29)
CREAT SERPL-MCNC: 0.5 MG/DL (ref 0.5–1.4)
GFR SERPLBLD CREATININE-BSD FMLA CKD-EPI: >60 ML/MIN/1.73/M2
GLUCOSE SERPL-MCNC: 87 MG/DL (ref 70–110)
HOLD SPECIMEN: NORMAL
MAGNESIUM SERPL-MCNC: 1.8 MG/DL (ref 1.6–2.6)
PHOSPHATE SERPL-MCNC: 4.9 MG/DL (ref 2.7–4.5)
PLPETH BLD-MCNC: <10 NG/ML
POPETH BLD-MCNC: <10 NG/ML
POTASSIUM SERPL-SCNC: 3.3 MMOL/L (ref 3.5–5.1)
PROT SERPL-MCNC: 5.6 GM/DL (ref 6–8.4)
SODIUM SERPL-SCNC: 137 MMOL/L (ref 136–145)
TOXICOLOGIST REVIEW: NORMAL

## 2025-05-14 PROCEDURE — 94799 UNLISTED PULMONARY SVC/PX: CPT | Mod: HCNC

## 2025-05-14 PROCEDURE — 36415 COLL VENOUS BLD VENIPUNCTURE: CPT | Mod: HCNC | Performed by: STUDENT IN AN ORGANIZED HEALTH CARE EDUCATION/TRAINING PROGRAM

## 2025-05-14 PROCEDURE — 84100 ASSAY OF PHOSPHORUS: CPT | Mod: HCNC | Performed by: STUDENT IN AN ORGANIZED HEALTH CARE EDUCATION/TRAINING PROGRAM

## 2025-05-14 PROCEDURE — S4991 NICOTINE PATCH NONLEGEND: HCPCS | Mod: HCNC | Performed by: STUDENT IN AN ORGANIZED HEALTH CARE EDUCATION/TRAINING PROGRAM

## 2025-05-14 PROCEDURE — 36410 VNPNXR 3YR/> PHY/QHP DX/THER: CPT | Mod: HCNC

## 2025-05-14 PROCEDURE — 25000003 PHARM REV CODE 250: Mod: HCNC | Performed by: STUDENT IN AN ORGANIZED HEALTH CARE EDUCATION/TRAINING PROGRAM

## 2025-05-14 PROCEDURE — C1751 CATH, INF, PER/CENT/MIDLINE: HCPCS | Mod: HCNC

## 2025-05-14 PROCEDURE — 27000221 HC OXYGEN, UP TO 24 HOURS: Mod: HCNC

## 2025-05-14 PROCEDURE — 82040 ASSAY OF SERUM ALBUMIN: CPT | Mod: HCNC | Performed by: STUDENT IN AN ORGANIZED HEALTH CARE EDUCATION/TRAINING PROGRAM

## 2025-05-14 PROCEDURE — 25000242 PHARM REV CODE 250 ALT 637 W/ HCPCS: Mod: HCNC | Performed by: STUDENT IN AN ORGANIZED HEALTH CARE EDUCATION/TRAINING PROGRAM

## 2025-05-14 PROCEDURE — 63600175 PHARM REV CODE 636 W HCPCS: Mod: HCNC | Performed by: STUDENT IN AN ORGANIZED HEALTH CARE EDUCATION/TRAINING PROGRAM

## 2025-05-14 PROCEDURE — 83735 ASSAY OF MAGNESIUM: CPT | Mod: HCNC | Performed by: STUDENT IN AN ORGANIZED HEALTH CARE EDUCATION/TRAINING PROGRAM

## 2025-05-14 PROCEDURE — 94640 AIRWAY INHALATION TREATMENT: CPT | Mod: HCNC

## 2025-05-14 RX ORDER — SODIUM CHLORIDE 0.9 % (FLUSH) 0.9 %
10 SYRINGE (ML) INJECTION EVERY 12 HOURS PRN
Status: DISCONTINUED | OUTPATIENT
Start: 2025-05-14 | End: 2025-05-14 | Stop reason: HOSPADM

## 2025-05-14 RX ORDER — CEFTRIAXONE 2 G/1
2 INJECTION, POWDER, FOR SOLUTION INTRAMUSCULAR; INTRAVENOUS
Status: DISCONTINUED | OUTPATIENT
Start: 2025-05-14 | End: 2025-05-14 | Stop reason: HOSPADM

## 2025-05-14 RX ORDER — FUROSEMIDE 10 MG/ML
40 INJECTION INTRAMUSCULAR; INTRAVENOUS ONCE
Status: COMPLETED | OUTPATIENT
Start: 2025-05-14 | End: 2025-05-14

## 2025-05-14 RX ORDER — POTASSIUM CHLORIDE 20 MEQ/1
40 TABLET, EXTENDED RELEASE ORAL ONCE
Status: COMPLETED | OUTPATIENT
Start: 2025-05-14 | End: 2025-05-14

## 2025-05-14 RX ORDER — CEFTRIAXONE 2 G/1
2 INJECTION, POWDER, FOR SOLUTION INTRAMUSCULAR; INTRAVENOUS DAILY
Start: 2025-05-14 | End: 2025-05-25

## 2025-05-14 RX ADMIN — ARFORMOTEROL TARTRATE 15 MCG: 15 SOLUTION RESPIRATORY (INHALATION) at 07:05

## 2025-05-14 RX ADMIN — BUDESONIDE 0.5 MG: 0.5 INHALANT RESPIRATORY (INHALATION) at 07:05

## 2025-05-14 RX ADMIN — POTASSIUM CHLORIDE 40 MEQ: 1500 TABLET, EXTENDED RELEASE ORAL at 12:05

## 2025-05-14 RX ADMIN — ESCITALOPRAM OXALATE 20 MG: 10 TABLET ORAL at 08:05

## 2025-05-14 RX ADMIN — CEFTRIAXONE 2 G: 2 INJECTION, POWDER, FOR SOLUTION INTRAMUSCULAR; INTRAVENOUS at 04:05

## 2025-05-14 RX ADMIN — POTASSIUM CHLORIDE 40 MEQ: 1500 TABLET, EXTENDED RELEASE ORAL at 08:05

## 2025-05-14 RX ADMIN — Medication 1 PATCH: at 08:05

## 2025-05-14 RX ADMIN — CARBAMAZEPINE 200 MG: 100 TABLET, EXTENDED RELEASE ORAL at 08:05

## 2025-05-14 RX ADMIN — FUROSEMIDE 40 MG: 10 INJECTION, SOLUTION INTRAVENOUS at 12:05

## 2025-05-14 NOTE — TELEPHONE ENCOUNTER
Pt's mom calling - Pt got the name brand this time of the cream that Dr. Grisel Garcia prescribed for her instead of the generic which is what she usually get and was also told to get Cetafil lotion for her face and she put them both on before she went to bed last night and when she woke up her face was like blistering and painful. She has been putting an antibiotic ointment on them and covering with gauze but she is wondering if she can get in to see Dr. Grsiel Garcia or can she prescribe something else or what should she do. Please call and advise.     Pharmacy confirmed okay to remove line at end of therapy. no ID follow up     Per Dr Garcia

## 2025-05-14 NOTE — PLAN OF CARE
Problem: Adult Inpatient Plan of Care  Goal: Plan of Care Review  5/14/2025 1610 by Hien Dunn RN  Outcome: Met  5/14/2025 1526 by Hien Dunn RN  Outcome: Ongoing  Goal: Patient-Specific Goal (Individualized)  5/14/2025 1610 by Hien Dunn RN  Outcome: Met  5/14/2025 1526 by Hien Dunn RN  Outcome: Ongoing  Goal: Absence of Hospital-Acquired Illness or Injury  5/14/2025 1610 by Hien Dunn RN  Outcome: Met  5/14/2025 1526 by Hien Dunn RN  Outcome: Ongoing  Goal: Optimal Comfort and Wellbeing  5/14/2025 1610 by Hien Dunn RN  Outcome: Met  5/14/2025 1526 by Hien Dunn RN  Outcome: Ongoing  Goal: Readiness for Transition of Care  5/14/2025 1610 by Hien Dunn RN  Outcome: Met  5/14/2025 1526 by Hien Dunn RN  Outcome: Ongoing     Problem: Sepsis/Septic Shock  Goal: Optimal Coping  5/14/2025 1610 by Hien Dunn RN  Outcome: Met  5/14/2025 1526 by Hien Dunn RN  Outcome: Ongoing  Goal: Absence of Bleeding  5/14/2025 1610 by Hien Dunn RN  Outcome: Met  5/14/2025 1526 by Hien Dunn RN  Outcome: Ongoing  Goal: Blood Glucose Level Within Targeted Range  5/14/2025 1610 by Hien Dunn RN  Outcome: Met  5/14/2025 1526 by Hien Dunn RN  Outcome: Ongoing  Goal: Absence of Infection Signs and Symptoms  5/14/2025 1610 by Hien Dunn RN  Outcome: Met  5/14/2025 1526 by Hien Dunn RN  Outcome: Ongoing  Goal: Optimal Nutrition Intake  5/14/2025 1610 by Hien uDnn RN  Outcome: Met  5/14/2025 1526 by Hien Dunn RN  Outcome: Ongoing     Problem: Skin Injury Risk Increased  Goal: Skin Health and Integrity  5/14/2025 1610 by Hien Dunn RN  Outcome: Met  5/14/2025 1526 by Hien Dunn RN  Outcome: Ongoing     Problem: Fall Injury Risk  Goal: Absence of Fall and Fall-Related Injury  5/14/2025 1610 by Hien Dunn, RN  Outcome: Met  5/14/2025 1526 by Shaun  TIFFANI Stanford  Outcome: Ongoing     Problem: Restraint, Nonviolent  Goal: Absence of Harm or Injury  5/14/2025 1610 by Hien Dunn RN  Outcome: Met  5/14/2025 1526 by Hien Dunn RN  Outcome: Ongoing     Problem: Infection  Goal: Absence of Infection Signs and Symptoms  5/14/2025 1610 by Hien Dunn RN  Outcome: Met  5/14/2025 1526 by Hien Dunn RN  Outcome: Ongoing

## 2025-05-14 NOTE — ASSESSMENT & PLAN NOTE
Patient's blood pressure range in the last 24 hours was: BP  Min: 111/72  Max: 138/73.The patient's inpatient anti-hypertensive regimen is listed below:  Current Antihypertensives  hydrALAZINE injection 10 mg, Every 4 hours PRN, Intravenous  hydrALAZINE injection 30 mg, Every 4 hours PRN, Intravenous  hydrALAZINE injection 20 mg, Every 4 hours PRN, Intravenous    Plan  - BP is controlled, no changes needed to their regimen  - Patient currently not on any antihypertensives

## 2025-05-14 NOTE — PLAN OF CARE
Problem: Adult Inpatient Plan of Care  Goal: Plan of Care Review  Outcome: Ongoing  Goal: Patient-Specific Goal (Individualized)  Outcome: Ongoing  Goal: Absence of Hospital-Acquired Illness or Injury  Outcome: Ongoing  Goal: Optimal Comfort and Wellbeing  Outcome: Ongoing  Goal: Readiness for Transition of Care  Outcome: Ongoing     Problem: Sepsis/Septic Shock  Goal: Optimal Coping  Outcome: Ongoing  Goal: Absence of Bleeding  Outcome: Ongoing  Goal: Blood Glucose Level Within Targeted Range  Outcome: Ongoing  Goal: Absence of Infection Signs and Symptoms  Outcome: Ongoing  Goal: Optimal Nutrition Intake  Outcome: Ongoing     Problem: Skin Injury Risk Increased  Goal: Skin Health and Integrity  Outcome: Ongoing     Problem: Fall Injury Risk  Goal: Absence of Fall and Fall-Related Injury  Outcome: Ongoing     Problem: Restraint, Nonviolent  Goal: Absence of Harm or Injury  Outcome: Ongoing     Problem: Infection  Goal: Absence of Infection Signs and Symptoms  Outcome: Ongoing

## 2025-05-14 NOTE — PLAN OF CARE
Case Management Final Discharge Note    Discharge Disposition: [facility]    New DME ordered / company name: [facility]- [employer]    Relevant SDOH / Transition of Care Barriers:  None    Person available to provide assistance at home when needed and their contact information: Jared -spouse [phone]    Scheduled followup appointment: PCP scheduled    Referrals placed: None    Transportation: Family    Patient and family educated on discharge services and updated on DC plan. Bedside RN notified, patient clear to discharge from Case Management Perspective.       05/14/25 1410   Final Note   Assessment Type Final Discharge Note   Anticipated Discharge Disposition Home-Health   What phone number can be called within the next 1-3 days to see how you are doing after discharge? [phone]   Hospital Resources/Appts/Education Provided Appointments scheduled and added to AVS   Post-Acute Status   Post-Acute Authorization Home Health;IV Infusion   Home Health Status Set-up Complete/Auth obtained   Coverage [employer] MANAGED MEDICARE - [employer] MEDICARE PPO   IV Infusion Status Set-up Complete/Auth obtained   Patient choice form signed by patient/caregiver List from System Post-Acute Care   Discharge Delays None known at this time

## 2025-05-14 NOTE — ASSESSMENT & PLAN NOTE
"This patient does have evidence of infective focus  My overall impression is sepsis.  Source: Bacteremia  Antibiotics given-   Antibiotics (72h ago, onward)      Start     Stop Route Frequency Ordered    05/12/25 2000  cefTRIAXone injection 2 g         05/26/25 1959 IV Every 24 hours (non-standard times) 05/12/25 1336          Latest lactate reviewed-  No results for input(s): "LACTATE", "POCLAC" in the last 72 hours.    Organ dysfunction indicated by Encephalopathy and Thrombocytopenia     Fluid challenge Ideal Body Weight- The patient is obese (BMI>30) and their ideal body weight of Ideal body weight: 45.5 kg (100 lb 4.9 oz) will be used to calculate fluid bolus of 30 ml/kg.     Post- resuscitation assessment No - Post resuscitation assessment not needed       Will Not start Pressors- Levophed for MAP of 65  Source control achieved by: IV Zosyn and Vancomycin     -klebsiella pneumoniae bacteremia.   -No symptoms of UTI, urine culture with no significant growth.   -CT A/P with pancreatitis and questionable filling defect at CBD. No evidence of choledocholithiasis on MRCP.  Source either GI or .   -Repeat blood cultures 5/10 also positive. Repeat blood cx on 05/12 with no growth to date at 24 hrs.  -CT A/P with apparently incidentally noted abdominal aneurysm so not a fluoroquinolone candidate.    -Infectious Disease consulted: recommend ceftriaxone.    --If repeat blood cultures remained negative at 48 hours, plan for ceftriaxone for total of 14 days, estimated  end date 05/25/2025     "

## 2025-05-14 NOTE — PLAN OF CARE
CM sent home health and IV infusion via Epic.    I provided the patient a choice of post acute providers and offered a list of CMS rated home health and IV Infusion.    Patient has declined to select a preferred provider and elects placement with the first accepting in network provider that is available to provide services as ordered by the physician.     Informed patient and family that placement is based on medical acceptance, insurance authorization and  staff availability.       05/14/25 1234   Post-Acute Status   Post-Acute Authorization Home Health;IV Infusion   Home Health Status Pending post-acute provider review/more information requested   Coverage [employer] MANAGED MEDICARE - [employer] MEDICARE PPO   IV Infusion Status Pending payor review/awaiting authorization (if required)   Patient choice form signed by patient/caregiver List from System Post-Acute Care   Discharge Delays (!) Post-Acute Set-up   Discharge Plan   Discharge Plan A Home Health   Discharge Plan B Home with family

## 2025-05-14 NOTE — DISCHARGE INSTRUCTIONS
Our goal at [facility] is to always give you outstanding care and exceptional service. You may receive a survey from [employer]) by mail, text or e-mail in the next 24-48 hours asking about the care you received with us. The survey should only take 5-10 minutes to complete and is very important to us.     Your feedback provides us with a way to recognize our staff who work tirelessly to provide the best care! Also, your responses help us learn how to improve when your experience was below our aspiration of excellence. We are always looking for ways to improve your stay. We WILL use your feedback to continue making improvements to help us provide the highest quality care. We keep your personal information and feedback confidential. We appreciate your time completing this survey and can't wait to hear from you!!!    We look forward to your continued care with us! Thanks so much for choosing [facility] for your healthcare needs!

## 2025-05-14 NOTE — ASSESSMENT & PLAN NOTE
Patient has Abnormal Magnesium: hypomagnesemia. Will continue to monitor electrolytes closely. Will replace the affected electrolytes and repeat labs to be done after interventions completed. The patient's magnesium results have been reviewed and are listed below.  Recent Labs   Lab 05/14/25  0440   MG 1.8      -continue replace as needed

## 2025-05-14 NOTE — ASSESSMENT & PLAN NOTE
"Anemia is likely due to Iron deficiency. Most recent hemoglobin and hematocrit are listed below.  No results for input(s): "HGB", "HCT" in the last 72 hours.    Plan  - Monitor serial CBC: Daily  - Transfuse PRBC if patient becomes hemodynamically unstable, symptomatic or H/H drops below 7/21.  - Patient has not received any PRBC transfusions to date  - Patient's anemia is currently stable  "

## 2025-05-14 NOTE — PLAN OF CARE
Patient AAOx4 and able to make needs known. Patient remains on 3L NC. No acute distress noted, patient free from falls or injury this shift.  Bed in low position, wheels locked, call light in reach for assistance, plan of care continued.      Problem: Sepsis/Septic Shock  Goal: Optimal Coping  Outcome: Progressing     Problem: Skin Injury Risk Increased  Goal: Skin Health and Integrity  Outcome: Progressing     Problem: Fall Injury Risk  Goal: Absence of Fall and Fall-Related Injury  Outcome: Progressing

## 2025-05-14 NOTE — PLAN OF CARE
[facility]      HOME HEALTH ORDERS  FACE TO FACE ENCOUNTER    Patient Name: Zoey Cali  YOB: 1965    PCP: Devon Collier MD   PCP Address: [address] / [place] [address]  PCP Phone Number: [phone]  PCP Fax: [phone]    Encounter Date: 5/8/25    Admit to Home Health    Diagnoses:  Active Hospital Problems    Diagnosis  POA    *Acute pancreatitis [K85.90]  Yes     Priority: 1 - High    Sepsis [A41.9]  Yes     Priority: 2     Bacteremia due to Klebsiella pneumoniae [R78.81, B96.1]  Yes     Priority: 3     Acute encephalopathy [G93.40]  Yes     Priority: 4     Iron deficiency anemia [D50.9]  Yes     Priority: 5     Hypokalemia [E87.6]  Yes     Priority: 6     Elevated LFTs [R79.89]  Yes     Priority: 6     Hypomagnesemia [E83.42]  Yes     Priority: 7     HTN (hypertension) [I10]  Yes     Priority: 8     Multiple sclerosis [G35]  Yes     Priority: 9      Unable to tolerate Aubagio due to GI side effects--stopped 3/18/2021. Will plan to return to [facility]      Neuropathic pain [M79.2]  Yes     Priority: 10     Bilateral nephrolithiasis [N20.0]  Yes     Priority: 11     Chronic obstructive pulmonary disease, unspecified [J44.9]  Yes     Priority: 12      Noted by KVNG SEWELL MD  last documented on [id number]      Tobacco abuse [Z72.0]  Yes     Priority: 13        10/4/19 still trying.  Brief counseling given.    Trying to quit.  4/17 still trying to quit brief counseling  8/17 counseled again still not ready to do the tobacco cessation program but she is thinking about it, brief counseling         Resolved Hospital Problems   No resolved problems to display.       Follow Up Appointments:  Future Appointments   Date Time Provider Department Center   9/25/2025  2:00 PM Denia Gordon APRN, CNS [facility]       Allergies:  Review of patient's allergies indicates:   Allergen Reactions    Lomotil [diphenoxylate-atropine]      Causes stomach spasms    Dilaudid  [hydromorphone (bulk)] Itching       Medications: Review discharge medications with patient and family and provide education.    Current Medications[1]     Medication List        START taking these medications      cefTRIAXone 2 gram injection  Commonly known as: ROCEPHIN  Inject 2 g into the vein once daily. for 11 days            CHANGE how you take these medications      ibuprofen 600 MG tablet  Commonly known as: ADVIL,MOTRIN  TAKE ONE TABLET BY MOUTH EVERY 6 HOURS AS NEEDED FOR PAIN  What changed: Another medication with the same name was removed. Continue taking this medication, and follow the directions you see here.     tiZANidine 4 MG tablet  Commonly known as: ZANAFLEX  TAKE 2 TABLETS IN THE MORNING, AT NOON AND IN THE EVENING AND TAKE 3 TABLETS AT NIGHT (9 TABLETS PER DAY); only takes 3 at bedtime and none during the day  What changed:   how much to take  how to take this  when to take this            CONTINUE taking these medications      AVONEX 30 mcg/0.5 mL Pnkt  Generic drug: interferon beta-1a  Inject 0.5 mLs (30 mcg total) into the muscle once a week.     carBAMazepine 200 MG 12 hr tablet  Commonly known as: TEGRETOL XR  TAKE ONE TABLET BY MOUTH TWICE DAILY     cetirizine 10 MG tablet  Commonly known as: ZYRTEC  Take 1 tablet (10 mg total) by mouth once daily.     cholecalciferol (vitamin D3) 125 mcg (5,000 unit) Tab  Commonly known as: VITAMIN D3  Take 1 tablet (5,000 Units total) by mouth once daily.     EScitalopram oxalate 20 MG tablet  Commonly known as: LEXAPRO  Take 1 tablet (20 mg total) by mouth once daily.     gabapentin 400 MG capsule  Commonly known as: NEURONTIN  Take 800 mg by mouth every evening.     HYDROcodone-acetaminophen  mg per tablet  Commonly known as: NORCO  Take 1 tablet by mouth every 6 (six) hours as needed for Pain.     hydrOXYzine HCL 25 MG tablet  Commonly known as: ATARAX  Take 1 tablet (25 mg total) by mouth 3 (three) times daily as needed for Itching.      ondansetron 8 MG Tbdl  Commonly known as: ZOFRAN-ODT  DISSOLVE 1 TABLET ON THE TONGUE THREE TIMES DAILY AS NEEDED Strength: 8 mg     simvastatin 40 MG tablet  Commonly known as: ZOCOR  Take 1 tablet (40 mg total) by mouth every evening.     STIOLTO RESPIMAT 2.5-2.5 mcg/actuation Mist  Generic drug: tiotropium-olodateroL  inhale TWO puffs INTO THE LUNGS DAILY                I have seen and examined this patient within the last 30 days. My clinical findings that support the need for the home health skilled services and home bound status are the following:no   Weakness/numbness causing balance and gait disturbance due to Infection making it taxing to leave home.     Diet:   regular diet        Outpatient Antibiotic Therapy Plan:    Please send referral to [facility].    1) Infection: Klebsiella pneumoniae Bacteremia    2) Discharge Antibiotics:    Intravenous antibiotics:  ceftriaxone 2g every 24 hours        3) Therapy Duration:  14 days total    Estimated end date of IV antibiotics: 05/26/2025    4) Outpatient Weekly Labs:    Order the following labs to be drawn on Mondays:   CBC  CMP   CRP      5) Fax Lab Results to Infectious Diseases Provider:     [facility] Fax Number: [phone], attention Dr. Darleen Garcia    6) Outpatient Infectious Diseases Follow-up    Follow-up appointment will be arranged by the ID clinic and will be found in the patient's appointments tab.    Prior to discharge, please ensure the patient's follow-up has been scheduled.    If there is still no follow-up scheduled prior to discharge, please send an EPIC message to Dafne Emerson in Infectious Diseases.            Activities:   activity as tolerated    Nursing:   Agency to admit patient within 24 hours of hospital discharge unless specified on physician order or at patient request    SN to complete comprehensive assessment including routine vital signs. Instruct on disease process and s/s of  complications to report to MD. Review/verify medication list sent home with the patient at time of discharge  and instruct patient/caregiver as needed. Frequency may be adjusted depending on start of care date.     Skilled nurse to perform up to 3 visits PRN for symptoms related to diagnosis    Notify MD if SBP > 160 or < 90; DBP > 90 or < 50; HR > 120 or < 50; Temp > 101; O2 < 88%;     Ok to schedule additional visits based on staff availability and patient request on consecutive days within the home health episode.    When multiple disciplines ordered:    Start of Care occurs on Sunday - Wednesday schedule remaining discipline evaluations as ordered on separate consecutive days following the start of care.    Thursday SOC -schedule subsequent evaluations Friday and Monday the following week.     Friday - Saturday SOC - schedule subsequent discipline evaluations on consecutive days starting Monday of the following week.    For all post-discharge communication and subsequent orders please contact patient's primary care physician.     Miscellaneous   Home Infusion Therapy:   SN to perform Infusion Therapy/Central Line Care.  Review Central Line Care & Central Line Flush with patient.    Administer (drug and dose): ceftriaxone 2g every 24 hours    Last dose given: 05/13/25 at 2200                           Home dose due: 05/14/25 at 2200    Scrub the Hub: Prior to accessing the line, always perform a 30 second alcohol scrub  Each lumen of the central line is to be flushed at least daily with 10 mL Normal Saline and 3 mL Heparin flush (10 units/mL)  Skilled Nurse (SN) may draw blood from IV access  Blood Draw Procedure:   - Aspirate at least 5 mL of blood   - Discard   - Obtain specimen   - Change injection cap   - Flush with 20 mL Normal Saline followed by a                 3-5 mL Heparin flush (10 units/mL)  Central :   - Sterile dressing changes are done weekly and as needed.   - Use chlor-hexadine  scrub to cleanse site, apply Biopatch to insertion site,       apply securement device dressing   - Injection caps are changed weekly and after EVERY lab draw.   - If sterile gauze is under dressing to control oozing,                 dressing change must be performed every 24 hours until gauze is not needed.    Home Health Aide:  Nursing Twice weekly    Wound Care Orders  no    I certify that this patient is confined to her home and needs intermittent skilled nursing care.                [1]   Current Facility-Administered Medications   Medication Dose Route Frequency Provider Last Rate Last Admin    acetaminophen tablet 650 mg  650 mg Oral Q8H PRN Cleve Monae MD   650 mg at 05/12/25 0404    albuterol-ipratropium 2.5 mg-0.5 mg/3 mL nebulizer solution 3 mL  3 mL Nebulization Q6H PRN Bernard Dillon PA-C   3 mL at 05/12/25 1440    budesonide nebulizer solution 0.5 mg  0.5 mg Nebulization Q12H Cleve Monae MD   0.5 mg at 05/14/25 0732    And    arformoteroL nebulizer solution 15 mcg  15 mcg Nebulization BID Cleve Monae MD   15 mcg at 05/14/25 0732    butalbital-acetaminophen-caffeine -40 mg per tablet 1 tablet  1 tablet Oral Q4H PRN Katheryn Lucas, DO        carBAMazepine 12 hr tablet 200 mg  200 mg Oral BID Cleve Monae MD   200 mg at 05/14/25 0831    cefTRIAXone injection 2 g  2 g Intravenous Q24H Cleve Monae MD   2 g at 05/13/25 2212    dextrose 50% injection 12.5 g  12.5 g Intravenous PRN Bernard Dillon PA-C        dextrose 50% injection 25 g  25 g Intravenous PRN Bernard Dillon PA-C        EScitalopram oxalate tablet 20 mg  20 mg Oral Daily Cleve Monae MD   20 mg at 05/14/25 0831    gabapentin capsule 800 mg  800 mg Oral QHS Katheryn Lucas, DO   800 mg at 05/13/25 2149    glucagon (human recombinant) injection 1 mg  1 mg Intramuscular PRN Bernard Dillon PA-C        glucose chewable tablet 16 g  16 g Oral PRN Bernard Dillon PA-C        glucose chewable tablet 24 g  24 g Oral PRN Santana,  Rashed, PA-C        hydrALAZINE injection 10 mg  10 mg Intravenous Q4H PRN Cleve Monae MD        hydrALAZINE injection 20 mg  20 mg Intravenous Q4H PRN Cleve Monae MD        hydrALAZINE injection 30 mg  30 mg Intravenous Q4H PRN Cleve Monae MD        HYDROcodone-acetaminophen  mg per tablet 1 tablet  1 tablet Oral Q4H PRN LucasNunoMy T., DO   1 tablet at 05/13/25 2233    HYDROcodone-acetaminophen 5-325 mg per tablet 1 tablet  1 tablet Oral Q4H PRN Lucas NunoMy T., DO        melatonin tablet 6 mg  6 mg Oral Nightly PRN Mahmud, Rashed, PA-C   6 mg at 05/11/25 2128    naloxone 0.4 mg/mL injection 0.02 mg  0.02 mg Intravenous PRN Mahmud, Rashed, PA-C        nicotine 21 mg/24 hr 1 patch  1 patch Transdermal Daily Cleve Monae MD   1 patch at 05/14/25 0831    OLANZapine injection 5 mg  5 mg Intramuscular Q4H PRN Cleve Monae MD   5 mg at 05/09/25 1245    ondansetron injection 4 mg  4 mg Intravenous Q8H PRN Mahmud, Rashed, PA-C        prochlorperazine injection Soln 5 mg  5 mg Intravenous Q6H PRN Mahmud, Rashed, PA-C        sodium chloride 0.9% flush 10 mL  10 mL Intravenous Q12H PRN Mahmud, Rashed, PA-C

## 2025-05-14 NOTE — NURSING
Home Oxygen Evaluation    Date Performed: 5/14/2025    1) Patient's Home O2 Sat on room air, while at rest: 83.  Placed on 2LPM sats 92%        If O2 sats on room air at rest are 88% or below, patient qualifies. No additional testing needed. Document N/A in steps 2 and 3. If 89% or above, complete steps 2.      2) Patient's O2 Sat on room air while exercising: N/A        If O2 sats on room air while exercising remain 89% or above patient does not qualify, no further testing needed Document N/A in step 3. If O2 sats on room air while exercising are 88% or below, continue to step 3.      3) Patient's O2 Sat while exercising on O2: N/A at N/A LPM         (Must show improvement from #2 for patients to qualify)    If O2 sats improve on oxygen, patient qualifies for portable oxygen. If not, the patient does not qualify.

## 2025-05-14 NOTE — ASSESSMENT & PLAN NOTE
Patient presents with worsening abdominal pain with associated nausea, vomiting, and altered mentation.  Lipase >1000.  CT with pancreatic head inflammation concerning for pancreatitis.  Patient denies alcohol use or any new medications.  No gallstones noted on imaging. Unclear etiology of pancreatitis at this time.  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    Plan:  -Ordered lipid panel. TG wnl/not cause of pancreatitis  -received aggressive IV fluids with improvement   -continue supportive care, and pain control  -diet advanced as tolerated, currently tolerating regular diet

## 2025-05-14 NOTE — DISCHARGE SUMMARY
[facility] Medicine  Discharge Summary      Patient Name: Zoey Cali  MRN: [id number]  [facility]: [id number]  Patient Class: IP- Inpatient  Admission Date: 5/8/2025  Hospital Length of Stay: 6 days  Discharge Date and Time: 05/14/2025 1:16 PM  Attending Physician: Katheryn Lucas DO   Discharging Provider: Katheryn Lucas DO  Primary Care Provider: Devon Collier MD    Primary Care Team: Networked reference to record PCT     HPI:   Zoey Cali is a 59 y.o. female with a pmh of hypertension, hyperlipidemia, multiple sclerosis, COPD, iron-deficiency anemia, osteoporosis presents to the hospital via EMS with weakness and altered mental status.    History obtained by independent historian  Jared on the phone who states that patient has been acting inappropriately over the past few days. Jared states that her symptoms started few days ago with the abdominal pain and spasms and patient was unable to tolerate p.o.. This has happened in the past and patient would drink some milk and it would go away. However, this time pain and associated symptoms got worse. Patient has a had several episodes of vomiting.  states that patient has become more confused. Patient unable to take any of her home medications. Unable to express exactly where her pain is located.  Review of systems complicated by patient's change in mentation.    In the ED: Patient afebrile with leukocytosis (WBC 15.87), thrombocytopenic with a platelet count 95, BUN 24, GFR 47, phosphorus 4.6, magnesium 1.3, elevated , T bili 6.9, elevated AST//273 respectively, normal ammonia level, lipase>1000, initial troponin normal, elevated lactic acid 3.54, elevated procalcitonin 68.66, COVID influenza negative, blood cultures pending. CT abdomen pelvis shows (1) pancreatic inflammation concerning for pancreatitis (2) surgical change of cholecystectomy noting prominent intra and extrahepatic biliary dilation (3)  bilateral nonobstructive nephrolithiasis.  Chest x-ray shows chronic appearing interstitial findings consistent with history of COPD/emphysema.  Ultrasound abdomen pending.  Patient given 1 L LR bolus x2 IV morphine 4 mg x 2, IV Zosyn 4.5 g, IV vancomycin, and IV Haldol 2.5 mg in the ED. Case discussed with ED provider and patient admitted to hospital medicine for further medical management.    * No surgery found *      Hospital Course:    59 year old female with multiple sclerosis on interferon beta-1a (avonex) and COPD who was admitted 5/8/25 for acute pancreatitis and encephalopathy.  Started on aggressive IVF fluid resuscitation and pain control.  TG wnl/not cause of pancreatitis,  states she is not a big drinker, and no stones appreciated. Tbili is elevated at 6.9 and LFTs up. GI consulted- US negative for biliary stone, possibly passed a stone?  MRCP negative for choledocholithiasis. Rec f/u in panc/bili clinic. I She was also found to have klebsiella pneumoniae bacteremia. No symptoms of UTI, urine culture with no significant growth. CT A/P with pancreatitis and questionable filling defect at CBD. No evidence of choledocholithiasis on MRCP.  Source either GI or . Repeat blood cultures 5/10 also positive. CT A/P with apparently incidentally noted abdominal aneurysm so not a fluoroquinolone candidate. Repeat blood cx on 05/12 with no growth to date at 24 hrs.  Infectious Disease consulted-recommend ceftriaxone.   repeat blood cultures remained negative at 48 hours, plan for ceftriaxone for total of 14 days, estimated end date 05/26/2025. Midline placed. Also, unable to wean of supplemental oxygen.  Attempted 6 minute walk test, but hypoxic at rest with O2 sats of 83% on room air, improved with 2 L of nasal cannula.  She will need home oxygen on discharge    Patient admits feeling better overall.  No longer having any abdominal pain, nausea, vomiting.  Admits shortness a breath, but denies any wheezing.   Urinating without difficulty.  Tolerating antibiotics. Pt denies any fever, headaches, vision changes, chest pain, shortness of breath, palpitations, abdominal pain, nausea, vomiting, or any new weaknesses. Feels ready to go home. Patient's exam on discharge was as follow: Patient is alert and oriented, appears in no acute distress, heart with regular rate and rhythm, lungs clear with diminished breath sounds in lower lung fields, no wheezing, on 2 L nasal cannula, abdomen soft, and no new weaknesses or focal deficits seen. Bilateral lower extremities without any edema or calf tenderness.     Patient was counseled regarding any abnormal labs, differential diagnosis, treatment options, risk-benefit, lifestyle changes, prognosis, current condition, and medications.  Family at bedside and present for discussion.  Patient was interactive and attentive.  Patient's questions were answered in a respectful and timely manner. Patient was instructed to follow-up with PCP within 1 week and to continue taking medications as prescribed.   Also, extensively discussed the risks, benefits, and side effects of patient's medications. Discussed with patient about any medication changes. Patient verbalized understanding and agrees to treatment plan.  Patient is stable for discharge.  Patient has no other questions or concerns at this time.  ED precautions discussed with the patient.    Vital signs are stable. Ambulating without any difficulty. Tolerating p.o. intake without any nausea or vomiting. Afebrile for over 24 hours. Patient is in stable condition and has no questions or concerns. Patient will be discharge to home with home IV infusion once IV infusion provide education and transportation secured . Prescriptions sent to pharmacy.  CM/SW to assist with discharge planning.     Vitals:    05/14/25 0444 05/14/25 0732 05/14/25 0745 05/14/25 1130   BP: 129/61  119/76 138/73   BP Location: Left arm      Patient Position: Lying       Pulse: 83 72 78 86   Resp: 18 19 20 18   Temp: 98.2 °F (36.8 °C)  98.4 °F (36.9 °C) 98 °F (36.7 °C)   TempSrc: Oral      SpO2: 95% (!) 94% 98% (!) 91%   Weight:       Height:                Goals of Care Treatment Preferences:  Code Status: Full Code      SDOH Screening:  The patient declined to be screened for utility difficulties, food insecurity, transport difficulties, housing insecurity, and interpersonal safety, so no concerns could be identified this admission.     Consults:   Consults (From admission, onward)          Status Ordering Provider     Inpatient consult to Midline team  Once        Provider:  (Not yet assigned)    Acknowledged GEE VELASQUEZ.     Inpatient consult to Infectious Diseases  Once        Provider:  Salvador Temple MD    Completed BENJA MORA     Inpatient consult to Gastroenterology  Once        Provider:  Salvador Temple MD    Completed BENJA MORA            Assessment & Plan  Acute pancreatitis  Patient presents with worsening abdominal pain with associated nausea, vomiting, and altered mentation.  Lipase >1000.  CT with pancreatic head inflammation concerning for pancreatitis.  Patient denies alcohol use or any new medications.  No gallstones noted on imaging. Unclear etiology of pancreatitis at this time.  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    Plan:  -Ordered lipid panel. TG wnl/not cause of pancreatitis  -received aggressive IV fluids with improvement   -continue supportive care, and pain control  -diet advanced as tolerated, currently tolerating regular diet    HTN (hypertension)  Patient's blood pressure range in the last 24 hours was: BP  Min: 111/72  Max: 138/73.The patient's inpatient anti-hypertensive regimen is listed below:  Current Antihypertensives  hydrALAZINE injection 10 mg, Every 4 hours PRN, Intravenous  hydrALAZINE injection 30 mg, Every 4 hours PRN,  "Intravenous  hydrALAZINE injection 20 mg, Every 4 hours PRN, Intravenous    Plan  - BP is controlled, no changes needed to their regimen  - Patient currently not on any antihypertensives  Multiple sclerosis  History noted, on Avonex injections weekly   Iron deficiency anemia  Anemia is likely due to Iron deficiency. Most recent hemoglobin and hematocrit are listed below.  No results for input(s): "HGB", "HCT" in the last 72 hours.    Plan  - Monitor serial CBC: Daily  - Transfuse PRBC if patient becomes hemodynamically unstable, symptomatic or H/H drops below 7/21.  - Patient has not received any PRBC transfusions to date  - Patient's anemia is currently stable  Tobacco abuse  Patient with a history of smoking 1ppd, 30 pack year history   Will need smoking cessation counseling once mentation improves   Neuropathic pain  Hold home gabapentin and carbamazepine in the setting of pancreatitis and acute encephalopathy on admission  Mental status not baseline, and pancreatitis pain improved  Okay to resume home carbamazepine and gabapentin  Chronic obstructive pulmonary disease, unspecified  Patient's COPD is controlled currently.  Patient is currently off COPD Pathway. Continue scheduled inhalers Duonebs and Supplemental oxygen and monitor respiratory status closely.   Sepsis  This patient does have evidence of infective focus  My overall impression is sepsis.  Source: Bacteremia  Antibiotics given-   Antibiotics (72h ago, onward)      Start     Stop Route Frequency Ordered    05/12/25 2000  cefTRIAXone injection 2 g         05/26/25 1959 IV Every 24 hours (non-standard times) 05/12/25 1336          Latest lactate reviewed-  No results for input(s): "LACTATE", "POCLAC" in the last 72 hours.    Organ dysfunction indicated by Encephalopathy and Thrombocytopenia     Fluid challenge Ideal Body Weight- The patient is obese (BMI>30) and their ideal body weight of Ideal body weight: 45.5 kg (100 lb 4.9 oz) will be used to " calculate fluid bolus of 30 ml/kg.     Post- resuscitation assessment No - Post resuscitation assessment not needed       Will Not start Pressors- Levophed for MAP of 65  Source control achieved by: IV Zosyn and Vancomycin     -klebsiella pneumoniae bacteremia.   -No symptoms of UTI, urine culture with no significant growth.   -CT A/P with pancreatitis and questionable filling defect at CBD. No evidence of choledocholithiasis on MRCP.  Source either GI or .   -Repeat blood cultures 5/10 also positive. Repeat blood cx on 05/12 with no growth to date at 24 hrs.  -CT A/P with apparently incidentally noted abdominal aneurysm so not a fluoroquinolone candidate.    -Infectious Disease consulted: recommend ceftriaxone.    --If repeat blood cultures remained negative at 48 hours, plan for ceftriaxone for total of 14 days, estimated  end date 05/25/2025     Elevated LFTs  Likely secondary to pancreatitis.   US abdomen shows dilated common bile duct commonly seen s/p cholecystectomy and no obstructing stone seen.  Trend daily LFTs  Resolved  Bilateral nephrolithiasis  Noted on CT abdomen/pelvis   F/u outpatient   Acute encephalopathy  -Patient presents with abdominal pain, nausea, vomiting, waxing/waning mentation from acute pancreatitis vs. unknown infectious process.  states patient has been acting strangely. Patient not tolerating po and not taking medications over the past few days.   -Metabolic workup: COVID/influenza negative, ammonia normal, elevated lactic acid 3.9, elevated procalcitonin 68.6, -Blood cultures with Klebsiella pneumoniae.    -urine cultures with no growth.   -Likely 2/2 unknown infectious process and pancreatitis contributing  -Continue IV antibiotics as above  -mental status back at baseline   -resolved  Bacteremia due to Klebsiella pneumoniae  -klebsiella pneumoniae bacteremia.   -No symptoms of UTI, urine culture with no significant growth.   -CT A/P with pancreatitis and questionable  filling defect at CBD. No evidence of choledocholithiasis on MRCP.  Source either GI or .   -Repeat blood cultures 5/10 also positive. Repeat blood cx on 05/12 with no growth to date at 24 hrs.  -CT A/P with apparently incidentally noted abdominal aneurysm so not a fluoroquinolone candidate.    -Infectious Disease consulted: recommend ceftriaxone.    --If repeat blood cultures remained negative at 48 hours, plan for ceftriaxone for total of 14 days, estimated  end date 05/25/2025       Hypokalemia  Patient's most recent potassium results are listed below.   Recent Labs     05/12/25  1001 05/13/25  0426 05/14/25  0440   K 2.9* 2.7* 3.3*     Plan  - Replete potassium per protocol  - Monitor potassium Daily  - Patient's hypokalemia is worsening. Will continue replace as needed  - replace as needed  Hypomagnesemia  Patient has Abnormal Magnesium: hypomagnesemia. Will continue to monitor electrolytes closely. Will replace the affected electrolytes and repeat labs to be done after interventions completed. The patient's magnesium results have been reviewed and are listed below.  Recent Labs   Lab 05/14/25  0440   MG 1.8      -continue replace as needed  Final Active Diagnoses:    Diagnosis Date Noted POA    PRINCIPAL PROBLEM:  Acute pancreatitis [K85.90] 05/08/2025 Yes    Sepsis [A41.9] 05/08/2025 Yes    Bacteremia due to Klebsiella pneumoniae [R78.81, B96.1] 05/12/2025 Yes    Acute encephalopathy [G93.40] 05/08/2025 Yes    Iron deficiency anemia [D50.9] 12/29/2014 Yes    Hypokalemia [E87.6] 05/13/2025 Yes    Elevated LFTs [R79.89] 05/08/2025 Yes    Hypomagnesemia [E83.42] 05/13/2025 Yes    HTN (hypertension) [I10] 07/17/2012 Yes    Multiple sclerosis [G35] 07/17/2012 Yes    Neuropathic pain [M79.2] 05/02/2017 Yes    Bilateral nephrolithiasis [N20.0] 05/08/2025 Yes    Chronic obstructive pulmonary disease, unspecified [J44.9] 01/26/2022 Yes    Tobacco abuse [Z72.0] 06/02/2015 Yes      Problems Resolved During this  Admission:       Discharged Condition: stable    Disposition: Home or Self Care    Follow Up:   Follow-up Information       Devon Collier MD Follow up in 1 week(s).    Specialty: Family Medicine  Contact information:  [address] [id number] [phone]                           Patient Instructions:      OXYGEN FOR HOME USE     Order Specific Question Answer Comments   Liter Flow 2    Duration Continuous    Qualifying Test Performed at: Rest    Oxygen saturation: 83    Portable mode: continuous    Route nasal cannula    Device: home concentrator with portable tanks    Length of need (in months): 3 mos    Patient condition with qualifying saturation COPD    Height: 5' (1.524 m)    Weight: 55.2 kg (121 lb 11.2 oz)    Alternative treatment measures have been tried or considered and deemed clinically ineffective. Yes      Diet Adult Regular     Notify your health care provider if you experience any of the following:  temperature >100.4     Notify your health care provider if you experience any of the following:  persistent nausea and vomiting or diarrhea     Notify your health care provider if you experience any of the following:  severe uncontrolled pain     Notify your health care provider if you experience any of the following:  persistent dizziness, light-headedness, or visual disturbances     Notify your health care provider if you experience any of the following:  increased confusion or weakness     Notify your health care provider if you experience any of the following:  difficulty breathing or increased cough     Notify your health care provider if you experience any of the following:  severe persistent headache     Reason for not Ordering Smoking Cessation Referral     Order Specific Question Answer Comments   Reason for not ordering: Patient refused      Reason for not Prescribing Nicotine Replacement     Order Specific Question Answer Comments   Reason for not Prescribing: Patient  refused      Activity as tolerated       Significant Diagnostic Studies: Labs: All labs within the past 24 hours have been reviewed      Recent Results (from the past 100 hours)   CBC with Differential    Collection Time: 05/10/25  8:32 PM   Result Value Ref Range    WBC 7.33 3.90 - 12.70 K/uL    RBC 3.42 (L) 4.00 - 5.40 M/uL    HGB 11.0 (L) 12.0 - 16.0 gm/dL    HCT 32.2 (L) 37.0 - 48.5 %    MCV 94 82 - 98 fL    MCH 32.2 (H) 27.0 - 31.0 pg    MCHC 34.2 32.0 - 36.0 g/dL    RDW 15.9 (H) 11.5 - 14.5 %    Platelet Count 74 (L) 150 - 450 K/uL    MPV 9.8 9.2 - 12.9 fL    Nucleated RBC 0 <=0 /100 WBC    Neut % 90.2 (H) 38 - 73 %    Lymph % 6.3 (L) 18 - 48 %    Mono % 2.5 (L) 4 - 15 %    Eos % 0.3 <=8 %    Basophil % 0.3 <=1.9 %    Imm Grans % 0.4 0.0 - 0.5 %    Neut # 6.62 1.8 - 7.7 K/uL    Lymph # 0.46 (L) 1 - 4.8 K/uL    Mono # 0.18 (L) 0.3 - 1 K/uL    Eos # 0.02 <=0.5 K/uL    Baso # 0.02 <=0.2 K/uL    Imm Grans # 0.03 0.00 - 0.04 K/uL   Brain natriuretic peptide    Collection Time: 05/10/25  8:32 PM   Result Value Ref Range     (H) 0 - 99 pg/mL   Comprehensive metabolic panel    Collection Time: 05/10/25  8:32 PM   Result Value Ref Range    Sodium 139 136 - 145 mmol/L    Potassium 3.3 (L) 3.5 - 5.1 mmol/L    Chloride 114 (H) 95 - 110 mmol/L    CO2 14 (L) 23 - 29 mmol/L    Glucose 57 (L) 70 - 110 mg/dL    BUN 7 6 - 20 mg/dL    Creatinine 0.5 0.5 - 1.4 mg/dL    Calcium 8.3 (L) 8.7 - 10.5 mg/dL    Protein Total 5.4 (L) 6.0 - 8.4 gm/dL    Albumin 2.3 (L) 3.5 - 5.2 g/dL    Bilirubin Total 1.8 (H) 0.1 - 1.0 mg/dL     40 - 150 unit/L    AST 62 (H) 11 - 45 unit/L    ALT 81 (H) 10 - 44 unit/L    Anion Gap 11 8 - 16 mmol/L    eGFR >60 >60 mL/min/1.73/m2   Lactic acid, plasma    Collection Time: 05/10/25  8:32 PM   Result Value Ref Range    Lactic Acid Level 0.6 0.5 - 2.2 mmol/L   Magnesium    Collection Time: 05/10/25  8:32 PM   Result Value Ref Range    Magnesium  1.5 (L) 1.6 - 2.6 mg/dL   ISTAT PROCEDURE     Collection Time: 05/10/25  9:12 PM   Result Value Ref Range    POC PH 7.284 (LL) 7.35 - 7.45    POC PCO2 31.1 (L) 35 - 45 mmHg    POC PO2 19 (L) 40 - 60 mmHg    POC HCO3 14.8 (L) 24 - 28 mmol/L    POC BE -11 (L) -2 to 2 mmol/L    POC SATURATED O2 25 95 - 100 %    POC TCO2 16 (L) 24 - 29 mmol/L    Sample VENOUS     Site Other     Allens Test N/A    Comprehensive Metabolic Panel (CMP)    Collection Time: 05/11/25  5:22 AM   Result Value Ref Range    Sodium 140 136 - 145 mmol/L    Potassium 2.8 (L) 3.5 - 5.1 mmol/L    Chloride 114 (H) 95 - 110 mmol/L    CO2 18 (L) 23 - 29 mmol/L    Glucose 114 (H) 70 - 110 mg/dL    BUN 6 6 - 20 mg/dL    Creatinine 0.5 0.5 - 1.4 mg/dL    Calcium 8.2 (L) 8.7 - 10.5 mg/dL    Protein Total 5.2 (L) 6.0 - 8.4 gm/dL    Albumin 2.3 (L) 3.5 - 5.2 g/dL    Bilirubin Total 1.7 (H) 0.1 - 1.0 mg/dL     40 - 150 unit/L    AST 44 11 - 45 unit/L    ALT 68 (H) 10 - 44 unit/L    Anion Gap 8 8 - 16 mmol/L    eGFR >60 >60 mL/min/1.73/m2   Magnesium    Collection Time: 05/11/25  5:22 AM   Result Value Ref Range    Magnesium  1.3 (L) 1.6 - 2.6 mg/dL   Phosphorus    Collection Time: 05/11/25  5:22 AM   Result Value Ref Range    Phosphorus Level 2.1 (L) 2.7 - 4.5 mg/dL   POCT glucose    Collection Time: 05/11/25  8:29 PM   Result Value Ref Range    POCT Glucose 134 (H) 70 - 110 mg/dL   Hepatic Function Panel    Collection Time: 05/12/25 10:01 AM   Result Value Ref Range    Protein Total 5.3 (L) 6.0 - 8.4 gm/dL    Albumin 2.2 (L) 3.5 - 5.2 g/dL    Bilirubin Total 1.5 (H) 0.1 - 1.0 mg/dL    Bilirubin Direct 0.8 (H) 0.1 - 0.3 mg/dL    ALP 95 40 - 150 unit/L    AST 21 11 - 45 unit/L    ALT 45 (H) 10 - 44 unit/L   Phosphatidylethanol (PETH)    Collection Time: 05/12/25 10:01 AM   Result Value Ref Range    Interpretation Negative.     PETH 16:0/18:1 (POPETH) BY LC-MS/MS <10 Cutoff: 10 ng/mL    PETH 16:0/18:2 (PLPETH) BY LC-MS/MS <10 Cutoff: 10 ng/mL   Basic Metabolic Panel    Collection Time: 05/12/25 10:01  AM   Result Value Ref Range    Sodium 140 136 - 145 mmol/L    Potassium 2.9 (L) 3.5 - 5.1 mmol/L    Chloride 113 (H) 95 - 110 mmol/L    CO2 21 (L) 23 - 29 mmol/L    Glucose 124 (H) 70 - 110 mg/dL    BUN 4 (L) 6 - 20 mg/dL    Creatinine 0.5 0.5 - 1.4 mg/dL    Calcium 7.9 (L) 8.7 - 10.5 mg/dL    Anion Gap 6 (L) 8 - 16 mmol/L    eGFR >60 >60 mL/min/1.73/m2   Magnesium    Collection Time: 05/12/25 10:01 AM   Result Value Ref Range    Magnesium  1.5 (L) 1.6 - 2.6 mg/dL   Blood culture    Collection Time: 05/12/25 10:01 AM    Specimen: Blood   Result Value Ref Range    Blood Culture No Growth After 48 Hours    Blood culture    Collection Time: 05/12/25 10:01 AM    Specimen: Blood   Result Value Ref Range    Blood Culture No Growth After 48 Hours    Comprehensive Metabolic Panel    Collection Time: 05/13/25  4:26 AM   Result Value Ref Range    Sodium 137 136 - 145 mmol/L    Potassium 2.7 (LL) 3.5 - 5.1 mmol/L    Chloride 105 95 - 110 mmol/L    CO2 23 23 - 29 mmol/L    Glucose 87 70 - 110 mg/dL    BUN 6 6 - 20 mg/dL    Creatinine 0.5 0.5 - 1.4 mg/dL    Calcium 8.1 (L) 8.7 - 10.5 mg/dL    Protein Total 5.5 (L) 6.0 - 8.4 gm/dL    Albumin 2.3 (L) 3.5 - 5.2 g/dL    Bilirubin Total 1.2 (H) 0.1 - 1.0 mg/dL    ALP 88 40 - 150 unit/L    AST 17 11 - 45 unit/L    ALT 37 10 - 44 unit/L    Anion Gap 9 8 - 16 mmol/L    eGFR >60 >60 mL/min/1.73/m2   Magnesium    Collection Time: 05/13/25  4:26 AM   Result Value Ref Range    Magnesium  1.4 (L) 1.6 - 2.6 mg/dL   Phosphorus    Collection Time: 05/13/25  4:26 AM   Result Value Ref Range    Phosphorus Level 3.5 2.7 - 4.5 mg/dL   Comprehensive Metabolic Panel    Collection Time: 05/14/25  4:40 AM   Result Value Ref Range    Sodium 137 136 - 145 mmol/L    Potassium 3.3 (L) 3.5 - 5.1 mmol/L    Chloride 106 95 - 110 mmol/L    CO2 24 23 - 29 mmol/L    Glucose 87 70 - 110 mg/dL    BUN 8 6 - 20 mg/dL    Creatinine 0.5 0.5 - 1.4 mg/dL    Calcium 8.0 (L) 8.7 - 10.5 mg/dL    Protein Total 5.6 (L) 6.0 -  8.4 gm/dL    Albumin 2.3 (L) 3.5 - 5.2 g/dL    Bilirubin Total 0.7 0.1 - 1.0 mg/dL    ALP 84 40 - 150 unit/L    AST 14 11 - 45 unit/L    ALT 25 10 - 44 unit/L    Anion Gap 7 (L) 8 - 16 mmol/L    eGFR >60 >60 mL/min/1.73/m2   Magnesium    Collection Time: 05/14/25  4:40 AM   Result Value Ref Range    Magnesium  1.8 1.6 - 2.6 mg/dL   Phosphorus    Collection Time: 05/14/25  4:40 AM   Result Value Ref Range    Phosphorus Level 4.9 (H) 2.7 - 4.5 mg/dL   Lavender Top Hold    Collection Time: 05/14/25  4:40 AM   Result Value Ref Range    Extra Tube Hold for add-ons.        Microbiology Results (last 7 days)       Procedure Component Value Units Date/Time    Blood culture [[id number]]  (Normal) Collected: 05/12/25 1001    Order Status: Completed Specimen: Blood Updated: 05/14/25 1100     Blood Culture No Growth After 48 Hours    Blood culture [[id number]]  (Normal) Collected: 05/12/25 1001    Order Status: Completed Specimen: Blood Updated: 05/14/25 1100     Blood Culture No Growth After 48 Hours    Blood culture [[id number]]  (Normal) Collected: 05/10/25 0508    Order Status: Completed Specimen: Blood Updated: 05/14/25 0602     Blood Culture No Growth After 96 hours    Blood culture [[id number]]  (Abnormal)  (Susceptibility) Collected: 05/10/25 0508    Order Status: Completed Specimen: Blood Updated: 05/13/25 0720     Blood Culture Positive - Aerobic/Pediatric Bottle      Klebsiella pneumoniae     GRAM STAIN Gram Negative Rods     Comment: Aerobic Bottle Positive         Urine culture [[id number]] Collected: 05/08/25 1708    Order Status: Completed Specimen: Urine, Clean Catch Updated: 05/10/25 0832     Urine Culture No Significant Growth    Blood culture x two cultures. Draw prior to antibiotics. [[id number]]  (Abnormal) Collected: 05/08/25 1250    Order Status: Completed Specimen: Blood from Peripheral, Antecubital, Right Updated: 05/10/25 0739     Blood Culture Positive - Aerobic and Anaerobic Bottles      Klebsiella  pneumoniae     GRAM STAIN Gram Negative Rods    Narrative:      Aerobic and Anaerobic Bottles Positive   Refer ID and Sensitivity at [id number]    Blood culture x two cultures. Draw prior to antibiotics. [[id number]]  (Abnormal)  (Susceptibility) Collected: 05/08/25 1250    Order Status: Completed Specimen: Blood from Peripheral, Antecubital, Left Updated: 05/10/25 0737     Blood Culture Positive - Aerobic and Anaerobic Bottles      Klebsiella pneumoniae     GRAM STAIN Gram Negative Rods    Narrative:      Aerobic and Anaerobic Bottles Positive     Rapid Organism ID by PCR (from Blood culture) [[id number]]  (Abnormal) Collected: 05/08/25 1250    Order Status: Completed Specimen: Blood from Peripheral, Antecubital, Right Updated: 05/09/25 1415     Enterococcus faecalis Not Detected     Enterococcus faecium Not Detected     Listeria monocytogenes Not Detected     Staphylococcus spp. Not Detected     Staphylococcus aureus Not Detected     Staphylococcus epidermidis Not Detected     Staphylococcus lugdunensis Not Detected     Streptococcus spp. Not Detected     Streptococcus agalactiae (Group B) Not Detected     Streptococcus pneumoniae Not Detected     Streptococcus pyogenes (Group A) Not Detected     Acinetobacter calcoaceticus/baumannii complex Not Detected     Bacteroides fragilis Not Detected     Enterobacterales See Species for ID     Enterobacter cloacae complex Not Detected     Escherichia coli Not Detected     Klebsiella aerogenes Not Detected     Klebsiella oxytoca Not Detected     Klebsiella pneumoniae group Detected     Proteus spp. Not Detected     Salmonella spp. Not Detected     Serratia marcescens Not Detected     Haemophilus influenzae Not Detected     Neisseria meningitidis Not Detected     Pseudomonas aeruginosa Not Detected     Stenotrophomonas maltophilia Not Detected     Candida albicans Not Detected     Candida auris Not Detected     Candida glabrata Not Detected     Dacia krusei Not  Detected     Candida parapsilosis Not Detected     Candida tropicalis Not Detected     Cryptococcus neoformans/gattii Not Detected     CTX-M (ESBL ) Not Detected     Comment: Note: Antimicrobial resistance can occur via multiple mechanisms. A Not Detected result for antimicrobial resistance gene(s) does not indicate antimicrobial susceptibility. Subculturing is required for species identification and susceptibility testing of   isolates.        IMP (Cabapenemase ) Not Detected     Comment: Note: Antimicrobial resistance can occur via multiple mechanisms. A Not Detected result for antimicrobial resistance gene(s) does not indicate antimicrobial susceptibility. Subculturing is required for species identification and susceptibility testing of   isolates.        KPC resistance gene (Carbapenemase ) Not Detected     Comment: Note: Antimicrobial resistance can occur via multiple mechanisms. A Not Detected result for antimicrobial resistance gene(s) does not indicate antimicrobial susceptibility. Subculturing is required for species identification and susceptibility testing of   isolates.        mcr-1 Not Detected     Comment: Note: Antimicrobial resistance can occur via multiple mechanisms. A Not Detected result for antimicrobial resistance gene(s) does not indicate antimicrobial susceptibility. Subculturing is required for species identification and susceptibility testing of   isolates.        mecA ID N/A     Comment: Note: Antimicrobial resistance can occur via multiple mechanisms. A Not Detected result for antimicrobial resistance gene(s) does not indicate antimicrobial susceptibility. Subculturing is required for species identification and susceptibility testing of   isolates.        mecA/C and MREJ (MRSA) gene N/A     Comment: Note: Antimicrobial resistance can occur via multiple mechanisms. A Not Detected result for antimicrobial resistance gene(s) does not indicate antimicrobial  susceptibility. Subculturing is required for species identification and susceptibility testing of   isolates.        NDM (Carbapenemase ) Not Detected     Comment: Note: Antimicrobial resistance can occur via multiple mechanisms. A Not Detected result for antimicrobial resistance gene(s) does not indicate antimicrobial susceptibility. Subculturing is required for species identification and susceptibility testing of   isolates.        OXA-48-like (Carbapenemase ) Not Detected     Comment: Note: Antimicrobial resistance can occur via multiple mechanisms. A Not Detected result for antimicrobial resistance gene(s) does not indicate antimicrobial susceptibility. Subculturing is required for species identification and susceptibility testing of   isolates.        Pro/B (VRE gene) N/A     Comment: Note: Antimicrobial resistance can occur via multiple mechanisms. A Not Detected result for antimicrobial resistance gene(s) does not indicate antimicrobial susceptibility. Subculturing is required for species identification and susceptibility testing of   isolates.        VIM (Carbapenemase ) Not Detected     Comment: Note: Antimicrobial resistance can occur via multiple mechanisms. A Not Detected result for antimicrobial resistance gene(s) does not indicate antimicrobial susceptibility. Subculturing is required for species identification and susceptibility testing of   isolates.               Imaging Results              US Abdomen Limited (Final result)  Result time 05/08/25 16:35:15      Final result by Quinton Sandoval MD (05/08/25 16:35:15)                   Impression:      Dilated common bile duct, commonly seen post cholecystectomy.  No obstructing stone identified.      Electronically signed by: Quinton Sandoval MD  Date:    05/08/2025  Time:    16:35               Narrative:    EXAMINATION:  US ABDOMEN LIMITED    CLINICAL HISTORY:  abdominal pain, elevated LFTs;    TECHNIQUE:  Limited ultrasound of  the right upper quadrant of the abdomen (including pancreas, liver, gallbladder, common bile duct, and spleen) was performed.    COMPARISON:  None.    FINDINGS:  The visualized portions of the pancreas, abdominal aorta, and IVC are unremarkable.    Gallbladder: Absent.  Negative sonographic Tate's sign.    Biliary system: The common duct is enlarged, measuring 9 mm.  Mild intrahepatic ductal dilatation.    Liver: Measures 13.3 cm.  There is homogeneous echotexture. No focal hepatic lesions.    Spleen: Normal in size  measuring 11.6 cm.                                        CT Abdomen Pelvis With IV Contrast NO Oral Contrast (Final result)  Result time 05/08/25 14:57:54      Final result by Solomon Gonzalez MD (05/08/25 14:57:54)                   Impression:      This report was flagged in Epic as abnormal.    1. Inflammation about the pancreas particularly at the pancreatic head, correlation with laboratory values recommended as findings are concerning for pancreatitis.  There is adjacent reactive lymphadenopathy.  2. There is surgical change of cholecystectomy noting prominent intra and extrahepatic biliary dilation.  No recent exams are available for comparison.  There is a questionable filling defect within the distal aspect of the common duct versus superimposition of duodenal soft tissue, choledocholithiasis cannot be definitively excluded.  3. Bilateral nonobstructive nephrolithiasis.  4. Please see above for several additional findings.      Electronically signed by: Solomon Gonzalez MD  Date:    05/08/2025  Time:    14:57               Narrative:    EXAMINATION:  CT ABDOMEN PELVIS WITH IV CONTRAST    CLINICAL HISTORY:  Nausea/vomiting;Epigastric pain;    TECHNIQUE:  Low dose axial images, sagittal and coronal reformations were obtained from the lung bases to the pubic symphysis following the IV administration of 75 mL of Omnipaque 350 .  Oral contrast was not  given.    COMPARISON:  None.    FINDINGS:  Images of the lower thorax are remarkable for bilateral dependent atelectasis/scarring.  There are several lymph nodes along the aortic hiatus particularly on the right.    Allowing for motion artifact, the liver, and adrenal glands are grossly unremarkable.  The spleen is enlarged.  There is some indistinctness about the pancreatic head, the pancreas enhances homogeneously.  The gallbladder is surgically absent noting intra and extrahepatic biliary dilation status post cholecystectomy.  At the distal aspect of the common duct, there is a questionable filling defect versus redundancy of the duodenal parenchyma in the region.  This is best seen on coronal image 601, and measures 3 mm.  There is mild fluid at the level of the ashish hepatis.  The portal vein, splenic vein, SMV, celiac axis and SMA all are patent.  No significant abdominal lymphadenopathy.  There is a small amount of strand-like fluid in the mid abdomen.    The kidneys enhance symmetrically without hydronephrosis.  There is bilateral nonobstructive nephrolithiasis.  Low attenuating lesions arise from the kidneys, too small for characterization.  The bilateral ureters are unable to be followed in their entirety to the urinary bladder, no definite calculi seen or secondary findings to suggest obstructive uropathy.  The urinary bladder is nondistended.  The uterus is absent the adnexa is unremarkable.    The distal large bowel is for the most part decompressed.  The terminal ileum is unremarkable.  The appendix is not confidently identified, no pericecal inflammation.  The small bowel is grossly unremarkable.  There are a few scattered shotty periaortic, pericaval, and mesenteric lymph nodes.  There is atherosclerotic calcification of the aorta and its branches.  No focal organized pelvic fluid collection.    There is osteopenia.  There are degenerative changes of the bilateral sacroiliac joints, pubic symphysis,  and spine.  No significant inguinal lymphadenopathy.                                       X-Ray Chest AP Portable (Final result)  Result time 05/08/25 13:07:25      Final result by Solomon Gonzalez MD (05/08/25 13:07:25)                   Impression:      1. Chronic appearing interstitial findings, underlying edema is a consideration.  Correlation with any history of COPD/emphysema.      Electronically signed by: Solomon Gonzalez MD  Date:    05/08/2025  Time:    13:07               Narrative:    EXAMINATION:  XR CHEST AP PORTABLE    CLINICAL HISTORY:  Sepsis;    TECHNIQUE:  Single frontal view of the chest was performed.    COMPARISON:  07/08/2019    FINDINGS:  The cardiomediastinal silhouette is not enlarged noting calcification of the aorta..  There is no pleural effusion.  The trachea is midline.  The lungs are symmetrically expanded bilaterally with mildly coarse interstitial attenuation and bilateral basilar subsegmental atelectasis..  No large focal consolidation seen.  There is no pneumothorax.  The osseous structures are remarkable for degenerative change..                                          Pending Diagnostic Studies:       Procedure Component Value Units Date/Time    X-Ray Chest 1 View [[id number]] Resulted: 05/14/25 1225    Order Status: Sent Lab Status: In process Updated: 05/14/25 1225           Medications:  Reconciled Home Medications:      Medication List        START taking these medications      cefTRIAXone 2 gram injection  Commonly known as: ROCEPHIN  Inject 2 g into the vein once daily. for 11 days            CHANGE how you take these medications      ibuprofen 600 MG tablet  Commonly known as: ADVIL,MOTRIN  TAKE ONE TABLET BY MOUTH EVERY 6 HOURS AS NEEDED FOR PAIN  What changed: Another medication with the same name was removed. Continue taking this medication, and follow the directions you see here.     tiZANidine 4 MG tablet  Commonly known as: ZANAFLEX  TAKE 2 TABLETS IN THE  MORNING, AT NOON AND IN THE EVENING AND TAKE 3 TABLETS AT NIGHT (9 TABLETS PER DAY); only takes 3 at bedtime and none during the day  What changed:   how much to take  how to take this  when to take this            CONTINUE taking these medications      AVONEX 30 mcg/0.5 mL Pnkt  Generic drug: interferon beta-1a  Inject 0.5 mLs (30 mcg total) into the muscle once a week.     carBAMazepine 200 MG 12 hr tablet  Commonly known as: TEGRETOL XR  TAKE ONE TABLET BY MOUTH TWICE DAILY     cetirizine 10 MG tablet  Commonly known as: ZYRTEC  Take 1 tablet (10 mg total) by mouth once daily.     cholecalciferol (vitamin D3) 125 mcg (5,000 unit) Tab  Commonly known as: VITAMIN D3  Take 1 tablet (5,000 Units total) by mouth once daily.     EScitalopram oxalate 20 MG tablet  Commonly known as: LEXAPRO  Take 1 tablet (20 mg total) by mouth once daily.     gabapentin 400 MG capsule  Commonly known as: NEURONTIN  Take 800 mg by mouth every evening.     HYDROcodone-acetaminophen  mg per tablet  Commonly known as: NORCO  Take 1 tablet by mouth every 6 (six) hours as needed for Pain.     hydrOXYzine HCL 25 MG tablet  Commonly known as: ATARAX  Take 1 tablet (25 mg total) by mouth 3 (three) times daily as needed for Itching.     ondansetron 8 MG Tbdl  Commonly known as: ZOFRAN-ODT  DISSOLVE 1 TABLET ON THE TONGUE THREE TIMES DAILY AS NEEDED Strength: 8 mg     simvastatin 40 MG tablet  Commonly known as: ZOCOR  Take 1 tablet (40 mg total) by mouth every evening.     STIOLTO RESPIMAT 2.5-2.5 mcg/actuation Mist  Generic drug: tiotropium-olodateroL  inhale TWO puffs INTO THE LUNGS DAILY              Indwelling Lines/Drains at time of discharge:   Lines/Drains/Airways       Drain  Duration             Female External Urinary Catheter w/ Suction 05/08/25 1303 6 days                    Time spent on the discharge of patient: Greater than 35 minutes         Katheryn Lucas DO  Department of Hospital Medicine  [facility]

## 2025-05-14 NOTE — SUBJECTIVE & OBJECTIVE
Interval History: feeling okay, eager to go home. Family at bedside and comfortable administering IV antibiotics. Tolerating ceftriaxone without issue.     Review of Systems   All other systems reviewed and are negative.    Objective:     Vital Signs (Most Recent):  Temp: 97.9 °F (36.6 °C) (05/14/25 1514)  Pulse: 97 (05/14/25 1514)  Resp: 18 (05/14/25 1514)  BP: 115/65 (05/14/25 1514)  SpO2: 96 % (05/14/25 1514) Vital Signs (24h Range):  Temp:  [97.9 °F (36.6 °C)-98.4 °F (36.9 °C)] 97.9 °F (36.6 °C)  Pulse:  [72-97] 97  Resp:  [16-20] 18  SpO2:  [91 %-98 %] 96 %  BP: (111-138)/(58-76) 115/65     Weight: 55.2 kg (121 lb 11.2 oz)  Body mass index is 23.77 kg/m².    Estimated Creatinine Clearance: 93.3 mL/min (based on SCr of 0.5 mg/dL).     Physical Exam  Vitals reviewed.   Constitutional:       Appearance: She is not ill-appearing.   HENT:      Nose: Nose normal.   Eyes:      General: No scleral icterus.  Pulmonary:      Effort: Pulmonary effort is normal. No respiratory distress.   Abdominal:      General: There is no distension.      Palpations: Abdomen is soft.      Tenderness: There is no abdominal tenderness.   Skin:     General: Skin is warm and dry.      Findings: No lesion.   Neurological:      Mental Status: She is alert and oriented to person, place, and time.   Psychiatric:         Mood and Affect: Mood normal.         Behavior: Behavior normal.          Significant Labs:   Microbiology Results (last 7 days)       Procedure Component Value Units Date/Time    Blood culture [[id number]]  (Normal) Collected: 05/12/25 1001    Order Status: Completed Specimen: Blood Updated: 05/14/25 1100     Blood Culture No Growth After 48 Hours    Blood culture [[id number]]  (Normal) Collected: 05/12/25 1001    Order Status: Completed Specimen: Blood Updated: 05/14/25 1100     Blood Culture No Growth After 48 Hours    Blood culture [[id number]]  (Normal) Collected: 05/10/25 0508    Order Status: Completed Specimen: Blood  Updated: 05/14/25 0602     Blood Culture No Growth After 96 hours    Blood culture [[id number]]  (Abnormal)  (Susceptibility) Collected: 05/10/25 0508    Order Status: Completed Specimen: Blood Updated: 05/13/25 0720     Blood Culture Positive - Aerobic/Pediatric Bottle      Klebsiella pneumoniae     GRAM STAIN Gram Negative Rods     Comment: Aerobic Bottle Positive         Urine culture [[id number]] Collected: 05/08/25 1708    Order Status: Completed Specimen: Urine, Clean Catch Updated: 05/10/25 0832     Urine Culture No Significant Growth    Blood culture x two cultures. Draw prior to antibiotics. [[id number]]  (Abnormal) Collected: 05/08/25 1250    Order Status: Completed Specimen: Blood from Peripheral, Antecubital, Right Updated: 05/10/25 0739     Blood Culture Positive - Aerobic and Anaerobic Bottles      Klebsiella pneumoniae     GRAM STAIN Gram Negative Rods    Narrative:      Aerobic and Anaerobic Bottles Positive   Refer ID and Sensitivity at [id number]    Blood culture x two cultures. Draw prior to antibiotics. [[id number]]  (Abnormal)  (Susceptibility) Collected: 05/08/25 1250    Order Status: Completed Specimen: Blood from Peripheral, Antecubital, Left Updated: 05/10/25 0737     Blood Culture Positive - Aerobic and Anaerobic Bottles      Klebsiella pneumoniae     GRAM STAIN Gram Negative Rods    Narrative:      Aerobic and Anaerobic Bottles Positive     Rapid Organism ID by PCR (from Blood culture) [[id number]]  (Abnormal) Collected: 05/08/25 1250    Order Status: Completed Specimen: Blood from Peripheral, Antecubital, Right Updated: 05/09/25 1415     Enterococcus faecalis Not Detected     Enterococcus faecium Not Detected     Listeria monocytogenes Not Detected     Staphylococcus spp. Not Detected     Staphylococcus aureus Not Detected     Staphylococcus epidermidis Not Detected     Staphylococcus lugdunensis Not Detected     Streptococcus spp. Not Detected     Streptococcus agalactiae (Group B)  Not Detected     Streptococcus pneumoniae Not Detected     Streptococcus pyogenes (Group A) Not Detected     Acinetobacter calcoaceticus/baumannii complex Not Detected     Bacteroides fragilis Not Detected     Enterobacterales See Species for ID     Enterobacter cloacae complex Not Detected     Escherichia coli Not Detected     Klebsiella aerogenes Not Detected     Klebsiella oxytoca Not Detected     Klebsiella pneumoniae group Detected     Proteus spp. Not Detected     Salmonella spp. Not Detected     Serratia marcescens Not Detected     Haemophilus influenzae Not Detected     Neisseria meningitidis Not Detected     Pseudomonas aeruginosa Not Detected     Stenotrophomonas maltophilia Not Detected     Candida albicans Not Detected     Candida auris Not Detected     Candida glabrata Not Detected     Candida krusei Not Detected     Candida parapsilosis Not Detected     Candida tropicalis Not Detected     Cryptococcus neoformans/gattii Not Detected     CTX-M (ESBL ) Not Detected     Comment: Note: Antimicrobial resistance can occur via multiple mechanisms. A Not Detected result for antimicrobial resistance gene(s) does not indicate antimicrobial susceptibility. Subculturing is required for species identification and susceptibility testing of   isolates.        IMP (Cabapenemase ) Not Detected     Comment: Note: Antimicrobial resistance can occur via multiple mechanisms. A Not Detected result for antimicrobial resistance gene(s) does not indicate antimicrobial susceptibility. Subculturing is required for species identification and susceptibility testing of   isolates.        KPC resistance gene (Carbapenemase ) Not Detected     Comment: Note: Antimicrobial resistance can occur via multiple mechanisms. A Not Detected result for antimicrobial resistance gene(s) does not indicate antimicrobial susceptibility. Subculturing is required for species identification and susceptibility testing of    isolates.        mcr-1 Not Detected     Comment: Note: Antimicrobial resistance can occur via multiple mechanisms. A Not Detected result for antimicrobial resistance gene(s) does not indicate antimicrobial susceptibility. Subculturing is required for species identification and susceptibility testing of   isolates.        mecA ID N/A     Comment: Note: Antimicrobial resistance can occur via multiple mechanisms. A Not Detected result for antimicrobial resistance gene(s) does not indicate antimicrobial susceptibility. Subculturing is required for species identification and susceptibility testing of   isolates.        mecA/C and MREJ (MRSA) gene N/A     Comment: Note: Antimicrobial resistance can occur via multiple mechanisms. A Not Detected result for antimicrobial resistance gene(s) does not indicate antimicrobial susceptibility. Subculturing is required for species identification and susceptibility testing of   isolates.        NDM (Carbapenemase ) Not Detected     Comment: Note: Antimicrobial resistance can occur via multiple mechanisms. A Not Detected result for antimicrobial resistance gene(s) does not indicate antimicrobial susceptibility. Subculturing is required for species identification and susceptibility testing of   isolates.        OXA-48-like (Carbapenemase ) Not Detected     Comment: Note: Antimicrobial resistance can occur via multiple mechanisms. A Not Detected result for antimicrobial resistance gene(s) does not indicate antimicrobial susceptibility. Subculturing is required for species identification and susceptibility testing of   isolates.        Pro/B (VRE gene) N/A     Comment: Note: Antimicrobial resistance can occur via multiple mechanisms. A Not Detected result for antimicrobial resistance gene(s) does not indicate antimicrobial susceptibility. Subculturing is required for species identification and susceptibility testing of   isolates.        VIM (Carbapenemase ) Not  Detected     Comment: Note: Antimicrobial resistance can occur via multiple mechanisms. A Not Detected result for antimicrobial resistance gene(s) does not indicate antimicrobial susceptibility. Subculturing is required for species identification and susceptibility testing of   isolates.               Significant Imaging: I have reviewed all pertinent imaging results/findings within the past 24 hours.

## 2025-05-14 NOTE — TELEPHONE ENCOUNTER
"       ----- Message -----  From: Shasha Torrez NP  Sent: 5/12/2025   1:36 PM CDT  To: [facility]  Subject: Endo scheduling                                  Procedure: Colonoscopy    Diagnosis: Screening colonoscopy    Procedure Timing: Within 12 weeks    *If within 4 weeks selected, please aamir as high priority*    *If greater than 12 weeks, please select "4-12 weeks" and delay sending until 2 months prior to requested date*    Provider: Dr Andersen or Any endoscopist    Location: Hospital Based ([facility]End[facility],  endo, [facility])    Additional Scheduling Information: No scheduling concerns    Prep Specifications:Standard prep    Have you attached a patient to this message: Yes  "

## 2025-05-14 NOTE — NURSING
Received report care assumed. Patient lying in bed resting, NAD noted. Safety precautions maintained.    [facility]  Nurses Note -- 4 Eyes      5/13/2025      Skin assessed on: Q Shift      [x] No Pressure Injuries Present    [x]Prevention Measures Documented    [] Yes LDA  for Pressure Injury Previously documented     [] Yes New Pressure Injury Discovered   [] LDA for New Pressure Injury Added      Attending RN:  Henny Murray LPN     Second RN:  laney Melendez RN

## 2025-05-14 NOTE — ASSESSMENT & PLAN NOTE
Zoey Cali is a 59 year old woman with multiple sclerosis on interferon beta-1a (avonex) who was admitted 5/8 for pancreatitis and encephalopathy, found to have klebsiella pneumoniae bacteremia. No symptoms of UTI, urine culture with no significant growth. CT A/P with pancreatitis and questionable filling defect at CBD. No evidence of choledocholithiasis on MRCP.  Source either GI or . Repeat blood cultures 5/10 also positive. CT A/P with apparently incidentally noted abdominal aneurysm so not a fluoroquinolone candidate.     Recommendations  - continue ceftriaxone 2 grams q24 hours to complete 14 days (end 5/25)  - see below for OPAT note    Outpatient Antibiotic Therapy Plan:    Please send referral to [facility].    1) Infection: klebsiella pneumoniae bacteremia    2) Discharge Antibiotics: ceftriaxone     Intravenous antibiotics:  2 grams IV q 24 hours     3) Therapy Duration:  14 days    Estimated end date of IV antibiotics: 5/25/2025    4) Outpatient Weekly Labs:    Order the following labs to be drawn on Mondays:   CBC  CMP   CRP    5) Fax Lab Results to Infectious Diseases Provider: [facility] Fax Number: [phone]    6) Outpatient Infectious Diseases Follow-up    Follow-up appointment will be arranged by the ID clinic and will be found in the patient's appointments tab.    Prior to discharge, please ensure the patient's follow-up has been scheduled.    If there is still no follow-up scheduled prior to discharge, please send an EPIC message to [facility] Clinical Pool or Call Infectious Diseases Dept.

## 2025-05-14 NOTE — ASSESSMENT & PLAN NOTE
Patient's most recent potassium results are listed below.   Recent Labs     05/12/25  1001 05/13/25  0426 05/14/25  0440   K 2.9* 2.7* 3.3*     Plan  - Replete potassium per protocol  - Monitor potassium Daily  - Patient's hypokalemia is worsening. Will continue replace as needed  - replace as needed

## 2025-05-14 NOTE — PROGRESS NOTES
[facility] Med Surg  Infectious Disease  Progress Note    Patient Name: Zoey Cali  MRN: [id number]  Admission Date: 5/8/2025  Length of Stay: 6 days  Attending Physician: Katheryn Lucas DO  Primary Care Provider: Devon Collier MD    Isolation Status: No active isolations  Assessment/Plan:      ID  Bacteremia due to Klebsiella pneumoniae  Zoey Cali is a 59 year old woman with multiple sclerosis on interferon beta-1a (avonex) who was admitted 5/8 for pancreatitis and encephalopathy, found to have klebsiella pneumoniae bacteremia. No symptoms of UTI, urine culture with no significant growth. CT A/P with pancreatitis and questionable filling defect at CBD. No evidence of choledocholithiasis on MRCP.  Source either GI or . Repeat blood cultures 5/10 also positive. CT A/P with apparently incidentally noted abdominal aneurysm so not a fluoroquinolone candidate.     Recommendations  - continue ceftriaxone 2 grams q24 hours to complete 14 days (end 5/25)  - see below for OPAT note    Outpatient Antibiotic Therapy Plan:    Please send referral to [facility].    1) Infection: klebsiella pneumoniae bacteremia    2) Discharge Antibiotics: ceftriaxone     Intravenous antibiotics:  2 grams IV q 24 hours     3) Therapy Duration:  14 days    Estimated end date of IV antibiotics: 5/25/2025    4) Outpatient Weekly Labs:    Order the following labs to be drawn on Mondays:   CBC  CMP   CRP    5) Fax Lab Results to Infectious Diseases Provider: [facility] Fax Number: [phone]    6) Outpatient Infectious Diseases Follow-up    Follow-up appointment will be arranged by the ID clinic and will be found in the patient's appointments tab.    Prior to discharge, please ensure the patient's follow-up has been scheduled.    If there is still no follow-up scheduled prior to discharge, please send an EPIC message to [facility] Clinical Pool or Call Infectious Diseases  Dept.              Above discussed with primary team.     Time: 50 minutes   50% of time spent on face-to-face counseling and coordination of care. Counseling included review of test results, diagnosis, and treatment plan with patient and/or family.  I have reviewed hospital notes from  service and other specialty providers as well as outside medical records. I have also reviewed CBC, CMP/BMP,  cultures and imaging with my interpretation as documented. Patient is high risk of morbidity, on antibiotics requiring intensive monitoring for toxicity.     Anticipated Disposition: TBD    Thank you for your consult. I will sign off. Please contact us if you have any additional questions.    Darleen Garcia MD  Infectious Disease  [facility] - Med Surg    Subjective:     Principal Problem:Acute pancreatitis    HPI: Mrs. Cali is a 59 y.o. woman with a multiple sclerosis presents to the hospital via EMS with weakness and altered mental status. Her  reports that she had abdominal pain on Tuesday and became confused on Wednesday. He denies that she complained of headache, dysuria, or diarrhea. Denies EtOH use. In the ED, she had a mild leukocytosis, abn LFTs, and increased lipase. A CT abdomen pelvis shows (1) pancreatic inflammation concerning for pancreatitis (2) surgical change of cholecystectomy noting prominent intra and extrahepatic biliary dilation (3) bilateral nonobstructive nephrolithiasis.  The patient was started on Zosyn and vancomycin. This morning, her blood cultures started growing a GNB (BCID pending) and ID is consulted for that. Her vancomycin was discontinued. Currently, the patient is encephalopathic and does not follow commands. She appears non-toxic, however.   Interval History: feeling okay, eager to go home. Family at bedside and comfortable administering IV antibiotics. Tolerating ceftriaxone without issue.     Review of Systems   All other systems reviewed and are negative.    Objective:      Vital Signs (Most Recent):  Temp: 97.9 °F (36.6 °C) (05/14/25 1514)  Pulse: 97 (05/14/25 1514)  Resp: 18 (05/14/25 1514)  BP: 115/65 (05/14/25 1514)  SpO2: 96 % (05/14/25 1514) Vital Signs (24h Range):  Temp:  [97.9 °F (36.6 °C)-98.4 °F (36.9 °C)] 97.9 °F (36.6 °C)  Pulse:  [72-97] 97  Resp:  [16-20] 18  SpO2:  [91 %-98 %] 96 %  BP: (111-138)/(58-76) 115/65     Weight: 55.2 kg (121 lb 11.2 oz)  Body mass index is 23.77 kg/m².    Estimated Creatinine Clearance: 93.3 mL/min (based on SCr of 0.5 mg/dL).     Physical Exam  Vitals reviewed.   Constitutional:       Appearance: She is not ill-appearing.   HENT:      Nose: Nose normal.   Eyes:      General: No scleral icterus.  Pulmonary:      Effort: Pulmonary effort is normal. No respiratory distress.   Abdominal:      General: There is no distension.      Palpations: Abdomen is soft.      Tenderness: There is no abdominal tenderness.   Skin:     General: Skin is warm and dry.      Findings: No lesion.   Neurological:      Mental Status: She is alert and oriented to person, place, and time.   Psychiatric:         Mood and Affect: Mood normal.         Behavior: Behavior normal.          Significant Labs:   Microbiology Results (last 7 days)       Procedure Component Value Units Date/Time    Blood culture [[id number]]  (Normal) Collected: 05/12/25 1001    Order Status: Completed Specimen: Blood Updated: 05/14/25 1100     Blood Culture No Growth After 48 Hours    Blood culture [[id number]]  (Normal) Collected: 05/12/25 1001    Order Status: Completed Specimen: Blood Updated: 05/14/25 1100     Blood Culture No Growth After 48 Hours    Blood culture [[id number]]  (Normal) Collected: 05/10/25 0508    Order Status: Completed Specimen: Blood Updated: 05/14/25 0602     Blood Culture No Growth After 96 hours    Blood culture [[id number]]  (Abnormal)  (Susceptibility) Collected: 05/10/25 0508    Order Status: Completed Specimen: Blood Updated: 05/13/25 0727     Blood Culture  Positive - Aerobic/Pediatric Bottle      Klebsiella pneumoniae     GRAM STAIN Gram Negative Rods     Comment: Aerobic Bottle Positive         Urine culture [[id number]] Collected: 05/08/25 1708    Order Status: Completed Specimen: Urine, Clean Catch Updated: 05/10/25 0832     Urine Culture No Significant Growth    Blood culture x two cultures. Draw prior to antibiotics. [[id number]]  (Abnormal) Collected: 05/08/25 1250    Order Status: Completed Specimen: Blood from Peripheral, Antecubital, Right Updated: 05/10/25 0739     Blood Culture Positive - Aerobic and Anaerobic Bottles      Klebsiella pneumoniae     GRAM STAIN Gram Negative Rods    Narrative:      Aerobic and Anaerobic Bottles Positive   Refer ID and Sensitivity at [id number]    Blood culture x two cultures. Draw prior to antibiotics. [[id number]]  (Abnormal)  (Susceptibility) Collected: 05/08/25 1250    Order Status: Completed Specimen: Blood from Peripheral, Antecubital, Left Updated: 05/10/25 0737     Blood Culture Positive - Aerobic and Anaerobic Bottles      Klebsiella pneumoniae     GRAM STAIN Gram Negative Rods    Narrative:      Aerobic and Anaerobic Bottles Positive     Rapid Organism ID by PCR (from Blood culture) [[id number]]  (Abnormal) Collected: 05/08/25 1250    Order Status: Completed Specimen: Blood from Peripheral, Antecubital, Right Updated: 05/09/25 1415     Enterococcus faecalis Not Detected     Enterococcus faecium Not Detected     Listeria monocytogenes Not Detected     Staphylococcus spp. Not Detected     Staphylococcus aureus Not Detected     Staphylococcus epidermidis Not Detected     Staphylococcus lugdunensis Not Detected     Streptococcus spp. Not Detected     Streptococcus agalactiae (Group B) Not Detected     Streptococcus pneumoniae Not Detected     Streptococcus pyogenes (Group A) Not Detected     Acinetobacter calcoaceticus/baumannii complex Not Detected     Bacteroides fragilis Not Detected     Enterobacterales See  Species for ID     Enterobacter cloacae complex Not Detected     Escherichia coli Not Detected     Klebsiella aerogenes Not Detected     Klebsiella oxytoca Not Detected     Klebsiella pneumoniae group Detected     Proteus spp. Not Detected     Salmonella spp. Not Detected     Serratia marcescens Not Detected     Haemophilus influenzae Not Detected     Neisseria meningitidis Not Detected     Pseudomonas aeruginosa Not Detected     Stenotrophomonas maltophilia Not Detected     Candida albicans Not Detected     Candida auris Not Detected     Candida glabrata Not Detected     Candida krusei Not Detected     Candida parapsilosis Not Detected     Candida tropicalis Not Detected     Cryptococcus neoformans/gattii Not Detected     CTX-M (ESBL ) Not Detected     Comment: Note: Antimicrobial resistance can occur via multiple mechanisms. A Not Detected result for antimicrobial resistance gene(s) does not indicate antimicrobial susceptibility. Subculturing is required for species identification and susceptibility testing of   isolates.        IMP (Cabapenemase ) Not Detected     Comment: Note: Antimicrobial resistance can occur via multiple mechanisms. A Not Detected result for antimicrobial resistance gene(s) does not indicate antimicrobial susceptibility. Subculturing is required for species identification and susceptibility testing of   isolates.        KPC resistance gene (Carbapenemase ) Not Detected     Comment: Note: Antimicrobial resistance can occur via multiple mechanisms. A Not Detected result for antimicrobial resistance gene(s) does not indicate antimicrobial susceptibility. Subculturing is required for species identification and susceptibility testing of   isolates.        mcr-1 Not Detected     Comment: Note: Antimicrobial resistance can occur via multiple mechanisms. A Not Detected result for antimicrobial resistance gene(s) does not indicate antimicrobial susceptibility. Subculturing is  required for species identification and susceptibility testing of   isolates.        mecA ID N/A     Comment: Note: Antimicrobial resistance can occur via multiple mechanisms. A Not Detected result for antimicrobial resistance gene(s) does not indicate antimicrobial susceptibility. Subculturing is required for species identification and susceptibility testing of   isolates.        mecA/C and MREJ (MRSA) gene N/A     Comment: Note: Antimicrobial resistance can occur via multiple mechanisms. A Not Detected result for antimicrobial resistance gene(s) does not indicate antimicrobial susceptibility. Subculturing is required for species identification and susceptibility testing of   isolates.        NDM (Carbapenemase ) Not Detected     Comment: Note: Antimicrobial resistance can occur via multiple mechanisms. A Not Detected result for antimicrobial resistance gene(s) does not indicate antimicrobial susceptibility. Subculturing is required for species identification and susceptibility testing of   isolates.        OXA-48-like (Carbapenemase ) Not Detected     Comment: Note: Antimicrobial resistance can occur via multiple mechanisms. A Not Detected result for antimicrobial resistance gene(s) does not indicate antimicrobial susceptibility. Subculturing is required for species identification and susceptibility testing of   isolates.        Pro/B (VRE gene) N/A     Comment: Note: Antimicrobial resistance can occur via multiple mechanisms. A Not Detected result for antimicrobial resistance gene(s) does not indicate antimicrobial susceptibility. Subculturing is required for species identification and susceptibility testing of   isolates.        VIM (Carbapenemase ) Not Detected     Comment: Note: Antimicrobial resistance can occur via multiple mechanisms. A Not Detected result for antimicrobial resistance gene(s) does not indicate antimicrobial susceptibility. Subculturing is required for species  identification and susceptibility testing of   isolates.               Significant Imaging: I have reviewed all pertinent imaging results/findings within the past 24 hours.

## 2025-05-15 LAB — BACTERIA BLD CULT: NORMAL

## 2025-05-16 ENCOUNTER — PATIENT OUTREACH (OUTPATIENT)
Dept: ADMINISTRATIVE | Facility: CLINIC | Age: 60
End: 2025-05-16
Payer: MEDICARE

## 2025-05-16 NOTE — PROGRESS NOTES
C3 nurse attempted to contact Zoey Cali  for a TCC post hospital discharge follow up call. No answer. LVM requesting a callback at [phone].    The patient does not have a scheduled HOSFU appointment. Message sent to PCP's staff to assist with HOSFU appointment scheduling.

## 2025-05-17 LAB
BACTERIA BLD CULT: NORMAL
BACTERIA BLD CULT: NORMAL

## 2025-05-17 NOTE — PROGRESS NOTES
Spoke with patient Zoey FRASER Karely . Patient has a [facility] appointment on 05/22/2025.

## 2025-05-19 ENCOUNTER — NURSE TRIAGE (OUTPATIENT)
Dept: ADMINISTRATIVE | Facility: CLINIC | Age: 60
End: 2025-05-19
Payer: MEDICARE

## 2025-05-19 NOTE — TELEPHONE ENCOUNTER
Spoke with Pablo who is  [facility]. States that pt is to receive IV rocephin daily. States that pt.'s central line is not working. Calling in to see if ok to start an IV and give medication IV push to if pt should be seen in ER    Dr. Garcia Called, CHUNG left     secure chat sent to Dr. Jose Garcia Called no contact made.  left    Dr. Garcia  Called back States will take a look at Pt chart, and will call triage nurse back    Dr. Garcia responded via secure chat stating If they don't think the picc line will work tomorrow either, it seems like she'll have to go to the ED anyway to get it replaced. She could either do PIV today and go to ED tomorrow for midline or go to ED today and get a midline.     Pablo with  states will attempt IV first. If unable to get it, and have medication administered[facility]     DR. Garcia made aware via secure chat  Reason for Disposition   [1] Follow-up call from patient regarding patient's clinical status AND [2] information urgent    Protocols used: PCP Call - No Triage-A-

## 2025-05-20 PROCEDURE — 85025 COMPLETE CBC W/AUTO DIFF WBC: CPT | Mod: HCNC | Performed by: STUDENT IN AN ORGANIZED HEALTH CARE EDUCATION/TRAINING PROGRAM

## 2025-05-20 PROCEDURE — 80053 COMPREHEN METABOLIC PANEL: CPT | Mod: HCNC | Performed by: STUDENT IN AN ORGANIZED HEALTH CARE EDUCATION/TRAINING PROGRAM

## 2025-05-20 PROCEDURE — 86140 C-REACTIVE PROTEIN: CPT | Mod: HCNC | Performed by: STUDENT IN AN ORGANIZED HEALTH CARE EDUCATION/TRAINING PROGRAM

## 2025-05-21 ENCOUNTER — RESULTS FOLLOW-UP (OUTPATIENT)
Dept: INFECTIOUS DISEASES | Facility: HOSPITAL | Age: 60
End: 2025-05-21

## 2025-05-22 ENCOUNTER — OFFICE VISIT (OUTPATIENT)
Dept: FAMILY MEDICINE | Facility: CLINIC | Age: 60
End: 2025-05-22
Payer: MEDICARE

## 2025-05-22 VITALS
SYSTOLIC BLOOD PRESSURE: 120 MMHG | HEART RATE: 60 BPM | HEIGHT: 60 IN | TEMPERATURE: 98 F | BODY MASS INDEX: 22.76 KG/M2 | DIASTOLIC BLOOD PRESSURE: 60 MMHG | OXYGEN SATURATION: 99 % | WEIGHT: 115.94 LBS

## 2025-05-22 DIAGNOSIS — J96.21 ACUTE ON CHRONIC RESPIRATORY FAILURE WITH HYPOXEMIA: ICD-10-CM

## 2025-05-22 DIAGNOSIS — J44.9 COPD MIXED TYPE: ICD-10-CM

## 2025-05-22 DIAGNOSIS — Z09 HOSPITAL DISCHARGE FOLLOW-UP: Primary | ICD-10-CM

## 2025-05-22 DIAGNOSIS — G93.40 ACUTE ENCEPHALOPATHY: ICD-10-CM

## 2025-05-22 DIAGNOSIS — R78.81 BACTEREMIA DUE TO KLEBSIELLA PNEUMONIAE: ICD-10-CM

## 2025-05-22 DIAGNOSIS — K85.10 ACUTE BILIARY PANCREATITIS, UNSPECIFIED COMPLICATION STATUS: ICD-10-CM

## 2025-05-22 DIAGNOSIS — B96.1 BACTEREMIA DUE TO KLEBSIELLA PNEUMONIAE: ICD-10-CM

## 2025-05-22 DIAGNOSIS — G35 MULTIPLE SCLEROSIS: ICD-10-CM

## 2025-05-22 DIAGNOSIS — Z86.39 HISTORY OF HYPOKALEMIA: ICD-10-CM

## 2025-05-22 PROCEDURE — 99496 TRANSJ CARE MGMT HIGH F2F 7D: CPT | Mod: S$GLB,,, | Performed by: NURSE PRACTITIONER

## 2025-05-22 PROCEDURE — 99999 PR PBB SHADOW E&M-EST. PATIENT-LVL V: CPT | Mod: PBBFAC,,, | Performed by: NURSE PRACTITIONER

## 2025-05-22 PROCEDURE — 3078F DIAST BP <80 MM HG: CPT | Mod: CPTII,S$GLB,, | Performed by: NURSE PRACTITIONER

## 2025-05-22 PROCEDURE — 1111F DSCHRG MED/CURRENT MED MERGE: CPT | Mod: CPTII,S$GLB,, | Performed by: NURSE PRACTITIONER

## 2025-05-22 PROCEDURE — 1159F MED LIST DOCD IN RCRD: CPT | Mod: CPTII,S$GLB,, | Performed by: NURSE PRACTITIONER

## 2025-05-22 PROCEDURE — 1160F RVW MEDS BY RX/DR IN RCRD: CPT | Mod: CPTII,S$GLB,, | Performed by: NURSE PRACTITIONER

## 2025-05-22 PROCEDURE — 3074F SYST BP LT 130 MM HG: CPT | Mod: CPTII,S$GLB,, | Performed by: NURSE PRACTITIONER

## 2025-05-22 RX ORDER — SODIUM CHLORIDE 0.9 % (FLUSH) 0.9 %
SYRINGE (ML) INJECTION
COMMUNITY
Start: 2025-05-14

## 2025-05-22 RX ORDER — GABAPENTIN 600 MG/1
600 TABLET ORAL 3 TIMES DAILY
COMMUNITY
Start: 2025-04-09

## 2025-05-22 RX ORDER — HEPARIN SODIUM,PORCINE/PF 10 UNIT/ML
SYRINGE (ML) INTRAVENOUS
COMMUNITY
Start: 2025-05-21

## 2025-05-22 NOTE — PROGRESS NOTES
Subjective:       Patient ID: Zoey Cali is a 59 y.o. female.    Chief Complaint: Hospital Follow Up    Transitional Care Note    Family and/or Caretaker present at visit?  Yes, Deysi.  Diagnostic tests reviewed/disposition: I have reviewed all completed as well as pending diagnostic tests at the time of discharge.  Disease/illness education: Yes.  Home health/community services discussion/referrals: Patient has home health established at [facility]-physical therapy and nursing care.   Establishment or re-establishment of referral orders for community resources: No other necessary community resources.   Discussion with other health care providers: No discussion with other health care providers necessary.        Zoey Cali is a 59 y.o. female patient that presents to clinic for hospital discharge follow up. Past medical and surgical history reviewed as listed. PCP is Devon Collier MD , she is known to me. She is accompanied by her daughter. Patient's hospital admission from 5/8-5/14/2025 with a primary dx of pancreatitis.  Hospital discharge summary reviewed at length and discussed with patient.  Patient is currently receiving Rocephin IV once daily for 14 days.  Family has to administer medication.  She also uses oxygen continuously.  Notes oxygen saturation was 87% on room air prior to six minute walk test.       Wt Readings from Last 3 Encounters:   05/22/25 1007 52.6 kg (115 lb 15.4 oz)   05/09/25 0915 55.2 kg (121 lb 11.2 oz)   05/08/25 1136 54.4 kg (120 lb)   04/29/25 1402 53.2 kg (117 lb 4.6 oz)       Past Medical History:   Diagnosis Date    Allergy     Anemia     Anxiety     Breast cyst     Chronic kidney disease     Depression     Fibrocystic breast     Hypertension     Multiple sclerosis     Multiple sclerosis     Neuromuscular disorder     Osteoporosis     Rib fracture 12/2015      Past Surgical History:   Procedure Laterality Date    APPENDECTOMY      BREAST BIOPSY Left 2012     BREAST CYST ASPIRATION      BREAST SURGERY  2004    excisional biopsy      SECTION, CLASSIC      CHALAZION EXCISION  at age 9    CHOLECYSTECTOMY      EYE SURGERY      HYSTERECTOMY  2001    OOPHORECTOMY  2001      Family History   Problem Relation Name Age of Onset    Arthritis Mother R     Diabetes Father Jr     Heart disease Father Jr     Hyperlipidemia Father Jr     Hypertension Father Jr     Stroke Father Jr     Arthritis Son Vamsi     Drug abuse Son Vamsi     Early death Son Vamsi         OD    Hypertension Maternal Grandmother F     Stroke Maternal Grandmother F     Arthritis Maternal Grandmother F     Heart disease Maternal Grandmother F     Arthritis Paternal Grandmother E     Heart disease Paternal Grandmother E     Hypertension Paternal Grandmother E     Stroke Paternal Grandmother E     Heart disease Paternal Grandfather HH         Massive heart attack    Heart attack Paternal Grandfather HH     Heart disease Maternal Grandfather L     Drug abuse Maternal Uncle E     Hypertension Paternal Aunt X     Heart disease Paternal Uncle U     Hypertension Paternal Uncle Yelitza     Early death Son Antoni         Suicide    Colon cancer Neg Hx      Ovarian cancer Neg Hx      Amblyopia Neg Hx      Blindness Neg Hx      Glaucoma Neg Hx      Macular degeneration Neg Hx      Retinal detachment Neg Hx      Strabismus Neg Hx      Thyroid disease Neg Hx        Review of patient's allergies indicates:   Allergen Reactions    Lomotil [diphenoxylate-atropine]      Causes stomach spasms    Dilaudid [hydromorphone (bulk)] Itching     Review of Systems   Constitutional:  Negative for activity change, appetite change, fatigue and fever.   Gastrointestinal:  Negative for constipation, diarrhea, nausea and vomiting.       Objective:      Vitals:    25 1007   BP: 120/60   Pulse: 60   Temp: 98.2 °F (36.8 °C)   TempSrc: Oral   SpO2: 99%   Weight: 52.6 kg (115 lb 15.4 oz)   Height: 5' (1.524 m)      Physical Exam  Vitals  and nursing note reviewed.   Constitutional:       General: She is not in acute distress.  HENT:      Head: Normocephalic.   Eyes:      Conjunctiva/sclera: Conjunctivae normal.      Pupils: Pupils are equal, round, and reactive to light.   Pulmonary:      Effort: Pulmonary effort is normal. No respiratory distress.      Breath sounds: Normal breath sounds.      Comments: Oxygen via nasal cannula  Musculoskeletal:      Cervical back: Normal range of motion.   Skin:     General: Skin is warm and dry.   Neurological:      Mental Status: She is alert and oriented to person, place, and time.      Motor: Weakness present.      Gait: Gait abnormal.   Psychiatric:         Mood and Affect: Mood normal.         Behavior: Behavior normal.         Lab Results   Component Value Date    WBC 6.13 05/20/2025    HGB 10.3 (L) 05/20/2025    HCT 29.8 (L) 05/20/2025     05/20/2025    CHOL 105 (L) 05/09/2025    TRIG 322 (H) 05/09/2025    HDL 12 (L) 05/09/2025    ALT 17 05/20/2025    AST 23 05/20/2025     05/20/2025    K 4.0 05/20/2025     05/20/2025    CREATININE 0.5 05/20/2025    BUN 12 05/20/2025    CO2 22 (L) 05/20/2025    HGBA1C 5.1 04/22/2024      Assessment:       1. Hospital discharge follow-up    2. Acute biliary pancreatitis, unspecified complication status    3. Acute encephalopathy    4. History of hypokalemia    5. Bacteremia due to Klebsiella pneumoniae    6. COPD mixed type    7. Acute on chronic respiratory failure with hypoxemia        Plan:       Hospital discharge follow-up    Acute biliary pancreatitis, unspecified complication status  -     Ambulatory referral/consult to Gastroenterology; Future; Expected date: 05/22/2025    Acute encephalopathy  Resolved.  History of hypokalemia  Check labs.  -     Basic Metabolic Panel; Future; Expected date: 06/22/2025    Bacteremia due to Klebsiella pneumoniae  Continue IV antibiotic.    COPD mixed type  Continue daily inhaler.    Refer to pulmonology for  further evaluation.  Patient may need pulmonary function tests.  -     Ambulatory referral/consult to Pulmonology; Future; Expected date: 05/22/2025    Acute on chronic respiratory failure with hypoxemia  Continue oxygen.    Medication List with Changes/Refills   Current Medications    CARBAMAZEPINE (TEGRETOL XR) 200 MG 12 HR TABLET    TAKE ONE TABLET BY MOUTH TWICE DAILY    CETIRIZINE (ZYRTEC) 10 MG TABLET    Take 1 tablet (10 mg total) by mouth once daily.    CHOLECALCIFEROL, VITAMIN D3, (VITAMIN D3) 125 MCG (5,000 UNIT) TAB    Take 1 tablet (5,000 Units total) by mouth once daily.    ESCITALOPRAM OXALATE (LEXAPRO) 20 MG TABLET    Take 1 tablet (20 mg total) by mouth once daily.    GABAPENTIN (NEURONTIN) 400 MG CAPSULE    Take 800 mg by mouth every evening.    GABAPENTIN (NEURONTIN) 600 MG TABLET    Take 600 mg by mouth 3 (three) times daily.    HEPARIN, PORCINE, PF, 10 UNIT/ML SYRG        HYDROCODONE-ACETAMINOPHEN (NORCO)  MG PER TABLET    Take 1 tablet by mouth every 6 (six) hours as needed for Pain.    HYDROXYZINE HCL (ATARAX) 25 MG TABLET    Take 1 tablet (25 mg total) by mouth 3 (three) times daily as needed for Itching.    IBUPROFEN (ADVIL,MOTRIN) 600 MG TABLET    TAKE ONE TABLET BY MOUTH EVERY 6 HOURS AS NEEDED FOR PAIN    LACTOBACILLUS ACIDOPHILUS (PROBIOTIC ORAL)    Take by mouth.    MAGNESIUM ORAL    Take by mouth.    NORMAL SALINE FLUSH INJECTION        ONDANSETRON (ZOFRAN-ODT) 8 MG TBDL    DISSOLVE 1 TABLET ON THE TONGUE THREE TIMES DAILY AS NEEDED Strength: 8 mg    POTASSIUM ORAL    Take by mouth.    SIMVASTATIN (ZOCOR) 40 MG TABLET    Take 1 tablet (40 mg total) by mouth every evening.    TIOTROPIUM-OLODATEROL (STIOLTO RESPIMAT) 2.5-2.5 MCG/ACTUATION MIST    inhale TWO puffs INTO THE LUNGS DAILY    TIZANIDINE (ZANAFLEX) 4 MG TABLET    TAKE 2 TABLETS IN THE MORNING, AT NOON AND IN THE EVENING AND TAKE 3 TABLETS AT NIGHT (9 TABLETS PER DAY); only takes 3 at bedtime and none during the day    Changed and/or Refilled Medications    Modified Medication Previous Medication    INTERFERON BETA-1A (AVONEX) 30 MCG/0.5 ML PNKT interferon beta-1a (AVONEX) 30 mcg/0.5 mL PnKt       Inject 0.5 mLs (30 mcg total) into the muscle once a week.    Inject 0.5 mLs (30 mcg total) into the muscle once a week.                Gabbie Melendez, MARSHALL, APRN, FNP-C  Family Medicine [facility]  05/27/2025 10:14 AM

## 2025-05-23 ENCOUNTER — TELEPHONE (OUTPATIENT)
Dept: ENDOSCOPY | Facility: HOSPITAL | Age: 60
End: 2025-05-23
Payer: MEDICARE

## 2025-05-23 RX ORDER — INTERFERON BETA-1A 30MCG/.5ML
30 KIT INTRAMUSCULAR WEEKLY
Qty: 6 ML | Refills: 1 | Status: ACTIVE | OUTPATIENT
Start: 2025-05-23

## 2025-05-27 ENCOUNTER — TELEPHONE (OUTPATIENT)
Dept: INFECTIOUS DISEASES | Facility: CLINIC | Age: 60
End: 2025-05-27
Payer: MEDICARE

## 2025-05-27 NOTE — TELEPHONE ENCOUNTER
Spoke to Pablo, patient's  nurse. He stated the patient wants to know if she can resume taking AVONEX again, now that she's completed her abx.    ----- Message from Mion sent at 5/27/2025 10:05 AM CDT -----  Regarding: Consult/Advisory/Medicine  Contact: [phone](Nurse)  Consult[facility]Advisory [facility]  Contact Preference:[phone](Nurse) [phone]  Nature of call:  pt nurse wants to know if pt can start her rx interferon beta-1a (AVONEX) 30 mcg/0.5 mL PnKt now that she has comopleted her antibiotics. Please call to advise thank you

## 2025-05-28 ENCOUNTER — TELEPHONE (OUTPATIENT)
Dept: NEUROLOGY | Facility: CLINIC | Age: 60
End: 2025-05-28
Payer: MEDICARE

## 2025-05-28 NOTE — TELEPHONE ENCOUNTER
----- Message from TIFFANI Box sent at 5/28/2025 11:08 AM CDT -----  Regarding: FW: RX Advise    ----- Message -----  From: Haven Baxter  Sent: 5/28/2025   8:56 AM CDT  To: Vanessa RHODES Staff  Subject: RX Advise                                        Consult/Advisory Name Of Caller:[facility] nurse  Contact Preference:[phone](Nurse) [phone] Nature of call: pt nurse wants to know if pt can start her rx interferon beta-1a (AVONEX) 30 mcg/0.5 mL PnKt now that her  is out of town until next week and is asking if they can wait til nxt week or give it now Please call to advise thank you

## 2025-06-04 DIAGNOSIS — M79.2 NEUROPATHIC PAIN: ICD-10-CM

## 2025-06-04 DIAGNOSIS — F32.A DEPRESSION, UNSPECIFIED DEPRESSION TYPE: ICD-10-CM

## 2025-06-04 RX ORDER — ESCITALOPRAM OXALATE 20 MG/1
20 TABLET ORAL
Qty: 90 TABLET | Refills: 1 | Status: SHIPPED | OUTPATIENT
Start: 2025-06-04

## 2025-06-04 RX ORDER — CARBAMAZEPINE 200 MG/1
200 TABLET, EXTENDED RELEASE ORAL 2 TIMES DAILY
Qty: 180 TABLET | Refills: 1 | Status: SHIPPED | OUTPATIENT
Start: 2025-06-04

## 2025-06-04 RX ORDER — IBUPROFEN 600 MG/1
600 TABLET ORAL EVERY 6 HOURS PRN
Qty: 20 TABLET | Refills: 1 | Status: SHIPPED | OUTPATIENT
Start: 2025-06-04

## 2025-06-06 ENCOUNTER — OFFICE VISIT (OUTPATIENT)
Dept: GASTROENTEROLOGY | Facility: CLINIC | Age: 60
End: 2025-06-06
Payer: MEDICARE

## 2025-06-06 DIAGNOSIS — K83.8 COMMON BILE DUCT DILATATION: ICD-10-CM

## 2025-06-06 DIAGNOSIS — R10.13 EPIGASTRIC PAIN: ICD-10-CM

## 2025-06-06 DIAGNOSIS — K85.10 ACUTE BILIARY PANCREATITIS, UNSPECIFIED COMPLICATION STATUS: Primary | ICD-10-CM

## 2025-06-06 DIAGNOSIS — R79.89 ELEVATED LFTS: ICD-10-CM

## 2025-06-06 PROCEDURE — 98006 SYNCH AUDIO-VIDEO EST MOD 30: CPT | Mod: HCNC,95,,

## 2025-06-06 NOTE — PROGRESS NOTES
[facility]  The patient location is: [place]  The chief complaint leading to consultation is: Acute pancreatitis  Visit type: Virtual visit with synchronous audio and video  Total time spent with patient: 25 mins     Each patient to whom he or she provides medical services by telemedicine is:  (1) informed of the relationship between the physician and patient and the respective role of any other health care provider with respect to management of the patient; and (2) notified that he or she may decline to receive medical services by telemedicine and may withdraw from such care at any time.       Reason for Visit:  The primary encounter diagnosis was Acute biliary pancreatitis, unspecified complication status. Diagnoses of Epigastric pain and Elevated LFTs were also pertinent to this visit.    PCP:   Devon Collier   [id number] [place] [id number]      Initial HPI   This is a 59 y.o. female with pmhx MS, COPD, HTN, s/p cholecystectomy presenting after recent episode of acute pancreatitis with elevated LFTs. Patient presented to the ED 5/8 with AMS, abdominal pain, and NV. Her lipase was >1000 and LFTs elevated with Tbili 6.9, , , and . Initial blood culture was positive for klebsiella bacteremia. CT was performed which showed some inflammation about the pancreatic head with reactive lymphadenopathy. Abdominal US showed dilation of the CBD measuring 9 mm with mild intrahepatic ductal dilatation. Subsequent MRCP was performed which demonstrated peripancreatic inflammatory change and ill defined fluid. Pancreas demonstrated homogenous signal intensity with no pancreatic ductal dilatation. There was some mild prominence of the extrahepatic bile ducts at the level of the ashish hepatis with tapering at the level of the pancreatic head and ampulla. No intraductal filling defects to suggest choledocholithiasis. No obvious obstructing mass. Her LFTs subsequently  trended down and were normal 5/20. Lipase trended down as well and she was subsequently discharged 5/14 after conservative management for pancreatitis and antibiotics for bacteremia. Blood cultures cleared as well after course of antibiotics. Bacteremia was thought to be GI vs  per ID.     She reports that she is overall feeling better at this time. She is still having pain in the LLQ intermittently.  Patient endorses intermittent cramping type pain in the epigastric region that has been occurring for at least the last 20 + years. This reportedly has always improved after drinking milk. The pain that she was having at the time of admission is difficult to characterize given the significant AMS she was having. She does also endorse about 15 + lbs of weight loss over the last few months. Denies NV, fever, pruritus, AMS. This was the first time she has ever been diagnosed with pancreatitis. Denies a family history of pancreatitis or pancreatic cancer. She does not drink alcohol or smoke (former smoker). PETH negative. S/p cholecystectomy. Triglycerides 322. Hepatitis panel negative. Denies hx of diabetes. Of note, she was recently diagnosed with COPD and had no prior knowledge of this diagnosis. She is currently on home oxygen. She will be meeting with a pulmonologist for the first time 7/7          ROS:  Review of Systems   Constitutional:  Negative for chills, fever and weight loss.   Gastrointestinal:  Positive for abdominal pain. Negative for blood in stool, constipation, diarrhea, heartburn, melena, nausea and vomiting.   Skin:  Negative for itching and rash.        Medical History:  has a past medical history of Allergy, Anemia, Anxiety, Breast cyst, Chronic kidney disease, Depression, Fibrocystic breast, Hypertension, Multiple sclerosis, Multiple sclerosis, Neuromuscular disorder, Osteoporosis, and Rib fracture (12/2015).    Surgical History:  has a past surgical history that includes Appendectomy;  Cholecystectomy; Eye surgery;  section, classic; Hysterectomy (); Oophorectomy (); Breast surgery (); Breast cyst aspiration; Breast biopsy (Left, 2012); and Chalazion excision (at age 9).    Family History: family history includes Arthritis in her maternal grandmother, mother, paternal grandmother, and son; Diabetes in her father; Drug abuse in her maternal uncle and son; Early death in her son and son; Heart attack in her paternal grandfather; Heart disease in her father, maternal grandfather, maternal grandmother, paternal grandfather, paternal grandmother, and paternal uncle; Hyperlipidemia in her father; Hypertension in her father, maternal grandmother, paternal aunt, paternal grandmother, and paternal uncle; Stroke in her father, maternal grandmother, and paternal grandmother..       Review of patient's allergies indicates:   Allergen Reactions    Lomotil [diphenoxylate-atropine]      Causes stomach spasms    Dilaudid [hydromorphone (bulk)] Itching       Medications Ordered Prior to Encounter[1]      Objective Findings: (Limited due to virtual nature of encounter)    Physical Exam:  Physical Exam  Constitutional:       General: She is not in acute distress.     Appearance: Normal appearance. She is not ill-appearing.   Eyes:      General: No scleral icterus.  Skin:     Coloration: Skin is not jaundiced or pale.   Neurological:      Mental Status: She is alert and oriented to person, place, and time. Mental status is at baseline.             Labs:  Lab Results   Component Value Date    WBC 6.13 2025    HGB 10.3 (L) 2025    HCT 29.8 (L) 2025     2025    CRP 5.4 2025    CHOL 105 (L) 2025    TRIG 322 (H) 2025    HDL 12 (L) 2025    ALKPHOS 101 2025    LIPASE 670 (H) 2025    ALT 17 2025    AST 23 2025     2025    K 4.0 2025     2025    CREATININE 0.5 2025    BUN 12 2025    CO2  22 (L) 05/20/2025    HGBA1C 5.1 04/22/2024       Imaging reviewed:    MRI/MRCP 5/9/25  FINDINGS:  Liver is normal in size.  Hepatic parenchyma demonstrates homogeneous signal intensity.  Questionable subcentimeter cyst within the anterior left lobe.  Periportal edema.  No intrahepatic biliary dilatation.     Cholecystectomy.  Mild prominence of the extrahepatic bile ducts at the level of the ashish hepatis with tapering at the level of the pancreatic head and ampulla. No intraductal filling defects to suggest choledocholithiasis.     Peripancreatic inflammatory change and ill-defined fluid.  Pancreatic parenchyma demonstrates homogeneous signal intensity.  No pancreatic ductal dilatation.  No peripancreatic fluid collection.     Stomach, duodenum and adrenal glands appear within normal limits.     Spleen is enlarged measuring 13.9 cm in craniocaudal dimension.  No focal splenic lesions.     Kidneys are normal in size and location.  Bilateral renal cyst.  No hydronephrosis or proximal hydroureter.     Small bowel is normal in caliber.  Visualized colon appears within normal limits.  No obstruction.     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  IVC appears grossly unremarkable.     Trace volume of abdominal and pelvic ascites.  Trace bilateral pleural effusions.  No pericardial effusion.     Generalized subcutaneous edema.  Fat containing supraumbilical ventral abdominal wall hernia.     Lumbar levoscoliosis with superimposed degenerative changes.  No marrow signal abnormality to suggest an infiltrative process.     Impression:     1. Cholecystectomy.  No choledocholithiasis.  2. Interstitial edematous pancreatitis.  No peripancreatic fluid collection.  No pancreatic ductal dilatation.  3. Splenomegaly.      CT AP 5/8/25  FINDINGS:  Images of the lower thorax are remarkable for bilateral dependent atelectasis/scarring.  There are several lymph nodes along the aortic  hiatus particularly on the right.     Allowing for motion artifact, the liver, and adrenal glands are grossly unremarkable.  The spleen is enlarged.  There is some indistinctness about the pancreatic head, the pancreas enhances homogeneously.  The gallbladder is surgically absent noting intra and extrahepatic biliary dilation status post cholecystectomy.  At the distal aspect of the common duct, there is a questionable filling defect versus redundancy of the duodenal parenchyma in the region.  This is best seen on coronal image 601, and measures 3 mm.  There is mild fluid at the level of the ashish hepatis.  The portal vein, splenic vein, SMV, celiac axis and SMA all are patent.  No significant abdominal lymphadenopathy.  There is a small amount of strand-like fluid in the mid abdomen.     The kidneys enhance symmetrically without hydronephrosis.  There is bilateral nonobstructive nephrolithiasis.  Low attenuating lesions arise from the kidneys, too small for characterization.  The bilateral ureters are unable to be followed in their entirety to the urinary bladder, no definite calculi seen or secondary findings to suggest obstructive uropathy.  The urinary bladder is nondistended.  The uterus is absent the adnexa is unremarkable.     The distal large bowel is for the most part decompressed.  The terminal ileum is unremarkable.  The appendix is not confidently identified, no pericecal inflammation.  The small bowel is grossly unremarkable.  There are a few scattered shotty periaortic, pericaval, and mesenteric lymph nodes.  There is atherosclerotic calcification of the aorta and its branches.  No focal organized pelvic fluid collection.     There is osteopenia.  There are degenerative changes of the bilateral sacroiliac joints, pubic symphysis, and spine.  No significant inguinal lymphadenopathy.     Impression:     This report was flagged in Epic as abnormal.     1. Inflammation about the pancreas particularly at  the pancreatic head, correlation with laboratory values recommended as findings are concerning for pancreatitis.  There is adjacent reactive lymphadenopathy.  2. There is surgical change of cholecystectomy noting prominent intra and extrahepatic biliary dilation.  No recent exams are available for comparison.  There is a questionable filling defect within the distal aspect of the common duct versus superimposition of duodenal soft tissue, choledocholithiasis cannot be definitively excluded.  3. Bilateral nonobstructive nephrolithiasis.  4. Please see above for several additional findings.             Endoscopy reviewed:  N/a    Assessment:  1. Acute biliary pancreatitis, unspecified complication status    2. Epigastric pain    3. Elevated LFTs         59 year old female presenting with recent episode of pancreatitis. LFTs were elevated (tbili 6.9) but had normalized. MRCP showed some biliary dilation but no evidence of filling defect or obstructing mass. Pancreas showed evidence of acute pancreatitis but no obvious mass or PD dilation. Blood cultures were positive for klebsiella bacteremia but normalized after course of antibiotics. This was her first known episode of pancreatitis. Currently feeling well aside from some intermittent LUQ pain and chronic intermittent epigastric pain. LFTs recently normal. No other identifiable risk factors for pancreatitis. She does not drink or smoke. Triglycerides elevated (300) but not high enough to typically cause pancreatitis. S/p cholecystectomy. Suspect the episode of pancreatitis could have been from a gallstone/sludge that passed given elevated LFTs that trended down to normal. Some element of CBD dilation could be attributed to previous cholecystectomy. I am reassured by the normalization of her LFTs given we would typically not see this with an obstructing mass    Recommendations:  Would likely recommend an EGD/EUS in a few weeks to assess for possible cause of  pancreatitis, rule out underlying mass lesion, and egd to assess for other non pancreas etiology of chronic epigastric pain. Patient is very hesitant to undergo endoscopy given her recent diagnosis of COPD and would at least like to meet with her pulmonologist before considering a procedure. For now would at least recommend a CT pancreas protocol to assess the pancreas for resolution of pancreatitis and evaluate for any underlying lesions/abnormalities.   Clinic visit in a few months to discuss potential future EGD/EUS after she has consulted with her pulmonologist   Would recommend she consult with her PCP/local GI to discuss the utility of future colonoscopy for evaluation of LLQ abdominal pain  F/u with ID for bacteremia  We discussed that if she begins to experience worsening abdominal pain, jaundice, fever, or AMS she should be seen in the ED for urgent evaluation          Thank you so much for allowing me to participate in the care of Zoey Akers PA-C  Advanced [facility] Physician Assitant  [facility]                 [1]   Current Outpatient Medications on File Prior to Visit   Medication Sig Dispense Refill    carBAMazepine (TEGRETOL XR) 200 MG 12 hr tablet TAKE ONE TABLET BY MOUTH TWICE DAILY 180 tablet 1    cetirizine (ZYRTEC) 10 MG tablet Take 1 tablet (10 mg total) by mouth once daily. (Patient not taking: Reported on 5/22/2025) 30 tablet 0    cholecalciferol, vitamin D3, (VITAMIN D3) 125 mcg (5,000 unit) Tab Take 1 tablet (5,000 Units total) by mouth once daily. 90 tablet 3    EScitalopram oxalate (LEXAPRO) 20 MG tablet TAKE ONE TABLET BY MOUTH ONCE DAILY 90 tablet 1    gabapentin (NEURONTIN) 400 MG capsule Take 800 mg by mouth every evening.      gabapentin (NEURONTIN) 600 MG tablet Take 600 mg by mouth 3 (three) times daily.      heparin, porcine, PF, 10 unit/mL Syrg       HYDROcodone-acetaminophen (NORCO)  mg per tablet Take 1 tablet by mouth  every 6 (six) hours as needed for Pain. 60 tablet 0    hydrOXYzine HCL (ATARAX) 25 MG tablet Take 1 tablet (25 mg total) by mouth 3 (three) times daily as needed for Itching.       mg tablet TAKE ONE TABLET BY MOUTH EVERY 6 HOURS AS NEEDED FOR PAIN 20 tablet 1    interferon beta-1a (AVONEX) 30 mcg/0.5 mL PnKt Inject 0.5 mLs (30 mcg total) into the muscle once a week. 6 mL 1    Lactobacillus acidophilus (PROBIOTIC ORAL) Take by mouth.      MAGNESIUM ORAL Take by mouth.      NORMAL SALINE FLUSH injection       ondansetron (ZOFRAN-ODT) 8 MG TbDL DISSOLVE 1 TABLET ON THE TONGUE THREE TIMES DAILY AS NEEDED Strength: 8 mg 20 tablet 11    POTASSIUM ORAL Take by mouth.      simvastatin (ZOCOR) 40 MG tablet Take 1 tablet (40 mg total) by mouth every evening. 90 tablet 3    tiotropium-olodateroL (STIOLTO RESPIMAT) 2.5-2.5 mcg/actuation Mist inhale TWO puffs INTO THE LUNGS DAILY (Patient not taking: Reported on 5/22/2025) 12 g 2    tiZANidine (ZANAFLEX) 4 MG tablet TAKE 2 TABLETS IN THE MORNING, AT NOON AND IN THE EVENING AND TAKE 3 TABLETS AT NIGHT (9 TABLETS PER DAY); only takes 3 at bedtime and none during the day 810 tablet 3     No current facility-administered medications on file prior to visit.

## 2025-06-16 ENCOUNTER — TELEPHONE (OUTPATIENT)
Dept: ENDOSCOPY | Facility: HOSPITAL | Age: 60
End: 2025-06-16
Payer: MEDICARE

## 2025-06-16 NOTE — TELEPHONE ENCOUNTER
Called pt. To schedule Colonoscopy. Pt. States she is not wanting to schedude at this time. Note sent to Shasha Torrez.NP. Pt. Stated she will call back if decides to have it. She states she has Scheduling phone number.

## 2025-06-19 ENCOUNTER — PATIENT MESSAGE (OUTPATIENT)
Dept: PSYCHIATRY | Facility: CLINIC | Age: 60
End: 2025-06-19
Payer: MEDICARE

## 2025-07-07 ENCOUNTER — OFFICE VISIT (OUTPATIENT)
Dept: PULMONOLOGY | Facility: CLINIC | Age: 60
End: 2025-07-07
Payer: MEDICARE

## 2025-07-07 DIAGNOSIS — K85.10 ACUTE BILIARY PANCREATITIS, UNSPECIFIED COMPLICATION STATUS: ICD-10-CM

## 2025-07-07 DIAGNOSIS — J96.01 ACUTE HYPOXIC RESPIRATORY FAILURE: Primary | ICD-10-CM

## 2025-07-07 DIAGNOSIS — S22.39XA CLOSED FRACTURE OF ONE RIB, UNSPECIFIED LATERALITY, INITIAL ENCOUNTER: ICD-10-CM

## 2025-07-07 DIAGNOSIS — J44.9 COPD MIXED TYPE: ICD-10-CM

## 2025-07-07 DIAGNOSIS — J43.8 OTHER EMPHYSEMA: ICD-10-CM

## 2025-07-07 DIAGNOSIS — F17.219 NICOTINE DEPENDENCE, CIGARETTES, WITH UNSPECIFIED NICOTINE-INDUCED DISORDERS: ICD-10-CM

## 2025-07-07 PROCEDURE — 98002 SYNCH AUDIO-VIDEO NEW MOD 45: CPT | Mod: 95,,, | Performed by: INTERNAL MEDICINE

## 2025-07-07 RX ORDER — TIOTROPIUM BROMIDE AND OLODATEROL 3.124; 2.736 UG/1; UG/1
2 SPRAY, METERED RESPIRATORY (INHALATION) DAILY
Qty: 12 G | Refills: 3 | Status: SHIPPED | OUTPATIENT
Start: 2025-07-07

## 2025-07-07 RX ORDER — ALBUTEROL SULFATE 90 UG/1
2 INHALANT RESPIRATORY (INHALATION) EVERY 6 HOURS PRN
Qty: 18 G | Refills: 11 | Status: SHIPPED | OUTPATIENT
Start: 2025-07-07 | End: 2026-07-07

## 2025-07-07 NOTE — Clinical Note
Maurice marcum -- she is going to call us tomorrow to let us know what time wed to do her cxr and rib x-ray. Pending these results we can schedule her pfts and walk test. Can we schedule her lung cancer screening ct chest for 5/2026 pleasE? 11:30 AM August 25 follow up lee ann

## 2025-07-07 NOTE — Clinical Note
Maurice marcum [facility] a [facility]

## 2025-07-07 NOTE — PROGRESS NOTES
General Pulmonary Clinic  New Patient Visit  The patient location is: [place]        Visit type: audiovisual    Face to Face time with patient: 20  40 minutes of total time spent on the encounter, which includes face to face time and non-face to face time preparing to see the patient (eg, review of tests), Obtaining and/or reviewing separately obtained history, Documenting clinical information in the electronic or other health record, Independently interpreting results (not separately reported) and communicating results to the patient/family/caregiver, or Care coordination (not separately reported).         Each patient to whom he or she provides medical services by telemedicine is:  (1) informed of the relationship between the physician and patient and the respective role of any other health care provider with respect to management of the patient; and (2) notified that he or she may decline to receive medical services by telemedicine and may withdraw from such care at any time.    Chief Complaint: acute hypoxemic resp failure     HPI     Zoey Cali is a 59 y.o. female with multiple sclerosis  tobacco smoking, emphysema, pancreatitis, pulmonary hypertension presenting with chief complaint of acute hypoxemic resp failure 2/2 acute pancreatitis w/ emphysema    # acute hypoxemic resp failure 2/2 acute pancreatitis w/ emphysema    Discharge summary 5/14/25 reviewed  She was admitted 5/8/25 for pancreatitis and klebsiella bacteremia  And during this admission she was found to be hypoxemic and required oxygen with rest and exertion.  She was discharged on 2L/min O2 at rest  She has hx of tobacco smoking 1 ppd x 30 years, with ct-chetst lung cancer screening review 5/5/25 noting upper lobe emphysema    She feels her shortness of breath has improved  She is concerned she broke a rib during a fall  Her O2 at rest  is % at rest, after exertion for 92% on room air  She has not been taking stiolto 2 puff  daily, regularly      MMRC      Childhood: no history of wheezing, coughing, asthma; no recurrent pneumonias or infections  Exposures  current smoker - 1 ppd x 40 years  no marijuana  no vaping  no mold or water damage in home  Pets - 1 cats  Occupation:          Objective   Past History     Past Medical History:  Past Medical History:   Diagnosis Date    Allergy     Anemia     Anxiety     Breast cyst     Chronic kidney disease     Depression     Fibrocystic breast     Hypertension     Multiple sclerosis     Multiple sclerosis     Neuromuscular disorder     Osteoporosis     Rib fracture 2015         Past Surgical History:  Past Surgical History:   Procedure Laterality Date    APPENDECTOMY      BREAST BIOPSY Left 2012    BREAST CYST ASPIRATION      BREAST SURGERY  2004    excisional biopsy      SECTION, CLASSIC      CHALAZION EXCISION  at age 9    CHOLECYSTECTOMY      EYE SURGERY      HYSTERECTOMY      OOPHORECTOMY           Social History:   Social History     Socioeconomic History    Marital status:    Tobacco Use    Smoking status: Every Day     Current packs/day: 1.00     Average packs/day: 1 pack/day for 30.0 years (30.0 ttl pk-yrs)     Types: Cigarettes    Smokeless tobacco: Never   Substance and Sexual Activity    Alcohol use: No    Drug use: No    Sexual activity: Not Currently     Partners: Male     Birth control/protection: See Surgical Hx     Comment: Hysterectomy     Social Drivers of Health     Financial Resource Strain: Patient Declined (2025)    Overall Financial Resource Strain (CARDIA)     Difficulty of Paying Living Expenses: Patient declined   Food Insecurity: Patient Declined (2025)    Hunger Vital Sign     Worried About Running Out of Food in the Last Year: Patient declined     Ran Out of Food in the Last Year: Patient declined   Transportation Needs: Patient Declined (2025)    PRAPARE - Transportation     Lack of Transportation (Medical):  Patient declined     Lack of Transportation (Non-Medical): Patient declined   Physical Activity: Insufficiently Active (3/18/2025)    Exercise Vital Sign     Days of Exercise per Week: 5 days     Minutes of Exercise per Session: 20 min   Stress: Patient Declined (5/9/2025)    [employer] [place] [employer] - Occupational Stress Questionnaire     Feeling of Stress : Patient declined   Housing Stability: Patient Declined (5/9/2025)    Housing Stability Vital Sign     Unable to Pay for Housing in the Last Year: Patient declined     Homeless in the Last Year: Patient declined         Family Hx:  No family history of pulmonary fibrosis, pre-mature graying of hair, cirrhosis, or hematologic malignancies  No family history of emphysema or spontaneous PTX  no family history of lung cancer or lymphoma    Allergies and Pertinent Medications   Allergies and Medications Reviewed    Lomotil [diphenoxylate-atropine] and Dilaudid [hydromorphone (bulk)]  [unfilled]             Physical Exam   LMP 01/01/2001 (Approximate) Comment: 2001      Constitutional: No acute distress, Atraumatic   HEENT: moist mucus membranes, extraocular movements intact  Cardiovascular: regular rate and rhythm, no murmurs, rubs or gallops  Pulmonary: normal respiratory rate and chest rise, no chest wall deformity, normal breath sounds with no wheezing or crackles  Abdominal: non-distended, bowel sounds present  Musculoskeletal: No lower extremity edema, no clubbing  Neurological: normal speech/rafal, moves all extremities against gravity  Skin: no finger cyanosis, no rashes on exposed body parts  Psych: Appropriate affect, normal mood       Diagnostic Studies      All diagnostic studies relevant to chief complaint reviewed personally    Labs:  Lab Results   Component Value Date    WBC 6.13 05/20/2025    HGB 10.3 (L) 05/20/2025    HGB 14.0 03/19/2025     05/20/2025     03/19/2025    MCV 91 05/20/2025    MCV 92 03/19/2025     Lab  "Results   Component Value Date     05/20/2025     (L) 03/19/2025    K 4.0 05/20/2025    K 3.8 03/19/2025    CO2 22 (L) 05/20/2025    CO2 25 03/19/2025    BUN 12 05/20/2025    CREATININE 0.5 05/20/2025    MG 1.8 05/14/2025     Lab Results   Component Value Date    AST 23 05/20/2025    AST 15 03/19/2025    ALT 17 05/20/2025    ALT 10 03/19/2025    ALBUMIN 3.0 (L) 05/20/2025    ALBUMIN 3.4 (L) 03/19/2025    PROT 6.6 05/20/2025    PROT 6.3 03/19/2025    BILITOT 0.5 05/20/2025    BILITOT 0.4 03/19/2025         PFTs:  Pulmonary Functions Testing Results:  No results found for: "FEV1", "FVC", "YUB9EYU", "TLC", "DLCO"      FVC FEV1 FEV/FVC TLC DLCO 6MWT Interpret                                                         TTE:  Results for orders placed during the hospital encounter of 03/17/25    Echo    Interpretation Summary    Left Ventricle: The left ventricle is normal in size. There is normal systolic function with a visually estimated ejection fraction of 60 - 65%.    Right Ventricle: The right ventricle is normal in size. Systolic function is normal.    Pulmonary Artery: The estimated pulmonary artery systolic pressure is 39 mmHg.    IVC/SVC: Normal venous pressure at 3 mmHg.            Assessment and Plan   Zoey Cali is a 59 y.o. female  presenting with chief complaint of acute hypoxemic resp failure 2/2 acute pancreatitis w/ emphysema    Problem List:  # acute hypoxemic resp failure 2/2 acute pancreatitis w/ emphysema -- acute, improving -- suspect O2 requirements related to pulmonary edema in setting of profound pancreatitis. At home sats have improved.  Will plan for walk test and PFTs in no clear rib fracture, check PA/lateral X-ray as well. Continue stiolto 2 puff daily, albuterol prn, consider pulm rehab  # possible rib fracture - rib series xray  # acute pancreatitis - acute improving - voice my concerns that etiology unclear and support her pursuing another EGD/further work up. No " pulmonary contraindication to pursuing this. She would like a second opinion w/ GI - refer to glenn NIEVES.  # nicotine dependence, active cigarette smoking- chronic, stable - counseled on risks    She was counseled extensively regarding the benefits and harms of low dose CT screening for lung cancer. It was explained She was high risk for lung cancer ( 50-80 years + > 20 py history), and  that the NLST showed reduction in deaths from CT screening--compared to CXR, 3 in 1000 fewer people  from lung cancer, and 5 in 1000  from all causes.  However this the study sample may not be entirely inclusive containing a younger, male dominant cohort cohort (<65 years old). Screening may also lead to unnecessary invasive procedures--  the majority of nodules captured were eventually found to be benign but required follow up imaging/procedures to further characterize. In the study, 18 in 1000 patients had a false alarm leading to an invasive procedure such as bronchoscopy, biopsy, and surgery, & 2 in 1000 had complications related to that. When choosing to proceed with low dose lung cancer screening with CT, it requires a commitment of multiple years of follow up CT chest and an understanding that sampling/invasive procedures for diagnosis, and this could have cost implications if not covered by insurance as well as psychological implications. To this day, the most effective intervention in preventing death from lung cancer is smoking cessation, not CT lung cancer screening.    After discussion, Zoey Cali would like to proceed with low dose CT chest for lung cancer screening due 2026.         Explained to the patient I work Monday-, so any results or messages received after working hours Thursday through Monday AM would not be addressed until I was back in the office. If he/she experienced any acute symptoms he/she should go the emergency room for immediate help.    Differential, diagnoses, diagnostic  work up, and possible treatments were discussed with the patient. Questions were answered.    Romelia Lopez MD  Pulmonary-Critical Care Medicine              For this visit, the following time was spent  preparing to see the patient (e.g., review of tests) 15 minutes  obtaining and/or reviewing separately obtained history 0 minutes  Performing a medically necessary appropriate examination and/or evaluation 11 minutes  Counseling and educating the patient/family/caregiver 10 minutes  Ordering medications, tests, or procedures 2 minutes  Referring and communicating with other health care professionals (when not reported separately) 0minutes  Documenting clinical information in the electronic or other health record 7minutes  Care coordination (not reported separately) 0minutes    Total time = 45 minutes

## 2025-07-09 ENCOUNTER — PATIENT MESSAGE (OUTPATIENT)
Dept: PULMONOLOGY | Facility: CLINIC | Age: 60
End: 2025-07-09
Payer: MEDICARE

## 2025-07-11 ENCOUNTER — HOSPITAL ENCOUNTER (OUTPATIENT)
Dept: RADIOLOGY | Facility: HOSPITAL | Age: 60
Discharge: HOME | End: 2025-07-11
Attending: INTERNAL MEDICINE
Payer: MEDICARE

## 2025-07-11 DIAGNOSIS — J43.8 OTHER EMPHYSEMA: ICD-10-CM

## 2025-07-11 PROCEDURE — 71100 X-RAY EXAM RIBS UNI 2 VIEWS: CPT | Mod: TC,FY,RT

## 2025-07-11 PROCEDURE — 71046 X-RAY EXAM CHEST 2 VIEWS: CPT | Mod: 26,,, | Performed by: RADIOLOGY

## 2025-07-11 PROCEDURE — 71100 X-RAY EXAM RIBS UNI 2 VIEWS: CPT | Mod: 26,RT,, | Performed by: RADIOLOGY

## 2025-07-11 PROCEDURE — 71046 X-RAY EXAM CHEST 2 VIEWS: CPT | Mod: TC,FY

## 2025-07-14 DIAGNOSIS — J44.9 COPD MIXED TYPE: Primary | ICD-10-CM

## 2025-07-15 ENCOUNTER — PATIENT MESSAGE (OUTPATIENT)
Dept: PULMONOLOGY | Facility: CLINIC | Age: 60
End: 2025-07-15
Payer: MEDICARE

## 2025-07-26 ENCOUNTER — DOCUMENT SCAN (OUTPATIENT)
Dept: HOME HEALTH SERVICES | Facility: HOSPITAL | Age: 60
End: 2025-07-26
Payer: MEDICARE

## 2025-07-29 ENCOUNTER — DOCUMENT SCAN (OUTPATIENT)
Dept: HOME HEALTH SERVICES | Facility: HOSPITAL | Age: 60
End: 2025-07-29
Payer: MEDICARE

## 2025-07-30 ENCOUNTER — PATIENT MESSAGE (OUTPATIENT)
Dept: PSYCHIATRY | Facility: CLINIC | Age: 60
End: 2025-07-30
Payer: MEDICARE

## 2025-07-30 ENCOUNTER — OFFICE VISIT (OUTPATIENT)
Dept: GASTROENTEROLOGY | Facility: CLINIC | Age: 60
End: 2025-07-30
Payer: MEDICARE

## 2025-07-30 ENCOUNTER — LAB VISIT (OUTPATIENT)
Dept: LAB | Facility: HOSPITAL | Age: 60
End: 2025-07-30
Payer: MEDICARE

## 2025-07-30 VITALS
SYSTOLIC BLOOD PRESSURE: 122 MMHG | HEART RATE: 92 BPM | HEIGHT: 60 IN | DIASTOLIC BLOOD PRESSURE: 74 MMHG | WEIGHT: 109.88 LBS | BODY MASS INDEX: 21.57 KG/M2

## 2025-07-30 DIAGNOSIS — D50.9 IRON DEFICIENCY ANEMIA, UNSPECIFIED IRON DEFICIENCY ANEMIA TYPE: ICD-10-CM

## 2025-07-30 DIAGNOSIS — K85.10 ACUTE BILIARY PANCREATITIS, UNSPECIFIED COMPLICATION STATUS: ICD-10-CM

## 2025-07-30 DIAGNOSIS — K85.10 ACUTE BILIARY PANCREATITIS, UNSPECIFIED COMPLICATION STATUS: Primary | ICD-10-CM

## 2025-07-30 LAB
ABSOLUTE NEUTROPHIL MANUAL (OHS): 3.4 K/UL
ALBUMIN SERPL BCP-MCNC: 4.3 G/DL (ref 3.5–5.2)
ALP SERPL-CCNC: 132 UNIT/L (ref 40–150)
ALT SERPL W/O P-5'-P-CCNC: <8 UNIT/L (ref 10–44)
ANION GAP (OHS): 7 MMOL/L (ref 8–16)
ANISOCYTOSIS BLD QL SMEAR: SLIGHT
AST SERPL-CCNC: 15 UNIT/L (ref 11–45)
BILIRUB SERPL-MCNC: 0.5 MG/DL (ref 0.1–1)
BUN SERPL-MCNC: 8 MG/DL (ref 6–20)
CALCIUM SERPL-MCNC: 9.3 MG/DL (ref 8.7–10.5)
CHLORIDE SERPL-SCNC: 100 MMOL/L (ref 95–110)
CO2 SERPL-SCNC: 30 MMOL/L (ref 23–29)
CREAT SERPL-MCNC: 0.5 MG/DL (ref 0.5–1.4)
EOSINOPHIL NFR BLD MANUAL: 1 % (ref 0–8)
ERYTHROCYTE [DISTWIDTH] IN BLOOD BY AUTOMATED COUNT: 14.2 % (ref 11.5–14.5)
FERRITIN SERPL-MCNC: 144.1 NG/ML (ref 20–300)
GFR SERPLBLD CREATININE-BSD FMLA CKD-EPI: >60 ML/MIN/1.73/M2
GLUCOSE SERPL-MCNC: 95 MG/DL (ref 70–110)
HCT VFR BLD AUTO: 42.4 % (ref 37–48.5)
HGB BLD-MCNC: 14.6 GM/DL (ref 12–16)
IRON SATN MFR SERPL: 28 % (ref 20–50)
IRON SERPL-MCNC: 93 UG/DL (ref 30–160)
LIPASE SERPL-CCNC: 16 U/L (ref 4–60)
LYMPHOCYTES NFR BLD MANUAL: 33 % (ref 18–48)
MCH RBC QN AUTO: 32.4 PG (ref 27–31)
MCHC RBC AUTO-ENTMCNC: 34.4 G/DL (ref 32–36)
MCV RBC AUTO: 94 FL (ref 82–98)
MONOCYTES NFR BLD MANUAL: 4 % (ref 4–15)
NEUTROPHILS NFR BLD MANUAL: 60 % (ref 38–73)
NEUTS BAND NFR BLD MANUAL: 2 %
NUCLEATED RBC (/100WBC) (OHS): 0 /100 WBC
OVALOCYTES BLD QL SMEAR: NORMAL
PLATELET # BLD AUTO: 217 K/UL (ref 150–450)
PLATELET BLD QL SMEAR: NORMAL
PMV BLD AUTO: 9 FL (ref 9.2–12.9)
POIKILOCYTOSIS BLD QL SMEAR: SLIGHT
POTASSIUM SERPL-SCNC: 4.1 MMOL/L (ref 3.5–5.1)
PROT SERPL-MCNC: 7.4 GM/DL (ref 6–8.4)
RBC # BLD AUTO: 4.5 M/UL (ref 4–5.4)
SODIUM SERPL-SCNC: 137 MMOL/L (ref 136–145)
STOMATOCYTES BLD QL SMEAR: PRESENT
TIBC SERPL-MCNC: 330 UG/DL (ref 250–450)
TRANSFERRIN SERPL-MCNC: 223 MG/DL (ref 200–375)
WBC # BLD AUTO: 5.45 K/UL (ref 3.9–12.7)

## 2025-07-30 PROCEDURE — 83690 ASSAY OF LIPASE: CPT

## 2025-07-30 PROCEDURE — 3074F SYST BP LT 130 MM HG: CPT | Mod: CPTII,S$GLB,,

## 2025-07-30 PROCEDURE — 85025 COMPLETE CBC W/AUTO DIFF WBC: CPT

## 2025-07-30 PROCEDURE — 83540 ASSAY OF IRON: CPT

## 2025-07-30 PROCEDURE — 82728 ASSAY OF FERRITIN: CPT

## 2025-07-30 PROCEDURE — 99999 PR PBB SHADOW E&M-EST. PATIENT-LVL V: CPT | Mod: PBBFAC,,,

## 2025-07-30 PROCEDURE — 1159F MED LIST DOCD IN RCRD: CPT | Mod: CPTII,S$GLB,,

## 2025-07-30 PROCEDURE — 99214 OFFICE O/P EST MOD 30 MIN: CPT | Mod: S$GLB,,,

## 2025-07-30 PROCEDURE — 82040 ASSAY OF SERUM ALBUMIN: CPT

## 2025-07-30 PROCEDURE — 3078F DIAST BP <80 MM HG: CPT | Mod: CPTII,S$GLB,,

## 2025-07-30 PROCEDURE — 3008F BODY MASS INDEX DOCD: CPT | Mod: CPTII,S$GLB,,

## 2025-07-30 PROCEDURE — 36415 COLL VENOUS BLD VENIPUNCTURE: CPT

## 2025-07-30 NOTE — PROGRESS NOTES
[facility] Follow-UP Note    Reason for Follow-Up:  The primary encounter diagnosis was Acute biliary pancreatitis, unspecified complication status. A diagnosis of Iron deficiency anemia, unspecified iron deficiency anemia type was also pertinent to this visit.    PCP:   Devon Collier   [address] [facility] / [place] [id number]      HPI:  This is a 59 y.o. female with PMHx of MS, COPD, HTN, s/p CCY last seen in pancreatic clinic on 6/6/2025 for hospital follow up. Pt was admitted for acute pancreatitis and encephalopathy. On presentation, her Lipase was >1000 with elevated LFTs: Tbili 6.9, , , and . CT showed inflammation about the pancreatic head with reactive lymphadenopathy. Abdominal US showed CBD dilation, but no choledocholithiasis. Subsequent MRCP was performed which demonstrated peripancreatic inflammatory change and ill defined fluid. Pancreas demonstrated homogenous signal intensity with no pancreatic ductal dilatation. There was some mild prominence of the extrahepatic bile ducts at the level of the ashish hepatis with tapering at the level of the pancreatic head and ampulla. No intraductal filling defects to suggest choledocholithiasis. No obvious obstructing mass.   At last visit, EGD/EUS was recommended but pt wanted to discuss with her pulmonologist first.     Interval History:  Today, pt presents for follow up. She has been doing better lately without any ongoing abdominal pain. Reports regular, soft BM lately. Denies bloody stools, rectal bleeding, black stools, fevers, or N/V.   She does endorse about 15 lb weight loss since hospitalization. Pt had CCY about 40 years ago. Denies known gallstones since then. Denies previous episodes of diagnosed pancreatitis. However, she reports episodes of severe epigastric pain over the past several years that would have her doubled over in pain. She would self treat with milk and a heating pad. She denies alcohol use.  Denies frequent NSAID use. Denies known FHx of GI malignancies.       Objective Findings:    Vital Signs:  /74   Pulse 92   Ht 5' (1.524 m)   Wt 49.9 kg (109 lb 14.4 oz)   LMP 01/01/2001 (Approximate) Comment: 2001  BMI 21.46 kg/m²   Body mass index is 21.46 kg/m².    Physical Exam:  General Appearance: Well appearing in no acute distress  Abdomen: Soft, non tender, non distended in all four quadrants. No hepatosplenomegaly, ascites, or mass    I have personally reviewed labs and imaging results.     CT Abdomen Pelvis  1. Inflammation about the pancreas particularly at the pancreatic head, correlation with laboratory values recommended as findings are concerning for pancreatitis.  There is adjacent reactive lymphadenopathy.  2. There is surgical change of cholecystectomy noting prominent intra and extrahepatic biliary dilation.  No recent exams are available for comparison.  There is a questionable filling defect within the distal aspect of the common duct versus superimposition of duodenal soft tissue, choledocholithiasis cannot be definitively excluded.  3. Bilateral nonobstructive nephrolithiasis.  4. Please see above for several additional findings.    US Abdomen Limited  Impression:  Dilated common bile duct, commonly seen post cholecystectomy.  No obstructing stone identified.    Assessment:  1. Acute biliary pancreatitis, unspecified complication status    2. Iron deficiency anemia, unspecified iron deficiency anemia type      This is a 59 y.o F w recent hospitalization for pancreatitis, in the setting of elevated LFTs. Imaging without stone. We spent majority of visit discussing possible causes of pancreatitis and reasoning behind obtaining EGD and EUS. Pt is agreeable to proceeding. All questions answered.     - Ordered EGD/EUS  - Ordered repeat labs  - Maintain adequate hdyration  - ED precautions reviewed with pt  - She is due for CRC surveillance. She has Cologuard ordered which she plans to  complete.     RTC as needed    Order summary:  Orders Placed This Encounter    CBC Auto Differential    Comprehensive Metabolic Panel    Ferritin    Iron and TIBC    Lipase     Thank you so much for allowing me to participate in the care of Danielle H Comeaux Sarah Abukhader, PA-C [facility]

## 2025-08-01 ENCOUNTER — PATIENT MESSAGE (OUTPATIENT)
Dept: PSYCHIATRY | Facility: CLINIC | Age: 60
End: 2025-08-01
Payer: MEDICARE

## 2025-08-06 ENCOUNTER — PATIENT MESSAGE (OUTPATIENT)
Dept: FAMILY MEDICINE | Facility: CLINIC | Age: 60
End: 2025-08-06
Payer: MEDICARE

## 2025-08-11 ENCOUNTER — EXTERNAL HOME HEALTH (OUTPATIENT)
Dept: HOME HEALTH SERVICES | Facility: HOSPITAL | Age: 60
End: 2025-08-11
Payer: MEDICARE

## 2025-08-11 ENCOUNTER — HOSPITAL ENCOUNTER (OUTPATIENT)
Dept: RESPIRATORY THERAPY | Facility: HOSPITAL | Age: 60
Discharge: HOME | End: 2025-08-11
Attending: INTERNAL MEDICINE
Payer: MEDICARE

## 2025-08-11 VITALS — OXYGEN SATURATION: 99 % | HEART RATE: 97 BPM | RESPIRATION RATE: 18 BRPM

## 2025-08-11 DIAGNOSIS — J44.9 COPD MIXED TYPE: ICD-10-CM

## 2025-08-11 PROCEDURE — 94200 LUNG FUNCTION TEST (MBC/MVV): CPT | Mod: HCNC

## 2025-08-11 PROCEDURE — 94729 DIFFUSING CAPACITY: CPT | Mod: HCNC

## 2025-08-11 PROCEDURE — 25000242 PHARM REV CODE 250 ALT 637 W/ HCPCS: Mod: HCNC | Performed by: INTERNAL MEDICINE

## 2025-08-11 RX ORDER — ALBUTEROL SULFATE 2.5 MG/.5ML
2.5 SOLUTION RESPIRATORY (INHALATION) ONCE
Status: COMPLETED | OUTPATIENT
Start: 2025-08-11 | End: 2025-08-11

## 2025-08-11 RX ADMIN — ALBUTEROL SULFATE 2.5 MG: 2.5 SOLUTION RESPIRATORY (INHALATION) at 01:08

## 2025-08-12 LAB
BRPFT: ABNORMAL
DLCO ADJ PRE: 5.48 ML/(MIN*MMHG) (ref 14.43–25.9)
DLCO SINGLE BREATH LLN: 14.43
DLCO SINGLE BREATH PRE REF: 28.1 %
DLCO SINGLE BREATH REF: 20.17
DLCOC SBVA LLN: 3
DLCOC SBVA PRE REF: 32.7 %
DLCOC SBVA REF: 4.75
DLCOC SINGLE BREATH LLN: 14.43
DLCOC SINGLE BREATH PRE REF: 27.2 %
DLCOC SINGLE BREATH REF: 20.17
DLCOVA LLN: 3
DLCOVA PRE REF: 33.8 %
DLCOVA PRE: 1.61 ML/(MIN*MMHG*L) (ref 3–6.5)
DLCOVA REF: 4.75
DLVAADJ PRE: 1.55 ML/(MIN*MMHG*L) (ref 3–6.5)
ERVN2 LLN: -16449.25
ERVN2 PRE REF: 124.7 %
ERVN2 PRE: 0.94 L (ref -16449.25–16450.75)
ERVN2 REF: 0.75
FEF 25 75 CHG: -26.2 %
FEF 25 75 LLN: 1.04
FEF 25 75 POST REF: 12.4 %
FEF 25 75 PRE REF: 16.8 %
FEF 25 75 REF: 2.04
FET100 CHG: 30.6 %
FEV1 CHG: -8 %
FEV1 FVC CHG: -10.6 %
FEV1 FVC LLN: 68
FEV1 FVC POST REF: 50 %
FEV1 FVC PRE REF: 56 %
FEV1 FVC REF: 80
FEV1 LLN: 1.62
FEV1 POST REF: 45.4 %
FEV1 PRE REF: 49.3 %
FEV1 REF: 2.15
FRCN2 LLN: 1.64
FRCN2 PRE REF: 122.9 %
FRCN2 REF: 2.46
FVC CHG: 2.9 %
FVC LLN: 2.04
FVC POST REF: 90.4 %
FVC PRE REF: 87.8 %
FVC REF: 2.7
IVC PRE: 2.01 L (ref 2.04–3.36)
IVC SINGLE BREATH LLN: 2.04
IVC SINGLE BREATH PRE REF: 74.2 %
IVC SINGLE BREATH REF: 2.7
PEF CHG: -3.8 %
PEF LLN: 4.18
PEF POST REF: 46.8 %
PEF PRE REF: 48.6 %
PEF REF: 5.68
POST FEF 25 75: 0.25 L/S (ref 1.04–3.04)
POST FET 100: 13.83 SEC
POST FEV1 FVC: 39.92 % (ref 67.67–92.02)
POST FEV1: 0.98 L (ref 1.62–2.68)
POST FVC: 2.44 L (ref 2.04–3.36)
POST PEF: 2.65 L/S (ref 4.18–7.17)
PRE DLCO: 5.67 ML/(MIN*MMHG) (ref 14.43–25.9)
PRE FEF 25 75: 0.34 L/S (ref 1.04–3.04)
PRE FET 100: 10.59 SEC
PRE FEV1 FVC: 44.67 % (ref 67.67–92.02)
PRE FEV1: 1.06 L (ref 1.62–2.68)
PRE FRC N2: 3.03 L
PRE FVC: 2.38 L (ref 2.04–3.36)
PRE PEF: 2.76 L/S (ref 4.18–7.17)
RVN2 LLN: 1.14
RVN2 PRE REF: 122.1 %
RVN2 PRE: 2.09 L (ref 1.14–2.29)
RVN2 REF: 1.71
RVN2TLCN2 LLN: 29.77
RVN2TLCN2 PRE REF: 123.2 %
RVN2TLCN2 PRE: 48.49 % (ref 29.77–48.95)
RVN2TLCN2 REF: 39.36
TLCN2 LLN: 3.25
TLCN2 PRE REF: 101.6 %
TLCN2 PRE: 4.31 L (ref 3.25–5.23)
TLCN2 REF: 4.24
VA PRE: 3.56 L (ref 4.09–4.09)
VA SINGLE BREATH LLN: 4.09
VA SINGLE BREATH PRE REF: 86.9 %
VA SINGLE BREATH REF: 4.09
VCMAXN2 LLN: 2.04
VCMAXN2 PRE REF: 82.1 %
VCMAXN2 PRE: 2.22 L (ref 2.04–3.36)
VCMAXN2 REF: 2.7

## 2025-08-12 PROCEDURE — 94060 EVALUATION OF WHEEZING: CPT | Mod: 26,HCNC,, | Performed by: INTERNAL MEDICINE

## 2025-08-12 PROCEDURE — 94727 GAS DIL/WSHOT DETER LNG VOL: CPT | Mod: 26,HCNC,, | Performed by: INTERNAL MEDICINE

## 2025-08-12 PROCEDURE — 94729 DIFFUSING CAPACITY: CPT | Mod: 26,HCNC,, | Performed by: INTERNAL MEDICINE

## 2025-08-14 DIAGNOSIS — J44.9 CHRONIC OBSTRUCTIVE PULMONARY DISEASE, UNSPECIFIED COPD TYPE: Primary | ICD-10-CM

## 2025-08-21 DIAGNOSIS — J44.9 CHRONIC OBSTRUCTIVE PULMONARY DISEASE, UNSPECIFIED COPD TYPE: ICD-10-CM

## 2025-08-21 DIAGNOSIS — J90 PLEURAL EFFUSION: ICD-10-CM

## 2025-08-21 DIAGNOSIS — J96.01 ACUTE RESPIRATORY FAILURE WITH HYPOXEMIA: ICD-10-CM

## 2025-08-25 ENCOUNTER — HOSPITAL ENCOUNTER (OUTPATIENT)
Dept: CARDIOLOGY | Facility: HOSPITAL | Age: 60
Discharge: HOME | End: 2025-08-25
Attending: INTERNAL MEDICINE
Payer: MEDICARE

## 2025-08-25 ENCOUNTER — HOSPITAL ENCOUNTER (OUTPATIENT)
Dept: RESPIRATORY THERAPY | Facility: HOSPITAL | Age: 60
Discharge: HOME | End: 2025-08-25
Attending: INTERNAL MEDICINE
Payer: MEDICARE

## 2025-08-25 ENCOUNTER — TELEPHONE (OUTPATIENT)
Dept: PULMONOLOGY | Facility: CLINIC | Age: 60
End: 2025-08-25
Payer: MEDICARE

## 2025-08-25 VITALS — WEIGHT: 109 LBS | BODY MASS INDEX: 21.4 KG/M2 | HEIGHT: 60 IN

## 2025-08-25 DIAGNOSIS — J44.9 CHRONIC OBSTRUCTIVE PULMONARY DISEASE, UNSPECIFIED COPD TYPE: ICD-10-CM

## 2025-08-25 DIAGNOSIS — J44.9 COPD MIXED TYPE: ICD-10-CM

## 2025-08-25 LAB
AORTIC ROOT ANNULUS: 3.2 CM
AORTIC SIZE INDEX (SOV): 2.4 CM/M2
AORTIC VALVE CUSP SEPERATION: 1.84 CM
AV INDEX (PROSTH): 0.91
AV MEAN GRADIENT: 3 MMHG
AV PEAK GRADIENT: 7 MMHG
AV VALVE AREA BY VELOCITY RATIO: 2.2 CM²
AV VALVE AREA: 2.3 CM²
AV VELOCITY RATIO: 0.85
BSA FOR ECHO PROCEDURE: 1.45 M2
CV ECHO LV RWT: 0.57 CM
DOP CALC AO PEAK VEL: 1.3 M/S
DOP CALC AO VTI: 29.1 CM
DOP CALC LVOT AREA: 2.5 CM2
DOP CALC LVOT DIAMETER: 1.8 CM
DOP CALC LVOT PEAK VEL: 1.1 M/S
DOP CALCLVOT PEAK VEL VTI: 26.5 CM
E WAVE DECELERATION TIME: 263 MSEC
E/A RATIO: 1.05
E/E' RATIO: 11 M/S
ECHO LV POSTERIOR WALL: 1 CM (ref 0.6–1.1)
FRACTIONAL SHORTENING: 37.1 % (ref 28–44)
INTERVENTRICULAR SEPTUM: 1 CM (ref 0.6–1.1)
IVC DIAMETER: 1.84 CM
IVRT: 114 MSEC
LA MAJOR: 4.3 CM
LA MINOR: 3.9 CM
LA WIDTH: 3.1 CM
LEFT ATRIUM SIZE: 3.4 CM
LEFT ATRIUM VOLUME INDEX: 25 ML/M2
LEFT ATRIUM VOLUME: 37 CM3
LEFT INTERNAL DIMENSION IN SYSTOLE: 2.2 CM (ref 2.1–4)
LEFT VENTRICLE DIASTOLIC VOLUME INDEX: 36.11 ML/M2
LEFT VENTRICLE DIASTOLIC VOLUME: 52 ML
LEFT VENTRICLE MASS INDEX: 71.8 G/M2
LEFT VENTRICLE SYSTOLIC VOLUME INDEX: 11.1 ML/M2
LEFT VENTRICLE SYSTOLIC VOLUME: 16 ML
LEFT VENTRICULAR INTERNAL DIMENSION IN DIASTOLE: 3.5 CM (ref 3.5–6)
LEFT VENTRICULAR MASS: 103.4 G
LV LATERAL E/E' RATIO: 9.2 M/S
LV SEPTAL E/E' RATIO: 13.1 M/S
LVED V (TEICH): 51.74 ML
LVES V (TEICH): 15.7 ML
LVOT MG: 2.64 MMHG
LVOT MV: 0.76 CM/S
Lab: 2.2 CM/M
MV PEAK A VEL: 0.88 M/S
MV PEAK E VEL: 0.92 M/S
MV STENOSIS PRESSURE HALF TIME: 76.27 MS
MV VALVE AREA P 1/2 METHOD: 2.88 CM2
OHS CV CPX PATIENT HEIGHT IN: 60
OHS CV RV/LV RATIO: 0.97 CM
PISA TR MAX VEL: 3.4 M/S
PULM VEIN S/D RATIO: 1.25
PV PEAK D VEL: 0.59 M/S
PV PEAK GRADIENT: 4 MMHG
PV PEAK S VEL: 0.74 M/S
PV PEAK VELOCITY: 1.06 M/S
RA MAJOR: 3.62 CM
RA PRESSURE ESTIMATED: 8 MMHG
RA WIDTH: 3.5 CM
RIGHT VENTRICLE DIASTOLIC BASEL DIMENSION: 3.4 CM
RIGHT VENTRICULAR END-DIASTOLIC DIMENSION: 3.37 CM
RV TB RVSP: 11 MMHG
RV TISSUE DOPPLER FREE WALL SYSTOLIC VELOCITY 1 (APICAL 4 CHAMBER VIEW): 12.33 CM/S
SINUS: 3.4 CM
STJ: 2 CM
TDI LATERAL: 0.1 M/S
TDI SEPTAL: 0.07 M/S
TDI: 0.09 M/S
TR MAX PG: 45 MMHG
TRICUSPID ANNULAR PLANE SYSTOLIC EXCURSION: 1.7 CM
TV REST PULMONARY ARTERY PRESSURE: 54 MMHG
Z-SCORE OF LEFT VENTRICULAR DIMENSION IN END DIASTOLE: -2.24
Z-SCORE OF LEFT VENTRICULAR DIMENSION IN END SYSTOLE: -1.67

## 2025-08-25 PROCEDURE — 94618 PULMONARY STRESS TESTING: CPT

## 2025-08-25 PROCEDURE — 93306 TTE W/DOPPLER COMPLETE: CPT | Mod: 26,,, | Performed by: INTERNAL MEDICINE

## 2025-08-25 PROCEDURE — 93306 TTE W/DOPPLER COMPLETE: CPT

## 2025-08-27 PROCEDURE — 94618 PULMONARY STRESS TESTING: CPT | Mod: 26,,, | Performed by: INTERNAL MEDICINE

## 2025-08-28 ENCOUNTER — PATIENT MESSAGE (OUTPATIENT)
Dept: PULMONOLOGY | Facility: CLINIC | Age: 60
End: 2025-08-28
Payer: MEDICARE

## 2025-08-28 DIAGNOSIS — I27.20 PULMONARY HYPERTENSION: Primary | ICD-10-CM

## 2025-08-28 DIAGNOSIS — J44.9 CHRONIC OBSTRUCTIVE PULMONARY DISEASE, UNSPECIFIED COPD TYPE: ICD-10-CM

## 2025-09-02 ENCOUNTER — TELEPHONE (OUTPATIENT)
Dept: TRANSPLANT | Facility: CLINIC | Age: 60
End: 2025-09-02
Payer: MEDICARE

## 2025-09-02 DIAGNOSIS — I27.9 CHRONIC PULMONARY HEART DISEASE: ICD-10-CM

## 2025-09-02 DIAGNOSIS — I27.20 PULMONARY HYPERTENSION: Primary | ICD-10-CM

## 2025-09-02 DIAGNOSIS — R06.82 TACHYPNEA: ICD-10-CM

## 2025-09-02 DIAGNOSIS — Z79.899 POLYPHARMACY: Primary | ICD-10-CM

## 2025-09-02 RX ORDER — IBUPROFEN 600 MG/1
600 TABLET ORAL EVERY 6 HOURS PRN
Qty: 20 TABLET | Refills: 1 | Status: SHIPPED | OUTPATIENT
Start: 2025-09-02